# Patient Record
Sex: FEMALE | Race: BLACK OR AFRICAN AMERICAN | Employment: OTHER | URBAN - METROPOLITAN AREA
[De-identification: names, ages, dates, MRNs, and addresses within clinical notes are randomized per-mention and may not be internally consistent; named-entity substitution may affect disease eponyms.]

---

## 2017-08-22 PROBLEM — R56.9 SEIZURE (HCC): Status: ACTIVE | Noted: 2017-08-22

## 2018-08-21 ENCOUNTER — APPOINTMENT (OUTPATIENT)
Dept: GENERAL RADIOLOGY | Age: 83
End: 2018-08-21
Payer: MEDICARE

## 2018-08-21 ENCOUNTER — HOSPITAL ENCOUNTER (OUTPATIENT)
Age: 83
Setting detail: OBSERVATION
Discharge: HOME | End: 2018-08-24
Attending: EMERGENCY MEDICINE | Admitting: INTERNAL MEDICINE
Payer: MEDICARE

## 2018-08-21 ENCOUNTER — APPOINTMENT (OUTPATIENT)
Dept: CT IMAGING | Age: 83
End: 2018-08-21
Payer: MEDICARE

## 2018-08-21 DIAGNOSIS — G45.9 TRANSIENT CEREBRAL ISCHEMIA, UNSPECIFIED TYPE: ICD-10-CM

## 2018-08-21 DIAGNOSIS — I10 ACCELERATED HYPERTENSION: ICD-10-CM

## 2018-08-21 DIAGNOSIS — R55 SYNCOPE AND COLLAPSE: Primary | ICD-10-CM

## 2018-08-21 PROBLEM — I48.0 PAROXYSMAL ATRIAL FIBRILLATION (HCC): Chronic | Status: ACTIVE | Noted: 2018-08-21

## 2018-08-21 LAB
ANION GAP SERPL CALCULATED.3IONS-SCNC: 10 MMOL/L (ref 7–16)
BASOPHILS ABSOLUTE: 0.05 E9/L (ref 0–0.2)
BASOPHILS RELATIVE PERCENT: 0.9 % (ref 0–2)
BUN BLDV-MCNC: 11 MG/DL (ref 8–23)
CALCIUM SERPL-MCNC: 9.4 MG/DL (ref 8.6–10.2)
CHLORIDE BLD-SCNC: 106 MMOL/L (ref 98–107)
CO2: 24 MMOL/L (ref 22–29)
CREAT SERPL-MCNC: 0.9 MG/DL (ref 0.5–1)
EKG ATRIAL RATE: 49 BPM
EKG P AXIS: 57 DEGREES
EKG P-R INTERVAL: 158 MS
EKG Q-T INTERVAL: 444 MS
EKG QRS DURATION: 100 MS
EKG QTC CALCULATION (BAZETT): 401 MS
EKG R AXIS: -47 DEGREES
EKG T AXIS: 30 DEGREES
EKG VENTRICULAR RATE: 49 BPM
EOSINOPHILS ABSOLUTE: 0.11 E9/L (ref 0.05–0.5)
EOSINOPHILS RELATIVE PERCENT: 1.9 % (ref 0–6)
GFR AFRICAN AMERICAN: >60
GFR NON-AFRICAN AMERICAN: >60 ML/MIN/1.73
GLUCOSE BLD-MCNC: 100 MG/DL (ref 74–109)
HCT VFR BLD CALC: 43 % (ref 34–48)
HEMOGLOBIN: 13.6 G/DL (ref 11.5–15.5)
IMMATURE GRANULOCYTES #: 0.01 E9/L
IMMATURE GRANULOCYTES %: 0.2 % (ref 0–5)
INR BLD: 1.3
LYMPHOCYTES ABSOLUTE: 2.05 E9/L (ref 1.5–4)
LYMPHOCYTES RELATIVE PERCENT: 35.1 % (ref 20–42)
MCH RBC QN AUTO: 29.1 PG (ref 26–35)
MCHC RBC AUTO-ENTMCNC: 31.6 % (ref 32–34.5)
MCV RBC AUTO: 91.9 FL (ref 80–99.9)
MONOCYTES ABSOLUTE: 0.57 E9/L (ref 0.1–0.95)
MONOCYTES RELATIVE PERCENT: 9.8 % (ref 2–12)
NEUTROPHILS ABSOLUTE: 3.05 E9/L (ref 1.8–7.3)
NEUTROPHILS RELATIVE PERCENT: 52.1 % (ref 43–80)
PDW BLD-RTO: 14.6 FL (ref 11.5–15)
PLATELET # BLD: 179 E9/L (ref 130–450)
PMV BLD AUTO: 9.7 FL (ref 7–12)
POTASSIUM SERPL-SCNC: 4 MMOL/L (ref 3.5–5)
PROTHROMBIN TIME: 14.4 SEC (ref 9.3–12.4)
RBC # BLD: 4.68 E12/L (ref 3.5–5.5)
SODIUM BLD-SCNC: 140 MMOL/L (ref 132–146)
TROPONIN: <0.01 NG/ML (ref 0–0.03)
WBC # BLD: 5.8 E9/L (ref 4.5–11.5)

## 2018-08-21 PROCEDURE — 85025 COMPLETE CBC W/AUTO DIFF WBC: CPT

## 2018-08-21 PROCEDURE — G0378 HOSPITAL OBSERVATION PER HR: HCPCS

## 2018-08-21 PROCEDURE — 99285 EMERGENCY DEPT VISIT HI MDM: CPT

## 2018-08-21 PROCEDURE — 93005 ELECTROCARDIOGRAM TRACING: CPT | Performed by: NURSE PRACTITIONER

## 2018-08-21 PROCEDURE — 36415 COLL VENOUS BLD VENIPUNCTURE: CPT

## 2018-08-21 PROCEDURE — 70450 CT HEAD/BRAIN W/O DYE: CPT

## 2018-08-21 PROCEDURE — 6360000002 HC RX W HCPCS: Performed by: EMERGENCY MEDICINE

## 2018-08-21 PROCEDURE — 71046 X-RAY EXAM CHEST 2 VIEWS: CPT

## 2018-08-21 PROCEDURE — 6370000000 HC RX 637 (ALT 250 FOR IP): Performed by: EMERGENCY MEDICINE

## 2018-08-21 PROCEDURE — 80048 BASIC METABOLIC PNL TOTAL CA: CPT

## 2018-08-21 PROCEDURE — 84484 ASSAY OF TROPONIN QUANT: CPT

## 2018-08-21 PROCEDURE — 96374 THER/PROPH/DIAG INJ IV PUSH: CPT

## 2018-08-21 PROCEDURE — 85610 PROTHROMBIN TIME: CPT

## 2018-08-21 RX ORDER — HYDRALAZINE HYDROCHLORIDE 10 MG/1
10 TABLET, FILM COATED ORAL 2 TIMES DAILY
Status: ON HOLD | COMMUNITY
End: 2018-08-24 | Stop reason: HOSPADM

## 2018-08-21 RX ORDER — ASPIRIN 81 MG/1
81 TABLET, CHEWABLE ORAL DAILY
Status: DISCONTINUED | OUTPATIENT
Start: 2018-08-22 | End: 2018-08-24 | Stop reason: HOSPADM

## 2018-08-21 RX ORDER — SODIUM CHLORIDE 0.9 % (FLUSH) 0.9 %
10 SYRINGE (ML) INJECTION EVERY 12 HOURS SCHEDULED
Status: DISCONTINUED | OUTPATIENT
Start: 2018-08-22 | End: 2018-08-24 | Stop reason: HOSPADM

## 2018-08-21 RX ORDER — CLONIDINE HYDROCHLORIDE 0.1 MG/1
0.1 TABLET ORAL ONCE
Status: COMPLETED | OUTPATIENT
Start: 2018-08-21 | End: 2018-08-21

## 2018-08-21 RX ORDER — HYDRALAZINE HYDROCHLORIDE 25 MG/1
25 TABLET, FILM COATED ORAL EVERY 8 HOURS SCHEDULED
Status: DISCONTINUED | OUTPATIENT
Start: 2018-08-22 | End: 2018-08-24 | Stop reason: HOSPADM

## 2018-08-21 RX ORDER — ONDANSETRON 2 MG/ML
4 INJECTION INTRAMUSCULAR; INTRAVENOUS EVERY 6 HOURS PRN
Status: DISCONTINUED | OUTPATIENT
Start: 2018-08-21 | End: 2018-08-24 | Stop reason: HOSPADM

## 2018-08-21 RX ORDER — LABETALOL HYDROCHLORIDE 5 MG/ML
10 INJECTION, SOLUTION INTRAVENOUS EVERY 4 HOURS PRN
Status: DISCONTINUED | OUTPATIENT
Start: 2018-08-21 | End: 2018-08-24 | Stop reason: HOSPADM

## 2018-08-21 RX ORDER — SENNA PLUS 8.6 MG/1
1 TABLET ORAL DAILY
Status: DISCONTINUED | OUTPATIENT
Start: 2018-08-22 | End: 2018-08-24 | Stop reason: HOSPADM

## 2018-08-21 RX ORDER — SODIUM CHLORIDE 0.9 % (FLUSH) 0.9 %
10 SYRINGE (ML) INJECTION PRN
Status: DISCONTINUED | OUTPATIENT
Start: 2018-08-21 | End: 2018-08-24 | Stop reason: HOSPADM

## 2018-08-21 RX ORDER — HYDRALAZINE HYDROCHLORIDE 20 MG/ML
10 INJECTION INTRAMUSCULAR; INTRAVENOUS ONCE
Status: COMPLETED | OUTPATIENT
Start: 2018-08-21 | End: 2018-08-21

## 2018-08-21 RX ORDER — HYDRALAZINE HYDROCHLORIDE 25 MG/1
25 TABLET, FILM COATED ORAL ONCE
Status: COMPLETED | OUTPATIENT
Start: 2018-08-21 | End: 2018-08-21

## 2018-08-21 RX ORDER — WARFARIN SODIUM 5 MG/1
5 TABLET ORAL
Status: COMPLETED | OUTPATIENT
Start: 2018-08-22 | End: 2018-08-22

## 2018-08-21 RX ORDER — ATORVASTATIN CALCIUM 10 MG/1
10 TABLET, FILM COATED ORAL NIGHTLY
Status: DISCONTINUED | OUTPATIENT
Start: 2018-08-22 | End: 2018-08-22

## 2018-08-21 RX ORDER — HYDRALAZINE HYDROCHLORIDE 20 MG/ML
10 INJECTION INTRAMUSCULAR; INTRAVENOUS EVERY 6 HOURS PRN
Status: DISCONTINUED | OUTPATIENT
Start: 2018-08-21 | End: 2018-08-24 | Stop reason: HOSPADM

## 2018-08-21 RX ORDER — LISINOPRIL 20 MG/1
20 TABLET ORAL 2 TIMES DAILY
Status: DISCONTINUED | OUTPATIENT
Start: 2018-08-23 | End: 2018-08-24 | Stop reason: HOSPADM

## 2018-08-21 RX ADMIN — HYDRALAZINE HYDROCHLORIDE 10 MG: 20 INJECTION INTRAMUSCULAR; INTRAVENOUS at 19:47

## 2018-08-21 RX ADMIN — HYDRALAZINE HYDROCHLORIDE 25 MG: 25 TABLET, FILM COATED ORAL at 18:15

## 2018-08-21 RX ADMIN — CLONIDINE HYDROCHLORIDE 0.1 MG: 0.1 TABLET ORAL at 19:47

## 2018-08-21 ASSESSMENT — ENCOUNTER SYMPTOMS
NAUSEA: 0
VOMITING: 0
BACK PAIN: 0
COUGH: 0
SHORTNESS OF BREATH: 0
BLOOD IN STOOL: 0
ABDOMINAL PAIN: 0

## 2018-08-21 ASSESSMENT — PAIN SCALES - GENERAL: PAINLEVEL_OUTOF10: 0

## 2018-08-21 NOTE — ED PROVIDER NOTES
FIRST PROVIDER CONTACT ASSESSMENT NOTE      Department of Emergency Medicine   8/21/18  1:07 PM    Chief Complaint: Dizziness (ongoing for about a month)      History of Present Illness:   Geronimo De La Rosa is a 80 y.o. female who presents to the ED for elevated BP and lightheadedness. Patient states BP at home was 170s/98. Patient reports lightheadedness when she stands today. Both elevated BP and lightheadedness have been going on for roughly one month. Patient not always compliant with medication. Denies head injury or trauma. Medical History:  has a past medical history of Arthritis; Hypertension; Ischemic colitis ([address] and PAF (paroxysmal atrial fibrillation) ([address] Surgical History:  has a past surgical history that includes Ankle fracture surgery. Social History:  reports that she has never smoked. She does not have any smokeless tobacco history on file. She reports that she does not drink alcohol or use drugs. Family History: family history is not on file. *ALLERGIES*     Patient has no known allergies.      Physical Exam:      VS:  BP (!) 168/85   Pulse 70   Temp 98.1 °F (36.7 °C)   Resp 14   Ht 5' 6.5\" (1.689 m)   Wt 155 lb (70.3 kg)   SpO2 98%   BMI 24.64 kg/m²      Initial Plan of Care:  Initiate Treatment-Testing, Proceed toTreatment Area When Bed Available for ED Attending/MLP to Continue Care    -----------------END OF FIRST PROVIDER CONTACT ASSESSMENT NOTE--------------  Electronically signed by FRANCES Avila CNP   DD: 8/21/18             FRANCES Avila CNP  08/21/18 7400

## 2018-08-21 NOTE — ED PROVIDER NOTES
Danay Sawyer is an 80 y.o female who presents to the ED with a complaint of dizziness. Patient states over the past month she has been experiencing intermittent lightheadedness. She states it usually occurs in the morning and improves throughout the day. She states she has passed out in the past and was told she has vasovagal syncope. She denies feeling the sensation that the room is spinning. She denies HA, blurry vision, nausea, vomiting, chest pain, sob, diaphoresis, cough or congestion. She denies tinnitus or ear pain. Review of Systems   Constitutional: Negative for chills and fever. Respiratory: Negative for cough and shortness of breath. Cardiovascular: Negative for chest pain. Gastrointestinal: Negative for abdominal pain, blood in stool, nausea and vomiting. Genitourinary: Negative for dysuria and frequency. Musculoskeletal: Negative for back pain. Skin: Negative for rash. Neurological: Positive for light-headedness. Negative for weakness and headaches. All other systems reviewed and are negative. Physical Exam   Constitutional: She is oriented to person, place, and time. She appears well-developed and well-nourished. HENT:   Head: Normocephalic and atraumatic. Eyes: Conjunctivae are normal.   Neck: Normal range of motion. Neck supple. Cardiovascular: Regular rhythm and normal heart sounds. Bradycardia present. No murmur heard. Pulmonary/Chest: Effort normal and breath sounds normal. No respiratory distress. She has no wheezes. She has no rales. Abdominal: Soft. Bowel sounds are normal. There is no tenderness. There is no rebound and no guarding. Musculoskeletal: She exhibits no edema. Neurological: She is alert and oriented to person, place, and time. No cranial nerve deficit. Coordination normal.   Skin: Skin is warm and dry. Nursing note and vitals reviewed.       Procedures    MDM  Number of Diagnoses or Management Options  Transient cerebral Final Result   1. Stable, enlarged cardiomediastinal silhouette. .   2. Tortuous descending thoracic aorta. 3. Vascular calcifications thoracic aorta. EKG: This EKG is signed and interpreted by me. Rate: 49  Rhythm: Sinus  Interpretation: NSR with PACs  Comparison: stable as compared to patient's most recent EKG      ------------------------- NURSING NOTES AND VITALS REVIEWED ---------------------------  Date / Time Roomed:  8/21/2018  5:12 PM  ED Bed Assignment:  24/24    The nursing notes within the ED encounter and vital signs as below have been reviewed. Patient Vitals for the past 24 hrs:   BP Temp Pulse Resp SpO2 Height Weight   08/21/18 2059 (!) 153/79 - 74 18 98 % - -   08/21/18 1952 (!) 178/94 - 66 - - - -   08/21/18 1947 (!) 196/107 - - - - - -   08/21/18 1944 (!) 187/110 - 63 18 98 % - -   08/21/18 1913 (!) 185/91 - (!) 49 22 98 % - -   08/21/18 1815 (!) 187/97 - - - - - -   08/21/18 1737 - - 50 17 98 % - -   08/21/18 1309 (!) 168/85 98.1 °F (36.7 °C) 70 14 98 % 5' 6.5\" (1.689 m) 155 lb (70.3 kg)       Oxygen Saturation Interpretation: Normal    ------------------------------------------ PROGRESS NOTES ------------------------------------------  Re-evaluation(s):  Time: 1921  Patients symptoms show no change  Repeat physical examination is not changed    Counseling:  I have spoken with the patient and discussed todays results, in addition to providing specific details for the plan of care and counseling regarding the diagnosis and prognosis. Their questions are answered at this time and they are agreeable with the plan of admission.    --------------------------------- ADDITIONAL PROVIDER NOTES ---------------------------------  Consultations:  Time: 2130. Spoke with Dr. Dorene Gracia. Discussed case. They will admit the patient.   This patient's ED course included: a personal history and physicial examination and multiple bedside re-evaluations    This patient has remained hemodynamically stable during their ED course. Diagnosis:  1. Transient cerebral ischemia, unspecified type        Disposition:  Patient's disposition: Admit to telemetry  Patient's condition is stable.        Samra Palumbo DO  Resident  08/21/18 2353

## 2018-08-22 ENCOUNTER — APPOINTMENT (OUTPATIENT)
Dept: ULTRASOUND IMAGING | Age: 83
End: 2018-08-22
Payer: MEDICARE

## 2018-08-22 ENCOUNTER — APPOINTMENT (OUTPATIENT)
Dept: MRI IMAGING | Age: 83
End: 2018-08-22
Payer: MEDICARE

## 2018-08-22 LAB
ALBUMIN SERPL-MCNC: 3.6 G/DL (ref 3.5–5.2)
ALP BLD-CCNC: 68 U/L (ref 35–104)
ALT SERPL-CCNC: 26 U/L (ref 0–32)
ANION GAP SERPL CALCULATED.3IONS-SCNC: 11 MMOL/L (ref 7–16)
AST SERPL-CCNC: 29 U/L (ref 0–31)
BASOPHILS ABSOLUTE: 0.04 E9/L (ref 0–0.2)
BASOPHILS RELATIVE PERCENT: 0.7 % (ref 0–2)
BILIRUB SERPL-MCNC: 0.7 MG/DL (ref 0–1.2)
BUN BLDV-MCNC: 12 MG/DL (ref 8–23)
CALCIUM SERPL-MCNC: 9.1 MG/DL (ref 8.6–10.2)
CHLORIDE BLD-SCNC: 107 MMOL/L (ref 98–107)
CHOLESTEROL, TOTAL: 154 MG/DL (ref 0–199)
CO2: 23 MMOL/L (ref 22–29)
CREAT SERPL-MCNC: 0.9 MG/DL (ref 0.5–1)
EOSINOPHILS ABSOLUTE: 0.1 E9/L (ref 0.05–0.5)
EOSINOPHILS RELATIVE PERCENT: 1.7 % (ref 0–6)
GFR AFRICAN AMERICAN: >60
GFR NON-AFRICAN AMERICAN: >60 ML/MIN/1.73
GLUCOSE BLD-MCNC: 122 MG/DL (ref 74–109)
HCT VFR BLD CALC: 39.1 % (ref 34–48)
HDLC SERPL-MCNC: 65 MG/DL
HEMOGLOBIN: 13.3 G/DL (ref 11.5–15.5)
IMMATURE GRANULOCYTES #: 0.01 E9/L
IMMATURE GRANULOCYTES %: 0.2 % (ref 0–5)
LDL CHOLESTEROL CALCULATED: 75 MG/DL (ref 0–99)
LYMPHOCYTES ABSOLUTE: 1.69 E9/L (ref 1.5–4)
LYMPHOCYTES RELATIVE PERCENT: 29.3 % (ref 20–42)
MAGNESIUM: 2 MG/DL (ref 1.6–2.6)
MCH RBC QN AUTO: 30 PG (ref 26–35)
MCHC RBC AUTO-ENTMCNC: 34 % (ref 32–34.5)
MCV RBC AUTO: 88.3 FL (ref 80–99.9)
MONOCYTES ABSOLUTE: 0.58 E9/L (ref 0.1–0.95)
MONOCYTES RELATIVE PERCENT: 10.1 % (ref 2–12)
NEUTROPHILS ABSOLUTE: 3.34 E9/L (ref 1.8–7.3)
NEUTROPHILS RELATIVE PERCENT: 58 % (ref 43–80)
PDW BLD-RTO: 14.5 FL (ref 11.5–15)
PLATELET # BLD: 171 E9/L (ref 130–450)
PMV BLD AUTO: 9.7 FL (ref 7–12)
POTASSIUM SERPL-SCNC: 3.8 MMOL/L (ref 3.5–5)
RBC # BLD: 4.43 E12/L (ref 3.5–5.5)
SODIUM BLD-SCNC: 141 MMOL/L (ref 132–146)
TOTAL PROTEIN: 6.8 G/DL (ref 6.4–8.3)
TRIGL SERPL-MCNC: 70 MG/DL (ref 0–149)
TROPONIN: <0.01 NG/ML (ref 0–0.03)
TROPONIN: <0.01 NG/ML (ref 0–0.03)
VLDLC SERPL CALC-MCNC: 14 MG/DL
WBC # BLD: 5.8 E9/L (ref 4.5–11.5)

## 2018-08-22 PROCEDURE — G8989 SELF CARE D/C STATUS: HCPCS

## 2018-08-22 PROCEDURE — 83735 ASSAY OF MAGNESIUM: CPT

## 2018-08-22 PROCEDURE — 97165 OT EVAL LOW COMPLEX 30 MIN: CPT

## 2018-08-22 PROCEDURE — G0378 HOSPITAL OBSERVATION PER HR: HCPCS

## 2018-08-22 PROCEDURE — 70551 MRI BRAIN STEM W/O DYE: CPT

## 2018-08-22 PROCEDURE — G8988 SELF CARE GOAL STATUS: HCPCS

## 2018-08-22 PROCEDURE — 6370000000 HC RX 637 (ALT 250 FOR IP): Performed by: INTERNAL MEDICINE

## 2018-08-22 PROCEDURE — 2580000003 HC RX 258: Performed by: INTERNAL MEDICINE

## 2018-08-22 PROCEDURE — 36415 COLL VENOUS BLD VENIPUNCTURE: CPT

## 2018-08-22 PROCEDURE — 80053 COMPREHEN METABOLIC PANEL: CPT

## 2018-08-22 PROCEDURE — 80061 LIPID PANEL: CPT

## 2018-08-22 PROCEDURE — 93880 EXTRACRANIAL BILAT STUDY: CPT

## 2018-08-22 PROCEDURE — 85025 COMPLETE CBC W/AUTO DIFF WBC: CPT

## 2018-08-22 PROCEDURE — 84484 ASSAY OF TROPONIN QUANT: CPT

## 2018-08-22 PROCEDURE — G8987 SELF CARE CURRENT STATUS: HCPCS

## 2018-08-22 RX ORDER — ATORVASTATIN CALCIUM 40 MG/1
40 TABLET, FILM COATED ORAL NIGHTLY
Status: DISCONTINUED | OUTPATIENT
Start: 2018-08-22 | End: 2018-08-24 | Stop reason: HOSPADM

## 2018-08-22 RX ORDER — LORAZEPAM 0.5 MG/1
0.5 TABLET ORAL
Status: COMPLETED | OUTPATIENT
Start: 2018-08-22 | End: 2018-08-22

## 2018-08-22 RX ADMIN — WARFARIN SODIUM 5 MG: 5 TABLET ORAL at 17:52

## 2018-08-22 RX ADMIN — METOPROLOL TARTRATE 25 MG: 25 TABLET ORAL at 08:56

## 2018-08-22 RX ADMIN — Medication 10 ML: at 08:56

## 2018-08-22 RX ADMIN — LORAZEPAM 0.5 MG: 0.5 TABLET ORAL at 11:59

## 2018-08-22 RX ADMIN — MAGNESIUM HYDROXIDE 30 ML: 400 SUSPENSION ORAL at 07:07

## 2018-08-22 RX ADMIN — ASPIRIN 81 MG 81 MG: 81 TABLET ORAL at 08:56

## 2018-08-22 RX ADMIN — HYDRALAZINE HYDROCHLORIDE 25 MG: 25 TABLET ORAL at 13:59

## 2018-08-22 RX ADMIN — ATORVASTATIN CALCIUM 10 MG: 10 TABLET, FILM COATED ORAL at 00:50

## 2018-08-22 RX ADMIN — METOPROLOL TARTRATE 25 MG: 25 TABLET ORAL at 00:28

## 2018-08-22 RX ADMIN — SENNOSIDES 8.6 MG: 8.6 TABLET, FILM COATED ORAL at 08:56

## 2018-08-22 RX ADMIN — HYDRALAZINE HYDROCHLORIDE 25 MG: 25 TABLET ORAL at 00:29

## 2018-08-22 RX ADMIN — HYDRALAZINE HYDROCHLORIDE 25 MG: 25 TABLET ORAL at 07:04

## 2018-08-22 ASSESSMENT — PAIN SCALES - GENERAL: PAINLEVEL_OUTOF10: 0

## 2018-08-22 NOTE — CARE COORDINATION
Social Work/Discharge Planning:    SW met with pt, pt stated that she lives with her son, grand daughter and two grandchildren. PTA pt stated that she is independent but use a cane in the community. Pt stated that she does not have any additional DME. Pt denies any hx with ARU/SNF however has hx with [facility]. PCP is Dr. Ivette Rausch and primary [facility] is [facility]. Pt stated that plan at discharge is to return home. Pt stated that her son and grand daughter can assist if needed. Pt stated that she also has a son that lives nearby and three daughters that live in the area that  are  able to assist if needed. SW discussed [address] with pt. Pt agreeable to [address] and would like [facility] after choices. SW made referral to [facility] at [facility], Will need [address] orders prior to d/c. Awaiting PT/OT olaf, AMANDEEP will follow.     Electronically signed by ESTEFANIA Whitehead on 8/22/2018 at 8:42 AM

## 2018-08-22 NOTE — PROGRESS NOTES
Physical Therapy    Facility/Department: [facility]      NAME: Ethan López  : 1931  MRN: [id number]    Date of Service: 2018    PT order received. Chart reviewed. Noted pt independent for OT evaluation. Nursing and pt also confirm, pt moving well in her room. Will discharge PT order at this time. Thank you.       Ary Hartmann PT, DPT [id number]

## 2018-08-22 NOTE — PROGRESS NOTES
Occupational Therapy        OCCUPATIONAL THERAPY INITIAL EVALUATION      Date:2018  Patient Name: Melecio Mcgrath  MRN: [id number]  : 1931  Room: [address] [place] [address]     Evaluating OT: Duque Foot, OTR/L    AM-PAC Daily Activity Scale:     Recommended Adaptive Equipment: none at this time     Diagnosis: syncope/dizziness    Past Medical History:  Past Medical History:   Diagnosis Date    Arthritis     Hypertension     [facility]schem[facility] colitis ([facility]     PAF (paroxysmal atrial fibrillation) ([facility]       Past Surgical History:   Procedure Laterality Date    ANKLE FRACTURE SURGERY       Precautions: fall    Home living:  Pt lives with her son, granddaughter and 2 great grandchildren in a one level home with 2 steps to enter. There is a basement where the laundry is, but she reports that she does not go to the basement. Pt is I with all her care and she has a tub/shower that she uses with a grab bar. Pt uses a SPC for mobility when she goes outside the home.      Occupation:  retired      Pain Level: pt c/o no pain this session    Hearing: intact  Vision: intact      Line and Tubes: PIV    Cognition: oriented x 4  Memory: intact  Attention: intact  Problem Solving: intact  Safety Awareness: intact      UE Assessment:  Hand Dominance: Right [x]  Left []      ROM:  LUE: WFL  RUE: WFL       Strength:  L UE: 5/5 Proximal; 5/5 Distal  R UE: 5/5 Proximal; 5/5 Distal          Functional Assessment:   Initial Status  Comments   Feeding  IND    Grooming  IND      Upper Body Dressing IND     Lower Body Dressing IND Pt independent to manage pajama bottoms during toileting activity   Bathing IND    Toileting  IND Pt was IND to sit<>stand from toilet, IND for hygiene, and IND for LB clothing management   Bed Mobility  Supine to Sit: IND  Sit to Supine:IND For supine<>sit   Functional Transfers IND  With use of SPC   Functional Mobility IND To ambulate to bathroom with SPC     Static Sit balance: good  Dynamic Sit Balance: good  Static Stand balance: good Dynamic Stand Balance: good  Endurance/Activity tolerance: good  Sensation: intact                                  Comments: Upon arrival pt lying supine in bed in NAD and agreeable to OT eval.  At end of session pt seated at edge of bed with all devices within reach, all lines and tubes intact. Assessment of current deficits  No deficits noted at this time  Functional mobility []  ROM [] Strength []  Cognition []  ADLs []   IADLs [] Safety Awareness [] Endurance []  Fine Motor Coordination [] Balance [] Vision/perception [] Sensation []   Gross Motor Coordination []     Eval Complexity: Low  Profile and History- Low  Assessment of Occupational Performance and Identification of Deficits- low  Clinical Decision Making- low    Treatment frequency-no needs       Patient seen this date for OT eval secondary to admittance for syncope. Patient reports that prior to admittance she is IND with all her care and uses a cane when she goes outside for mobility. Patient does not drive and she has assistance from family members for [place] as needed. Pt is currently presenting at her baseline level of IND function and is not in need of any further OT tx. Will DC. Patient and/or family understands diagnosis, prognosis and plan of care: yes         OT Eval: only  Timed Treatment Minutes:     Tamika Pineda OTR/L #[id number]

## 2018-08-23 PROBLEM — E44.0 MODERATE PROTEIN-CALORIE MALNUTRITION (HCC): Chronic | Status: ACTIVE | Noted: 2018-08-23

## 2018-08-23 LAB
INR BLD: 1.8
LV EF: 55 %
LVEF MODALITY: NORMAL
PROTHROMBIN TIME: 21 SEC (ref 9.3–12.4)

## 2018-08-23 PROCEDURE — 93306 TTE W/DOPPLER COMPLETE: CPT

## 2018-08-23 PROCEDURE — 6370000000 HC RX 637 (ALT 250 FOR IP): Performed by: INTERNAL MEDICINE

## 2018-08-23 PROCEDURE — G0378 HOSPITAL OBSERVATION PER HR: HCPCS

## 2018-08-23 PROCEDURE — 85610 PROTHROMBIN TIME: CPT

## 2018-08-23 PROCEDURE — 99231 SBSQ HOSP IP/OBS SF/LOW 25: CPT | Performed by: PSYCHIATRY & NEUROLOGY

## 2018-08-23 PROCEDURE — 2580000003 HC RX 258: Performed by: INTERNAL MEDICINE

## 2018-08-23 PROCEDURE — 36415 COLL VENOUS BLD VENIPUNCTURE: CPT

## 2018-08-23 RX ADMIN — METOPROLOL TARTRATE 12.5 MG: 25 TABLET, FILM COATED ORAL at 08:06

## 2018-08-23 RX ADMIN — METOPROLOL TARTRATE 12.5 MG: 25 TABLET, FILM COATED ORAL at 20:14

## 2018-08-23 RX ADMIN — SENNOSIDES 8.6 MG: 8.6 TABLET, FILM COATED ORAL at 08:07

## 2018-08-23 RX ADMIN — HYDRALAZINE HYDROCHLORIDE 25 MG: 25 TABLET ORAL at 06:32

## 2018-08-23 RX ADMIN — DESMOPRESSIN ACETATE 40 MG: 0.2 TABLET ORAL at 20:14

## 2018-08-23 RX ADMIN — LISINOPRIL 20 MG: 20 TABLET ORAL at 08:06

## 2018-08-23 RX ADMIN — HYDRALAZINE HYDROCHLORIDE 25 MG: 25 TABLET ORAL at 13:54

## 2018-08-23 RX ADMIN — LISINOPRIL 20 MG: 20 TABLET ORAL at 20:14

## 2018-08-23 RX ADMIN — Medication 10 ML: at 08:06

## 2018-08-23 RX ADMIN — HYDRALAZINE HYDROCHLORIDE 25 MG: 25 TABLET ORAL at 22:25

## 2018-08-23 RX ADMIN — ASPIRIN 81 MG 81 MG: 81 TABLET ORAL at 08:06

## 2018-08-23 ASSESSMENT — PAIN SCALES - GENERAL
PAINLEVEL_OUTOF10: 0

## 2018-08-23 NOTE — PROGRESS NOTES
Patient is seen for Syncope    Subjective:     Ms. Cisneros Feeler she feels fine today and wants to go home. She denies any chest pain, shortness of breath, lightheadedness, or dizziness   Laying in bed, in no acute distress     ROS:  CONSTITUTIONAL:  negative for  fevers, chills  HEENT:  negative for earaches, nasal congestion and epistaxis  RESPIRATORY:  negative for  dry cough, cough with sputum,wheezing and hemoptysis  GASTROINTESTINAL:  negative for nausea, vomiting  MUSCULOSKELETAL:  negative for  myalgias, arthralgias  NEUROLOGICAL:  negative for visual disturbance, dysphagia    Medication side effects: none    Scheduled Meds:   atorvastatin  40 mg Oral Nightly    metoprolol tartrate  12.5 mg Oral BID    aspirin  81 mg Oral Daily    hydrALAZINE  25 mg Oral 3 times per day    lisinopril  20 mg Oral BID    senna  1 tablet Oral Daily    sodium chloride flush  10 mL Intravenous 2 times per day     Continuous Infusions:  PRN Meds:perflutren lipid microspheres, sodium chloride flush, magnesium hydroxide, ondansetron, hydrALAZINE, labetalol      Objective:      Physical Exam:   /77   Pulse 76   Temp 98.8 °F (37.1 °C) (Temporal)   Resp 16   Ht 5' 6.5\" (1.689 m)   Wt 155 lb (70.3 kg)   SpO2 98%   BMI 24.64 kg/m²   CONSTITUTIONAL:  awake, alert, cooperative, no apparent distress, and appears stated age  NECK:  Supple, symmetrical, trachea midline  LUNGS:  No increased work of breathing, good air exchange, clear to auscultation bilaterally, no crackles or wheezing  CARDIOVASCULAR:  Normal apical impulse, regular rate and rhythm, normal S1 and S2, no S3 or S4, 3/6 systolic murmur at the apex, 3/6 diastolic murmur at the right upper sternal border, no JVD, no carotid bruit, no pedal edema, good carotid upstroke bilaterally. ABDOMEN:  Soft, nontender, no masses, no hepatomegaly or splenomegaly, BS+  CHEST: nontender to palpation, expands symmetrically  MUSCULOSKELETAL:  No clubbing no cyanosis. there is no redness, warmth, or swelling of the joints  full range of motion noted  NEUROLOGIC:  Alert, awake  SKIN:  no bruising or bleeding, normal skin color, texture, turgor and no redness, warmth, or swelling    Cardiographics  I personally reviewed the telemetry monitor strips with the following interpretation: atrial fibrillation with controled ventricular rate     Echocardiogram: 12/3/2015,Summary   No previous echo for comparison. Technically adequate study.   Mild asymmetric hypertrophy.   Ejection fraction is visually estimated at 45-50%.   No regional wall motion abnormalities seen.  E/A flow reversal noted. Suggestive of diastolic dysfunction.   mild aortic regurgitation is noted.   Mild tricuspid regurgitation.       Imaging  US CAROTID ARTERY BILATERAL   Final Result   1. No evidence to suggest hemodynamically significant stenosis. MRI Brain WO Contrast   Final Result      1. Negative acute stroke, midline shift mass effect. 2. Moderate chronic small ischemic disease. 3. Multifocal lacunar infarcts involving the cerebellum          CT Head WO Contrast   Final Result   1. No CT evidence of acute intracranial abnormality, as questioned. If   there is further clinical concern, MRI of the brain would be   recommended for more detailed evaluation. .   2. Moderate cerebral volume loss/atrophy. 3. Moderate to moderately severe white matter changes consistent with   sequelae small vessel ischemic disease. 4. Vascular calcifications within the bilateral internal carotid   artery cavernous segments and the vertebral arteries. .   5. Moderate hyperostosis frontalis interna. XR CHEST STANDARD (2 VW)   Final Result   1. Stable, enlarged cardiomediastinal silhouette. .   2. Tortuous descending thoracic aorta. 3. Vascular calcifications thoracic aorta.           Lab Review   Lab Results   Component Value Date     08/22/2018     08/21/2018     02/05/2018    K 3.8 08/22/2018    K 4.0

## 2018-08-23 NOTE — PROGRESS NOTES
Pt woke up pleasantly confused, trying to find the bathroom. Pt was observed attempting to crawl into bed with her roommate. RN intervened & reoriented pt back to her own bed.

## 2018-08-23 NOTE — CONSULTS
Inpatient consult to Neurology  Consult performed by: Nakul Ivy ordered by: Lyndon MORAES        Neurology Consult Note    8/23/2018     REASON FOR CONSULTATION: stroke     HISTORY OF PRESENT ILLNESS:   Patient is an 80-year-old woman presented here with positional dizziness as lightheadedness occurring the past 3 days. Blood pressure has been fluctuating from very high to low. She denies any kind of a spinning sensation. No dysphagia or facial numbness changing voice, weakness or numbness in face and extremities. Past Medical History:   Diagnosis Date    Arthritis     Hypertension     Ischemic colitis ([address]     PA[facility]oxysmal [facility] fibrillation) ([facility])         Past Surgical History:   Procedure Laterality Date    ANKLE FRACTURE SURGERY         Prior to Admission medications    Medication Sig Start Date End Date Taking? Authorizing Provider   hydrALAZINE (APRESOLINE) 10 MG tablet Take 10 mg by mouth 2 times daily One in the A.M.  And at 3pm   Yes Historical Provider, MD   senna (SENOKOT) 8.6 MG TABS tablet Take 1 tablet by mouth daily 2/5/18  Yes FRANCES Pascual - CNP   atorvastatin (LIPITOR) 10 MG tablet Take 1 tablet by mouth nightly 8/25/17  Yes Shayy Taylor,    hydrALAZINE (APRESOLINE) 25 MG tablet Take 1 tablet by mouth every 8 hours  Patient taking differently: Take 25 mg by mouth every evening  8/25/17  Yes Heidi Butler, DO   metoprolol tartrate (LOPRESSOR) 25 MG tablet Take 1 tablet by mouth 2 times daily 8/25/17  Yes Heidi Butler DO   warfarin (JANTOVEN) 6 MG tablet Take 6 mg by mouth daily    Yes Historical Provider, MD   vitamin B-12 (CYANOCOBALAMIN) 500 MCG tablet Take 500 mcg by mouth daily   Yes Historical Provider, MD   lisinopril (PRINIVIL;ZESTRIL) 20 MG tablet Take 1 tablet by mouth 2 times daily  Patient taking differently: Take 40 mg by mouth daily  12/5/15  Yes Tanisha Pritchard, DO   vitamin D (CHOLECALCIFEROL) 1000 UNITS TABS tablet Take 1,000 Units by mouth daily   Yes Historical Provider, MD   aspirin 81 MG chewable tablet Take 81 mg by mouth daily. Yes Historical Provider, MD       Allergies:  Patient has no known allergies. History reviewed. No pertinent family history. Social History   Substance Use Topics    Smoking status: Never Smoker    Smokeless tobacco: Never Used    Alcohol use No          ROS:  GENERAL:  No weight change, change in appetite, fever or chills. HEENT:  No headache, blurred or double vision. CARDIOPULMONARY:  No chest pain, palpitations or shortness of breath. GASTROINTESTINAL:  No anorexia, nausea or vomiting, no diarrhea  GENITOURINARY:  No dysuria  ENDOCRINE:  No heat/cold intolerance, no excessive thirst.  HEMATOLOGY/ONCOLOGY:  No bruising or bleeding. MUSCULOSKELETAL: No swelling. PSYCHIATRIC:  No change in personality, affect or depression. EXAMINATION:  /77   Pulse 76   Temp 98.8 °F (37.1 °C) (Temporal)   Resp 16   Ht 5' 6.5\" (1.689 m)   Wt 148 lb 14.4 oz (67.5 kg)   SpO2 98%   BMI 23.67 kg/m²   GEN: NAD    AAOx3, follows commands, no aphasia/dysarthria. PERRL, EOMI, VFF, normal saccades and pursuit, no gaze preference/nystagmus. Intact facial sensation, no asymmetry. His heart of hearing bilaterally. Tongue midline. Symmetric palate elevation. Neck muscles and shoulder shrug 5/5. Sensation: intact all over (PP, LT, pain, vibration and proprioception), no neglect. Motor: 5/5 all four extremities. Normal bulk and tone, No drift/orbiting. DTRs: +2 biceps/triceps/BR/Knees, +1 ankles, plantars down B/L. Coordination: intact F-N and H-S B/L. No dysmetria. Intact KEKE.  Gait: Walks with a cane and provided the daughter is walking fine at her baseline      ASSESSMENT:  80year-old woman with positional lightheadedness without any spinning components. Neurology was consulted because of question of a cerebellar lower stroke.  These small lacunar strokes  In cerebellum are part of the chronic small vessel disease that can be seen periventricularly in bilateral hemispheres. No acute distress in DWI. She is already on aspirin 81 mg and she takes Coumadin because of the paroxysmal afib. PLAN:   No further neurology investigation or treatment. Aspirin and Coumadin can be continued per discretion of primary team and cardiology. Neurology will sign off. Please call us with questions/additional, persistent or new concerns.       Electronically signed by Sonia Escobedo MD on 8/23/2018 at 7:37 PM

## 2018-08-23 NOTE — PROGRESS NOTES
Nutrition Assessment    Type and Reason for Visit: Initial, Positive Nutrition Screen    Nutrition Recommendations: Continue current diet, Start Vanilla Ensure BID    Malnutrition Assessment:  · Malnutrition Status: Meets the criteria for moderate malnutrition  · Context: Acute illness or injury  · Findings of the 6 clinical characteristics of malnutrition (Minimum of 2 out of 6 clinical characteristics is required to make the diagnosis of moderate or severe Protein Calorie Malnutrition based on AND/ASPEN Guidelines):  1. Energy Intake-Greater than 75%,  (x 2 days)    2. Weight Loss-No significant weight loss, in 1 year  3. Fat Loss-Mild subcutaneous fat loss, Orbital, Triceps  4. Muscle Loss-Moderate muscle mass loss, Temples (temporalis muscle), Clavicles (pectoralis and deltoids)  5. Fluid Accumulation-No significant fluid accumulation  6.  Strength-Not measured    Nutrition Diagnosis:   · Problem:  Moderate malnutrition, in context of acute illness or injury  · Etiology: related to Insufficient energy/nutrient consumption     Signs and symptoms:  as evidenced by Moderate muscle loss (mild fat loss)    Nutrition Assessment:  · Nutrition-Focused Physical Findings: pt alert, some confusion, abd WDL, no noted edema, +I/O's  · Wound Type: None  · Current Nutrition Therapies:  · Oral Diet Orders: General   · Oral Diet intake: %  · Oral Nutrition Supplement (ONS) Orders: None  · Anthropometric Measures:  · Ht: 5' 6.5\" (168.9 cm)   · Current Body Wt: 148 lb (67.1 kg) (8/23 bed scale)  · Admission Body Wt: 148 lb (67.1 kg) (8/23 first measured)  · Usual Body Wt: 149 lb (67.6 kg) (08/2017 standing, actual EMR)  · % Weight Change: wt stable x 1 yr  · Ideal Body Wt: 135 lb (61.2 kg), % Ideal Body 110%  · BMI Classification: BMI 18.5 - 24.9 Normal Weight  · Comparative Standards (Estimated Nutrition Needs):  · Estimated Daily Total Kcal: 6611-2780 (MSJ 1152 x 1.2 SF)  · Estimated Daily Protein (g):

## 2018-08-24 VITALS
OXYGEN SATURATION: 97 % | HEART RATE: 63 BPM | HEIGHT: 67 IN | TEMPERATURE: 98 F | WEIGHT: 148.9 LBS | DIASTOLIC BLOOD PRESSURE: 83 MMHG | SYSTOLIC BLOOD PRESSURE: 141 MMHG | BODY MASS INDEX: 23.37 KG/M2 | RESPIRATION RATE: 18 BRPM

## 2018-08-24 PROCEDURE — 6370000000 HC RX 637 (ALT 250 FOR IP): Performed by: INTERNAL MEDICINE

## 2018-08-24 PROCEDURE — G0378 HOSPITAL OBSERVATION PER HR: HCPCS

## 2018-08-24 RX ORDER — WARFARIN SODIUM 2 MG/1
2 TABLET ORAL DAILY
Status: DISCONTINUED | OUTPATIENT
Start: 2018-08-24 | End: 2018-08-24 | Stop reason: HOSPADM

## 2018-08-24 RX ADMIN — LISINOPRIL 20 MG: 20 TABLET ORAL at 09:00

## 2018-08-24 RX ADMIN — HYDRALAZINE HYDROCHLORIDE 25 MG: 25 TABLET ORAL at 13:12

## 2018-08-24 RX ADMIN — SENNOSIDES 8.6 MG: 8.6 TABLET, FILM COATED ORAL at 09:05

## 2018-08-24 RX ADMIN — ASPIRIN 81 MG 81 MG: 81 TABLET ORAL at 09:00

## 2018-08-24 RX ADMIN — HYDRALAZINE HYDROCHLORIDE 25 MG: 25 TABLET ORAL at 06:29

## 2018-08-24 RX ADMIN — METOPROLOL TARTRATE 12.5 MG: 25 TABLET, FILM COATED ORAL at 09:01

## 2018-08-24 RX ADMIN — MAGNESIUM HYDROXIDE 30 ML: 400 SUSPENSION ORAL at 09:05

## 2018-08-24 ASSESSMENT — PAIN SCALES - GENERAL: PAINLEVEL_OUTOF10: 0

## 2018-08-24 NOTE — DISCHARGE SUMMARY
Physician Discharge Summary     Patient ID:  Leopoldo Del  [id number]  80 y.o.  1931    Admit date: 2018    Discharge date and time: 2018    Admission Diagnoses:   Patient Active Problem List   Diagnosis    Seizure [facility])    Essential hypertension    Paroxysmal atrial fibrillation (HCC)    Syncope and collapse    Moderate protein-calorie malnutrition ([facility] [address]       Discharge Diagnoses: near syncope - likely vasovagal, cerebellar strokes lacunar     Consults:neuro and cards     Procedures: imaging work up unremarkable except cerebellar strokes     Hospital Course:      The patient is a 80 y.o. female of Tuyet Cardoso MD with significant past medical history of htn / colitis ? PAF  on oac with coumadin   who presents with above complaints. She indicates she had a stroke 1 month ago. I do not see documentation of this on our records. Headache w/o visual changes. No focal neurological deficits are noted. Last MRI was 1 year ago. She was evalauted by cards and neurology at that tome and was thought to have vasovagal syncope. Ef was noted to be 50%. On [facility] admission, ct head is wihtou acute stroke. Will await mri . BP is noted to be markedly elevated in 190's range. Pt underwent echo and eval by cards and neuro. She is doing well. She was placed on asa and bb secondary to cerebellar stroke. bp better controlled.  [facility]     Recent Labs      18   1348  18   0323   WBC  5.8  5.8   HGB  13.6  13.3   HCT  43.0  39.1   PLT  179  171       Recent Labs      18   1348  18   0323   NA  140  141   K  4.0  3.8   CL  106  107   CO2  24  23   BUN  11  12   CREATININE  0.9  0.9   CALCIUM  9.4  9.1       Xr Chest Standard (2 Vw)    Result Date: 2018  Patient MRN: [id number] : 1931 Age:  80 years Gender: Female Order Date: 2018 2:15 PM Exam: XR CHEST (2 VW) Number of Views: 2 Indication:   Dizziness with elevated blood pressure, patient reports a stroke one month ischemic disease. 4. Vascular calcifications within the bilateral internal carotid artery cavernous segments and the vertebral arteries. . 5. Moderate hyperostosis frontalis interna. Mri Brain Wo Contrast    Result Date: 2018  Patient MRN:  [id number] : 1931 Age: 80 years Gender: Female EXAM: MRI BRAIN WO CONTRAST INDICATION:  tia/stroke  headache, near syncope, lightheadedness history of hypertension COMPARISON: MRI brain 2017, CT head 2018 FINDINGS: No restricted diffusion. Mild to moderate age-related involutional changes of brain parenchyma with prominence of ventricles and sulci. Moderate periventricular and subcortical white matter T2/FLAIR hyperintensity is nonspecific but suggestive of chronic small vessel ischemic disease. Mild patchy white matter disease identified by the pamella and midbrain. Remote lacunar infarct left lentiform nucleus. Small remote lacunar infarcts both cerebellar hemispheres. No shift of midline structure. Basal cisterns are patent. Minimal fluid both mastoids. 1. Negative acute stroke, midline shift mass effect. 2. Moderate chronic small ischemic disease. 3. Multifocal lacunar infarcts involving the cerebellum      Us Carotid Artery Bilateral    Result Date: 2018  Patient MRN:  [id number] : 1931 Age: 80 years Gender: Female Order Date:  2018 10:30 AM EXAM: US CAROTID ARTERY BILATERAL NUMBER OF IMAGES:  68 TECHNIQUE: Duplex carotid ultrasound with color-flow Doppler, and a velocity blood flow characteristic and spectral analysis of the carotid arteries were obtained and documented. INDICATION:  Patient is an 14-year-old woman with history of stroke  COMPARISON: 2009 FINDINGS: Bilateral CCA, ICA and ECA are patent. The right internal carotid artery has a peak systolic velocity of 39.7 cm/sec proximally with an  internal to common carotid artery ratio of 1.1.  Based on Gosink criteria, no evidence of hemodynamically significant stenosis is seen. The left internal carotid artery has a peak systolic velocity of 15.9 cm/sec proximally with an internal to common carotid artery ratio of 1.6. Based on Gosink criteria, no evidence of hemodynamically significant stenosis is seen. Bilateral antegrade vertebral artery flow is seen. Mild atherosclerotic plaque formation is seen on the right without evidence of hemodynamically significant stenosis. Mild atherosclerotic plaque formation is seen on the left without evidence of hemodynamically significant stenosis. An incidental finding, complex area of right thyroid lobe measuring 2.2 x 2 x 2 cm. Thyroid ultrasound for follow-up is recommended. 1. No evidence to suggest hemodynamically significant stenosis. Discharge Exam:    HEENT: NCAT,  PERRLA, No JVD  Heart:  RRR, no murmurs, gallops, or rubs. Lungs:  CTA bilaterally, no wheeze, rales or rhonchi  Abd: bowel sounds present, nontender, nondistended, no masses  Extrem:  No clubbing, cyanosis, or edema    Disposition: home    Patient Instructions:      Medication List      CHANGE how you take these medications    * hydrALAZINE 25 MG tablet  Commonly known as:  APRESOLINE  Take 1 tablet by mouth every 8 hours  What changed:  when to take this     * hydrALAZINE 10 MG tablet  Commonly known as:  APRESOLINE  What changed:  Another medication with the same name was changed. Make sure you understand how and when to take each. lisinopril 20 MG tablet  Commonly known as:  PRINIVIL;ZESTRIL  Take 1 tablet by mouth 2 times daily  What changed:  · how much to take  · when to take this        * This list has 2 medication(s) that are the same as other medications prescribed for you. Read the directions carefully, and ask your doctor or other care provider to review them with you.             CONTINUE taking these medications    aspirin 81 MG chewable tablet     atorvastatin 10 MG tablet  Commonly known as:  LIPITOR  Take 1 tablet by mouth nightly     JANTOVEN 6 MG tablet  Generic drug:  warfarin     metoprolol tartrate 25 MG tablet  Commonly known as:  LOPRESSOR  Take 1 tablet by mouth 2 times daily     senna 8.6 MG Tabs tablet  Commonly known as:  SENOKOT  Take 1 tablet by mouth daily     vitamin B-12 500 MCG tablet  Commonly known as:  CYANOCOBALAMIN     vitamin D 1000 units Tabs tablet  Commonly known as:  CHOLECALCIFEROL          Activity: activity as tolerated  Diet: low fat, low cholesterol diet    Pt has been advised to: Follow-up with Patrizia Malone MD in 1 week.   Follow-up with consultants as recommended by them    Note that over 30 minutes was spent in preparing discharge papers, discussing discharge with patient, medication review, etc.    Signed:  Donya Oquendo MD  8/24/2018  12:23 PM

## 2018-08-24 NOTE — PROGRESS NOTES
bilateral internal carotid   artery cavernous segments and the vertebral arteries. .   5. Moderate hyperostosis frontalis interna. XR CHEST STANDARD (2 VW)   Final Result   1. Stable, enlarged cardiomediastinal silhouette. .   2. Tortuous descending thoracic aorta. 3. Vascular calcifications thoracic aorta.           Lab Review   Lab Results   Component Value Date     08/22/2018     08/21/2018     02/05/2018    K 3.8 08/22/2018    K 4.0 08/21/2018    K 3.8 02/05/2018    CO2 23 08/22/2018    CO2 24 08/21/2018    CO2 20 02/05/2018    BUN 12 08/22/2018    BUN 11 08/21/2018    BUN 9 02/05/2018    CREATININE 0.9 08/22/2018    CREATININE 0.9 08/21/2018    CREATININE 1.0 02/05/2018    GLUCOSE 122 08/22/2018    GLUCOSE 100 08/21/2018    GLUCOSE 118 02/05/2018    GLUCOSE 102 11/12/2010    GLUCOSE 111 11/09/2010    GLUCOSE 100 10/20/2010    CALCIUM 9.1 08/22/2018    CALCIUM 9.4 08/21/2018    CALCIUM 9.2 02/05/2018     Lab Results   Component Value Date    CKTOTAL 266 12/02/2015    CKTOTAL 252 12/01/2015    CKTOTAL 160 11/12/2010    CKMB 2.6 12/02/2015    CKMB 2.7 12/01/2015    CKMB 2.2 11/12/2010    TROPONINI <0.01 08/22/2018    TROPONINI <0.01 08/21/2018    TROPONINI <0.01 08/21/2018     Lab Results   Component Value Date    WBC 5.8 08/22/2018    WBC 5.8 08/21/2018    WBC 6.0 02/05/2018    HGB 13.3 08/22/2018    HGB 13.6 08/21/2018    HGB 13.2 02/05/2018    HCT 39.1 08/22/2018    HCT 43.0 08/21/2018    HCT 40.1 02/05/2018    MCV 88.3 08/22/2018    MCV 91.9 08/21/2018    MCV 88.7 02/05/2018     08/22/2018     08/21/2018     02/05/2018     Lab Results   Component Value Date    CHOL 154 08/22/2018    CHOL 182 08/24/2017    TRIG 70 08/22/2018    TRIG 58 08/24/2017    HDL 65 08/22/2018    HDL 62 08/24/2017     I have personally reviewed the laboratory, cardiac diagnostic and radiographic testing as outlined above:    Assessment:       Patient Active Problem List    Diagnosis Date Noted

## 2019-04-15 ENCOUNTER — APPOINTMENT (OUTPATIENT)
Dept: CT IMAGING | Age: 84
End: 2019-04-15
Payer: MEDICARE

## 2019-04-15 ENCOUNTER — HOSPITAL ENCOUNTER (OUTPATIENT)
Age: 84
Setting detail: OBSERVATION
Discharge: HOME | End: 2019-04-16
Attending: EMERGENCY MEDICINE | Admitting: INTERNAL MEDICINE
Payer: MEDICARE

## 2019-04-15 ENCOUNTER — APPOINTMENT (OUTPATIENT)
Dept: GENERAL RADIOLOGY | Age: 84
End: 2019-04-15
Payer: MEDICARE

## 2019-04-15 DIAGNOSIS — R55 SYNCOPE AND COLLAPSE: Primary | ICD-10-CM

## 2019-04-15 LAB
ALBUMIN SERPL-MCNC: 3.5 G/DL (ref 3.5–5.2)
ALP BLD-CCNC: 69 U/L (ref 35–104)
ALT SERPL-CCNC: 26 U/L (ref 0–32)
ANION GAP SERPL CALCULATED.3IONS-SCNC: 8 MMOL/L (ref 7–16)
AST SERPL-CCNC: 34 U/L (ref 0–31)
BILIRUB SERPL-MCNC: 1 MG/DL (ref 0–1.2)
BUN BLDV-MCNC: 10 MG/DL (ref 8–23)
CALCIUM SERPL-MCNC: 9 MG/DL (ref 8.6–10.2)
CHLORIDE BLD-SCNC: 108 MMOL/L (ref 98–107)
CO2: 23 MMOL/L (ref 22–29)
CREAT SERPL-MCNC: 0.7 MG/DL (ref 0.5–1)
EKG ATRIAL RATE: 91 BPM
EKG P AXIS: 45 DEGREES
EKG P-R INTERVAL: 156 MS
EKG Q-T INTERVAL: 396 MS
EKG QRS DURATION: 96 MS
EKG QTC CALCULATION (BAZETT): 487 MS
EKG R AXIS: -52 DEGREES
EKG T AXIS: 69 DEGREES
EKG VENTRICULAR RATE: 91 BPM
GFR AFRICAN AMERICAN: >60
GFR NON-AFRICAN AMERICAN: >60 ML/MIN/1.73
GLUCOSE BLD-MCNC: 99 MG/DL (ref 74–99)
HCT VFR BLD CALC: 40.6 % (ref 34–48)
HEMOGLOBIN: 13.5 G/DL (ref 11.5–15.5)
INR BLD: 1.5
MCH RBC QN AUTO: 29.7 PG (ref 26–35)
MCHC RBC AUTO-ENTMCNC: 33.3 % (ref 32–34.5)
MCV RBC AUTO: 89.2 FL (ref 80–99.9)
PDW BLD-RTO: 14.4 FL (ref 11.5–15)
PLATELET # BLD: 156 E9/L (ref 130–450)
PMV BLD AUTO: 9.8 FL (ref 7–12)
POTASSIUM SERPL-SCNC: 3.8 MMOL/L (ref 3.5–5)
PROTHROMBIN TIME: 17 SEC (ref 9.3–12.4)
RBC # BLD: 4.55 E12/L (ref 3.5–5.5)
SODIUM BLD-SCNC: 139 MMOL/L (ref 132–146)
TOTAL PROTEIN: 7 G/DL (ref 6.4–8.3)
TROPONIN: <0.01 NG/ML (ref 0–0.03)
WBC # BLD: 6.2 E9/L (ref 4.5–11.5)

## 2019-04-15 PROCEDURE — 85027 COMPLETE CBC AUTOMATED: CPT

## 2019-04-15 PROCEDURE — 80053 COMPREHEN METABOLIC PANEL: CPT

## 2019-04-15 PROCEDURE — 97165 OT EVAL LOW COMPLEX 30 MIN: CPT

## 2019-04-15 PROCEDURE — G0378 HOSPITAL OBSERVATION PER HR: HCPCS

## 2019-04-15 PROCEDURE — 70450 CT HEAD/BRAIN W/O DYE: CPT

## 2019-04-15 PROCEDURE — 85610 PROTHROMBIN TIME: CPT

## 2019-04-15 PROCEDURE — 93005 ELECTROCARDIOGRAM TRACING: CPT | Performed by: EMERGENCY MEDICINE

## 2019-04-15 PROCEDURE — 99218 PR INITIAL OBSERVATION CARE/DAY 30 MINUTES: CPT | Performed by: PSYCHIATRY & NEUROLOGY

## 2019-04-15 PROCEDURE — 99285 EMERGENCY DEPT VISIT HI MDM: CPT

## 2019-04-15 PROCEDURE — 97161 PT EVAL LOW COMPLEX 20 MIN: CPT | Performed by: PHYSICAL THERAPIST

## 2019-04-15 PROCEDURE — 6370000000 HC RX 637 (ALT 250 FOR IP): Performed by: INTERNAL MEDICINE

## 2019-04-15 PROCEDURE — 2580000003 HC RX 258: Performed by: INTERNAL MEDICINE

## 2019-04-15 PROCEDURE — 36415 COLL VENOUS BLD VENIPUNCTURE: CPT

## 2019-04-15 PROCEDURE — 96360 HYDRATION IV INFUSION INIT: CPT

## 2019-04-15 PROCEDURE — 96361 HYDRATE IV INFUSION ADD-ON: CPT

## 2019-04-15 PROCEDURE — 84484 ASSAY OF TROPONIN QUANT: CPT

## 2019-04-15 PROCEDURE — 71045 X-RAY EXAM CHEST 1 VIEW: CPT

## 2019-04-15 RX ORDER — HYDRALAZINE HYDROCHLORIDE 25 MG/1
25 TABLET, FILM COATED ORAL EVERY EVENING
Status: DISCONTINUED | OUTPATIENT
Start: 2019-04-15 | End: 2019-04-15

## 2019-04-15 RX ORDER — ACETAMINOPHEN 325 MG/1
650 TABLET ORAL EVERY 4 HOURS PRN
Status: DISCONTINUED | OUTPATIENT
Start: 2019-04-15 | End: 2019-04-16 | Stop reason: HOSPADM

## 2019-04-15 RX ORDER — SODIUM CHLORIDE 0.9 % (FLUSH) 0.9 %
10 SYRINGE (ML) INJECTION PRN
Status: DISCONTINUED | OUTPATIENT
Start: 2019-04-15 | End: 2019-04-16 | Stop reason: HOSPADM

## 2019-04-15 RX ORDER — SODIUM CHLORIDE 9 MG/ML
INJECTION, SOLUTION INTRAVENOUS CONTINUOUS
Status: ACTIVE | OUTPATIENT
Start: 2019-04-15 | End: 2019-04-15

## 2019-04-15 RX ORDER — SENNA PLUS 8.6 MG/1
1 TABLET ORAL DAILY
Status: DISCONTINUED | OUTPATIENT
Start: 2019-04-15 | End: 2019-04-16 | Stop reason: HOSPADM

## 2019-04-15 RX ORDER — HYDRALAZINE HYDROCHLORIDE 25 MG/1
25 TABLET, FILM COATED ORAL EVERY 8 HOURS SCHEDULED
Status: DISCONTINUED | OUTPATIENT
Start: 2019-04-16 | End: 2019-04-16 | Stop reason: HOSPADM

## 2019-04-15 RX ORDER — HYDRALAZINE HYDROCHLORIDE 10 MG/1
10 TABLET, FILM COATED ORAL 2 TIMES DAILY
Status: ON HOLD | COMMUNITY
End: 2019-04-16 | Stop reason: HOSPADM

## 2019-04-15 RX ORDER — ONDANSETRON 2 MG/ML
4 INJECTION INTRAMUSCULAR; INTRAVENOUS EVERY 6 HOURS PRN
Status: DISCONTINUED | OUTPATIENT
Start: 2019-04-15 | End: 2019-04-16 | Stop reason: HOSPADM

## 2019-04-15 RX ORDER — LISINOPRIL 20 MG/1
40 TABLET ORAL DAILY
Status: DISCONTINUED | OUTPATIENT
Start: 2019-04-15 | End: 2019-04-16 | Stop reason: HOSPADM

## 2019-04-15 RX ORDER — SODIUM CHLORIDE 0.9 % (FLUSH) 0.9 %
10 SYRINGE (ML) INJECTION EVERY 12 HOURS SCHEDULED
Status: DISCONTINUED | OUTPATIENT
Start: 2019-04-15 | End: 2019-04-16 | Stop reason: HOSPADM

## 2019-04-15 RX ORDER — WARFARIN SODIUM 5 MG/1
5 TABLET ORAL
Status: COMPLETED | OUTPATIENT
Start: 2019-04-15 | End: 2019-04-15

## 2019-04-15 RX ORDER — ASPIRIN 81 MG/1
81 TABLET, CHEWABLE ORAL DAILY
Status: DISCONTINUED | OUTPATIENT
Start: 2019-04-15 | End: 2019-04-16 | Stop reason: HOSPADM

## 2019-04-15 RX ORDER — HYDRALAZINE HYDROCHLORIDE 25 MG/1
25 TABLET, FILM COATED ORAL EVERY EVENING
Status: ON HOLD | COMMUNITY
End: 2019-04-16 | Stop reason: HOSPADM

## 2019-04-15 RX ADMIN — HYDRALAZINE HYDROCHLORIDE 25 MG: 25 TABLET, FILM COATED ORAL at 18:37

## 2019-04-15 RX ADMIN — Medication 10 ML: at 12:27

## 2019-04-15 RX ADMIN — ASPIRIN 81 MG 81 MG: 81 TABLET ORAL at 12:26

## 2019-04-15 RX ADMIN — WARFARIN SODIUM 5 MG: 5 TABLET ORAL at 18:37

## 2019-04-15 RX ADMIN — METOPROLOL TARTRATE 25 MG: 25 TABLET ORAL at 21:11

## 2019-04-15 RX ADMIN — SODIUM CHLORIDE: 9 INJECTION, SOLUTION INTRAVENOUS at 12:26

## 2019-04-15 RX ADMIN — METOPROLOL TARTRATE 25 MG: 25 TABLET ORAL at 12:26

## 2019-04-15 NOTE — CONSULTS
Jaleesa Stanton is a 80 y.o. right handed female    Neurology was consulted for seizure    Past Medical History:     Past Medical History:   Diagnosis Date    Arthritis     Hypertension     Ischemic colitis ([facility] [address]     PAF (paroxysmal atrial fibrillation) ([facility] [address]        Past Surgical History:     Past Surgical History:   Procedure Laterality Date    ANKLE FRACTURE SURGERY         Allergies:     Patient has no known allergies. Medications:     Prior to Admission medications    Medication Sig Start Date End Date Taking? Authorizing Provider   aspirin 81 MG tablet Take 81 mg by mouth daily   Yes Historical Provider, MD   hydrALAZINE (APRESOLINE) 25 MG tablet Take 25 mg by mouth every evening   Yes Historical Provider, MD   hydrALAZINE (APRESOLINE) 10 MG tablet Take 10 mg by mouth 2 times daily   Yes Historical Provider, MD   senna (SENOKOT) 8.6 MG TABS tablet Take 1 tablet by mouth daily 2/5/18  Yes FRANCES Dwyer CNP   metoprolol tartrate (LOPRESSOR) 25 MG tablet Take 1 tablet by mouth 2 times daily 8/25/17  Yes Jeanette Chaparro DO   warfarin (JANTOVEN) 5 MG tablet Take 5 mg by mouth daily    Yes Historical Provider, MD   vitamin B-12 (CYANOCOBALAMIN) 500 MCG tablet Take 500 mcg by mouth daily   Yes Historical Provider, MD   vitamin D (CHOLECALCIFEROL) 1000 UNITS TABS tablet Take 1,000 Units by mouth daily   Yes Historical Provider, MD       Social History:     Denies ETOH, tobacco, or illicit drugs    Review of Systems:     No chest pain  Positive for palpitations  No SOB  Positive for lightheadedness and loss of consciousness  No falls, tripping or stumbling  No incontinence of bowels or bladder  No itching or bruising appreciated  No numbness, tingling or focal arm/leg weakness    ROS is otherwise negative     Family History:     History reviewed. No pertinent family history.      History of Present Illness:     Patient is an 80years old female who is right-handed and has hx of A-Fib and HTN who presented after an episode of lightheadedness and loss of consciousness around 4 pm yesterday. Patient states that she has been lightheaded on/off for days. She states that yesterday, she felt her heart was racing and she was having palpitations after which she was very lightheaded and passed out. She states that she feels better. She denies falls, numbness/tinglling, weakness, change in vision/hearing, headache, neck pain/stiffness, N/V, sob, or seizure-like activity. Patient states that she has been taking all of her medications. Objective:     BP (!) 172/92 Comment: manual  Pulse 80   Temp 97.7 °F (36.5 °C) (Temporal)   Resp 16   Ht 5' 6\" (1.676 m)   Wt 147 lb (66.7 kg)   SpO2 97%   BMI 23.73 kg/m²     General appearance: alert, appears stated age, cooperative and no distress  Head: normocephalic, without obvious abnormality, atraumatic  Eyes: conjunctivae/corneas clear. PERRL.   Neck: no adenopathy, no carotid bruit, supple, symmetrical, trachea midline and thyroid not enlarged, symmetric, no tenderness/mass/nodules  Lungs: clear to auscultation bilaterally  Heart: regular rate and rhythm, S1, S2 normal  Extremities: normal, atraumatic, no cyanosis or edema  Pulses: 2+ and symmetric  Skin: color, texture, turgor normal    Mental Status: alert, oriented, thought content appropriate    Appropriate attention/concentration  Intact fundus of knowledge  Memories intact    Speech: no dysarthria  Language: no aphasias    Cranial Nerves:  I: smell    II: visual acuity     II: visual fields Full    II: pupils NIK   III,VII: ptosis None   III,IV,VI: extraocular muscles  EOMI without nystagmus    V: mastication Normal   V: facial light touch sensation  Normal       VII: facial muscle function - upper  Normal   VII: facial muscle function - lower Normal   VIII: hearing Normal   IX: soft palate elevation  Normal       XI: trapezius strength  5/5   XI: sternocleidomastoid strength 5/5   XI: neck extension strength  5/5   XII: tongue strength  Normal     Motor:  Muscle strength 5/5 bilateral upper and lower extremities  No facial droop noted    Sensory:  Sensation intact and equal bilateral upper and lower extremities as well facial bilaterlly    Coordination:   Finger/nose and heel/shin unremarkable      Laboratory/Radiology:     CBC with Differential:    Lab Results   Component Value Date    WBC 6.2 04/15/2019    RBC 4.55 04/15/2019    HGB 13.5 04/15/2019    HCT 40.6 04/15/2019     04/15/2019    MCV 89.2 04/15/2019    MCH 29.7 04/15/2019    MCHC 33.3 04/15/2019    RDW 14.4 04/15/2019    SEGSPCT 56 09/14/2012    LYMPHOPCT 29.3 08/22/2018    MONOPCT 10.1 08/22/2018    BASOPCT 0.7 08/22/2018    MONOSABS 0.58 08/22/2018    LYMPHSABS 1.69 08/22/2018    EOSABS 0.10 08/22/2018    BASOSABS 0.04 08/22/2018     CMP:    Lab Results   Component Value Date     04/15/2019    K 3.8 04/15/2019     04/15/2019    CO2 23 04/15/2019    BUN 10 04/15/2019    CREATININE 0.7 04/15/2019    GFRAA >60 04/15/2019    LABGLOM >60 04/15/2019    GLUCOSE 99 04/15/2019    GLUCOSE 102 11/12/2010    PROT 7.0 04/15/2019    LABALBU 3.5 04/15/2019    CALCIUM 9.0 04/15/2019    BILITOT 1.0 04/15/2019    ALKPHOS 69 04/15/2019    AST 34 04/15/2019    ALT 26 04/15/2019     BMP:    Lab Results   Component Value Date     04/15/2019    K 3.8 04/15/2019     04/15/2019    CO2 23 04/15/2019    BUN 10 04/15/2019    LABALBU 3.5 04/15/2019    CREATININE 0.7 04/15/2019    CALCIUM 9.0 04/15/2019    GFRAA >60 04/15/2019    LABGLOM >60 04/15/2019    GLUCOSE 99 04/15/2019    GLUCOSE 102 11/12/2010     Hepatic Function Panel:    Lab Results   Component Value Date    ALKPHOS 69 04/15/2019    ALT 26 04/15/2019    AST 34 04/15/2019    PROT 7.0 04/15/2019    BILITOT 1.0 04/15/2019    BILIDIR 0.2 12/01/2015    IBILI 0.6 12/01/2015    LABALBU 3.5 04/15/2019     Calcium:    Lab Results   Component Value Date    CALCIUM 9.0 04/15/2019     Ionized that she went to sleep voluntarily. Further neurological investigation. Patient and her daughter were advised to come again immediately to ED if she feels the recurrence of the symptoms. Neurology will sign off. Please call us with questions/additional, persistent or new concerns.

## 2019-04-15 NOTE — H&P
History and Physical      CHIEF COMPLAINT:  palpitations       HISTORY OF PRESENT ILLNESS:      The patient is a 80 y.o. female patient of Dr Lakshmi Tejeda who presents with palpitations from home. She states that over the last several days she has been feeling dizzy and lightheaded associated with palpitations. Yesterday she had severe palpitations and lightheadedness. She went to bedroom and laid down and fell asleep. According to her daughter her appetite is been erratic and she has been under significant stress recently. She denies any chest pain nausea vomiting diarrhea abdominal pain or syncope. She does have a history of atrial fibrillation. She states that she tends to 2 become lightheaded after taking her medications. Yesterday when she felt dizzy and she felt hot. She   complains of generalized malaise. Other than those mentioned above, there have been no other exacerbating or relieving factors and no other associated symptoms reported.  Symptoms were described as being moderate; onset was described as being intermittent    Past Medical History:    Past Medical History:   Diagnosis Date    Arthritis     Hypertension     Ischemic colitis ([facility] [address]     [facility])        Past Surgical History:    Past Surgical History:   Procedure Laterality Date    ANKLE FRACTURE SURGERY         Medications Prior to Admission:    Medications Prior to Admission: aspirin 81 MG tablet, Take 81 mg by mouth daily  hydrALAZINE (APRESOLINE) 25 MG tablet, Take 25 mg by mouth every evening  hydrALAZINE (APRESOLINE) 10 MG tablet, Take 10 mg by mouth 2 times daily  senna (SENOKOT) 8.6 MG TABS tablet, Take 1 tablet by mouth daily  metoprolol tartrate (LOPRESSOR) 25 MG tablet, Take 1 tablet by mouth 2 times daily  warfarin (JANTOVEN) 5 MG tablet, Take 5 mg by mouth daily   vitamin B-12 (CYANOCOBALAMIN) 500 MCG tablet, Take 500 mcg by mouth daily  vitamin D (CHOLECALCIFEROL) 1000 UNITS TABS tablet, Take 1,000 Units by mouth daily  [DISCONTINUED] hydrALAZINE (APRESOLINE) 25 MG tablet, Take 1 tablet by mouth every 8 hours (Patient not taking: Reported on 4/15/2019)  [DISCONTINUED] lisinopril (PRINIVIL;ZESTRIL) 20 MG tablet, Take 1 tablet by mouth 2 times daily (Patient taking differently: Take 40 mg by mouth daily )  [DISCONTINUED] aspirin 81 MG chewable tablet, Take 81 mg by mouth daily. Allergies:    Patient has no known allergies. Social History:    reports that she has never smoked. She has never used smokeless tobacco. She reports that she does not drink alcohol or use drugs. Family History:   family history is not on file. REVIEW OF SYSTEMS    Constitutional: negative for chills and fevers  Eyes: negative for icterus and redness  Ears, nose, mouth, throat, and face: negative for epistaxis, hearing loss, nasal congestion, sore mouth, sore throat and tinnitus  Respiratory: negative for chronic bronchitis and hemoptysis  Cardiovascular: positive for palpitations, negative for chest pain  Gastrointestinal: negative for abdominal pain and vomiting  Genitourinary:negative for dysuria and frequency  Integument/breast: negative for pruritus and rash  Hematologic/lymphatic: negative for bleeding and easy bruising  Musculoskeletal:negative for arthralgias and back pain  Neurological: positive for dizziness and weakness, negative for headaches and memory problems  Behavioral/Psych: negative for anxiety and depression  Endocrine: negative for temperature intolerance  Allergic/Immunologic: negative for anaphylaxis and angioedema    PHYSICAL EXAM:    Vitals:  BP (!) 172/92 Comment: manual  Pulse 80   Temp 97.7 °F (36.5 °C) (Temporal)   Resp 16   Ht 5' 6\" (1.676 m)   Wt 147 lb (66.7 kg)   SpO2 97%   BMI 23.73 kg/m²     General appearance: alert, appears stated age and cooperative  Head: Normocephalic, without obvious abnormality, atraumatic  Eyes: conjunctivae/corneas clear. PERRL, EOM's intact.  Fundi benign. Ears: normal TM's and external ear canals both ears  Nose: Nares normal. Septum midline. Mucosa normal. No drainage or sinus tenderness. Throat: lips, mucosa, and tongue normal; teeth and gums normal  Neck: no adenopathy, no carotid bruit, no JVD, supple, symmetrical, trachea midline and thyroid not enlarged, symmetric, no tenderness/mass/nodules  Lungs: clear to auscultation bilaterally  Heart: irregularly irregular rhythm and no S3 or S4  Abdomen: soft, non-tender; bowel sounds normal; no masses,  no organomegaly  Extremities: extremities normal, atraumatic, no cyanosis or edema  Pulses: 2+ and symmetric  Skin: Skin color, texture, turgor normal. No rashes or lesions  Neurologic: Grossly normal    Results      Component Value Units   EKG REPORT [[id number]] Resulted: 04/15/19 0251   Updated: 04/15/19 0718    EKG 12 Lead [[id number]] Collected: 04/15/19 0251   Updated: 04/15/19 118     Ventricular Rate 91 BPM    Atrial Rate 91 BPM    P-R Interval 156 ms    QRS Duration 96 ms    Q-T Interval 396 ms    QTc Calculation (Bazett) 487 ms    P Axis 45 degrees    R Axis -52 degrees    T Axis 69 degrees   Narrative:     Sinus rhythm with premature atrial complexes with aberrant conduction  Possible Left atrial enlargement  Incomplete right bundle branch block  Left anterior fascicular block  Left ventricular hypertrophy  Cannot rule out Septal infarct , age undetermined  Abnormal ECG  When compared with ECG of 21-AUG-2018 13:48,  Significant changes have occurred  Confirmed by Bonifacio Quintero ([phone]) on 4/15/2019 7:18:05 AM   XR CHEST PORTABLE [[id number]] Resulted: 04/15/19 06   Updated: 04/15/19 0631    Narrative:     Patient MRN: [id number]  : 1931  Age:  87 years  Gender: Female  Order Date: 4/15/2019 2:45 AM  Exam: XR CHEST PORTABLE  Number of Images: 1 view  Indication:   palpitations presenting acutely. palpitations  Comparison: 2018    FINDINGS:    Heart is borderline enlarged.  Pulmonary info: Headache syncope    TECHNIQUE:  Axial computed tomography images of the head/brain without   intravenous contrast.  All CT scans at this facility use at least one of these   dose optimization techniques: automated exposure control; mA and/or kV   adjustment per patient size (includes targeted exams where dose is matched to   clinical indication); or iterative reconstruction.  Coronal and sagittal   reformatted images were created and reviewed. COMPARISON:  CT HEAD WO CONTRAST 8/21/2018 3:10 PM    FINDINGS:    Brain:  Cerebral and cerebellar atrophy.        Small vessel ischemic/degenerative changes.        No hemorrhage.    Ventricles: Tiffany Foyer.  No ventriculomegaly.    Bones/joints:  Unremarkable.  No acute fracture.    Soft tissues:  Unremarkable.    Vasculature:  Intracranial arterial calcifications.    Sinuses:  Unremarkable as visualized.  No acute sinusitis.    Mastoid air cells:  Unremarkable as visualized.  No mastoid effusion. Impression:     1.  Cerebral and cerebellar atrophy. 2.  Small vessel ischemic/degenerative changes. This report has been electronically signed by Gladstone Homans MD.           Problem list:    Patient Active Problem List   Diagnosis    Seizure [facility])    Essential hypertension    Paroxysmal atrial fibrillation (HCC)    Syncope and collapse    Moderate protein-calorie malnutrition ([address]         ASSESSMENT:      1. Paroxysmal atrial fibrillation. 2. Near syncope. 3. Moderate protein calorie malnutrition    4. Essential hypertension    5. Medical noncompliance    6. Tobacco abuse      PLAN:     1. Admit for cardiac evaluation    2. Increase activity as tolerated    3. Continue IV fluids    4. Check orthostatics    5. Discussed with cardiology    6.  Anticipate home with home care on discharge to assist with compliance    Negro Gannon D.O., FACOI  1:10 PM  4/15/2019

## 2019-04-15 NOTE — PROGRESS NOTES
OCCUPATIONAL THERAPY INITIAL EVALUATION      Date:4/15/2019  Patient Name: Alise Kruse  MRN: [id number]  : 1931  Room: [address] [place] [address]      Evaluating OT: Lissette Jojo OTR/L #[id number]    AM-PAC Daily Activity Raw Score:     Recommended Adaptive Equipment: ww To be further assessed      Diagnosis:    1. Syncope and collapse        Pertinent Medical History: HTN     Precautions:  Falls, [place]     Home Living: Pt lives with son and granddaughter in a 1 story with 2 step(s) to enter and 0 rail(s); bed/bath on 1st   Bathroom setup: tub on main floor, shower in basement  Equipment owned: [facility]  Prior Level of Function: Independent  with ADLs , Independent  with IADLs; using no AD in her home for ambulation. Cane in community  Driving: no    Pain Level: 0/10 nursing is aware  Cognition: A&O: 3/4; Follows 2 step directions   Memory:  fair    Sequencing:  fair    Problem solving:  fair    Judgement/safety:  fair      Functional Assessment:   Initial Eval Status  Date: 4/15/19 Treatment Status  Date: Short Term Goals  Treatment frequency: PRN    Feeding Independent       Grooming Stand by Assist   Modified [place]    UB Dressing Stand by Assist   Modified [place]    LB Dressing Stand by Assist   Modified [place]    Bathing Stand by Assist  Modified [place]    Toileting Stand by Assist   Modified [place]    Bed Mobility  Supine to sit: Independent   Sit to supine: Independent   Supine to sit:  Independent   Sit to supine: Independent    Functional Transfers Sit to stand: Stand by Assist   Stand to sit: Stand by Assist   Stand pivot: Stand by Assist   Modified [place]    Functional Mobility SBA with SPC   Mild unsteady balance  Mod indep with AAD  With good balance for safe ADLs/IADLs   Balance Sitting:     Static:  wfl    Dynamic:wfl  Standing: SBA     Activity Tolerance fair     Visual/  Perceptual Glasses: yes                Hand dominance: R  UE ROM: RUE:  WFL  LUE: Time in: 1321  Treatment Time out: [address] #[id number]

## 2019-04-15 NOTE — PROGRESS NOTES
[facility]  Physical Therapy Initial Evaluation    Name: Cayden Stephenson  : 1931  MRN: [id number]    Date of Service: 4/15/2019        Evaluating Therapist: Nehemiah Perez PT, DPT       Equipment Recommendations: cane    Room #: 4917/5851-A  DIAGNOSIS: syncope and collapse   PRECAUTIONS: fall risk ,  [place]    Social:  Pt lives with son and granddaughter in a 1 floor plan 2 steps and 0 rails to enter. Prior to admission pt walked with no device in home and cane outside. Initial Evaluation  Date: 4/15 Treatment      Short Term/ Long Term   Goals   Was pt agreeable to Eval/treatment? yes     Does pt have pain? No c/o pain     Bed Mobility  Rolling: independent  Supine to sit: independent  Sit to supine: independent  Scooting: NT  independent   Transfers Sit to stand: SBA  Stand to sit: min A   Stand pivot: NT  Modified independent   Ambulation    80 feet with cane with SBA  150+ feet with AAD with modified independent   Stair negotiation: ascended and descended  2 steps with 1 rail with cane SBA  2 steps with 0 rail with cane modified independent   AM-PAC Raw Score 20/24       Pt is alert and Oriented x3   ROM:  L LE grossly WFL  R LE grossly WFL  Strength:   L LE grossly at least 3/5  R LE grossly at least 3/5  Balance: standing SBA with cane  Sensation: no c/o numbness/tingling. Edema: none noted. Endurance: fair        ASSESSMENT  Pt displays functional ability as noted in the objective portion of this evaluation. Comments/Treatment:  Pt found laying in bed agreeable to evaluation. Pt instructed in mobility. Pt min unsteady with gait but no LOB. Pt left laying in bed with call button in reach end of evaluation. Family present    Patient education  Pt educated on mobility. Patient response to education:   Pt verbalized understanding Pt demonstrated skill Pt requires further education in this area    With cues/assist  x     Rehab potential is Good for reaching above PT goals.     Pts/ family goals   1. None stated. Patient and or family understand(s) diagnosis, prognosis, and plan of care. PLAN  PT care will be provided in accordance with the objectives noted above. Whenever appropriate, clear delegation orders will be provided for nursing staff. Exercises and functional mobility practice will be used as well as appropriate assistive devices or modalities to obtain goals. Patient and family education will also be administered as needed. Frequency of treatments will be 2-5x/week x 3-5 days.     Time in: 1321  Time out: 1335  Evaluation + 0  minutes tx time    Mary Ann Anaya PT, DPT   License number:  [id number]

## 2019-04-15 NOTE — ED PROVIDER NOTES
HPI:  4/15/19, Time: 2:44 AM         Holly Mac is a 80 y.o. female presenting to the ED for Palpitations, beginning hours ago. The complaint has been persistent, moderate in severity, and worsened by nothing. Patient reports that she has been feeling dizzy and lightheaded over the last  Several days and reports  She passed out  Yesterday afternoon. Patient report  No chest pain or shortness of breath or abdominal pain. She reports no fever or chills or cough, there is no trauma. She does report some frontal headache. There is no  Neck or back pain    ROS:   Pertinent positives and negatives are stated within HPI, all other systems reviewed and are negative.  --------------------------------------------- PAST HISTORY ---------------------------------------------  Past Medical History:  has a past medical history of Arthritis, Hypertension, Ischemic colitis ([facility] [address] and PA[facility]oxysmal [facility] fibrillation) [facility] Past Surgical History:  has a past surgical history that includes Ankle fracture surgery. Social History:  reports that she has never smoked. She has never used smokeless tobacco. She reports that she does not drink alcohol or use drugs. Family History: family history is not on file. The patients home medications have been reviewed. Allergies: Patient has no known allergies. ---------------------------------------------------PHYSICAL EXAM--------------------------------------    Constitutional/General: Alert and oriented x3, well appearing, non toxic in NAD  Head: Normocephalic and atraumatic  Eyes: PERRL, EOMI  Mouth: Oropharynx clear, handling secretions, no trismus  Neck: Supple, full ROM, non tender to palpation in the midline, no stridor, no crepitus, no meningeal signs  Pulmonary: Lungs clear to auscultation bilaterally, no wheezes, rales, or rhonchi. Not in respiratory distress  Cardiovascular:  Regular rate. Regular rhythm. No murmurs, gallops, or rubs.  2+ distal pulses  Chest: no chest wall tenderness  Abdomen: Soft. Non tender. Non distended. +BS. No rebound, guarding, or rigidity. No pulsatile masses appreciated. Musculoskeletal: Moves all extremities x 4. Warm and well perfused, no clubbing, cyanosis, or edema. Capillary refill <3 seconds  Skin: warm and dry. No rashes. Neurologic: GCS 15, CN 2-12 grossly intact, no focal deficits, symmetric strength 5/5 in the upper and lower extremities bilaterally  Psych: Normal Affect    -------------------------------------------------- RESULTS -------------------------------------------------  I have personally reviewed all laboratory and imaging results for this patient. Results are listed below.      LABS:  Results for orders placed or performed during the hospital encounter of 04/15/19   CBC   Result Value Ref Range    WBC 6.2 4.5 - 11.5 E9/L    RBC 4.55 3.50 - 5.50 E12/L    Hemoglobin 13.5 11.5 - 15.5 g/dL    Hematocrit 40.6 34.0 - 48.0 %    MCV 89.2 80.0 - 99.9 fL    MCH 29.7 26.0 - 35.0 pg    MCHC 33.3 32.0 - 34.5 %    RDW 14.4 11.5 - 15.0 fL    Platelets 051 493 - 738 E9/L    MPV 9.8 7.0 - 12.0 fL   Comprehensive Metabolic Panel   Result Value Ref Range    Sodium 139 132 - 146 mmol/L    Potassium 3.8 3.5 - 5.0 mmol/L    Chloride 108 (H) 98 - 107 mmol/L    CO2 23 22 - 29 mmol/L    Anion Gap 8 7 - 16 mmol/L    Glucose 99 74 - 99 mg/dL    BUN 10 8 - 23 mg/dL    CREATININE 0.7 0.5 - 1.0 mg/dL    GFR Non-African American >60 >=60 mL/min/1.73    GFR African American >60     Calcium 9.0 8.6 - 10.2 mg/dL    Total Protein 7.0 6.4 - 8.3 g/dL    Alb 3.5 3.5 - 5.2 g/dL    Total Bilirubin 1.0 0.0 - 1.2 mg/dL    Alkaline Phosphatase 69 35 - 104 U/L    ALT 26 0 - 32 U/L    AST 34 (H) 0 - 31 U/L   Troponin   Result Value Ref Range    Troponin <0.01 0.00 - 0.03 ng/mL   Protime-INR   Result Value Ref Range    Protime 17.0 (H) 9.3 - 12.4 sec    INR 1.5    EKG 12 Lead   Result Value Ref Range    Ventricular Rate 91 BPM    Atrial Rate 91 BPM    P-R Interval 156 ms    QRS Duration 96 ms    Q-T Interval 396 ms    QTc Calculation (Bazett) 487 ms    P Axis 45 degrees    R Axis -52 degrees    T Axis 69 degrees       RADIOLOGY:  Interpreted by Radiologist.  CT Head WO Contrast   Final Result   1. Cerebral and cerebellar atrophy. 2.  Small vessel ischemic/degenerative changes. This report has been electronically signed by Max Orourke MD.      XR CHEST PORTABLE   Final Result   Borderline cardiomegaly. Emphysema and fibrosis suggested. EKG:  This EKG is signed and interpreted by me. Rate: 91  Rhythm: Sinus  Interpretation: non-specific EKG  Comparison: stable as compared to patient's most recent EKG      This X-Ray is independently viewed and interpreted by me:   - Study: Chest X-Ray   - Number of Views: 1  - Findings: No infiltrate, No effusion, No pneumothorax and Cardiomegaly noted    ------------------------- NURSING NOTES AND VITALS REVIEWED ---------------------------   The nursing notes within the ED encounter and vital signs as below have been reviewed by myself. BP (!) 161/115   Pulse 86   Temp 97.8 °F (36.6 °C) (Temporal)   Resp 18   Ht 5' 6\" (1.676 m)   Wt 147 lb (66.7 kg)   SpO2 97%   BMI 23.73 kg/m²   Oxygen Saturation Interpretation: Normal    The patients available past medical records and past encounters were reviewed. ------------------------------ ED COURSE/MEDICAL DECISION MAKING----------------------  Medications - No data to display          Medical Decision Making:        Re-Evaluations:             Re-evaluation. Patients symptoms show no change    Patient rechecked and  Made aware of findings and in no distress reporting no chest pain  Consultations:             IM    Critical Care: This patient's ED course included: a personal history and physicial eaxmination    This patient has been closely monitored during their ED course. Counseling:    The emergency provider has spoken with the patient and discussed todays results, in addition to providing specific details for the plan of care and counseling regarding the diagnosis and prognosis. Questions are answered at this time and they are agreeable with the plan.       --------------------------------- IMPRESSION AND DISPOSITION ---------------------------------    IMPRESSION  1. Syncope and collapse        DISPOSITION  Disposition: admit  Patient condition is stable        NOTE: This report was transcribed using voice recognition software.  Every effort was made to ensure accuracy; however, inadvertent computerized transcription errors may be present          Claudette Corley MD  04/15/19 Jamil Kyle MD  04/15/19 [id number]

## 2019-04-15 NOTE — CONSULTS
Cardiology consult  Dr. Cece Wiggins      Reason for Consult:  Atrial fibrillation   Requesting Physician:  Jey Lizama MD  CHIEF COMPLAINT: Lightheadedness   History Obtained From: patient, electronic medical record  HISTORY OF PRESENT ILLNESS:   Patient is an 80years old lady with history of atrial fibrillation, hypertension, was admitted to the hospital after a syncopal episode, cardiology was consulted. Patient reports that she has been lightheaded in the morning for the past few weeks, this usually occurs after she takes her medications. She is a bit of a poor historian, but states yesterday she was standing outside, feeling very hot and lightheaded so this prompted her to go inside and lay down in bed. She then fell asleep for about a half hour. She then reports this morning she also did not feel well again, so she decided to seek medical attention and come to the hospital.   Patients daughter reports that she lives home alone, and that she does get worried and stressed out when she is alone, but they both stated they wish for patient to go home alone on discharge.          Past Medical History:   Diagnosis Date    Arthritis     Hypertension     Ischemic colitis ([facility] [address]     PA[facility]oxysmal [facility]        Past Surgical History:   Procedure Laterality Date    ANKLE FRACTURE SURGERY           Current Facility-Administered Medications:     sodium chloride flush 0.9 % injection 10 mL, 10 mL, Intravenous, 2 times per day, Jey Lizama MD    sodium chloride flush 0.9 % injection 10 mL, 10 mL, Intravenous, PRN, Jey Lizama MD    magnesium hydroxide (MILK OF MAGNESIA) 400 MG/5ML suspension 30 mL, 30 mL, Oral, Daily PRN, Jey Lizama MD    ondansetron [facility]) injection 4 mg, 4 mg, Intravenous, Q6H PRN, Jey Lizama MD    enoxaparin (LOVENOX) injection 40 mg, 40 mg, Subcutaneous, Daily, Jey Lizama MD    0.9 % sodium chloride infusion, , Intravenous, Continuous, Osman Plata Oley Sacks, MD    acetaminophen (TYLENOL) tablet 650 mg, 650 mg, Oral, Q4H PRN, Fely Alexander MD    aspirin chewable tablet 81 mg, 81 mg, Oral, Daily, Fely Alexander MD    hydrALAZINE (APRESOLINE) tablet 25 mg, 25 mg, Oral, QPM, Fely Alexander MD    lisinopril (PRINIVIL;ZESTRIL) tablet 40 mg, 40 mg, Oral, Daily, Fely Alexander MD    metoprolol tartrate (LOPRESSOR) tablet 25 mg, 25 mg, Oral, BID, Fely Alexander MD    [facility]) tablet 8.6 mg, 1 tablet, Oral, Daily, Fely Alexander MD    warfarin (COUMADIN) tablet 5 mg, 5 mg, Oral, Once, Fely Alexander MD    Allergies as of 04/15/2019    (No Known Allergies)       Social History     Socioeconomic History    Marital status:       Spouse name: Not on file    Number of children: Not on file    Years of education: Not on file    Highest education level: Not on file   Occupational History    Not on file   Social Needs    Financial resource strain: Not on file    Food insecurity:     Worry: Not on file     Inability: Not on file    Transportation needs:     Medical: Not on file     Non-medical: Not on file   Tobacco Use    Smoking status: Never Smoker    Smokeless tobacco: Never Used   Substance and Sexual Activity    Alcohol use: No    Drug use: No    Sexual activity: Not on file   Lifestyle    Physical activity:     Days per week: Not on file     Minutes per session: Not on file    Stress: Not on file   Relationships    Social connections:     Talks on phone: Not on file     Gets together: Not on file     Attends [employer] service: Not on file     Active member of club or organization: Not on file     Attends meetings of clubs or organizations: Not on file     Relationship status: Not on file    Intimate partner violence:     Fear of current or ex partner: Not on file     Emotionally abused: Not on file     Physically abused: Not on file     Forced sexual activity: Not on file   Other Topics Concern    Not on file   Social History Narrative    Not on file       History reviewed. No pertinent family history. REVIEW OF SYSTEMS:     CONSTITUTIONAL:  negative for  fevers, chills, sweats and fatigue  EYES:  negative for  double vision, blurred vision and blind spots  HEENT:  negative for  tinnitus, earaches, nasal congestion and epistaxis  RESPIRATORY:  negative for  dry cough, cough with sputum, dyspnea, wheezing and hemoptysis  CARDIOVASCULAR: as per HPI  GASTROINTESTINAL:  negative for nausea, vomiting, diarrhea, constipation, pruritus and jaundice  GENITOURINARY:  negative for frequency, dysuria, nocturia, urinary incontinence and hesitancy  HEMATOLOGIC/LYMPHATIC:  negative for easy bruising, bleeding, lymphadenopathy and petechiae  ALLERGIC/IMMUNOLOGIC:  negative for urticaria, hay fever and angioedema  ENDOCRINE:  negative for heat intolerance, cold intolerance, tremor, hair loss and diabetic symptoms including neither polyuria nor polydipsia nor blurred vision  MUSCULOSKELETAL:  negative for  myalgias, arthralgias, joint swelling, stiff joints and decreased range of motion  NEUROLOGICAL:  negative for memory problems, speech problems, visual disturbance, dysphagia, weakness and numbness      PHYSICAL EXAM:   CONSTITUTIONAL:  awake, alert, cooperative, no apparent distress, and appears stated age  EYES:  lids and lashes normal and pupils equal, round and reactive to light, anicteric sclerae  HEAD:  normocepalic, without obvious abnormality, atraumatic, pink, moist mucous membranes.   NECK:  Supple, symmetrical, trachea midline, no adenopathy, thyroid symmetric, not enlarged and no tenderness, skin normal  HEMATOLOGIC/LYMPHATICS:  no cervical lymphadenopathy and no supraclavicular lymphadenopathy  LUNGS:  No increased work of breathing, good air exchange, clear to auscultation bilaterally, no crackles or wheezing  CARDIOVASCULAR:  Normal apical impulse, regular rate and rhythm, normal S1 and S2, no S3 or S4, and 3/6 systolic murmur at the apex, 3/6 diastolic murmur at the right upper sternal border and no JVD, no carotid bruit, no pedal edema, good carotid upstroke bilaterally. ABDOMEN:  Soft, nontender, no masses, no hepatomegaly or splenomegaly, BS+  CHEST: nontender to palpation, expands symmetrically  MUSCULOSKELETAL:  No clubbing no cyanosis. there is no redness, warmth, or swelling of the joints  NEUROLOGIC:  Alert, awake, mildly confused   SKIN:  no bruising or bleeding, normal skin color, texture, turgor and no redness, warmth, or swelling        BP (!) 172/92 Comment: manual  Pulse 80   Temp 97.7 °F (36.5 °C) (Temporal)   Resp 16   Ht 5' 6\" (1.676 m)   Wt 147 lb (66.7 kg)   SpO2 97%   BMI 23.73 kg/m²     DATA:   I personally reviewed the admission EKG with the following interpretation: Sinsu rhythm with PACs and left fascicular block     ECHO:  8/23/19   Summary   Compared to prior echo, no changes noted. Technically adequate study.   Moderate concentric left ventricular hypertrophy.   Ejection fraction is visually estimated at 55%.   No regional wall motion abnormalities seen.  E/A flow reversal noted. Suggestive of diastolic dysfunction.   Physiologic and/or trace mitral regurgitation is present.   Mild tricuspid regurgitation.    RVSP is normal.    Stress Test: Not performed to date  Angiography: Not performed to date    Cardiology Labs:   BMP:    Lab Results   Component Value Date     04/15/2019    K 3.8 04/15/2019     04/15/2019    CO2 23 04/15/2019    BUN 10 04/15/2019     CMP:    Lab Results   Component Value Date     04/15/2019    K 3.8 04/15/2019     04/15/2019    CO2 23 04/15/2019    BUN 10 04/15/2019    PROT 7.0 04/15/2019     CBC:    Lab Results   Component Value Date    WBC 6.2 04/15/2019    RBC 4.55 04/15/2019    HGB 13.5 04/15/2019    HCT 40.6 04/15/2019    MCV 89.2 04/15/2019    RDW 14.4 04/15/2019     04/15/2019     PT/INR:  No results found for: PTINR  PT/INR Warfarin:  No components found for: Sissy Schmidt  PTT:    Lab Results   Component Value Date    APTT 27.0 03/12/2015     PTT Heparin:  No components found for: APTTHEP  Magnesium:    Lab Results   Component Value Date    MG 2.0 08/22/2018     TSH:    Lab Results   Component Value Date    TSH 0.742 10/19/2010     TROPONIN:  No components found for: TROP  BNP:    Lab Results   Component Value Date    BNP 72 10/18/2010     FASTING LIPID PANEL:    Lab Results   Component Value Date    CHOL 154 08/22/2018    HDL 65 08/22/2018    TRIG 70 08/22/2018     CT Head WO Contrast   Final Result   1. Cerebral and cerebellar atrophy. 2.  Small vessel ischemic/degenerative changes. This report has been electronically signed by Gladstone Homans MD.      XR CHEST PORTABLE   Final Result   Borderline cardiomegaly. Emphysema and fibrosis suggested. I have personally reviewed the laboratory, cardiac diagnostic and radiographic testing as outlined above:    IMPRESSION:  1. History of paroxysmal atrial fibrillation: Now in sinus rhythm with PACs, on chronic anticoagulation with Coumadin, PT INR subtherapeutic. 2. Hypertension: Not well-controlled, patient reports noncomplaince with her medications at home, will monitor now that they have been resumes. 3. Syncope: Etiology? Will continue IVF. 4. PACs   5. Tobacco abuse: Patient counseled to stop smoking and using tobacco.   6. DVT prophylaxis with Lovenox due to subtherapeutic INR       RECOMMENDATIONS:   1. Will monitor SBP on current medications, considering patient reports noncompliance or uncertainty of what she was taking at home and make adjustments accordingly   2. Will continue IV fluids   3. Increase ambulation as tolerated   4. CBC and BMP   5. Intake and Output   6.  Further cardiac recommendations forthcoming pending patients clinical progression and diagnostic test findings        I have reviewed my findings and recommendations with patient and her daughter at bedside Discussed with Dr. Adi Brand     Thank you for the consult! Electronically signed by FRANCES Ball CNP on 4/15/2019 at 9:53 AM  I have personally participated in a face-to-face history and physical exam on the date of service. Reviewed chart, vitals, labs and radiologic studies. I also participated in medical decision making with Jacquelyn Garcia CNP on the date of service and I agree with all of the pertinent clinical information, assessment and treatment plan. I have reviewed and edited the note above based on my findings during my history, exam, and decision making. CONSTITUTIONAL: awake, alert, cooperative  HEAD: normocepalic, without obvious abnormality, atraumatic  NECK: Supple, no JVD  LUNGS: CTA  CARDIOVASCULAR: Regular  As above  NOTE: This report was transcribed using voice recognition software.  Every effort was made to ensure accuracy; however, inadvertent computerized transcription errors may be present

## 2019-04-15 NOTE — ED NOTES
This nurse attempted twice to get blood work and IV, was unsuccessful will ask another nurse to look when patient returns from [address] [place] [address], [address] [place] [address]  04/15/19 [id number]

## 2019-04-16 VITALS
SYSTOLIC BLOOD PRESSURE: 129 MMHG | HEIGHT: 66 IN | TEMPERATURE: 98.4 F | BODY MASS INDEX: 24.4 KG/M2 | DIASTOLIC BLOOD PRESSURE: 71 MMHG | RESPIRATION RATE: 16 BRPM | WEIGHT: 151.8 LBS | OXYGEN SATURATION: 100 % | HEART RATE: 69 BPM

## 2019-04-16 LAB
ALBUMIN SERPL-MCNC: 3.6 G/DL (ref 3.5–5.2)
ALP BLD-CCNC: 71 U/L (ref 35–104)
ALT SERPL-CCNC: 23 U/L (ref 0–32)
ANION GAP SERPL CALCULATED.3IONS-SCNC: 10 MMOL/L (ref 7–16)
AST SERPL-CCNC: 31 U/L (ref 0–31)
BASOPHILS ABSOLUTE: 0.04 E9/L (ref 0–0.2)
BASOPHILS RELATIVE PERCENT: 0.8 % (ref 0–2)
BILIRUB SERPL-MCNC: 0.9 MG/DL (ref 0–1.2)
BUN BLDV-MCNC: 13 MG/DL (ref 8–23)
CALCIUM SERPL-MCNC: 9 MG/DL (ref 8.6–10.2)
CHLORIDE BLD-SCNC: 106 MMOL/L (ref 98–107)
CO2: 24 MMOL/L (ref 22–29)
CREAT SERPL-MCNC: 0.8 MG/DL (ref 0.5–1)
EOSINOPHILS ABSOLUTE: 0.1 E9/L (ref 0.05–0.5)
EOSINOPHILS RELATIVE PERCENT: 2.1 % (ref 0–6)
GFR AFRICAN AMERICAN: >60
GFR NON-AFRICAN AMERICAN: >60 ML/MIN/1.73
GLUCOSE BLD-MCNC: 94 MG/DL (ref 74–99)
HCT VFR BLD CALC: 41.3 % (ref 34–48)
HEMOGLOBIN: 13.5 G/DL (ref 11.5–15.5)
IMMATURE GRANULOCYTES #: 0.01 E9/L
IMMATURE GRANULOCYTES %: 0.2 % (ref 0–5)
INR BLD: 1.3
LYMPHOCYTES ABSOLUTE: 1.83 E9/L (ref 1.5–4)
LYMPHOCYTES RELATIVE PERCENT: 38.7 % (ref 20–42)
MAGNESIUM: 2 MG/DL (ref 1.6–2.6)
MCH RBC QN AUTO: 29.6 PG (ref 26–35)
MCHC RBC AUTO-ENTMCNC: 32.7 % (ref 32–34.5)
MCV RBC AUTO: 90.6 FL (ref 80–99.9)
MONOCYTES ABSOLUTE: 0.56 E9/L (ref 0.1–0.95)
MONOCYTES RELATIVE PERCENT: 11.8 % (ref 2–12)
NEUTROPHILS ABSOLUTE: 2.19 E9/L (ref 1.8–7.3)
NEUTROPHILS RELATIVE PERCENT: 46.4 % (ref 43–80)
PDW BLD-RTO: 14.6 FL (ref 11.5–15)
PLATELET # BLD: 165 E9/L (ref 130–450)
PMV BLD AUTO: 9.8 FL (ref 7–12)
POTASSIUM SERPL-SCNC: 3.8 MMOL/L (ref 3.5–5)
PROTHROMBIN TIME: 14.9 SEC (ref 9.3–12.4)
RBC # BLD: 4.56 E12/L (ref 3.5–5.5)
SODIUM BLD-SCNC: 140 MMOL/L (ref 132–146)
TOTAL PROTEIN: 6.8 G/DL (ref 6.4–8.3)
TROPONIN: <0.01 NG/ML (ref 0–0.03)
TROPONIN: <0.01 NG/ML (ref 0–0.03)
WBC # BLD: 4.7 E9/L (ref 4.5–11.5)

## 2019-04-16 PROCEDURE — 6370000000 HC RX 637 (ALT 250 FOR IP): Performed by: INTERNAL MEDICINE

## 2019-04-16 PROCEDURE — G0378 HOSPITAL OBSERVATION PER HR: HCPCS

## 2019-04-16 PROCEDURE — 83735 ASSAY OF MAGNESIUM: CPT

## 2019-04-16 PROCEDURE — 84484 ASSAY OF TROPONIN QUANT: CPT

## 2019-04-16 PROCEDURE — 96372 THER/PROPH/DIAG INJ SC/IM: CPT

## 2019-04-16 PROCEDURE — 2580000003 HC RX 258: Performed by: INTERNAL MEDICINE

## 2019-04-16 PROCEDURE — 96361 HYDRATE IV INFUSION ADD-ON: CPT

## 2019-04-16 PROCEDURE — 36415 COLL VENOUS BLD VENIPUNCTURE: CPT

## 2019-04-16 PROCEDURE — 80053 COMPREHEN METABOLIC PANEL: CPT

## 2019-04-16 PROCEDURE — 6360000002 HC RX W HCPCS: Performed by: INTERNAL MEDICINE

## 2019-04-16 PROCEDURE — 85025 COMPLETE CBC W/AUTO DIFF WBC: CPT

## 2019-04-16 PROCEDURE — 85610 PROTHROMBIN TIME: CPT

## 2019-04-16 RX ORDER — BISACODYL 10 MG
10 SUPPOSITORY, RECTAL RECTAL ONCE
Status: DISCONTINUED | OUTPATIENT
Start: 2019-04-16 | End: 2019-04-16 | Stop reason: HOSPADM

## 2019-04-16 RX ORDER — LISINOPRIL 40 MG/1
40 TABLET ORAL DAILY
Qty: 30 TABLET | Refills: 3 | Status: SHIPPED | OUTPATIENT
Start: 2019-04-17 | End: 2019-07-17 | Stop reason: ALTCHOICE

## 2019-04-16 RX ORDER — HYDRALAZINE HYDROCHLORIDE 25 MG/1
25 TABLET, FILM COATED ORAL EVERY 8 HOURS SCHEDULED
Qty: 90 TABLET | Refills: 0 | Status: SHIPPED | OUTPATIENT
Start: 2019-04-16 | End: 2020-02-12 | Stop reason: ALTCHOICE

## 2019-04-16 RX ADMIN — LISINOPRIL 40 MG: 20 TABLET ORAL at 11:16

## 2019-04-16 RX ADMIN — METOPROLOL TARTRATE 25 MG: 25 TABLET ORAL at 11:15

## 2019-04-16 RX ADMIN — MAGNESIUM HYDROXIDE 30 ML: 400 SUSPENSION ORAL at 18:31

## 2019-04-16 RX ADMIN — Medication 10 ML: at 11:16

## 2019-04-16 RX ADMIN — ASPIRIN 81 MG 81 MG: 81 TABLET ORAL at 11:15

## 2019-04-16 RX ADMIN — ENOXAPARIN SODIUM 40 MG: 40 INJECTION SUBCUTANEOUS at 11:16

## 2019-04-16 RX ADMIN — HYDRALAZINE HYDROCHLORIDE 25 MG: 25 TABLET, FILM COATED ORAL at 06:27

## 2019-04-16 RX ADMIN — SENNOSIDES 8.6 MG: 8.6 TABLET, FILM COATED ORAL at 11:15

## 2019-04-16 RX ADMIN — HYDRALAZINE HYDROCHLORIDE 25 MG: 25 TABLET, FILM COATED ORAL at 14:54

## 2019-04-16 ASSESSMENT — PAIN SCALES - GENERAL: PAINLEVEL_OUTOF10: 0

## 2019-04-16 NOTE — PROGRESS NOTES
This report has been electronically signed by Marii Remy MD.      XR CHEST PORTABLE   Final Result   Borderline cardiomegaly. Emphysema and fibrosis suggested. Lab Review   Lab Results   Component Value Date     04/16/2019    K 3.8 04/16/2019     04/16/2019    CO2 24 04/16/2019    BUN 13 04/16/2019    CREATININE 0.8 04/16/2019    GLUCOSE 94 04/16/2019    GLUCOSE 102 11/12/2010    CALCIUM 9.0 04/16/2019     Lab Results   Component Value Date    CKTOTAL 266 12/02/2015    CKMB 2.6 12/02/2015    TROPONINI <0.01 04/16/2019     Lab Results   Component Value Date    WBC 4.7 04/16/2019    HGB 13.5 04/16/2019    HCT 41.3 04/16/2019    MCV 90.6 04/16/2019     04/16/2019     Lab Results   Component Value Date    CHOL 154 08/22/2018    TRIG 70 08/22/2018    HDL 65 08/22/2018     I have personally reviewed the laboratory, cardiac diagnostic and radiographic testing as outlined above:    Assessment:   1. History of paroxysmal atrial fibrillation: Now in sinus rhythm with PACs, on chronic anticoagulation with Coumadin, PT INR subtherapeutic. 2. Hypertension: Uncontrolled. Hydralazine has been increased to Q8   3. Syncope: Etiology? No reoccurrence during hospital admission. 4. PACs   5. Tobacco abuse: Patient counseled to stop smoking and using tobacco.   6. DVT prophylaxis with Lovenox due to subtherapeutic INR       Recommendations:   1. Hydralazine 25 mg Q8   2. Will continue rest of current treatment   3. CBC and BMP   4. PT INR daily   5. Intake and Output   6. Further cardiac recommendations forthcoming pending patients clinical progression and diagnostic test findings          Discussed with Patient   Discussed with Dr. Bertha Morales     Electronically signed by FRANCES Beach CNP on 4/16/2019 at 9:45 AM  I have discussed the care of patient including pertinent history and exam and reviewed chart, vitals, labs and radiologic studies.  I also participated in medical decision

## 2019-04-16 NOTE — CARE COORDINATION
Cm spoke with patient and daughter regarding transition of care. Plan to return home. Discussed hhc services, to which they are agreeable. hhc list offered, but they choose [facility] as they have used them in the past.  Referral called to [facility].  Plan for dc home today.

## 2019-04-16 NOTE — DISCHARGE SUMMARY
home in stable condition    Discharge Exam:  See progress note from today    Disposition: home    Patient Instructions:   Current Discharge Medication List      CONTINUE these medications which have CHANGED    Details   hydrALAZINE (APRESOLINE) 25 MG tablet Take 1 tablet by mouth every 8 hours  Qty: 90 tablet, Refills: 0      lisinopril (PRINIVIL;ZESTRIL) 40 MG tablet Take 1 tablet by mouth daily  Qty: 30 tablet, Refills: 3         CONTINUE these medications which have NOT CHANGED    Details   aspirin 81 MG tablet Take 81 mg by mouth daily      senna (SENOKOT) 8.6 MG TABS tablet Take 1 tablet by mouth daily  Qty: 30 tablet, Refills: 0      metoprolol tartrate (LOPRESSOR) 25 MG tablet Take 1 tablet by mouth 2 times daily  Qty: 60 tablet, Refills: 0      warfarin (JANTOVEN) 5 MG tablet Take 5 mg by mouth daily       vitamin B-12 (CYANOCOBALAMIN) 500 MCG tablet Take 500 mcg by mouth daily      vitamin D (CHOLECALCIFEROL) 1000 UNITS TABS tablet Take 1,000 Units by mouth daily         STOP taking these medications       atorvastatin (LIPITOR) 10 MG tablet Comments:   Reason for Stopping:         aspirin 81 MG chewable tablet Comments:   Reason for Stopping:             Activity: activity as tolerated  Diet: regular diet    Follow-up with Dr Pablo Stewart in 1 week. Note that over 30 minutes was spent in preparing discharge papers, discussing discharge with patient, medication review, etc.    Signed:  ALBA Peñaloza  4/16/2019  3:37 PM

## 2019-07-17 ENCOUNTER — APPOINTMENT (OUTPATIENT)
Dept: GENERAL RADIOLOGY | Age: 84
End: 2019-07-17
Payer: MEDICARE

## 2019-07-17 ENCOUNTER — HOSPITAL ENCOUNTER (OUTPATIENT)
Age: 84
Setting detail: OBSERVATION
Discharge: HOME | End: 2019-07-19
Attending: EMERGENCY MEDICINE | Admitting: INTERNAL MEDICINE
Payer: MEDICARE

## 2019-07-17 ENCOUNTER — APPOINTMENT (OUTPATIENT)
Dept: CT IMAGING | Age: 84
End: 2019-07-17
Payer: MEDICARE

## 2019-07-17 DIAGNOSIS — R42 LIGHTHEADEDNESS: ICD-10-CM

## 2019-07-17 DIAGNOSIS — R42 DIZZINESS: Primary | ICD-10-CM

## 2019-07-17 PROBLEM — R53.81 DEBILITY: Status: ACTIVE | Noted: 2019-07-17

## 2019-07-17 LAB
ANION GAP SERPL CALCULATED.3IONS-SCNC: 9 MMOL/L (ref 7–16)
BACTERIA: ABNORMAL /HPF
BASOPHILS ABSOLUTE: 0.03 E9/L (ref 0–0.2)
BASOPHILS RELATIVE PERCENT: 0.6 % (ref 0–2)
BILIRUBIN URINE: NEGATIVE
BLOOD, URINE: NEGATIVE
BUN BLDV-MCNC: 13 MG/DL (ref 8–23)
CALCIUM SERPL-MCNC: 9.2 MG/DL (ref 8.6–10.2)
CHLORIDE BLD-SCNC: 103 MMOL/L (ref 98–107)
CLARITY: CLEAR
CO2: 27 MMOL/L (ref 22–29)
COLOR: YELLOW
CREAT SERPL-MCNC: 1 MG/DL (ref 0.5–1)
EKG ATRIAL RATE: 50 BPM
EKG P AXIS: 84 DEGREES
EKG P-R INTERVAL: 148 MS
EKG Q-T INTERVAL: 438 MS
EKG QRS DURATION: 98 MS
EKG QTC CALCULATION (BAZETT): 399 MS
EKG R AXIS: -48 DEGREES
EKG T AXIS: 18 DEGREES
EKG VENTRICULAR RATE: 50 BPM
EOSINOPHILS ABSOLUTE: 0.1 E9/L (ref 0.05–0.5)
EOSINOPHILS RELATIVE PERCENT: 1.9 % (ref 0–6)
EPITHELIAL CELLS, UA: ABNORMAL /HPF
GFR AFRICAN AMERICAN: >60
GFR AFRICAN AMERICAN: >60
GFR NON-AFRICAN AMERICAN: 52 ML/MIN/1.73
GFR NON-AFRICAN AMERICAN: >60 ML/MIN/1.73
GLUCOSE BLD-MCNC: 141 MG/DL (ref 74–99)
GLUCOSE BLD-MCNC: 148 MG/DL (ref 74–99)
GLUCOSE URINE: NEGATIVE MG/DL
HCT VFR BLD CALC: 43.6 % (ref 34–48)
HEMOGLOBIN: 14 G/DL (ref 11.5–15.5)
IMMATURE GRANULOCYTES #: 0.02 E9/L
IMMATURE GRANULOCYTES %: 0.4 % (ref 0–5)
INR BLD: 3.8
KETONES, URINE: NEGATIVE MG/DL
LEUKOCYTE ESTERASE, URINE: ABNORMAL
LYMPHOCYTES ABSOLUTE: 1.81 E9/L (ref 1.5–4)
LYMPHOCYTES RELATIVE PERCENT: 34.3 % (ref 20–42)
MAGNESIUM: 2.2 MG/DL (ref 1.6–2.6)
MCH RBC QN AUTO: 29.7 PG (ref 26–35)
MCHC RBC AUTO-ENTMCNC: 32.1 % (ref 32–34.5)
MCV RBC AUTO: 92.4 FL (ref 80–99.9)
MONOCYTES ABSOLUTE: 0.49 E9/L (ref 0.1–0.95)
MONOCYTES RELATIVE PERCENT: 9.3 % (ref 2–12)
NEUTROPHILS ABSOLUTE: 2.83 E9/L (ref 1.8–7.3)
NEUTROPHILS RELATIVE PERCENT: 53.5 % (ref 43–80)
NITRITE, URINE: NEGATIVE
PDW BLD-RTO: 14.2 FL (ref 11.5–15)
PERFORMED ON: ABNORMAL
PH UA: 7 (ref 5–9)
PLATELET # BLD: 169 E9/L (ref 130–450)
PMV BLD AUTO: 10.2 FL (ref 7–12)
POC CHLORIDE: 108 MMOL/L (ref 100–108)
POC CREATININE: 1 MG/DL (ref 0.5–1)
POC POTASSIUM: 4.8 MMOL/L (ref 3.5–5)
POC SODIUM: 142 MMOL/L (ref 132–146)
POTASSIUM SERPL-SCNC: 4.7 MMOL/L (ref 3.5–5)
PROTEIN UA: NEGATIVE MG/DL
PROTHROMBIN TIME: 43.1 SEC (ref 9.3–12.4)
RBC # BLD: 4.72 E12/L (ref 3.5–5.5)
RBC UA: ABNORMAL /HPF (ref 0–2)
SODIUM BLD-SCNC: 139 MMOL/L (ref 132–146)
SPECIFIC GRAVITY UA: <=1.005 (ref 1–1.03)
TROPONIN: <0.01 NG/ML (ref 0–0.03)
UROBILINOGEN, URINE: 0.2 E.U./DL
WBC # BLD: 5.3 E9/L (ref 4.5–11.5)
WBC UA: ABNORMAL /HPF (ref 0–5)

## 2019-07-17 PROCEDURE — 36415 COLL VENOUS BLD VENIPUNCTURE: CPT

## 2019-07-17 PROCEDURE — 2580000003 HC RX 258: Performed by: EMERGENCY MEDICINE

## 2019-07-17 PROCEDURE — G0378 HOSPITAL OBSERVATION PER HR: HCPCS

## 2019-07-17 PROCEDURE — 83735 ASSAY OF MAGNESIUM: CPT

## 2019-07-17 PROCEDURE — 93010 ELECTROCARDIOGRAM REPORT: CPT | Performed by: INTERNAL MEDICINE

## 2019-07-17 PROCEDURE — 84484 ASSAY OF TROPONIN QUANT: CPT

## 2019-07-17 PROCEDURE — 71045 X-RAY EXAM CHEST 1 VIEW: CPT

## 2019-07-17 PROCEDURE — 82435 ASSAY OF BLOOD CHLORIDE: CPT

## 2019-07-17 PROCEDURE — 2580000003 HC RX 258: Performed by: INTERNAL MEDICINE

## 2019-07-17 PROCEDURE — 80048 BASIC METABOLIC PNL TOTAL CA: CPT

## 2019-07-17 PROCEDURE — 99285 EMERGENCY DEPT VISIT HI MDM: CPT

## 2019-07-17 PROCEDURE — 70450 CT HEAD/BRAIN W/O DYE: CPT

## 2019-07-17 PROCEDURE — 87088 URINE BACTERIA CULTURE: CPT

## 2019-07-17 PROCEDURE — 84132 ASSAY OF SERUM POTASSIUM: CPT

## 2019-07-17 PROCEDURE — 82947 ASSAY GLUCOSE BLOOD QUANT: CPT

## 2019-07-17 PROCEDURE — 85025 COMPLETE CBC W/AUTO DIFF WBC: CPT

## 2019-07-17 PROCEDURE — 84295 ASSAY OF SERUM SODIUM: CPT

## 2019-07-17 PROCEDURE — 6370000000 HC RX 637 (ALT 250 FOR IP): Performed by: INTERNAL MEDICINE

## 2019-07-17 PROCEDURE — 85610 PROTHROMBIN TIME: CPT

## 2019-07-17 PROCEDURE — 81001 URINALYSIS AUTO W/SCOPE: CPT

## 2019-07-17 PROCEDURE — 93005 ELECTROCARDIOGRAM TRACING: CPT | Performed by: NURSE PRACTITIONER

## 2019-07-17 PROCEDURE — 82565 ASSAY OF CREATININE: CPT

## 2019-07-17 RX ORDER — ASPIRIN 81 MG/1
81 TABLET, CHEWABLE ORAL DAILY
Status: DISCONTINUED | OUTPATIENT
Start: 2019-07-17 | End: 2019-07-19 | Stop reason: HOSPADM

## 2019-07-17 RX ORDER — SODIUM CHLORIDE 9 MG/ML
INJECTION, SOLUTION INTRAVENOUS CONTINUOUS
Status: DISCONTINUED | OUTPATIENT
Start: 2019-07-17 | End: 2019-07-18

## 2019-07-17 RX ORDER — ACETAMINOPHEN 325 MG/1
650 TABLET ORAL EVERY 4 HOURS PRN
Status: DISCONTINUED | OUTPATIENT
Start: 2019-07-17 | End: 2019-07-19 | Stop reason: HOSPADM

## 2019-07-17 RX ORDER — POTASSIUM CHLORIDE 7.45 MG/ML
10 INJECTION INTRAVENOUS PRN
Status: DISCONTINUED | OUTPATIENT
Start: 2019-07-17 | End: 2019-07-19 | Stop reason: HOSPADM

## 2019-07-17 RX ORDER — VITAMIN E 268 MG
400 CAPSULE ORAL DAILY
COMMUNITY
End: 2020-02-12 | Stop reason: ALTCHOICE

## 2019-07-17 RX ORDER — POTASSIUM CHLORIDE 20 MEQ/1
40 TABLET, EXTENDED RELEASE ORAL PRN
Status: DISCONTINUED | OUTPATIENT
Start: 2019-07-17 | End: 2019-07-19 | Stop reason: HOSPADM

## 2019-07-17 RX ORDER — ONDANSETRON 2 MG/ML
4 INJECTION INTRAMUSCULAR; INTRAVENOUS EVERY 6 HOURS PRN
Status: DISCONTINUED | OUTPATIENT
Start: 2019-07-17 | End: 2019-07-19 | Stop reason: HOSPADM

## 2019-07-17 RX ORDER — HYDRALAZINE HYDROCHLORIDE 25 MG/1
25 TABLET, FILM COATED ORAL EVERY 8 HOURS SCHEDULED
Status: DISCONTINUED | OUTPATIENT
Start: 2019-07-17 | End: 2019-07-19 | Stop reason: HOSPADM

## 2019-07-17 RX ORDER — SODIUM CHLORIDE 0.9 % (FLUSH) 0.9 %
10 SYRINGE (ML) INJECTION EVERY 12 HOURS SCHEDULED
Status: DISCONTINUED | OUTPATIENT
Start: 2019-07-17 | End: 2019-07-19 | Stop reason: HOSPADM

## 2019-07-17 RX ORDER — ATORVASTATIN CALCIUM 20 MG/1
20 TABLET, FILM COATED ORAL DAILY
COMMUNITY
End: 2019-08-26

## 2019-07-17 RX ORDER — ATORVASTATIN CALCIUM 10 MG/1
20 TABLET, FILM COATED ORAL DAILY
Status: DISCONTINUED | OUTPATIENT
Start: 2019-07-17 | End: 2019-07-19 | Stop reason: HOSPADM

## 2019-07-17 RX ORDER — SODIUM CHLORIDE 0.9 % (FLUSH) 0.9 %
10 SYRINGE (ML) INJECTION PRN
Status: DISCONTINUED | OUTPATIENT
Start: 2019-07-17 | End: 2019-07-19 | Stop reason: HOSPADM

## 2019-07-17 RX ORDER — 0.9 % SODIUM CHLORIDE 0.9 %
500 INTRAVENOUS SOLUTION INTRAVENOUS ONCE
Status: COMPLETED | OUTPATIENT
Start: 2019-07-17 | End: 2019-07-17

## 2019-07-17 RX ADMIN — Medication 10 ML: at 18:25

## 2019-07-17 RX ADMIN — SODIUM CHLORIDE 500 ML: 9 INJECTION, SOLUTION INTRAVENOUS at 10:11

## 2019-07-17 RX ADMIN — SODIUM CHLORIDE: 9 INJECTION, SOLUTION INTRAVENOUS at 18:25

## 2019-07-17 RX ADMIN — HYDRALAZINE HYDROCHLORIDE 25 MG: 25 TABLET, FILM COATED ORAL at 21:52

## 2019-07-17 RX ADMIN — Medication 10 ML: at 21:53

## 2019-07-17 RX ADMIN — METOPROLOL TARTRATE 25 MG: 25 TABLET ORAL at 21:52

## 2019-07-17 ASSESSMENT — ENCOUNTER SYMPTOMS
COLOR CHANGE: 0
BLOOD IN STOOL: 0
ABDOMINAL PAIN: 0
BACK PAIN: 0
NAUSEA: 0
COUGH: 0
VOMITING: 0
CHEST TIGHTNESS: 0
DIARRHEA: 0
SHORTNESS OF BREATH: 0

## 2019-07-17 ASSESSMENT — PAIN DESCRIPTION - DESCRIPTORS: DESCRIPTORS: ACHING

## 2019-07-17 ASSESSMENT — PAIN DESCRIPTION - LOCATION: LOCATION: HEAD

## 2019-07-17 ASSESSMENT — PAIN SCALES - GENERAL: PAINLEVEL_OUTOF10: 3

## 2019-07-17 ASSESSMENT — PAIN DESCRIPTION - ONSET: ONSET: ON-GOING

## 2019-07-17 ASSESSMENT — PAIN DESCRIPTION - FREQUENCY: FREQUENCY: INTERMITTENT

## 2019-07-17 ASSESSMENT — PAIN DESCRIPTION - PAIN TYPE: TYPE: ACUTE PAIN

## 2019-07-17 ASSESSMENT — PAIN DESCRIPTION - PROGRESSION: CLINICAL_PROGRESSION: NOT CHANGED

## 2019-07-17 NOTE — ED NOTES
Pt reported dizziness while standing and also dizziness from lying to sitting no other complaints Dr zain Salcedo, RN  07/17/19 1736

## 2019-07-17 NOTE — ED PROVIDER NOTES
Neck: Normal range of motion. Neck supple. No JVD present. Cardiovascular: Regular rhythm, S1 normal, S2 normal and normal heart sounds. Bradycardia present. No murmur heard. Pulses:       Radial pulses are 2+ on the right side, and 2+ on the left side. Dorsalis pedis pulses are 2+ on the right side, and 2+ on the left side. Pulmonary/Chest: Effort normal and breath sounds normal. No accessory muscle usage. No respiratory distress. She has no wheezes. She has no rhonchi. She has no rales. Abdominal: Soft. Normal appearance and bowel sounds are normal. She exhibits no distension. There is no tenderness. Musculoskeletal: Normal range of motion. She exhibits no edema. Lymphadenopathy:     She has no cervical adenopathy. Neurological: She is alert and oriented to person, place, and time. She has normal strength. No cranial nerve deficit or sensory deficit. Coordination normal. GCS eye subscore is 4. GCS verbal subscore is 5. GCS motor subscore is 6. Reflex Scores:       Brachioradialis reflexes are 2+ on the right side and 2+ on the left side. Patellar reflexes are 2+ on the right side and 2+ on the left side. Skin: Skin is warm and dry. No rash noted. She is not diaphoretic. No pallor. Nursing note and vitals reviewed. Procedures    MDM    ED Course as of Jul 20 0703   Wed Jul 17, 2019   1106 Patient states she does not feel well, shivering. Reassessed, vitals stable, pulse ox 100%, appears anxious. Given assurance and warm blankets and is improving. To go for CT head. [RU]   1542 Attempted to ambulate patient, became symptomatic and unsteady. Decision made to admit at this time.     [RU]      ED Course User Index  [RU] Cora Arce, DO       --------------------------------------------- PAST HISTORY ---------------------------------------------  Past Medical History:  has a past medical history of Arthritis, Hypertension, Ischemic colitis ([address] and PAF (paroxysmal atrial fibrillation) ([facility] Past Surgical History:  has a past surgical history that includes Ankle fracture surgery. Social History:  reports that she has never smoked. She has never used smokeless tobacco. She reports that she does not drink alcohol or use drugs. Family History: family history is not on file. The patients home medications have been reviewed. Allergies: Patient has no known allergies.     -------------------------------------------------- RESULTS -------------------------------------------------    Lab  Results for orders placed or performed during the hospital encounter of 07/17/19   CBC Auto Differential   Result Value Ref Range    WBC 5.3 4.5 - 11.5 E9/L    RBC 4.72 3.50 - 5.50 E12/L    Hemoglobin 14.0 11.5 - 15.5 g/dL    Hematocrit 43.6 34.0 - 48.0 %    MCV 92.4 80.0 - 99.9 fL    MCH 29.7 26.0 - 35.0 pg    MCHC 32.1 32.0 - 34.5 %    RDW 14.2 11.5 - 15.0 fL    Platelets 808 438 - 435 E9/L    MPV 10.2 7.0 - 12.0 fL    Neutrophils % 53.5 43.0 - 80.0 %    Immature Granulocytes % 0.4 0.0 - 5.0 %    Lymphocytes % 34.3 20.0 - 42.0 %    Monocytes % 9.3 2.0 - 12.0 %    Eosinophils % 1.9 0.0 - 6.0 %    Basophils % 0.6 0.0 - 2.0 %    Neutrophils # 2.83 1.80 - 7.30 E9/L    Immature Granulocytes # 0.02 E9/L    Lymphocytes # 1.81 1.50 - 4.00 E9/L    Monocytes # 0.49 0.10 - 0.95 E9/L    Eosinophils # 0.10 0.05 - 0.50 E9/L    Basophils # 0.03 0.00 - 0.20 R1/Y   Basic Metabolic Panel   Result Value Ref Range    Sodium 139 132 - 146 mmol/L    Potassium 4.7 3.5 - 5.0 mmol/L    Chloride 103 98 - 107 mmol/L    CO2 27 22 - 29 mmol/L    Anion Gap 9 7 - 16 mmol/L    Glucose 141 (H) 74 - 99 mg/dL    BUN 13 8 - 23 mg/dL    CREATININE 1.0 0.5 - 1.0 mg/dL    GFR Non-African American >60 >=60 mL/min/1.73    GFR African American >60     Calcium 9.2 8.6 - 10.2 mg/dL   Magnesium   Result Value Ref Range    Magnesium 2.2 1.6 - 2.6 mg/dL   Troponin   Result Value Ref Range    Troponin <0.01 0.00 - 0.03 ng/mL history, physical, labs, imaging reviewed, presentation is most consistent with dizziness. Differential diagnoses include, but are not limited to, heart block, vasovagal, sepsis, peripheral vertigo. Patient's condition improved following treatment which included fluid bolus. However, patient remained symptomatic with standing, concern for safety on discharge. Patient subsequently felt stable for admission for further workup and evaluation and management.        --------------------------------- ADDITIONAL PROVIDER NOTES ---------------------------------  Counseling:  I have spoken with the patient and discussed todays results, in addition to providing specific details for the plan of care and counseling regarding the diagnosis and prognosis. Their questions are answered at this time and they are agreeable with the plan of admission. This patient has remained hemodynamically stable during their ED course. Diagnosis:  1. Dizziness    2. Lightheadedness        Disposition:  Patient's disposition: Admit to telemetry  Patient's condition is stable.          Jarred Sheffield DO  Resident  07/20/19 9735

## 2019-07-18 LAB
ANION GAP SERPL CALCULATED.3IONS-SCNC: 12 MMOL/L (ref 7–16)
BASOPHILS ABSOLUTE: 0.03 E9/L (ref 0–0.2)
BASOPHILS RELATIVE PERCENT: 0.6 % (ref 0–2)
BUN BLDV-MCNC: 7 MG/DL (ref 8–23)
CALCIUM SERPL-MCNC: 9.3 MG/DL (ref 8.6–10.2)
CHLORIDE BLD-SCNC: 108 MMOL/L (ref 98–107)
CO2: 22 MMOL/L (ref 22–29)
CREAT SERPL-MCNC: 0.7 MG/DL (ref 0.5–1)
EOSINOPHILS ABSOLUTE: 0.1 E9/L (ref 0.05–0.5)
EOSINOPHILS RELATIVE PERCENT: 1.9 % (ref 0–6)
GFR AFRICAN AMERICAN: >60
GFR NON-AFRICAN AMERICAN: >60 ML/MIN/1.73
GLUCOSE BLD-MCNC: 102 MG/DL (ref 74–99)
HCT VFR BLD CALC: 43.5 % (ref 34–48)
HEMOGLOBIN: 14.2 G/DL (ref 11.5–15.5)
IMMATURE GRANULOCYTES #: 0.01 E9/L
IMMATURE GRANULOCYTES %: 0.2 % (ref 0–5)
INR BLD: 5.2
LYMPHOCYTES ABSOLUTE: 1.28 E9/L (ref 1.5–4)
LYMPHOCYTES RELATIVE PERCENT: 24.3 % (ref 20–42)
MAGNESIUM: 2 MG/DL (ref 1.6–2.6)
MCH RBC QN AUTO: 29.9 PG (ref 26–35)
MCHC RBC AUTO-ENTMCNC: 32.6 % (ref 32–34.5)
MCV RBC AUTO: 91.6 FL (ref 80–99.9)
MONOCYTES ABSOLUTE: 0.55 E9/L (ref 0.1–0.95)
MONOCYTES RELATIVE PERCENT: 10.5 % (ref 2–12)
NEUTROPHILS ABSOLUTE: 3.29 E9/L (ref 1.8–7.3)
NEUTROPHILS RELATIVE PERCENT: 62.5 % (ref 43–80)
PDW BLD-RTO: 14.3 FL (ref 11.5–15)
PLATELET # BLD: 166 E9/L (ref 130–450)
PMV BLD AUTO: 10.4 FL (ref 7–12)
POTASSIUM REFLEX MAGNESIUM: 3.5 MMOL/L (ref 3.5–5)
PROTHROMBIN TIME: 57.8 SEC (ref 9.3–12.4)
RBC # BLD: 4.75 E12/L (ref 3.5–5.5)
SODIUM BLD-SCNC: 142 MMOL/L (ref 132–146)
URINE CULTURE, ROUTINE: NORMAL
WBC # BLD: 5.3 E9/L (ref 4.5–11.5)

## 2019-07-18 PROCEDURE — 6370000000 HC RX 637 (ALT 250 FOR IP): Performed by: INTERNAL MEDICINE

## 2019-07-18 PROCEDURE — G0378 HOSPITAL OBSERVATION PER HR: HCPCS

## 2019-07-18 PROCEDURE — 2580000003 HC RX 258: Performed by: INTERNAL MEDICINE

## 2019-07-18 PROCEDURE — 80048 BASIC METABOLIC PNL TOTAL CA: CPT

## 2019-07-18 PROCEDURE — 97161 PT EVAL LOW COMPLEX 20 MIN: CPT | Performed by: PHYSICAL THERAPIST

## 2019-07-18 PROCEDURE — 85610 PROTHROMBIN TIME: CPT

## 2019-07-18 PROCEDURE — 83735 ASSAY OF MAGNESIUM: CPT

## 2019-07-18 PROCEDURE — 97165 OT EVAL LOW COMPLEX 30 MIN: CPT

## 2019-07-18 PROCEDURE — 36415 COLL VENOUS BLD VENIPUNCTURE: CPT

## 2019-07-18 PROCEDURE — 97530 THERAPEUTIC ACTIVITIES: CPT

## 2019-07-18 PROCEDURE — 85025 COMPLETE CBC W/AUTO DIFF WBC: CPT

## 2019-07-18 RX ORDER — HYDRALAZINE HYDROCHLORIDE 20 MG/ML
10 INJECTION INTRAMUSCULAR; INTRAVENOUS EVERY 4 HOURS PRN
Status: DISCONTINUED | OUTPATIENT
Start: 2019-07-18 | End: 2019-07-19 | Stop reason: HOSPADM

## 2019-07-18 RX ADMIN — Medication 10 ML: at 08:57

## 2019-07-18 RX ADMIN — METOPROLOL TARTRATE 12.5 MG: 25 TABLET ORAL at 20:46

## 2019-07-18 RX ADMIN — HYDRALAZINE HYDROCHLORIDE 25 MG: 25 TABLET, FILM COATED ORAL at 22:05

## 2019-07-18 RX ADMIN — Medication 10 ML: at 20:48

## 2019-07-18 RX ADMIN — HYDRALAZINE HYDROCHLORIDE 25 MG: 25 TABLET, FILM COATED ORAL at 05:46

## 2019-07-18 RX ADMIN — ASPIRIN 81 MG 81 MG: 81 TABLET ORAL at 08:56

## 2019-07-18 RX ADMIN — METOPROLOL TARTRATE 12.5 MG: 25 TABLET ORAL at 08:57

## 2019-07-18 RX ADMIN — ATORVASTATIN CALCIUM 20 MG: 10 TABLET, FILM COATED ORAL at 08:56

## 2019-07-18 RX ADMIN — HYDRALAZINE HYDROCHLORIDE 25 MG: 25 TABLET, FILM COATED ORAL at 13:22

## 2019-07-18 ASSESSMENT — PAIN SCALES - GENERAL
PAINLEVEL_OUTOF10: 0
PAINLEVEL_OUTOF10: 0

## 2019-07-18 NOTE — H&P
Admission History and Physical patient seen in coverage for hospital service                                                                                 Joshua Espinoza MD, FACP                   Patient Name: Koko Rosario                   Age:  80 y.o. Gender:   female    CC: Dizziness lightheadedness and near syncope    HPI: This patient was admitted overnight by the hospital service. History is transcribed by the emergency room as follows:  Patient is an 80year old female with a history of hypertension, colitis, PAF, with recent admission for syncope presenting for an episode of light-headedness. States that she had difficulty sleeping throughout the night, this morning she woke, was cleaning the kitchen when she became lightheaded. States she felt as though she could potentially pass out, symptoms reportedly moderate in severity, worse with standing, improved with sitting down. She was scheduled to see her physician this morning at 10 AM, however, felt concerned enough for her symptoms and decided to come the emergency department instead. Does have a known history of paroxysmal atrial fibrillation, is on Coumadin for anticoagulation, has had multiple changes in her dosage due to frequent variations in her INR. She currently denies headache, vertigo, vision changes, numbness, tingling, weakness, falls, trauma, chest pain, shortness of breath, abdominal pain. No black or bloody stools.     This patient was interviewed this morning: He is awake and alert  She endorses the above history  She stated she has been having this off and on periodically  He also stated that she has passed out in the past  Acknowledges admission and evaluation for this as well    Past Medical History:   Diagnosis Date    Arthritis     Hx of blood clots     Hypertension     [facility]     PAF (paroxysmal atrial fibrillation) ([facility] suggestive of orthostasis  Acceleration of blood pressure based on fluid bolus    Problem list:  Patient Active Problem List   Diagnosis    [facility]    Essential hypertension    Paroxysmal atrial fibrillation (HCC)    Syncope and collapse    Moderate protein-calorie malnutrition ([facility]    Debility    Dizzy       PLAN:  Reviewed the emergency room record indicates that she has orthostatic dizziness with change in position  Currently her blood pressure is elevated and her pulse is slow  She also has a headache no focal neurologic signs  In the context of her elevated blood pressure unfortunately she received a bolus of 500 cc of normal saline followed by 75 cc/h  Orthostatic blood pressure and pulse checks  At this time we will discontinue the intravenous since there is no sign of dehydration  We will order hydralazine venously for acceleration of her blood pressure  Maintain the metoprolol but at a reduced dose  I have reviewed the electrocardiographic tracing:  · Sinus bradycardia with premature atrial complexes  · Incomplete right bundle branch block  · Left anterior fascicular block  · Voltage criteria for left ventricular hypertrophy      See  Orders  Waldo Khanna MD, Kurtis Smith, American Board of Internal Medicine  [place] [address], [address] of Geriatric Medicine  7:53 AM  7/18/2019

## 2019-07-19 VITALS
OXYGEN SATURATION: 98 % | WEIGHT: 141.2 LBS | HEIGHT: 66 IN | RESPIRATION RATE: 16 BRPM | DIASTOLIC BLOOD PRESSURE: 82 MMHG | TEMPERATURE: 98.3 F | BODY MASS INDEX: 22.69 KG/M2 | SYSTOLIC BLOOD PRESSURE: 142 MMHG | HEART RATE: 53 BPM

## 2019-07-19 LAB
INR BLD: 5
PROTHROMBIN TIME: 55.7 SEC (ref 9.3–12.4)

## 2019-07-19 PROCEDURE — 36415 COLL VENOUS BLD VENIPUNCTURE: CPT

## 2019-07-19 PROCEDURE — 85610 PROTHROMBIN TIME: CPT

## 2019-07-19 PROCEDURE — 2580000003 HC RX 258: Performed by: INTERNAL MEDICINE

## 2019-07-19 PROCEDURE — G0378 HOSPITAL OBSERVATION PER HR: HCPCS

## 2019-07-19 PROCEDURE — 6370000000 HC RX 637 (ALT 250 FOR IP): Performed by: INTERNAL MEDICINE

## 2019-07-19 RX ADMIN — HYDRALAZINE HYDROCHLORIDE 25 MG: 25 TABLET, FILM COATED ORAL at 05:44

## 2019-07-19 RX ADMIN — Medication 10 ML: at 08:28

## 2019-07-19 RX ADMIN — ASPIRIN 81 MG 81 MG: 81 TABLET ORAL at 08:27

## 2019-07-19 RX ADMIN — ATORVASTATIN CALCIUM 20 MG: 10 TABLET, FILM COATED ORAL at 08:27

## 2019-07-19 NOTE — CARE COORDINATION
Transition of care at discharge. Patient is alert and oriented. She is independent. She does not use assistive devices when she is at home. She uses a cane when she goes out somewhere. She lives with her son in a one floor home. She does have rails to enter the home as there are a couple of steps to get into the house. She does not have any rehab history or homecare. Her plan is to return home with her family and they will provide transportation home. Her pcp is Dr Lilian Larkin and her pharmacy is [address] [place] [address]. Will follow prn.

## 2019-07-19 NOTE — DISCHARGE SUMMARY
Discharge Summary    Leigh Ann Randall  :  1931  MRN:  [id number]    Admit date:  2019  Discharge date:  2019 10:03 AM    Admitting Physician:  Shu Del Toro MD    Discharge Diagnoses:    Patient Active Problem List    Diagnosis Date Noted    Debility 2019    Dizzy 2019    Moderate protein-calorie malnutrition ([address] 2018    Essential hypertension 2018    Paroxysmal atrial fibrillation ([address] 2018    Syncope and collapse 2018    Seizure ([address] 2017       Past Medical Hx :   Past Medical History:   Diagnosis Date    Arthritis     Hx of blood clots     Hypertension     [facility]     PAF (paroxysmal atrial fibrillation) ([address]        Past Surgical Hx :   Past Surgical History:   Procedure Laterality Date    ANKLE FRACTURE SURGERY      COLONOSCOPY      ENDOSCOPY, COLON, DIAGNOSTIC      FRACTURE SURGERY         Admission Condition:  fair    Discharged Condition:  good    Labs:  CBC:   Lab Results   Component Value Date    WBC 5.3 2019    RBC 4.75 2019    HGB 14.2 2019    HCT 43.5 2019    MCV 91.6 2019    MCH 29.9 2019    MCHC 32.6 2019    RDW 14.3 2019     2019    MPV 10.4 2019     CMP:    Lab Results   Component Value Date     2019    K 3.5 2019     2019    CO2 22 2019    BUN 7 2019    CREATININE 0.7 2019    GFRAA >60 2019    LABGLOM >60 2019    GLUCOSE 102 2019    GLUCOSE 102 2010    PROT 6.8 2019    LABALBU 3.6 2019    CALCIUM 9.3 2019    BILITOT 0.9 2019    ALKPHOS 71 2019    AST 31 2019    ALT 23 2019        Radiology Results: Ct Head Wo Contrast    Result Date: 2019  Patient MRN: [id number] : 1931 Age:  80 years Gender: Female Order Date: 2019 10:15 AM Exam: CT HEAD WO CONTRAST Number of Images: 105 views Indication:  Acute dizziness

## 2019-08-26 ENCOUNTER — HOSPITAL ENCOUNTER (OUTPATIENT)
Dept: WOUND CARE | Age: 84
Discharge: HOME | End: 2019-08-26
Payer: MEDICARE

## 2019-08-26 VITALS
WEIGHT: 143 LBS | HEIGHT: 66 IN | BODY MASS INDEX: 22.98 KG/M2 | DIASTOLIC BLOOD PRESSURE: 88 MMHG | RESPIRATION RATE: 18 BRPM | HEART RATE: 60 BPM | TEMPERATURE: 97.8 F | SYSTOLIC BLOOD PRESSURE: 134 MMHG

## 2019-08-26 PROCEDURE — 11042 DBRDMT SUBQ TIS 1ST 20SQCM/<: CPT

## 2019-08-26 PROCEDURE — 99203 OFFICE O/P NEW LOW 30 MIN: CPT

## 2019-08-26 RX ORDER — LIDOCAINE HYDROCHLORIDE 20 MG/ML
JELLY TOPICAL ONCE
Status: DISCONTINUED | OUTPATIENT
Start: 2019-08-26 | End: 2019-08-27 | Stop reason: HOSPADM

## 2019-08-26 NOTE — PROGRESS NOTES
[facility] Followup Visit Note    Referring Physician : Aislinn Cerda MD   [facility] [employer] RECORD NUMBER:  [id number]  AGE: 80 y.o. GENDER: female  : 1931  EPISODE DATE:  2019    Subjective:     Chief Complaint   Patient presents with    Wound Check     rt ankle       HISTORY of PRESENT ILLNESS HPI   Jesus Baptiste is a 80 y.o. female who presents today in regards to follow up evaluation and treatment of wound/ulcer. That patient's past medical, family and social hx were reviewed and changes were made if present. History of Wound Context:  Follow up and debridement     Wound/Ulcer Pain Timing/Severity: intermittent  Quality of pain: dull, aching  Severity:  2 / 10   Modifying Factors: Pain worsens with walking and Pain is relieved/improved with rest  Associated Signs/Symptoms: edema    Ulcer Identification:  Ulcer Type: venous  Contributing Factors: edema    Diabetic/Pressure/Non Pressure Ulcers only:  Ulcer: Non-Pressure ulcer, fat layer exposed    Wound: N/A        PAST MEDICAL HISTORY      Diagnosis Date    Arthritis     Hx of blood clots     Hypertension     [facility]     PAF (paroxysmal atrial fibrillation) ([facility]      Past Surgical History:   Procedure Laterality Date    ANKLE FRACTURE SURGERY      COLONOSCOPY      ENDOSCOPY, COLON, DIAGNOSTIC      FRACTURE SURGERY       History reviewed. No pertinent family history.   Social History     Tobacco Use    Smoking status: Never Smoker    Smokeless tobacco: Never Used   Substance Use Topics    Alcohol use: No    Drug use: No     No Known Allergies  Current Outpatient Medications on File Prior to Encounter   Medication Sig Dispense Refill    metoprolol tartrate (LOPRESSOR) 25 MG tablet Take 0.5 tablets by mouth 2 times daily 60 tablet 3    vitamin E 400 UNIT capsule Take 400 Units by mouth daily      hydrALAZINE (APRESOLINE) 25 MG tablet Take 1 tablet by mouth every 8 hours 90 tablet 0    aspirin 81 MG tablet Take 81 mg by mouth daily      warfarin (JANTOVEN) 5 MG tablet Take 5 mg by mouth daily       vitamin B-12 (CYANOCOBALAMIN) 500 MCG tablet Take 500 mcg by mouth daily      vitamin D (CHOLECALCIFEROL) 1000 UNITS TABS tablet Take 1,000 Units by mouth daily       No current facility-administered medications on file prior to encounter. REVIEW OF SYSTEMS See HPI    Objective:    /88   Pulse 60   Temp 97.8 °F (36.6 °C) (Oral)   Resp 18   Ht 5' 6\" (1.676 m)   Wt 143 lb (64.9 kg)   BMI 23.08 kg/m²   Wt Readings from Last 3 Encounters:   08/26/19 143 lb (64.9 kg)   07/19/19 141 lb 3.2 oz (64 kg)   04/16/19 151 lb 12.8 oz (68.9 kg)     PHYSICAL EXAM  CONSTITUTIONAL:   Awake, alert, cooperative   EYES:  lids and lashes normal   ENT: external ears and nose without lesions   NECK:  supple, symmetrical, trachea midline   SKIN:  Open wound Present    Assessment:     Problem List Items Addressed This Visit     None        Procedure Note  Indications:  Based on my examination of this patient's wound(s)/ulcer(s) today, debridement is required to promote healing and evaluate the wound base. Performed by: Waqas Lynn DPM    Consent obtained:  Yes    Time out taken:  Yes    Pain Control: Anesthetic  Anesthetic: 2% Lidocaine Gel Topical     Debridement:Excisional Debridement    Using curette the wound(s)/ulcer(s) was/were sharply debrided down through and including the removal of subcutaneous tissue.         Devitalized Tissue Debrided:  fibrin, biofilm and slough to stimulate bleeding to promote healing, post debridement good bleeding base and wound edges noted    Pre Debridement Measurements:  Are located in the [place]  Documentation Flow Sheet    Wound/Ulcer #: 1    Post Debridement Measurements:  Wound/Ulcer Descriptions are Pre Debridement except measurements:    Wound 08/26/19 Ankle Right;Lateral #1 acquired 8/12/19 (Active)   Wound Image   8/26/2019  3:37 PM   Wound Length (cm) 0.4 cm Increase in Pain  * Temperature over 101  * Increase in drainage from your wound  * Drainage with a foul odor  * Bleeding  * Increase in swelling  * Need for compression bandage changes due to slippage, breakthrough drainage. If you need medical attention outside of the business hours of the [address] [place] [address] Road please contact your PCP or go to the nearest emergency room.         Electronically signed by Elmer Acosta DPM on 8/26/2019 at 3:54 PM

## 2019-09-09 ENCOUNTER — HOSPITAL ENCOUNTER (OUTPATIENT)
Dept: WOUND CARE | Age: 84
Discharge: HOME | End: 2019-09-09
Payer: MEDICARE

## 2019-09-16 ENCOUNTER — APPOINTMENT (OUTPATIENT)
Dept: GENERAL RADIOLOGY | Age: 84
End: 2019-09-16
Payer: MEDICARE

## 2019-09-16 ENCOUNTER — HOSPITAL ENCOUNTER (OUTPATIENT)
Age: 84
Setting detail: OBSERVATION
Discharge: HOME | End: 2019-09-17
Attending: EMERGENCY MEDICINE | Admitting: INTERNAL MEDICINE
Payer: MEDICARE

## 2019-09-16 DIAGNOSIS — R00.2 PALPITATIONS: Primary | ICD-10-CM

## 2019-09-16 DIAGNOSIS — R79.1 SUBTHERAPEUTIC INTERNATIONAL NORMALIZED RATIO (INR): ICD-10-CM

## 2019-09-16 DIAGNOSIS — R55 NEAR SYNCOPE: ICD-10-CM

## 2019-09-16 PROBLEM — R53.81 DEBILITY: Status: RESOLVED | Noted: 2019-07-17 | Resolved: 2019-09-16

## 2019-09-16 PROBLEM — R53.1 WEAKNESS: Status: ACTIVE | Noted: 2019-09-16

## 2019-09-16 PROBLEM — R42 DIZZY: Status: RESOLVED | Noted: 2019-07-17 | Resolved: 2019-09-16

## 2019-09-16 LAB
ALBUMIN SERPL-MCNC: 3.5 G/DL (ref 3.5–5.2)
ALP BLD-CCNC: 68 U/L (ref 35–104)
ALT SERPL-CCNC: 12 U/L (ref 0–32)
ANION GAP SERPL CALCULATED.3IONS-SCNC: 11 MMOL/L (ref 7–16)
AST SERPL-CCNC: 17 U/L (ref 0–31)
BILIRUB SERPL-MCNC: 0.7 MG/DL (ref 0–1.2)
BUN BLDV-MCNC: 10 MG/DL (ref 8–23)
CALCIUM SERPL-MCNC: 9.3 MG/DL (ref 8.6–10.2)
CHLORIDE BLD-SCNC: 108 MMOL/L (ref 98–107)
CO2: 25 MMOL/L (ref 22–29)
CREAT SERPL-MCNC: 0.8 MG/DL (ref 0.5–1)
EKG ATRIAL RATE: 75 BPM
EKG P-R INTERVAL: 96 MS
EKG Q-T INTERVAL: 424 MS
EKG QRS DURATION: 96 MS
EKG QTC CALCULATION (BAZETT): 464 MS
EKG R AXIS: -49 DEGREES
EKG T AXIS: 11 DEGREES
EKG VENTRICULAR RATE: 72 BPM
GFR AFRICAN AMERICAN: >60
GFR NON-AFRICAN AMERICAN: >60 ML/MIN/1.73
GLUCOSE BLD-MCNC: 113 MG/DL (ref 74–99)
HCT VFR BLD CALC: 39.5 % (ref 34–48)
HEMOGLOBIN: 12.9 G/DL (ref 11.5–15.5)
INR BLD: 1.7
MCH RBC QN AUTO: 29.5 PG (ref 26–35)
MCHC RBC AUTO-ENTMCNC: 32.7 % (ref 32–34.5)
MCV RBC AUTO: 90.2 FL (ref 80–99.9)
METER GLUCOSE: 121 MG/DL (ref 74–99)
PDW BLD-RTO: 14.6 FL (ref 11.5–15)
PLATELET # BLD: 186 E9/L (ref 130–450)
PMV BLD AUTO: 10 FL (ref 7–12)
POTASSIUM SERPL-SCNC: 3.4 MMOL/L (ref 3.5–5)
PROTHROMBIN TIME: 19.3 SEC (ref 9.3–12.4)
RBC # BLD: 4.38 E12/L (ref 3.5–5.5)
SODIUM BLD-SCNC: 144 MMOL/L (ref 132–146)
TOTAL PROTEIN: 7.1 G/DL (ref 6.4–8.3)
TROPONIN: <0.01 NG/ML (ref 0–0.03)
WBC # BLD: 5.4 E9/L (ref 4.5–11.5)

## 2019-09-16 PROCEDURE — 71045 X-RAY EXAM CHEST 1 VIEW: CPT

## 2019-09-16 PROCEDURE — 96375 TX/PRO/DX INJ NEW DRUG ADDON: CPT

## 2019-09-16 PROCEDURE — 6360000002 HC RX W HCPCS

## 2019-09-16 PROCEDURE — 93005 ELECTROCARDIOGRAM TRACING: CPT | Performed by: EMERGENCY MEDICINE

## 2019-09-16 PROCEDURE — 82962 GLUCOSE BLOOD TEST: CPT

## 2019-09-16 PROCEDURE — 99285 EMERGENCY DEPT VISIT HI MDM: CPT

## 2019-09-16 PROCEDURE — 93010 ELECTROCARDIOGRAM REPORT: CPT | Performed by: INTERNAL MEDICINE

## 2019-09-16 PROCEDURE — 6360000002 HC RX W HCPCS: Performed by: EMERGENCY MEDICINE

## 2019-09-16 PROCEDURE — 36415 COLL VENOUS BLD VENIPUNCTURE: CPT

## 2019-09-16 PROCEDURE — 84484 ASSAY OF TROPONIN QUANT: CPT

## 2019-09-16 PROCEDURE — 85610 PROTHROMBIN TIME: CPT

## 2019-09-16 PROCEDURE — 96374 THER/PROPH/DIAG INJ IV PUSH: CPT

## 2019-09-16 PROCEDURE — 80053 COMPREHEN METABOLIC PANEL: CPT

## 2019-09-16 PROCEDURE — 6370000000 HC RX 637 (ALT 250 FOR IP): Performed by: INTERNAL MEDICINE

## 2019-09-16 PROCEDURE — G0378 HOSPITAL OBSERVATION PER HR: HCPCS

## 2019-09-16 PROCEDURE — 85027 COMPLETE CBC AUTOMATED: CPT

## 2019-09-16 PROCEDURE — 2580000003 HC RX 258: Performed by: PHYSICIAN ASSISTANT

## 2019-09-16 PROCEDURE — 97165 OT EVAL LOW COMPLEX 30 MIN: CPT

## 2019-09-16 PROCEDURE — 97535 SELF CARE MNGMENT TRAINING: CPT

## 2019-09-16 PROCEDURE — 6370000000 HC RX 637 (ALT 250 FOR IP): Performed by: PHYSICIAN ASSISTANT

## 2019-09-16 RX ORDER — ONDANSETRON 2 MG/ML
INJECTION INTRAMUSCULAR; INTRAVENOUS
Status: COMPLETED
Start: 2019-09-16 | End: 2019-09-16

## 2019-09-16 RX ORDER — HYDRALAZINE HYDROCHLORIDE 25 MG/1
25 TABLET, FILM COATED ORAL EVERY 8 HOURS SCHEDULED
Status: DISCONTINUED | OUTPATIENT
Start: 2019-09-16 | End: 2019-09-17 | Stop reason: HOSPADM

## 2019-09-16 RX ORDER — WARFARIN SODIUM 5 MG/1
5 TABLET ORAL DAILY
Status: DISCONTINUED | OUTPATIENT
Start: 2019-09-16 | End: 2019-09-16

## 2019-09-16 RX ORDER — SODIUM CHLORIDE 0.9 % (FLUSH) 0.9 %
10 SYRINGE (ML) INJECTION PRN
Status: DISCONTINUED | OUTPATIENT
Start: 2019-09-16 | End: 2019-09-17 | Stop reason: HOSPADM

## 2019-09-16 RX ORDER — ONDANSETRON 2 MG/ML
4 INJECTION INTRAMUSCULAR; INTRAVENOUS EVERY 6 HOURS PRN
Status: DISCONTINUED | OUTPATIENT
Start: 2019-09-16 | End: 2019-09-17 | Stop reason: HOSPADM

## 2019-09-16 RX ORDER — ACETAMINOPHEN 325 MG/1
650 TABLET ORAL EVERY 4 HOURS PRN
Status: DISCONTINUED | OUTPATIENT
Start: 2019-09-16 | End: 2019-09-17 | Stop reason: HOSPADM

## 2019-09-16 RX ORDER — ASPIRIN 81 MG/1
81 TABLET, CHEWABLE ORAL DAILY
Status: DISCONTINUED | OUTPATIENT
Start: 2019-09-16 | End: 2019-09-17 | Stop reason: HOSPADM

## 2019-09-16 RX ORDER — HYDRALAZINE HYDROCHLORIDE 20 MG/ML
10 INJECTION INTRAMUSCULAR; INTRAVENOUS ONCE
Status: COMPLETED | OUTPATIENT
Start: 2019-09-16 | End: 2019-09-16

## 2019-09-16 RX ORDER — SODIUM CHLORIDE 0.9 % (FLUSH) 0.9 %
10 SYRINGE (ML) INJECTION EVERY 12 HOURS SCHEDULED
Status: DISCONTINUED | OUTPATIENT
Start: 2019-09-16 | End: 2019-09-17 | Stop reason: HOSPADM

## 2019-09-16 RX ORDER — WARFARIN SODIUM 7.5 MG/1
7.5 TABLET ORAL
Status: COMPLETED | OUTPATIENT
Start: 2019-09-16 | End: 2019-09-16

## 2019-09-16 RX ORDER — POTASSIUM CHLORIDE 7.45 MG/ML
10 INJECTION INTRAVENOUS PRN
Status: DISCONTINUED | OUTPATIENT
Start: 2019-09-16 | End: 2019-09-17

## 2019-09-16 RX ORDER — POTASSIUM CHLORIDE 20 MEQ/1
40 TABLET, EXTENDED RELEASE ORAL ONCE
Status: COMPLETED | OUTPATIENT
Start: 2019-09-16 | End: 2019-09-16

## 2019-09-16 RX ORDER — POTASSIUM CHLORIDE 20 MEQ/1
40 TABLET, EXTENDED RELEASE ORAL PRN
Status: DISCONTINUED | OUTPATIENT
Start: 2019-09-16 | End: 2019-09-17

## 2019-09-16 RX ADMIN — Medication 10 ML: at 21:18

## 2019-09-16 RX ADMIN — ONDANSETRON HYDROCHLORIDE 4 MG: 2 SOLUTION INTRAMUSCULAR; INTRAVENOUS at 07:03

## 2019-09-16 RX ADMIN — METOPROLOL TARTRATE 12.5 MG: 25 TABLET ORAL at 21:18

## 2019-09-16 RX ADMIN — POTASSIUM CHLORIDE 40 MEQ: 20 TABLET, EXTENDED RELEASE ORAL at 10:49

## 2019-09-16 RX ADMIN — HYDRALAZINE HYDROCHLORIDE 25 MG: 25 TABLET, FILM COATED ORAL at 21:18

## 2019-09-16 RX ADMIN — HYDRALAZINE HYDROCHLORIDE 10 MG: 20 INJECTION, SOLUTION INTRAMUSCULAR; INTRAVENOUS at 07:03

## 2019-09-16 RX ADMIN — WARFARIN SODIUM 7.5 MG: 7.5 TABLET ORAL at 19:45

## 2019-09-16 ASSESSMENT — PAIN SCALES - GENERAL: PAINLEVEL_OUTOF10: 0

## 2019-09-16 NOTE — H&P
[address] [facility]  Attending History and Physical      CHIEF COMPLAINT:  weakness      HISTORY OF PRESENT ILLNESS:      The patient is a 80 y.o. female patient of dr Brandi Barajas who presents with complains of weakness. Patient states she has been having pain in her mouth from her dentures. She has not been eating or drinking much. She states she felt her heart racing in her chest.  She denied chest pain, shortness of breath, abdominal pain, nausea, vomiting, fevers, chills and diaphoresis. She is back to her usual state of health currently. Past Medical History:    Past Medical History:   Diagnosis Date    Arthritis     Hx of blood clots     Hypertension     Ischemic colitis ([address]     [facility]        Past Surgical History:    Past Surgical History:   Procedure Laterality Date    ANKLE FRACTURE SURGERY      COLONOSCOPY      ENDOSCOPY, COLON, DIAGNOSTIC      FRACTURE SURGERY         Medications Prior to Admission:    Not in a hospital admission. Allergies:    Patient has no known allergies. Social History:    reports that she has never smoked. She has never used smokeless tobacco. She reports that she does not drink alcohol or use drugs. Family History:   family history is not on file. REVIEW OF SYSTEMS:  As above in the HPI, otherwise negative    PHYSICAL EXAM:    Vitals:  BP (!) 168/97   Pulse 76   Temp 98.3 °F (36.8 °C) (Temporal)   Resp 19   Ht 5' 6\" (1.676 m)   Wt 143 lb (64.9 kg)   SpO2 100%   BMI 23.08 kg/m²     General:  Awake, alert, oriented X 3. Frail appearing. HEENT:  Normocephalic, atraumatic. Pupils equal, round, reactive to light. No scleral icterus. No conjunctival injection. Normal lips, teeth, and gums. No nasal discharge. Neck:  Supple  Heart:  RRR, no murmurs, gallops, rubs  Lungs:  CTA bilaterally, bilat symmetrical expansion, no wheeze, rales, or rhonchi  Abdomen:   Bowel sounds present, soft, Component Value Date    TROPONINI <0.01 09/16/2019     Last 3 Troponin:    Lab Results   Component Value Date    TROPONINI <0.01 09/16/2019    TROPONINI <0.01 07/17/2019    TROPONINI <0.01 04/16/2019     U/A:    Lab Results   Component Value Date    COLORU Yellow 07/17/2019    PROTEINU Negative 07/17/2019    PHUR 7.0 07/17/2019    WBCUA 0-1 07/17/2019    WBCUA 0-1 11/12/2010    RBCUA 0-1 07/17/2019    RBCUA 0-1 08/24/2012    BACTERIA RARE 07/17/2019    CLARITYU Clear 07/17/2019    SPECGRAV <=1.005 07/17/2019    LEUKOCYTESUR TRACE 07/17/2019    UROBILINOGEN 0.2 07/17/2019    BILIRUBINUR Negative 07/17/2019    BILIRUBINUR NEGATIVE 11/12/2010    BLOODU Negative 07/17/2019    GLUCOSEU Negative 07/17/2019    GLUCOSEU NEGATIVE 11/12/2010     HgBA1c:  No results found for: LABA1C  FLP:    Lab Results   Component Value Date    TRIG 70 08/22/2018    HDL 65 08/22/2018    LDLCALC 75 08/22/2018    LABVLDL 14 08/22/2018     TSH:    Lab Results   Component Value Date    TSH 0.742 10/19/2010       ASSESSMENT:      Patient Active Problem List   Diagnosis    [facility]    Essential hypertension    Paroxysmal atrial fibrillation (HCC)    Moderate protein-calorie malnutrition (HCC)    Weakness         PLAN:    Continue anti-epileptic therapy. Blood pressure ok, continue current medications  Rate controlled. Supplement diet. Seems physical deconditioning. No metabolic or infectious etiology to account for acute weakness.     Pt/Ot evaluations for discharge planning    Livier Marino MD  8:42 AM  9/16/2019

## 2019-09-16 NOTE — ED PROVIDER NOTES
clear, handling secretions, no trismus  Neck: Supple, full ROM, non tender to palpation in the midline, no stridor, no crepitus, no meningeal signs  Pulmonary: Lungs clear to auscultation bilaterally, no wheezes, rales, or rhonchi. Not in respiratory distress  Cardiovascular:  Regular rate. Regular rhythm. No murmurs, gallops, or rubs. 2+ distal pulses  Chest: no chest wall tenderness  Abdomen: Soft. Non tender. Non distended. +BS. No rebound, guarding, or rigidity. No pulsatile masses appreciated. Musculoskeletal: Moves all extremities x 4. Warm and well perfused, no clubbing, cyanosis, or edema. Capillary refill <3 seconds  Skin: warm and dry. No rashes. Noted superficial ulceration right lateral ankle no cellulitis or abscess. Pulses are intact distally  Neurologic: GCS 15, CN 2-12 grossly intact, no focal deficits, symmetric strength 5/5 in the upper and lower extremities bilaterally  Psych: Normal Affect    -------------------------------------------------- RESULTS -------------------------------------------------  I have personally reviewed all laboratory and imaging results for this patient. Results are listed below.      LABS:  Results for orders placed or performed during the hospital encounter of 09/16/19   CBC   Result Value Ref Range    WBC 5.4 4.5 - 11.5 E9/L    RBC 4.38 3.50 - 5.50 E12/L    Hemoglobin 12.9 11.5 - 15.5 g/dL    Hematocrit 39.5 34.0 - 48.0 %    MCV 90.2 80.0 - 99.9 fL    MCH 29.5 26.0 - 35.0 pg    MCHC 32.7 32.0 - 34.5 %    RDW 14.6 11.5 - 15.0 fL    Platelets 000 234 - 999 E9/L    MPV 10.0 7.0 - 12.0 fL   Comprehensive Metabolic Panel   Result Value Ref Range    Sodium 144 132 - 146 mmol/L    Potassium 3.4 (L) 3.5 - 5.0 mmol/L    Chloride 108 (H) 98 - 107 mmol/L    CO2 25 22 - 29 mmol/L    Anion Gap 11 7 - 16 mmol/L    Glucose 113 (H) 74 - 99 mg/dL    BUN 10 8 - 23 mg/dL    CREATININE 0.8 0.5 - 1.0 mg/dL    GFR Non-African American >60 >=60 mL/min/1.73    GFR African American >60

## 2019-09-16 NOTE — PROGRESS NOTES
Occupational Therapy  OCCUPATIONAL THERAPY INITIAL EVALUATION      Date:2019  Patient Name: Eduarda Guadarrama  MRN: [id number]  : 1931  Room: [address] [place], [place] [address]    Evaluating OT:  LUIGI David, OTR/L #[id number]      AM-PAC Daily Activity Raw Score:    Recommended Adaptive Equipment:  TBD     Reason for Admission:  Pt was admitted w/ Dizziness, lightheadedness, near syncope    Diagnosis:  Palpitations, Near Syncope      Procedures this admission:  None     Pertinent Medical History:  HTN, A Fib     Precautions:  Falls  General Diet    Home Living: Pt lives with her Son in a 1-story house with 2 ADELE and 0 HR/s. Bed/bath on the main floor. Laundry in basement - Flight w/ HR   Bathroom setup:  Tub on main floor - sits on floor of tub to bathe, standard commode   Equipment owned:  [facility] for outings    Available Family Assist:  Family can assist PRN    Prior Level of Function:  IND with ADLs, IADLs, Transfers and Mobility using no AD for household mobility, SPC for outings.     Driving:  No  Occupation:  None reported    Pain Level:  denies;  Nsg Notified   Additional Complaints:  No C/O Dizziness w/ Ax, Change in position    Vitals/Lab Values:  WFL, Room Air    Cognition: A & O x 3 - generally oriented, cues for exact date   Able to Follow Multi-Step Commands w/ min VCs   Memory:  good (-)   Sequencing:  good (-)   Problem solving:  good (-)   Judgement/safety:  good  (-)  Additional Comments:  Pt was pleasant, cooperative       Functional Assessment:   Initial Eval Status  Date: 19 Treatment Status  Date: Short Term Goals  Treatment frequency: PRN 1-2 x/week   Feeding IND         Grooming Close SUP    Able to wash hands standing at the sink, no LOB/dizziness  Mod I  Standing At The Sink   UB Dressing SUP/Set up    Able to don garment while seated EOB after set up, unsafe to retrieve item INDly  Mod I   LB Dressing CGA/Close SUP    SUP to don/doff socks seated EOB w/ cross-legged tech  CGA for standing balance to pull pants over hips  Mod I   Bathing NT      Mod I   Toileting NT    Pt declined  Mod I   Bed Mobility  Rolling:  IND  Repositioning:  IND   Supine to Sit:  IND    Sit to Supine:  NT           Functional Transfers Sit to stand:  Close SUP  Stand to sit:  Close SUP      Sit<>stand from EOB 2x, chair 1x  Mod I   Functional Mobility CGA w/ SPC    Short distance in room ~ 20' 2x + distance to parish 25' 2x w/o seated rest break, no LOB, slightly unsteady  Mod I   Balance Sitting:  Remote SUP for safety     Static:  SUP    Dynamic:  SUP w/ Ax at EOB    Standing:  CGA w/ SPC for safety     Static:  Close SUP w/ SPC    Dynamic:  CGA w/ SPC for safety     Activity Tolerance Tolerated Sitting:  EOB ~ 15 mins w/ ax  Tolerated Standing:  ~ 5 mins     Visual/  Perceptual WFL  Glasses:  Yes      Hearing WFL - [place]  Hearing Aids  No       Hand dominance: Right    UE ROM: RUE:  WFL      LUE:  WFL    Strength: RUE: grossly 4+/5     LUE: grossly 4+/5     Strength:  WFL [place] UEs    Fine Motor Coordination:  WFL Tim[place] UEs    Sensation:  Denies numbness or tingling Tim[place] UEs  Tone:  WFL Timi UEs  Edema:  None Noted                            Treatment:       -- Education:  Provided Pt/Family ed re: Benefits/Purpose of OT services;  OT Plan of Care;  Transfer Safety; Benefits of use of DME/AD/Adaptive equip/techs to increase safety/IND with Functional Ax; Techs to increase Safety/Safety Awareness w/ Functional Ax; Benefits of Cont'd Participation in OT services       Pt and/or Family verbalized/demonstrated a good understanding of education provided. Will Review PRN. Provided Skilled SUP/Assist w/ Pt safety, Proper Positioning, ADLs, Transfers and Functional Mobility as noted above, as well as set up and clean up for session. Skilled monitoring of Vitals and pts response to treatment.       Consulted RN, Family     [] Malnutrition indicators have been identified and nursing has been notified to ensure a dietitian consult

## 2019-09-17 VITALS
BODY MASS INDEX: 23.29 KG/M2 | TEMPERATURE: 97.6 F | HEIGHT: 66 IN | OXYGEN SATURATION: 96 % | SYSTOLIC BLOOD PRESSURE: 145 MMHG | WEIGHT: 144.9 LBS | DIASTOLIC BLOOD PRESSURE: 82 MMHG | RESPIRATION RATE: 18 BRPM | HEART RATE: 70 BPM

## 2019-09-17 LAB
ANION GAP SERPL CALCULATED.3IONS-SCNC: 8 MMOL/L (ref 7–16)
BASOPHILS ABSOLUTE: 0.03 E9/L (ref 0–0.2)
BASOPHILS RELATIVE PERCENT: 0.6 % (ref 0–2)
BUN BLDV-MCNC: 11 MG/DL (ref 8–23)
CALCIUM SERPL-MCNC: 9.1 MG/DL (ref 8.6–10.2)
CHLORIDE BLD-SCNC: 107 MMOL/L (ref 98–107)
CO2: 27 MMOL/L (ref 22–29)
CREAT SERPL-MCNC: 0.9 MG/DL (ref 0.5–1)
EOSINOPHILS ABSOLUTE: 0.13 E9/L (ref 0.05–0.5)
EOSINOPHILS RELATIVE PERCENT: 2.8 % (ref 0–6)
GFR AFRICAN AMERICAN: >60
GFR NON-AFRICAN AMERICAN: >60 ML/MIN/1.73
GLUCOSE BLD-MCNC: 100 MG/DL (ref 74–99)
HCT VFR BLD CALC: 41.4 % (ref 34–48)
HEMOGLOBIN: 13.3 G/DL (ref 11.5–15.5)
IMMATURE GRANULOCYTES #: 0.01 E9/L
IMMATURE GRANULOCYTES %: 0.2 % (ref 0–5)
INR BLD: 2.3
LV EF: 48 %
LVEF MODALITY: NORMAL
LYMPHOCYTES ABSOLUTE: 1.51 E9/L (ref 1.5–4)
LYMPHOCYTES RELATIVE PERCENT: 32 % (ref 20–42)
MCH RBC QN AUTO: 29.6 PG (ref 26–35)
MCHC RBC AUTO-ENTMCNC: 32.1 % (ref 32–34.5)
MCV RBC AUTO: 92.2 FL (ref 80–99.9)
MONOCYTES ABSOLUTE: 0.48 E9/L (ref 0.1–0.95)
MONOCYTES RELATIVE PERCENT: 10.2 % (ref 2–12)
NEUTROPHILS ABSOLUTE: 2.56 E9/L (ref 1.8–7.3)
NEUTROPHILS RELATIVE PERCENT: 54.2 % (ref 43–80)
PDW BLD-RTO: 15.2 FL (ref 11.5–15)
PLATELET # BLD: 200 E9/L (ref 130–450)
PMV BLD AUTO: 10.3 FL (ref 7–12)
POTASSIUM REFLEX MAGNESIUM: 3.9 MMOL/L (ref 3.5–5)
PROTHROMBIN TIME: 26.3 SEC (ref 9.3–12.4)
RBC # BLD: 4.49 E12/L (ref 3.5–5.5)
SODIUM BLD-SCNC: 142 MMOL/L (ref 132–146)
WBC # BLD: 4.7 E9/L (ref 4.5–11.5)

## 2019-09-17 PROCEDURE — 6370000000 HC RX 637 (ALT 250 FOR IP): Performed by: INTERNAL MEDICINE

## 2019-09-17 PROCEDURE — 6370000000 HC RX 637 (ALT 250 FOR IP)

## 2019-09-17 PROCEDURE — G0378 HOSPITAL OBSERVATION PER HR: HCPCS

## 2019-09-17 PROCEDURE — 85025 COMPLETE CBC W/AUTO DIFF WBC: CPT

## 2019-09-17 PROCEDURE — 97161 PT EVAL LOW COMPLEX 20 MIN: CPT | Performed by: PHYSICAL THERAPIST

## 2019-09-17 PROCEDURE — 93306 TTE W/DOPPLER COMPLETE: CPT

## 2019-09-17 PROCEDURE — 80048 BASIC METABOLIC PNL TOTAL CA: CPT

## 2019-09-17 PROCEDURE — 85610 PROTHROMBIN TIME: CPT

## 2019-09-17 PROCEDURE — 36415 COLL VENOUS BLD VENIPUNCTURE: CPT

## 2019-09-17 PROCEDURE — 6370000000 HC RX 637 (ALT 250 FOR IP): Performed by: PHYSICIAN ASSISTANT

## 2019-09-17 PROCEDURE — 97530 THERAPEUTIC ACTIVITIES: CPT | Performed by: PHYSICAL THERAPIST

## 2019-09-17 PROCEDURE — 2580000003 HC RX 258: Performed by: PHYSICIAN ASSISTANT

## 2019-09-17 RX ORDER — LISINOPRIL 5 MG/1
5 TABLET ORAL DAILY
Qty: 30 TABLET | Refills: 0 | Status: ON HOLD | OUTPATIENT
Start: 2019-09-18 | End: 2019-11-12 | Stop reason: HOSPADM

## 2019-09-17 RX ORDER — WARFARIN SODIUM 4 MG/1
4 TABLET ORAL
Status: DISCONTINUED | OUTPATIENT
Start: 2019-09-17 | End: 2019-09-17

## 2019-09-17 RX ORDER — SPIRONOLACTONE 25 MG/1
12.5 TABLET ORAL DAILY
Qty: 30 TABLET | Refills: 0 | Status: ON HOLD | OUTPATIENT
Start: 2019-09-18 | End: 2019-10-17 | Stop reason: HOSPADM

## 2019-09-17 RX ORDER — CARVEDILOL 6.25 MG/1
6.25 TABLET ORAL 2 TIMES DAILY WITH MEALS
Qty: 30 TABLET | Refills: 0 | Status: SHIPPED | OUTPATIENT
Start: 2019-09-18 | End: 2019-10-06 | Stop reason: SDUPTHER

## 2019-09-17 RX ORDER — CARVEDILOL 6.25 MG/1
6.25 TABLET ORAL 2 TIMES DAILY WITH MEALS
Status: DISCONTINUED | OUTPATIENT
Start: 2019-09-17 | End: 2019-09-17 | Stop reason: HOSPADM

## 2019-09-17 RX ORDER — LISINOPRIL 5 MG/1
5 TABLET ORAL DAILY
Status: DISCONTINUED | OUTPATIENT
Start: 2019-09-17 | End: 2019-09-17 | Stop reason: HOSPADM

## 2019-09-17 RX ORDER — SPIRONOLACTONE 25 MG/1
12.5 TABLET ORAL DAILY
Status: DISCONTINUED | OUTPATIENT
Start: 2019-09-17 | End: 2019-09-17 | Stop reason: HOSPADM

## 2019-09-17 RX ORDER — LISINOPRIL 10 MG/1
TABLET ORAL
Status: COMPLETED
Start: 2019-09-17 | End: 2019-09-17

## 2019-09-17 RX ADMIN — LISINOPRIL 5 MG: 10 TABLET ORAL at 10:38

## 2019-09-17 RX ADMIN — CARVEDILOL 6.25 MG: 6.25 TABLET, FILM COATED ORAL at 17:59

## 2019-09-17 RX ADMIN — HYDRALAZINE HYDROCHLORIDE 25 MG: 25 TABLET, FILM COATED ORAL at 18:00

## 2019-09-17 RX ADMIN — LISINOPRIL 5 MG: 5 TABLET ORAL at 10:38

## 2019-09-17 RX ADMIN — SPIRONOLACTONE 12.5 MG: 25 TABLET ORAL at 18:02

## 2019-09-17 RX ADMIN — HYDRALAZINE HYDROCHLORIDE 25 MG: 25 TABLET, FILM COATED ORAL at 06:40

## 2019-09-17 RX ADMIN — ASPIRIN 81 MG 81 MG: 81 TABLET ORAL at 10:32

## 2019-09-17 RX ADMIN — Medication 10 ML: at 10:32

## 2019-09-17 RX ADMIN — METOPROLOL TARTRATE 12.5 MG: 25 TABLET ORAL at 10:32

## 2019-09-17 ASSESSMENT — PAIN SCALES - GENERAL: PAINLEVEL_OUTOF10: 0

## 2019-09-17 NOTE — PROGRESS NOTES
Call placed to Dr. Aleshia Shetty advise him of medication changes made by Dr. Shanthi Hartley and that cardiology signs off for patient discharge.

## 2019-09-17 NOTE — PROGRESS NOTES
Orthostatic Bps done at the bedside with PT. Laying 140/76 and 70, 171/81 and 80 sitting and 141/80 and 96 standing.

## 2019-09-17 NOTE — CONSULTS
CARDIOLOGY CONSULTATION    Patient Name:  Stefanie Viera    :  1931    Reason for Consultation:   Atrial fibrillation    History of Present Illness:   Stefanie Viera presents to [facility] for weakness and palpitations. The patient reports that she was at home yesterday with family. After walking down the hallway she sat to rest and reported to her daughter that she \"felt warm\" the daughter later reported to the patient that she seemed to be sitting with her mouth opening as if she was trying to talk but couldn't get the words out. She denies syncope or falls. Symptoms resolved after a short amount of time. She is a fair historian. She denies chest heaviness, SOB, but does feel her heart pounding at times. Past Medical History:   has a past medical history of Arthritis, Hx of blood clots, Hypertension, Ischemic colitis ([facility] and PAF (paroxysmal atrial fibrillation) ([facility] Surgical History:   has a past surgical history that includes Ankle fracture surgery; Colonoscopy; Endoscopy, colon, diagnostic; and fracture surgery. Social History:   reports that she has never smoked. She has never used smokeless tobacco. She reports that she does not drink alcohol or use drugs. Family History:  Remarkable for Mother  secondary CVA  Father  secondary Heart attack    Medications:  Prior to Admission medications    Medication Sig Start Date End Date Taking?  Authorizing Provider   metoprolol tartrate (LOPRESSOR) 25 MG tablet Take 0.5 tablets by mouth 2 times daily 19   Mary Ellen Elizondo MD   vitamin E 400 UNIT capsule Take 400 Units by mouth daily    Historical Provider, MD   hydrALAZINE (APRESOLINE) 25 MG tablet Take 1 tablet by mouth every 8 hours 19   Jim Samuels DO   aspirin 81 MG tablet Take 81 mg by mouth daily    Historical Provider, MD   warfarin (JANTOVEN) 5 MG tablet Take 5 mg by mouth daily     Historical Provider, MD   vitamin B-12 (CYANOCOBALAMIN) 500 MCG tablet Take 500 mcg by mouth daily    Historical Provider, MD   vitamin D (CHOLECALCIFEROL) 1000 UNITS TABS tablet Take 1,000 Units by mouth daily    Historical Provider, MD       Allergies:  Patient has no known allergies. Review of Systems:   · Constitutional: (+) near LOC; there has been no unanticipated weight loss. There's been no significant change in energy level, sleep pattern or activity level. No fever chills or rigors. · Eyes: No visual changes or diplopia. No scleral icterus. · ENT: No Headaches, hearing loss or vertigo. No mouth sores or sore throat. No change in taste or smell. · Cardiovascular: (+) palpitations; No chest discomfort, dyspnea on exertion, loss of consciousness, no phlebitis, no claudication. · Respiratory: No cough or wheezing, no sputum production. No hemoptysis, pleuritic pain. · Gastrointestinal: No abdominal pain, appetite loss, blood in stools. No change in bowel habits. No hematemesis  · Genitourinary: No dysuria, trouble voiding or hematuria. No nocturia or increased frequency. · Musculoskeletal:  No gait disturbance, weakness or joint complaints. · Integumentary: No rash or pruritis. · Neurological: No headache, diplopia, change in muscle strength, numbness or tingling. No change in gait, balance, coordination, mood, affect, memory, mentation, behavior. · Psychiatric: No anxiety or depression. · Endocrine: No temperature intolerance. No excessive thirst, fluid intake, or urination. No tremor. · Hematologic/Lymphatic: No abnormal bruising or bleeding, blood clots or swollen lymph nodes. · Allergic/Immunologic: No nasal congestion or hives. Physical Examination:    Vital Signs: BP (!) 162/93   Pulse 77   Temp 97.6 °F (36.4 °C) (Tympanic)   Resp 18   Ht 5' 6\" (1.676 m)   Wt 144 lb 14.4 oz (65.7 kg)   SpO2 97%   BMI 23.39 kg/m²   General appearance: Well preserved, mesomorphic body habitus, alert, no distress.   Skin: Skin color, texture, turgor INR 1.7 2.3     PRO-BNP:   Lab Results   Component Value Date    PROBNP 283 2017      Cardiac Injury Profile:   Recent Labs     19  0320   TROPONINI <0.01      Lipid Profile:   Lab Results   Component Value Date    TRIG 70 2018    HDL 65 2018    LDLCALC 75 2018    CHOL 154 2018      Hemoglobin A1C: No components found for: HGBA1C   ECG:  See report    Radiology:  Xr Chest Portable    Result Date: 2019  Patient MRN: [id number] : 1931 Age:  80 years Gender: Female Order Date: 2019 2:15 AM Exam: XR CHEST PORTABLE Number of Images: 1 view Indication:   palpitations palpitations Comparison: 2019 Findings: The heart is borderline The lung fields demonstrate no significant pulmonary vascular congestion and edema. The aorta is tortuous ectatic and calcified. There are infiltrates throughout the lung fields which are likely chronic     Cardiomegaly Findings compatible with atherosclerotic disease of the aorta. No acute infiltrate        Assessment:    Principal Problem:    Weakness  Active Problems:    Seizure (HCC)    Essential hypertension    Paroxysmal atrial fibrillation (HCC)    Moderate protein-calorie malnutrition (HCC)  Resolved Problems:    Near syncope    Near syncope      Plan:  Would consider placing her on apixaban 5mg BID in place of her coumadin. There is no evidence on Afib on EKG however it is very irregular. Recommend switching to carvedilol 6.25mg BID instead of metoprolol. Will add spironolactone 12.5 mg as well to assist with hypokalemia. Recommend electrolyte checks weekly for 2 weeks to ensure potassium is well regulated. Continue hydralazine. Echo with bubble study is pending. She is to follow-up with PCP Dr. Paige Harper and I will follow-up with her in my office.     I have spent more than 45 minutes face to face with Andrew Rodriguze reviewing notes and laboratory data with greater than 50% of this time instructing and counseling the patient regarding my findings and recommendations and I have answered all questions as posed to me by Ms. Summer Aguilera. Thank you, Roselyn Moise MD for allowing me to consult in the care of this patient. Claudy Archer DO, FACP, FACC, [facility]    NOTE:  This report was transcribed using voice recognition software. Every effort was made to ensure accuracy; however, inadvertent computerized transcription errors may be present.

## 2019-09-17 NOTE — PROGRESS NOTES
MG 2.0 07/18/2019     Phosphorus:  No results found for: PHOS  PT/INR:    Lab Results   Component Value Date    PROTIME 26.3 09/17/2019    INR 2.3 09/17/2019     PTT:    Lab Results   Component Value Date    APTT 27.0 03/12/2015   [APTT}     Assessment:    Patient Active Problem List   Diagnosis    Seizure ([facility]    Essential hypertension    Paroxysmal atrial fibrillation (HCC)    Moderate protein-calorie malnutrition (HCC)    Weakness       Plan:    No acute issues. Blood pressure ok, continue current medications  Rate controlled. Supplement diet. Physically deconditioned. Ok to discharge.     Gerald Mitchell    12:33 PM  9/17/2019

## 2019-09-19 PROBLEM — I48.91 ATRIAL FIBRILLATION WITH RVR (HCC): Status: ACTIVE | Noted: 2019-09-19

## 2019-10-06 ENCOUNTER — HOSPITAL ENCOUNTER (EMERGENCY)
Age: 84
Discharge: HOME | End: 2019-10-06
Payer: MEDICARE

## 2019-10-06 VITALS
RESPIRATION RATE: 16 BRPM | SYSTOLIC BLOOD PRESSURE: 182 MMHG | DIASTOLIC BLOOD PRESSURE: 88 MMHG | OXYGEN SATURATION: 98 % | HEART RATE: 69 BPM | TEMPERATURE: 98.2 F

## 2019-10-06 DIAGNOSIS — Z76.0 ENCOUNTER FOR MEDICATION REFILL: Primary | ICD-10-CM

## 2019-10-06 PROCEDURE — 99281 EMR DPT VST MAYX REQ PHY/QHP: CPT

## 2019-10-06 PROCEDURE — 6370000000 HC RX 637 (ALT 250 FOR IP): Performed by: PHYSICIAN ASSISTANT

## 2019-10-06 RX ORDER — CARVEDILOL 6.25 MG/1
6.25 TABLET ORAL ONCE
Status: COMPLETED | OUTPATIENT
Start: 2019-10-06 | End: 2019-10-06

## 2019-10-06 RX ORDER — CARVEDILOL 6.25 MG/1
6.25 TABLET ORAL 2 TIMES DAILY WITH MEALS
Qty: 30 TABLET | Refills: 0 | Status: ON HOLD | OUTPATIENT
Start: 2019-10-06 | End: 2019-11-21 | Stop reason: HOSPADM

## 2019-10-06 RX ADMIN — CARVEDILOL 6.25 MG: 6.25 TABLET, FILM COATED ORAL at 20:03

## 2019-10-06 RX ADMIN — APIXABAN 5 MG: 5 TABLET, FILM COATED ORAL at 20:03

## 2019-10-16 ENCOUNTER — HOSPITAL ENCOUNTER (OUTPATIENT)
Age: 84
Setting detail: OBSERVATION
Discharge: HOME | End: 2019-10-17
Attending: EMERGENCY MEDICINE | Admitting: INTERNAL MEDICINE
Payer: MEDICARE

## 2019-10-16 ENCOUNTER — APPOINTMENT (OUTPATIENT)
Dept: CT IMAGING | Age: 84
End: 2019-10-16
Payer: MEDICARE

## 2019-10-16 ENCOUNTER — APPOINTMENT (OUTPATIENT)
Dept: GENERAL RADIOLOGY | Age: 84
End: 2019-10-16
Payer: MEDICARE

## 2019-10-16 DIAGNOSIS — D69.6 THROMBOCYTOPENIA (HCC): ICD-10-CM

## 2019-10-16 DIAGNOSIS — R00.1 BRADYCARDIA: ICD-10-CM

## 2019-10-16 DIAGNOSIS — D64.9 ANEMIA, UNSPECIFIED TYPE: Primary | ICD-10-CM

## 2019-10-16 LAB
ANION GAP SERPL CALCULATED.3IONS-SCNC: 7 MMOL/L (ref 7–16)
BACTERIA: ABNORMAL /HPF
BASOPHILS ABSOLUTE: 0.02 E9/L (ref 0–0.2)
BASOPHILS RELATIVE PERCENT: 0.8 % (ref 0–2)
BILIRUBIN URINE: NEGATIVE
BLOOD, URINE: NEGATIVE
BUN BLDV-MCNC: 14 MG/DL (ref 8–23)
BURR CELLS: ABNORMAL
CALCIUM SERPL-MCNC: 9.3 MG/DL (ref 8.6–10.2)
CHLORIDE BLD-SCNC: 106 MMOL/L (ref 98–107)
CLARITY: CLEAR
CO2: 25 MMOL/L (ref 22–29)
COLOR: YELLOW
CREAT SERPL-MCNC: 1 MG/DL (ref 0.5–1)
EKG ATRIAL RATE: 53 BPM
EKG P AXIS: 53 DEGREES
EKG P-R INTERVAL: 168 MS
EKG Q-T INTERVAL: 496 MS
EKG QRS DURATION: 100 MS
EKG QTC CALCULATION (BAZETT): 465 MS
EKG R AXIS: -49 DEGREES
EKG T AXIS: 21 DEGREES
EKG VENTRICULAR RATE: 53 BPM
EOSINOPHILS ABSOLUTE: 0.06 E9/L (ref 0.05–0.5)
EOSINOPHILS RELATIVE PERCENT: 2.4 % (ref 0–6)
GFR AFRICAN AMERICAN: >60
GFR NON-AFRICAN AMERICAN: >60 ML/MIN/1.73
GLUCOSE BLD-MCNC: 115 MG/DL (ref 74–99)
GLUCOSE URINE: NEGATIVE MG/DL
HCT VFR BLD CALC: 22.9 % (ref 34–48)
HEMOGLOBIN: 7.1 G/DL (ref 11.5–15.5)
IMMATURE GRANULOCYTES #: 0.01 E9/L
IMMATURE GRANULOCYTES %: 0.4 % (ref 0–5)
KETONES, URINE: NEGATIVE MG/DL
LEUKOCYTE ESTERASE, URINE: ABNORMAL
LYMPHOCYTES ABSOLUTE: 0.83 E9/L (ref 1.5–4)
LYMPHOCYTES RELATIVE PERCENT: 32.8 % (ref 20–42)
MCH RBC QN AUTO: 30.3 PG (ref 26–35)
MCHC RBC AUTO-ENTMCNC: 31 % (ref 32–34.5)
MCV RBC AUTO: 97.9 FL (ref 80–99.9)
MONOCYTES ABSOLUTE: 0.27 E9/L (ref 0.1–0.95)
MONOCYTES RELATIVE PERCENT: 10.7 % (ref 2–12)
NEUTROPHILS ABSOLUTE: 1.34 E9/L (ref 1.8–7.3)
NEUTROPHILS RELATIVE PERCENT: 52.9 % (ref 43–80)
NITRITE, URINE: NEGATIVE
OVALOCYTES: ABNORMAL
PDW BLD-RTO: 14.6 FL (ref 11.5–15)
PH UA: 7.5 (ref 5–9)
PLATELET # BLD: 76 E9/L (ref 130–450)
PLATELET CONFIRMATION: NORMAL
PMV BLD AUTO: 9.9 FL (ref 7–12)
POIKILOCYTES: ABNORMAL
POTASSIUM REFLEX MAGNESIUM: 4.9 MMOL/L (ref 3.5–5)
PRO-BNP: 141 PG/ML (ref 0–450)
PROTEIN UA: NEGATIVE MG/DL
RBC # BLD: 2.34 E12/L (ref 3.5–5.5)
RBC UA: ABNORMAL /HPF (ref 0–2)
SODIUM BLD-SCNC: 138 MMOL/L (ref 132–146)
SPECIFIC GRAVITY UA: 1.01 (ref 1–1.03)
TROPONIN: <0.01 NG/ML (ref 0–0.03)
TROPONIN: <0.01 NG/ML (ref 0–0.03)
UROBILINOGEN, URINE: 0.2 E.U./DL
WBC # BLD: 2.5 E9/L (ref 4.5–11.5)
WBC UA: ABNORMAL /HPF (ref 0–5)

## 2019-10-16 PROCEDURE — 70450 CT HEAD/BRAIN W/O DYE: CPT

## 2019-10-16 PROCEDURE — G0378 HOSPITAL OBSERVATION PER HR: HCPCS

## 2019-10-16 PROCEDURE — 71045 X-RAY EXAM CHEST 1 VIEW: CPT

## 2019-10-16 PROCEDURE — 36415 COLL VENOUS BLD VENIPUNCTURE: CPT

## 2019-10-16 PROCEDURE — 83880 ASSAY OF NATRIURETIC PEPTIDE: CPT

## 2019-10-16 PROCEDURE — 81001 URINALYSIS AUTO W/SCOPE: CPT

## 2019-10-16 PROCEDURE — 2580000003 HC RX 258: Performed by: EMERGENCY MEDICINE

## 2019-10-16 PROCEDURE — 85025 COMPLETE CBC W/AUTO DIFF WBC: CPT

## 2019-10-16 PROCEDURE — 99285 EMERGENCY DEPT VISIT HI MDM: CPT

## 2019-10-16 PROCEDURE — 93005 ELECTROCARDIOGRAM TRACING: CPT | Performed by: EMERGENCY MEDICINE

## 2019-10-16 PROCEDURE — 93010 ELECTROCARDIOGRAM REPORT: CPT | Performed by: INTERNAL MEDICINE

## 2019-10-16 PROCEDURE — 80048 BASIC METABOLIC PNL TOTAL CA: CPT

## 2019-10-16 PROCEDURE — 84484 ASSAY OF TROPONIN QUANT: CPT

## 2019-10-16 RX ORDER — SODIUM CHLORIDE 9 MG/ML
INJECTION, SOLUTION INTRAVENOUS CONTINUOUS
Status: DISCONTINUED | OUTPATIENT
Start: 2019-10-16 | End: 2019-10-17

## 2019-10-16 RX ORDER — ASPIRIN 81 MG/1
81 TABLET ORAL DAILY
Status: DISCONTINUED | OUTPATIENT
Start: 2019-10-17 | End: 2019-10-17 | Stop reason: HOSPADM

## 2019-10-16 RX ORDER — POTASSIUM CHLORIDE 20 MEQ/1
40 TABLET, EXTENDED RELEASE ORAL PRN
Status: DISCONTINUED | OUTPATIENT
Start: 2019-10-16 | End: 2019-10-17 | Stop reason: HOSPADM

## 2019-10-16 RX ORDER — SODIUM CHLORIDE 0.9 % (FLUSH) 0.9 %
10 SYRINGE (ML) INJECTION PRN
Status: DISCONTINUED | OUTPATIENT
Start: 2019-10-16 | End: 2019-10-17 | Stop reason: HOSPADM

## 2019-10-16 RX ORDER — 0.9 % SODIUM CHLORIDE 0.9 %
500 INTRAVENOUS SOLUTION INTRAVENOUS ONCE
Status: COMPLETED | OUTPATIENT
Start: 2019-10-16 | End: 2019-10-16

## 2019-10-16 RX ORDER — POTASSIUM CHLORIDE 7.45 MG/ML
10 INJECTION INTRAVENOUS PRN
Status: DISCONTINUED | OUTPATIENT
Start: 2019-10-16 | End: 2019-10-17 | Stop reason: HOSPADM

## 2019-10-16 RX ORDER — HYDRALAZINE HYDROCHLORIDE 25 MG/1
25 TABLET, FILM COATED ORAL EVERY 8 HOURS SCHEDULED
Status: DISCONTINUED | OUTPATIENT
Start: 2019-10-16 | End: 2019-10-17 | Stop reason: HOSPADM

## 2019-10-16 RX ORDER — SODIUM CHLORIDE 0.9 % (FLUSH) 0.9 %
10 SYRINGE (ML) INJECTION EVERY 12 HOURS SCHEDULED
Status: DISCONTINUED | OUTPATIENT
Start: 2019-10-16 | End: 2019-10-17 | Stop reason: HOSPADM

## 2019-10-16 RX ORDER — ACETAMINOPHEN 325 MG/1
650 TABLET ORAL EVERY 4 HOURS PRN
Status: DISCONTINUED | OUTPATIENT
Start: 2019-10-16 | End: 2019-10-17 | Stop reason: HOSPADM

## 2019-10-16 RX ORDER — LISINOPRIL 5 MG/1
5 TABLET ORAL DAILY
Status: DISCONTINUED | OUTPATIENT
Start: 2019-10-17 | End: 2019-10-17 | Stop reason: HOSPADM

## 2019-10-16 RX ORDER — SPIRONOLACTONE 25 MG/1
12.5 TABLET ORAL DAILY
Status: DISCONTINUED | OUTPATIENT
Start: 2019-10-17 | End: 2019-10-17

## 2019-10-16 RX ORDER — ONDANSETRON 2 MG/ML
4 INJECTION INTRAMUSCULAR; INTRAVENOUS EVERY 6 HOURS PRN
Status: DISCONTINUED | OUTPATIENT
Start: 2019-10-16 | End: 2019-10-17 | Stop reason: HOSPADM

## 2019-10-16 RX ADMIN — SODIUM CHLORIDE 500 ML: 9 INJECTION, SOLUTION INTRAVENOUS at 17:30

## 2019-10-16 ASSESSMENT — ENCOUNTER SYMPTOMS
SHORTNESS OF BREATH: 0
DIARRHEA: 0
CHEST TIGHTNESS: 0
VOMITING: 0
BACK PAIN: 0
COUGH: 0
SINUS PAIN: 0
SORE THROAT: 0
ABDOMINAL PAIN: 0
NAUSEA: 0

## 2019-10-16 ASSESSMENT — PAIN SCALES - GENERAL: PAINLEVEL_OUTOF10: 0

## 2019-10-17 VITALS
TEMPERATURE: 98.2 F | HEIGHT: 66 IN | BODY MASS INDEX: 22.84 KG/M2 | WEIGHT: 142.1 LBS | SYSTOLIC BLOOD PRESSURE: 100 MMHG | RESPIRATION RATE: 18 BRPM | HEART RATE: 54 BPM | OXYGEN SATURATION: 97 % | DIASTOLIC BLOOD PRESSURE: 69 MMHG

## 2019-10-17 PROBLEM — I48.91 ATRIAL FIBRILLATION WITH RVR (HCC): Status: RESOLVED | Noted: 2019-09-19 | Resolved: 2019-10-17

## 2019-10-17 PROBLEM — R53.1 WEAKNESS: Status: RESOLVED | Noted: 2019-09-16 | Resolved: 2019-10-17

## 2019-10-17 PROBLEM — D64.9 ANEMIA: Status: RESOLVED | Noted: 2019-10-16 | Resolved: 2019-10-17

## 2019-10-17 LAB
HCT VFR BLD CALC: 39.1 % (ref 34–48)
HEMOGLOBIN: 12.7 G/DL (ref 11.5–15.5)
REASON FOR REJECTION: NORMAL
REJECTED TEST: NORMAL
TROPONIN: <0.01 NG/ML (ref 0–0.03)

## 2019-10-17 PROCEDURE — 84484 ASSAY OF TROPONIN QUANT: CPT

## 2019-10-17 PROCEDURE — 85014 HEMATOCRIT: CPT

## 2019-10-17 PROCEDURE — 6370000000 HC RX 637 (ALT 250 FOR IP): Performed by: PHYSICIAN ASSISTANT

## 2019-10-17 PROCEDURE — 36415 COLL VENOUS BLD VENIPUNCTURE: CPT

## 2019-10-17 PROCEDURE — G0378 HOSPITAL OBSERVATION PER HR: HCPCS

## 2019-10-17 PROCEDURE — 97165 OT EVAL LOW COMPLEX 30 MIN: CPT

## 2019-10-17 PROCEDURE — 85018 HEMOGLOBIN: CPT

## 2019-10-17 PROCEDURE — 2580000003 HC RX 258: Performed by: PHYSICIAN ASSISTANT

## 2019-10-17 RX ADMIN — SPIRONOLACTONE 12.5 MG: 25 TABLET ORAL at 10:24

## 2019-10-17 RX ADMIN — HYDRALAZINE HYDROCHLORIDE 25 MG: 25 TABLET, FILM COATED ORAL at 03:17

## 2019-10-17 RX ADMIN — SODIUM CHLORIDE: 9 INJECTION, SOLUTION INTRAVENOUS at 03:17

## 2019-10-17 RX ADMIN — LISINOPRIL 5 MG: 5 TABLET ORAL at 10:24

## 2019-10-17 RX ADMIN — Medication 10 ML: at 10:27

## 2019-10-17 RX ADMIN — APIXABAN 5 MG: 5 TABLET, FILM COATED ORAL at 16:43

## 2019-10-17 RX ADMIN — APIXABAN 5 MG: 5 TABLET, FILM COATED ORAL at 03:17

## 2019-10-17 RX ADMIN — Medication 10 ML: at 03:17

## 2019-10-17 RX ADMIN — ASPIRIN 81 MG: 81 TABLET ORAL at 10:24

## 2019-10-17 ASSESSMENT — PAIN SCALES - GENERAL: PAINLEVEL_OUTOF10: 0

## 2019-11-06 ENCOUNTER — APPOINTMENT (OUTPATIENT)
Dept: GENERAL RADIOLOGY | Age: 84
DRG: 287 | End: 2019-11-06
Payer: MEDICARE

## 2019-11-06 ENCOUNTER — APPOINTMENT (OUTPATIENT)
Dept: CT IMAGING | Age: 84
DRG: 287 | End: 2019-11-06
Payer: MEDICARE

## 2019-11-06 ENCOUNTER — HOSPITAL ENCOUNTER (INPATIENT)
Age: 84
LOS: 3 days | Discharge: HOME | DRG: 287 | End: 2019-11-12
Attending: EMERGENCY MEDICINE | Admitting: INTERNAL MEDICINE
Payer: MEDICARE

## 2019-11-06 DIAGNOSIS — R00.2 PALPITATIONS: Primary | ICD-10-CM

## 2019-11-06 DIAGNOSIS — I10 ESSENTIAL HYPERTENSION: ICD-10-CM

## 2019-11-06 DIAGNOSIS — R07.9 CHEST PAIN, UNSPECIFIED TYPE: ICD-10-CM

## 2019-11-06 LAB
ALBUMIN SERPL-MCNC: 3.8 G/DL (ref 3.5–5.2)
ALP BLD-CCNC: 77 U/L (ref 35–104)
ALT SERPL-CCNC: 14 U/L (ref 0–32)
ANION GAP SERPL CALCULATED.3IONS-SCNC: 11 MMOL/L (ref 7–16)
AST SERPL-CCNC: 20 U/L (ref 0–31)
BACTERIA: ABNORMAL /HPF
BILIRUB SERPL-MCNC: 0.6 MG/DL (ref 0–1.2)
BILIRUBIN URINE: NEGATIVE
BLOOD, URINE: NEGATIVE
BUN BLDV-MCNC: 11 MG/DL (ref 8–23)
CALCIUM SERPL-MCNC: 9.3 MG/DL (ref 8.6–10.2)
CHLORIDE BLD-SCNC: 106 MMOL/L (ref 98–107)
CLARITY: CLEAR
CO2: 24 MMOL/L (ref 22–29)
COLOR: YELLOW
CREAT SERPL-MCNC: 0.9 MG/DL (ref 0.5–1)
EPITHELIAL CELLS, UA: ABNORMAL /HPF
GFR AFRICAN AMERICAN: >60
GFR NON-AFRICAN AMERICAN: >60 ML/MIN/1.73
GLUCOSE BLD-MCNC: 109 MG/DL (ref 74–99)
GLUCOSE URINE: NEGATIVE MG/DL
HCT VFR BLD CALC: 42.5 % (ref 34–48)
HEMOGLOBIN: 13.6 G/DL (ref 11.5–15.5)
KETONES, URINE: NEGATIVE MG/DL
LEUKOCYTE ESTERASE, URINE: ABNORMAL
MAGNESIUM: 2 MG/DL (ref 1.6–2.6)
MCH RBC QN AUTO: 29.3 PG (ref 26–35)
MCHC RBC AUTO-ENTMCNC: 32 % (ref 32–34.5)
MCV RBC AUTO: 91.6 FL (ref 80–99.9)
NITRITE, URINE: NEGATIVE
PDW BLD-RTO: 14.4 FL (ref 11.5–15)
PH UA: 8 (ref 5–9)
PLATELET # BLD: 193 E9/L (ref 130–450)
PMV BLD AUTO: 9.7 FL (ref 7–12)
POTASSIUM SERPL-SCNC: 3.6 MMOL/L (ref 3.5–5)
PRO-BNP: 248 PG/ML (ref 0–450)
PROTEIN UA: NEGATIVE MG/DL
RBC # BLD: 4.64 E12/L (ref 3.5–5.5)
RBC UA: ABNORMAL /HPF (ref 0–2)
SODIUM BLD-SCNC: 141 MMOL/L (ref 132–146)
SPECIFIC GRAVITY UA: 1.01 (ref 1–1.03)
TOTAL PROTEIN: 7.2 G/DL (ref 6.4–8.3)
TROPONIN: <0.01 NG/ML (ref 0–0.03)
TROPONIN: <0.01 NG/ML (ref 0–0.03)
UROBILINOGEN, URINE: 0.2 E.U./DL
WBC # BLD: 4.8 E9/L (ref 4.5–11.5)
WBC UA: ABNORMAL /HPF (ref 0–5)

## 2019-11-06 PROCEDURE — 36415 COLL VENOUS BLD VENIPUNCTURE: CPT

## 2019-11-06 PROCEDURE — 99285 EMERGENCY DEPT VISIT HI MDM: CPT

## 2019-11-06 PROCEDURE — 83880 ASSAY OF NATRIURETIC PEPTIDE: CPT

## 2019-11-06 PROCEDURE — 6370000000 HC RX 637 (ALT 250 FOR IP): Performed by: INTERNAL MEDICINE

## 2019-11-06 PROCEDURE — 71045 X-RAY EXAM CHEST 1 VIEW: CPT

## 2019-11-06 PROCEDURE — 93005 ELECTROCARDIOGRAM TRACING: CPT | Performed by: NURSE PRACTITIONER

## 2019-11-06 PROCEDURE — G0378 HOSPITAL OBSERVATION PER HR: HCPCS

## 2019-11-06 PROCEDURE — 70450 CT HEAD/BRAIN W/O DYE: CPT

## 2019-11-06 PROCEDURE — 84484 ASSAY OF TROPONIN QUANT: CPT

## 2019-11-06 PROCEDURE — 83735 ASSAY OF MAGNESIUM: CPT

## 2019-11-06 PROCEDURE — 6360000002 HC RX W HCPCS: Performed by: NURSE PRACTITIONER

## 2019-11-06 PROCEDURE — 81001 URINALYSIS AUTO W/SCOPE: CPT

## 2019-11-06 PROCEDURE — 2580000003 HC RX 258: Performed by: INTERNAL MEDICINE

## 2019-11-06 PROCEDURE — 80053 COMPREHEN METABOLIC PANEL: CPT

## 2019-11-06 PROCEDURE — 6370000000 HC RX 637 (ALT 250 FOR IP): Performed by: NURSE PRACTITIONER

## 2019-11-06 PROCEDURE — 96374 THER/PROPH/DIAG INJ IV PUSH: CPT

## 2019-11-06 PROCEDURE — 85027 COMPLETE CBC AUTOMATED: CPT

## 2019-11-06 RX ORDER — ONDANSETRON 2 MG/ML
4 INJECTION INTRAMUSCULAR; INTRAVENOUS EVERY 6 HOURS PRN
Status: DISCONTINUED | OUTPATIENT
Start: 2019-11-06 | End: 2019-11-12 | Stop reason: HOSPADM

## 2019-11-06 RX ORDER — SODIUM CHLORIDE 0.9 % (FLUSH) 0.9 %
10 SYRINGE (ML) INJECTION EVERY 12 HOURS SCHEDULED
Status: DISCONTINUED | OUTPATIENT
Start: 2019-11-06 | End: 2019-11-12 | Stop reason: HOSPADM

## 2019-11-06 RX ORDER — HYDRALAZINE HYDROCHLORIDE 25 MG/1
25 TABLET, FILM COATED ORAL EVERY 8 HOURS SCHEDULED
Status: DISCONTINUED | OUTPATIENT
Start: 2019-11-06 | End: 2019-11-12 | Stop reason: HOSPADM

## 2019-11-06 RX ORDER — VITAMIN E 268 MG
400 CAPSULE ORAL DAILY
Status: DISCONTINUED | OUTPATIENT
Start: 2019-11-06 | End: 2019-11-12 | Stop reason: HOSPADM

## 2019-11-06 RX ORDER — VITAMIN B COMPLEX
1000 TABLET ORAL DAILY
Status: DISCONTINUED | OUTPATIENT
Start: 2019-11-06 | End: 2019-11-12 | Stop reason: HOSPADM

## 2019-11-06 RX ORDER — LISINOPRIL 5 MG/1
5 TABLET ORAL DAILY
Status: DISCONTINUED | OUTPATIENT
Start: 2019-11-06 | End: 2019-11-11

## 2019-11-06 RX ORDER — LANOLIN ALCOHOL/MO/W.PET/CERES
500 CREAM (GRAM) TOPICAL DAILY
Status: DISCONTINUED | OUTPATIENT
Start: 2019-11-06 | End: 2019-11-12 | Stop reason: HOSPADM

## 2019-11-06 RX ORDER — ACETAMINOPHEN 325 MG/1
650 TABLET ORAL EVERY 4 HOURS PRN
Status: DISCONTINUED | OUTPATIENT
Start: 2019-11-06 | End: 2019-11-12 | Stop reason: HOSPADM

## 2019-11-06 RX ORDER — ACETAMINOPHEN 325 MG/1
650 TABLET ORAL ONCE
Status: COMPLETED | OUTPATIENT
Start: 2019-11-06 | End: 2019-11-06

## 2019-11-06 RX ORDER — SODIUM CHLORIDE 0.9 % (FLUSH) 0.9 %
10 SYRINGE (ML) INJECTION PRN
Status: DISCONTINUED | OUTPATIENT
Start: 2019-11-06 | End: 2019-11-12 | Stop reason: HOSPADM

## 2019-11-06 RX ORDER — HYDRALAZINE HYDROCHLORIDE 20 MG/ML
5 INJECTION INTRAMUSCULAR; INTRAVENOUS ONCE
Status: COMPLETED | OUTPATIENT
Start: 2019-11-06 | End: 2019-11-06

## 2019-11-06 RX ORDER — ATORVASTATIN CALCIUM 40 MG/1
40 TABLET, FILM COATED ORAL NIGHTLY
Status: DISCONTINUED | OUTPATIENT
Start: 2019-11-06 | End: 2019-11-12 | Stop reason: HOSPADM

## 2019-11-06 RX ORDER — CARVEDILOL 6.25 MG/1
6.25 TABLET ORAL 2 TIMES DAILY WITH MEALS
Status: DISCONTINUED | OUTPATIENT
Start: 2019-11-06 | End: 2019-11-12 | Stop reason: HOSPADM

## 2019-11-06 RX ORDER — ASPIRIN 81 MG/1
81 TABLET ORAL DAILY
Status: DISCONTINUED | OUTPATIENT
Start: 2019-11-06 | End: 2019-11-12 | Stop reason: HOSPADM

## 2019-11-06 RX ADMIN — ASPIRIN 81 MG: 81 TABLET, COATED ORAL at 21:55

## 2019-11-06 RX ADMIN — Medication 10 ML: at 20:55

## 2019-11-06 RX ADMIN — HYDRALAZINE HYDROCHLORIDE 5 MG: 20 INJECTION INTRAMUSCULAR; INTRAVENOUS at 14:37

## 2019-11-06 RX ADMIN — APIXABAN 5 MG: 5 TABLET, FILM COATED ORAL at 20:55

## 2019-11-06 RX ADMIN — ACETAMINOPHEN 650 MG: 325 TABLET, FILM COATED ORAL at 14:25

## 2019-11-06 RX ADMIN — VITAMIN D, TAB 1000IU (100/BT) 1000 UNITS: 25 TAB at 21:55

## 2019-11-06 RX ADMIN — HYDRALAZINE HYDROCHLORIDE 25 MG: 25 TABLET, FILM COATED ORAL at 20:55

## 2019-11-06 RX ADMIN — ATORVASTATIN CALCIUM 40 MG: 40 TABLET, FILM COATED ORAL at 20:55

## 2019-11-06 RX ADMIN — CARVEDILOL 6.25 MG: 6.25 TABLET, FILM COATED ORAL at 21:55

## 2019-11-06 RX ADMIN — Medication 400 UNITS: at 21:55

## 2019-11-06 RX ADMIN — Medication 500 MCG: at 21:56

## 2019-11-06 RX ADMIN — LISINOPRIL 5 MG: 5 TABLET ORAL at 21:55

## 2019-11-06 ASSESSMENT — PAIN SCALES - GENERAL
PAINLEVEL_OUTOF10: 8
PAINLEVEL_OUTOF10: 0

## 2019-11-07 PROBLEM — Z79.01 CHRONIC ANTICOAGULATION: Status: ACTIVE | Noted: 2019-11-07

## 2019-11-07 PROBLEM — R07.9 CHEST PAIN: Status: ACTIVE | Noted: 2019-11-07

## 2019-11-07 LAB
ANION GAP SERPL CALCULATED.3IONS-SCNC: 14 MMOL/L (ref 7–16)
BUN BLDV-MCNC: 10 MG/DL (ref 8–23)
CALCIUM SERPL-MCNC: 9.4 MG/DL (ref 8.6–10.2)
CHLORIDE BLD-SCNC: 105 MMOL/L (ref 98–107)
CHOLESTEROL, TOTAL: 186 MG/DL (ref 0–199)
CO2: 20 MMOL/L (ref 22–29)
CREAT SERPL-MCNC: 0.8 MG/DL (ref 0.5–1)
EKG ATRIAL RATE: 70 BPM
EKG P AXIS: 69 DEGREES
EKG Q-T INTERVAL: 394 MS
EKG QRS DURATION: 96 MS
EKG QTC CALCULATION (BAZETT): 439 MS
EKG R AXIS: -55 DEGREES
EKG T AXIS: 75 DEGREES
EKG VENTRICULAR RATE: 75 BPM
GFR AFRICAN AMERICAN: >60
GFR NON-AFRICAN AMERICAN: >60 ML/MIN/1.73
GLUCOSE BLD-MCNC: 125 MG/DL (ref 74–99)
HBA1C MFR BLD: 5.5 % (ref 4–5.6)
HCT VFR BLD CALC: 41.1 % (ref 34–48)
HDLC SERPL-MCNC: 70 MG/DL
HEMOGLOBIN: 13.4 G/DL (ref 11.5–15.5)
LDL CHOLESTEROL CALCULATED: 107 MG/DL (ref 0–99)
MCH RBC QN AUTO: 30 PG (ref 26–35)
MCHC RBC AUTO-ENTMCNC: 32.6 % (ref 32–34.5)
MCV RBC AUTO: 91.9 FL (ref 80–99.9)
PDW BLD-RTO: 14.4 FL (ref 11.5–15)
PLATELET # BLD: 182 E9/L (ref 130–450)
PMV BLD AUTO: 9.7 FL (ref 7–12)
POTASSIUM REFLEX MAGNESIUM: 3.8 MMOL/L (ref 3.5–5)
RBC # BLD: 4.47 E12/L (ref 3.5–5.5)
SODIUM BLD-SCNC: 139 MMOL/L (ref 132–146)
TRIGL SERPL-MCNC: 44 MG/DL (ref 0–149)
TROPONIN: <0.01 NG/ML (ref 0–0.03)
VLDLC SERPL CALC-MCNC: 9 MG/DL
WBC # BLD: 4.8 E9/L (ref 4.5–11.5)

## 2019-11-07 PROCEDURE — 83036 HEMOGLOBIN GLYCOSYLATED A1C: CPT

## 2019-11-07 PROCEDURE — 85027 COMPLETE CBC AUTOMATED: CPT

## 2019-11-07 PROCEDURE — G0378 HOSPITAL OBSERVATION PER HR: HCPCS

## 2019-11-07 PROCEDURE — 84484 ASSAY OF TROPONIN QUANT: CPT

## 2019-11-07 PROCEDURE — 80048 BASIC METABOLIC PNL TOTAL CA: CPT

## 2019-11-07 PROCEDURE — 36415 COLL VENOUS BLD VENIPUNCTURE: CPT

## 2019-11-07 PROCEDURE — 2580000003 HC RX 258: Performed by: INTERNAL MEDICINE

## 2019-11-07 PROCEDURE — 6370000000 HC RX 637 (ALT 250 FOR IP): Performed by: INTERNAL MEDICINE

## 2019-11-07 PROCEDURE — 93010 ELECTROCARDIOGRAM REPORT: CPT | Performed by: INTERNAL MEDICINE

## 2019-11-07 PROCEDURE — 80061 LIPID PANEL: CPT

## 2019-11-07 RX ADMIN — Medication 500 MCG: at 08:30

## 2019-11-07 RX ADMIN — HYDRALAZINE HYDROCHLORIDE 25 MG: 25 TABLET, FILM COATED ORAL at 21:46

## 2019-11-07 RX ADMIN — ASPIRIN 81 MG: 81 TABLET, COATED ORAL at 08:30

## 2019-11-07 RX ADMIN — Medication 10 ML: at 08:37

## 2019-11-07 RX ADMIN — ACETAMINOPHEN 650 MG: 325 TABLET, FILM COATED ORAL at 00:46

## 2019-11-07 RX ADMIN — HYDRALAZINE HYDROCHLORIDE 25 MG: 25 TABLET, FILM COATED ORAL at 06:27

## 2019-11-07 RX ADMIN — APIXABAN 5 MG: 5 TABLET, FILM COATED ORAL at 06:27

## 2019-11-07 RX ADMIN — Medication 400 UNITS: at 08:30

## 2019-11-07 RX ADMIN — VITAMIN D, TAB 1000IU (100/BT) 1000 UNITS: 25 TAB at 08:30

## 2019-11-07 RX ADMIN — ATORVASTATIN CALCIUM 40 MG: 40 TABLET, FILM COATED ORAL at 21:46

## 2019-11-07 RX ADMIN — LISINOPRIL 5 MG: 5 TABLET ORAL at 08:30

## 2019-11-07 RX ADMIN — CARVEDILOL 6.25 MG: 6.25 TABLET, FILM COATED ORAL at 16:41

## 2019-11-07 RX ADMIN — APIXABAN 5 MG: 5 TABLET, FILM COATED ORAL at 21:46

## 2019-11-07 RX ADMIN — Medication 10 ML: at 21:47

## 2019-11-07 RX ADMIN — CARVEDILOL 6.25 MG: 6.25 TABLET, FILM COATED ORAL at 08:30

## 2019-11-07 RX ADMIN — HYDRALAZINE HYDROCHLORIDE 25 MG: 25 TABLET, FILM COATED ORAL at 13:44

## 2019-11-07 ASSESSMENT — PAIN SCALES - GENERAL
PAINLEVEL_OUTOF10: 0
PAINLEVEL_OUTOF10: 4
PAINLEVEL_OUTOF10: 0

## 2019-11-08 ENCOUNTER — APPOINTMENT (OUTPATIENT)
Dept: NUCLEAR MEDICINE | Age: 84
DRG: 287 | End: 2019-11-08
Payer: MEDICARE

## 2019-11-08 LAB
LV EF: 47 %
LVEF MODALITY: NORMAL

## 2019-11-08 PROCEDURE — 6370000000 HC RX 637 (ALT 250 FOR IP): Performed by: INTERNAL MEDICINE

## 2019-11-08 PROCEDURE — 3430000000 HC RX DIAGNOSTIC RADIOPHARMACEUTICAL: Performed by: RADIOLOGY

## 2019-11-08 PROCEDURE — G0378 HOSPITAL OBSERVATION PER HR: HCPCS

## 2019-11-08 PROCEDURE — 93017 CV STRESS TEST TRACING ONLY: CPT

## 2019-11-08 PROCEDURE — 6360000002 HC RX W HCPCS: Performed by: INTERNAL MEDICINE

## 2019-11-08 PROCEDURE — A9500 TC99M SESTAMIBI: HCPCS | Performed by: RADIOLOGY

## 2019-11-08 PROCEDURE — 2580000003 HC RX 258: Performed by: INTERNAL MEDICINE

## 2019-11-08 PROCEDURE — 78452 HT MUSCLE IMAGE SPECT MULT: CPT

## 2019-11-08 RX ADMIN — CARVEDILOL 6.25 MG: 6.25 TABLET, FILM COATED ORAL at 19:22

## 2019-11-08 RX ADMIN — Medication 8 MILLICURIE: at 12:40

## 2019-11-08 RX ADMIN — Medication 30 MILLICURIE: at 14:15

## 2019-11-08 RX ADMIN — Medication 500 MCG: at 10:49

## 2019-11-08 RX ADMIN — APIXABAN 5 MG: 5 TABLET, FILM COATED ORAL at 19:23

## 2019-11-08 RX ADMIN — LISINOPRIL 5 MG: 5 TABLET ORAL at 10:47

## 2019-11-08 RX ADMIN — ATORVASTATIN CALCIUM 40 MG: 40 TABLET, FILM COATED ORAL at 20:21

## 2019-11-08 RX ADMIN — HYDRALAZINE HYDROCHLORIDE 25 MG: 25 TABLET, FILM COATED ORAL at 06:40

## 2019-11-08 RX ADMIN — ACETAMINOPHEN 650 MG: 325 TABLET, FILM COATED ORAL at 20:28

## 2019-11-08 RX ADMIN — Medication 10 ML: at 20:21

## 2019-11-08 RX ADMIN — Medication 10 ML: at 10:48

## 2019-11-08 RX ADMIN — HYDRALAZINE HYDROCHLORIDE 25 MG: 25 TABLET, FILM COATED ORAL at 15:51

## 2019-11-08 RX ADMIN — ASPIRIN 81 MG: 81 TABLET, COATED ORAL at 10:47

## 2019-11-08 RX ADMIN — APIXABAN 5 MG: 5 TABLET, FILM COATED ORAL at 06:40

## 2019-11-08 RX ADMIN — REGADENOSON 0.4 MG: 0.08 INJECTION, SOLUTION INTRAVENOUS at 14:07

## 2019-11-08 RX ADMIN — Medication 400 UNITS: at 10:47

## 2019-11-08 RX ADMIN — VITAMIN D, TAB 1000IU (100/BT) 1000 UNITS: 25 TAB at 10:48

## 2019-11-08 ASSESSMENT — PAIN SCALES - GENERAL
PAINLEVEL_OUTOF10: 0
PAINLEVEL_OUTOF10: 5

## 2019-11-09 PROBLEM — Z79.01 CHRONIC ANTICOAGULATION: Status: RESOLVED | Noted: 2019-11-07 | Resolved: 2019-11-09

## 2019-11-09 PROBLEM — R00.2 PALPITATIONS: Status: RESOLVED | Noted: 2019-11-06 | Resolved: 2019-11-09

## 2019-11-09 PROBLEM — R00.1 BRADYCARDIA: Status: RESOLVED | Noted: 2019-10-16 | Resolved: 2019-11-09

## 2019-11-09 PROBLEM — R56.9 SEIZURE (HCC): Status: RESOLVED | Noted: 2017-08-22 | Resolved: 2019-11-09

## 2019-11-09 LAB
ANION GAP SERPL CALCULATED.3IONS-SCNC: 11 MMOL/L (ref 7–16)
BUN BLDV-MCNC: 13 MG/DL (ref 8–23)
CALCIUM SERPL-MCNC: 8.9 MG/DL (ref 8.6–10.2)
CHLORIDE BLD-SCNC: 105 MMOL/L (ref 98–107)
CO2: 23 MMOL/L (ref 22–29)
CREAT SERPL-MCNC: 0.9 MG/DL (ref 0.5–1)
GFR AFRICAN AMERICAN: >60
GFR NON-AFRICAN AMERICAN: >60 ML/MIN/1.73
GLUCOSE BLD-MCNC: 96 MG/DL (ref 74–99)
HCT VFR BLD CALC: 41.3 % (ref 34–48)
HEMOGLOBIN: 13.3 G/DL (ref 11.5–15.5)
MCH RBC QN AUTO: 29.8 PG (ref 26–35)
MCHC RBC AUTO-ENTMCNC: 32.2 % (ref 32–34.5)
MCV RBC AUTO: 92.4 FL (ref 80–99.9)
PDW BLD-RTO: 14.8 FL (ref 11.5–15)
PLATELET # BLD: 187 E9/L (ref 130–450)
PMV BLD AUTO: 9.8 FL (ref 7–12)
POTASSIUM SERPL-SCNC: 4.2 MMOL/L (ref 3.5–5)
RBC # BLD: 4.47 E12/L (ref 3.5–5.5)
SODIUM BLD-SCNC: 139 MMOL/L (ref 132–146)
WBC # BLD: 4.3 E9/L (ref 4.5–11.5)

## 2019-11-09 PROCEDURE — 6370000000 HC RX 637 (ALT 250 FOR IP): Performed by: INTERNAL MEDICINE

## 2019-11-09 PROCEDURE — 36415 COLL VENOUS BLD VENIPUNCTURE: CPT

## 2019-11-09 PROCEDURE — 2580000003 HC RX 258: Performed by: INTERNAL MEDICINE

## 2019-11-09 PROCEDURE — 80048 BASIC METABOLIC PNL TOTAL CA: CPT

## 2019-11-09 PROCEDURE — 97161 PT EVAL LOW COMPLEX 20 MIN: CPT

## 2019-11-09 PROCEDURE — 85027 COMPLETE CBC AUTOMATED: CPT

## 2019-11-09 PROCEDURE — 2060000000 HC ICU INTERMEDIATE R&B

## 2019-11-09 RX ADMIN — HYDRALAZINE HYDROCHLORIDE 25 MG: 25 TABLET, FILM COATED ORAL at 22:26

## 2019-11-09 RX ADMIN — Medication 10 ML: at 09:12

## 2019-11-09 RX ADMIN — Medication 400 UNITS: at 09:12

## 2019-11-09 RX ADMIN — ASPIRIN 81 MG: 81 TABLET, COATED ORAL at 09:14

## 2019-11-09 RX ADMIN — CARVEDILOL 6.25 MG: 6.25 TABLET, FILM COATED ORAL at 17:46

## 2019-11-09 RX ADMIN — HYDRALAZINE HYDROCHLORIDE 25 MG: 25 TABLET, FILM COATED ORAL at 14:36

## 2019-11-09 RX ADMIN — HYDRALAZINE HYDROCHLORIDE 25 MG: 25 TABLET, FILM COATED ORAL at 06:53

## 2019-11-09 RX ADMIN — MAGNESIUM HYDROXIDE 30 ML: 400 SUSPENSION ORAL at 17:48

## 2019-11-09 RX ADMIN — ATORVASTATIN CALCIUM 40 MG: 40 TABLET, FILM COATED ORAL at 20:09

## 2019-11-09 RX ADMIN — Medication 10 ML: at 20:09

## 2019-11-09 RX ADMIN — CARVEDILOL 6.25 MG: 6.25 TABLET, FILM COATED ORAL at 09:12

## 2019-11-09 RX ADMIN — Medication 500 MCG: at 09:12

## 2019-11-09 RX ADMIN — LISINOPRIL 5 MG: 5 TABLET ORAL at 09:12

## 2019-11-09 RX ADMIN — APIXABAN 5 MG: 5 TABLET, FILM COATED ORAL at 06:54

## 2019-11-09 RX ADMIN — VITAMIN D, TAB 1000IU (100/BT) 1000 UNITS: 25 TAB at 09:12

## 2019-11-09 ASSESSMENT — PAIN SCALES - GENERAL
PAINLEVEL_OUTOF10: 0

## 2019-11-10 PROCEDURE — 2580000003 HC RX 258: Performed by: INTERNAL MEDICINE

## 2019-11-10 PROCEDURE — 6360000002 HC RX W HCPCS: Performed by: NURSE PRACTITIONER

## 2019-11-10 PROCEDURE — 2060000000 HC ICU INTERMEDIATE R&B

## 2019-11-10 PROCEDURE — 6370000000 HC RX 637 (ALT 250 FOR IP): Performed by: INTERNAL MEDICINE

## 2019-11-10 PROCEDURE — 96372 THER/PROPH/DIAG INJ SC/IM: CPT

## 2019-11-10 RX ORDER — SODIUM CHLORIDE 0.9 % (FLUSH) 0.9 %
10 SYRINGE (ML) INJECTION EVERY 12 HOURS SCHEDULED
Status: DISCONTINUED | OUTPATIENT
Start: 2019-11-10 | End: 2019-11-12 | Stop reason: HOSPADM

## 2019-11-10 RX ORDER — SODIUM CHLORIDE 0.9 % (FLUSH) 0.9 %
10 SYRINGE (ML) INJECTION PRN
Status: DISCONTINUED | OUTPATIENT
Start: 2019-11-10 | End: 2019-11-12 | Stop reason: HOSPADM

## 2019-11-10 RX ADMIN — LISINOPRIL 5 MG: 5 TABLET ORAL at 09:37

## 2019-11-10 RX ADMIN — Medication 10 ML: at 09:39

## 2019-11-10 RX ADMIN — Medication 10 ML: at 21:36

## 2019-11-10 RX ADMIN — VITAMIN D, TAB 1000IU (100/BT) 1000 UNITS: 25 TAB at 09:37

## 2019-11-10 RX ADMIN — HYDRALAZINE HYDROCHLORIDE 25 MG: 25 TABLET, FILM COATED ORAL at 06:37

## 2019-11-10 RX ADMIN — CARVEDILOL 6.25 MG: 6.25 TABLET, FILM COATED ORAL at 09:37

## 2019-11-10 RX ADMIN — Medication 400 UNITS: at 09:37

## 2019-11-10 RX ADMIN — HYDRALAZINE HYDROCHLORIDE 25 MG: 25 TABLET, FILM COATED ORAL at 13:13

## 2019-11-10 RX ADMIN — CARVEDILOL 6.25 MG: 6.25 TABLET, FILM COATED ORAL at 16:53

## 2019-11-10 RX ADMIN — ENOXAPARIN SODIUM 60 MG: 60 INJECTION SUBCUTANEOUS at 09:38

## 2019-11-10 RX ADMIN — Medication 500 MCG: at 09:37

## 2019-11-10 RX ADMIN — ATORVASTATIN CALCIUM 40 MG: 40 TABLET, FILM COATED ORAL at 21:36

## 2019-11-10 RX ADMIN — Medication 10 ML: at 21:38

## 2019-11-10 RX ADMIN — HYDRALAZINE HYDROCHLORIDE 25 MG: 25 TABLET, FILM COATED ORAL at 21:37

## 2019-11-10 RX ADMIN — ENOXAPARIN SODIUM 60 MG: 60 INJECTION SUBCUTANEOUS at 21:37

## 2019-11-10 RX ADMIN — ASPIRIN 81 MG: 81 TABLET, COATED ORAL at 09:37

## 2019-11-10 ASSESSMENT — PAIN SCALES - GENERAL
PAINLEVEL_OUTOF10: 0

## 2019-11-11 LAB
ABO/RH: NORMAL
ANION GAP SERPL CALCULATED.3IONS-SCNC: 12 MMOL/L (ref 7–16)
ANTIBODY SCREEN: NORMAL
BUN BLDV-MCNC: 12 MG/DL (ref 8–23)
CALCIUM SERPL-MCNC: 8.9 MG/DL (ref 8.6–10.2)
CHLORIDE BLD-SCNC: 107 MMOL/L (ref 98–107)
CO2: 22 MMOL/L (ref 22–29)
CREAT SERPL-MCNC: 0.9 MG/DL (ref 0.5–1)
GFR AFRICAN AMERICAN: >60
GFR NON-AFRICAN AMERICAN: >60 ML/MIN/1.73
GLUCOSE BLD-MCNC: 105 MG/DL (ref 74–99)
HCT VFR BLD CALC: 40.4 % (ref 34–48)
HEMOGLOBIN: 13 G/DL (ref 11.5–15.5)
MCH RBC QN AUTO: 29.8 PG (ref 26–35)
MCHC RBC AUTO-ENTMCNC: 32.2 % (ref 32–34.5)
MCV RBC AUTO: 92.7 FL (ref 80–99.9)
PDW BLD-RTO: 14.6 FL (ref 11.5–15)
PLATELET # BLD: 185 E9/L (ref 130–450)
PMV BLD AUTO: 9.7 FL (ref 7–12)
POTASSIUM SERPL-SCNC: 3.8 MMOL/L (ref 3.5–5)
RBC # BLD: 4.36 E12/L (ref 3.5–5.5)
SODIUM BLD-SCNC: 141 MMOL/L (ref 132–146)
WBC # BLD: 4.2 E9/L (ref 4.5–11.5)

## 2019-11-11 PROCEDURE — 85027 COMPLETE CBC AUTOMATED: CPT

## 2019-11-11 PROCEDURE — 86900 BLOOD TYPING SEROLOGIC ABO: CPT

## 2019-11-11 PROCEDURE — 97535 SELF CARE MNGMENT TRAINING: CPT

## 2019-11-11 PROCEDURE — 93458 L HRT ARTERY/VENTRICLE ANGIO: CPT | Performed by: INTERNAL MEDICINE

## 2019-11-11 PROCEDURE — 6370000000 HC RX 637 (ALT 250 FOR IP): Performed by: INTERNAL MEDICINE

## 2019-11-11 PROCEDURE — B2111ZZ FLUOROSCOPY OF MULTIPLE CORONARY ARTERIES USING LOW OSMOLAR CONTRAST: ICD-10-PCS | Performed by: INTERNAL MEDICINE

## 2019-11-11 PROCEDURE — 97530 THERAPEUTIC ACTIVITIES: CPT

## 2019-11-11 PROCEDURE — 2709999900 HC NON-CHARGEABLE SUPPLY

## 2019-11-11 PROCEDURE — 4A023N7 MEASUREMENT OF CARDIAC SAMPLING AND PRESSURE, LEFT HEART, PERCUTANEOUS APPROACH: ICD-10-PCS | Performed by: INTERNAL MEDICINE

## 2019-11-11 PROCEDURE — 86901 BLOOD TYPING SEROLOGIC RH(D): CPT

## 2019-11-11 PROCEDURE — 2140000000 HC CCU INTERMEDIATE R&B

## 2019-11-11 PROCEDURE — 80048 BASIC METABOLIC PNL TOTAL CA: CPT

## 2019-11-11 PROCEDURE — 36415 COLL VENOUS BLD VENIPUNCTURE: CPT

## 2019-11-11 PROCEDURE — 2580000003 HC RX 258: Performed by: INTERNAL MEDICINE

## 2019-11-11 PROCEDURE — C1769 GUIDE WIRE: HCPCS

## 2019-11-11 PROCEDURE — 86850 RBC ANTIBODY SCREEN: CPT

## 2019-11-11 PROCEDURE — 2500000003 HC RX 250 WO HCPCS

## 2019-11-11 PROCEDURE — 6360000002 HC RX W HCPCS

## 2019-11-11 PROCEDURE — 97165 OT EVAL LOW COMPLEX 30 MIN: CPT

## 2019-11-11 RX ORDER — LISINOPRIL 10 MG/1
10 TABLET ORAL DAILY
Status: DISCONTINUED | OUTPATIENT
Start: 2019-11-12 | End: 2019-11-12 | Stop reason: HOSPADM

## 2019-11-11 RX ADMIN — Medication 10 ML: at 21:21

## 2019-11-11 RX ADMIN — HYDRALAZINE HYDROCHLORIDE 25 MG: 25 TABLET, FILM COATED ORAL at 06:11

## 2019-11-11 RX ADMIN — ASPIRIN 81 MG: 81 TABLET, COATED ORAL at 10:00

## 2019-11-11 RX ADMIN — HYDRALAZINE HYDROCHLORIDE 25 MG: 25 TABLET, FILM COATED ORAL at 21:20

## 2019-11-11 RX ADMIN — ATORVASTATIN CALCIUM 40 MG: 40 TABLET, FILM COATED ORAL at 21:19

## 2019-11-11 RX ADMIN — Medication 10 ML: at 10:00

## 2019-11-11 RX ADMIN — CARVEDILOL 6.25 MG: 6.25 TABLET, FILM COATED ORAL at 22:29

## 2019-11-11 ASSESSMENT — PAIN SCALES - GENERAL
PAINLEVEL_OUTOF10: 0
PAINLEVEL_OUTOF10: 0

## 2019-11-12 VITALS
TEMPERATURE: 99 F | WEIGHT: 137.5 LBS | OXYGEN SATURATION: 97 % | RESPIRATION RATE: 16 BRPM | BODY MASS INDEX: 22.1 KG/M2 | SYSTOLIC BLOOD PRESSURE: 141 MMHG | DIASTOLIC BLOOD PRESSURE: 77 MMHG | HEIGHT: 66 IN | HEART RATE: 61 BPM

## 2019-11-12 PROCEDURE — 6370000000 HC RX 637 (ALT 250 FOR IP): Performed by: INTERNAL MEDICINE

## 2019-11-12 PROCEDURE — 2580000003 HC RX 258: Performed by: INTERNAL MEDICINE

## 2019-11-12 RX ORDER — LISINOPRIL 10 MG/1
10 TABLET ORAL DAILY
Qty: 30 TABLET | Refills: 3 | Status: SHIPPED | OUTPATIENT
Start: 2019-11-13 | End: 2020-09-26

## 2019-11-12 RX ADMIN — CARVEDILOL 6.25 MG: 6.25 TABLET, FILM COATED ORAL at 10:09

## 2019-11-12 RX ADMIN — Medication 10 ML: at 10:12

## 2019-11-12 RX ADMIN — Medication 500 MCG: at 10:10

## 2019-11-12 RX ADMIN — HYDRALAZINE HYDROCHLORIDE 25 MG: 25 TABLET, FILM COATED ORAL at 14:00

## 2019-11-12 RX ADMIN — VITAMIN D, TAB 1000IU (100/BT) 1000 UNITS: 25 TAB at 10:11

## 2019-11-12 RX ADMIN — Medication 10 ML: at 10:11

## 2019-11-12 RX ADMIN — Medication 400 UNITS: at 10:11

## 2019-11-12 RX ADMIN — HYDRALAZINE HYDROCHLORIDE 25 MG: 25 TABLET, FILM COATED ORAL at 06:13

## 2019-11-12 RX ADMIN — LISINOPRIL 10 MG: 10 TABLET ORAL at 10:10

## 2019-11-12 RX ADMIN — ASPIRIN 81 MG: 81 TABLET, COATED ORAL at 10:09

## 2019-11-12 ASSESSMENT — PAIN SCALES - GENERAL
PAINLEVEL_OUTOF10: 0

## 2019-11-18 ENCOUNTER — APPOINTMENT (OUTPATIENT)
Dept: CT IMAGING | Age: 84
End: 2019-11-18
Payer: MEDICARE

## 2019-11-18 ENCOUNTER — APPOINTMENT (OUTPATIENT)
Dept: GENERAL RADIOLOGY | Age: 84
End: 2019-11-18
Payer: MEDICARE

## 2019-11-18 ENCOUNTER — HOSPITAL ENCOUNTER (OUTPATIENT)
Age: 84
Setting detail: OBSERVATION
Discharge: HOME | End: 2019-11-21
Attending: EMERGENCY MEDICINE | Admitting: INTERNAL MEDICINE
Payer: MEDICARE

## 2019-11-18 DIAGNOSIS — R55 SYNCOPE AND COLLAPSE: Primary | ICD-10-CM

## 2019-11-18 PROBLEM — R07.9 CHEST PAIN: Status: RESOLVED | Noted: 2019-11-07 | Resolved: 2019-11-18

## 2019-11-18 PROBLEM — E44.0 MODERATE PROTEIN-CALORIE MALNUTRITION (HCC): Chronic | Status: RESOLVED | Noted: 2018-08-23 | Resolved: 2019-11-18

## 2019-11-18 LAB
ALBUMIN SERPL-MCNC: 3.1 G/DL (ref 3.5–5.2)
ALP BLD-CCNC: 59 U/L (ref 35–104)
ALT SERPL-CCNC: 22 U/L (ref 0–32)
ANION GAP SERPL CALCULATED.3IONS-SCNC: 11 MMOL/L (ref 7–16)
AST SERPL-CCNC: 22 U/L (ref 0–31)
BASOPHILS ABSOLUTE: 0.01 E9/L (ref 0–0.2)
BASOPHILS RELATIVE PERCENT: 0.2 % (ref 0–2)
BILIRUB SERPL-MCNC: 0.5 MG/DL (ref 0–1.2)
BUN BLDV-MCNC: 16 MG/DL (ref 8–23)
CALCIUM SERPL-MCNC: 8.6 MG/DL (ref 8.6–10.2)
CHLORIDE BLD-SCNC: 111 MMOL/L (ref 98–107)
CHP ED QC CHECK: YES
CO2: 22 MMOL/L (ref 22–29)
CREAT SERPL-MCNC: 1 MG/DL (ref 0.5–1)
EKG ATRIAL RATE: 61 BPM
EKG P AXIS: 22 DEGREES
EKG P-R INTERVAL: 162 MS
EKG Q-T INTERVAL: 440 MS
EKG QRS DURATION: 110 MS
EKG QTC CALCULATION (BAZETT): 442 MS
EKG R AXIS: -48 DEGREES
EKG T AXIS: -12 DEGREES
EKG VENTRICULAR RATE: 61 BPM
EOSINOPHILS ABSOLUTE: 0.04 E9/L (ref 0.05–0.5)
EOSINOPHILS RELATIVE PERCENT: 0.9 % (ref 0–6)
GFR AFRICAN AMERICAN: >60
GFR NON-AFRICAN AMERICAN: >60 ML/MIN/1.73
GLUCOSE BLD-MCNC: 121 MG/DL
GLUCOSE BLD-MCNC: 149 MG/DL (ref 74–99)
HCT VFR BLD CALC: 39.5 % (ref 34–48)
HEMOGLOBIN: 12.3 G/DL (ref 11.5–15.5)
IMMATURE GRANULOCYTES #: 0.01 E9/L
IMMATURE GRANULOCYTES %: 0.2 % (ref 0–5)
LYMPHOCYTES ABSOLUTE: 0.98 E9/L (ref 1.5–4)
LYMPHOCYTES RELATIVE PERCENT: 21.4 % (ref 20–42)
MCH RBC QN AUTO: 29 PG (ref 26–35)
MCHC RBC AUTO-ENTMCNC: 31.1 % (ref 32–34.5)
MCV RBC AUTO: 93.2 FL (ref 80–99.9)
MONOCYTES ABSOLUTE: 0.45 E9/L (ref 0.1–0.95)
MONOCYTES RELATIVE PERCENT: 9.8 % (ref 2–12)
NEUTROPHILS ABSOLUTE: 3.1 E9/L (ref 1.8–7.3)
NEUTROPHILS RELATIVE PERCENT: 67.5 % (ref 43–80)
PDW BLD-RTO: 14.3 FL (ref 11.5–15)
PLATELET # BLD: 166 E9/L (ref 130–450)
PMV BLD AUTO: 9.7 FL (ref 7–12)
POTASSIUM REFLEX MAGNESIUM: 3.7 MMOL/L (ref 3.5–5)
PRO-BNP: 366 PG/ML (ref 0–450)
RBC # BLD: 4.24 E12/L (ref 3.5–5.5)
SODIUM BLD-SCNC: 144 MMOL/L (ref 132–146)
TOTAL PROTEIN: 6.3 G/DL (ref 6.4–8.3)
TROPONIN: <0.01 NG/ML (ref 0–0.03)
TROPONIN: <0.01 NG/ML (ref 0–0.03)
WBC # BLD: 4.6 E9/L (ref 4.5–11.5)

## 2019-11-18 PROCEDURE — 85025 COMPLETE CBC W/AUTO DIFF WBC: CPT

## 2019-11-18 PROCEDURE — 84484 ASSAY OF TROPONIN QUANT: CPT

## 2019-11-18 PROCEDURE — G0378 HOSPITAL OBSERVATION PER HR: HCPCS

## 2019-11-18 PROCEDURE — 36415 COLL VENOUS BLD VENIPUNCTURE: CPT

## 2019-11-18 PROCEDURE — 2580000003 HC RX 258: Performed by: INTERNAL MEDICINE

## 2019-11-18 PROCEDURE — 70450 CT HEAD/BRAIN W/O DYE: CPT

## 2019-11-18 PROCEDURE — 83880 ASSAY OF NATRIURETIC PEPTIDE: CPT

## 2019-11-18 PROCEDURE — 93010 ELECTROCARDIOGRAM REPORT: CPT | Performed by: INTERNAL MEDICINE

## 2019-11-18 PROCEDURE — 99285 EMERGENCY DEPT VISIT HI MDM: CPT

## 2019-11-18 PROCEDURE — 6370000000 HC RX 637 (ALT 250 FOR IP): Performed by: INTERNAL MEDICINE

## 2019-11-18 PROCEDURE — 80053 COMPREHEN METABOLIC PANEL: CPT

## 2019-11-18 PROCEDURE — 71045 X-RAY EXAM CHEST 1 VIEW: CPT

## 2019-11-18 PROCEDURE — 93005 ELECTROCARDIOGRAM TRACING: CPT | Performed by: STUDENT IN AN ORGANIZED HEALTH CARE EDUCATION/TRAINING PROGRAM

## 2019-11-18 RX ORDER — SODIUM CHLORIDE 9 MG/ML
INJECTION, SOLUTION INTRAVENOUS CONTINUOUS
Status: ACTIVE | OUTPATIENT
Start: 2019-11-18 | End: 2019-11-19

## 2019-11-18 RX ORDER — ACETAMINOPHEN 325 MG/1
650 TABLET ORAL EVERY 4 HOURS PRN
Status: DISCONTINUED | OUTPATIENT
Start: 2019-11-18 | End: 2019-11-21 | Stop reason: HOSPADM

## 2019-11-18 RX ORDER — LISINOPRIL 10 MG/1
10 TABLET ORAL DAILY
Status: DISCONTINUED | OUTPATIENT
Start: 2019-11-18 | End: 2019-11-21 | Stop reason: HOSPADM

## 2019-11-18 RX ORDER — SODIUM CHLORIDE 0.9 % (FLUSH) 0.9 %
10 SYRINGE (ML) INJECTION PRN
Status: DISCONTINUED | OUTPATIENT
Start: 2019-11-18 | End: 2019-11-21 | Stop reason: HOSPADM

## 2019-11-18 RX ORDER — ASPIRIN 81 MG/1
81 TABLET, CHEWABLE ORAL DAILY
Status: DISCONTINUED | OUTPATIENT
Start: 2019-11-18 | End: 2019-11-21 | Stop reason: HOSPADM

## 2019-11-18 RX ORDER — HYDRALAZINE HYDROCHLORIDE 25 MG/1
25 TABLET, FILM COATED ORAL EVERY 8 HOURS SCHEDULED
Status: DISCONTINUED | OUTPATIENT
Start: 2019-11-18 | End: 2019-11-21 | Stop reason: HOSPADM

## 2019-11-18 RX ORDER — ONDANSETRON 2 MG/ML
4 INJECTION INTRAMUSCULAR; INTRAVENOUS EVERY 6 HOURS PRN
Status: DISCONTINUED | OUTPATIENT
Start: 2019-11-18 | End: 2019-11-21 | Stop reason: HOSPADM

## 2019-11-18 RX ORDER — CARVEDILOL 6.25 MG/1
6.25 TABLET ORAL 2 TIMES DAILY WITH MEALS
Status: DISCONTINUED | OUTPATIENT
Start: 2019-11-18 | End: 2019-11-19

## 2019-11-18 RX ORDER — SODIUM CHLORIDE 0.9 % (FLUSH) 0.9 %
10 SYRINGE (ML) INJECTION EVERY 12 HOURS SCHEDULED
Status: DISCONTINUED | OUTPATIENT
Start: 2019-11-18 | End: 2019-11-21 | Stop reason: HOSPADM

## 2019-11-18 RX ADMIN — Medication 10 ML: at 21:39

## 2019-11-18 RX ADMIN — SODIUM CHLORIDE 125 ML: 9 INJECTION, SOLUTION INTRAVENOUS at 19:55

## 2019-11-18 RX ADMIN — ASPIRIN 81 MG 81 MG: 81 TABLET ORAL at 19:54

## 2019-11-18 RX ADMIN — HYDRALAZINE HYDROCHLORIDE 25 MG: 25 TABLET, FILM COATED ORAL at 21:23

## 2019-11-18 RX ADMIN — CARVEDILOL 6.25 MG: 6.25 TABLET, FILM COATED ORAL at 21:28

## 2019-11-18 RX ADMIN — APIXABAN 5 MG: 5 TABLET, FILM COATED ORAL at 21:23

## 2019-11-18 ASSESSMENT — ENCOUNTER SYMPTOMS
ABDOMINAL PAIN: 0
SHORTNESS OF BREATH: 0
VOMITING: 0
VISUAL CHANGE: 0
ABDOMINAL DISTENTION: 0
NAUSEA: 0
CHEST TIGHTNESS: 0
DIARRHEA: 0
PHOTOPHOBIA: 0
WHEEZING: 0
DIFFICULTY BREATHING: 0
COUGH: 0
STRIDOR: 0

## 2019-11-18 ASSESSMENT — PAIN SCALES - GENERAL: PAINLEVEL_OUTOF10: 0

## 2019-11-19 LAB — TROPONIN: <0.01 NG/ML (ref 0–0.03)

## 2019-11-19 PROCEDURE — 97530 THERAPEUTIC ACTIVITIES: CPT

## 2019-11-19 PROCEDURE — 6370000000 HC RX 637 (ALT 250 FOR IP): Performed by: INTERNAL MEDICINE

## 2019-11-19 PROCEDURE — G0378 HOSPITAL OBSERVATION PER HR: HCPCS

## 2019-11-19 PROCEDURE — 97162 PT EVAL MOD COMPLEX 30 MIN: CPT

## 2019-11-19 PROCEDURE — 2580000003 HC RX 258: Performed by: INTERNAL MEDICINE

## 2019-11-19 RX ORDER — CARVEDILOL 3.12 MG/1
3.12 TABLET ORAL 2 TIMES DAILY WITH MEALS
Status: DISCONTINUED | OUTPATIENT
Start: 2019-11-19 | End: 2019-11-21 | Stop reason: HOSPADM

## 2019-11-19 RX ADMIN — Medication 10 ML: at 09:32

## 2019-11-19 RX ADMIN — CARVEDILOL 6.25 MG: 6.25 TABLET, FILM COATED ORAL at 09:32

## 2019-11-19 RX ADMIN — HYDRALAZINE HYDROCHLORIDE 25 MG: 25 TABLET, FILM COATED ORAL at 14:17

## 2019-11-19 RX ADMIN — Medication 10 ML: at 21:17

## 2019-11-19 RX ADMIN — APIXABAN 5 MG: 5 TABLET, FILM COATED ORAL at 21:18

## 2019-11-19 RX ADMIN — HYDRALAZINE HYDROCHLORIDE 25 MG: 25 TABLET, FILM COATED ORAL at 21:17

## 2019-11-19 RX ADMIN — LISINOPRIL 10 MG: 10 TABLET ORAL at 09:32

## 2019-11-19 RX ADMIN — APIXABAN 5 MG: 5 TABLET, FILM COATED ORAL at 09:32

## 2019-11-19 RX ADMIN — ASPIRIN 81 MG 81 MG: 81 TABLET ORAL at 09:32

## 2019-11-19 RX ADMIN — HYDRALAZINE HYDROCHLORIDE 25 MG: 25 TABLET, FILM COATED ORAL at 05:12

## 2019-11-19 ASSESSMENT — PAIN SCALES - GENERAL: PAINLEVEL_OUTOF10: 0

## 2019-11-20 PROCEDURE — 6370000000 HC RX 637 (ALT 250 FOR IP): Performed by: INTERNAL MEDICINE

## 2019-11-20 PROCEDURE — 2580000003 HC RX 258: Performed by: INTERNAL MEDICINE

## 2019-11-20 PROCEDURE — 97165 OT EVAL LOW COMPLEX 30 MIN: CPT

## 2019-11-20 PROCEDURE — G0378 HOSPITAL OBSERVATION PER HR: HCPCS

## 2019-11-20 RX ADMIN — HYDRALAZINE HYDROCHLORIDE 25 MG: 25 TABLET, FILM COATED ORAL at 21:32

## 2019-11-20 RX ADMIN — APIXABAN 5 MG: 5 TABLET, FILM COATED ORAL at 20:41

## 2019-11-20 RX ADMIN — Medication 10 ML: at 08:11

## 2019-11-20 RX ADMIN — CARVEDILOL 3.12 MG: 3.12 TABLET, FILM COATED ORAL at 17:48

## 2019-11-20 RX ADMIN — ACETAMINOPHEN 325 MG: 325 TABLET, FILM COATED ORAL at 05:50

## 2019-11-20 RX ADMIN — ASPIRIN 81 MG 81 MG: 81 TABLET ORAL at 08:05

## 2019-11-20 RX ADMIN — Medication 10 ML: at 20:41

## 2019-11-20 RX ADMIN — HYDRALAZINE HYDROCHLORIDE 25 MG: 25 TABLET, FILM COATED ORAL at 05:06

## 2019-11-20 RX ADMIN — APIXABAN 5 MG: 5 TABLET, FILM COATED ORAL at 09:00

## 2019-11-20 RX ADMIN — LISINOPRIL 10 MG: 10 TABLET ORAL at 08:05

## 2019-11-20 RX ADMIN — CARVEDILOL 3.12 MG: 3.12 TABLET, FILM COATED ORAL at 08:05

## 2019-11-20 ASSESSMENT — PAIN DESCRIPTION - PAIN TYPE: TYPE: ACUTE PAIN

## 2019-11-20 ASSESSMENT — PAIN SCALES - GENERAL
PAINLEVEL_OUTOF10: 0
PAINLEVEL_OUTOF10: 5
PAINLEVEL_OUTOF10: 0

## 2019-11-20 ASSESSMENT — PAIN DESCRIPTION - DESCRIPTORS: DESCRIPTORS: ACHING;DISCOMFORT

## 2019-11-20 ASSESSMENT — PAIN - FUNCTIONAL ASSESSMENT: PAIN_FUNCTIONAL_ASSESSMENT: ACTIVITIES ARE NOT PREVENTED

## 2019-11-20 ASSESSMENT — PAIN DESCRIPTION - PROGRESSION: CLINICAL_PROGRESSION: NOT CHANGED

## 2019-11-20 ASSESSMENT — PAIN DESCRIPTION - LOCATION: LOCATION: BACK

## 2019-11-20 ASSESSMENT — PAIN DESCRIPTION - FREQUENCY: FREQUENCY: INTERMITTENT

## 2019-11-20 ASSESSMENT — PAIN DESCRIPTION - ONSET: ONSET: AWAKENED FROM SLEEP

## 2019-11-21 VITALS
WEIGHT: 130 LBS | BODY MASS INDEX: 23.04 KG/M2 | TEMPERATURE: 97.4 F | SYSTOLIC BLOOD PRESSURE: 166 MMHG | RESPIRATION RATE: 19 BRPM | HEART RATE: 77 BPM | OXYGEN SATURATION: 99 % | HEIGHT: 63 IN | DIASTOLIC BLOOD PRESSURE: 62 MMHG

## 2019-11-21 LAB
EKG ATRIAL RATE: 59 BPM
EKG P AXIS: 51 DEGREES
EKG P-R INTERVAL: 168 MS
EKG Q-T INTERVAL: 460 MS
EKG QRS DURATION: 96 MS
EKG QTC CALCULATION (BAZETT): 455 MS
EKG R AXIS: -51 DEGREES
EKG T AXIS: -3 DEGREES
EKG VENTRICULAR RATE: 59 BPM

## 2019-11-21 PROCEDURE — 6370000000 HC RX 637 (ALT 250 FOR IP): Performed by: INTERNAL MEDICINE

## 2019-11-21 PROCEDURE — G0378 HOSPITAL OBSERVATION PER HR: HCPCS

## 2019-11-21 PROCEDURE — 2580000003 HC RX 258: Performed by: INTERNAL MEDICINE

## 2019-11-21 RX ORDER — CARVEDILOL 3.12 MG/1
3.12 TABLET ORAL 2 TIMES DAILY WITH MEALS
Qty: 60 TABLET | Refills: 3 | Status: ON HOLD
Start: 2019-11-21 | End: 2020-02-13 | Stop reason: HOSPADM

## 2019-11-21 RX ADMIN — CARVEDILOL 3.12 MG: 3.12 TABLET, FILM COATED ORAL at 09:17

## 2019-11-21 RX ADMIN — HYDRALAZINE HYDROCHLORIDE 25 MG: 25 TABLET, FILM COATED ORAL at 05:31

## 2019-11-21 RX ADMIN — APIXABAN 5 MG: 5 TABLET, FILM COATED ORAL at 09:17

## 2019-11-21 RX ADMIN — ASPIRIN 81 MG 81 MG: 81 TABLET ORAL at 09:17

## 2019-11-21 RX ADMIN — LISINOPRIL 10 MG: 10 TABLET ORAL at 09:17

## 2019-11-21 RX ADMIN — Medication 10 ML: at 09:18

## 2020-02-12 ENCOUNTER — HOSPITAL ENCOUNTER (OUTPATIENT)
Age: 85
Setting detail: OBSERVATION
Discharge: HOME | End: 2020-02-14
Attending: EMERGENCY MEDICINE | Admitting: INTERNAL MEDICINE
Payer: MEDICARE

## 2020-02-12 ENCOUNTER — APPOINTMENT (OUTPATIENT)
Dept: GENERAL RADIOLOGY | Age: 85
End: 2020-02-12
Payer: MEDICARE

## 2020-02-12 ENCOUNTER — APPOINTMENT (OUTPATIENT)
Dept: CT IMAGING | Age: 85
End: 2020-02-12
Payer: MEDICARE

## 2020-02-12 LAB
ALBUMIN SERPL-MCNC: 3.5 G/DL (ref 3.5–5.2)
ALP BLD-CCNC: 58 U/L (ref 35–104)
ALT SERPL-CCNC: 12 U/L (ref 0–32)
ANION GAP SERPL CALCULATED.3IONS-SCNC: 6 MMOL/L (ref 7–16)
AST SERPL-CCNC: 24 U/L (ref 0–31)
BASOPHILS ABSOLUTE: 0.04 E9/L (ref 0–0.2)
BASOPHILS RELATIVE PERCENT: 0.9 % (ref 0–2)
BILIRUB SERPL-MCNC: 0.8 MG/DL (ref 0–1.2)
BILIRUBIN URINE: NEGATIVE
BLOOD, URINE: NEGATIVE
BUN BLDV-MCNC: 9 MG/DL (ref 8–23)
CALCIUM SERPL-MCNC: 8.8 MG/DL (ref 8.6–10.2)
CHLORIDE BLD-SCNC: 107 MMOL/L (ref 98–107)
CLARITY: CLEAR
CO2: 27 MMOL/L (ref 22–29)
COLOR: YELLOW
CREAT SERPL-MCNC: 0.9 MG/DL (ref 0.5–1)
EKG ATRIAL RATE: 60 BPM
EKG P AXIS: 42 DEGREES
EKG P-R INTERVAL: 162 MS
EKG Q-T INTERVAL: 450 MS
EKG QRS DURATION: 108 MS
EKG QTC CALCULATION (BAZETT): 450 MS
EKG R AXIS: -38 DEGREES
EKG T AXIS: 22 DEGREES
EKG VENTRICULAR RATE: 60 BPM
EOSINOPHILS ABSOLUTE: 0.12 E9/L (ref 0.05–0.5)
EOSINOPHILS RELATIVE PERCENT: 2.8 % (ref 0–6)
GFR AFRICAN AMERICAN: >60
GFR NON-AFRICAN AMERICAN: >60 ML/MIN/1.73
GLUCOSE BLD-MCNC: 98 MG/DL (ref 74–99)
GLUCOSE URINE: NEGATIVE MG/DL
HCT VFR BLD CALC: 37.9 % (ref 34–48)
HEMOGLOBIN: 11.9 G/DL (ref 11.5–15.5)
IMMATURE GRANULOCYTES #: 0.01 E9/L
IMMATURE GRANULOCYTES %: 0.2 % (ref 0–5)
INR BLD: 1.3
KETONES, URINE: NEGATIVE MG/DL
LEUKOCYTE ESTERASE, URINE: NEGATIVE
LYMPHOCYTES ABSOLUTE: 1.51 E9/L (ref 1.5–4)
LYMPHOCYTES RELATIVE PERCENT: 34.6 % (ref 20–42)
MCH RBC QN AUTO: 29.2 PG (ref 26–35)
MCHC RBC AUTO-ENTMCNC: 31.4 % (ref 32–34.5)
MCV RBC AUTO: 93.1 FL (ref 80–99.9)
MONOCYTES ABSOLUTE: 0.47 E9/L (ref 0.1–0.95)
MONOCYTES RELATIVE PERCENT: 10.8 % (ref 2–12)
NEUTROPHILS ABSOLUTE: 2.21 E9/L (ref 1.8–7.3)
NEUTROPHILS RELATIVE PERCENT: 50.7 % (ref 43–80)
NITRITE, URINE: NEGATIVE
PDW BLD-RTO: 14.6 FL (ref 11.5–15)
PH UA: 8 (ref 5–9)
PLATELET # BLD: 184 E9/L (ref 130–450)
PMV BLD AUTO: 10.5 FL (ref 7–12)
POTASSIUM REFLEX MAGNESIUM: 4.1 MMOL/L (ref 3.5–5)
PRO-BNP: 265 PG/ML (ref 0–450)
PROTEIN UA: NEGATIVE MG/DL
PROTHROMBIN TIME: 14.5 SEC (ref 9.3–12.4)
RBC # BLD: 4.07 E12/L (ref 3.5–5.5)
SODIUM BLD-SCNC: 140 MMOL/L (ref 132–146)
SPECIFIC GRAVITY UA: 1.01 (ref 1–1.03)
TOTAL PROTEIN: 6.5 G/DL (ref 6.4–8.3)
TROPONIN: <0.01 NG/ML (ref 0–0.03)
UROBILINOGEN, URINE: 0.2 E.U./DL
WBC # BLD: 4.4 E9/L (ref 4.5–11.5)

## 2020-02-12 PROCEDURE — 81003 URINALYSIS AUTO W/O SCOPE: CPT

## 2020-02-12 PROCEDURE — 6370000000 HC RX 637 (ALT 250 FOR IP): Performed by: EMERGENCY MEDICINE

## 2020-02-12 PROCEDURE — 71045 X-RAY EXAM CHEST 1 VIEW: CPT

## 2020-02-12 PROCEDURE — 99285 EMERGENCY DEPT VISIT HI MDM: CPT

## 2020-02-12 PROCEDURE — 96375 TX/PRO/DX INJ NEW DRUG ADDON: CPT

## 2020-02-12 PROCEDURE — 83880 ASSAY OF NATRIURETIC PEPTIDE: CPT

## 2020-02-12 PROCEDURE — 2580000003 HC RX 258: Performed by: EMERGENCY MEDICINE

## 2020-02-12 PROCEDURE — 96374 THER/PROPH/DIAG INJ IV PUSH: CPT

## 2020-02-12 PROCEDURE — G0378 HOSPITAL OBSERVATION PER HR: HCPCS

## 2020-02-12 PROCEDURE — 85610 PROTHROMBIN TIME: CPT

## 2020-02-12 PROCEDURE — 36415 COLL VENOUS BLD VENIPUNCTURE: CPT

## 2020-02-12 PROCEDURE — 84484 ASSAY OF TROPONIN QUANT: CPT

## 2020-02-12 PROCEDURE — 80053 COMPREHEN METABOLIC PANEL: CPT

## 2020-02-12 PROCEDURE — 85025 COMPLETE CBC W/AUTO DIFF WBC: CPT

## 2020-02-12 PROCEDURE — 6360000002 HC RX W HCPCS: Performed by: EMERGENCY MEDICINE

## 2020-02-12 PROCEDURE — 6370000000 HC RX 637 (ALT 250 FOR IP): Performed by: INTERNAL MEDICINE

## 2020-02-12 PROCEDURE — 93005 ELECTROCARDIOGRAM TRACING: CPT | Performed by: EMERGENCY MEDICINE

## 2020-02-12 PROCEDURE — 70450 CT HEAD/BRAIN W/O DYE: CPT

## 2020-02-12 RX ORDER — HYDRALAZINE HYDROCHLORIDE 25 MG/1
12.5 TABLET, FILM COATED ORAL 3 TIMES DAILY
Status: DISCONTINUED | OUTPATIENT
Start: 2020-02-12 | End: 2020-02-14

## 2020-02-12 RX ORDER — HYDRALAZINE HYDROCHLORIDE 25 MG/1
12.5 TABLET, FILM COATED ORAL 3 TIMES DAILY
Status: ON HOLD | COMMUNITY
End: 2020-02-14 | Stop reason: HOSPADM

## 2020-02-12 RX ORDER — LANOLIN ALCOHOL/MO/W.PET/CERES
500 CREAM (GRAM) TOPICAL DAILY
Status: DISCONTINUED | OUTPATIENT
Start: 2020-02-12 | End: 2020-02-14 | Stop reason: HOSPADM

## 2020-02-12 RX ORDER — FUROSEMIDE 10 MG/ML
20 INJECTION INTRAMUSCULAR; INTRAVENOUS ONCE
Status: COMPLETED | OUTPATIENT
Start: 2020-02-12 | End: 2020-02-12

## 2020-02-12 RX ORDER — ATORVASTATIN CALCIUM 20 MG/1
20 TABLET, FILM COATED ORAL NIGHTLY
COMMUNITY
End: 2020-02-12 | Stop reason: ALTCHOICE

## 2020-02-12 RX ORDER — ASPIRIN 81 MG/1
81 TABLET, CHEWABLE ORAL DAILY
Status: DISCONTINUED | OUTPATIENT
Start: 2020-02-13 | End: 2020-02-14 | Stop reason: HOSPADM

## 2020-02-12 RX ORDER — HYDRALAZINE HYDROCHLORIDE 20 MG/ML
20 INJECTION INTRAMUSCULAR; INTRAVENOUS ONCE
Status: COMPLETED | OUTPATIENT
Start: 2020-02-12 | End: 2020-02-12

## 2020-02-12 RX ORDER — LISINOPRIL 10 MG/1
10 TABLET ORAL DAILY
Status: DISCONTINUED | OUTPATIENT
Start: 2020-02-13 | End: 2020-02-14

## 2020-02-12 RX ORDER — 0.9 % SODIUM CHLORIDE 0.9 %
500 INTRAVENOUS SOLUTION INTRAVENOUS ONCE
Status: COMPLETED | OUTPATIENT
Start: 2020-02-12 | End: 2020-02-12

## 2020-02-12 RX ADMIN — NITROGLYCERIN 0.5 INCH: 20 OINTMENT TOPICAL at 12:00

## 2020-02-12 RX ADMIN — HYDRALAZINE HYDROCHLORIDE 20 MG: 20 INJECTION INTRAMUSCULAR; INTRAVENOUS at 13:18

## 2020-02-12 RX ADMIN — FUROSEMIDE 20 MG: 10 INJECTION, SOLUTION INTRAMUSCULAR; INTRAVENOUS at 12:00

## 2020-02-12 RX ADMIN — SODIUM CHLORIDE 500 ML: 9 INJECTION, SOLUTION INTRAVENOUS at 10:12

## 2020-02-12 RX ADMIN — APIXABAN 5 MG: 5 TABLET, FILM COATED ORAL at 21:10

## 2020-02-12 RX ADMIN — HYDRALAZINE HYDROCHLORIDE 12.5 MG: 25 TABLET, FILM COATED ORAL at 21:10

## 2020-02-12 ASSESSMENT — PAIN SCALES - GENERAL
PAINLEVEL_OUTOF10: 0
PAINLEVEL_OUTOF10: 0

## 2020-02-12 NOTE — ED PROVIDER NOTES
Department of Emergency Medicine   ED  Provider Note  Admit Date/RoomTime: 2/12/2020  9:40 AM  ED Room: 23/23      History of Present Illness:  2/12/20, Time: 10:01 AM         Lupe Padilla is a 80 y.o. female presenting to the ED for dizziness, beginning three days ago. The complaint has been persistent, moderate in severity, and worsened by nothing. Pt comes to the ED reporting that for the past three days she has felt like she is going to pass out. Pt has heart issues which has her in and out of the hospital often, and her home nurse reports that for the past few days she has been sleeping a lot and having memory issues. Pt reports that she also has had multiple syncopal episodes recently. Pt denies shortness of breath, fever, chills, cough, abdominal pain, nausea, emesis, diarrhea, constipation, urinary symptoms or further complaints at this time. Review of Systems:   Pertinent positives and negatives are stated within HPI, all other systems reviewed and are negative.    --------------------------------------------- PAST HISTORY ---------------------------------------------  Past Medical History:  has a past medical history of Arthritis, Hx of blood clots, Hypertension, Ischemic colitis ([facility] [facility] and PAF (paroxysmal atrial fibrillation) ([facility] Past Surgical History:  has a past surgical history that includes Ankle fracture surgery; Colonoscopy; Endoscopy, colon, diagnostic; fracture surgery; and Cardiac catheterization (11/11/2019). Social History:  reports that she has never smoked. She has never used smokeless tobacco. She reports that she does not drink alcohol or use drugs. Family History: family history is not on file. The patients home medications have been reviewed. Allergies: Patient has no known allergies.     ---------------------------------------------------PHYSICAL EXAM--------------------------------------    Constitutional/General: Alert and oriented x3, well appearing, non mmol/L    Potassium reflex Magnesium 4.1 3.5 - 5.0 mmol/L    Chloride 107 98 - 107 mmol/L    CO2 27 22 - 29 mmol/L    Anion Gap 6 (L) 7 - 16 mmol/L    Glucose 98 74 - 99 mg/dL    BUN 9 8 - 23 mg/dL    CREATININE 0.9 0.5 - 1.0 mg/dL    GFR Non-African American >60 >=60 mL/min/1.73    GFR African American >60     Calcium 8.8 8.6 - 10.2 mg/dL    Total Protein 6.5 6.4 - 8.3 g/dL    Alb 3.5 3.5 - 5.2 g/dL    Total Bilirubin 0.8 0.0 - 1.2 mg/dL    Alkaline Phosphatase 58 35 - 104 U/L    ALT 12 0 - 32 U/L    AST 24 0 - 31 U/L   Troponin   Result Value Ref Range    Troponin <0.01 0.00 - 0.03 ng/mL   Protime-INR   Result Value Ref Range    Protime 14.5 (H) 9.3 - 12.4 sec    INR 1.3    Urinalysis, reflex to microscopic   Result Value Ref Range    Color, UA Yellow Straw/Yellow    Clarity, UA Clear Clear    Glucose, Ur Negative Negative mg/dL    Bilirubin Urine Negative Negative    Ketones, Urine Negative Negative mg/dL    Specific Gravity, UA 1.015 1.005 - 1.030    Blood, Urine Negative Negative    pH, UA 8.0 5.0 - 9.0    Protein, UA Negative Negative mg/dL    Urobilinogen, Urine 0.2 <2.0 E.U./dL    Nitrite, Urine Negative Negative    Leukocyte Esterase, Urine Negative Negative   Brain Natriuretic Peptide   Result Value Ref Range    Pro- 0 - 450 pg/mL   EKG 12 Lead   Result Value Ref Range    Ventricular Rate 60 BPM    Atrial Rate 60 BPM    P-R Interval 162 ms    QRS Duration 108 ms    Q-T Interval 450 ms    QTc Calculation (Bazett) 450 ms    P Axis 42 degrees    R Axis -38 degrees    T Axis 22 degrees       RADIOLOGY:  Interpreted by Radiologist.  CT Head WO Contrast   Final Result   Atrophy and chronic microvascular ischemic disease. XR CHEST PORTABLE   Final Result   Cardiomegaly. Borderline pulmonary vascular congestion. EKG:  This EKG is signed and interpreted by the EP.     Time: 9:48am  Rate: 60bpm  Rhythm: Sinus  Interpretation: Marked sinus arrhythmia, left axis deviation, occasional PACs, non specific EKG  Comparison: stable as compared to patient's most recent EKG and no previous EKG available    ------------------------- NURSING NOTES AND VITALS REVIEWED ---------------------------   The nursing notes within the ED encounter and vital signs as below have been reviewed by myself. BP (!) 154/94   Pulse 64   Temp 97.6 °F (36.4 °C)   Resp 14   Wt 143 lb (64.9 kg)   SpO2 100%   BMI 25.33 kg/m²   Oxygen Saturation Interpretation: Normal    The patients available past medical records and past encounters were reviewed. ------------------------------ ED COURSE/MEDICAL DECISION MAKING----------------------  Medications   0.9 % sodium chloride bolus (0 mLs Intravenous Stopped 2/12/20 1130)   nitroglycerin (NITRO-BID) 2 % ointment 0.5 inch (0.5 inches Topical Given 2/12/20 1200)   furosemide (LASIX) injection 20 mg (20 mg Intravenous Given 2/12/20 1200)   hydrALAZINE (APRESOLINE) injection 20 mg (20 mg Intravenous Given 2/12/20 1318)       Medical Decision Making:   Syncope with chf, discussed with pt, elects for obs tx, discussed with dr Lilia Leblanc, covering for pcp, will admit     This patient's ED course included: a personal history and physicial examination and re-evaluation prior to disposition    This patient has remained hemodynamically stable during their ED course. Re-Evaluations:           Re-evaluation. Patients symptoms are improving    Consultations:                 Counseling: The emergency provider has spoken with the patient and discussed todays results, in addition to providing specific details for the plan of care and counseling regarding the diagnosis and prognosis. Questions are answered at this time and they are agreeable with the plan.     --------------------------------- IMPRESSION AND DISPOSITION ---------------------------------    IMPRESSION  1. Syncope and collapse    2.  Acute congestive heart failure, unspecified [facility] DISPOSITION  Disposition: Admit to telemetry  Patient condition is fair    2/12/20, 10:01 AM.    This note is prepared by Prince Bernal, acting as Scribe for Estela Amin MD.    Estela Amin MD:  The scribe's documentation has been prepared under my direction and personally reviewed by me in its entirety. I confirm that the note above accurately reflects all work, treatment, procedures, and medical decision making performed by me.          Estela Amin MD  02/12/20 [id number]

## 2020-02-13 PROBLEM — E44.0 MODERATE PROTEIN-CALORIE MALNUTRITION (HCC): Chronic | Status: ACTIVE | Noted: 2020-02-13

## 2020-02-13 PROCEDURE — G0378 HOSPITAL OBSERVATION PER HR: HCPCS

## 2020-02-13 PROCEDURE — 6370000000 HC RX 637 (ALT 250 FOR IP): Performed by: INTERNAL MEDICINE

## 2020-02-13 RX ORDER — AMLODIPINE BESYLATE 2.5 MG/1
2.5 TABLET ORAL DAILY
Qty: 30 TABLET | Refills: 3 | Status: SHIPPED
Start: 2020-02-14 | End: 2020-02-14 | Stop reason: HOSPADM

## 2020-02-13 RX ORDER — AMLODIPINE BESYLATE 2.5 MG/1
2.5 TABLET ORAL DAILY
Status: DISCONTINUED | OUTPATIENT
Start: 2020-02-13 | End: 2020-02-14

## 2020-02-13 RX ADMIN — APIXABAN 5 MG: 5 TABLET, FILM COATED ORAL at 09:07

## 2020-02-13 RX ADMIN — Medication 500 MCG: at 09:07

## 2020-02-13 RX ADMIN — HYDRALAZINE HYDROCHLORIDE 12.5 MG: 25 TABLET, FILM COATED ORAL at 09:06

## 2020-02-13 RX ADMIN — HYDRALAZINE HYDROCHLORIDE 12.5 MG: 25 TABLET, FILM COATED ORAL at 20:22

## 2020-02-13 RX ADMIN — APIXABAN 5 MG: 5 TABLET, FILM COATED ORAL at 20:22

## 2020-02-13 RX ADMIN — HYDRALAZINE HYDROCHLORIDE 12.5 MG: 25 TABLET, FILM COATED ORAL at 16:12

## 2020-02-13 RX ADMIN — LISINOPRIL 10 MG: 10 TABLET ORAL at 09:06

## 2020-02-13 RX ADMIN — AMLODIPINE BESYLATE 2.5 MG: 2.5 TABLET ORAL at 16:13

## 2020-02-13 RX ADMIN — ASPIRIN 81 MG 81 MG: 81 TABLET ORAL at 09:06

## 2020-02-13 ASSESSMENT — PAIN SCALES - GENERAL
PAINLEVEL_OUTOF10: 0
PAINLEVEL_OUTOF10: 0

## 2020-02-13 NOTE — PLAN OF CARE
Problem: Falls - Risk of:  Goal: Will remain free from falls  Description  Will remain free from falls  2/13/2020 1739 by Maryam Gibson RN  Outcome: Met This Shift     Problem: Falls - Risk of:  Goal: Absence of physical injury  Description  Absence of physical injury  2/13/2020 1739 by Maryam Gibson RN  Outcome: Met This Shift     Problem: Risk for Impaired Skin Integrity  Goal: Tissue integrity - skin and mucous membranes  Description  Structural intactness and normal physiological function of skin and  mucous membranes.   2/13/2020 1739 by Maryam Gibson RN  Outcome: Met This Shift

## 2020-02-13 NOTE — H&P
file   [employer] needs:     Medical: Not on file     Non-medical: Not on file   Tobacco Use    Smoking status: Never Smoker    Smokeless tobacco: Never Used   Substance and Sexual Activity    Alcohol use: No    Drug use: No    Sexual activity: Not on file   Lifestyle    Physical activity:     Days per week: Not on file     Minutes per session: Not on file    Stress: Not on file   Relationships    Social connections:     Talks on phone: Not on file     Gets together: Not on file     Attends Buddhist service: Not on file     Active member of club or organization: Not on file     Attends meetings of clubs or organizations: Not on file     Relationship status: Not on file    Intimate partner violence:     Fear of current or ex partner: Not on file     Emotionally abused: Not on file     Physically abused: Not on file     Forced sexual activity: Not on file   Other Topics Concern    Not on file   Social History Narrative    Not on file         Family History:   History reviewed. No pertinent family history. REVIEW OF SYSTEMS:    General ROS: negative  Hematological and Lymphatic ROS: negative  Endocrine ROS: negative  Respiratory ROS: no cough,  wheezing  or shortness of breath,   Cardiovascular ROS: Dizziness bradycardia   gastrointestinal ROS: no abdominal pain, change in bowel habits, or black or bloody stools  Genito-Urinary ROS: no dysuria, trouble voiding, or hematuria  Neurological ROS: no TIA or stroke symptoms  negative    Vitals:  BP (!) 167/84   Pulse 66   Temp 97.2 °F (36.2 °C) (Temporal)   Resp 16   Ht 5' 5\" (1.651 m)   Wt 138 lb (62.6 kg) Comment: bedscale per RD  SpO2 98%   BMI 22.96 kg/m²     PHYSICAL EXAM:  General:  Awake, alert, oriented X 3. Well developed, well nourished, well groomed. No apparent distress. HEENT:  Normocephalic, atraumatic. Pupils equal, round, reactive to light. No scleral icterus. No conjunctival injection.    Neck:  Supple, no carotid bruits  Heart:  RRR,   Lungs:  CTA bilaterally, bilat symmetrical expansion, no wheeze, rales, or rhonchi  Abdomen: Bowel sounds present, soft, nontender, no masses, no organomegaly, no peritoneal signs  Extremities:  No clubbing, cyanosis, or edema  Skin:  Warm and dry, no open lesions or rash  Neuro:  Cranial nerves 2-12 intact, no focal deficits      DATA:     No results found for this or any previous visit (from the past 24 hour(s)). CT Head WO Contrast   Final Result   Atrophy and chronic microvascular ischemic disease. XR CHEST PORTABLE   Final Result   Cardiomegaly. Borderline pulmonary vascular congestion. ASSESSMENT :      Active Problems:    Syncope and collapse    Moderate protein-calorie malnutrition (HCC)  Resolved Problems:    * No resolved hospital problems. *  Bradycardia from beta blockade  Hypertension by history  Paroxysmal A. fib on Eliquis therapy    Plan :    Doing better off beta-blocker  Cardiology on the case  Discharge home once okay with cardiology she has had echocardiogram and heart catheterization recently        Electronically signed by Yordan Pineda MD on 2/13/2020 at 2:43 PM    NOTE: This report was transcribed using voice recognition software.  Every effort was made to ensure accuracy; however, inadvertent transcription errors may be present

## 2020-02-13 NOTE — PLAN OF CARE
Problem: Falls - Risk of:  Goal: Will remain free from falls  Description  Will remain free from falls  2/13/2020 0345 by Anabella Villar RN  Outcome: Met This Shift     Problem: Falls - Risk of:  Goal: Absence of physical injury  Description  Absence of physical injury  2/13/2020 0345 by Anabella Villar RN  Outcome: Met This Shift     Problem: Risk for Impaired Skin Integrity  Goal: Tissue integrity - skin and mucous membranes  Description  Structural intactness and normal physiological function of skin and  mucous membranes.   2/13/2020 0345 by Anabella Villar RN  Outcome: Met This Shift

## 2020-02-13 NOTE — PROGRESS NOTES
Nutrition Assessment    Type and Reason for Visit: Initial, Positive Nutrition Screen    Nutrition Recommendations: Recommend and start Ensure Enlive supplement BID (per discussion with pt) to help meet nutritional needs. Nutrition Assessment: Son at bedside ; Pt stating fair appetite x 2 days since admission ; Pt meets criteria for moderate malnutrition AEB muscle and fat wasting ; will start nutritional supplementation     Malnutrition Assessment:  · Malnutrition Status: Meets the criteria for moderate malnutrition  · Context: Chronic illness  · Findings of the 6 clinical characteristics of malnutrition (Minimum of 2 out of 6 clinical characteristics is required to make the diagnosis of moderate or severe Protein Calorie Malnutrition based on AND/ASPEN Guidelines):  1. Energy Intake-Less than or equal to 75% of estimated energy requirement, (x 2 days )    2. Weight Loss-No significant weight loss,    3. Fat Loss-Mild subcutaneous fat loss, Orbital, Triceps  4. Muscle Loss-Moderate muscle mass loss, Temples (temporalis muscle), Clavicles (pectoralis and deltoids), Interosseous  5. Fluid Accumulation-No significant fluid accumulation,    6.  Strength-Not measured    Nutrition Risk Level: Moderate    Nutrient Needs:  · Estimated Daily Total Kcal: 6978-7741 (REE 1058 x 1.2 SF)  · Estimated Daily Protein (g): 75-90 (1.2-1.4g/kg CBW)  · Estimated Daily Total Fluid (ml/day): 7336-6789    Nutrition Diagnosis:   · Problem:  Moderate malnutrition, In context of chronic illness  · Etiology: related to Cardiac dysfunction     Signs and symptoms:  as evidenced by Diet history of poor intake, Moderate muscle loss, Mild loss of subcutaneous fat    Objective Information:  · Nutrition-Focused Physical Findings: I&Os WNL, no edema, [place], active BS, confused at times, U/L dentures, dry skin, boggy heels, abrasion, fair appetite, muscle and fat wasting      · Wound Type: None     · Current Nutrition Therapies:  · Oral Diet Orders: Cardiac   · Oral Diet intake: %(per 1 meal recorded in flowsheets ; pt states fair appetite)  · Oral Nutrition Supplement (ONS) Orders: None     · Anthropometric Measures:  · Ht: 5' 5\" (165.1 cm)   · Current Body Wt: 138 lb (62.6 kg)(2/13/20, bedscale per RD)  · Admission Body Wt: 143 lb (64.9 kg)(2/12/20, stated)  · Usual Body Wt: 140 lb (63.5 kg)(per pt)  ·   EMR shows past standing scale weights of 137# on 11/6/19 and 141# on 7/17/19 ; pt unsure of recent weight loss or gain   · Ideal Body Wt: 125 lb (56.7 kg), % Ideal Body 110%  · BMI Classification: BMI 18.5 - 24.9 Normal Weight    Nutrition Interventions:   Continue current diet, Start ONS  Continued Inpatient Monitoring, Coordination of Care    Nutrition Evaluation:   · Evaluation: Goals set   · Goals: Patient will consume >75% of most meals and supplements served     · Monitoring: Meal Intake, Supplement Intake, Diet Tolerance, Skin Integrity, I&O, Monitor Hemodynamic Status, Monitor Bowel Function, Mental Status/Confusion, Weight, Pertinent Labs, Chewing/Swallowing      Electronically signed by Dory Cushing, RD, LD on 2/13/20 at 2:23 PM    Contact Number: [phone]

## 2020-02-14 VITALS
WEIGHT: 138 LBS | HEIGHT: 65 IN | BODY MASS INDEX: 22.99 KG/M2 | OXYGEN SATURATION: 97 % | HEART RATE: 84 BPM | RESPIRATION RATE: 16 BRPM | DIASTOLIC BLOOD PRESSURE: 85 MMHG | TEMPERATURE: 98 F | SYSTOLIC BLOOD PRESSURE: 139 MMHG

## 2020-02-14 PROCEDURE — G0378 HOSPITAL OBSERVATION PER HR: HCPCS

## 2020-02-14 PROCEDURE — 97165 OT EVAL LOW COMPLEX 30 MIN: CPT

## 2020-02-14 PROCEDURE — 97530 THERAPEUTIC ACTIVITIES: CPT

## 2020-02-14 PROCEDURE — 6370000000 HC RX 637 (ALT 250 FOR IP): Performed by: INTERNAL MEDICINE

## 2020-02-14 RX ORDER — LISINOPRIL 20 MG/1
20 TABLET ORAL DAILY
Status: DISCONTINUED | OUTPATIENT
Start: 2020-02-15 | End: 2020-02-14

## 2020-02-14 RX ORDER — ACETAMINOPHEN 325 MG/1
650 TABLET ORAL EVERY 4 HOURS PRN
Status: DISCONTINUED | OUTPATIENT
Start: 2020-02-14 | End: 2020-02-14 | Stop reason: HOSPADM

## 2020-02-14 RX ORDER — NIFEDIPINE 30 MG/1
30 TABLET, FILM COATED, EXTENDED RELEASE ORAL DAILY
Qty: 30 TABLET | Refills: 3 | Status: SHIPPED | OUTPATIENT
Start: 2020-02-14 | End: 2020-09-26

## 2020-02-14 RX ORDER — LISINOPRIL 10 MG/1
10 TABLET ORAL DAILY
Status: DISCONTINUED | OUTPATIENT
Start: 2020-02-15 | End: 2020-02-14 | Stop reason: HOSPADM

## 2020-02-14 RX ORDER — CHLORTHALIDONE 25 MG/1
25 TABLET ORAL
Status: DISCONTINUED | OUTPATIENT
Start: 2020-02-14 | End: 2020-02-14 | Stop reason: HOSPADM

## 2020-02-14 RX ORDER — CHLORTHALIDONE 25 MG/1
25 TABLET ORAL
Qty: 30 TABLET | Refills: 3 | Status: SHIPPED | OUTPATIENT
Start: 2020-02-14 | End: 2020-09-26

## 2020-02-14 RX ORDER — NIFEDIPINE 30 MG/1
30 TABLET, EXTENDED RELEASE ORAL DAILY
Status: DISCONTINUED | OUTPATIENT
Start: 2020-02-14 | End: 2020-02-14 | Stop reason: HOSPADM

## 2020-02-14 RX ORDER — DOCUSATE SODIUM 100 MG/1
100 CAPSULE, LIQUID FILLED ORAL DAILY
Status: DISCONTINUED | OUTPATIENT
Start: 2020-02-14 | End: 2020-02-14 | Stop reason: HOSPADM

## 2020-02-14 RX ADMIN — LISINOPRIL 10 MG: 10 TABLET ORAL at 08:12

## 2020-02-14 RX ADMIN — AMLODIPINE BESYLATE 2.5 MG: 2.5 TABLET ORAL at 08:12

## 2020-02-14 RX ADMIN — HYDRALAZINE HYDROCHLORIDE 12.5 MG: 25 TABLET, FILM COATED ORAL at 08:12

## 2020-02-14 RX ADMIN — HYDRALAZINE HYDROCHLORIDE 12.5 MG: 25 TABLET, FILM COATED ORAL at 14:07

## 2020-02-14 RX ADMIN — ACETAMINOPHEN 650 MG: 325 TABLET ORAL at 14:07

## 2020-02-14 RX ADMIN — Medication 500 MCG: at 08:12

## 2020-02-14 RX ADMIN — APIXABAN 5 MG: 5 TABLET, FILM COATED ORAL at 08:12

## 2020-02-14 RX ADMIN — DOCUSATE SODIUM 100 MG: 100 CAPSULE, LIQUID FILLED ORAL at 14:07

## 2020-02-14 RX ADMIN — ASPIRIN 81 MG 81 MG: 81 TABLET ORAL at 08:12

## 2020-02-14 ASSESSMENT — PAIN SCALES - GENERAL
PAINLEVEL_OUTOF10: 6
PAINLEVEL_OUTOF10: 0
PAINLEVEL_OUTOF10: 6

## 2020-02-14 ASSESSMENT — PAIN DESCRIPTION - ORIENTATION: ORIENTATION: LEFT;OUTER

## 2020-02-14 ASSESSMENT — PAIN DESCRIPTION - DESCRIPTORS: DESCRIPTORS: CONSTANT

## 2020-02-14 ASSESSMENT — PAIN DESCRIPTION - LOCATION: LOCATION: HEAD

## 2020-02-14 ASSESSMENT — PAIN DESCRIPTION - PAIN TYPE: TYPE: ACUTE PAIN

## 2020-02-14 NOTE — PROGRESS NOTES
sclera non-icteric, conjunctiva pink. Carotids are brisk in upstroke with normal contour. No carotid bruits. Normal jugular venous pulsation at 45°. No palpable cervical nor supraclavicular nodes. Thyroid not palpable. Trachea midline. Chest: Even excursion  Lungs: CTA B, no expiratory wheezes or rhonchi, no decreased tactile fremitus without inspiratory rales. Heart: Regular  rhythm; S1 > S2, no gallop or murmur. No clicks, rub, palpable thrills   or heaves. PMI nondisplaced, 5th intercostal space MCL. Abdomen: Soft, nontender, nondistended,  mildly protuberant, no masses or organomegaly. Bowel sounds active. Extremities: Without clubbing, cyanosis or edema. Pulses present 3+ upper extermities bilaterally; present 1+ DP and present 1+ PT bilaterally.      Data:   Scheduled Meds: Reviewed  Continuous Infusions:     Intake/Output Summary (Last 24 hours) at 2020 2130  Last data filed at 2020 1442  Gross per 24 hour   Intake 60 ml   Output --- ml   Net 60 ml     CBC:   Recent Labs     20  1011   WBC 4.4*   HGB 11.9   HCT 37.9        BMP:  Recent Labs     20  1011      K 4.1      CO2 27   BUN 9   CREATININE 0.9   LABGLOM >60     ABGs: No results found for: PH, PO2, PCO2  INR:   Recent Labs     20  1011   INR 1.3     PRO-BNP:   Lab Results   Component Value Date    PROBNP 265 2020    PROBNP 366 2019      TSH:   Lab Results   Component Value Date    TSH 0.742 10/19/2010      Cardiac Injury Profile:   Recent Labs     20  1011   TROPONINI <0.01      Lipid Profile:   Lab Results   Component Value Date    TRIG 44 2019    HDL 70 2019    LDLCALC 107 2019    CHOL 186 2019      Hemoglobin A1C: No components found for: HGBA1C     RAD:   Ct Head Wo Contrast    Result Date: 2020  Patient MRN: [id number] : 1931 Age:  80 years Gender: Female Order Date: 2020 10:15 AM Exam: CT HEAD WO CONTRAST Number of Images: 111 views transcribed using voice recognition software.   Every effort was made to ensure accuracy; however, inadvertent computerized transcription errors may be present

## 2020-02-14 NOTE — PROGRESS NOTES
PROGRESS NOTE       PATIENT PROBLEM LIST:  Active Problems:    Systolic hypertension    Paroxysmal atrial fibrillation (HCC)    Syncope and collapse    Moderate protein-calorie malnutrition (HCC)  Resolved Problems:    * No resolved hospital problems. *      SUBJECTIVE:  Lyndon Javier states she feels somewhat better and denies any lightheadedness nor shortness of breath presently. Her overall baseline heart rate has increased to the 60s-70s. REVIEW OF SYSTEMS:  General ROS: positive for - fatigue  Psychological ROS: negative for - anxiety , depression  Ophthalmic ROS: negative for - decreased vision or visual distortion. ENT ROS: negative  Allergy and Immunology ROS: negative  Hematological and Lymphatic ROS: negative  Endocrine: no heat or cold intolerance and no polyphagia, polydipsia, or polyuria  Respiratory ROS: negative for - hemoptysis, pleuritic pain, shortness of breath and wheezing  Cardiovascular ROS: positive for - irregular heartbeat and near loss of consciousness. Gastrointestinal ROS: no abdominal pain, change in bowel habits, or black or bloody stools  Genito-Urinary ROS: no nocturia, dysuria, trouble voiding, frequency or hematuria  Musculoskeletal ROS: negative for- myalgias, arthralgias, or claudication  Neurological ROS: no TIA or stroke symptoms otherwise no significant change in symptoms or problems since yesterday as documented in previous progress notes.     SCHEDULED MEDICATIONS:   docusate sodium  100 mg Oral Daily    NIFEdipine  30 mg Oral Daily    chlorthalidone  25 mg Oral Once per day on Mon Thu    [START ON 2/15/2020] lisinopril  10 mg Oral Daily    vitamin B-12  500 mcg Oral Daily    aspirin  81 mg Oral Daily    apixaban  5 mg Oral BID       VITAL SIGNS:                                                                                                                          /85   Pulse 84   Temp 98 °F (36.7 °C) (Temporal)   Resp 16   Ht 5' 5\" (1.651 m)   Wt 138 lb (62.6 kg) Comment: bedscale per RD  SpO2 97%   BMI 22.96 kg/m²   Patient Vitals for the past 96 hrs (Last 3 readings):   Weight   02/13/20 1400 138 lb (62.6 kg)   02/13/20 0530 138 lb 2 oz (62.7 kg)   02/12/20 1815 148 lb 2.4 oz (67.2 kg)     OBJECTIVE:    HEENT: PERRL, EOM  Intact; sclera non-icteric, conjunctiva pink. Carotids are brisk in upstroke with normal contour. No carotid bruits. Normal jugular venous pulsation at 45°. No palpable cervical nor supraclavicular nodes. Thyroid not palpable. Trachea midline. Chest: Even excursion  Lungs: CTA B, no expiratory wheezes or rhonchi, no decreased tactile fremitus without inspiratory rales. Heart: Regular, irregular rhythm; S1 > S2, no gallop or murmur. No clicks, rub, palpable thrills   or heaves. PMI nondisplaced, 5th intercostal space MCL. Abdomen: Soft, nontender, nondistended,  scaphoid, no masses or organomegaly. Bowel sounds active. Extremities: Without clubbing, cyanosis or edema. Pulses present 3+ upper extermities bilaterally; present 1+ DP and present 1+ PT bilaterally.      Data:   Scheduled Meds: Reviewed  Continuous Infusions:     Intake/Output Summary (Last 24 hours) at 2/14/2020 1519  Last data filed at 2/14/2020 1442  Gross per 24 hour   Intake 600 ml   Output 500 ml   Net 100 ml     CBC:   Recent Labs     02/12/20  1011   WBC 4.4*   HGB 11.9   HCT 37.9        BMP:  Recent Labs     02/12/20  1011      K 4.1      CO2 27   BUN 9   CREATININE 0.9   LABGLOM >60     ABGs: No results found for: PH, PO2, PCO2  INR:   Recent Labs     02/12/20  1011   INR 1.3     PRO-BNP:   Lab Results   Component Value Date    PROBNP 265 02/12/2020    PROBNP 366 11/18/2019      TSH:   Lab Results   Component Value Date    TSH 0.742 10/19/2010      Cardiac Injury Profile:   Recent Labs     02/12/20  1011   TROPONINI <0.01      Lipid Profile:   Lab Results   Component Value Date    TRIG 44 11/07/2019    HDL 70 11/07/2019    LDLCALC 107 apixaban decreased risk of stroke based upon her Chads-Vasc score. Hopefully she will not experience orthostatic hypotension with her new drug regimen fortunately she has noncritical coronary atherosclerosis and preserved left ventricular systolic function and estimated left ventricular ejection fraction based upon her cardiac catheterization in November 2019 at approximately 55%. I will see her in follow-up as well. Should she develop recurrent atrial fibrillation, she will then be placed on a more definitive antiarrhythmic drug regimen and if necessary will place a permanent pacemaker to the fact that she demonstrate a propensity for profound bradycardia. Additionally she is to maintain her LDL cholesterol within updated 2018 ACC/AHA/AACE cholesterol guidelines. I have spent more than 26 minutes face to face with Lupe Randy and reviewing notes and laboratory data, with greater than 50% of this time instructing and counseling the patient face to face regarding my findings and recommendations and I have answered all questions as posed to me by Ms. Joe Tan. Stuart [facility], DO FACP,FACC,FSCAI      NOTE:  This report was transcribed using voice recognition software.   Every effort was made to ensure accuracy; however, inadvertent computerized transcription errors may be present

## 2020-02-14 NOTE — PROGRESS NOTES
OCCUPATIONAL THERAPY INITIAL EVALUATION      Date:2020  Patient Name: Tarry Dakins  MRN: [id number]  : 1931  Room: [address] [place] [address]      [address], OTR/L #[id number]    AM-PAC Daily Activity Raw Score:   Recommended Adaptive Equipment: TBD   Recommend pt utilize w/w for functional ambulation and standing tasks for safety, balance. Pt has home w/w    Diagnosis: Syncope and collapse [R55]     Pt presented to ED on 20 for dizziness. Multiple syncopal episodes recently  Referring physician: Claudine Tom MD     Pertinent Medical History: arthritis, HTN, ischemic colitis, PAF    Precautions:  Falls, dizzy/lightheaded w/ activity, [place]     Home Living: Pt lives with son in 1 floor home. 2 ADELE, 1 handrail   Bathroom setup: tub/shower with grab bars   Equipment owned: [employer], w/w    Prior Level of Function: independent/mod I with ADLs , shares IADLs; ambulated independently w/o AD inside home, SPC in community  Driving: no - children assist    Pain Level: Pt c/o no pain this session    Cognition: A&O: 4/4; Follows 1 step directions   Memory:  good    Sequencing:  fair    Problem solving:  fair    Judgement/safety:  fair      Functional Assessment:   Initial Eval Status  Date: 20 Treatment Status  Date: Short Term Goals/LTG  Treatment frequency: 1-4x/wk   Feeding Supervision   Modified [place]    Grooming Minimal Assist   standing  Modified [place]    UB Dressing Stand by Assist   Modified [place]    LB Dressing Minimal Assist   Supervision   Bathing Minimal Assist  Supervision   Toileting Minimal Assist   Modified [place]    Bed Mobility  NT  Pt seated in chair at room entry and departure. Student nursing present  Rolling: Independent   Supine to sit:  Independent   Sit to supine: Independent    Functional Transfers Min A  Mod I   Functional Mobility Min A w/ SPC  Min A w/ w/w  Mod I   Balance Sitting: SBA  Standing: Min A w/ SPC     Activity Tolerance Fair-  Limited by c/o dizziness/lightheadedness. Safety reinforced  Fair+   Visual/  Perceptual Glasses: bifocals                Hand dominance: R   Strength ROM Additional Info:    RUE  4/5  WFL   good  and wfl FMC/dexterity noted during ADL tasks       LUE 4/5  WFL   good  and wfl FMC/dexterity noted during ADL tasks       Hearing: [place]  Sensation:  No c/o numbness or tingling  Tone: WFL   Edema: none noted                   Treatment: Upon arrival, patient seated in chair. Pt agreeable to OT session this date - RN approved. Therapist facilitated functional transfers (multiple sit><stand transfers from chair w/ cues for safety/hand placement. Thorough education on importance of pausing/safety w/ all positional changes), standing tolerance tasks (addressing posture, balance and activity tolerance while incorporating light functional reaching) and functional ambulation task (Initially facilitated ambulation in room w/ SPC. Observed pt reaching for therapist, unsteady therefore incorporated w/w for remainder of mobility - balance improved. Education/cues on posture, w/w management and safety). Therapist facilitated self-care retraining: UB/LB self-care tasks (simulated gown, socks) and simulated toileting task while educating pt on modified techniques, posture, safety and energy conservation techniques. Skilled monitoring of HR, O2 sats and pts response to treatment. Pt c/o lightheadedness/dizziness w/ all activity - safety reinforced. BP taken prior to ambulation task: 112/88  BP following ambulation:144/78, HR=96 bpm  At end of session, patient seated in chair (nursing students present) with call light and phone within reach, all lines and tubes intact. Comments: Pt demonstrated decreased independence w/ self-care tasks and functional mobility secondary to c/o lightheadedness/dizziness, decreased safety and limited activity tolerance.   Pt would benefit from continued skilled OT to increase functional independence and quality of life. Eval Complexity: Low    Evaluation time includes thorough review of current medical information, gathering information on past medical history/social history and prior level of function, completion of standardized testing/informal observation of tasks, assessment of data, and development of POC/Goals. Assessment of current deficits   Functional mobility [x]  ADLs [x] Strength [x]  Cognition []  Functional transfers  [x] IADLs [] Safety Awareness [x]  Endurance [x]  Fine Motor Coordination [] Balance [x] Vision/perception [] Sensation []   Gross Motor Coordination [] ROM [] Delirium []                  Motor Control []    Plan of Care:   5-7 days  ADL retraining [x]   Equipment needs [x]   Neuromuscular re-education [x] Energy Conservation Techniques [x]  Functional Transfer training [x] Patient and/or Family Education [x]  Functional Mobility training [x]  Environmental Modifications [x]  Cognitive re-training []   Compensatory techniques for ADLs [x]  Splinting Needs []   Positioning to improve overall function [x]   Therapeutic Activity [x]  Therapeutic Exercise  [x]  Visual/Perceptual: []    Delirium prevention/treatment  []   Other:  []    Rehab Potential: Good for established goals    Patient / Family Goal:  Not stated     Patient and/or family were instructed on diagnosis, prognosis/goals and plan of care. Demonstrated fair understanding. [] Malnutrition indicators have been identified and nursing has been notified to ensure a dietitian consult is ordered.        Low Evaluation completed +    Treatment Time In:09:57            Treatment Time Out:10:09               Treatment Charges: Mins Units   Ther Ex  31297     Manual Therapy 46734     Thera Activities 37422 8 1   ADL/Home Mgt 90264 4 0   Neuro Re-ed 79300     Group Therapy      Orthotic manage/training  99065     Non-Billable Time     Total Timed Treatment 12 1         Jacku, OTR/L #[id number]

## 2020-02-14 NOTE — PROGRESS NOTES
Subjective:    Chief complaint:    Issues with constipation and headache    Objective:    /85   Pulse 84   Temp 98 °F (36.7 °C) (Temporal)   Resp 16   Ht 5' 5\" (1.651 m)   Wt 138 lb (62.6 kg) Comment: bedscale per RD  SpO2 97%   BMI 22.96 kg/m²   General : Awake ,alert,no distress. Heart:  RRR, no murmurs, gallops, or rubs. Lungs:  CTA bilaterally, no wheeze, rales or rhonchi  Abd: bowel sounds present, nontender, nondistended, no masses  Extrem:  No clubbing, cyanosis, or edema    CBC:   Lab Results   Component Value Date    WBC 4.4 02/12/2020    RBC 4.07 02/12/2020    HGB 11.9 02/12/2020    HCT 37.9 02/12/2020    MCV 93.1 02/12/2020    MCH 29.2 02/12/2020    MCHC 31.4 02/12/2020    RDW 14.6 02/12/2020     02/12/2020    MPV 10.5 02/12/2020     BMP:    Lab Results   Component Value Date     02/12/2020    K 4.1 02/12/2020     02/12/2020    CO2 27 02/12/2020    BUN 9 02/12/2020    LABALBU 3.5 02/12/2020    CREATININE 0.9 02/12/2020    CALCIUM 8.8 02/12/2020    GFRAA >60 02/12/2020    LABGLOM >60 02/12/2020    GLUCOSE 98 02/12/2020    GLUCOSE 102 11/12/2010     PT/INR:    Lab Results   Component Value Date    PROTIME 14.5 02/12/2020    INR 1.3 02/12/2020     Troponin:    Lab Results   Component Value Date    TROPONINI <0.01 02/12/2020       No results for input(s): LABURIN in the last 72 hours. No results for input(s): BC in the last 72 hours. No results for input(s): Raf Perry in the last 72 hours.       Current Facility-Administered Medications:     docusate sodium (COLACE) capsule 100 mg, 100 mg, Oral, Daily, Stephen Neil MD, 100 mg at 02/14/20 1407    acetaminophen (TYLENOL) tablet 650 mg, 650 mg, Oral, Q4H PRN, Stephen Neil MD, 650 mg at 02/14/20 1407    NIFEdipine (PROCARDIA XL) extended release tablet 30 mg, 30 mg, Oral, Daily, Navarro Putnam DO    chlorthalidone (HYGROTON) tablet 25 mg, 25 mg, Oral, Once per day on Mon Thu, Navarro Putnam DO  

## 2020-02-14 NOTE — CONSULTS
smell.  · Cardiovascular: (+) palpitations; No chest discomfort, dyspnea on exertion, loss of consciousness, no phlebitis, no claudication. · Respiratory: No cough or wheezing, no sputum production. No hemoptysis, pleuritic pain. · Gastrointestinal: No abdominal pain, appetite loss, blood in stools. No change in bowel habits. No hematemesis  · Genitourinary: No dysuria, trouble voiding or hematuria. No nocturia or increased frequency. · Musculoskeletal:  No gait disturbance, weakness or joint complaints. · Integumentary: No rash or pruritis. · Neurological: No headache, diplopia, change in muscle strength, numbness or tingling. No change in gait, balance, coordination, mood, affect, memory, mentation, behavior. · Psychiatric: No anxiety or depression. · Endocrine: No temperature intolerance. No excessive thirst, fluid intake, or urination. No tremor. · Hematologic/Lymphatic: No abnormal bruising or bleeding, blood clots or swollen lymph nodes. · Allergic/Immunologic: No nasal congestion or hives. Physical Examination:    Vital Signs: /85   Pulse 64   Temp 97.6 °F (36.4 °C) (Temporal)   Resp 14   Ht 5' 5\" (1.651 m)   Wt 138 lb (62.6 kg) Comment: bedscale per RD  SpO2 97%   BMI 22.96 kg/m²   General appearance: Well preserved, mesomorphic body habitus, alert, no distress. Skin: Skin color, texture, turgor normal. No rashes or lesions. No induration or tightening palpated. Head: Normocephalic. No masses, lesions, tenderness or abnormalities  Eyes: Conjunctivae/corneas clear. PERRL, EOMs intact. Sclera non icteric. Ears: External ears normal. Canals clear. TM's clear bilaterally. Hearing normal to finger rub. Nose/Sinuses: Nares normal. Septum midline. Mucosa normal. No drainage or sinus tenderness. Oropharynx: Lips, mucosa, and tongue normal. Oropharynx clear with no exudate seen. Neck: Neck supple and symmetric. No adenopathy. Thyroid symmetric, normal size, without nodules.  Trachea is

## 2020-02-14 NOTE — PROGRESS NOTES
Attempted to call Dr. Jori Hampton twice to ok discharge, phone rang busy both times was instructed to call back

## 2020-02-17 NOTE — DISCHARGE SUMMARY
Physician Discharge Summary     Patient ID:  Oralia Beltran  [id number]  2 y.o.  12/25/1931    Admit date: 2/12/2020    Discharge date and time: 2/14/2020  4:49 PM     Admission Diagnoses: Active Problems:    Systolic hypertension    Paroxysmal atrial fibrillation (HCC)    Syncope and collapse    Moderate protein-calorie malnutrition (HCC)  Resolved Problems:    * No resolved hospital problems. *      Discharge Diagnoses: Active Problems:    Systolic hypertension    Paroxysmal atrial fibrillation (HCC)    Syncope and collapse    Moderate protein-calorie malnutrition (HCC)  Resolved Problems:    * No resolved hospital problems. *      Condition at discharge : Stable     Consults: IP CONSULT TO CARDIOLOGY    Procedures: None    Hospital Course: Patient is a 70-year-old lady who presents to the emergency room with bradycardia, dizziness. The symptoms were felt to be from carvedilol. This was discontinued. She improved with this therapy. Seen by cardiology. CT Head WO Contrast   Final Result   Atrophy and chronic microvascular ischemic disease. XR CHEST PORTABLE   Final Result   Cardiomegaly. Borderline pulmonary vascular congestion.                 Results for orders placed or performed during the hospital encounter of 02/12/20 (from the past 336 hour(s))   EKG 12 Lead    Collection Time: 02/12/20  9:48 AM   Result Value Ref Range    Ventricular Rate 60 BPM    Atrial Rate 60 BPM    P-R Interval 162 ms    QRS Duration 108 ms    Q-T Interval 450 ms    QTc Calculation (Bazett) 450 ms    P Axis 42 degrees    R Axis -38 degrees    T Axis 22 degrees   CBC Auto Differential    Collection Time: 02/12/20 10:11 AM   Result Value Ref Range    WBC 4.4 (L) 4.5 - 11.5 E9/L    RBC 4.07 3.50 - 5.50 E12/L    Hemoglobin 11.9 11.5 - 15.5 g/dL    Hematocrit 37.9 34.0 - 48.0 %    MCV 93.1 80.0 - 99.9 fL    MCH 29.2 26.0 - 35.0 pg    MCHC 31.4 (L) 32.0 - 34.5 %    RDW 14.6 11.5 - 15.0 fL    Platelets 591 173 - 788 E9/L    MPV 10.5 7.0 - 12.0 fL    Neutrophils % 50.7 43.0 - 80.0 %    Immature Granulocytes % 0.2 0.0 - 5.0 %    Lymphocytes % 34.6 20.0 - 42.0 %    Monocytes % 10.8 2.0 - 12.0 %    Eosinophils % 2.8 0.0 - 6.0 %    Basophils % 0.9 0.0 - 2.0 %    Neutrophils Absolute 2.21 1.80 - 7.30 E9/L    Immature Granulocytes # 0.01 E9/L    Lymphocytes Absolute 1.51 1.50 - 4.00 E9/L    Monocytes Absolute 0.47 0.10 - 0.95 E9/L    Eosinophils Absolute 0.12 0.05 - 0.50 E9/L    Basophils Absolute 0.04 0.00 - 0.20 E9/L   Comprehensive Metabolic Panel w/ Reflex to MG    Collection Time: 02/12/20 10:11 AM   Result Value Ref Range    Sodium 140 132 - 146 mmol/L    Potassium reflex Magnesium 4.1 3.5 - 5.0 mmol/L    Chloride 107 98 - 107 mmol/L    CO2 27 22 - 29 mmol/L    Anion Gap 6 (L) 7 - 16 mmol/L    Glucose 98 74 - 99 mg/dL    BUN 9 8 - 23 mg/dL    CREATININE 0.9 0.5 - 1.0 mg/dL    GFR Non-African American >60 >=60 mL/min/1.73    GFR African American >60     Calcium 8.8 8.6 - 10.2 mg/dL    Total Protein 6.5 6.4 - 8.3 g/dL    Alb 3.5 3.5 - 5.2 g/dL    Total Bilirubin 0.8 0.0 - 1.2 mg/dL    Alkaline Phosphatase 58 35 - 104 U/L    ALT 12 0 - 32 U/L    AST 24 0 - 31 U/L   Troponin    Collection Time: 02/12/20 10:11 AM   Result Value Ref Range    Troponin <0.01 0.00 - 0.03 ng/mL   Protime-INR    Collection Time: 02/12/20 10:11 AM   Result Value Ref Range    Protime 14.5 (H) 9.3 - 12.4 sec    INR 1.3    Brain Natriuretic Peptide    Collection Time: 02/12/20 10:11 AM   Result Value Ref Range    Pro- 0 - 450 pg/mL   Urinalysis, reflex to microscopic    Collection Time: 02/12/20 11:45 AM   Result Value Ref Range    Color, UA Yellow Straw/Yellow    Clarity, UA Clear Clear    Glucose, Ur Negative Negative mg/dL    Bilirubin Urine Negative Negative    Ketones, Urine Negative Negative mg/dL    Specific Gravity, UA 1.015 1.005 - 1.030    Blood, Urine Negative Negative    pH, UA 8.0 5.0 - 9.0    Protein, UA Negative Negative mg/dL    Urobilinogen, Urine

## 2020-09-26 ENCOUNTER — HOSPITAL ENCOUNTER (EMERGENCY)
Age: 85
Discharge: HOME | End: 2020-09-26
Attending: FAMILY MEDICINE
Payer: MEDICARE

## 2020-09-26 ENCOUNTER — APPOINTMENT (OUTPATIENT)
Dept: GENERAL RADIOLOGY | Age: 85
End: 2020-09-26
Payer: MEDICARE

## 2020-09-26 VITALS
HEIGHT: 65 IN | DIASTOLIC BLOOD PRESSURE: 88 MMHG | RESPIRATION RATE: 16 BRPM | WEIGHT: 143 LBS | TEMPERATURE: 97.5 F | SYSTOLIC BLOOD PRESSURE: 172 MMHG | OXYGEN SATURATION: 99 % | HEART RATE: 76 BPM | BODY MASS INDEX: 23.82 KG/M2

## 2020-09-26 LAB
ANION GAP SERPL CALCULATED.3IONS-SCNC: 9 MMOL/L (ref 7–16)
BUN BLDV-MCNC: 10 MG/DL (ref 8–23)
CALCIUM SERPL-MCNC: 9.4 MG/DL (ref 8.6–10.2)
CHLORIDE BLD-SCNC: 104 MMOL/L (ref 98–107)
CO2: 28 MMOL/L (ref 22–29)
CREAT SERPL-MCNC: 1 MG/DL (ref 0.5–1)
EKG ATRIAL RATE: 70 BPM
EKG P AXIS: 50 DEGREES
EKG P-R INTERVAL: 172 MS
EKG Q-T INTERVAL: 448 MS
EKG QRS DURATION: 106 MS
EKG QTC CALCULATION (BAZETT): 483 MS
EKG R AXIS: -45 DEGREES
EKG T AXIS: -4 DEGREES
EKG VENTRICULAR RATE: 70 BPM
GFR AFRICAN AMERICAN: >60
GFR NON-AFRICAN AMERICAN: >60 ML/MIN/1.73
GLUCOSE BLD-MCNC: 112 MG/DL (ref 74–99)
HCT VFR BLD CALC: 41.7 % (ref 34–48)
HEMOGLOBIN: 14.1 G/DL (ref 11.5–15.5)
MAGNESIUM: 2 MG/DL (ref 1.6–2.6)
MCH RBC QN AUTO: 30.8 PG (ref 26–35)
MCHC RBC AUTO-ENTMCNC: 33.8 % (ref 32–34.5)
MCV RBC AUTO: 91 FL (ref 80–99.9)
PDW BLD-RTO: 13.9 FL (ref 11.5–15)
PLATELET # BLD: 212 E9/L (ref 130–450)
PMV BLD AUTO: 9.1 FL (ref 7–12)
POTASSIUM SERPL-SCNC: 3.4 MMOL/L (ref 3.5–5)
PRO-BNP: 369 PG/ML (ref 0–450)
RBC # BLD: 4.58 E12/L (ref 3.5–5.5)
SODIUM BLD-SCNC: 141 MMOL/L (ref 132–146)
TROPONIN: <0.01 NG/ML (ref 0–0.03)
WBC # BLD: 6.2 E9/L (ref 4.5–11.5)

## 2020-09-26 PROCEDURE — 6370000000 HC RX 637 (ALT 250 FOR IP): Performed by: FAMILY MEDICINE

## 2020-09-26 PROCEDURE — 73030 X-RAY EXAM OF SHOULDER: CPT

## 2020-09-26 PROCEDURE — 84439 ASSAY OF FREE THYROXINE: CPT

## 2020-09-26 PROCEDURE — 93005 ELECTROCARDIOGRAM TRACING: CPT | Performed by: FAMILY MEDICINE

## 2020-09-26 PROCEDURE — 84443 ASSAY THYROID STIM HORMONE: CPT

## 2020-09-26 PROCEDURE — 99282 EMERGENCY DEPT VISIT SF MDM: CPT

## 2020-09-26 PROCEDURE — 36415 COLL VENOUS BLD VENIPUNCTURE: CPT

## 2020-09-26 PROCEDURE — 84484 ASSAY OF TROPONIN QUANT: CPT

## 2020-09-26 PROCEDURE — 85027 COMPLETE CBC AUTOMATED: CPT

## 2020-09-26 PROCEDURE — 80048 BASIC METABOLIC PNL TOTAL CA: CPT

## 2020-09-26 PROCEDURE — 71046 X-RAY EXAM CHEST 2 VIEWS: CPT

## 2020-09-26 PROCEDURE — 93010 ELECTROCARDIOGRAM REPORT: CPT | Performed by: INTERNAL MEDICINE

## 2020-09-26 PROCEDURE — 99283 EMERGENCY DEPT VISIT LOW MDM: CPT

## 2020-09-26 PROCEDURE — 2580000003 HC RX 258: Performed by: FAMILY MEDICINE

## 2020-09-26 PROCEDURE — 83880 ASSAY OF NATRIURETIC PEPTIDE: CPT

## 2020-09-26 PROCEDURE — 83735 ASSAY OF MAGNESIUM: CPT

## 2020-09-26 RX ORDER — ACETAMINOPHEN 500 MG
1000 TABLET ORAL EVERY 8 HOURS PRN
Qty: 50 TABLET | Refills: 0 | Status: SHIPPED | OUTPATIENT
Start: 2020-09-26

## 2020-09-26 RX ORDER — CARVEDILOL 3.12 MG/1
3.12 TABLET ORAL 2 TIMES DAILY WITH MEALS
COMMUNITY
End: 2021-08-10

## 2020-09-26 RX ORDER — HYDRALAZINE HYDROCHLORIDE 25 MG/1
25 TABLET, FILM COATED ORAL 2 TIMES DAILY
COMMUNITY
End: 2021-08-10

## 2020-09-26 RX ORDER — 0.9 % SODIUM CHLORIDE 0.9 %
1000 INTRAVENOUS SOLUTION INTRAVENOUS ONCE
Status: COMPLETED | OUTPATIENT
Start: 2020-09-26 | End: 2020-09-26

## 2020-09-26 RX ADMIN — POTASSIUM BICARBONATE 40 MEQ: 782 TABLET, EFFERVESCENT ORAL at 12:22

## 2020-09-26 RX ADMIN — SODIUM CHLORIDE 1000 ML: 9 INJECTION, SOLUTION INTRAVENOUS at 10:53

## 2020-09-26 ASSESSMENT — PAIN DESCRIPTION - FREQUENCY: FREQUENCY: CONTINUOUS

## 2020-09-26 ASSESSMENT — PAIN DESCRIPTION - ONSET: ONSET: SUDDEN

## 2020-09-26 ASSESSMENT — PAIN SCALES - GENERAL: PAINLEVEL_OUTOF10: 7

## 2020-09-26 ASSESSMENT — PAIN DESCRIPTION - LOCATION: LOCATION: SHOULDER

## 2020-09-26 ASSESSMENT — PAIN DESCRIPTION - DESCRIPTORS: DESCRIPTORS: SORE

## 2020-09-26 ASSESSMENT — PAIN DESCRIPTION - PROGRESSION: CLINICAL_PROGRESSION: NOT CHANGED

## 2020-09-26 ASSESSMENT — PAIN DESCRIPTION - PAIN TYPE: TYPE: ACUTE PAIN

## 2020-09-26 ASSESSMENT — PAIN DESCRIPTION - ORIENTATION: ORIENTATION: LEFT

## 2020-09-26 NOTE — ED PROVIDER NOTES
Department of Emergency Medicine   ED  Provider Note  Admit Date/RoomTime: 9/26/2020 10:03 AM  ED Room: 07/07  Chief Complaint:   Shoulder Pain (pt passes out often, this is not new. Having left shoulder pain, not sure if she fell or not. )    History of Present Illness   Source of history provided by:  patient and relative(s). History/Exam Limitations: none. Ras Rodriguez is a 80 y.o. old female who has a past medical history of:   Past Medical History:   Diagnosis Date    Arthritis     Hx of blood clots     Hypertension     Ischemic colitis ([facility] [address]     PAF (paroxysmal atrial fibrillation) ([facility] [address]    presents to the emergency department by private vehicle and ambulatory, for Left shoulder pain which occured 3 day(s) prior to arrival. Cause of complaint: fall while at home. Was found on the floor. There has been a history of no prior problems with this area in the past.  She is right handed. The patients tetanus status is unknown. Since onset the symptoms have been moderate in degree. Her pain is aggraveated by movement, use and palpation and relieved by rest.     ROS    Pertinent positives and negatives are stated within HPI, all other systems reviewed and are negative. Past Surgical History:  has a past surgical history that includes Ankle fracture surgery; Colonoscopy; Endoscopy, colon, diagnostic; fracture surgery; and Cardiac catheterization (11/11/2019). Social History:  reports that she has never smoked. She has never used smokeless tobacco. She reports that she does not drink alcohol or use drugs. Family History: family history is not on file. Allergies: Nifedipine    Physical Exam           ED Triage Vitals [09/26/20 1010]   BP Temp Temp Source Pulse Resp SpO2 Height Weight   -- 97.5 °F (36.4 °C) Infrared 137 16 99 % -- --      Oxygen Saturation Interpretation: Normal    Constitutional:  Alert, development consistent with age. Neck:  Normal ROM. Supple. Non-tender.   Chest neg Lungs clear BS equal Heart IR IR rapid  Abd neg  Shoulder:  Left anterior, lateral.              Tenderness: mild              Swelling: None. Deformity: no.              ROM: diminished range with pain. Skin:  no erythema, rash or wounds noted. Neurovascular: Motor deficit: none. Sensory deficit: none. Pulse deficit: none. Capillary refill: normal.    Elbow:              Tenderness:  none. Swelling: None. Deformity: no.              ROM: full range of motion. Skin:  no erythema, rash or wounds noted. Lymphatics: No lymphangitis or edema noted  Neurological:  Oriented. Motor functions intact.     Lab / Imaging Results   (All laboratory and radiology results have been personally reviewed by myself)  Labs:  Results for orders placed or performed during the hospital encounter of 09/26/20   CBC   Result Value Ref Range    WBC 6.2 4.5 - 11.5 E9/L    RBC 4.58 3.50 - 5.50 E12/L    Hemoglobin 14.1 11.5 - 15.5 g/dL    Hematocrit 41.7 34.0 - 48.0 %    MCV 91.0 80.0 - 99.9 fL    MCH 30.8 26.0 - 35.0 pg    MCHC 33.8 32.0 - 34.5 %    RDW 13.9 11.5 - 15.0 fL    Platelets 341 440 - 891 E9/L    MPV 9.1 7.0 - 12.0 fL   Basic Metabolic Panel   Result Value Ref Range    Sodium 141 132 - 146 mmol/L    Potassium 3.4 (L) 3.5 - 5.0 mmol/L    Chloride 104 98 - 107 mmol/L    CO2 28 22 - 29 mmol/L    Anion Gap 9 7 - 16 mmol/L    Glucose 112 (H) 74 - 99 mg/dL    BUN 10 8 - 23 mg/dL    CREATININE 1.0 0.5 - 1.0 mg/dL    GFR Non-African American >60 >=60 mL/min/1.73    GFR African American >60     Calcium 9.4 8.6 - 10.2 mg/dL   Troponin   Result Value Ref Range    Troponin <0.01 0.00 - 0.03 ng/mL   Magnesium   Result Value Ref Range    Magnesium 2.0 1.6 - 2.6 mg/dL   EKG 12 Lead   Result Value Ref Range    Ventricular Rate 70 BPM    Atrial Rate 70 BPM    P-R Interval 172 ms    QRS Duration 106 ms    Q-T Interval 448 ms    QTc Calculation (Bazett) 483 ms    P Axis 50 degrees    R Axis -45 degrees    T Axis -4 degrees     Imaging: All Radiology results interpreted by Radiologist unless otherwise noted. XR SHOULDER LEFT (MIN 2 VIEWS)   Final Result   No acute process         XR CHEST (2 VW)   Final Result   No acute cardiopulmonary disease process is identified. EKG:  This EKG is signed and interpreted by me. Rate: 70  Rhythm: Atrial fibrillation PACs  Interpretation: Sinus rhythm with PACs incomplete right bundle branch block left anterior fascicular block  Comparison: stable as compared to patient's most recent EKG      ED Course / Medical Decision Making     Medications   0.9 % sodium chloride bolus (1,000 mLs Intravenous New Bag 9/26/20 1053)        Re-examination:  9/26/20       Time: 11:25   improving    Consult(s):   None    Procedure(s):   none    MDM:   Films were obtained based on moderate  suspicion for bony injury as per history/physical findings . Plan is subsequently for symptom control, limited use and  immobilization with appropriate outpatient follow-up. Counseling: The emergency provider has spoken with the patient and family member patient and daughter and discussed todays results, in addition to providing specific details for the plan of care and counseling regarding the diagnosis and prognosis. Questions are answered at this time and they are agreeable with the plan. Assessment      1. Sprain of left shoulder, unspecified shoulder sprain type, initial encounter    2. Chronic atrial fibrillation ([address]      Plan   Discharge to home  Patient condition is good    New Medications     New Prescriptions    No medications on file     Electronically signed by Vincent Godoy MD   DD: 9/26/20  **This report was transcribed using voice recognition software. Every effort was made to ensure accuracy; however, inadvertent computerized transcription errors may be present.   END OF ED PROVIDER NOTE           Estuardo Miranda MD  09/26/20 7998

## 2020-09-27 LAB
T4 FREE: 1.17 NG/DL (ref 0.93–1.7)
TSH SERPL DL<=0.05 MIU/L-ACNC: 2.54 UIU/ML (ref 0.27–4.2)

## 2021-03-11 ENCOUNTER — HOSPITAL ENCOUNTER (OUTPATIENT)
Dept: CT IMAGING | Age: 86
Discharge: HOME | End: 2021-03-13
Payer: MEDICARE

## 2021-03-11 DIAGNOSIS — R41.3 MEMORY PROBLEM: ICD-10-CM

## 2021-03-11 PROCEDURE — 70450 CT HEAD/BRAIN W/O DYE: CPT

## 2021-08-10 ENCOUNTER — APPOINTMENT (OUTPATIENT)
Dept: GENERAL RADIOLOGY | Age: 86
End: 2021-08-10
Payer: MEDICARE

## 2021-08-10 ENCOUNTER — APPOINTMENT (OUTPATIENT)
Dept: CT IMAGING | Age: 86
End: 2021-08-10
Payer: MEDICARE

## 2021-08-10 ENCOUNTER — HOSPITAL ENCOUNTER (EMERGENCY)
Age: 86
Discharge: LEFT BEFORE DISCHARGE | End: 2021-08-10
Attending: EMERGENCY MEDICINE
Payer: MEDICARE

## 2021-08-10 VITALS
OXYGEN SATURATION: 100 % | HEART RATE: 69 BPM | DIASTOLIC BLOOD PRESSURE: 98 MMHG | TEMPERATURE: 98.1 F | SYSTOLIC BLOOD PRESSURE: 170 MMHG | RESPIRATION RATE: 16 BRPM

## 2021-08-10 DIAGNOSIS — Z53.29 LEFT AGAINST MEDICAL ADVICE: ICD-10-CM

## 2021-08-10 DIAGNOSIS — N30.00 ACUTE CYSTITIS WITHOUT HEMATURIA: ICD-10-CM

## 2021-08-10 DIAGNOSIS — R55 SYNCOPE AND COLLAPSE: Primary | ICD-10-CM

## 2021-08-10 LAB
ALBUMIN SERPL-MCNC: 3.5 G/DL (ref 3.5–5.2)
ALP BLD-CCNC: 61 U/L (ref 35–104)
ALT SERPL-CCNC: 18 U/L (ref 0–32)
ANION GAP SERPL CALCULATED.3IONS-SCNC: 12 MMOL/L (ref 7–16)
AST SERPL-CCNC: 30 U/L (ref 0–31)
BACTERIA: ABNORMAL /HPF
BASOPHILS ABSOLUTE: 0.03 E9/L (ref 0–0.2)
BASOPHILS RELATIVE PERCENT: 0.6 % (ref 0–2)
BILIRUB SERPL-MCNC: 1 MG/DL (ref 0–1.2)
BILIRUBIN URINE: NEGATIVE
BLOOD, URINE: ABNORMAL
BUN BLDV-MCNC: 15 MG/DL (ref 6–23)
CALCIUM SERPL-MCNC: 9.5 MG/DL (ref 8.6–10.2)
CHLORIDE BLD-SCNC: 102 MMOL/L (ref 98–107)
CLARITY: CLEAR
CO2: 27 MMOL/L (ref 22–29)
COLOR: YELLOW
CREAT SERPL-MCNC: 1 MG/DL (ref 0.5–1)
EKG ATRIAL RATE: 83 BPM
EKG P AXIS: 33 DEGREES
EKG P-R INTERVAL: 144 MS
EKG Q-T INTERVAL: 390 MS
EKG QRS DURATION: 110 MS
EKG QTC CALCULATION (BAZETT): 458 MS
EKG R AXIS: -52 DEGREES
EKG T AXIS: 56 DEGREES
EKG VENTRICULAR RATE: 83 BPM
EOSINOPHILS ABSOLUTE: 0.07 E9/L (ref 0.05–0.5)
EOSINOPHILS RELATIVE PERCENT: 1.3 % (ref 0–6)
GFR AFRICAN AMERICAN: >60
GFR NON-AFRICAN AMERICAN: >60 ML/MIN/1.73
GLUCOSE BLD-MCNC: 103 MG/DL (ref 74–99)
GLUCOSE URINE: NEGATIVE MG/DL
HCT VFR BLD CALC: 42 % (ref 34–48)
HEMOGLOBIN: 14.2 G/DL (ref 11.5–15.5)
IMMATURE GRANULOCYTES #: 0.01 E9/L
IMMATURE GRANULOCYTES %: 0.2 % (ref 0–5)
KETONES, URINE: NEGATIVE MG/DL
LEUKOCYTE ESTERASE, URINE: ABNORMAL
LYMPHOCYTES ABSOLUTE: 1.96 E9/L (ref 1.5–4)
LYMPHOCYTES RELATIVE PERCENT: 36 % (ref 20–42)
MAGNESIUM: 2.2 MG/DL (ref 1.6–2.6)
MCH RBC QN AUTO: 30.2 PG (ref 26–35)
MCHC RBC AUTO-ENTMCNC: 33.8 % (ref 32–34.5)
MCV RBC AUTO: 89.4 FL (ref 80–99.9)
MONOCYTES ABSOLUTE: 0.67 E9/L (ref 0.1–0.95)
MONOCYTES RELATIVE PERCENT: 12.3 % (ref 2–12)
NEUTROPHILS ABSOLUTE: 2.7 E9/L (ref 1.8–7.3)
NEUTROPHILS RELATIVE PERCENT: 49.6 % (ref 43–80)
NITRITE, URINE: NEGATIVE
PDW BLD-RTO: 14.6 FL (ref 11.5–15)
PH UA: 7 (ref 5–9)
PLATELET # BLD: 183 E9/L (ref 130–450)
PMV BLD AUTO: 9.8 FL (ref 7–12)
POTASSIUM REFLEX MAGNESIUM: 3.5 MMOL/L (ref 3.5–5)
PRO-BNP: 177 PG/ML (ref 0–450)
PROTEIN UA: NEGATIVE MG/DL
RBC # BLD: 4.7 E12/L (ref 3.5–5.5)
RBC UA: ABNORMAL /HPF (ref 0–2)
SODIUM BLD-SCNC: 141 MMOL/L (ref 132–146)
SPECIFIC GRAVITY UA: 1.01 (ref 1–1.03)
TOTAL CK: 399 U/L (ref 20–180)
TOTAL PROTEIN: 7 G/DL (ref 6.4–8.3)
TROPONIN, HIGH SENSITIVITY: 24 NG/L (ref 0–9)
TROPONIN, HIGH SENSITIVITY: 25 NG/L (ref 0–9)
UROBILINOGEN, URINE: 0.2 E.U./DL
WBC # BLD: 5.4 E9/L (ref 4.5–11.5)
WBC UA: >20 /HPF (ref 0–5)

## 2021-08-10 PROCEDURE — 93010 ELECTROCARDIOGRAM REPORT: CPT | Performed by: INTERNAL MEDICINE

## 2021-08-10 PROCEDURE — 84484 ASSAY OF TROPONIN QUANT: CPT

## 2021-08-10 PROCEDURE — 93005 ELECTROCARDIOGRAM TRACING: CPT | Performed by: EMERGENCY MEDICINE

## 2021-08-10 PROCEDURE — 85025 COMPLETE CBC W/AUTO DIFF WBC: CPT

## 2021-08-10 PROCEDURE — 36415 COLL VENOUS BLD VENIPUNCTURE: CPT

## 2021-08-10 PROCEDURE — 71045 X-RAY EXAM CHEST 1 VIEW: CPT

## 2021-08-10 PROCEDURE — 81001 URINALYSIS AUTO W/SCOPE: CPT

## 2021-08-10 PROCEDURE — 99284 EMERGENCY DEPT VISIT MOD MDM: CPT

## 2021-08-10 PROCEDURE — 80053 COMPREHEN METABOLIC PANEL: CPT

## 2021-08-10 PROCEDURE — 70450 CT HEAD/BRAIN W/O DYE: CPT

## 2021-08-10 PROCEDURE — 87088 URINE BACTERIA CULTURE: CPT

## 2021-08-10 PROCEDURE — 82550 ASSAY OF CK (CPK): CPT

## 2021-08-10 PROCEDURE — 83880 ASSAY OF NATRIURETIC PEPTIDE: CPT

## 2021-08-10 PROCEDURE — 83735 ASSAY OF MAGNESIUM: CPT

## 2021-08-10 RX ORDER — CEFDINIR 300 MG/1
300 CAPSULE ORAL 2 TIMES DAILY
Qty: 14 CAPSULE | Refills: 0 | Status: SHIPPED | OUTPATIENT
Start: 2021-08-10 | End: 2021-08-17

## 2021-08-10 ASSESSMENT — ENCOUNTER SYMPTOMS
BACK PAIN: 0
COUGH: 0
ABDOMINAL PAIN: 0
SHORTNESS OF BREATH: 0

## 2021-08-10 NOTE — ED PROVIDER NOTES
This is an 59-year-old female with a past medical history of CHF and pulmonary embolism who presents to the ED for evaluation of syncope. Patient states that about 2 days ago she woke up on her bathroom floor. Patient states that she does not recall whether she was on the commode or walking to the commode is unsure how long she was on the ground for. Patient has no chest pain or shortness of breath that is new. Patient states she has had this happen in the past and was told that it was her heart. Patient has no leg pain leg swelling recent travel or recent surgeries. Patient states she will takes her blood thinners and unsure whether she struck her head or not. Patient states that her PCP has been changing her medications time to time. Patient has any back pain or neck pain. No reported mitigating or exacerbating factors. The history is provided by the patient. No  was used. Review of Systems   Constitutional: Negative for fever. HENT: Negative for congestion. Eyes: Negative for visual disturbance. Respiratory: Negative for cough and shortness of breath. Cardiovascular: Negative for chest pain. Gastrointestinal: Negative for abdominal pain. Endocrine: Negative for polyuria. Genitourinary: Positive for difficulty urinating. Musculoskeletal: Negative for back pain. Skin: Negative for rash. Allergic/Immunologic: Negative for immunocompromised state. Neurological: Positive for syncope. Negative for headaches. Hematological: Bruises/bleeds easily. Psychiatric/Behavioral: Negative for confusion. Physical Exam  Vitals and nursing note reviewed. Constitutional:       General: She is not in acute distress. Appearance: She is well-developed. HENT:      Head: Normocephalic and atraumatic. Eyes:      Extraocular Movements: Extraocular movements intact. Pupils: Pupils are equal, round, and reactive to light. Neck:      Vascular: No JVD. their PCP today and was given strict return precautions                       --------------------------------------------- PAST HISTORY ---------------------------------------------  Past Medical History:  has a past medical history of Arthritis, Hx of blood clots, Hypertension, Ischemic colitis ([facility] [address] and PAF (paroxysmal atrial fibrillation) ([facility] [address] Past Surgical History:  has a past surgical history that includes Ankle fracture surgery; Colonoscopy; Endoscopy, colon, diagnostic; fracture surgery; and Cardiac catheterization (11/11/2019). Social History:  reports that she has never smoked. She has never used smokeless tobacco. She reports that she does not drink alcohol and does not use drugs. Family History: family history is not on file. The patients home medications have been reviewed.     Allergies: Nifedipine    -------------------------------------------------- RESULTS -------------------------------------------------  Labs:  Results for orders placed or performed during the hospital encounter of 08/10/21   Comprehensive Metabolic Panel w/ Reflex to MG   Result Value Ref Range    Sodium 141 132 - 146 mmol/L    Potassium reflex Magnesium 3.5 3.5 - 5.0 mmol/L    Chloride 102 98 - 107 mmol/L    CO2 27 22 - 29 mmol/L    Anion Gap 12 7 - 16 mmol/L    Glucose 103 (H) 74 - 99 mg/dL    BUN 15 6 - 23 mg/dL    CREATININE 1.0 0.5 - 1.0 mg/dL    GFR Non-African American >60 >=60 mL/min/1.73    GFR African American >60     Calcium 9.5 8.6 - 10.2 mg/dL    Total Protein 7.0 6.4 - 8.3 g/dL    Albumin 3.5 3.5 - 5.2 g/dL    Total Bilirubin 1.0 0.0 - 1.2 mg/dL    Alkaline Phosphatase 61 35 - 104 U/L    ALT 18 0 - 32 U/L    AST 30 0 - 31 U/L   CBC Auto Differential   Result Value Ref Range    WBC 5.4 4.5 - 11.5 E9/L    RBC 4.70 3.50 - 5.50 E12/L    Hemoglobin 14.2 11.5 - 15.5 g/dL    Hematocrit 42.0 34.0 - 48.0 %    MCV 89.4 80.0 - 99.9 fL    MCH 30.2 26.0 - 35.0 pg    MCHC 33.8 32.0 - 34.5 %    RDW 14.6 11.5 - 15.0 fL    Platelets 910 163 - 422 E9/L    MPV 9.8 7.0 - 12.0 fL    Neutrophils % 49.6 43.0 - 80.0 %    Immature Granulocytes % 0.2 0.0 - 5.0 %    Lymphocytes % 36.0 20.0 - 42.0 %    Monocytes % 12.3 (H) 2.0 - 12.0 %    Eosinophils % 1.3 0.0 - 6.0 %    Basophils % 0.6 0.0 - 2.0 %    Neutrophils Absolute 2.70 1.80 - 7.30 E9/L    Immature Granulocytes # 0.01 E9/L    Lymphocytes Absolute 1.96 1.50 - 4.00 E9/L    Monocytes Absolute 0.67 0.10 - 0.95 E9/L    Eosinophils Absolute 0.07 0.05 - 0.50 E9/L    Basophils Absolute 0.03 0.00 - 0.20 E9/L   Troponin   Result Value Ref Range    Troponin, High Sensitivity 25 (H) 0 - 9 ng/L   Brain Natriuretic Peptide   Result Value Ref Range    Pro- 0 - 450 pg/mL   CK   Result Value Ref Range    Total  (H) 20 - 180 U/L   Magnesium   Result Value Ref Range    Magnesium 2.2 1.6 - 2.6 mg/dL   Troponin   Result Value Ref Range    Troponin, High Sensitivity 24 (H) 0 - 9 ng/L   Urinalysis with Microscopic   Result Value Ref Range    Color, UA Yellow Straw/Yellow    Clarity, UA Clear Clear    Glucose, Ur Negative Negative mg/dL    Bilirubin Urine Negative Negative    Ketones, Urine Negative Negative mg/dL    Specific Gravity, UA 1.015 1.005 - 1.030    Blood, Urine TRACE-INTACT Negative    pH, UA 7.0 5.0 - 9.0    Protein, UA Negative Negative mg/dL    Urobilinogen, Urine 0.2 <2.0 E.U./dL    Nitrite, Urine Negative Negative    Leukocyte Esterase, Urine LARGE (A) Negative    WBC, UA >20 (A) 0 - 5 /HPF    RBC, UA 2-5 0 - 2 /HPF    Bacteria, UA MODERATE (A) None Seen /HPF   EKG 12 Lead   Result Value Ref Range    Ventricular Rate 83 BPM    Atrial Rate 83 BPM    P-R Interval 144 ms    QRS Duration 110 ms    Q-T Interval 390 ms    QTc Calculation (Bazett) 458 ms    P Axis 33 degrees    R Axis -52 degrees    T Axis 56 degrees     EKG: This EKG is signed and interpreted by me.     Rate: 83  Rhythm: Sinus  Interpretation: Sinus rhythm with marked sinus arrhythmia incomplete right bundle branch left anterior fascicular block LVH as well as nonspecific T wave changes  Comparison: changes compared to previous EKG  Radiology:  CT HEAD WO CONTRAST   Final Result   1. There is no acute intracranial abnormality. Specifically, there is no   intracranial hemorrhage. 2. Atrophy and periventricular leukomalacia,   3. 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 XR CHEST PORTABLE   Final Result   Slight cardiomegaly      No radiographic evidence of definite acute pulmonary disease in the   visualized chest             ------------------------- NURSING NOTES AND VITALS REVIEWED ---------------------------  Date / Time Roomed:  8/10/2021  9:02 AM  ED Bed Assignment:  02/02    The nursing notes within the ED encounter and vital signs as below have been reviewed. BP (!) 170/98   Pulse 69   Temp 98.1 °F (36.7 °C) (Temporal)   Resp 16   SpO2 100%   Oxygen Saturation Interpretation: Normal      Unfortunately, Kelly White at 12:46 PM decided to leave the Emergency Department Against Medical Advice. Kelly White is clinically sober, free of any distracting injury, appears to be of sound mind with intact judgement and insight, and in my opinion has the capacity to make decisions. she presented to the Emergency Department to be evaluated for fall, syncope and understands that I am concerned about the possibility of heart failure, cva, cardiogenic shock UTI. I have explained the nature of the evaluation so for and she understands the results and that the evaluation so far has been limited and cannot exclude LV/RV function, PE, ACS and that by not undergoing further evaluation and management specific risks include: shock, grave disability, hypoxia and even death   We have discussed alternative treatments and the patient was offered/prescribed Omnicef and advised to follow up with her PCP and Dr. Fernando Clark.    Still, Kelly White is unwilling to stay for the recommended evaluation and management and wishes to leave against medical advice. I am unable to convince the patient to stay, I have asked them to return as soon as possible to complete their evaluation. I have answered all their questions     New Prescriptions    CEFDINIR (OMNICEF) 300 MG CAPSULE    Take 1 capsule by mouth 2 times daily for 7 days       Diagnosis:  1. Syncope and collapse    2. Acute cystitis without hematuria    3. Left against medical advice        Disposition:  Patient's disposition: Discharge AMA  Patient's condition is stable.      Heather Ramirez DO  08/10/21 1300

## 2021-08-12 LAB — URINE CULTURE, ROUTINE: NORMAL

## 2021-08-29 ENCOUNTER — APPOINTMENT (OUTPATIENT)
Dept: GENERAL RADIOLOGY | Age: 86
End: 2021-08-29
Payer: MEDICARE

## 2021-08-29 ENCOUNTER — HOSPITAL ENCOUNTER (OUTPATIENT)
Age: 86
Setting detail: OBSERVATION
Discharge: HOME | End: 2021-08-31
Attending: EMERGENCY MEDICINE | Admitting: INTERNAL MEDICINE
Payer: MEDICARE

## 2021-08-29 DIAGNOSIS — R07.9 CHEST PAIN, UNSPECIFIED TYPE: Primary | ICD-10-CM

## 2021-08-29 LAB
ALBUMIN SERPL-MCNC: 3.3 G/DL (ref 3.5–5.2)
ALP BLD-CCNC: 74 U/L (ref 35–104)
ALT SERPL-CCNC: 21 U/L (ref 0–32)
ANION GAP SERPL CALCULATED.3IONS-SCNC: 9 MMOL/L (ref 7–16)
AST SERPL-CCNC: 26 U/L (ref 0–31)
BASOPHILS ABSOLUTE: 0.05 E9/L (ref 0–0.2)
BASOPHILS RELATIVE PERCENT: 0.9 % (ref 0–2)
BILIRUB SERPL-MCNC: 0.7 MG/DL (ref 0–1.2)
BUN BLDV-MCNC: 15 MG/DL (ref 6–23)
CALCIUM SERPL-MCNC: 9.2 MG/DL (ref 8.6–10.2)
CHLORIDE BLD-SCNC: 105 MMOL/L (ref 98–107)
CO2: 28 MMOL/L (ref 22–29)
CREAT SERPL-MCNC: 0.9 MG/DL (ref 0.5–1)
EKG ATRIAL RATE: 62 BPM
EKG P AXIS: 37 DEGREES
EKG P-R INTERVAL: 152 MS
EKG Q-T INTERVAL: 444 MS
EKG QRS DURATION: 100 MS
EKG QTC CALCULATION (BAZETT): 450 MS
EKG R AXIS: -44 DEGREES
EKG T AXIS: 10 DEGREES
EKG VENTRICULAR RATE: 62 BPM
EOSINOPHILS ABSOLUTE: 0.24 E9/L (ref 0.05–0.5)
EOSINOPHILS RELATIVE PERCENT: 4.2 % (ref 0–6)
GFR AFRICAN AMERICAN: >60
GFR NON-AFRICAN AMERICAN: >60 ML/MIN/1.73
GLUCOSE BLD-MCNC: 99 MG/DL (ref 74–99)
HCT VFR BLD CALC: 39.1 % (ref 34–48)
HEMOGLOBIN: 13.2 G/DL (ref 11.5–15.5)
IMMATURE GRANULOCYTES #: 0.02 E9/L
IMMATURE GRANULOCYTES %: 0.3 % (ref 0–5)
INR BLD: 1.1
LYMPHOCYTES ABSOLUTE: 1.66 E9/L (ref 1.5–4)
LYMPHOCYTES RELATIVE PERCENT: 28.9 % (ref 20–42)
MCH RBC QN AUTO: 30.3 PG (ref 26–35)
MCHC RBC AUTO-ENTMCNC: 33.8 % (ref 32–34.5)
MCV RBC AUTO: 89.7 FL (ref 80–99.9)
MONOCYTES ABSOLUTE: 0.61 E9/L (ref 0.1–0.95)
MONOCYTES RELATIVE PERCENT: 10.6 % (ref 2–12)
NEUTROPHILS ABSOLUTE: 3.17 E9/L (ref 1.8–7.3)
NEUTROPHILS RELATIVE PERCENT: 55.1 % (ref 43–80)
PDW BLD-RTO: 14.2 FL (ref 11.5–15)
PLATELET # BLD: 192 E9/L (ref 130–450)
PMV BLD AUTO: 9.3 FL (ref 7–12)
POTASSIUM REFLEX MAGNESIUM: 3.6 MMOL/L (ref 3.5–5)
PRO-BNP: 242 PG/ML (ref 0–450)
PROTHROMBIN TIME: 12.7 SEC (ref 9.3–12.4)
RBC # BLD: 4.36 E12/L (ref 3.5–5.5)
SARS-COV-2, NAAT: NOT DETECTED
SODIUM BLD-SCNC: 142 MMOL/L (ref 132–146)
TOTAL PROTEIN: 6.7 G/DL (ref 6.4–8.3)
TROPONIN, HIGH SENSITIVITY: 17 NG/L (ref 0–9)
TROPONIN, HIGH SENSITIVITY: 17 NG/L (ref 0–9)
WBC # BLD: 5.8 E9/L (ref 4.5–11.5)

## 2021-08-29 PROCEDURE — 84484 ASSAY OF TROPONIN QUANT: CPT

## 2021-08-29 PROCEDURE — 71045 X-RAY EXAM CHEST 1 VIEW: CPT

## 2021-08-29 PROCEDURE — 93005 ELECTROCARDIOGRAM TRACING: CPT | Performed by: EMERGENCY MEDICINE

## 2021-08-29 PROCEDURE — 99284 EMERGENCY DEPT VISIT MOD MDM: CPT

## 2021-08-29 PROCEDURE — 83880 ASSAY OF NATRIURETIC PEPTIDE: CPT

## 2021-08-29 PROCEDURE — 85025 COMPLETE CBC W/AUTO DIFF WBC: CPT

## 2021-08-29 PROCEDURE — G0378 HOSPITAL OBSERVATION PER HR: HCPCS

## 2021-08-29 PROCEDURE — 36415 COLL VENOUS BLD VENIPUNCTURE: CPT

## 2021-08-29 PROCEDURE — 87635 SARS-COV-2 COVID-19 AMP PRB: CPT

## 2021-08-29 PROCEDURE — 93010 ELECTROCARDIOGRAM REPORT: CPT | Performed by: INTERNAL MEDICINE

## 2021-08-29 PROCEDURE — 80053 COMPREHEN METABOLIC PANEL: CPT

## 2021-08-29 PROCEDURE — 6370000000 HC RX 637 (ALT 250 FOR IP): Performed by: EMERGENCY MEDICINE

## 2021-08-29 PROCEDURE — 85610 PROTHROMBIN TIME: CPT

## 2021-08-29 RX ORDER — ASPIRIN 81 MG/1
81 TABLET ORAL DAILY
Status: ON HOLD | COMMUNITY
End: 2022-07-14 | Stop reason: SDUPTHER

## 2021-08-29 RX ORDER — ACETAMINOPHEN 500 MG
500 TABLET ORAL EVERY 6 HOURS PRN
Status: DISCONTINUED | OUTPATIENT
Start: 2021-08-29 | End: 2021-08-31 | Stop reason: HOSPADM

## 2021-08-29 RX ORDER — ASPIRIN 325 MG
325 TABLET ORAL ONCE
Status: COMPLETED | OUTPATIENT
Start: 2021-08-29 | End: 2021-08-29

## 2021-08-29 RX ORDER — LISINOPRIL 5 MG/1
5 TABLET ORAL DAILY
Status: DISCONTINUED | OUTPATIENT
Start: 2021-08-30 | End: 2021-08-31 | Stop reason: HOSPADM

## 2021-08-29 RX ADMIN — ASPIRIN 325 MG ORAL TABLET 325 MG: 325 PILL ORAL at 21:15

## 2021-08-29 ASSESSMENT — PAIN DESCRIPTION - DESCRIPTORS: DESCRIPTORS: ACHING;SHARP

## 2021-08-29 ASSESSMENT — PAIN SCALES - GENERAL
PAINLEVEL_OUTOF10: 0
PAINLEVEL_OUTOF10: 0

## 2021-08-29 ASSESSMENT — PAIN DESCRIPTION - LOCATION: LOCATION: CHEST

## 2021-08-29 ASSESSMENT — PAIN DESCRIPTION - PAIN TYPE: TYPE: ACUTE PAIN

## 2021-08-29 NOTE — Clinical Note
Patient Class: Observation [104]   REQUIRED: Diagnosis: Chest pain [051719]   Estimated Length of Stay: Estimated stay of less than 2 midnights   Admitting Provider: Al Strauss [[id number]]

## 2021-08-29 NOTE — ED PROVIDER NOTES
Fatou Francisco is a 80 y.o. female presenting to the ED for left sided chest pain, beginning today ago. The complaint has been intermittent, moderate in severity, and worsened by nothing. Is a 58-year-old female with history of paroxysmal A. fib history of blood clots, history of cardiac catheterization in 2019 that showed mild diffuse disease done by Dr. Lourdes Eng. History of hypertension history ischemic colitis. Patient states she has had intermittent left-sided chest pain. She states currently she is asymptomatic and has 0 out of 10 pain currently. She states that at worst can be pretty sharp and 8 out of 10. She states she is recently had a intermittent fever as well. She denies any cough or shortness of breath denies any abdominal pain vomiting or diarrhea she denies any back pain. She states she not currently have a family doctor. She states she is cut back on her lisinopril. She states she is taking her Eliquis. Patient states she has been intermittently lightheaded as well. Patient previously was a patient of Dr. Jerrica Larose MD.  Patient states she does not exert herself and therefore cannot comment on if she is having any exertional symptoms. Review of Systems:   Pertinent positives and negatives are stated within HPI, all other systems reviewed and are negative.          --------------------------------------------- PAST HISTORY ---------------------------------------------  Past Medical History:  has a past medical history of Arthritis, Atrial fibrillation ([facility] [address] Hx of blood clots, Hypertension, Ischemic colitis ([facility] [address] and PAF (paroxysmal [facility] fibrillation) [facility] Past Surgical History:  has a past surgical history that includes Ankle fracture surgery; Colonoscopy; Endoscopy, colon, diagnostic; fracture surgery; and Cardiac catheterization (11/11/2019). Social History:  reports that she has never smoked.  She has never used smokeless tobacco. She reports that she does not drink alcohol and does not use drugs. Family History: family history is not on file. The patients home medications have been reviewed.     Allergies: Nifedipine    -------------------------------------------------- RESULTS -------------------------------------------------  All laboratory and radiology results have been personally reviewed by myself   LABS:  Results for orders placed or performed during the hospital encounter of 08/29/21   COVID-19, Rapid    Specimen: Nasopharyngeal Swab   Result Value Ref Range    SARS-CoV-2, NAAT Not Detected Not Detected   CBC Auto Differential   Result Value Ref Range    WBC 5.8 4.5 - 11.5 E9/L    RBC 4.36 3.50 - 5.50 E12/L    Hemoglobin 13.2 11.5 - 15.5 g/dL    Hematocrit 39.1 34.0 - 48.0 %    MCV 89.7 80.0 - 99.9 fL    MCH 30.3 26.0 - 35.0 pg    MCHC 33.8 32.0 - 34.5 %    RDW 14.2 11.5 - 15.0 fL    Platelets 446 073 - 497 E9/L    MPV 9.3 7.0 - 12.0 fL    Neutrophils % 55.1 43.0 - 80.0 %    Immature Granulocytes % 0.3 0.0 - 5.0 %    Lymphocytes % 28.9 20.0 - 42.0 %    Monocytes % 10.6 2.0 - 12.0 %    Eosinophils % 4.2 0.0 - 6.0 %    Basophils % 0.9 0.0 - 2.0 %    Neutrophils Absolute 3.17 1.80 - 7.30 E9/L    Immature Granulocytes # 0.02 E9/L    Lymphocytes Absolute 1.66 1.50 - 4.00 E9/L    Monocytes Absolute 0.61 0.10 - 0.95 E9/L    Eosinophils Absolute 0.24 0.05 - 0.50 E9/L    Basophils Absolute 0.05 0.00 - 0.20 E9/L   Comprehensive Metabolic Panel w/ Reflex to MG   Result Value Ref Range    Sodium 142 132 - 146 mmol/L    Potassium reflex Magnesium 3.6 3.5 - 5.0 mmol/L    Chloride 105 98 - 107 mmol/L    CO2 28 22 - 29 mmol/L    Anion Gap 9 7 - 16 mmol/L    Glucose 99 74 - 99 mg/dL    BUN 15 6 - 23 mg/dL    CREATININE 0.9 0.5 - 1.0 mg/dL    GFR Non-African American >60 >=60 mL/min/1.73    GFR African American >60     Calcium 9.2 8.6 - 10.2 mg/dL    Total Protein 6.7 6.4 - 8.3 g/dL    Albumin 3.3 (L) 3.5 - 5.2 g/dL    Total Bilirubin 0.7 0.0 - 1.2 mg/dL Alkaline Phosphatase 74 35 - 104 U/L    ALT 21 0 - 32 U/L    AST 26 0 - 31 U/L   Troponin   Result Value Ref Range    Troponin, High Sensitivity 17 (H) 0 - 9 ng/L   Brain Natriuretic Peptide   Result Value Ref Range    Pro- 0 - 450 pg/mL   Protime-INR   Result Value Ref Range    Protime 12.7 (H) 9.3 - 12.4 sec    INR 1.1    EKG 12 Lead   Result Value Ref Range    Ventricular Rate 62 BPM    Atrial Rate 62 BPM    P-R Interval 152 ms    QRS Duration 100 ms    Q-T Interval 444 ms    QTc Calculation (Bazett) 450 ms    P Axis 37 degrees    R Axis -44 degrees    T Axis 10 degrees       RADIOLOGY:  Interpreted by Radiologist.  XR CHEST PORTABLE   Final Result   No acute process. ------------------------- NURSING NOTES AND VITALS REVIEWED ---------------------------   The nursing notes within the ED encounter and vital signs as below have been reviewed. BP (!) 159/85   Pulse 60   Temp 98.8 °F (37.1 °C) (Temporal)   Resp 18   Ht 5' 6\" (1.676 m)   Wt 140 lb (63.5 kg)   SpO2 100%   BMI 22.60 kg/m²   Oxygen Saturation Interpretation: Normal      ---------------------------------------------------PHYSICAL EXAM--------------------------------------    Physical Exam  Vitals reviewed. Constitutional:       General: She is not in acute distress. Appearance: Normal appearance. She is not toxic-appearing. HENT:      Head: Normocephalic and atraumatic. Right Ear: External ear normal.      Left Ear: External ear normal.      Nose: Nose normal. No congestion. Mouth/Throat:      Mouth: Mucous membranes are moist.      Pharynx: Oropharynx is clear. No posterior oropharyngeal erythema. Eyes:      Extraocular Movements: Extraocular movements intact. Pupils: Pupils are equal, round, and reactive to light. Cardiovascular:      Rate and Rhythm: Normal rate and regular rhythm. Pulses: Normal pulses. Heart sounds: No murmur heard.      Pulmonary:      Effort: Pulmonary effort is normal.      Breath sounds: No wheezing or rhonchi. Chest:      Chest wall: No tenderness. Abdominal:      General: Bowel sounds are normal.      Tenderness: There is no abdominal tenderness. There is no right CVA tenderness, left CVA tenderness or guarding. Musculoskeletal:         General: No swelling, tenderness or deformity. Cervical back: Normal range of motion and neck supple. No muscular tenderness. Right lower leg: No edema. Left lower leg: No edema. Skin:     General: Skin is warm and dry. Capillary Refill: Capillary refill takes less than 2 seconds. Coloration: Skin is not pale. Findings: No erythema. Neurological:      General: No focal deficit present. Mental Status: She is alert and oriented to person, place, and time. Sensory: No sensory deficit. Motor: No weakness. Coordination: Coordination normal.   Psychiatric:         Mood and Affect: Mood normal.         Behavior: Behavior normal.               ------------------------------ ED COURSE/MEDICAL DECISION MAKING----------------------  Medications   aspirin tablet 325 mg (325 mg Oral Given 21)     EKG: This EKG is signed and interpreted by me.     [id number]  Rate: 62  Rhythm: Sinus arrythmia  Interpretation: IRBBB, LAFB, no acute ischemic, LVH qtc 450ms  Comparison: stable as compared to patient's most recent EKG 10-AUG-2021 09:20:58      ED COURSE:  ED Course as of Aug 29 2116   Sun Aug 29, 2021   2106 D/w dr Lisa Blanco will admit    [JOSY]      ED Course User Index  [JOSY] Garcia Mota DO       HEART Score For Major Cardiac Events  (Max Score 10 Points)  HISTORY       []   Slightly Suspicious  0       [x]   Moderately Suspicious  +1       []   Highly Suspicious  +2    EK point: No ST depression but LBBB, LVH repolarization changes (ex:digoxin);               2 points: ST depression/elevation not due to LBBB, LVH or digoxin         []   Normal  0       [x]   Nonspecific Repolarization Disturbance  +1       []   Significant ST Depression  +2    AGE       []   <45  0       []   45-64  +1       [x]    >65  +2    RISK FACTORS:  1. HTN    2. Hypercholesterolemia    3. DM     4. Cigarette smoking (current or cessation < 3 mos)    5. Positive family history  (parent or sibling with CVD before age 72). 6. Obesity (BMI >30kg/m2)         []   No Risk factors Known  0       []   1-2 Risk Factors  +1       [x]   >3 Risk Factors or History of Atherosclerotic Disease  +2      INITIAL TROPONIN       [x]   < Normal Limit   0       []   1-3 x Normal Limit   +1       []   >3 x Normal Limit   +2     -----------------------------------------------------------------------------------------------------------------  SCORE TOTAL:  6 POINTS     Low Score          (0-3 Points), risk of MACE of 0.9-1.7% (discuss d/c home with f/u)  Moderate Score (4-6 Points), risk of MACE of 12-16.6% (discuss admission for        further testing)  High Score         (7-10 Points), risk of MACE of 50-65% (Admit ALL as they are        candidates for early invasive measures)      Medical Decision Making:   She has had intermittent left-sided chest pain. She is 80years old. She had a previous cardiac cath that showed diffuse mild disease in 2019. Her heart score is high risk. EKG today shows no acute ischemia. However cannot rule out anginal equivalent. She may have worsening disease. Likely will need admitted and stress test.  Discussed with hospitalist who will admit. Counseling: The emergency provider has spoken with the patient and discussed todays results, in addition to providing specific details for the plan of care and counseling regarding the diagnosis and prognosis. Questions are answered at this time and they are agreeable with the plan.      --------------------------------- IMPRESSION AND DISPOSITION ---------------------------------    IMPRESSION  1.  Chest pain, unspecified type DISPOSITION  Disposition: Admit to telemetry  Patient condition is fair      NOTE: This report was transcribed using voice recognition software.  Every effort was made to ensure accuracy; however, inadvertent computerized transcription errors may be present       Milad Schaeffer DO  08/29/21 [id number]

## 2021-08-30 ENCOUNTER — APPOINTMENT (OUTPATIENT)
Dept: NON INVASIVE DIAGNOSTICS | Age: 86
End: 2021-08-30
Payer: MEDICARE

## 2021-08-30 ENCOUNTER — APPOINTMENT (OUTPATIENT)
Dept: NUCLEAR MEDICINE | Age: 86
End: 2021-08-30
Payer: MEDICARE

## 2021-08-30 LAB
BACTERIA: ABNORMAL /HPF
BILIRUBIN URINE: NEGATIVE
BLOOD, URINE: ABNORMAL
CLARITY: ABNORMAL
COLOR: YELLOW
EPITHELIAL CELLS, UA: ABNORMAL /HPF
GLUCOSE URINE: NEGATIVE MG/DL
KETONES, URINE: NEGATIVE MG/DL
LEUKOCYTE ESTERASE, URINE: ABNORMAL
LV EF: 38 %
LVEF MODALITY: NORMAL
NITRITE, URINE: NEGATIVE
PH UA: 8.5 (ref 5–9)
PROTEIN UA: NEGATIVE MG/DL
RBC UA: ABNORMAL /HPF (ref 0–2)
SPECIFIC GRAVITY UA: 1.01 (ref 1–1.03)
TROPONIN, HIGH SENSITIVITY: 17 NG/L (ref 0–9)
TROPONIN, HIGH SENSITIVITY: 18 NG/L (ref 0–9)
UROBILINOGEN, URINE: 0.2 E.U./DL
WBC UA: ABNORMAL /HPF (ref 0–5)

## 2021-08-30 PROCEDURE — 6360000002 HC RX W HCPCS: Performed by: INTERNAL MEDICINE

## 2021-08-30 PROCEDURE — G0378 HOSPITAL OBSERVATION PER HR: HCPCS

## 2021-08-30 PROCEDURE — 93017 CV STRESS TEST TRACING ONLY: CPT

## 2021-08-30 PROCEDURE — 36415 COLL VENOUS BLD VENIPUNCTURE: CPT

## 2021-08-30 PROCEDURE — 6370000000 HC RX 637 (ALT 250 FOR IP): Performed by: INTERNAL MEDICINE

## 2021-08-30 PROCEDURE — 84484 ASSAY OF TROPONIN QUANT: CPT

## 2021-08-30 PROCEDURE — 81001 URINALYSIS AUTO W/SCOPE: CPT

## 2021-08-30 PROCEDURE — 78452 HT MUSCLE IMAGE SPECT MULT: CPT

## 2021-08-30 PROCEDURE — A9500 TC99M SESTAMIBI: HCPCS | Performed by: RADIOLOGY

## 2021-08-30 PROCEDURE — 3430000000 HC RX DIAGNOSTIC RADIOPHARMACEUTICAL: Performed by: RADIOLOGY

## 2021-08-30 RX ORDER — AMLODIPINE BESYLATE 5 MG/1
5 TABLET ORAL ONCE
Status: COMPLETED | OUTPATIENT
Start: 2021-08-30 | End: 2021-08-30

## 2021-08-30 RX ADMIN — APIXABAN 5 MG: 5 TABLET, FILM COATED ORAL at 00:45

## 2021-08-30 RX ADMIN — AMLODIPINE BESYLATE 5 MG: 5 TABLET ORAL at 00:45

## 2021-08-30 RX ADMIN — REGADENOSON 0.4 MG: 0.08 INJECTION, SOLUTION INTRAVENOUS at 14:20

## 2021-08-30 RX ADMIN — LISINOPRIL 5 MG: 5 TABLET ORAL at 08:18

## 2021-08-30 RX ADMIN — Medication 12 MILLICURIE: at 12:50

## 2021-08-30 RX ADMIN — Medication 32 MILLICURIE: at 14:25

## 2021-08-30 RX ADMIN — APIXABAN 5 MG: 5 TABLET, FILM COATED ORAL at 08:18

## 2021-08-30 RX ADMIN — APIXABAN 5 MG: 5 TABLET, FILM COATED ORAL at 22:02

## 2021-08-30 ASSESSMENT — PAIN SCALES - GENERAL: PAINLEVEL_OUTOF10: 0

## 2021-08-30 NOTE — PROGRESS NOTES
Paged Dr Martín Perry regarding patient being in [facility] due to discharge order if patient's stress test results are negative.       1920- cancel discharge per dr Martín Perry due to patient being in [facility] R[facility]

## 2021-08-30 NOTE — H&P
[facility] Internal Medicine  History and Physical      CHIEF COMPLAINT: Chest pain    Reason for Admission: Chest pain    History Obtained From: Patient    PCP :  Terrie Hall MD    [address] / [place] [address]      HISTORY OF PRESENT ILLNESS:      The patient is a 80 y.o. female presented to the emergency room with left lower chest wall pain. Currently she is chest pain-free. She does have a history of mild coronary artery disease on a cardiac catheterization in 2019. At that time no intervention was needed. She also has a history of paroxysmal atrial fibrillation. Patient was then admitted for further evaluation and treatment. Past Medical History:        Diagnosis Date    Arthritis     [facility]     Hx of blood clots     Hypertension     Ischemic colitis ([facility] [address] [facility]     [facility])      Past Surgical History:        Procedure Laterality Date    ANKLE FRACTURE SURGERY      CARDIAC CATHETERIZATION  11/11/2019    Dr. Rigoberto Bernal, COLON, DIAGNOSTIC      FRACTURE SURGERY           Medications Prior to Admission:    Medications Prior to Admission: aspirin 81 MG EC tablet, Take 81 mg by mouth daily  LISINOPRIL PO, Take by mouth daily Pt doesn't remember the dose  acetaminophen (TYLENOL) 500 MG tablet, Take 2 tablets by mouth every 8 hours as needed for Pain  apixaban (ELIQUIS) 5 MG TABS tablet, Take 5 mg by mouth 2 times daily    Allergies:  Nifedipine    Social History:   Social History     Socioeconomic History    Marital status:       Spouse name: Not on file    Number of children: Not on file    Years of education: Not on file    Highest education level: Not on file   Occupational History    Not on file   Tobacco Use    Smoking status: Never Smoker    Smokeless tobacco: Never Used   Vaping Use    Vaping Use: Never used   Substance and Sexual Activity    Alcohol use: No    Drug use: No    Sexual activity: Not on file   Other Topics Concern    Not on file   Social History Narrative    Not on file     Social Determinants of Health     Financial Resource Strain:     Difficulty of Paying Living Expenses:    Food Insecurity:     Worried About Running Out of Food in the Last Year:     [address] in the Last Year:    Transportation Needs:     Lack of Transportation (Medical):  Lack of Transportation (Non-Medical):    Physical Activity:     Days of Exercise per Week:     Minutes of Exercise per Session:    Stress:     Feeling of Stress :    Social Connections:     Frequency of Communication with Friends and Family:     Frequency of Social Gatherings with Friends and Family:     Attends Jainism Services:     Active Member of Clubs or Organizations:     Attends Club or Organization Meetings:     Marital Status:    Intimate Partner Violence:     Fear of Current or Ex-Partner:     Emotionally Abused:     Physically Abused:     Sexually Abused:          Family History:   History reviewed. No pertinent family history. REVIEW OF SYSTEMS:    General ROS: negative  Hematological and Lymphatic ROS: negative  Endocrine ROS: negative  Respiratory ROS: no cough,  wheezing  or shortness of breath,   Cardiovascular ROS: Positive for chest pain  Gastrointestinal ROS: no abdominal pain, change in bowel habits, or black or bloody stools  Genito-Urinary ROS: no dysuria, trouble voiding, or hematuria  Neurological ROS: no TIA or stroke symptoms  negative    Vitals:  BP (!) 166/81   Pulse 54   Temp 97.6 °F (36.4 °C) (Temporal)   Resp 18   Ht 5' 5\" (1.651 m)   Wt 138 lb 6.4 oz (62.8 kg)   SpO2 100%   BMI 23.03 kg/m²     PHYSICAL EXAM:  General:  Awake, alert, oriented X 3. Well developed, well nourished, well groomed. No apparent distress. HEENT:  Normocephalic, atraumatic. Pupils equal, round, reactive to light. No scleral icterus. No conjunctival injection.    Neck:  Supple, no carotid bruits  Heart:  RRR,   Lungs:  CTA bilaterally, bilat symmetrical expansion, no wheeze, rales, or rhonchi  Abdomen:   Bowel sounds present, soft, nontender, no masses, no organomegaly, no peritoneal signs  Extremities:  No clubbing, cyanosis, or edema  Skin:  Warm and dry, no open lesions or rash  Neuro:  Cranial nerves 2-12 intact, no focal deficits      DATA:     Recent Results (from the past 24 hour(s))   EKG 12 Lead    Collection Time: 08/29/21  7:55 PM   Result Value Ref Range    Ventricular Rate 62 BPM    Atrial Rate 62 BPM    P-R Interval 152 ms    QRS Duration 100 ms    Q-T Interval 444 ms    QTc Calculation (Bazett) 450 ms    P Axis 37 degrees    R Axis -44 degrees    T Axis 10 degrees   CBC Auto Differential    Collection Time: 08/29/21  8:04 PM   Result Value Ref Range    WBC 5.8 4.5 - 11.5 E9/L    RBC 4.36 3.50 - 5.50 E12/L    Hemoglobin 13.2 11.5 - 15.5 g/dL    Hematocrit 39.1 34.0 - 48.0 %    MCV 89.7 80.0 - 99.9 fL    MCH 30.3 26.0 - 35.0 pg    MCHC 33.8 32.0 - 34.5 %    RDW 14.2 11.5 - 15.0 fL    Platelets 799 400 - 130 E9/L    MPV 9.3 7.0 - 12.0 fL    Neutrophils % 55.1 43.0 - 80.0 %    Immature Granulocytes % 0.3 0.0 - 5.0 %    Lymphocytes % 28.9 20.0 - 42.0 %    Monocytes % 10.6 2.0 - 12.0 %    Eosinophils % 4.2 0.0 - 6.0 %    Basophils % 0.9 0.0 - 2.0 %    Neutrophils Absolute 3.17 1.80 - 7.30 E9/L    Immature Granulocytes # 0.02 E9/L    Lymphocytes Absolute 1.66 1.50 - 4.00 E9/L    Monocytes Absolute 0.61 0.10 - 0.95 E9/L    Eosinophils Absolute 0.24 0.05 - 0.50 E9/L    Basophils Absolute 0.05 0.00 - 0.20 E9/L   Comprehensive Metabolic Panel w/ Reflex to MG    Collection Time: 08/29/21  8:04 PM   Result Value Ref Range    Sodium 142 132 - 146 mmol/L    Potassium reflex Magnesium 3.6 3.5 - 5.0 mmol/L    Chloride 105 98 - 107 mmol/L    CO2 28 22 - 29 mmol/L    Anion Gap 9 7 - 16 mmol/L    Glucose 99 74 - 99 mg/dL    BUN 15 6 - 23 mg/dL    CREATININE 0.9 0.5 - 1.0 mg/dL    GFR Non-African American >60 >=60 mL/min/1.73    GFR African American >60     Calcium 9.2 8.6 - 10.2 mg/dL    Total Protein 6.7 6.4 - 8.3 g/dL    Albumin 3.3 (L) 3.5 - 5.2 g/dL    Total Bilirubin 0.7 0.0 - 1.2 mg/dL    Alkaline Phosphatase 74 35 - 104 U/L    ALT 21 0 - 32 U/L    AST 26 0 - 31 U/L   Troponin    Collection Time: 08/29/21  8:04 PM   Result Value Ref Range    Troponin, High Sensitivity 17 (H) 0 - 9 ng/L   Brain Natriuretic Peptide    Collection Time: 08/29/21  8:04 PM   Result Value Ref Range    Pro- 0 - 450 pg/mL   Protime-INR    Collection Time: 08/29/21  8:04 PM   Result Value Ref Range    Protime 12.7 (H) 9.3 - 12.4 sec    INR 1.1    COVID-19, Rapid    Collection Time: 08/29/21  8:13 PM    Specimen: Nasopharyngeal Swab   Result Value Ref Range    SARS-CoV-2, NAAT Not Detected Not Detected   Troponin    Collection Time: 08/29/21  9:24 PM   Result Value Ref Range    Troponin, High Sensitivity 17 (H) 0 - 9 ng/L   Troponin    Collection Time: 08/30/21 12:20 AM   Result Value Ref Range    Troponin, High Sensitivity 18 (H) 0 - 9 ng/L   Troponin    Collection Time: 08/30/21  4:29 AM   Result Value Ref Range    Troponin, High Sensitivity 17 (H) 0 - 9 ng/L   Urinalysis    Collection Time: 08/30/21 10:00 AM   Result Value Ref Range    Color, UA Yellow Straw/Yellow    Clarity, UA SL CLOUDY Clear    Glucose, Ur Negative Negative mg/dL    Bilirubin Urine Negative Negative    Ketones, Urine Negative Negative mg/dL    Specific Gravity, UA 1.015 1.005 - 1.030    Blood, Urine SMALL (A) Negative    pH, UA 8.5 5.0 - 9.0    Protein, UA Negative Negative mg/dL    Urobilinogen, Urine 0.2 <2.0 E.U./dL    Nitrite, Urine Negative Negative    Leukocyte Esterase, Urine LARGE (A) Negative   Microscopic Urinalysis    Collection Time: 08/30/21 10:00 AM   Result Value Ref Range    WBC, UA 10-20 (A) 0 - 5 /HPF    RBC, UA 1-3 0 - 2 /HPF    Epithelial Cells, UA MODERATE /HPF    Bacteria, UA MANY (A) None Seen /HPF       XR CHEST PORTABLE   Final Result   No acute process. NM Cardiac Stress Test Nuclear Imaging    (Results Pending)           ASSESSMENT :      Active Problems:    Chest pain  Resolved Problems:    * No resolved hospital problems. *    Underlying history of paroxysmal atrial fibrillation  Hypertension by history  Prior history of ischemic colitis  Chronic anticoagulation because of atrial fibrillation  Mild coronary artery disease on heart cath in the past    Plan :    No definitive upward trend in troponin  Cardiology following  Plan is for stress test today      Electronically signed by Keira Miller MD on 8/30/2021 at 11:04 AM    NOTE: This report was transcribed using voice recognition software.  Every effort was made to ensure accuracy; however, inadvertent transcription errors may be present

## 2021-08-30 NOTE — CARE COORDINATION
Attempted to meet with pt. Off the floor at testing. Second attempt made to see pt. Remain off the floor.

## 2021-08-31 VITALS
SYSTOLIC BLOOD PRESSURE: 152 MMHG | TEMPERATURE: 98.1 F | WEIGHT: 138.4 LBS | RESPIRATION RATE: 16 BRPM | OXYGEN SATURATION: 100 % | DIASTOLIC BLOOD PRESSURE: 87 MMHG | HEART RATE: 66 BPM | HEIGHT: 65 IN | BODY MASS INDEX: 23.06 KG/M2

## 2021-08-31 LAB
LV EF: 38 %
LVEF MODALITY: NORMAL

## 2021-08-31 PROCEDURE — 93306 TTE W/DOPPLER COMPLETE: CPT

## 2021-08-31 PROCEDURE — 6370000000 HC RX 637 (ALT 250 FOR IP): Performed by: INTERNAL MEDICINE

## 2021-08-31 PROCEDURE — G0378 HOSPITAL OBSERVATION PER HR: HCPCS

## 2021-08-31 RX ORDER — AMIODARONE HYDROCHLORIDE 200 MG/1
200 TABLET ORAL DAILY
Qty: 30 TABLET | Refills: 1 | Status: SHIPPED | OUTPATIENT
Start: 2021-09-01 | End: 2021-08-31

## 2021-08-31 RX ORDER — AMIODARONE HYDROCHLORIDE 200 MG/1
200 TABLET ORAL DAILY
Qty: 30 TABLET | Refills: 1 | Status: ON HOLD | OUTPATIENT
Start: 2021-09-01 | End: 2021-11-10 | Stop reason: HOSPADM

## 2021-08-31 RX ORDER — AMIODARONE HYDROCHLORIDE 200 MG/1
200 TABLET ORAL DAILY
Status: DISCONTINUED | OUTPATIENT
Start: 2021-08-31 | End: 2021-08-31 | Stop reason: HOSPADM

## 2021-08-31 RX ADMIN — AMIODARONE HYDROCHLORIDE 200 MG: 200 TABLET ORAL at 08:17

## 2021-08-31 RX ADMIN — APIXABAN 5 MG: 5 TABLET, FILM COATED ORAL at 08:17

## 2021-08-31 RX ADMIN — LISINOPRIL 5 MG: 5 TABLET ORAL at 08:17

## 2021-08-31 ASSESSMENT — PAIN SCALES - GENERAL
PAINLEVEL_OUTOF10: 0

## 2021-08-31 NOTE — PLAN OF CARE
Problem: Falls - Risk of:  Goal: Will remain free from falls  Description: Will remain free from falls  Outcome: Met This Shift     Problem: Pain:  Goal: Pain level will decrease  Description: Pain level will decrease  Outcome: Met This Shift     Problem: Pain:  Goal: Control of acute pain  Description: Control of acute pain  Outcome: Met This Shift     Problem: Pain:  Goal: Control of chronic pain  Description: Control of chronic pain  Outcome: Met This Shift

## 2021-08-31 NOTE — CARE COORDINATION
Message sent echo department requesting echo be completed today. Met with pt in the room. Role of  and transition of care discussed. Pt lives in a 2 story home with her son. Pt is independent for ADL's, uses a cane, also has a walker that she does not use, no other DME, no hx of HHC/GAIL. Pt plans to return home and is requesting HHC. Pt prefers [facility]. [id number] [facility] has accepted pt will need NEW HHC ORDERS. PCP Dr Lul Whalen. [facility].

## 2021-08-31 NOTE — CONSULTS
CARDIOLOGY CONSULTATION    Patient Name:  Misael Palomares    :  1931    Reason for Consultation:   Atrial fibrillation chest pain    History of Present Illness:   Misael Palomares returns to [address], following history of sudden near loss of consciousness and apparent bradycardia as well as the sudden onset of a severe sharp pain in her right upper chest..  Is a longstanding history of hypertension as well as recurrent persistent atrial fibrillation has been on polypharmacy leading carvedilol. She recently underwent cardiac catheterization 2019 which demonstrated noncritical coronary artery disease. Likewise her two-dimensional echocardiogram obtained in 2019 adjusted minimal global left ventricular hypokinesis and an apparent abnormal bubble study. She is now readmitted for further observation and adjustment of her medical regimen. Past Medical History:   has a past medical history of Arthritis, Atrial fibrillation ([address] Hx of blood clots, Hypertension, Ischemic colitis ([address] and PAF (paroxysmal atrial fibrillation) ([facility] [address] Surgical History:   has a past surgical history that includes Ankle fracture surgery; Colonoscopy; Endoscopy, colon, diagnostic; fracture surgery; and Cardiac catheterization (2019). Social History:   reports that she has never smoked. She has never used smokeless tobacco. She reports that she does not drink alcohol and does not use drugs. Family History:  Remarkable for - Mother  secondary CVA. Father  secondary to myocardial infarction. Medications:  See medication reconciliation list    Allergies:  Nifedipine     Review of Systems:   · Constitutional: (+) near LOC; there has been no unanticipated weight loss. There's been no significant change in energy level, sleep pattern or activity level. No fever chills or rigors. · Eyes: No visual changes or diplopia. No scleral icterus.   · ENT: No Headaches, hearing loss or vertigo. No mouth sores or sore throat. No change in taste or smell. · Cardiovascular: (+) palpitations; No chest discomfort, dyspnea on exertion, loss of consciousness, no phlebitis, no claudication. · Respiratory: No cough or wheezing, no sputum production. No hemoptysis, pleuritic pain. · Gastrointestinal: No abdominal pain, appetite loss, blood in stools. No change in bowel habits. No hematemesis  · Genitourinary: No dysuria, trouble voiding or hematuria. No nocturia or increased frequency. · Musculoskeletal:  No gait disturbance, weakness or joint complaints. · Integumentary: No rash or pruritis. · Neurological: No headache, diplopia, change in muscle strength, numbness or tingling. No change in gait, balance, coordination, mood, affect, memory, mentation, behavior. · Psychiatric: No anxiety or depression. · Endocrine: No temperature intolerance. No excessive thirst, fluid intake, or urination. No tremor. · Hematologic/Lymphatic: No abnormal bruising or bleeding, blood clots or swollen lymph nodes. · Allergic/Immunologic: No nasal congestion or hives. Physical Examination:    Vital Signs: /85   Pulse 64   Temp 97.6 °F (36.4 °C) (Temporal)   Resp 14   Ht 5' 5\" (1.651 m)   Wt 138 lb (62.6 kg) Comment: bedscale per RD  SpO2 97%   BMI 22.96 kg/m²   General appearance: Well preserved, mesomorphic body habitus, alert, no distress. Skin: Skin color, texture, turgor normal. No rashes or lesions. No induration or tightening palpated. Head: Normocephalic. No masses, lesions, tenderness or abnormalities  Eyes: Conjunctivae/corneas clear. PERRL, EOMs intact. Sclera non icteric. Ears: External ears normal. Canals clear. TM's clear bilaterally. Hearing normal to finger rub. Nose/Sinuses: Nares normal. Septum midline. Mucosa normal. No drainage or sinus tenderness. Oropharynx: Lips, mucosa, and tongue normal. Oropharynx clear with no exudate seen.   Neck: Neck supple and symmetric. No adenopathy. Thyroid symmetric, normal size, without nodules. Trachea is midline. Carotids brisk in upstroke without bruits, no abnormal JVP noted at 45°. Chest: Even excursion  Lungs: Lungs clear to auscultation bilaterally. No retractions or use of accessory muscles. No tactile vocal fremitus. No rhonchi, crackles or rales. Heart:  S1 > S2. Regular irregular rhythm. frequent ectopy S4 gallop or grade 2/6 early systolic murmur LUSB: No rub, palpable thrill or heave noted. PMI 5th intercostal space midclavicular line. Abdomen: Abdomen soft, scaphoid, non-tender. BS normal. No masses, organomegaly. No hernia noted. Extremities: Extremities normal. No deformities, edema, or skin discoloration. No cyanosis or clubbing noted to the nails. Peripheral pulses present 2+ upper extremities and present 2+  lower extremities. Musculoskeletal: Spine ROM normal. Muscular strength intact. Neuro: Cranial nerves intact. Motor: Strength 5/5 in all extremities. Reflexes 2+ in all extremities. No focal weakness. Sensory: grossly normal to touch. Coordination intact. Pertinent Labs:  CBC:   Recent Labs     08/29/21 2004   WBC 5.8   HGB 13.2        BMP:  Recent Labs     08/29/21 2004      K 3.6      CO2 28   BUN 15   CREATININE 0.9   GLUCOSE 99   LABGLOM >60     ABGs: No results found for: PH, PO2, PCO2  INR:   Recent Labs     08/29/21 2004   INR 1.1     PRO-BNP:   Lab Results   Component Value Date    PROBNP 242 08/29/2021    PROBNP 177 08/10/2021      Cardiac Injury Profile:   No results for input(s): CKTOTAL, CKMB, CKMBINDEX, TROPONINI in the last 72 hours.    Lipid Profile:   Lab Results   Component Value Date    TRIG 44 11/07/2019    HDL 70 11/07/2019    LDLCALC 107 11/07/2019    CHOL 186 11/07/2019      Hemoglobin A1C: No components found for: HGBA1C   ECG:  See report    Radiology:  Ct Head Wo Contrast    Result Date: 2/12/2020  Atrophy and chronic microvascular ischemic disease. Xr Chest Portable    Result Date: 2/12/2020  Cardiomegaly. Borderline pulmonary vascular congestion. Assessment:    Active Problems:    Chest pain  Resolved Problems:    * No resolved hospital problems. *      Plan:  It appears that Mrs. Rebeca Garcia has the equivalent of tachy-bradycardia syndrome and that while she is in sinus rhythm with intermittent 4 beat supraventricular salvos her baseline rate is significantly bradycardic enhanced by beta-blocker. Thus, will hold beta-blocker presently and if heart rate does not improve and/or she develops atrial fibrillation a rapid response, would then consider a permanent pacemaker appropriate antiarrhythmic medications in addition to continued chronic anticoagulation. Will need to readjust her hypertensive medications and check her for orthostatic changes. Monitor renal function and electrolytes. utilize appropriate antiarrhythmic agent to at least decrease heart rate while in atrial fibrillation. I have spent more than 45 minutes face to face with Severino Joy reviewing notes and laboratory data with greater than 50% of this time instructing and counseling the patient  regarding my findings and recommendations and I have answered all questions as posed to me by Ms. Rebeca Garcia. Thank you, Kely Matta MD and Mireya An MD for allowing me to consult in the care of this patient. Claudette Shin DO DO, FACP, [facility] [place], [facility]    NOTE:  This report was transcribed using voice recognition software. Every effort was made to ensure accuracy; however, inadvertent computerized transcription errors may be present.

## 2021-09-03 NOTE — HOME CARE
CALLED OUT TO PATIENT WHO STATES SHE HASN'T SEEN DR. Stacie Dwyer IN A LONG TIME AND TO CALL HER DAUGHTER THOMPSON CALLED OUT TO HER DAUGHTER AND SHE STATES THERE WAS A PROBLEM WITH THIS DOCTOR AND HE WOULDN'T TAKE HER BACK. PATIENT HAS NO PCP  EXPLAINED WITH NO PCP PATIENT UNABLE TO HAVE HHC. EXPLAINED THIS REFERRAL WOULD BE CX AND IF SHE ESTABLISHES WITH A PCP THEY CAN SEND HHC REFERRAL.      Fariha Barber LPN   [facility] [address]

## 2021-09-08 ENCOUNTER — APPOINTMENT (OUTPATIENT)
Dept: CT IMAGING | Age: 86
End: 2021-09-08
Payer: MEDICARE

## 2021-09-08 ENCOUNTER — APPOINTMENT (OUTPATIENT)
Dept: GENERAL RADIOLOGY | Age: 86
End: 2021-09-08
Payer: MEDICARE

## 2021-09-08 ENCOUNTER — HOSPITAL ENCOUNTER (EMERGENCY)
Age: 86
Discharge: HOME | End: 2021-09-08
Attending: EMERGENCY MEDICINE
Payer: MEDICARE

## 2021-09-08 VITALS
HEIGHT: 65 IN | OXYGEN SATURATION: 99 % | SYSTOLIC BLOOD PRESSURE: 162 MMHG | HEART RATE: 82 BPM | BODY MASS INDEX: 20.49 KG/M2 | RESPIRATION RATE: 18 BRPM | WEIGHT: 123 LBS | TEMPERATURE: 99.1 F | DIASTOLIC BLOOD PRESSURE: 100 MMHG

## 2021-09-08 DIAGNOSIS — R42 DIZZINESS: ICD-10-CM

## 2021-09-08 DIAGNOSIS — R55 NEAR SYNCOPE: Primary | ICD-10-CM

## 2021-09-08 DIAGNOSIS — K59.00 CONSTIPATION, UNSPECIFIED CONSTIPATION TYPE: ICD-10-CM

## 2021-09-08 LAB
ALBUMIN SERPL-MCNC: 3.3 G/DL (ref 3.5–5.2)
ALP BLD-CCNC: 63 U/L (ref 35–104)
ALT SERPL-CCNC: 15 U/L (ref 0–32)
AMPHETAMINE SCREEN, URINE: NOT DETECTED
ANION GAP SERPL CALCULATED.3IONS-SCNC: 8 MMOL/L (ref 7–16)
AST SERPL-CCNC: 27 U/L (ref 0–31)
BACTERIA: ABNORMAL /HPF
BARBITURATE SCREEN URINE: NOT DETECTED
BASOPHILS ABSOLUTE: 0.01 E9/L (ref 0–0.2)
BASOPHILS RELATIVE PERCENT: 0.2 % (ref 0–2)
BENZODIAZEPINE SCREEN, URINE: NOT DETECTED
BILIRUB SERPL-MCNC: 0.4 MG/DL (ref 0–1.2)
BILIRUBIN URINE: NEGATIVE
BLOOD, URINE: ABNORMAL
BUN BLDV-MCNC: 9 MG/DL (ref 6–23)
CALCIUM SERPL-MCNC: 8.5 MG/DL (ref 8.6–10.2)
CANNABINOID SCREEN URINE: NOT DETECTED
CHLORIDE BLD-SCNC: 101 MMOL/L (ref 98–107)
CK MB: <1 NG/ML (ref 0–4.3)
CLARITY: ABNORMAL
CO2: 27 MMOL/L (ref 22–29)
COCAINE METABOLITE SCREEN URINE: NOT DETECTED
COLOR: YELLOW
CREAT SERPL-MCNC: 1 MG/DL (ref 0.5–1)
EOSINOPHILS ABSOLUTE: 0.01 E9/L (ref 0.05–0.5)
EOSINOPHILS RELATIVE PERCENT: 0.2 % (ref 0–6)
EPITHELIAL CELLS, UA: ABNORMAL /HPF
FENTANYL SCREEN, URINE: NOT DETECTED
GFR AFRICAN AMERICAN: >60
GFR NON-AFRICAN AMERICAN: >60 ML/MIN/1.73
GLUCOSE BLD-MCNC: 111 MG/DL (ref 74–99)
GLUCOSE URINE: NEGATIVE MG/DL
HCT VFR BLD CALC: 44.9 % (ref 34–48)
HEMOGLOBIN: 14.6 G/DL (ref 11.5–15.5)
IMMATURE GRANULOCYTES #: 0.01 E9/L
IMMATURE GRANULOCYTES %: 0.2 % (ref 0–5)
KETONES, URINE: 15 MG/DL
LEUKOCYTE ESTERASE, URINE: ABNORMAL
LYMPHOCYTES ABSOLUTE: 1.1 E9/L (ref 1.5–4)
LYMPHOCYTES RELATIVE PERCENT: 25 % (ref 20–42)
Lab: NORMAL
MCH RBC QN AUTO: 29.2 PG (ref 26–35)
MCHC RBC AUTO-ENTMCNC: 32.5 % (ref 32–34.5)
MCV RBC AUTO: 89.8 FL (ref 80–99.9)
METHADONE SCREEN, URINE: NOT DETECTED
MONOCYTES ABSOLUTE: 0.49 E9/L (ref 0.1–0.95)
MONOCYTES RELATIVE PERCENT: 11.1 % (ref 2–12)
NEUTROPHILS ABSOLUTE: 2.78 E9/L (ref 1.8–7.3)
NEUTROPHILS RELATIVE PERCENT: 63.3 % (ref 43–80)
NITRITE, URINE: NEGATIVE
OPIATE SCREEN URINE: NOT DETECTED
OXYCODONE URINE: NOT DETECTED
PDW BLD-RTO: 14.4 FL (ref 11.5–15)
PH UA: 7.5 (ref 5–9)
PHENCYCLIDINE SCREEN URINE: NOT DETECTED
PLATELET # BLD: 163 E9/L (ref 130–450)
PMV BLD AUTO: 9.8 FL (ref 7–12)
POTASSIUM SERPL-SCNC: 4.3 MMOL/L (ref 3.5–5)
PROTEIN UA: ABNORMAL MG/DL
RBC # BLD: 5 E12/L (ref 3.5–5.5)
RBC UA: ABNORMAL /HPF (ref 0–2)
SODIUM BLD-SCNC: 136 MMOL/L (ref 132–146)
SPECIFIC GRAVITY UA: 1.02 (ref 1–1.03)
TOTAL CK: 57 U/L (ref 20–180)
TOTAL PROTEIN: 7.1 G/DL (ref 6.4–8.3)
TROPONIN, HIGH SENSITIVITY: 16 NG/L (ref 0–9)
TROPONIN, HIGH SENSITIVITY: 21 NG/L (ref 0–9)
UROBILINOGEN, URINE: 0.2 E.U./DL
WBC # BLD: 4.4 E9/L (ref 4.5–11.5)
WBC UA: ABNORMAL /HPF (ref 0–5)

## 2021-09-08 PROCEDURE — 72125 CT NECK SPINE W/O DYE: CPT

## 2021-09-08 PROCEDURE — 82553 CREATINE MB FRACTION: CPT

## 2021-09-08 PROCEDURE — 74177 CT ABD & PELVIS W/CONTRAST: CPT

## 2021-09-08 PROCEDURE — 93005 ELECTROCARDIOGRAM TRACING: CPT | Performed by: PHYSICIAN ASSISTANT

## 2021-09-08 PROCEDURE — 85025 COMPLETE CBC W/AUTO DIFF WBC: CPT

## 2021-09-08 PROCEDURE — 2580000003 HC RX 258: Performed by: RADIOLOGY

## 2021-09-08 PROCEDURE — 6360000004 HC RX CONTRAST MEDICATION: Performed by: RADIOLOGY

## 2021-09-08 PROCEDURE — 82550 ASSAY OF CK (CPK): CPT

## 2021-09-08 PROCEDURE — 99284 EMERGENCY DEPT VISIT MOD MDM: CPT

## 2021-09-08 PROCEDURE — 71046 X-RAY EXAM CHEST 2 VIEWS: CPT

## 2021-09-08 PROCEDURE — 70450 CT HEAD/BRAIN W/O DYE: CPT

## 2021-09-08 PROCEDURE — 80053 COMPREHEN METABOLIC PANEL: CPT

## 2021-09-08 PROCEDURE — 6370000000 HC RX 637 (ALT 250 FOR IP): Performed by: STUDENT IN AN ORGANIZED HEALTH CARE EDUCATION/TRAINING PROGRAM

## 2021-09-08 PROCEDURE — 80307 DRUG TEST PRSMV CHEM ANLYZR: CPT

## 2021-09-08 PROCEDURE — 81001 URINALYSIS AUTO W/SCOPE: CPT

## 2021-09-08 PROCEDURE — 84484 ASSAY OF TROPONIN QUANT: CPT

## 2021-09-08 RX ORDER — ACETAMINOPHEN 325 MG/1
650 TABLET ORAL ONCE
Status: COMPLETED | OUTPATIENT
Start: 2021-09-08 | End: 2021-09-08

## 2021-09-08 RX ORDER — SODIUM CHLORIDE 0.9 % (FLUSH) 0.9 %
10 SYRINGE (ML) INJECTION ONCE
Status: COMPLETED | OUTPATIENT
Start: 2021-09-08 | End: 2021-09-08

## 2021-09-08 RX ORDER — POLYETHYLENE GLYCOL 3350 17 G/17G
17 POWDER, FOR SOLUTION ORAL DAILY
Qty: 507 G | Refills: 0 | Status: SHIPPED | OUTPATIENT
Start: 2021-09-08 | End: 2021-10-08

## 2021-09-08 RX ADMIN — ACETAMINOPHEN 650 MG: 325 TABLET ORAL at 20:41

## 2021-09-08 RX ADMIN — IOPAMIDOL 90 ML: 755 INJECTION, SOLUTION INTRAVENOUS at 19:18

## 2021-09-08 RX ADMIN — SODIUM CHLORIDE, PRESERVATIVE FREE 10 ML: 5 INJECTION INTRAVENOUS at 20:42

## 2021-09-08 ASSESSMENT — PAIN SCALES - GENERAL: PAINLEVEL_OUTOF10: 5

## 2021-09-08 NOTE — ED PROVIDER NOTES
HPI:  9/8/21, Time: 2:27 PM EDT      *history obtained from son patient and chart review due to [address] Quintainlla is a 80 y.o. female presenting to the ED for altered mental status and syncope that began a few weeks ago. The complaint has been intermittent, moderate in severity, and worsened by changing position. Patient's son notes that her mother has been \"low on energy\" and sleeping a lot for a few weeks. Per patient she has been having on and off chest pains associated with shortness of breath. The episodes occur mostly at night and are similar to the pain she experienced on her previous admission. The pain does not radiate anywhere and is not worsened with exertion. Patient's syncopal symptoms are worsened with changes from sitting to standing specifically when she is getting on or off the toilet. Using the bathroom, specifically both urination and defecation also precipitate the episodes. Patient's oral intake per patient and son has also been very poor with patient losing \"a few pounds\" of weight. Patient denies fevers, night sweats, nausea vomiting, abdominal pain, extremity pain, numbness tingling, focal deficits, changes in vision or hearing, hematuria, bloody stools, or diarrhea. Patient is unsure if she fell or if she hit her head or body on anything. Patient also cannot recall how long she was unconscious for or how many events have occurred. ROS:   Pertinent positives and negatives are stated within HPI, all other systems reviewed and are negative.  --------------------------------------------- PAST HISTORY ---------------------------------------------  Past Medical History:  has a past medical history of Arthritis, Atrial fibrillation ([facility] [address] Hx of blood clots, Hypertension, Ischemic colitis ([facility] [address] and [facility] Past Surgical History:  has a past surgical history that includes Ankle fracture surgery;  Colonoscopy; Endoscopy, colon, diagnostic; fracture surgery; and Cardiac catheterization (11/11/2019). Social History:  reports that she has never smoked. She has never used smokeless tobacco. She reports that she does not drink alcohol and does not use drugs. Family History: family history is not on file. The patients home medications have been reviewed. Allergies: Nifedipine    ---------------------------------------------------PHYSICAL EXAM--------------------------------------  Constitutional/General: Alert and lethargic,  well appearing, non toxic in NAD  Head: Normocephalic and atraumatic  Eyes: PERRL, EOMI  Mouth: Oropharynx clear, handling secretions, no trismus  Neck: Supple, full ROM, non tender to palpation in the midline, no stridor, no crepitus, no meningeal signs  Pulmonary: Lungs clear to auscultation bilaterally, no wheezes, rales, or rhonchi. Not in respiratory distress  Cardiovascular:  Regular rate. Regular rhythm. No murmurs, gallops, or rubs. 2+ distal pulses  Chest: no chest wall tenderness  Abdomen: Soft. Non tender. Non distended. +BS. No rebound, guarding, or rigidity. No pulsatile masses appreciated. Musculoskeletal: Moves all extremities x 4. Warm and well perfused, no clubbing, cyanosis, or edema. Capillary refill <3 seconds  Skin: warm and dry. No rashes. Neurologic: GCS 15, CN 2-12 grossly intact, no focal deficits, symmetric strength 5/5 in the upper and lower extremities bilaterally  Psych: Normal Affect    -------------------------------------------------- RESULTS -------------------------------------------------  I have personally reviewed all laboratory and imaging results for this patient. Results are listed below.      LABS:  Results for orders placed or performed during the hospital encounter of 09/08/21   CBC Auto Differential   Result Value Ref Range    WBC 4.4 (L) 4.5 - 11.5 E9/L    RBC 5.00 3.50 - 5.50 E12/L    Hemoglobin 14.6 11.5 - 15.5 g/dL    Hematocrit 44.9 34.0 - 48.0 %    MCV 89.8 80.0 - 99.9 fL    MCH 29.2 26.0 - 35.0 pg    MCHC 32.5 32.0 - 34.5 %    RDW 14.4 11.5 - 15.0 fL    Platelets 722 887 - 012 E9/L    MPV 9.8 7.0 - 12.0 fL    Neutrophils % 63.3 43.0 - 80.0 %    Immature Granulocytes % 0.2 0.0 - 5.0 %    Lymphocytes % 25.0 20.0 - 42.0 %    Monocytes % 11.1 2.0 - 12.0 %    Eosinophils % 0.2 0.0 - 6.0 %    Basophils % 0.2 0.0 - 2.0 %    Neutrophils Absolute 2.78 1.80 - 7.30 E9/L    Immature Granulocytes # 0.01 E9/L    Lymphocytes Absolute 1.10 (L) 1.50 - 4.00 E9/L    Monocytes Absolute 0.49 0.10 - 0.95 E9/L    Eosinophils Absolute 0.01 (L) 0.05 - 0.50 E9/L    Basophils Absolute 0.01 0.00 - 0.20 E9/L   Comprehensive Metabolic Panel   Result Value Ref Range    Sodium 136 132 - 146 mmol/L    Potassium 4.3 3.5 - 5.0 mmol/L    Chloride 101 98 - 107 mmol/L    CO2 27 22 - 29 mmol/L    Anion Gap 8 7 - 16 mmol/L    Glucose 111 (H) 74 - 99 mg/dL    BUN 9 6 - 23 mg/dL    CREATININE 1.0 0.5 - 1.0 mg/dL    GFR Non-African American >60 >=60 mL/min/1.73    GFR African American >60     Calcium 8.5 (L) 8.6 - 10.2 mg/dL    Total Protein 7.1 6.4 - 8.3 g/dL    Albumin 3.3 (L) 3.5 - 5.2 g/dL    Total Bilirubin 0.4 0.0 - 1.2 mg/dL    Alkaline Phosphatase 63 35 - 104 U/L    ALT 15 0 - 32 U/L    AST 27 0 - 31 U/L   Troponin   Result Value Ref Range    Troponin, High Sensitivity 21 (H) 0 - 9 ng/L   Urinalysis   Result Value Ref Range    Color, UA Yellow Straw/Yellow    Clarity, UA SL CLOUDY Clear    Glucose, Ur Negative Negative mg/dL    Bilirubin Urine Negative Negative    Ketones, Urine 15 (A) Negative mg/dL    Specific Gravity, UA 1.020 1.005 - 1.030    Blood, Urine TRACE (A) Negative    pH, UA 7.5 5.0 - 9.0    Protein, UA TRACE Negative mg/dL    Urobilinogen, Urine 0.2 <2.0 E.U./dL    Nitrite, Urine Negative Negative    Leukocyte Esterase, Urine TRACE (A) Negative   URINE DRUG SCREEN   Result Value Ref Range    Amphetamine Screen, Urine NOT DETECTED Negative <1000 ng/mL    Barbiturate Screen, Ur NOT DETECTED Negative < 200 ng/mL    Benzodiazepine Screen, Urine NOT DETECTED Negative < 200 ng/mL    Cannabinoid Scrn, Ur NOT DETECTED Negative < 50ng/mL    Cocaine Metabolite Screen, Urine NOT DETECTED Negative < 300 ng/mL    Opiate Scrn, Ur NOT DETECTED Negative < 300ng/mL    PCP Screen, Urine NOT DETECTED Negative < 25 ng/mL    Methadone Screen, Urine NOT DETECTED Negative <300 ng/mL    Oxycodone Urine NOT DETECTED Negative <100 ng/mL    FENTANYL SCREEN, URINE NOT DETECTED Negative <1 ng/mL    Drug Screen Comment: see below    Microscopic Urinalysis   Result Value Ref Range    WBC, UA 2-5 0 - 5 /HPF    RBC, UA 0-1 0 - 2 /HPF    Epithelial Cells, UA FEW /HPF    Bacteria, UA FEW (A) None Seen /HPF   Troponin   Result Value Ref Range    Troponin, High Sensitivity 16 (H) 0 - 9 ng/L   CK-MB   Result Value Ref Range    CK-MB <1.0 0.0 - 4.3 ng/mL   CK   Result Value Ref Range    Total CK 57 20 - 180 U/L   EKG 12 Lead   Result Value Ref Range    Ventricular Rate 69 BPM    Atrial Rate 69 BPM    QRS Duration 110 ms    Q-T Interval 456 ms    QTc Calculation (Bazett) 488 ms    R Axis -55 degrees       RADIOLOGY:  Interpreted by Radiologist.  CT ABDOMEN PELVIS W IV CONTRAST Additional Contrast? None   Final Result   Colonic fecal retention involving the descending and left side of the   transverse colon. 1.2 cm cyst left lobe of the liver. CT HEAD WO CONTRAST   Final Result   No acute intracranial abnormality. 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 CT CERVICAL SPINE WO CONTRAST   Final Result   There is marked multilevel degenerative changes C4-5 through C6-7. No CT evidence for acute fracture involving the cervical spine. There is a nonspecific sclerotic lesion perhaps bone island at the C6 level.    If there is history of malignancy consider bone scan to evaluate this lesion   as well as identify any additional.         XR CHEST (2 VW)   Final Result   No radiographic evidence of acute cardiopulmonary disease process               EKG Interpretation  Interpreted by emergency department physician    Rhythm: atrial fibrillation - controlled  Rate: normal  Axis: left  Conduction: left anterior fasciclar block  ST Segments: no acute change  T Waves: non specific changes    Clinical Impression: no acute changes  Comparison to prior EKG: stable as compared to patient's most recent EKG and None      ------------------------- NURSING NOTES AND VITALS REVIEWED ---------------------------   The nursing notes within the ED encounter and vital signs as below have been reviewed by myself. BP (!) 162/100   Pulse 82   Temp 99.1 °F (37.3 °C)   Resp 18   Ht 5' 5\" (1.651 m)   Wt 123 lb (55.8 kg)   SpO2 99%   BMI 20.47 kg/m²   Oxygen Saturation Interpretation: Normal    The patients available past medical records and past encounters were reviewed. ------------------------------ ED COURSE/MEDICAL DECISION MAKING----------------------  Medications   iopamidol (ISOVUE-370) 76 % injection 90 mL (90 mLs IntraVENous Given 9/8/21 1918)   sodium chloride flush 0.9 % injection 10 mL (10 mLs IntraVENous Given 9/8/21 2042)   acetaminophen (TYLENOL) tablet 650 mg (650 mg Oral Given 9/8/21 2041)             Medical Decision Making:    Due to patient's inability to remember specifics regarding fall occurrence, CT head wo contrast was ordered to assess for bleeding in context of dual antiplatelet therapy. CBC, CMP, troponin, CK, CK-MB, UA and UDS were ordered to assess for an etiology of lethargy/AMS. Patient' Troponin returned elevated at 21, however this is within her baseline. Lytes, BUN, and glucose returned normal, CBC showed no evidence of leukocytosis or anemia. UA and CT scan returned negative. With workup for AMS negative and patient's condition stable, patient was discharged to home. Re-Evaluations:             Re-evaluation.   Patients symptoms show no change  Re-evaluation patient's symptoms have not changed      Consultations:             none    Critical Care: none        This patient's ED course included: a personal history and physicial examination and multiple bedside re-evaluations    This patient has remained hemodynamically stable and remained unchanged during their ED course. Counseling: The emergency provider has spoken with the patient and discussed todays results, in addition to providing specific details for the plan of care and counseling regarding the diagnosis and prognosis. Questions are answered at this time and they are agreeable with the plan.       --------------------------------- IMPRESSION AND DISPOSITION ---------------------------------    IMPRESSION  1. Near syncope    2. Dizziness    3. Constipation, unspecified constipation type        DISPOSITION  Disposition: Admit to telemetry  Patient condition is fair        NOTE: This report was transcribed using voice recognition software.  Every effort was made to ensure accuracy; however, inadvertent computerized transcription errors may be present       Rogelio Bullard MD  Resident  09/12/21 [id number]

## 2021-09-08 NOTE — ED NOTES
FIRST PROVIDER CONTACT ASSESSMENT NOTE                                                                                                Department of Emergency Medicine                                                      First Provider Note  21  12:15 PM EDT  NAME: Sandy Griffin  : 1931  MRN: [id number]    Chief Complaint: Loss of Consciousness (states shes been passing out when she gets up, denies hitting head. has been feeling weak)      History of Present Illness:   Sandy Griffin is a 80 y.o. female who presents to the ED for syncope that started a few weeks ago. Denies head injury. Focused Physical Exam:  VS:    ED Triage Vitals [21 1211]   BP Temp Temp src Pulse Resp SpO2 Height Weight   -- -- -- 71 -- 91 % -- --        General: Alert and in no apparent distress. Medical History:  has a past medical history of Arthritis, Atrial fibrillation ([facility] [address] Hx of blood clots, Hypertension, Ischemic colitis ([facility] [address] and PAF (paroxysmal atrial fibrillation) ([facility] [address] Surgical History:  has a past surgical history that includes Ankle fracture surgery; Colonoscopy; Endoscopy, colon, diagnostic; fracture surgery; and Cardiac catheterization (2019). Social History:  reports that she has never smoked. She has never used smokeless tobacco. She reports that she does not drink alcohol and does not use drugs. Family History: family history is not on file.     Allergies: Nifedipine     Initial Plan of Care:  Initiate Treatment-Testing, Proceed toTreatment Area When Bed Available for ED Attending/MLP to Continue Care    -------------------------------------------------END OF FIRST PROVIDER CONTACT ASSESSMENT NOTE--------------------------------------------------------  Electronically signed by PUJA Allen   DD: 21       PUJA Allen  21 [id number]

## 2021-09-08 NOTE — ED NOTES
Bed: 12  Expected date:   Expected time:   Means of arrival:   Comments:  mukul Dean Speaker, RN  09/08/21 [id number]

## 2021-09-09 LAB
EKG ATRIAL RATE: 69 BPM
EKG Q-T INTERVAL: 456 MS
EKG QRS DURATION: 110 MS
EKG QTC CALCULATION (BAZETT): 488 MS
EKG R AXIS: -55 DEGREES
EKG VENTRICULAR RATE: 69 BPM

## 2021-09-09 PROCEDURE — 93010 ELECTROCARDIOGRAM REPORT: CPT | Performed by: INTERNAL MEDICINE

## 2021-09-10 ENCOUNTER — HOSPITAL ENCOUNTER (OUTPATIENT)
Age: 86
Setting detail: OBSERVATION
Discharge: NURSING, REHAB OR LONG-TERM CARE | End: 2021-09-15
Attending: EMERGENCY MEDICINE | Admitting: INTERNAL MEDICINE
Payer: MEDICARE

## 2021-09-10 ENCOUNTER — APPOINTMENT (OUTPATIENT)
Dept: GENERAL RADIOLOGY | Age: 86
End: 2021-09-10
Payer: MEDICARE

## 2021-09-10 ENCOUNTER — APPOINTMENT (OUTPATIENT)
Dept: CT IMAGING | Age: 86
End: 2021-09-10
Payer: MEDICARE

## 2021-09-10 DIAGNOSIS — R53.1 GENERALIZED WEAKNESS: ICD-10-CM

## 2021-09-10 DIAGNOSIS — R55 SYNCOPE AND COLLAPSE: Primary | ICD-10-CM

## 2021-09-10 LAB
ALBUMIN SERPL-MCNC: 3.8 G/DL (ref 3.5–5.2)
ALP BLD-CCNC: 67 U/L (ref 35–104)
ALT SERPL-CCNC: 15 U/L (ref 0–32)
ANION GAP SERPL CALCULATED.3IONS-SCNC: 11 MMOL/L (ref 7–16)
AST SERPL-CCNC: 33 U/L (ref 0–31)
BACTERIA: ABNORMAL /HPF
BASOPHILS ABSOLUTE: 0.01 E9/L (ref 0–0.2)
BASOPHILS RELATIVE PERCENT: 0.3 % (ref 0–2)
BILIRUB SERPL-MCNC: 0.6 MG/DL (ref 0–1.2)
BILIRUBIN URINE: NEGATIVE
BLOOD, URINE: NEGATIVE
BUN BLDV-MCNC: 9 MG/DL (ref 6–23)
CALCIUM SERPL-MCNC: 9.2 MG/DL (ref 8.6–10.2)
CHLORIDE BLD-SCNC: 98 MMOL/L (ref 98–107)
CLARITY: CLEAR
CO2: 27 MMOL/L (ref 22–29)
COLOR: YELLOW
CREAT SERPL-MCNC: 1 MG/DL (ref 0.5–1)
EKG ATRIAL RATE: 136 BPM
EKG Q-T INTERVAL: 400 MS
EKG QRS DURATION: 96 MS
EKG QTC CALCULATION (BAZETT): 508 MS
EKG R AXIS: -52 DEGREES
EKG T AXIS: 31 DEGREES
EKG VENTRICULAR RATE: 97 BPM
EOSINOPHILS ABSOLUTE: 0.01 E9/L (ref 0.05–0.5)
EOSINOPHILS RELATIVE PERCENT: 0.3 % (ref 0–6)
EPITHELIAL CELLS, UA: ABNORMAL /HPF
GFR AFRICAN AMERICAN: >60
GFR NON-AFRICAN AMERICAN: >60 ML/MIN/1.73
GLUCOSE BLD-MCNC: 119 MG/DL (ref 74–99)
GLUCOSE URINE: NEGATIVE MG/DL
HCT VFR BLD CALC: 47.3 % (ref 34–48)
HEMOGLOBIN: 15.9 G/DL (ref 11.5–15.5)
IMMATURE GRANULOCYTES #: 0.01 E9/L
IMMATURE GRANULOCYTES %: 0.3 % (ref 0–5)
KETONES, URINE: NEGATIVE MG/DL
LEUKOCYTE ESTERASE, URINE: ABNORMAL
LYMPHOCYTES ABSOLUTE: 0.78 E9/L (ref 1.5–4)
LYMPHOCYTES RELATIVE PERCENT: 22.2 % (ref 20–42)
MAGNESIUM: 2.3 MG/DL (ref 1.6–2.6)
MCH RBC QN AUTO: 29.4 PG (ref 26–35)
MCHC RBC AUTO-ENTMCNC: 33.6 % (ref 32–34.5)
MCV RBC AUTO: 87.6 FL (ref 80–99.9)
MONOCYTES ABSOLUTE: 0.34 E9/L (ref 0.1–0.95)
MONOCYTES RELATIVE PERCENT: 9.7 % (ref 2–12)
NEUTROPHILS ABSOLUTE: 2.36 E9/L (ref 1.8–7.3)
NEUTROPHILS RELATIVE PERCENT: 67.2 % (ref 43–80)
NITRITE, URINE: NEGATIVE
PDW BLD-RTO: 14.1 FL (ref 11.5–15)
PH UA: 8.5 (ref 5–9)
PLATELET # BLD: 171 E9/L (ref 130–450)
PMV BLD AUTO: 9.8 FL (ref 7–12)
POTASSIUM SERPL-SCNC: 3.7 MMOL/L (ref 3.5–5)
PROTEIN UA: 100 MG/DL
RBC # BLD: 5.4 E12/L (ref 3.5–5.5)
RBC UA: ABNORMAL /HPF (ref 0–2)
REASON FOR REJECTION: NORMAL
REJECTED TEST: NORMAL
SODIUM BLD-SCNC: 136 MMOL/L (ref 132–146)
SPECIFIC GRAVITY UA: 1.02 (ref 1–1.03)
TOTAL PROTEIN: 7.9 G/DL (ref 6.4–8.3)
TROPONIN, HIGH SENSITIVITY: 25 NG/L (ref 0–9)
TROPONIN, HIGH SENSITIVITY: 31 NG/L (ref 0–9)
UROBILINOGEN, URINE: 1 E.U./DL
WBC # BLD: 3.5 E9/L (ref 4.5–11.5)
WBC UA: ABNORMAL /HPF (ref 0–5)

## 2021-09-10 PROCEDURE — 70450 CT HEAD/BRAIN W/O DYE: CPT

## 2021-09-10 PROCEDURE — 80053 COMPREHEN METABOLIC PANEL: CPT

## 2021-09-10 PROCEDURE — 83735 ASSAY OF MAGNESIUM: CPT

## 2021-09-10 PROCEDURE — 93005 ELECTROCARDIOGRAM TRACING: CPT | Performed by: STUDENT IN AN ORGANIZED HEALTH CARE EDUCATION/TRAINING PROGRAM

## 2021-09-10 PROCEDURE — 71045 X-RAY EXAM CHEST 1 VIEW: CPT

## 2021-09-10 PROCEDURE — G0378 HOSPITAL OBSERVATION PER HR: HCPCS

## 2021-09-10 PROCEDURE — 84484 ASSAY OF TROPONIN QUANT: CPT

## 2021-09-10 PROCEDURE — 93010 ELECTROCARDIOGRAM REPORT: CPT | Performed by: INTERNAL MEDICINE

## 2021-09-10 PROCEDURE — 85025 COMPLETE CBC W/AUTO DIFF WBC: CPT

## 2021-09-10 PROCEDURE — 6370000000 HC RX 637 (ALT 250 FOR IP): Performed by: INTERNAL MEDICINE

## 2021-09-10 PROCEDURE — 81001 URINALYSIS AUTO W/SCOPE: CPT

## 2021-09-10 PROCEDURE — 99285 EMERGENCY DEPT VISIT HI MDM: CPT

## 2021-09-10 PROCEDURE — 36415 COLL VENOUS BLD VENIPUNCTURE: CPT

## 2021-09-10 RX ORDER — ASPIRIN 81 MG/1
81 TABLET ORAL DAILY
Status: DISCONTINUED | OUTPATIENT
Start: 2021-09-10 | End: 2021-09-15 | Stop reason: HOSPADM

## 2021-09-10 RX ORDER — POLYETHYLENE GLYCOL 3350 17 G/17G
17 POWDER, FOR SOLUTION ORAL DAILY
Status: DISCONTINUED | OUTPATIENT
Start: 2021-09-10 | End: 2021-09-15 | Stop reason: HOSPADM

## 2021-09-10 RX ORDER — ACETAMINOPHEN 325 MG/1
650 TABLET ORAL EVERY 8 HOURS PRN
Status: DISCONTINUED | OUTPATIENT
Start: 2021-09-10 | End: 2021-09-15 | Stop reason: HOSPADM

## 2021-09-10 RX ORDER — SODIUM CHLORIDE 9 MG/ML
INJECTION, SOLUTION INTRAVENOUS CONTINUOUS
Status: DISCONTINUED | OUTPATIENT
Start: 2021-09-10 | End: 2021-09-15 | Stop reason: HOSPADM

## 2021-09-10 RX ORDER — LISINOPRIL 5 MG/1
5 TABLET ORAL DAILY
Status: DISCONTINUED | OUTPATIENT
Start: 2021-09-10 | End: 2021-09-15 | Stop reason: HOSPADM

## 2021-09-10 RX ORDER — AMIODARONE HYDROCHLORIDE 200 MG/1
200 TABLET ORAL DAILY
Status: DISCONTINUED | OUTPATIENT
Start: 2021-09-10 | End: 2021-09-15 | Stop reason: HOSPADM

## 2021-09-10 RX ADMIN — AMIODARONE HYDROCHLORIDE 200 MG: 200 TABLET ORAL at 14:24

## 2021-09-10 RX ADMIN — APIXABAN 5 MG: 5 TABLET, FILM COATED ORAL at 14:24

## 2021-09-10 RX ADMIN — ASPIRIN 81 MG: 81 TABLET, COATED ORAL at 14:24

## 2021-09-10 RX ADMIN — APIXABAN 5 MG: 5 TABLET, FILM COATED ORAL at 20:20

## 2021-09-10 ASSESSMENT — ENCOUNTER SYMPTOMS
SHORTNESS OF BREATH: 0
VOMITING: 0
NAUSEA: 0
ABDOMINAL PAIN: 0
COUGH: 0

## 2021-09-10 NOTE — ED PROVIDER NOTES
HPI   80-year-old female patient with past medical history of atrial fibrillation presented to emergency department for chief complaint of syncope and weakness. Patient was recently seen here few days prior and discharged for similar complaints. Over the last few days while at home patient has had 3-4 syncopal episodes. ,  Not brought on by anything not worsened with standing. She is complaining of some left-sided chest pain which is now resolved. No associated shortness of breath. Denies hitting her head. Denies loss of consciousness. Denies any numbness or tingling, states she does have weakness and both her legs. Review of Systems   Constitutional: Negative for activity change. HENT: Negative for congestion. Respiratory: Negative for cough and shortness of breath. Cardiovascular: Positive for chest pain. Negative for leg swelling. Gastrointestinal: Negative for abdominal pain, nausea and vomiting. Genitourinary: Negative for dysuria and frequency. All other systems reviewed and are negative. Physical Exam  Vitals and nursing note reviewed. HENT:      Head: Normocephalic and atraumatic. Nose: Nose normal.      Mouth/Throat:      Pharynx: Oropharynx is clear. Eyes:      General: No scleral icterus. Conjunctiva/sclera: Conjunctivae normal.   Cardiovascular:      Rate and Rhythm: Normal rate and regular rhythm. Pulses: Normal pulses. Heart sounds: Normal heart sounds. Pulmonary:      Effort: Pulmonary effort is normal.      Breath sounds: Normal breath sounds. Abdominal:      General: Bowel sounds are normal. There is no distension. Palpations: Abdomen is soft. Tenderness: There is abdominal tenderness (mild generlized tenderness). Musculoskeletal:         General: Normal range of motion. Cervical back: Normal range of motion. No tenderness. Right lower leg: No edema. Left lower leg: No edema. Skin:     General: Skin is warm and dry. Capillary Refill: Capillary refill takes less than 2 seconds. Neurological:      General: No focal deficit present. Mental Status: She is alert. Comments: No focal deficits, moving all 4 extremities patient is oriented x3. Sensation intact. Psychiatric:         Mood and Affect: Mood normal.          Procedures     MDM   63-year-old female patient presents to emergency department for frequent falls. Patient seen here recently discharged home. Patient with persisting falls. Did have an episode of chest pain today as well troponin is slightly elevated at 31. No acute findings on her EKG. Vital signs stable. Head and neck CT ordered on patient both negative. No acute concerns for stroke at this time. At this time patient to be admitted for further work-up of syncope falls and weakness. EKG: This EKG is signed and interpreted by me. Rate: 97  Rhythm: Atrial fibrillation  Interpretation: no acute changes  Comparison: stable as compared to patient's most recent EKG    ED Course as of Sep 10 1116   Fri Sep 10, 2021   0827 Spoke with Dr. Adiel Prescott, agrees patient needs admission for further work-up. [FG]      ED Course User Index  [FG] Allyssa Jacobo DO        ED Course as of Sep 10 1116   Fri Sep 10, 2021   0827 Spoke with Dr. Adiel Prescott, agrees patient needs admission for further work-up. [FG]      ED Course User Index  [FG] Allyssa Jacobo DO       --------------------------------------------- PAST HISTORY ---------------------------------------------  Past Medical History:  has a past medical history of Arthritis, Atrial fibrillation ([facility] [address] Hx of blood clots, Hypertension, Ischemic colitis ([facility] [address] and PAF (paroxysmal atrial fibrillation) ([facility] [address] Past Surgical History:  has a past surgical history that includes Ankle fracture surgery; Colonoscopy; Endoscopy, colon, diagnostic; fracture surgery; and Cardiac catheterization (11/11/2019).     Social History:  reports that she has never Troponin   Result Value Ref Range    Troponin, High Sensitivity 31 (H) 0 - 9 ng/L   Urinalysis with Microscopic   Result Value Ref Range    Color, UA Yellow Straw/Yellow    Clarity, UA Clear Clear    Glucose, Ur Negative Negative mg/dL    Bilirubin Urine Negative Negative    Ketones, Urine Negative Negative mg/dL    Specific Gravity, UA 1.020 1.005 - 1.030    Blood, Urine Negative Negative    pH, UA 8.5 5.0 - 9.0    Protein,  (A) Negative mg/dL    Urobilinogen, Urine 1.0 <2.0 E.U./dL    Nitrite, Urine Negative Negative    Leukocyte Esterase, Urine TRACE (A) Negative    WBC, UA 1-3 0 - 5 /HPF    RBC, UA 0-1 0 - 2 /HPF    Epithelial Cells, UA RARE /HPF    Bacteria, UA RARE (A) None Seen /HPF   EKG 12 Lead   Result Value Ref Range    Ventricular Rate 97 BPM    Atrial Rate 136 BPM    QRS Duration 96 ms    Q-T Interval 400 ms    QTc Calculation (Bazett) 508 ms    R Axis -52 degrees    T Axis 31 degrees       RADIOLOGY:  CT HEAD WO CONTRAST   Final Result   Stable atrophy and chronic ischemic disease. XR CHEST PORTABLE   Final Result   No acute process               ------------------------- NURSING NOTES AND VITALS REVIEWED ---------------------------  Date / Time Roomed:  9/10/2021  6:36 AM  ED Bed Assignment:  8210/8210-B    The nursing notes within the ED encounter and vital signs as below have been reviewed.      Patient Vitals for the past 24 hrs:   BP Temp Temp src Pulse Resp SpO2 Height Weight   09/10/21 1057 -- -- -- 87 -- -- -- --   09/10/21 1019 -- 98 °F (36.7 °C) Temporal -- 18 94 % -- --   09/10/21 0815 (!) 129/95 98 °F (36.7 °C) -- 93 16 97 % -- --   09/10/21 0634 118/86 98.2 °F (36.8 °C) -- 63 15 98 % 5' 5\" (1.651 m) 123 lb (55.8 kg)       Oxygen Saturation Interpretation: Normal    ------------------------------------------ PROGRESS NOTES ------------------------------------------  Re-evaluation(s):  Time: 9am  Patients symptoms show no change  Repeat physical examination is not changed    Counseling:  I have spoken with the patient and discussed todays results, in addition to providing specific details for the plan of care and counseling regarding the diagnosis and prognosis. Their questions are answered at this time and they are agreeable with the plan of admission.    --------------------------------- ADDITIONAL PROVIDER NOTES ---------------------------------  Consultations:  Time: 10am. Spoke with Dr. Aj Bennett. Discussed case. They will admit the patient. This patient's ED course included: a personal history and physicial examination and re-evaluation prior to disposition    This patient has remained hemodynamically stable during their ED course. Diagnosis:  1. Syncope and collapse    2. Generalized weakness        Disposition:  Patient's disposition: Admit to telemetry  Patient's condition is stable.          Bill Goddard DO  Resident  09/10/21 1111

## 2021-09-10 NOTE — PROGRESS NOTES
Upon obtaining orthostatic vital signs patient was able to stand for about 8 seconds before patient was assisted to sit back on bed with loss of consciousness for about 4 seconds . HR elevated to 150 during standing and quickly went back down to near 100 upon sitting. patient resting in bed comfortably at this time. Victoria Lincoln notified.

## 2021-09-10 NOTE — PROGRESS NOTES
Physical Therapy    PT orders received and chart reviewed to attempt eval. Pt case discussed with RN who requests pt to be held at this time due to unstable BP and syncopal episode during attempt to obtain orthostatic BP measurements. PT will follow and attempt again at later time/date as able. Thank you.     Eileen Jacob, PT, DPT  [id number]

## 2021-09-10 NOTE — CARE COORDINATION
9/10/21 Transition of Care: Patient is a return due to syncope at home and a period of loss of consciousness. She is on Eliquis at home. Her son states she is weaker and sleeping a lot more. She is c/o chest pains at home. He states her appetite has been poor and has been losing weight. She is currently observation. She is alert but hard of hearing. She asked if I could call her daughter, Jesus Brown, who is POA. Per Jesus Brown patient resides in a home with her son. The patient eats mostly fast foods. She has a walker and uses a cane. She was recently sent home with [facility] but did not have a pcp to sign the orders. Patient was seeing Dr Kamilla Rios, Dr Olga Clarke, and [facility] on [address] but the daughter does not know if she has a doctor currently. She does use [facility] in [facility]. It was explained to Jesus Brown that a cardiology consult is pending. PT.OT evals are pending. It was mentioned to [facility] that GAIL may need to be considered. She was unsure if that is something they are interested in. She states they will make arrangements to be at the home with the patient more often. She was also asked to follow up and see who her mothers pcp is. Will follow.  Stan Monteiro RN CM

## 2021-09-10 NOTE — LETTER
[place] Department Medicaid  CERTIFICATION OF NECESSITY  FOR NON-EMERGENCY TRANSPORTATION   BY GROUND AMBULANCE      Individual Information   1. Name: Elena Monroe 2. [place] Medicaid Billing Number:    3. Address: [facility] Information   4. Provider Name: Hakeem Gaines    5. [place] Medicaid Provider Number:  National Provider Identifier (NPI):      Certification  7. Criteria:  During transport, this individual requires:  [x] Medical treatment or continuous     supervision by an EMT. [] The administration or regulation of oxygen by another person. [] Supervised protective restraint. 8. Period Beginning Date: 09/13/2021   9. Length  [] Not more than 3  day(s)  [] One Year     Additional Information Relevant to Certification   10. Comments or Explanations, If Necessary or Appropriate   SYNCOPE/COLLAPSE WITH LOC/FALLS      Certifying Practitioner Information   11. Name of Practitioner: DR Dana Trinidad   12. [place] Medicaid Provider Number, If Applicable:  [address] Provider Identifier (NPI):      Signature Information   14. Date of Signature: 09/13/2021 15. Name of Person Signing: Electronically signed by Kenisha Montgomery RN on 9/13/2021 at 3:04 PM     16. Signature and Professional Designation: DR PHYLLIS URRUTIA/Electronically signed by Kenisha Montgomery RN  DISCHARGE PLANNER on 9/13/2021 at 3:04 PM       [facility] [address]  Rev. 7/2015                                      [address] [place] [address] Admission Date/Time: 9/10/21 1705 [facility] Account: [de-identified]    MRN: [id number]    Patient:  Elena Monroe   Contact Serial #: [id number]            ENCOUNTER          Patient Class: Observation Private Enc?   No Unit RM BD: SEYZ 8S CDU 8210/8210-B   Hospital Service: Intermediate   ADM DX: Syncope and collapse [C4*   ADM Provider: Sahara Veras MD   Procedure:     ATT Provider: Sahara Veras MD   REF Provider:        PATIENT                 Name: Elena Monroe : 1931 (89 yrs)   Address: [address] [place] [address] Sex: Female   City: [address] [place] [address]         Marital Status:    Employer: RETIRED         Temple: [employer]   Primary Care Provider: Akhil Richey MD         Primary Phone: [phone]   EMERGENCY CONTACT   Contact Name Legal Guardian? Relationship to Patient Home Phone Work Phone   1. Inés Wong  2. Maksim Coleman      Child  Child [phone]              GUARANTOR            Guarantor: Doretha Kent     : 1931   Address: [address] [place] [address] Sex: Female     [place] [id number]     Relation to Patient: Self       Home Phone: [phone]   Guarantor ID: [id number]       Work Phone:     Guarantor Employer: NONE         Status: NOT EMPLO*      COVERAGE  PRIMARY INSURANCE   Payor: [facility] MEDICARE Plan: ANTHEM MEDIBLUE ESSENTIA*   Payor Address: [facility] [id number]       Group Number: [id number] Type: INDEMNITY   Subscriber Name: Eduin Barragan Subscriber : 1931   Subscriber ID: [id number] [place]. Rel. to Sub: Self   SECONDARY INSURANCE   Payor:   Plan:     Payor Address:  ,           Group Number:   Insurance Type:     Subscriber Name:   Subscriber :     Subscriber ID:   Pat.  Rel. to Sub:

## 2021-09-11 PROCEDURE — 97530 THERAPEUTIC ACTIVITIES: CPT

## 2021-09-11 PROCEDURE — G0378 HOSPITAL OBSERVATION PER HR: HCPCS

## 2021-09-11 PROCEDURE — 2580000003 HC RX 258: Performed by: INTERNAL MEDICINE

## 2021-09-11 PROCEDURE — 97165 OT EVAL LOW COMPLEX 30 MIN: CPT

## 2021-09-11 PROCEDURE — 6370000000 HC RX 637 (ALT 250 FOR IP): Performed by: INTERNAL MEDICINE

## 2021-09-11 RX ORDER — SODIUM CHLORIDE 0.9 % (FLUSH) 0.9 %
5-40 SYRINGE (ML) INJECTION PRN
Status: DISCONTINUED | OUTPATIENT
Start: 2021-09-11 | End: 2021-09-15 | Stop reason: HOSPADM

## 2021-09-11 RX ORDER — SODIUM CHLORIDE 0.9 % (FLUSH) 0.9 %
5-40 SYRINGE (ML) INJECTION EVERY 12 HOURS SCHEDULED
Status: DISCONTINUED | OUTPATIENT
Start: 2021-09-11 | End: 2021-09-15 | Stop reason: HOSPADM

## 2021-09-11 RX ADMIN — APIXABAN 5 MG: 5 TABLET, FILM COATED ORAL at 19:56

## 2021-09-11 RX ADMIN — APIXABAN 5 MG: 5 TABLET, FILM COATED ORAL at 09:54

## 2021-09-11 RX ADMIN — SODIUM CHLORIDE: 9 INJECTION, SOLUTION INTRAVENOUS at 14:22

## 2021-09-11 RX ADMIN — LISINOPRIL 5 MG: 5 TABLET ORAL at 09:54

## 2021-09-11 RX ADMIN — AMIODARONE HYDROCHLORIDE 200 MG: 200 TABLET ORAL at 09:54

## 2021-09-11 RX ADMIN — ASPIRIN 81 MG: 81 TABLET, COATED ORAL at 09:54

## 2021-09-11 ASSESSMENT — PAIN SCALES - GENERAL
PAINLEVEL_OUTOF10: 0
PAINLEVEL_OUTOF10: 0

## 2021-09-11 NOTE — CONSULTS
CARDIOLOGY CONSULTATION    Patient Name:  Coral Cardoso    :  1931    Reason for Consultation:   Recurrent syncope    History of Present Illness:   Coral Cardoso returns to [facility], following history of sudden  loss of consciousness on at least 3 occasions during the past few weeks prior to admission. Her daughter states that this occurred while she was getting up and not while sitting or laying in bed. She has a longstanding history ofsystolic hypertension as well as recurrent persistent atrial fibrillation andhas been on polypharmacy . She previously underwent cardiac catheterization 2019 which demonstrated noncritical coronary artery disease. Likewise her two-dimensional echocardiogram obtained in 2019 demonstrated minimal global left ventricular hypokinesis and an apparent abnormal bubble study. She is now readmitted for further observation and adjustment of her medical regimen. she is not particularly clear or exact explaining her symptoms but her family members note that whenever she is under psychological stress due to family issues her medical problems exacerbate. Past Medical History:   has a past medical history of Arthritis, Atrial fibrillation ([facility] [address] Hx of blood clots, Hypertension, Ischemic colitis ([facility] [address] and PAF (paroxysmal atrial fibrillation) ([facility] [address] Surgical History:   has a past surgical history that includes Ankle fracture surgery; Colonoscopy; Endoscopy, colon, diagnostic; fracture surgery; and Cardiac catheterization (2019). Social History:   reports that she has never smoked. She has never used smokeless tobacco. She reports that she does not drink alcohol and does not use drugs. Family History:  Remarkable for - Mother  secondary CVA. Father  secondary to myocardial infarction.     Medications:  See medication reconciliation list    Allergies:  Nifedipine     Review of Systems: · Constitutional: (+) near LOC; there has been no unanticipated weight loss. There's been no significant change in energy level, sleep pattern or activity level. No fever chills or rigors. · Eyes: No visual changes or diplopia. No scleral icterus. · ENT: No Headaches, hearing loss or vertigo. No mouth sores or sore throat. No change in taste or smell. · Cardiovascular: (+) palpitations; No chest discomfort, dyspnea on exertion, loss of consciousness, no phlebitis, no claudication. · Respiratory: No cough or wheezing, no sputum production. No hemoptysis, pleuritic pain. · Gastrointestinal: No abdominal pain, appetite loss, blood in stools. No change in bowel habits. No hematemesis  · Genitourinary: No dysuria, trouble voiding or hematuria. No nocturia or increased frequency. · Musculoskeletal:  No gait disturbance, weakness or joint complaints. · Integumentary: No rash or pruritis. · Neurological: No headache, diplopia, change in muscle strength, numbness or tingling. No change in gait, balance, coordination, mood, affect, memory, mentation, behavior. · Psychiatric: No anxiety or depression. · Endocrine: No temperature intolerance. No excessive thirst, fluid intake, or urination. No tremor. · Hematologic/Lymphatic: No abnormal bruising or bleeding, blood clots or swollen lymph nodes. · Allergic/Immunologic: No nasal congestion or hives. Physical Examination:    Vital Signs: /85   Pulse 64   Temp 97.6 °F (36.4 °C) (Temporal)   Resp 14   Ht 5' 5\" (1.651 m)   Wt 138 lb (62.6 kg) Comment: bedscale per RD  SpO2 97%   BMI 22.96 kg/m²   General appearance: Well preserved, mesomorphic body habitus, alert, no distress. Skin: Skin color, texture, turgor normal. No rashes or lesions. No induration or tightening palpated. Head: Normocephalic. No masses, lesions, tenderness or abnormalities  Eyes: Conjunctivae/corneas clear. PERRL, EOMs intact. Sclera non icteric.   Ears: External ears normal. Canals clear. TM's clear bilaterally. Hearing normal to finger rub. Nose/Sinuses: Nares normal. Septum midline. Mucosa normal. No drainage or sinus tenderness. Oropharynx: Lips, mucosa, and tongue normal. Oropharynx clear with no exudate seen. Neck: Neck supple and symmetric. No adenopathy. Thyroid symmetric, normal size, without nodules. Trachea is midline. Carotids brisk in upstroke without bruits, no abnormal JVP noted at 45°. Chest: Even excursion  Lungs: Lungs clear to auscultation bilaterally. No retractions or use of accessory muscles. No tactile vocal fremitus. No rhonchi, crackles or rales. Heart:  S1 > S2. Regular irregular rhythm. frequent ectopy S4 gallop or grade 2/6 early systolic murmur LUSB: No rub, palpable thrill or heave noted. PMI 5th intercostal space midclavicular line. Abdomen: Abdomen soft, scaphoid, non-tender. BS normal. No masses, organomegaly. No hernia noted. Extremities: Extremities normal. No deformities, edema, or skin discoloration. No cyanosis or clubbing noted to the nails. Peripheral pulses present 2+ upper extremities and present 2+  lower extremities. Musculoskeletal: Spine ROM normal. Muscular strength intact. Neuro: Cranial nerves intact. Motor: Strength 5/5 in all extremities. Reflexes 2+ in all extremities. No focal weakness. Sensory: grossly normal to touch. Coordination intact. Pertinent Labs:  CBC:   Recent Labs     09/08/21  1458 09/10/21  0706   WBC 4.4* 3.5*   HGB 14.6 15.9*    171     BMP:  Recent Labs     09/08/21  1458 09/08/21  1458 09/10/21  0706     --  136   K 4.3  --  3.7     --  98   CO2 27  --  27   BUN 9  --  9   CREATININE 1.0  --  1.0   GLUCOSE 111*  --  119*   LABGLOM >60   < > >60    < > = values in this interval not displayed. ABGs: No results found for: PH, PO2, PCO2  INR:   No results for input(s): INR in the last 72 hours.   PRO-BNP:   Lab Results   Component Value Date    PROBNP 242 08/29/2021    PROBNP 177 08/10/2021      Cardiac Injury Profile:   Recent Labs     09/08/21  1950   CKTOTAL 62   CKMB <1.0      Lipid Profile:   Lab Results   Component Value Date    TRIG 44 11/07/2019    HDL 70 11/07/2019    LDLCALC 107 11/07/2019    CHOL 186 11/07/2019      Hemoglobin A1C: No components found for: HGBA1C   ECG:  See report    Radiology:  EXAMINATION:   ONE XRAY VIEW OF THE CHEST       9/10/2021 7:33 am       COMPARISON:   09/08/2021       HISTORY:   ORDERING SYSTEM PROVIDED HISTORY: chest pain   TECHNOLOGIST PROVIDED HISTORY:   Reason for exam:->chest pain   What reading provider will be dictating this exam?->CRC       FINDINGS:   Heart size is normal.  There is tortuosity of the aorta.  There are no acute   infiltrates or effusions.  There are few scattered scars.           Impression   No acute process         Assessment:    Active Problems:    Syncope and collapse  Resolved Problems:    * No resolved hospital problems. *      Plan:  It appears that Mrs. Bennie Pardo presents with a difficult clinical situation in which she has difficulty in describing what she is feeling and why she is feeling and other related matters. Nonetheless she has apparently lost consciousness according to family witnesses and we will recheck orthostatic blood pressures if she can cooperate and if abnormal would add low-dose midodrine. We will also continue to monitor for any significant malignant supraventricular or ventricular arrhythmia. I have spent more than 50 minutes face to face with Mario Alberto Adame reviewing notes and laboratory data with greater than 50% of this time instructing and counseling the patientand her daughter who is at her bedside  regarding my findings and recommendations and I have answered all questions as posed to me by Ms. Coleman's family member. Thank you, Héctor Barton MD and Rudy Lezama MD for allowing me to consult in the care of this patient.     Brien Woodson DO DO, FACP, [facility], [facility]    NOTE: This report was transcribed using voice recognition software. Every effort was made to ensure accuracy; however, inadvertent computerized transcription errors may be present.

## 2021-09-11 NOTE — PROGRESS NOTES
functional transfers/mobility and ADLs  * Cognitive retraining/development of therapeutic activities to improve problem solving, judgement, memory, and attention for increased safety/participation in ADL/IADL tasks  * Therapeutic exercise to improve motor endurance, ROM, and functional strength for ADLs/functional transfers  * Therapeutic activities to facilitate/challenge dynamic balance, stand tolerance for increased safety and independence with ADLs  * Therapeutic activities to facilitate gross/fine motor skills for increased independence with ADLs      Recommended Adaptive Equipment: TBD     Home Living: Pt lives with son in 1 floor home. 0 ADELE    Bathroom setup: tub/shower, 1 bar     Equipment owned: [facility]    Prior Level of Function: assistance with ADLs (by 2 daughters), assistance with IADLs; ambulated w/ SPC   Driving: no - family assists  Daughter provided above PLOF/home setup    Pain Level: Pt c/o no pain this session    Cognition: A&O: 1/4 (to self. Pt able to recall place w/ increased time and when choices provided); Follows 1 step directions inconsistently - [place], confusion noted.  Increased cues provided to sequence, initiate and attend to tasks   Memory:  poor   Sequencing:  fair -   Problem solving:  poor   Judgement/safety:  poor     Functional Assessment:  AM-PAC Daily Activity Raw Score: 10/24   Initial Eval Status  Date: 9/11/21 Treatment Status  Date: STGs = LTGs  Time frame: 10-14 days   Feeding Minimal Assist   Stand by Assist   Grooming Moderate Assist   Stand by Assist    UB Dressing Moderate Assist   Stand by Assist    LB Dressing Dependent   Moderate Assist    Bathing Dependent  Moderate Assist    Toileting Dependent   Including hygiene (rolling L/R)  Incontinent of urine  Moderate Assist    Bed Mobility  Supine to sit: Moderate Assist   Sit to supine: Maximal Assist   Supine to sit: Minimal Assist   Sit to supine: Minimal Assist    Functional Transfers Maximal Assist   Minimal Assist Functional Mobility NT  Deferred d/t safety concerns, difficulty following commands  Minimal Assist    Balance Sitting:     Static: Min A    Dynamic:Min A  Standing: Max A w/ w/w     Activity Tolerance Poor+  Fair+   Visual/  Perceptual Glasses: no                  Hand Dominance R   AROM (PROM) Strength Additional Info:    RUE  WFL Distal: 3+/5  Proximal: 3-/5 Fair  and fair FMC/dexterity noted during ADL tasks       LUE WFL Proximal: 3-/5  Distal: 3+/5 Fair  and fair FMC/dexterity noted during ADL tasks       Hearing: severely [place]  Sensation:  No c/o numbness or tingling  Tone: WFL   Edema: none noted    Comments: Upon arrival patient lying in bed. RN clearance. Pt agreeable to OT session this date. Therapist educated pt on role of OT. At end of session, patient lying in bed (bed alarm on) with call light and phone within reach, all lines and tubes intact. Overall patient demonstrated decreased independence and safety during completion of ADL/functional transfer/mobility tasks. Pt would benefit from continued skilled OT to increase safety and independence with completion of ADL/IADL tasks for functional independence and quality of life. Treatment: OT treatment provided this date includes: Facilitation of bed mobility (education/cues for body mechanics, sequencing), unsupported sitting balance (addressing posture, weight shifting, safety and dynamic reaching to prep for ADL's), functional transfers (w/ education/max cues for safety/hand placement, sequencing, attention) and standing tolerance tasks (addressing posture, balance and activity tolerance impacting ADLs and functional activity. Limited stand tolerance d/t c/o fatigue and noted weakness). Therapist facilitated self-care retraining: UB self-care tasks (gown), toileting task (including hygiene - incontinent of urine) and seated grooming tasks while educating pt on modified techniques, posture, safety and energy conservation techniques.  At end of session, facilitated optimal bed repositioning w/ TAPS and pillow props w/ education on benefits for skin and joint integrity. Skilled monitoring of HR, O2 sats and pts response to treatment. No c/o dizziness/lightheadedness w/ prompts. BP post activity: 167/96  During activity, HR= bpm, O2=^92%    Rehab Potential: Good for established goals     Patient / Family Goal: not stated      Patient and/or family were instructed on functional diagnosis, prognosis/goals and OT plan of care. Demonstrated fair understanding. Eval Complexity: Low    Time In: 11:25  Time Out: 11:55  Total Treatment Time: 14 minutes    Min Units   OT Eval Low 97165  X  1   OT Eval Medium 19103      OT Eval High 61896      OT Re-Eval [id number]       Therapeutic Ex 53584       Therapeutic Activities 91060  10  1   ADL/Self Care 03296  4  0   Orthotic Management 44773       Manual [id number]     Neuro Re-Ed [id number]       Non-Billable Time          Evaluation Time additionally includes thorough review of current medical information, gathering information on past medical history/social history and prior level of function, interpretation of standardized testing/informal observation of tasks, assessment of data and development of plan of care and goals.         JULIA YoR/L #[id number]

## 2021-09-11 NOTE — FLOWSHEET NOTE
09/11/21 0630   Vital Signs   Orthostatic B/P and Pulse?  Yes   Blood Pressure Lying 148/92   Pulse Lying 67 PER MINUTE   Blood Pressure Sitting 157/100   Pulse Sitting 94 PER MINUTE   Blood Pressure Standing 97/64   Pulse Standing 102 PER MINUTE

## 2021-09-11 NOTE — H&P
[facility]  History and Physical      CHIEF COMPLAINT: Syncope    Reason for Admission: Sinus bradycardia    History Obtained From: Daughter    PCP :  Anne Marie Mejia MD    [address] / [place] [id number]      HISTORY OF PRESENT ILLNESS:      The patient is a 80 y.o. female presented to the emergency room because of possible syncope, increased weakness. In the emergency room patient was noted to be bradycardic. Patient was then admitted for further evaluation and treatment. Patient is poor historian. She denies any complaints at the time of examination. She does not have any chest pain or heart palpitations. Past Medical History:        Diagnosis Date    Arthritis     Atrial fibrillation ([facility]     Hx of blood clots     Hypertension     Ischemic colitis ([facility]     [facility]oxysmal [facility]) [facility]      Past Surgical History:        Procedure Laterality Date    ANKLE FRACTURE SURGERY      CARDIAC CATHETERIZATION  11/11/2019    Dr. Lynnette Cerda    COLONOSCOPY      ENDOSCOPY, COLON, DIAGNOSTIC      FRACTURE SURGERY           Medications Prior to Admission:    Medications Prior to Admission: polyethylene glycol (GLYCOLAX) 17 GM/SCOOP powder, Take 17 g by mouth daily  amiodarone (CORDARONE) 200 MG tablet, Take 1 tablet by mouth daily  aspirin 81 MG EC tablet, Take 81 mg by mouth daily  LISINOPRIL PO, Take by mouth daily Pt doesn't remember the dose  acetaminophen (TYLENOL) 500 MG tablet, Take 2 tablets by mouth every 8 hours as needed for Pain  apixaban (ELIQUIS) 5 MG TABS tablet, Take 5 mg by mouth 2 times daily    Allergies:  Nifedipine    Social History:   Social History     Socioeconomic History    Marital status:       Spouse name: Not on file    Number of children: Not on file    Years of education: Not on file    Highest education level: Not on file   Occupational History    Not on file   Tobacco Use    Smoking status: Never Smoker    Smokeless tobacco: Never Used   Vaping Use    Vaping Use: Never used   Substance and Sexual Activity    Alcohol use: No    Drug use: No    Sexual activity: Not on file   Other Topics Concern    Not on file   Social History Narrative    Not on file     Social Determinants of Health     Financial Resource Strain:     Difficulty of Paying Living Expenses:    Food Insecurity:     Worried About Running Out of Food in the Last Year:     [address] in the Last Year:    Transportation Needs:     Lack of Transportation (Medical):  Lack of Transportation (Non-Medical):    Physical Activity:     Days of Exercise per Week:     Minutes of Exercise per Session:    Stress:     Feeling of Stress :    Social Connections:     Frequency of Communication with Friends and Family:     Frequency of Social Gatherings with Friends and Family:     Attends Yazidi Services:     Active Member of Clubs or Organizations:     Attends Club or Organization Meetings:     Marital Status:    Intimate Partner Violence:     Fear of Current or Ex-Partner:     Emotionally Abused:     Physically Abused:     Sexually Abused:          Family History:   History reviewed. No pertinent family history. REVIEW OF SYSTEMS:    General ROS: negative  Hematological and Lymphatic ROS: negative  Endocrine ROS: negative  Respiratory ROS: no cough,  wheezing  or shortness of breath,   Cardiovascular ROS: no chest pain or dyspnea on exertion  Gastrointestinal ROS: no abdominal pain, change in bowel habits, or black or bloody stools  Genito-Urinary ROS: no dysuria, trouble voiding, or hematuria  Neurological ROS: no TIA or stroke symptoms  negative    Vitals:  BP (!) 158/88   Pulse 62   Temp 98.2 °F (36.8 °C) (Temporal)   Resp 18   Ht 5' 5\" (1.651 m)   Wt 123 lb (55.8 kg)   SpO2 97%   BMI 20.47 kg/m²     PHYSICAL EXAM:  General:  Awake, alert, oriented X 3. Well developed, well nourished, well groomed. No apparent distress.   HEENT: Normocephalic, atraumatic. Pupils equal, round, reactive to light. No scleral icterus. No conjunctival injection. Neck:  Supple, no carotid bruits  Heart: Irregularly irregular  Lungs:  CTA bilaterally, bilat symmetrical expansion, no wheeze, rales, or rhonchi  Abdomen: Bowel sounds present, soft, nontender, no masses, no organomegaly, no peritoneal signs  Extremities:  No clubbing, cyanosis, or edema  Skin:  Warm and dry, no open lesions or rash  Neuro:  Cranial nerves 2-12 intact, no focal deficits      DATA:     Recent Results (from the past 24 hour(s))   Troponin    Collection Time: 09/10/21  6:35 PM   Result Value Ref Range    Troponin, High Sensitivity 25 (H) 0 - 9 ng/L   SPECIMEN REJECTION    Collection Time: 09/10/21  6:36 PM   Result Value Ref Range    Rejected Test TRP5     Reason for Rejection see below        CT HEAD WO CONTRAST   Final Result   Stable atrophy and chronic ischemic disease. XR CHEST PORTABLE   Final Result   No acute process                 ASSESSMENT :      Active Problems:    Syncope and collapse  Resolved Problems:    * No resolved hospital problems. 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  Every effort was made to ensure accuracy; however, inadvertent transcription errors may be present

## 2021-09-12 PROCEDURE — 2580000003 HC RX 258: Performed by: INTERNAL MEDICINE

## 2021-09-12 PROCEDURE — G0378 HOSPITAL OBSERVATION PER HR: HCPCS

## 2021-09-12 PROCEDURE — 6370000000 HC RX 637 (ALT 250 FOR IP): Performed by: INTERNAL MEDICINE

## 2021-09-12 RX ADMIN — ASPIRIN 81 MG: 81 TABLET, COATED ORAL at 09:25

## 2021-09-12 RX ADMIN — APIXABAN 5 MG: 5 TABLET, FILM COATED ORAL at 20:24

## 2021-09-12 RX ADMIN — SODIUM CHLORIDE, PRESERVATIVE FREE 10 ML: 5 INJECTION INTRAVENOUS at 20:24

## 2021-09-12 RX ADMIN — SODIUM CHLORIDE: 9 INJECTION, SOLUTION INTRAVENOUS at 03:32

## 2021-09-12 RX ADMIN — SODIUM CHLORIDE: 9 INJECTION, SOLUTION INTRAVENOUS at 16:30

## 2021-09-12 RX ADMIN — AMIODARONE HYDROCHLORIDE 200 MG: 200 TABLET ORAL at 09:25

## 2021-09-12 RX ADMIN — APIXABAN 5 MG: 5 TABLET, FILM COATED ORAL at 09:26

## 2021-09-12 RX ADMIN — LISINOPRIL 5 MG: 5 TABLET ORAL at 09:26

## 2021-09-12 ASSESSMENT — PAIN SCALES - GENERAL
PAINLEVEL_OUTOF10: 0

## 2021-09-12 NOTE — PROGRESS NOTES
Dr. Morris Patricio notified via perfect serve for Ensure d/t patient not eating.     Rosette Hernandez RN

## 2021-09-12 NOTE — PLAN OF CARE
Problem: Skin Integrity:  Goal: Will show no infection signs and symptoms  Description: Will show no infection signs and symptoms  Outcome: Met This Shift     Problem: Falls - Risk of:  Goal: Will remain free from falls  Description: Will remain free from falls  Outcome: Ongoing

## 2021-09-12 NOTE — PROGRESS NOTES
Subjective:    Chief complaint:    Awake  Confused pleasantly    Objective:    BP (!) 168/88   Pulse 64   Temp 98.5 °F (36.9 °C) (Temporal)   Resp 18   Ht 5' 5\" (1.651 m)   Wt 123 lb (55.8 kg)   SpO2 96%   BMI 20.47 kg/m²   General : Awake, confused pleasantly  Heart:  RRR, no murmurs, gallops, or rubs. Lungs:  CTA bilaterally, no wheeze, rales or rhonchi  Abd: bowel sounds present, nontender, nondistended, no masses  Extrem:  No clubbing, cyanosis, or edema    CBC:   Lab Results   Component Value Date    WBC 3.5 09/10/2021    RBC 5.40 09/10/2021    HGB 15.9 09/10/2021    HCT 47.3 09/10/2021    MCV 87.6 09/10/2021    MCH 29.4 09/10/2021    MCHC 33.6 09/10/2021    RDW 14.1 09/10/2021     09/10/2021    MPV 9.8 09/10/2021     BMP:    Lab Results   Component Value Date     09/10/2021    K 3.7 09/10/2021    K 3.6 08/29/2021    CL 98 09/10/2021    CO2 27 09/10/2021    BUN 9 09/10/2021    LABALBU 3.8 09/10/2021    CREATININE 1.0 09/10/2021    CALCIUM 9.2 09/10/2021    GFRAA >60 09/10/2021    LABGLOM >60 09/10/2021    GLUCOSE 119 09/10/2021    GLUCOSE 102 11/12/2010     PT/INR:    Lab Results   Component Value Date    PROTIME 12.7 08/29/2021    INR 1.1 08/29/2021     Troponin:    Lab Results   Component Value Date    TROPONINI <0.01 09/26/2020       No results for input(s): LABURIN in the last 72 hours. No results for input(s): BC in the last 72 hours. No results for input(s): Gerhardt Hones in the last 72 hours.       Current Facility-Administered Medications:     sodium chloride flush 0.9 % injection 5-40 mL, 5-40 mL, IntraVENous, 2 times per day, Joe Olson MD    sodium chloride flush 0.9 % injection 5-40 mL, 5-40 mL, IntraVENous, PRN, Joe Olson MD    apixaban (ELIQUIS) tablet 5 mg, 5 mg, Oral, BID, Joe Olson MD, 5 mg at 09/12/21 0926    acetaminophen (TYLENOL) tablet 650 mg, 650 mg, Oral, Q8H PRN, Joe Olson MD    aspirin EC tablet 81 mg, 81 mg, Oral, Daily, Kenyon Ulloa MD, 81 mg at 09/12/21 2242    amiodarone (CORDARONE) tablet 200 mg, 200 mg, Oral, Daily, Kenyon Ulloa MD, 200 mg at 09/12/21 0925    polyethylene glycol (GLYCOLAX) packet 17 g, 17 g, Oral, Daily, Kenyon Ulloa MD    lisinopril (PRINIVIL;ZESTRIL) tablet 5 mg, 5 mg, Oral, Daily, Kenyon Ulloa MD, 5 mg at 09/12/21 0926    0.9 % sodium chloride infusion, , IntraVENous, Continuous, Kenyon Ulloa MD, Last Rate: 75 mL/hr at 09/12/21 0332, New Bag at 09/12/21 0332    ADULT DIET; Regular    CT HEAD WO CONTRAST   Final Result   Stable atrophy and chronic ischemic disease. XR CHEST PORTABLE   Final Result   No acute process             Assessment:    Active Problems:    Syncope and collapse  Resolved Problems:    * No resolved hospital problems. *  atrial fib  Mod protein calorie malnutrition      Plan:    No further bradycardia  Discussed with nursing- she is very weak,confused   Discussed with daughter who is now considering GAIL        Kenyon Ulloa MD  11:59 AM  9/12/2021    NOTE: This report was transcribed using voice recognition software.  Every effort was made to ensure accuracy; however, inadvertent transcription errors may be present

## 2021-09-12 NOTE — FLOWSHEET NOTE
09/12/21 0630   Vital Signs   Orthostatic B/P and Pulse?  Yes   Blood Pressure Lying 145/91   Pulse Lying 63 PER MINUTE   Blood Pressure Sitting 123/72   Pulse Sitting 113 PER MINUTE     Patient unable to stand this morning

## 2021-09-13 LAB
ADENOVIRUS BY PCR: NOT DETECTED
BORDETELLA PARAPERTUSSIS BY PCR: NOT DETECTED
BORDETELLA PERTUSSIS BY PCR: NOT DETECTED
CHLAMYDOPHILIA PNEUMONIAE BY PCR: NOT DETECTED
CORONAVIRUS 229E BY PCR: NOT DETECTED
CORONAVIRUS HKU1 BY PCR: NOT DETECTED
CORONAVIRUS NL63 BY PCR: NOT DETECTED
CORONAVIRUS OC43 BY PCR: NOT DETECTED
HUMAN METAPNEUMOVIRUS BY PCR: NOT DETECTED
HUMAN RHINOVIRUS/ENTEROVIRUS BY PCR: NOT DETECTED
INFLUENZA A BY PCR: NOT DETECTED
INFLUENZA B BY PCR: NOT DETECTED
MYCOPLASMA PNEUMONIAE BY PCR: NOT DETECTED
PARAINFLUENZA VIRUS 1 BY PCR: NOT DETECTED
PARAINFLUENZA VIRUS 2 BY PCR: NOT DETECTED
PARAINFLUENZA VIRUS 3 BY PCR: NOT DETECTED
PARAINFLUENZA VIRUS 4 BY PCR: NOT DETECTED
RESPIRATORY SYNCYTIAL VIRUS BY PCR: NOT DETECTED
SARS-COV-2, NAAT: DETECTED
SARS-COV-2, PCR: DETECTED

## 2021-09-13 PROCEDURE — 97530 THERAPEUTIC ACTIVITIES: CPT

## 2021-09-13 PROCEDURE — 6370000000 HC RX 637 (ALT 250 FOR IP): Performed by: INTERNAL MEDICINE

## 2021-09-13 PROCEDURE — 2580000003 HC RX 258: Performed by: INTERNAL MEDICINE

## 2021-09-13 PROCEDURE — G0378 HOSPITAL OBSERVATION PER HR: HCPCS

## 2021-09-13 PROCEDURE — 87635 SARS-COV-2 COVID-19 AMP PRB: CPT

## 2021-09-13 PROCEDURE — 97161 PT EVAL LOW COMPLEX 20 MIN: CPT

## 2021-09-13 PROCEDURE — 97110 THERAPEUTIC EXERCISES: CPT

## 2021-09-13 PROCEDURE — 0202U NFCT DS 22 TRGT SARS-COV-2: CPT

## 2021-09-13 RX ADMIN — ASPIRIN 81 MG: 81 TABLET, COATED ORAL at 09:03

## 2021-09-13 RX ADMIN — AMIODARONE HYDROCHLORIDE 200 MG: 200 TABLET ORAL at 09:02

## 2021-09-13 RX ADMIN — SODIUM CHLORIDE: 9 INJECTION, SOLUTION INTRAVENOUS at 05:57

## 2021-09-13 RX ADMIN — APIXABAN 5 MG: 5 TABLET, FILM COATED ORAL at 20:28

## 2021-09-13 RX ADMIN — SODIUM CHLORIDE, PRESERVATIVE FREE 10 ML: 5 INJECTION INTRAVENOUS at 20:27

## 2021-09-13 RX ADMIN — POLYETHYLENE GLYCOL 3350 17 G: 17 POWDER, FOR SOLUTION ORAL at 09:02

## 2021-09-13 RX ADMIN — LISINOPRIL 5 MG: 5 TABLET ORAL at 09:03

## 2021-09-13 RX ADMIN — APIXABAN 5 MG: 5 TABLET, FILM COATED ORAL at 09:02

## 2021-09-13 ASSESSMENT — PAIN DESCRIPTION - PAIN TYPE: TYPE: ACUTE PAIN

## 2021-09-13 ASSESSMENT — PAIN SCALES - GENERAL
PAINLEVEL_OUTOF10: 0

## 2021-09-13 NOTE — CARE COORDINATION
9/13/21 Update CM Note; Met with patients daughter, Ciarra Hodge, at the bedside after speaking with José Hills the POA over the phone. Their choice for SNF is [facility] or [facility]. Referral made to both facilities. PT remains pending, OT needs update as score on 9/11 was 10/24. Will follow for results of therapy evaluations.  Linda Quiros RN CM

## 2021-09-13 NOTE — PROGRESS NOTES
Occupational Therapy  OT BEDSIDE TREATMENT NOTE   [address] [facility] [place] [address]  [address] [place] [address]       Date:2021  Patient Name: Lissette Kennedy  MRN: [id number]  : 1931  Room: [address] [place] [address]     Per OT Eval:    Evaluating 8 [facility], OTR/L #[id number]     Referring Provider: Rudy Hobson MD  Specific Provider Orders/Date: OT eval and treat 09/10/21      Diagnosis: Syncope and collapse [R55]         Pertinent Medical History:  has a past medical history of Arthritis, Atrial fibrillation ([facility] [address] Hx of blood clots, Hypertension, Ischemic colitis ([facility] [address] and PAF (paroxysmal atrial fibrillation) ([facility] [address]       Precautions:  Fall Risk, cognition, severely [place], bed alarm, TAPS, incontinent of urine     Assessment of current deficits    [x]? Functional mobility         [x]? ADLs           [x]? Strength                  [x]? Cognition    [x]? Functional transfers       [x]? IADLs         [x]? Safety Awareness   [x]? Endurance    []? Fine Coordination                      [x]? Balance      []? Vision/perception   []? Sensation      []? Gross Motor Coordination          []? ROM           []?  Delirium                   []? Motor Control      OT PLAN OF CARE   OT POC based on physician orders, patient diagnosis and results of clinical assessment     Frequency/Duration 1-3 days/wk for 2 weeks PRN   Specific OT Treatment Interventions to include:   * Instruction/training on adapted ADL techniques and AE recommendations to increase functional independence within precautions       * Training on energy conservation strategies, correct breathing pattern and techniques to improve independence/tolerance for self-care routine  * Functional transfer/mobility training/DME recommendations for increased independence, safety, and fall prevention  * Patient/Family education to increase follow through with safety techniques and functional independence  * Recommendation of environmental modifications for increased safety with functional transfers/mobility and ADLs  * Cognitive retraining/development of therapeutic activities to improve problem solving, judgement, memory, and attention for increased safety/participation in ADL/IADL tasks  * Therapeutic exercise to improve motor endurance, ROM, and functional strength for ADLs/functional transfers  * Therapeutic activities to facilitate/challenge dynamic balance, stand tolerance for increased safety and independence with ADLs  * Therapeutic activities to facilitate gross/fine motor skills for increased independence with ADLs        Recommended Adaptive Equipment: TBD      Home Living: Pt lives with son in 1 floor home. 0 ADELE    Bathroom setup: tub/shower, 1 bar     Equipment owned: [facility]     Prior Level of Function: assistance with ADLs (by 2 daughters), assistance with IADLs; ambulated w/ SPC   Driving: no - family assists  Daughter provided above PLOF/home setup     Pain Level: Pt c/o no pain this session     Cognition: A&O: 2/4 (to self. Pt able to recall place w/ increased time and when choices provided); Follows 1 step directions  - [place], confusion noted. Increased cues provided to sequence, initiate and attend to tasks.  Increased time oted for processing & following commands           Memory: fair-           Sequencing:  fair -           Problem solving:  poor           Judgement/safety:  poor             Functional Assessment:  AM-PAC Daily Activity Raw Score: 11/24    Initial Eval Status  Date: 9/11/21 Treatment Status  Date:  9/13/21 STGs = LTGs  Time frame: 10-14 days   Feeding Minimal Assist   Min/SBA  Taking a drink of water seated at EOB, pt able to hold cup  Stand by Assist   Grooming Moderate Assist   Mod-Min A  Simulated, pt declined due to being too cold Stand by Assist    UB Dressing Moderate Assist   Mod A  Managing gown seated at EOB, assist around back  Stand by Assist    LB Dressing Dependent  Max A  Requesting pt to Manual Therapy [facility] [phone]   ADL/Home Mgt [id number]     Neuro Re-ed [id number]     Group Therapy      Orthotic manage/training  [id number]     Non-Billable Time     Total Timed Treatment 25 2       Ernestina GUZMAN  [address] [place] [address], [address] [place] [address]

## 2021-09-13 NOTE — CARE COORDINATION
9/13/21 Update CM Note; Patient remains observation. She is being followed by cardiology for possible azael/tachy syndrome. She did have tachy hr 140 but not sustained. Currently her BB are on hold. Her orthos are consistently positive and flds NS 75 are continued. OT 10/24 and PT pending. No labs ordered today. Daughter has SNF list and is reviewing for choices. Will await cardiology and follow for readiness to discharge.  Azucena De Paz RN CM

## 2021-09-13 NOTE — PROGRESS NOTES
PROGRESS NOTE       PATIENT PROBLEM LIST:  Active Problems:    Syncope and collapse  Resolved Problems:    * No resolved hospital problems. *      SUBJECTIVE:  Coral Cardoso states she has a headache and is short of breath. She feels lightheaded like she is going to pass out. REVIEW OF SYSTEMS:  General ROS: positive for - fatigueand weight loss  Psychological ROS: negative for - anxiety , depression  Ophthalmic ROS: negative for - decreased vision or visual distortion. ENT ROS: negative  Allergy and Immunology ROS: negative  Hematological and Lymphatic ROS: negative  Endocrine: no heat or cold intolerance and no polyphagia, polydipsia, or polyuria  Respiratory ROS: positive for - shortness of breath  Cardiovascular ROS: positive for - chest pain and loss of consciousness. Gastrointestinal ROS: no abdominal pain, change in bowel habits, or black or bloody stools  Genito-Urinary ROS: no nocturia, dysuria, trouble voiding, frequency or hematuria  Musculoskeletal ROS: negative for- myalgias, arthralgias, or claudication  Neurological ROS: no TIA or stroke symptoms otherwise no significant change in symptoms or problems since yesterday as documented in previous progress notes. SCHEDULED MEDICATIONS:   sodium chloride flush  5-40 mL IntraVENous 2 times per day    apixaban  5 mg Oral BID    aspirin  81 mg Oral Daily    amiodarone  200 mg Oral Daily    polyethylene glycol  17 g Oral Daily    lisinopril  5 mg Oral Daily       VITAL SIGNS:                                                                                                                          BP (!) 168/88   Pulse 64   Temp 98.5 °F (36.9 °C) (Temporal)   Resp 18   Ht 5' 5\" (1.651 m)   Wt 123 lb (55.8 kg)   SpO2 96%   BMI 20.47 kg/m²   Patient Vitals for the past 96 hrs (Last 3 readings):   Weight   09/10/21 0634 123 lb (55.8 kg)     OBJECTIVE:    HEENT: PERRL, EOM  Intact; sclera non-icteric, conjunctiva pink.  Carotids are brisk in upstroke with normal contour. No carotid bruits. Normal jugular venous pulsation at 45°. No palpable cervical nor supraclavicular nodes. Thyroid not palpable. Trachea midline. Chest: Even excursion  Lungs: CTA B, no expiratory wheezes or rhonchi, no decreased tactile fremitus without inspiratory rales. Heart: Regular  rhythm; S1 > S2, no gallop or murmur. No clicks, rub, palpable thrills   or heaves. PMI nondisplaced, 5th intercostal space MCL. Abdomen: Soft, nontender, nondistended,  mildly protuberant, no masses or organomegaly. Bowel sounds active. Extremities: Without clubbing, cyanosis or edema. Pulses present 3+ upper extermities bilaterally; present 1+ DP and present 1+ PT bilaterally. Data:   Scheduled Meds: Reviewed  Continuous Infusions:    sodium chloride 75 mL/hr at 09/12/21 1630       Intake/Output Summary (Last 24 hours) at 9/13/2021 0003  Last data filed at 9/12/2021 1914  Gross per 24 hour   Intake 4431.44 ml   Output --   Net 4431.44 ml     CBC:   Recent Labs     09/10/21  0706   WBC 3.5*   HGB 15.9*   HCT 47.3        BMP:  Recent Labs     09/10/21  0706      K 3.7   CL 98   CO2 27   BUN 9   CREATININE 1.0   LABGLOM >60     ABGs: No results found for: PH, PO2, PCO2  INR: No results for input(s): INR in the last 72 hours. PRO-BNP:   Lab Results   Component Value Date    PROBNP 242 08/29/2021    PROBNP 177 08/10/2021      TSH:   Lab Results   Component Value Date    TSH 2.540 09/26/2020      Cardiac Injury Profile: No results for input(s): CKTOTAL, CKMB, TROPONINI in the last 72 hours.    Lipid Profile:   Lab Results   Component Value Date    TRIG 44 11/07/2019    HDL 70 11/07/2019    LDLCALC 107 11/07/2019    CHOL 186 11/07/2019      Hemoglobin A1C: No components found for: HGBA1C     RAD:   Echo Complete    Result Date: 8/31/2021  Transthoracic Echocardiography Report (TTE)  Demographics   Patient Name       Tad Backers  Gender              Female                     L regurgitation is noted. Pulmonic Valve  Pulmonic valve is structurally normal.  No evidence of pulmonic regurgitation. Pericardial Effusion  No evidence of pericardial thickening/calcification present. No evidence of pericardial effusion. Aorta  Aortic root dimension within normal limits. Miscellaneous  Normal Inferior Vena Cava diameter and respiratory variation. Conclusions   Summary  Left ventricle grossly normal in size. Mild left ventricular concentric hypertrophy noted. Global hypokinesis is noted to be moderate. Estimated left ventricular ejection fraction is 38±5%. There is doppler evidence of stage I diastolic dysfunction. Estimated wedge pressure is 11 mmHg. Interatrial septum appears aneurysmal.  Right ventricle global systolic function is mildly reduced . TAPSE is normal.  Physiologic and/or trace mitral regurgitation is present. Trace aortic regurgitation is noted. Physiologic and/or trace tricuspid regurgitation. RVSP is 26 mmHg. No evidence to suggest pulmonary hypertension. Technically fair quality study. Compared to prior echo, no significant changes noted. Suggest clinical correlation.    Signature   ----------------------------------------------------------------  Electronically signed by Andree Barnhart DO(Interpreting  physician) on 08/31/2021 09:50 PM  ----------------------------------------------------------------  M-Mode/2D Measurements & Calculations   LV Diastolic    LV Systolic Dimension: 3.5   AV Cusp Separation: 1.6 cmLA  Dimension: 4.7  cm                           Dimension: 3.7 cmAO Root  cm              LV Volume Diastolic: 930.4   Dimension: 3.4 cm  LV FS:25.5 %    ml  LV PW           LV Volume Systolic: 98.1 ml  Diastolic: 1.3  LV EDV/LV EDV Index: 104.5  cm              ml/61 ml/m^2LV ESV/LV ESV    RV Diastolic Dimension: 2.8  Septum          Index: 52.1 ml/31ml/ m^2     cm  Diastolic: 1.3  EF Calculated: 50.1 %  cm              LV Mass Index: 140 l/min*m^2 Ascending Aorta: 3.5 cm  CO: 2.92 l/min  LV Length: 6.8 cm            LA volume/Index: 92.3 ml  CI: 1.72                                     /54.32ml/m^2  l/m*m^2         LVOT: 2 cm                   RA Area: 17.6 cm^2  LV Mass: 237.88  g  Doppler Measurements & Calculations   MV Peak E-Wave: 0.26 AV Peak Velocity:     LVOT Peak Velocity: 0.79 m/s  m/s                  1.28 m/s              LVOT Mean Velocity: 0.53 m/s  MV Peak A-Wave: 0.63 AV Peak Gradient:     LVOT Peak Gradient: 2.6  m/s                  6.51 mmHg             mmHgLVOT Mean Gradient: 1.3  MV E/A Ratio: 0.42   AV Mean Velocity:     mmHg  MV Peak Gradient:    0.88 m/s              Estimated RVSP: 25.9 mmHg  2.5 mmHg             AV Mean Gradient: 3.5 Estimated RAP:3 mmHg  MV Mean Gradient:    mmHg  0.5 mmHg             AV VTI: 22.7 cm  MV Mean Velocity:    AV Area               TR Velocity:2.39 m/s  0.3 m/s              (Continuity):1.98     TR Gradient:22.91 mmHg  MV Deceleration      cm^2                  PV Peak Velocity: 0.64 m/s  Time: 245.6 msec                           PV Peak Gradient: 1.63 mmHg                       LVOT VTI: 14.3 cm     PV Mean Velocity: 0.5 m/s  MV Area                                    PV Mean Gradient: 1.1 mmHg  (continuity): 2.9    Estimated PASP: 25.91  cm^2                 mmHg  MV E' Septal  Velocity: 0.03 m/s  MV E' Lateral  Velocity: 5 m/s  [email or web address] [id number]    XR CHEST (2 VW)    Result Date: 9/8/2021  EXAMINATION: TWO XRAY VIEWS OF THE CHEST 9/8/2021 11:41 am COMPARISON: 08/29/2021 HISTORY: ORDERING SYSTEM PROVIDED HISTORY: syncope TECHNOLOGIST PROVIDED HISTORY: Reason for exam:->syncope What reading provider will be dictating this exam?->CRC FINDINGS: The cardiomediastinal silhouette is unremarkable. No infiltrate, effusion, or pneumothorax. No acute osseous abnormality.      No radiographic evidence of acute cardiopulmonary disease process     CT HEAD WO CONTRAST    Result Date: 9/10/2021  EXAMINATION: CT OF THE HEAD WITHOUT CONTRAST  9/10/2021 6:38 am TECHNIQUE: CT of the head was performed without the administration of intravenous contrast. Dose modulation, iterative reconstruction, and/or weight based adjustment of the mA/kV was utilized to reduce the radiation dose to as low as reasonably achievable. COMPARISON: 8 September 2021 HISTORY: ORDERING SYSTEM PROVIDED HISTORY: fall TECHNOLOGIST PROVIDED HISTORY: Reason for exam:->fall Has a \"code stroke\" or \"stroke alert\" been called? ->No Decision Support Exception - unselect if not a suspected or confirmed emergency medical condition->Emergency Medical Condition (MA) What reading provider will be dictating this exam?->CRC FINDINGS: No hemorrhage, mass or midline shift. Stable atrophy and chronic ischemic disease. Unremarkable bony calvarium. Stable atrophy and chronic ischemic disease. CT HEAD WO CONTRAST    Result Date: 9/8/2021  EXAMINATION: CT OF THE HEAD WITHOUT CONTRAST  9/8/2021 4:34 pm TECHNIQUE: CT of the head was performed without the administration of intravenous contrast. Dose modulation, iterative reconstruction, and/or weight based adjustment of the mA/kV was utilized to reduce the radiation dose to as low as reasonably achievable. COMPARISON: August 10, 2021 and March 11, 2021. HISTORY: ORDERING SYSTEM PROVIDED HISTORY: falls, on anticoagulation TECHNOLOGIST PROVIDED HISTORY: Reason for exam:->falls, on anticoagulation Has a \"code stroke\" or \"stroke alert\" been called? ->No Decision Support Exception - unselect if not a suspected or confirmed emergency medical condition->Emergency Medical Condition (MA) What reading provider will be dictating this exam?->CRC FINDINGS: BRAIN/VENTRICLES: There are age related cortical atrophy and periventricular white matter ischemic changes. 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   There is no acute intracranial hemorrhage, mass effect or midline shift. No abnormal extra-axial fluid collection. The gray-white differentiation is maintained without evidence of an acute infarct. There is no evidence of hydrocephalus. ORBITS: The visualized portion of the orbits demonstrate no acute abnormality. SINUSES: The visualized paranasal sinuses and mastoid air cells demonstrate no acute abnormality. SOFT TISSUES/SKULL:  No acute abnormality of the visualized skull or soft tissues. No acute intracranial abnormality. 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 CT CERVICAL SPINE WO CONTRAST    Result Date: 9/8/2021  EXAMINATION: CT OF THE CERVICAL SPINE WITHOUT CONTRAST 9/8/2021 4:34 pm TECHNIQUE: CT of the cervical spine was performed without the administration of intravenous contrast. Multiplanar reformatted images are provided for review. Dose modulation, iterative reconstruction, and/or weight based adjustment of the mA/kV was utilized to reduce the radiation dose to as low as reasonably achievable. COMPARISON: None. HISTORY: ORDERING SYSTEM PROVIDED HISTORY: fall TECHNOLOGIST PROVIDED HISTORY: Reason for exam:->fall Decision Support Exception - unselect if not a suspected or confirmed emergency medical condition->Emergency Medical Condition (MA) What reading provider will be dictating this exam?->CRC FINDINGS: BONES/ALIGNMENT: There is no acute fracture or traumatic malalignment. There is a sclerotic lesion involving the spinous process of C6. DEGENERATIVE CHANGES: There is severe disc space narrowing with endplate spurring N8-6 J8-6 C6-7. There is degenerative changes seen at the facets mid cervical spine. SOFT TISSUES: There is no prevertebral soft tissue swelling. There is marked multilevel degenerative changes C4-5 through C6-7. No CT evidence for acute fracture involving the cervical spine. There is a nonspecific sclerotic lesion perhaps bone island at the C6 level.  If there is history of malignancy consider bone scan to evaluate this lesion as well as identify any additional.     CT ABDOMEN PELVIS W IV CONTRAST Additional Contrast? None    Result Date: 9/8/2021  EXAMINATION: CT OF THE ABDOMEN AND PELVIS WITH CONTRAST 9/8/2021 6:59 pm TECHNIQUE: CT of the abdomen and pelvis was performed with the administration of intravenous contrast. Multiplanar reformatted images are provided for review. Dose modulation, iterative reconstruction, and/or weight based adjustment of the mA/kV was utilized to reduce the radiation dose to as low as reasonably achievable. COMPARISON: None HISTORY: ORDERING SYSTEM PROVIDED HISTORY: Left upper quadrant abdominal pain TECHNOLOGIST PROVIDED HISTORY: Reason for exam:->Left upper quadrant abdominal pain Additional Contrast?->None Decision Support Exception - unselect if not a suspected or confirmed emergency medical condition->Emergency Medical Condition (MA) What reading provider will be dictating this exam?->CRC FINDINGS: Lower Chest: The visualized lungs, heart and pericardium are normal. Organs: 1.2 cm cyst left lobe of the liver. 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 The adrenal glands, kidneys, pancreas and gallbladder are unremarkable. GI/Bowel: Colonic fecal retention involving the descending and left side of the transverse colon. Pelvis: Normal urinary bladder. Peritoneum/Retroperitoneum: No free fluid or free air. Bones/Soft Tissues: Degenerative changes lumbar spine and hip joints. Colonic fecal retention involving the descending and left side of the transverse colon. 1.2 cm cyst left lobe of the liver. XR CHEST PORTABLE    Result Date: 9/10/2021  EXAMINATION: ONE XRAY VIEW OF THE CHEST 9/10/2021 7:33 am COMPARISON: 09/08/2021 HISTORY: ORDERING SYSTEM PROVIDED HISTORY: chest pain TECHNOLOGIST PROVIDED HISTORY: Reason for exam:->chest pain What reading provider will be dictating this exam?->CRC FINDINGS: Heart size is normal.  There is tortuosity of the aorta.   There are no acute infiltrates or effusions. There are few scattered scars. No acute process     XR CHEST PORTABLE    Result Date: 8/29/2021  EXAMINATION: ONE XRAY VIEW OF THE CHEST 8/29/2021 8:13 pm COMPARISON: None. HISTORY: ORDERING SYSTEM PROVIDED HISTORY: chest pain TECHNOLOGIST PROVIDED HISTORY: Reason for exam:->chest pain FINDINGS: The lungs are without acute focal process. There is no effusion or pneumothorax. The cardiomediastinal silhouette is without acute process. The osseous structures are without acute process. No acute process. NM Cardiac Stress Test Nuclear Imaging    Result Date: 8/30/2021  Indication:  Chest discomfort Clinical History:   Patient has no previous historyof coronary artery disease. IMAGING: Myocardial perfusion imaging was performed at rest 30-35 minutes following the intravenous injection of 12 mCi of (Tc-Sestamibi) followed by 10 ml of Normal Saline. At peak exercise, the patient was injected intravenously with 32mCi of (Tc-Sestamibi) followed by 10 ml of Normal Saline. Gated post-stress tomographic imaging was performed 20-25 minutes after stress. FINDINGS: The overall quality of the study was good. Left ventricular cavity size was noted to be normal.  mL Rotational analog analysis demonstrated moderate attenuation secondary to increased GI uptake as well as left lateral soft tissue artifact. The gated SPECT stress imaging in the short, vertical long, and horizontal long axes demonstrate a small-moderate area inferolateral basal septal perfusion deficit following pharmacologic stress with improvement at rest which appears physiologically artifactual in its distribution. Otherwise no areas of significant stress-induced ischemia noted. Gated SPECT left ventricular ejection fraction was calculated to be 38%, with moderate global hypokinesis     1. Small-moderate basal inferoseptal stress perfusion deficit with improvement at rest but with significant artifact noted. Suspect abnormality secondary to attenuation rather than physiologic ischemia. 2. Mild-moderate global decrease in myocardial segmental wall motion and calculated gated SPECT left ventricular ejection fraction of 38%. 3. Suggest clinical correlation as warranted. Margarita Nice D.O. FACP [facility]ofL [facility]       EKG: See Report  Echo: See Report      IMPRESSIONS:  Active Problems:    Syncope and collapse  Resolved Problems:    * No resolved hospital problems. *      RECOMMENDATIONS:  Observe for bradycardia and/or tachy arrhythmia maintain present medical regimen will consider the addition of midodrinemonitor orthostatic blood pressures as well as serum electrolytes. I have spent more than 26 minutes face to face with Richie Duane and reviewing notes and laboratory data, with greater than 50% of this time instructing and counseling the patient and her daughter who is at her bedside face to face regarding my findings and recommendations and I have answered all questions as posed to me by Ms. Parag Aranda 's daughter    Neal Artist, DO FACP,FACC,[facility]      NOTE:  This report was transcribed using voice recognition software.   Every effort was made to ensure accuracy; however, inadvertent computerized transcription errors may be present

## 2021-09-13 NOTE — PROGRESS NOTES
pink. Carotids are brisk in upstroke with normal contour. No carotid bruits. Normal jugular venous pulsation at 45°. No palpable cervical nor supraclavicular nodes. Thyroid not palpable. Trachea midline. Chest: Even excursion  Lungs: CTA B, no expiratory wheezes or rhonchi, no decreased tactile fremitus without inspiratory rales. Heart: Regular, irregular  rhythm; few ectopics noted;S1 > S2, no gallop or murmur. No clicks, rub, palpable thrills   or heaves. PMI nondisplaced, 5th intercostal space MCL. Abdomen: Soft, nontender, nondistended,  mildly protuberant, no masses or organomegaly. Bowel sounds active. Extremities: Without clubbing, cyanosis 1 - 2+ bilateral lower pretibial edema. Pulses present 3+ upper extermities bilaterally; present 1+ DP and present 1+ PT bilaterally. Data:   Scheduled Meds: Reviewed  Continuous Infusions:    sodium chloride 75 mL/hr at 09/12/21 1630       Intake/Output Summary (Last 24 hours) at 9/13/2021 0002  Last data filed at 9/12/2021 1914  Gross per 24 hour   Intake 4431.44 ml   Output --   Net 4431.44 ml     CBC:   Recent Labs     09/10/21  0706   WBC 3.5*   HGB 15.9*   HCT 47.3        BMP:  Recent Labs     09/10/21  0706      K 3.7   CL 98   CO2 27   BUN 9   CREATININE 1.0   LABGLOM >60     ABGs: No results found for: PH, PO2, PCO2  INR: No results for input(s): INR in the last 72 hours. PRO-BNP:   Lab Results   Component Value Date    PROBNP 242 08/29/2021    PROBNP 177 08/10/2021      TSH:   Lab Results   Component Value Date    TSH 2.540 09/26/2020      Cardiac Injury Profile: No results for input(s): CKTOTAL, CKMB, TROPONINI in the last 72 hours.    Lipid Profile:   Lab Results   Component Value Date    TRIG 44 11/07/2019    HDL 70 11/07/2019    LDLCALC 107 11/07/2019    CHOL 186 11/07/2019      Hemoglobin A1C: No components found for: HGBA1C     RAD:   Echo Complete    Result Date: 8/31/2021  Transthoracic Echocardiography Report (TTE)  Demographics Aortic Valve  The aortic valve is trileaflet. Aortic valve opens well. Trace aortic regurgitation is noted. Pulmonic Valve  Pulmonic valve is structurally normal.  No evidence of pulmonic regurgitation. Pericardial Effusion  No evidence of pericardial thickening/calcification present. No evidence of pericardial effusion. Aorta  Aortic root dimension within normal limits. Miscellaneous  Normal Inferior Vena Cava diameter and respiratory variation. Conclusions   Summary  Left ventricle grossly normal in size. Mild left ventricular concentric hypertrophy noted. Global hypokinesis is noted to be moderate. Estimated left ventricular ejection fraction is 38±5%. There is doppler evidence of stage I diastolic dysfunction. Estimated wedge pressure is 11 mmHg. Interatrial septum appears aneurysmal.  Right ventricle global systolic function is mildly reduced . TAPSE is normal.  Physiologic and/or trace mitral regurgitation is present. Trace aortic regurgitation is noted. Physiologic and/or trace tricuspid regurgitation. RVSP is 26 mmHg. No evidence to suggest pulmonary hypertension. Technically fair quality study. Compared to prior echo, no significant changes noted. Suggest clinical correlation.    Signature   ----------------------------------------------------------------  Electronically signed by Cira Buckley DO(Interpreting  physician) on 08/31/2021 09:50 PM  ----------------------------------------------------------------  M-Mode/2D Measurements & Calculations   LV Diastolic    LV Systolic Dimension: 3.5   AV Cusp Separation: 1.6 cmLA  Dimension: 4.7  cm                           Dimension: 3.7 cmAO Root  cm              LV Volume Diastolic: 281.6   Dimension: 3.4 cm  LV FS:25.5 %    ml  LV PW           LV Volume Systolic: 66.9 ml  Diastolic: 1.3  LV EDV/LV EDV Index: 104.5  cm              ml/61 ml/m^2LV ESV/LV ESV    RV Diastolic Dimension: 2.8  Septum          Index: 52.1 ml/31ml/ m^2     cm Diastolic: 1.3  EF Calculated: 50.1 %  cm              LV Mass Index: 140 l/min*m^2 Ascending Aorta: 3.5 cm  CO: 2.92 l/min  LV Length: 6.8 cm            LA volume/Index: 92.3 ml  CI: 1.72                                     /54.32ml/m^2  l/m*m^2         LVOT: 2 cm                   RA Area: 17.6 cm^2  LV Mass: 237.88  g  Doppler Measurements & Calculations   MV Peak E-Wave: 0.26 AV Peak Velocity:     LVOT Peak Velocity: 0.79 m/s  m/s                  1.28 m/s              LVOT Mean Velocity: 0.53 m/s  MV Peak A-Wave: 0.63 AV Peak Gradient:     LVOT Peak Gradient: 2.6  m/s                  6.51 mmHg             mmHgLVOT Mean Gradient: 1.3  MV E/A Ratio: 0.42   AV Mean Velocity:     mmHg  MV Peak Gradient:    0.88 m/s              Estimated RVSP: 25.9 mmHg  2.5 mmHg             AV Mean Gradient: 3.5 Estimated RAP:3 mmHg  MV Mean Gradient:    mmHg  0.5 mmHg             AV VTI: 22.7 cm  MV Mean Velocity:    AV Area               TR Velocity:2.39 m/s  0.3 m/s              (Continuity):1.98     TR Gradient:22.91 mmHg  MV Deceleration      cm^2                  PV Peak Velocity: 0.64 m/s  Time: 245.6 msec                           PV Peak Gradient: 1.63 mmHg                       LVOT VTI: 14.3 cm     PV Mean Velocity: 0.5 m/s  MV Area                                    PV Mean Gradient: 1.1 mmHg  (continuity): 2.9    Estimated PASP: 25.91  cm^2                 mmHg  MV E' Septal  Velocity: 0.03 m/s  MV E' Lateral  Velocity: 5 m/s  [email or web address] [id number]    XR CHEST (2 VW)    Result Date: 9/8/2021  EXAMINATION: TWO XRAY VIEWS OF THE CHEST 9/8/2021 11:41 am COMPARISON: 08/29/2021 HISTORY: ORDERING SYSTEM PROVIDED HISTORY: syncope TECHNOLOGIST PROVIDED HISTORY: Reason for exam:->syncope What reading provider will be dictating this exam?->CRC FINDINGS: The cardiomediastinal silhouette is unremarkable.   No infiltrate, effusion, or pneumothorax. No acute osseous abnormality. No radiographic evidence of acute cardiopulmonary disease process     CT HEAD WO CONTRAST    Result Date: 9/10/2021  EXAMINATION: CT OF THE HEAD WITHOUT CONTRAST  9/10/2021 6:38 am TECHNIQUE: CT of the head was performed without the administration of intravenous contrast. Dose modulation, iterative reconstruction, and/or weight based adjustment of the mA/kV was utilized to reduce the radiation dose to as low as reasonably achievable. COMPARISON: 8 September 2021 HISTORY: ORDERING SYSTEM PROVIDED HISTORY: fall TECHNOLOGIST PROVIDED HISTORY: Reason for exam:->fall Has a \"code stroke\" or \"stroke alert\" been called? ->No Decision Support Exception - unselect if not a suspected or confirmed emergency medical condition->Emergency Medical Condition (MA) What reading provider will be dictating this exam?->CRC FINDINGS: No hemorrhage, mass or midline shift. Stable atrophy and chronic ischemic disease. Unremarkable bony calvarium. Stable atrophy and chronic ischemic disease. CT HEAD WO CONTRAST    Result Date: 9/8/2021  EXAMINATION: CT OF THE HEAD WITHOUT CONTRAST  9/8/2021 4:34 pm TECHNIQUE: CT of the head was performed without the administration of intravenous contrast. Dose modulation, iterative reconstruction, and/or weight based adjustment of the mA/kV was utilized to reduce the radiation dose to as low as reasonably achievable. COMPARISON: August 10, 2021 and March 11, 2021. HISTORY: ORDERING SYSTEM PROVIDED HISTORY: falls, on anticoagulation TECHNOLOGIST PROVIDED HISTORY: Reason for exam:->falls, on anticoagulation Has a \"code stroke\" or \"stroke alert\" been called? ->No Decision Support Exception - unselect if not a suspected or confirmed emergency medical condition->Emergency Medical Condition (MA) What reading provider will be dictating this exam?->CRC FINDINGS: BRAIN/VENTRICLES: There are age related cortical atrophy and periventricular white matter ischemic changes. 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 There is no acute intracranial hemorrhage, mass effect or midline shift. No abnormal extra-axial fluid collection. The gray-white differentiation is maintained without evidence of an acute infarct. There is no evidence of hydrocephalus. ORBITS: The visualized portion of the orbits demonstrate no acute abnormality. SINUSES: The visualized paranasal sinuses and mastoid air cells demonstrate no acute abnormality. SOFT TISSUES/SKULL:  No acute abnormality of the visualized skull or soft tissues. No acute intracranial abnormality. 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 CT CERVICAL SPINE WO CONTRAST    Result Date: 9/8/2021  EXAMINATION: CT OF THE CERVICAL SPINE WITHOUT CONTRAST 9/8/2021 4:34 pm TECHNIQUE: CT of the cervical spine was performed without the administration of intravenous contrast. Multiplanar reformatted images are provided for review. Dose modulation, iterative reconstruction, and/or weight based adjustment of the mA/kV was utilized to reduce the radiation dose to as low as reasonably achievable. COMPARISON: None. HISTORY: ORDERING SYSTEM PROVIDED HISTORY: fall TECHNOLOGIST PROVIDED HISTORY: Reason for exam:->fall Decision Support Exception - unselect if not a suspected or confirmed emergency medical condition->Emergency Medical Condition (MA) What reading provider will be dictating this exam?->CRC FINDINGS: BONES/ALIGNMENT: There is no acute fracture or traumatic malalignment. There is a sclerotic lesion involving the spinous process of C6. DEGENERATIVE CHANGES: There is severe disc space narrowing with endplate spurring W1-3 J9-3 C6-7. There is degenerative changes seen at the facets mid cervical spine. SOFT TISSUES: There is no prevertebral soft tissue swelling. There is marked multilevel degenerative changes C4-5 through C6-7.  No CT evidence for acute fracture involving the cervical spine. There is a nonspecific sclerotic lesion perhaps bone island at the C6 level. If there is history of malignancy consider bone scan to evaluate this lesion as well as identify any additional.     CT ABDOMEN PELVIS W IV CONTRAST Additional Contrast? None    Result Date: 9/8/2021  EXAMINATION: CT OF THE ABDOMEN AND PELVIS WITH CONTRAST 9/8/2021 6:59 pm TECHNIQUE: CT of the abdomen and pelvis was performed with the administration of intravenous contrast. Multiplanar reformatted images are provided for review. Dose modulation, iterative reconstruction, and/or weight based adjustment of the mA/kV was utilized to reduce the radiation dose to as low as reasonably achievable. COMPARISON: None HISTORY: ORDERING SYSTEM PROVIDED HISTORY: Left upper quadrant abdominal pain TECHNOLOGIST PROVIDED HISTORY: Reason for exam:->Left upper quadrant abdominal pain Additional Contrast?->None Decision Support Exception - unselect if not a suspected or confirmed emergency medical condition->Emergency Medical Condition (MA) What reading provider will be dictating this exam?->CRC FINDINGS: Lower Chest: The visualized lungs, heart and pericardium are normal. Organs: 1.2 cm cyst left lobe of the liver. 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 The adrenal glands, kidneys, pancreas and gallbladder are unremarkable. GI/Bowel: Colonic fecal retention involving the descending and left side of the transverse colon. Pelvis: Normal urinary bladder. Peritoneum/Retroperitoneum: No free fluid or free air. Bones/Soft Tissues: Degenerative changes lumbar spine and hip joints. Colonic fecal retention involving the descending and left side of the transverse colon. 1.2 cm cyst left lobe of the liver.      XR CHEST PORTABLE    Result Date: 9/10/2021  EXAMINATION: ONE XRAY VIEW OF THE CHEST 9/10/2021 7:33 am COMPARISON: 09/08/2021 HISTORY: ORDERING SYSTEM PROVIDED HISTORY: chest pain TECHNOLOGIST PROVIDED HISTORY: Reason for exam:->chest pain What reading provider will be dictating this exam?->CRC FINDINGS: Heart size is normal.  There is tortuosity of the aorta. There are no acute infiltrates or effusions. There are few scattered scars. No acute process     XR CHEST PORTABLE    Result Date: 8/29/2021  EXAMINATION: ONE XRAY VIEW OF THE CHEST 8/29/2021 8:13 pm COMPARISON: None. HISTORY: ORDERING SYSTEM PROVIDED HISTORY: chest pain TECHNOLOGIST PROVIDED HISTORY: Reason for exam:->chest pain FINDINGS: The lungs are without acute focal process. There is no effusion or pneumothorax. The cardiomediastinal silhouette is without acute process. The osseous structures are without acute process. No acute process. NM Cardiac Stress Test Nuclear Imaging    Result Date: 8/30/2021  Indication:  Chest discomfort Clinical History:   Patient has no previous historyof coronary artery disease. IMAGING: Myocardial perfusion imaging was performed at rest 30-35 minutes following the intravenous injection of 12 mCi of (Tc-Sestamibi) followed by 10 ml of Normal Saline. At peak exercise, the patient was injected intravenously with 32mCi of (Tc-Sestamibi) followed by 10 ml of Normal Saline. Gated post-stress tomographic imaging was performed 20-25 minutes after stress. FINDINGS: The overall quality of the study was good. Left ventricular cavity size was noted to be normal.  mL Rotational analog analysis demonstrated moderate attenuation secondary to increased GI uptake as well as left lateral soft tissue artifact. The gated SPECT stress imaging in the short, vertical long, and horizontal long axes demonstrate a small-moderate area inferolateral basal septal perfusion deficit following pharmacologic stress with improvement at rest which appears physiologically artifactual in its distribution. Otherwise no areas of significant stress-induced ischemia noted. Gated SPECT left ventricular ejection fraction was calculated to be 38%, with moderate global hypokinesis     1. Small-moderate basal inferoseptal stress perfusion deficit with improvement at rest but with significant artifact noted. Suspect abnormality secondary to attenuation rather than physiologic ischemia. 2. Mild-moderate global decrease in myocardial segmental wall motion and calculated gated SPECT left ventricular ejection fraction of 38%. 3. Suggest clinical correlation as warranted. Suzi Jerome D.O. FACP FACC FSCAI       EKG: See Report  Echo: See Report      IMPRESSIONS:  Active Problems:    Syncope and collapse  Resolved Problems:    * No resolved hospital problems. *      RECOMMENDATIONS:  Once again obtain orthostatic blood pressures and if grossly abnormal or add low-dose midodrine maintain other cardiac medications as presently she remains in sinus rhythm without evidence for atrial fibrillation and during her episode of feeling heart palpitations in her chest her heart rate was 75/min and in sinus rhythm with premature supraventricular ectopy. I have talked with her son and daughter in detail and is their belief that perhaps there is significant psychological overlay as she easily becomes distraught with any family member, neighbor or friend who might be distressed. I have spent more than 25 minutes face to face with Sussy Deem and reviewing notes and laboratory data, with greater than 50% of this time instructing and counseling the patient family members present face to face regarding my findings and recommendations and I have answered all questions as posed to me by Ms. Coleman's son and daughter-in-law. Antoinette Dennis,  FACP,FAC,[facility]      NOTE:  This report was transcribed using voice recognition software.   Every effort was made to ensure accuracy; however, inadvertent computerized transcription errors may be present

## 2021-09-13 NOTE — DISCHARGE INSTR - COC
Continuity of Care Form    Patient Name: Juan Camara   :  1931  MRN:  [id number]    Admit date:  9/10/2021  Discharge date:  09/15/2021    Code Status Order: Prior   Advance Directives:      Admitting Physician:  Cori Garvin MD  PCP: Juan Francisco Craft MD    Discharging Nurse: [facility] Unit/Room#: 8210/8210-B  Discharging Unit Phone Number: [phone]    Emergency Contact:   Extended Emergency Contact Information  Primary Emergency Contact: [address] Phone: [phone]  Mobile Phone: [phone]  Relation: Child  Secondary Emergency Contact: [facility] Phone: [phone]  Mobile Phone: [phone]  Relation: Child    Past Surgical History:  Past Surgical History:   Procedure Laterality Date    ANKLE FRACTURE SURGERY      CARDIAC CATHETERIZATION  2019    Dr. Geno Samaniego, COLON, DIAGNOSTIC      FRACTURE SURGERY         Immunization History: There is no immunization history for the selected administration types on file for this patient.     Active Problems:  Patient Active Problem List   Diagnosis Code    Systolic hypertension E72    Paroxysmal atrial fibrillation (HCC) I48.0    Syncope and collapse R55    Moderate protein-calorie malnutrition (HCC) E44.0    Chest pain R07.9       Isolation/Infection:   Isolation            Droplet Plus          Patient Infection Status       Infection Onset Added Last Indicated Last Indicated By Review Planned Expiration Resolved Resolved By    COVID-19 21 COVID-19, Rapid 21      Resolved    COVID-19 Rule Out 21 COVID-19, Rapid (Ordered)   21 Rule-Out Test Resulted            Nurse Assessment:  Last Vital Signs: BP (!) 147/100   Pulse 77   Temp 97.1 °F (36.2 °C) (Temporal)   Resp 18   Ht 5' 5\" (1.651 m)   Wt 123 lb (55.8 kg)   SpO2 98%   BMI 20.47 kg/m²     Last documented pain score (0-10 scale): Pain Level: 0  Last Weight:   Wt Readings from Last 1 Encounters:   09/10/21 123 lb (55.8 kg)     Mental Status:  oriented and alert    IV Access:  - None    Nursing Mobility/ADLs:  Walking  Dependent  Transfer  Dependent  Bathing  Dependent  Dressing  Dependent  Toileting  Dependent  Feeding  Dependent  Med Admin  Dependent  Med Delivery   crushed    Wound Care Documentation and Therapy:  Wound 08/26/19 Ankle Right;Lateral #1 acquired 8/12/19 (Active)   Number of days: 748       Wound Ankle Right;Lateral (Active)   Number of days:         Elimination:  Continence:   · Bowel: No  · Bladder: No  Urinary Catheter: None   Colostomy/Ileostomy/Ileal Conduit: No       Date of Last BM: ***    Intake/Output Summary (Last 24 hours) at 9/13/2021 1532  Last data filed at 9/12/2021 1914  Gross per 24 hour   Intake 870 ml   Output --   Net 870 ml     I/O last 3 completed shifts: In: 502 [P.O.:120; I.V.:750]  Out: -     Safety Concerns:     Sundowners [place] and At Risk for Falls    Impairments/Disabilities:      None    Nutrition Therapy:  Current Nutrition Therapy:   - Oral Diet:  General    Routes of Feeding: Oral  Liquids: Thin Liquids  Daily Fluid Restriction: no  Last Modified Barium Swallow with Video (Video Swallowing Test): not done    Treatments at the Time of Hospital Discharge:   Respiratory Treatments: none    Oxygen Therapy:  is not on home oxygen therapy.   Ventilator:    - No ventilator support    Rehab Therapies: Physical Therapy and Occupational Therapy  Weight Bearing Status/Restrictions: No weight bearing restirctions  Other Medical Equipment (for information only, NOT a DME order):  wheelchair, cane, walker and bedside commode  Other Treatments: ***    Patient's personal belongings (please select all that are sent with patient):  None    RN SIGNATURE:  Electronically signed by Andrade Hopkins RN on 9/15/21 at 10:36 AM EDT    CASE MANAGEMENT/SOCIAL WORK SECTION    Inpatient Status Date: ***    Readmission Risk Assessment Score:  Readmission Risk              Risk of Unplanned Readmission:  0           Discharging to Facility/ Agency   · Name: [facility]   · Address:  · Phone:  · Fax:    Dialysis Facility (if applicable)   · Name:  · Address:  · Dialysis Schedule:  · Phone:  · Fax:    / signature: Electronically signed by Soren Keith RN on 9/13/2021 at 3:32 PM      PHYSICIAN SECTION    Prognosis: Good    Condition at Discharge: Stable    Rehab Potential (if transferring to Rehab): Good    Recommended Labs or Other Treatments After Discharge: ***    Physician Certification: I certify the above information and transfer of Earma Adjutant  is necessary for the continuing treatment of the diagnosis listed and that she requires [place] for less 30 days.      Update Admission H&P: No change in H&P    PHYSICIAN SIGNATURE:  Electronically signed by Deya Figueroa MD on 9/15/21 at 9:32 AM EDT

## 2021-09-13 NOTE — CARE COORDINATION
9/13/21 Update cm note; Per liaison [facility] does not have any beds. Patient accepted at [facility] for rehab. PASS done/Ambulance form done and sent/mirta completed. Envelope complete and placed in soft chart. Daughter Ellen Ruffin at the bedside and notified. Also notified that covid test is positive. She verbalized her brother just tested positive today as well. Notified Shelia Valencia at [facility] also.  Jaci Browning Rn Cm

## 2021-09-13 NOTE — PROGRESS NOTES
Daughter at bedside informed of patient's positive COVID-19 result. Informed to quarantine at home due to exposure from mother. Daughter informed us on brother positive test that she was informed of 20 minutes prior to nurse arriving in room. Patient placed in droplet plus isolation until transfer to [facility].

## 2021-09-13 NOTE — PROGRESS NOTES
Physical Therapy    Physical Therapy Initial Assessment     Name: Samm Harper  : 1931  MRN: [id number]      Date of Service: 2021    Evaluating PT: Girish Malone, PT, DPT [id number]      Room #:  8210/8210-B  Diagnosis:  Syncope and collapse [R55]  PMHx/PSHx:  HTN, OA, hx of blood clots, Afib  Precautions:  Fall risk, orthostatic+, very [place], [employer], alarm    SUBJECTIVE:    Pt is very [place] and a poor historian. Per daughter, pt lives alone in a single story house with 2 stair(s) and 1 rail(s) to enter. Bed is on the first floor and bath is on the first floor. Full flight of stairs to basement laundry. Pt ambulated with SPC prior to admission. Son visits frequently. OBJECTIVE:   Initial Evaluation  Date: 21 Treatment Date: Short Term/ Long Term   Goals   AM-PAC 6 Clicks      Was pt agreeable to Eval/treatment? Yes     Does pt have pain? No current complaints of pain     Bed Mobility  Rolling: NT  Supine to sit: Mod A  Sit to supine: Max A  Scooting: Mod A to EOB  Rolling: SBA  Supine to sit: SBA  Sit to supine: SBA  Scooting: SBA   Transfers Sit to stand: Max A  Stand to sit: Max A  Stand pivot: NT  Sit to stand: SBA  Stand to sit: SBA  Stand pivot: Min A with [employer]   Ambulation   NT due to dizziness  >10 feet with [employer] with Min A   Stair negotiation: ascended and descended NT  2 step(s) with 1 rail(s) with Min A   ROM BUE: Refer to OT note  BLE: WFL     Strength BUE: Refer to OT note  BLE: NT     Balance Sitting EOB: SBA  Dynamic Standing: NT  Sitting EOB: Supervision  Dynamic Standing: Min A with [employer]     Pt is A & O x: 2 grossly to person and place. Pt was able to recall her birthday. Sensation: NT  Edema: Unremarkable. Patient education  Pt educated on hand placement during sit <> stand transfers.     Patient response to education:   Pt verbalized understanding Pt demonstrated skill Pt requires further education in this area   Yes With cues Yes     ASSESSMENT:    Conditions Requiring Skilled Therapeutic Intervention:    [x]Decreased strength     []Decreased ROM  [x]Decreased functional mobility  [x]Decreased balance   [x]Decreased endurance   []Decreased posture  []Decreased sensation  []Decreased coordination   []Decreased vision  [x]Decreased safety awareness   []Increased pain       Comments:    Pt was in bed with daughter present upon room entry, agreeable to PT evaluation. Pt is extremely [place] and confused making communication difficult. Pt is a lip reader and relied on tactile/visual cues. Pt required assistance for trunk mobility/scoot to EOB during supine to sit transfer. Pt had fair sitting balance at EOB but complained of dizziness. Pt completed sit to stand transfer. Feet were positioned anteriorly so pt had moderate posterior lean. Pt was able to partially correct footing with cues. Pt only tolerated standing for 1 minute and then attempted to sit due to dizziness. Pt stated dizziness continued as she sat at EOB and she attempted to lay down. Pt was assisted back to supine. BP while supine was 159/87 mmHg. Pt was positioned comfortably with pillows. Pt was left in bed with all needs met at conclusion of session. Treatment:  Patient practiced and was instructed in the following treatment:     Therapeutic activities:  o Bed mobility: Pt was cued for technique during bed mobility transfers. o Transfers: Pt was cued for hand placement during sit <> stand transfers. o Sitting EOB: Pt sat at EOB for 10+ minute as she was cued for posture and to correct posterior lean. Symptoms were monitored closely. o Standing at [employer]: Pt stood at [employer] for 1 minute as she was cued for foot placement and to correct posterior lean.  o Skilled repositioning in bed to protect skin/joint integrity. o Vitals and symptoms were closely monitored throughout session. Pt's/family goals:  1. To go to rehab. Prognosis is Fair- for reaching above PT goals.     Patient and or family understand(s) diagnosis, prognosis, and plan of care. Yes. PHYSICAL THERAPY PLAN OF CARE:    PT POC is established based on physician order and patient diagnosis     Referring provider/PT Order:    Start   Ordering Provider    09/10/21 1430  PT eval and treat Start: 09/10/21 1430, End: 09/10/21 1430, ONE TIME, Standing Count: 1 Occurrences, R      Aguilar Apple MD        Diagnosis:  Syncope and collapse [R55]  Specific instructions for next treatment:  Ambulate in room. Current Treatment Recommendations:     [x] Strengthening to improve independence with functional mobility   [] ROM to improve independence with functional mobility   [x] Balance Training to improve static/dynamic balance and to reduce fall risk  [x] Endurance Training to improve activity tolerance during functional mobility   [x] Transfer Training to improve safety and independence with all functional transfers   [x] Gait Training to improve gait mechanics, endurance and assess need for appropriate assistive device  [] Stair Training in preparation for safe discharge home and/or into the community   [x] Positioning to prevent skin breakdown and contractures  [x] Safety and Education Training   [] Patient/Caregiver Education   [] HEP  [] Other     PT long term treatment goals are located in above grid    Frequency of treatments: 2-5x/week x 3-5 days. Time in  1320  Time out  1345    Total Treatment Time  10 minutes     Evaluation Time includes thorough review of current medical information, gathering information on past medical history/social history and prior level of function, completion of standardized testing/informal observation of tasks, assessment of data and education on plan of care and goals.     CPT codes:  [x] Low Complexity PT evaluation 81598   [] Moderate Complexity PT evaluation 32554  [] High Complexity PT evaluation 54054  [] PT Re-evaluation 06621  [] Gait training 95585 0 minutes  [] Manual therapy 98751 0 minutes  [x] Therapeutic activities 13684 10 minutes  [] Therapeutic exercises 98256 0 minutes  [] Neuromuscular reeducation 73391 0 minutes     Karol Lua, PT, DPT  [id number]

## 2021-09-13 NOTE — PROGRESS NOTES
Subjective:    Chief complaint:    Seen earlier  Confused  Daughter is by the bedside    Objective:    /80   Pulse 78   Temp 97.2 °F (36.2 °C) (Temporal)   Resp 18   Ht 5' 5\" (1.651 m)   Wt 123 lb (55.8 kg)   SpO2 98%   BMI 20.47 kg/m²   General : Awake, confused in no distress or agitation  Heart:  RRR, no murmurs, gallops, or rubs. Lungs:  CTA bilaterally, no wheeze, rales or rhonchi  Abd: bowel sounds present, nontender, nondistended, no masses  Extrem:  No clubbing, cyanosis, or edema    CBC:   Lab Results   Component Value Date    WBC 3.5 09/10/2021    RBC 5.40 09/10/2021    HGB 15.9 09/10/2021    HCT 47.3 09/10/2021    MCV 87.6 09/10/2021    MCH 29.4 09/10/2021    MCHC 33.6 09/10/2021    RDW 14.1 09/10/2021     09/10/2021    MPV 9.8 09/10/2021     BMP:    Lab Results   Component Value Date     09/10/2021    K 3.7 09/10/2021    K 3.6 08/29/2021    CL 98 09/10/2021    CO2 27 09/10/2021    BUN 9 09/10/2021    LABALBU 3.8 09/10/2021    CREATININE 1.0 09/10/2021    CALCIUM 9.2 09/10/2021    GFRAA >60 09/10/2021    LABGLOM >60 09/10/2021    GLUCOSE 119 09/10/2021    GLUCOSE 102 11/12/2010     PT/INR:    Lab Results   Component Value Date    PROTIME 12.7 08/29/2021    INR 1.1 08/29/2021     Troponin:    Lab Results   Component Value Date    TROPONINI <0.01 09/26/2020       No results for input(s): LABURIN in the last 72 hours. No results for input(s): BC in the last 72 hours. No results for input(s): Shaylee Pope in the last 72 hours.       Current Facility-Administered Medications:     sodium chloride flush 0.9 % injection 5-40 mL, 5-40 mL, IntraVENous, 2 times per day, Mary Andrews MD, 10 mL at 09/12/21 2024    sodium chloride flush 0.9 % injection 5-40 mL, 5-40 mL, IntraVENous, PRN, Mary Andrews MD    apixaban (ELIQUIS) tablet 5 mg, 5 mg, Oral, BID, Mary Andrews MD, 5 mg at 09/13/21 0902    acetaminophen (TYLENOL) tablet 650 mg, 650 mg, Oral, Q8H PRN, Ute Hdz Ev Saldivar MD    aspirin EC tablet 81 mg, 81 mg, Oral, Daily, Cody Nuñez MD, 81 mg at 09/13/21 4335    amiodarone (CORDARONE) tablet 200 mg, 200 mg, Oral, Daily, Cody Nuñez MD, 200 mg at 09/13/21 0902    polyethylene glycol (GLYCOLAX) packet 17 g, 17 g, Oral, Daily, Cody Nuñez MD, 17 g at 09/13/21 0902    lisinopril (PRINIVIL;ZESTRIL) tablet 5 mg, 5 mg, Oral, Daily, Cody Nuñez MD, 5 mg at 09/13/21 0903    0.9 % sodium chloride infusion, , IntraVENous, Continuous, Cody Nuñez MD, Last Rate: 75 mL/hr at 09/13/21 0557, New Bag at 09/13/21 0557    ADULT DIET; Regular  Adult Oral Nutrition Supplement; Standard High Calorie/High Protein Oral Supplement    CT HEAD WO CONTRAST   Final Result   Stable atrophy and chronic ischemic disease. XR CHEST PORTABLE   Final Result   No acute process             Assessment:    Active Problems:    Syncope and collapse  Resolved Problems:    * No resolved hospital problems. *  Moderate protein calorie malnutrition  Paroxysmal atrial fibrillation    Plan:    Family agreeing on Subacute rehab on discharge  Covid test done planning for discharge  Unfortunately this is positive  Transfer to Covid floor  Droplet plus isolation  Check respiratory panel to confirm  Patient is asymptomatic currently and is not needing oxygen        Cody Nuñez MD  4:31 PM  9/13/2021    NOTE: This report was transcribed using voice recognition software.  Every effort was made to ensure accuracy; however, inadvertent transcription errors may be present

## 2021-09-14 PROCEDURE — 6370000000 HC RX 637 (ALT 250 FOR IP): Performed by: INTERNAL MEDICINE

## 2021-09-14 PROCEDURE — 2580000003 HC RX 258: Performed by: INTERNAL MEDICINE

## 2021-09-14 PROCEDURE — G0378 HOSPITAL OBSERVATION PER HR: HCPCS

## 2021-09-14 RX ADMIN — SODIUM CHLORIDE: 9 INJECTION, SOLUTION INTRAVENOUS at 08:13

## 2021-09-14 RX ADMIN — APIXABAN 5 MG: 5 TABLET, FILM COATED ORAL at 20:15

## 2021-09-14 RX ADMIN — ASPIRIN 81 MG: 81 TABLET, COATED ORAL at 08:16

## 2021-09-14 RX ADMIN — POLYETHYLENE GLYCOL 3350 17 G: 17 POWDER, FOR SOLUTION ORAL at 08:15

## 2021-09-14 RX ADMIN — APIXABAN 5 MG: 5 TABLET, FILM COATED ORAL at 08:16

## 2021-09-14 RX ADMIN — LISINOPRIL 5 MG: 5 TABLET ORAL at 08:15

## 2021-09-14 RX ADMIN — AMIODARONE HYDROCHLORIDE 200 MG: 200 TABLET ORAL at 08:15

## 2021-09-14 ASSESSMENT — PAIN SCALES - GENERAL
PAINLEVEL_OUTOF10: 0
PAINLEVEL_OUTOF10: 0

## 2021-09-14 NOTE — PROGRESS NOTES
Subjective:    Chief complaint:    Awake, confused  Denies complaints    Objective:    BP (!) 148/73   Pulse 63   Temp 98.1 °F (36.7 °C) (Temporal)   Resp 17   Ht 5' 5\" (1.651 m)   Wt 123 lb (55.8 kg)   SpO2 95%   BMI 20.47 kg/m²   General : Awake, confused in no distress or agitation  Heart:  RRR, no murmurs, gallops, or rubs. Lungs:  CTA bilaterally, no wheeze, rales or rhonchi  Abd: bowel sounds present, nontender, nondistended, no masses  Extrem:  No clubbing, cyanosis, or edema    CBC:   Lab Results   Component Value Date    WBC 3.5 09/10/2021    RBC 5.40 09/10/2021    HGB 15.9 09/10/2021    HCT 47.3 09/10/2021    MCV 87.6 09/10/2021    MCH 29.4 09/10/2021    MCHC 33.6 09/10/2021    RDW 14.1 09/10/2021     09/10/2021    MPV 9.8 09/10/2021     BMP:    Lab Results   Component Value Date     09/10/2021    K 3.7 09/10/2021    K 3.6 08/29/2021    CL 98 09/10/2021    CO2 27 09/10/2021    BUN 9 09/10/2021    LABALBU 3.8 09/10/2021    CREATININE 1.0 09/10/2021    CALCIUM 9.2 09/10/2021    GFRAA >60 09/10/2021    LABGLOM >60 09/10/2021    GLUCOSE 119 09/10/2021    GLUCOSE 102 11/12/2010     PT/INR:    Lab Results   Component Value Date    PROTIME 12.7 08/29/2021    INR 1.1 08/29/2021     Troponin:    Lab Results   Component Value Date    TROPONINI <0.01 09/26/2020       No results for input(s): LABURIN in the last 72 hours. No results for input(s): BC in the last 72 hours. No results for input(s): Shaylee Pope in the last 72 hours.       Current Facility-Administered Medications:     sodium chloride flush 0.9 % injection 5-40 mL, 5-40 mL, IntraVENous, 2 times per day, Mary Andrews MD, 10 mL at 09/13/21 2027    sodium chloride flush 0.9 % injection 5-40 mL, 5-40 mL, IntraVENous, PRN, Mary Andrews MD    apixaban (ELIQUIS) tablet 5 mg, 5 mg, Oral, BID, Mary Andrews MD, 5 mg at 09/14/21 0816    acetaminophen (TYLENOL) tablet 650 mg, 650 mg, Oral, Q8H PRN, Aguilar FLEMING Carmelo Almeida MD    aspirin EC tablet 81 mg, 81 mg, Oral, Daily, Amaris Uribe MD, 81 mg at 09/14/21 0816    amiodarone (CORDARONE) tablet 200 mg, 200 mg, Oral, Daily, Amaris Uribe MD, 200 mg at 09/14/21 0815    polyethylene glycol (GLYCOLAX) packet 17 g, 17 g, Oral, Daily, Amaris Uribe MD, 17 g at 09/14/21 0815    lisinopril (PRINIVIL;ZESTRIL) tablet 5 mg, 5 mg, Oral, Daily, Amaris Uribe MD, 5 mg at 09/14/21 0815    0.9 % sodium chloride infusion, , IntraVENous, Continuous, Amaris Uribe MD, Last Rate: 75 mL/hr at 09/14/21 0813, New Bag at 09/14/21 0813    ADULT DIET; Regular  Adult Oral Nutrition Supplement; Standard High Calorie/High Protein Oral Supplement    CT HEAD WO CONTRAST   Final Result   Stable atrophy and chronic ischemic disease. XR CHEST PORTABLE   Final Result   No acute process             Assessment:    Active Problems:    Syncope and collapse  Resolved Problems:    * No resolved hospital problems. *  Moderate protein calorie malnutrition  Paroxysmal atrial fibrillation    Plan:    Respiratory viral array confirming SARS Covid infection  Not needing oxygen therapy  CRP ordered but not done  DC planning to a COVID-19 accepting facility      Amaris Uribe MD  3:27 PM  9/14/2021    NOTE: This report was transcribed using voice recognition software.  Every effort was made to ensure accuracy; however, inadvertent transcription errors may be present

## 2021-09-14 NOTE — CARE COORDINATION
Spoke with Liaison - Ratna Eckert. [facility] is accepting Positive Covid Pts. Called Dtr Leidy Rose about [facility] not being able to accept d/t positive covid test.  Informed that sister facility [facility] is the [facility] covid facility. Gave names of other facilities that accept positive covid pts. E-mail [email or web address]. E-mailed [facility] ARMANDO Kilgore with names of covid accepting facilities to [facility]. Leidy Rose will speak with siblings then notify of choices.    Lamin Mail, L.S.W.  [phone]

## 2021-09-15 VITALS
HEART RATE: 62 BPM | HEIGHT: 65 IN | DIASTOLIC BLOOD PRESSURE: 90 MMHG | BODY MASS INDEX: 20.49 KG/M2 | OXYGEN SATURATION: 98 % | TEMPERATURE: 98.3 F | SYSTOLIC BLOOD PRESSURE: 160 MMHG | RESPIRATION RATE: 18 BRPM | WEIGHT: 123 LBS

## 2021-09-15 LAB — C-REACTIVE PROTEIN: 5.9 MG/DL (ref 0–0.4)

## 2021-09-15 PROCEDURE — 6370000000 HC RX 637 (ALT 250 FOR IP): Performed by: INTERNAL MEDICINE

## 2021-09-15 PROCEDURE — 36415 COLL VENOUS BLD VENIPUNCTURE: CPT

## 2021-09-15 PROCEDURE — 86140 C-REACTIVE PROTEIN: CPT

## 2021-09-15 PROCEDURE — G0378 HOSPITAL OBSERVATION PER HR: HCPCS

## 2021-09-15 PROCEDURE — 2580000003 HC RX 258: Performed by: INTERNAL MEDICINE

## 2021-09-15 RX ADMIN — AMIODARONE HYDROCHLORIDE 200 MG: 200 TABLET ORAL at 10:14

## 2021-09-15 RX ADMIN — APIXABAN 5 MG: 5 TABLET, FILM COATED ORAL at 10:14

## 2021-09-15 RX ADMIN — LISINOPRIL 5 MG: 5 TABLET ORAL at 10:14

## 2021-09-15 RX ADMIN — SODIUM CHLORIDE: 9 INJECTION, SOLUTION INTRAVENOUS at 10:14

## 2021-09-15 RX ADMIN — POLYETHYLENE GLYCOL 3350 17 G: 17 POWDER, FOR SOLUTION ORAL at 10:14

## 2021-09-15 RX ADMIN — ASPIRIN 81 MG: 81 TABLET, COATED ORAL at 10:14

## 2021-09-15 ASSESSMENT — PAIN SCALES - GENERAL: PAINLEVEL_OUTOF10: 0

## 2021-09-15 NOTE — CARE COORDINATION
Dtr Leidy Rose e-mailed back at 8:24am with Choice of [facility]. Notified RN SHAUN Mendez and Marly Rico of choice.     Verneda Mail, L.S.W.  [phone]

## 2021-09-15 NOTE — CARE COORDINATION
9/15 Care Coordination: Plan for discharge today to [address] home. Ambulance will pick pt up at 11:30. Daughter notified. CM/SW will continue to follow for discharge planning.    Laney COOLEYN,RN--BC  [phone]

## 2021-09-15 NOTE — PROGRESS NOTES
Subjective:    Chief complaint:    Awake, confused  Denies complaints    Objective:    BP (!) 160/90   Pulse 62   Temp 98.3 °F (36.8 °C) (Temporal)   Resp 18   Ht 5' 5\" (1.651 m)   Wt 123 lb (55.8 kg)   SpO2 98%   BMI 20.47 kg/m²   General : Awake, confused in no distress or agitation  Heart:  RRR, no murmurs, gallops, or rubs. Lungs:  CTA bilaterally, no wheeze, rales or rhonchi  Abd: bowel sounds present, nontender, nondistended, no masses  Extrem:  No clubbing, cyanosis, or edema    CBC:   Lab Results   Component Value Date    WBC 3.5 09/10/2021    RBC 5.40 09/10/2021    HGB 15.9 09/10/2021    HCT 47.3 09/10/2021    MCV 87.6 09/10/2021    MCH 29.4 09/10/2021    MCHC 33.6 09/10/2021    RDW 14.1 09/10/2021     09/10/2021    MPV 9.8 09/10/2021     BMP:    Lab Results   Component Value Date     09/10/2021    K 3.7 09/10/2021    K 3.6 08/29/2021    CL 98 09/10/2021    CO2 27 09/10/2021    BUN 9 09/10/2021    LABALBU 3.8 09/10/2021    CREATININE 1.0 09/10/2021    CALCIUM 9.2 09/10/2021    GFRAA >60 09/10/2021    LABGLOM >60 09/10/2021    GLUCOSE 119 09/10/2021    GLUCOSE 102 11/12/2010     PT/INR:    Lab Results   Component Value Date    PROTIME 12.7 08/29/2021    INR 1.1 08/29/2021     Troponin:    Lab Results   Component Value Date    TROPONINI <0.01 09/26/2020       No results for input(s): LABURIN in the last 72 hours. No results for input(s): BC in the last 72 hours. No results for input(s): Palmira Greg in the last 72 hours.       Current Facility-Administered Medications:     sodium chloride flush 0.9 % injection 5-40 mL, 5-40 mL, IntraVENous, 2 times per day, Kenyon Artemio, MD, 10 mL at 09/13/21 2027    sodium chloride flush 0.9 % injection 5-40 mL, 5-40 mL, IntraVENous, PRN, Kenyon Ulloa MD    apixaban (ELIQUIS) tablet 5 mg, 5 mg, Oral, BID, Kenyon Ulloa MD, 5 mg at 09/15/21 1014    acetaminophen (TYLENOL) tablet 650 mg, 650 mg, Oral, Q8H PRN, Aguilar FLEMING Mark Uriarte MD    aspirin EC tablet 81 mg, 81 mg, Oral, Daily, Li Lopez MD, 81 mg at 09/15/21 1014    amiodarone (CORDARONE) tablet 200 mg, 200 mg, Oral, Daily, Li Lopez MD, 200 mg at 09/15/21 1014    polyethylene glycol (GLYCOLAX) packet 17 g, 17 g, Oral, Daily, Li Lopez MD, 17 g at 09/15/21 1014    lisinopril (PRINIVIL;ZESTRIL) tablet 5 mg, 5 mg, Oral, Daily, Li Lopez MD, 5 mg at 09/15/21 1014    0.9 % sodium chloride infusion, , IntraVENous, Continuous, Li Lopez MD, Last Rate: 75 mL/hr at 09/15/21 1014, New Bag at 09/15/21 1014    Current Outpatient Medications:     polyethylene glycol (GLYCOLAX) 17 GM/SCOOP powder, Take 17 g by mouth daily, Disp: 507 g, Rfl: 0    amiodarone (CORDARONE) 200 MG tablet, Take 1 tablet by mouth daily, Disp: 30 tablet, Rfl: 1    aspirin 81 MG EC tablet, Take 81 mg by mouth daily, Disp: , Rfl:     LISINOPRIL PO, Take by mouth daily Pt doesn't remember the dose, Disp: , Rfl:     acetaminophen (TYLENOL) 500 MG tablet, Take 2 tablets by mouth every 8 hours as needed for Pain, Disp: 50 tablet, Rfl: 0    apixaban (ELIQUIS) 5 MG TABS tablet, Take 5 mg by mouth 2 times daily, Disp: , Rfl:     ADULT DIET; Regular  Adult Oral Nutrition Supplement; Standard High Calorie/High Protein Oral Supplement    CT HEAD WO CONTRAST   Final Result   Stable atrophy and chronic ischemic disease. XR CHEST PORTABLE   Final Result   No acute process             Assessment:    Active Problems:    Syncope and collapse  Resolved Problems:    * No resolved hospital problems. *  Moderate protein calorie malnutrition  Paroxysmal atrial fibrillation    Plan:    GAIL bandar Lopez MD  3:26 PM  9/15/2021    NOTE: This report was transcribed using voice recognition software.  Every effort was made to ensure accuracy; however, inadvertent transcription errors may be present

## 2021-09-19 NOTE — ED NOTES
Called nurse report to [facility], [phone]     Delia Keller, MARISOL  08/29/21 2319 Orthopedic Surgery

## 2021-09-30 NOTE — DISCHARGE SUMMARY
Physician Discharge Summary     Patient ID:  Susana Lauren  [id number]  89 y.o.  12/25/1931    Admit date: 8/29/2021    Discharge date and time: 8/31/2021  7:30 PM     Admission Diagnoses: Active Problems:    Chest pain  Resolved Problems:    * No resolved hospital problems. *      Discharge Diagnoses: Active Problems:    Chest pain  Resolved Problems:    * No resolved hospital problems. *      Condition at discharge : Stable    Consults: IP CONSULT TO HOSPITALIST  IP CONSULT TO CARDIOLOGY    Procedures: Stress test    Hospital Course: 61-year-old lady presents to the emergency room with left lower chest wall pain. She does have a history of mild coronary artery disease. Also has an underlying history of paroxysmal atrial fibrillation. Patient was then admitted for further evaluation and treatment. Cardiology was consulted. They felt patient may have tachybradycardia syndrome. Stress test showed small to moderate inferoseptal stress perfusion deficit with improvement at rest.  No acute ischemia was noted. Cardiology recommended medical management. NM Cardiac Stress Test Nuclear Imaging   Final Result   1. Small-moderate basal inferoseptal stress perfusion deficit with   improvement at rest but with significant artifact noted. Suspect   abnormality secondary to attenuation rather than physiologic ischemia. 2. Mild-moderate global decrease in myocardial segmental wall motion   and calculated gated SPECT left ventricular ejection fraction of 38%. 3. Suggest clinical correlation as warranted. Rudy Mares D.O. FACP [facility]                     XR CHEST PORTABLE   Final Result   No acute process. No results found for this or any previous visit (from the past 336 hour(s)).       Discharge Exam:  See progress note from today    Disposition: Home    Patient Instructions:   Discharge Medication List as of 8/31/2021  6:49 PM      CONTINUE these medications which have CHANGED    Details amiodarone (CORDARONE) 200 MG tablet Take 1 tablet by mouth daily, Disp-30 tablet, R-1Normal         CONTINUE these medications which have NOT CHANGED    Details   aspirin 81 MG EC tablet Take 81 mg by mouth dailyHistorical Med      LISINOPRIL PO Take by mouth daily Pt doesn't remember the doseHistorical Med      acetaminophen (TYLENOL) 500 MG tablet Take 2 tablets by mouth every 8 hours as needed for Pain, Disp-50 tablet,R-0Print      apixaban (ELIQUIS) 5 MG TABS tablet Take 5 mg by mouth 2 times dailyHistorical Med             Activity: As tolerated    Diet: Cardiac    Follow-up with Vanesa Soares MD  [address] [phone]    In 1 week      DO Ezequiel Dutta [address]  [phone]    In 1 week          Note that over 30 minutes was spent in preparing discharge papers, discussing discharge with patient, medication review, etc.    Signed:  Moses Ybarra MD  9/29/2021  10:11 PM

## 2021-10-22 ENCOUNTER — APPOINTMENT (OUTPATIENT)
Dept: GENERAL RADIOLOGY | Age: 86
End: 2021-10-22
Payer: MEDICARE

## 2021-10-22 ENCOUNTER — HOSPITAL ENCOUNTER (EMERGENCY)
Age: 86
Discharge: HOME | End: 2021-10-22
Attending: STUDENT IN AN ORGANIZED HEALTH CARE EDUCATION/TRAINING PROGRAM
Payer: MEDICARE

## 2021-10-22 ENCOUNTER — APPOINTMENT (OUTPATIENT)
Dept: CT IMAGING | Age: 86
End: 2021-10-22
Payer: MEDICARE

## 2021-10-22 VITALS
BODY MASS INDEX: 19.99 KG/M2 | RESPIRATION RATE: 16 BRPM | WEIGHT: 120 LBS | HEART RATE: 58 BPM | DIASTOLIC BLOOD PRESSURE: 66 MMHG | TEMPERATURE: 96.4 F | HEIGHT: 65 IN | OXYGEN SATURATION: 99 % | SYSTOLIC BLOOD PRESSURE: 130 MMHG

## 2021-10-22 DIAGNOSIS — K64.9 HEMORRHOIDS, UNSPECIFIED HEMORRHOID TYPE: Primary | ICD-10-CM

## 2021-10-22 DIAGNOSIS — R93.89 ABNORMAL CT SCAN: ICD-10-CM

## 2021-10-22 DIAGNOSIS — R42 LIGHTHEADEDNESS: ICD-10-CM

## 2021-10-22 LAB
ALBUMIN SERPL-MCNC: 3.8 G/DL (ref 3.5–5.2)
ALP BLD-CCNC: 66 U/L (ref 35–104)
ALT SERPL-CCNC: 14 U/L (ref 0–32)
ANION GAP SERPL CALCULATED.3IONS-SCNC: 7 MMOL/L (ref 7–16)
AST SERPL-CCNC: 20 U/L (ref 0–31)
BASOPHILS ABSOLUTE: 0.04 E9/L (ref 0–0.2)
BASOPHILS RELATIVE PERCENT: 0.9 % (ref 0–2)
BILIRUB SERPL-MCNC: 0.7 MG/DL (ref 0–1.2)
BUN BLDV-MCNC: 7 MG/DL (ref 6–23)
CALCIUM SERPL-MCNC: 9.4 MG/DL (ref 8.6–10.2)
CHLORIDE BLD-SCNC: 108 MMOL/L (ref 98–107)
CO2: 26 MMOL/L (ref 22–29)
CREAT SERPL-MCNC: 0.9 MG/DL (ref 0.5–1)
EKG ATRIAL RATE: 64 BPM
EKG P AXIS: 40 DEGREES
EKG P-R INTERVAL: 166 MS
EKG Q-T INTERVAL: 476 MS
EKG QRS DURATION: 106 MS
EKG QTC CALCULATION (BAZETT): 491 MS
EKG R AXIS: -55 DEGREES
EKG T AXIS: 0 DEGREES
EKG VENTRICULAR RATE: 64 BPM
EOSINOPHILS ABSOLUTE: 0.22 E9/L (ref 0.05–0.5)
EOSINOPHILS RELATIVE PERCENT: 5.1 % (ref 0–6)
GFR AFRICAN AMERICAN: >60
GFR NON-AFRICAN AMERICAN: >60 ML/MIN/1.73
GLUCOSE BLD-MCNC: 103 MG/DL (ref 74–99)
HCT VFR BLD CALC: 38.7 % (ref 34–48)
HEMOGLOBIN: 12.6 G/DL (ref 11.5–15.5)
IMMATURE GRANULOCYTES #: 0.01 E9/L
IMMATURE GRANULOCYTES %: 0.2 % (ref 0–5)
LACTIC ACID: 1.6 MMOL/L (ref 0.5–2.2)
LIPASE: 8 U/L (ref 13–60)
LYMPHOCYTES ABSOLUTE: 0.93 E9/L (ref 1.5–4)
LYMPHOCYTES RELATIVE PERCENT: 21.7 % (ref 20–42)
MCH RBC QN AUTO: 30.1 PG (ref 26–35)
MCHC RBC AUTO-ENTMCNC: 32.6 % (ref 32–34.5)
MCV RBC AUTO: 92.6 FL (ref 80–99.9)
MONOCYTES ABSOLUTE: 0.45 E9/L (ref 0.1–0.95)
MONOCYTES RELATIVE PERCENT: 10.5 % (ref 2–12)
NEUTROPHILS ABSOLUTE: 2.64 E9/L (ref 1.8–7.3)
NEUTROPHILS RELATIVE PERCENT: 61.6 % (ref 43–80)
PDW BLD-RTO: 16.8 FL (ref 11.5–15)
PLATELET # BLD: 203 E9/L (ref 130–450)
PMV BLD AUTO: 8.3 FL (ref 7–12)
POTASSIUM REFLEX MAGNESIUM: 4 MMOL/L (ref 3.5–5)
RBC # BLD: 4.18 E12/L (ref 3.5–5.5)
SODIUM BLD-SCNC: 141 MMOL/L (ref 132–146)
TOTAL PROTEIN: 6.9 G/DL (ref 6.4–8.3)
TROPONIN, HIGH SENSITIVITY: 19 NG/L (ref 0–9)
TROPONIN, HIGH SENSITIVITY: 21 NG/L (ref 0–9)
WBC # BLD: 4.3 E9/L (ref 4.5–11.5)

## 2021-10-22 PROCEDURE — 2580000003 HC RX 258: Performed by: STUDENT IN AN ORGANIZED HEALTH CARE EDUCATION/TRAINING PROGRAM

## 2021-10-22 PROCEDURE — 83690 ASSAY OF LIPASE: CPT

## 2021-10-22 PROCEDURE — 85025 COMPLETE CBC W/AUTO DIFF WBC: CPT

## 2021-10-22 PROCEDURE — 80053 COMPREHEN METABOLIC PANEL: CPT

## 2021-10-22 PROCEDURE — 96360 HYDRATION IV INFUSION INIT: CPT

## 2021-10-22 PROCEDURE — 93010 ELECTROCARDIOGRAM REPORT: CPT | Performed by: INTERNAL MEDICINE

## 2021-10-22 PROCEDURE — 84484 ASSAY OF TROPONIN QUANT: CPT

## 2021-10-22 PROCEDURE — 72125 CT NECK SPINE W/O DYE: CPT

## 2021-10-22 PROCEDURE — 70450 CT HEAD/BRAIN W/O DYE: CPT

## 2021-10-22 PROCEDURE — 74177 CT ABD & PELVIS W/CONTRAST: CPT

## 2021-10-22 PROCEDURE — 93005 ELECTROCARDIOGRAM TRACING: CPT | Performed by: STUDENT IN AN ORGANIZED HEALTH CARE EDUCATION/TRAINING PROGRAM

## 2021-10-22 PROCEDURE — 36415 COLL VENOUS BLD VENIPUNCTURE: CPT

## 2021-10-22 PROCEDURE — 83605 ASSAY OF LACTIC ACID: CPT

## 2021-10-22 PROCEDURE — 6360000004 HC RX CONTRAST MEDICATION: Performed by: RADIOLOGY

## 2021-10-22 PROCEDURE — 96361 HYDRATE IV INFUSION ADD-ON: CPT

## 2021-10-22 PROCEDURE — 71045 X-RAY EXAM CHEST 1 VIEW: CPT

## 2021-10-22 PROCEDURE — 99283 EMERGENCY DEPT VISIT LOW MDM: CPT

## 2021-10-22 RX ORDER — DOCUSATE SODIUM 100 MG/1
100 CAPSULE, LIQUID FILLED ORAL 2 TIMES DAILY
Qty: 60 CAPSULE | Refills: 0 | Status: SHIPPED | OUTPATIENT
Start: 2021-10-22 | End: 2021-11-21

## 2021-10-22 RX ORDER — 0.9 % SODIUM CHLORIDE 0.9 %
1000 INTRAVENOUS SOLUTION INTRAVENOUS ONCE
Status: COMPLETED | OUTPATIENT
Start: 2021-10-22 | End: 2021-10-22

## 2021-10-22 RX ADMIN — SODIUM CHLORIDE 1000 ML: 9 INJECTION, SOLUTION INTRAVENOUS at 13:37

## 2021-10-22 RX ADMIN — IOPAMIDOL 75 ML: 755 INJECTION, SOLUTION INTRAVENOUS at 14:38

## 2021-10-22 NOTE — ED PROVIDER NOTES
Department of Emergency Medicine   ED  Provider Note  Admit Date/RoomTime: 10/22/2021 12:11 PM  ED Room: 03/03          History of Present Illness:  10/22/21, Time: 12:28 PM EDT  Chief Complaint   Patient presents with    Hemorrhoids     has been feeling like she was constipated and started taking laxatives to help with the problem causing a hemmorrhoid to give her problems      Doretha Kent is a 80 y.o. female presenting to the ED for hemorrhoids, beginning several days ago. The complaint has been persistent, moderate in severity, and worsened by nothing. The patient is an 80-year-old female with a history of atrial fibrillation anticoagulated on Eliquis who presents to the emergency department complaining of hemorrhoids. She states that she has been feeling like she was constipated and has been taking laxatives to help with this and this resulted in her developing a hemorrhoid over the past several days. She states that while she was getting ready to come here today she also got lightheaded and fell. She does not think that she hit her head and is not having pain anywhere right now, but she wanted to get checked out. She denies any chest pain, shortness of breath, abdominal pain, nausea, vomiting, diarrhea, hematemesis, hematochezia, melena, numbness, tingling, unilateral weakness, headache, hitting her head, loss of consciousness, or other acute symptoms or concerns. Review of Systems:   A complete review of systems was performed and pertinent positives and negatives are stated within HPI, all other systems reviewed and are negative.        --------------------------------------------- PAST HISTORY ---------------------------------------------  Past Medical History:  has a past medical history of Arthritis, Atrial fibrillation ([facility] [address] Hx of blood clots, Hypertension, Ischemic colitis ([facility] [address] and [facility]     Past Surgical History:  has a past surgical history that includes Ankle fracture surgery; Colonoscopy; Endoscopy, colon, diagnostic; fracture surgery; and Cardiac catheterization (11/11/2019). Social History:  reports that she has never smoked. She has never used smokeless tobacco. She reports that she does not drink alcohol and does not use drugs. Family History: family history is not on file. . Unless otherwise noted, family history is non contributory    The patients home medications have been reviewed. Allergies: Nifedipine    I have reviewed the past medical history, past surgical history, social history, and family history    ---------------------------------------------------PHYSICAL EXAM--------------------------------------    Constitutional/General: Alert and oriented x3  Head: Normocephalic and atraumatic  Eyes:  EOMI, sclera non icteric  ENT: Oropharynx clear, handling secretions, no trismus, no asymmetry of the posterior oropharynx or uvular edema  Neck: Supple, full ROM, no stridor, no meningeal signs  Respiratory: Lungs clear to auscultation bilaterally, no wheezes, rales, or rhonchi. Not in respiratory distress  Cardiovascular:  Regular rate. Regular rhythm. No murmurs, no gallops, no rubs. 2+ distal pulses. Equal extremity pulses. Gastrointestinal:  Abdomen Soft, Non tender, Non distended. No rebound, guarding, or rigidity. No pulsatile masses. : Internal and external hemorrhoids with no evidence of thrombosis present. There is no active bleeding at this time. There is light brown stool that is guaiac negative. Musculoskeletal: Moves all extremities x 4. Warm and well perfused, no clubbing, no cyanosis, no edema. Capillary refill <3 seconds  Skin: skin warm and dry. No rashes.    Neurologic: GCS 15, no focal deficits, symmetric strength 5/5 in the upper and lower extremities bilaterally  Psychiatric: Normal Affect      -------------------------------------------------- RESULTS -------------------------------------------------  I have personally reviewed all laboratory and imaging results for this patient. Results are listed below.      LABS: (Lab results interpreted by me)  Results for orders placed or performed during the hospital encounter of 10/22/21   CBC auto differential   Result Value Ref Range    WBC 4.3 (L) 4.5 - 11.5 E9/L    RBC 4.18 3.50 - 5.50 E12/L    Hemoglobin 12.6 11.5 - 15.5 g/dL    Hematocrit 38.7 34.0 - 48.0 %    MCV 92.6 80.0 - 99.9 fL    MCH 30.1 26.0 - 35.0 pg    MCHC 32.6 32.0 - 34.5 %    RDW 16.8 (H) 11.5 - 15.0 fL    Platelets 164 959 - 139 E9/L    MPV 8.3 7.0 - 12.0 fL    Neutrophils % 61.6 43.0 - 80.0 %    Immature Granulocytes % 0.2 0.0 - 5.0 %    Lymphocytes % 21.7 20.0 - 42.0 %    Monocytes % 10.5 2.0 - 12.0 %    Eosinophils % 5.1 0.0 - 6.0 %    Basophils % 0.9 0.0 - 2.0 %    Neutrophils Absolute 2.64 1.80 - 7.30 E9/L    Immature Granulocytes # 0.01 E9/L    Lymphocytes Absolute 0.93 (L) 1.50 - 4.00 E9/L    Monocytes Absolute 0.45 0.10 - 0.95 E9/L    Eosinophils Absolute 0.22 0.05 - 0.50 E9/L    Basophils Absolute 0.04 0.00 - 0.20 E9/L   Comprehensive Metabolic Panel w/ Reflex to MG   Result Value Ref Range    Sodium 141 132 - 146 mmol/L    Potassium reflex Magnesium 4.0 3.5 - 5.0 mmol/L    Chloride 108 (H) 98 - 107 mmol/L    CO2 26 22 - 29 mmol/L    Anion Gap 7 7 - 16 mmol/L    Glucose 103 (H) 74 - 99 mg/dL    BUN 7 6 - 23 mg/dL    CREATININE 0.9 0.5 - 1.0 mg/dL    GFR Non-African American >60 >=60 mL/min/1.73    GFR African American >60     Calcium 9.4 8.6 - 10.2 mg/dL    Total Protein 6.9 6.4 - 8.3 g/dL    Albumin 3.8 3.5 - 5.2 g/dL    Total Bilirubin 0.7 0.0 - 1.2 mg/dL    Alkaline Phosphatase 66 35 - 104 U/L    ALT 14 0 - 32 U/L    AST 20 0 - 31 U/L   Troponin   Result Value Ref Range    Troponin, High Sensitivity 21 (H) 0 - 9 ng/L   LACTIC ACID, PLASMA   Result Value Ref Range    Lactic Acid 1.6 0.5 - 2.2 mmol/L   Lipase   Result Value Ref Range    Lipase 8 (L) 13 - 60 U/L   Troponin   Result Value Ref Range Troponin, High Sensitivity 19 (H) 0 - 9 ng/L   EKG 12 Lead   Result Value Ref Range    Ventricular Rate 64 BPM    Atrial Rate 64 BPM    P-R Interval 166 ms    QRS Duration 106 ms    Q-T Interval 476 ms    QTc Calculation (Bazett) 491 ms    P Axis 40 degrees    R Axis -55 degrees    T Axis 0 degrees   ,       RADIOLOGY:  Interpreted by Radiologist unless otherwise specified  CT HEAD WO CONTRAST   Final Result   1. There is no acute intracranial abnormality. Specifically, there is no   intracranial hemorrhage. 2. Atrophy and periventricular leukomalacia,         CT CERVICAL SPINE WO CONTRAST   Final Result   1. No sign of acute traumatic injury. 2. Mid and lower cervical degenerative disc disease and spondylosis. CT ABDOMEN PELVIS W IV CONTRAST Additional Contrast? None   Final Result   Fluid-filled and mildly dilated small and large bowel loops may be related to   enterocolitis/diarrheal illness. There is no bowel obstruction or acute   inflammation. New focal airspace opacity with air bronchograms in the right lower lobe,   which may be related to focal atelectasis or less likely infiltrate/neoplasm. Follow-up CT could be obtained in 3 months to ensure stability. There is low-attenuation in the central uterus, concerning for endometrial   cavity fluid or other endometrial pathology. Recommend pelvic ultrasound   evaluation. Prominent bilateral pelvic vessels, which can be seen with pelvic congestion   syndrome. XR CHEST PORTABLE   Final Result   Moderate cardiomegaly      No definite acute pulmonary disease with stable interstitial scar in both mid   lungs               EKG Interpretation  Interpreted by emergency department physician, Dr. Manuel Montoya    EKG: This EKG is signed and interpreted by me.     Rate: 64  Rhythm: Sinus  Interpretation: Sinus rhythm, left axis deviation, left anterior fasicular block, nonspecific st changes throughout, LVH, qtc is 491  Comparison: changes compared to previous EKG       ------------------------- NURSING NOTES AND VITALS REVIEWED ---------------------------   The nursing notes within the ED encounter and vital signs as below have been reviewed by myself  /66   Pulse 58   Temp 96.4 °F (35.8 °C) (Temporal)   Resp 16   Ht 5' 5\" (1.651 m)   Wt 120 lb (54.4 kg)   SpO2 99%   BMI 19.97 kg/m²     Oxygen Saturation Interpretation: Normal    The patients available past medical records and past encounters were reviewed. ------------------------------ ED COURSE/MEDICAL DECISION MAKING----------------------  Medications   0.9 % sodium chloride bolus (0 mLs IntraVENous Stopped 10/22/21 1540)   iopamidol (ISOVUE-370) 76 % injection 75 mL (75 mLs IntraVENous Given 10/22/21 1438)           The cardiac monitor revealed NSR with a heart rate in the 70s as interpreted by me. The cardiac monitor was ordered secondary to the patient's lightheadedness and to monitor the patient for dysrhythmia. CPT [id number]       I, Dr. Kallie Dawson, am the primary provider of record    Medical Decision Making:   The patient is an 63-year-old female who presents to the emergency department complaining of hemorrhoids. She is hemodynamically stable, nontoxic in appearance, no acute distress. EKG does not show evidence of acute ischemia and delta troponin is negative. Hemoglobin is stable at 12.6 rectal exam was negative for any acute bleeding she does have internal and external hemorrhoids which are nonthrombosed at this time. Imaging was negative for any acute abnormalities. Recommend her to follow-up closely with her family doctor and did provide her with surgery follow-up as well regarding her hemorrhoids. She remained asymptomatic in the emergency department. She states that she felt lightheaded because she was moving too quickly at home and that is why she fell. She never experienced an actual loss of consciousness.   She is to return here for worsening symptoms or other acute symptoms or concerns. She verbalized understanding and agreement to treatment plan discharge instructions    Oxygen Saturation Interpretation: 99 % on room air. Re-Evaluations:       Patient was resting comfortably on reassessment and in no acute distress. This patient's ED course included: a personal history and physicial examination, re-evaluation prior to disposition, multiple bedside re-evaluations, IV medications, cardiac monitoring, continuous pulse oximetry and complex medical decision making and emergency management    This patient has remained hemodynamically stable during their ED course. Counseling: The emergency provider has spoken with the patient and discussed todays results, in addition to providing specific details for the plan of care and counseling regarding the diagnosis and prognosis. Questions are answered at this time and they are agreeable with the plan.       --------------------------------- IMPRESSION AND DISPOSITION ---------------------------------    IMPRESSION  1. Hemorrhoids, unspecified hemorrhoid type    2. Abnormal CT scan    3. Lightheadedness        DISPOSITION  Disposition: Discharge to home  Patient condition is stable        NOTE: This report was transcribed using voice recognition software.  Every effort was made to ensure accuracy; however, inadvertent computerized transcription errors may be present       Alida Loo DO  10/27/21 1358

## 2021-10-24 NOTE — DISCHARGE SUMMARY
Physician Discharge Summary     Patient ID:  Bertie Dandy  [id number]  14 y.o.  12/25/1931    Admit date: 9/10/2021    Discharge date and time: 9/15/2021  1:30 PM     Admission Diagnoses: Active Problems:    Syncope and collapse  Resolved Problems:    * No resolved hospital problems. *      Discharge Diagnoses: Active Problems:    Syncope and collapse  Resolved Problems:    * No resolved hospital problems. *      Condition at discharge : Stable    Consults: IP CONSULT TO CARDIOLOGY    Procedures: None    Hospital Course: Patient is a 51-year-old lady presents to the emergency room with syncope, increased weakness. She was noted to be bradycardic. Admitted for further evaluation and treatment. She is a very poor historian. Cardiology was consulted. Patient was taken off beta-blockade. Patient was very weak. Subacute rehab was recommended on discharge. Patient was positive for Covid kena  test done for discharge planning. Patient was asymptomatic. She was then discharged to subacute rehab. CT HEAD WO CONTRAST   Final Result   Stable atrophy and chronic ischemic disease.          XR CHEST PORTABLE   Final Result   No acute process             Results for orders placed or performed during the hospital encounter of 10/22/21 (from the past 336 hour(s))   CBC auto differential    Collection Time: 10/22/21 12:57 PM   Result Value Ref Range    WBC 4.3 (L) 4.5 - 11.5 E9/L    RBC 4.18 3.50 - 5.50 E12/L    Hemoglobin 12.6 11.5 - 15.5 g/dL    Hematocrit 38.7 34.0 - 48.0 %    MCV 92.6 80.0 - 99.9 fL    MCH 30.1 26.0 - 35.0 pg    MCHC 32.6 32.0 - 34.5 %    RDW 16.8 (H) 11.5 - 15.0 fL    Platelets 566 318 - 046 E9/L    MPV 8.3 7.0 - 12.0 fL    Neutrophils % 61.6 43.0 - 80.0 %    Immature Granulocytes % 0.2 0.0 - 5.0 %    Lymphocytes % 21.7 20.0 - 42.0 %    Monocytes % 10.5 2.0 - 12.0 %    Eosinophils % 5.1 0.0 - 6.0 %    Basophils % 0.9 0.0 - 2.0 %    Neutrophils Absolute 2.64 1.80 - 7.30 E9/L    Immature Granulocytes R-0Print      amiodarone (CORDARONE) 200 MG tablet Take 1 tablet by mouth daily, Disp-30 tablet, R-1Normal      aspirin 81 MG EC tablet Take 81 mg by mouth dailyHistorical Med      LISINOPRIL PO Take by mouth daily Pt doesn't remember the doseHistorical Med      acetaminophen (TYLENOL) 500 MG tablet Take 2 tablets by mouth every 8 hours as needed for Pain, Disp-50 tablet,R-0Print      apixaban (ELIQUIS) 5 MG TABS tablet Take 5 mg by mouth 2 times dailyHistorical Med             Activity: As tolerated    Diet: Cardiac    Follow-up with No follow-up provider specified.       Note that over 30 minutes was spent in preparing discharge papers, discussing discharge with patient, medication review, etc.    Signed:  Grisel Escobar MD  10/24/2021  7:17 PM

## 2021-11-06 ENCOUNTER — APPOINTMENT (OUTPATIENT)
Dept: CT IMAGING | Age: 86
DRG: 312 | End: 2021-11-06
Payer: MEDICARE

## 2021-11-06 ENCOUNTER — APPOINTMENT (OUTPATIENT)
Dept: GENERAL RADIOLOGY | Age: 86
DRG: 312 | End: 2021-11-06
Payer: MEDICARE

## 2021-11-06 ENCOUNTER — HOSPITAL ENCOUNTER (INPATIENT)
Age: 86
LOS: 1 days | Discharge: HOME | DRG: 312 | End: 2021-11-10
Attending: EMERGENCY MEDICINE | Admitting: INTERNAL MEDICINE
Payer: MEDICARE

## 2021-11-06 DIAGNOSIS — R55 SYNCOPE AND COLLAPSE: Primary | ICD-10-CM

## 2021-11-06 DIAGNOSIS — R77.8 ELEVATED TROPONIN: ICD-10-CM

## 2021-11-06 DIAGNOSIS — I44.4 LEFT ANTERIOR FASCICULAR BLOCK: ICD-10-CM

## 2021-11-06 DIAGNOSIS — R00.1 BRADYCARDIA: ICD-10-CM

## 2021-11-06 LAB
ALBUMIN SERPL-MCNC: 3.1 G/DL (ref 3.5–5.2)
ALP BLD-CCNC: 55 U/L (ref 35–104)
ALT SERPL-CCNC: 9 U/L (ref 0–32)
ANION GAP SERPL CALCULATED.3IONS-SCNC: 7 MMOL/L (ref 7–16)
AST SERPL-CCNC: 13 U/L (ref 0–31)
BASOPHILS ABSOLUTE: 0.04 E9/L (ref 0–0.2)
BASOPHILS RELATIVE PERCENT: 0.6 % (ref 0–2)
BILIRUB SERPL-MCNC: 0.7 MG/DL (ref 0–1.2)
BUN BLDV-MCNC: 8 MG/DL (ref 6–23)
CALCIUM SERPL-MCNC: 8.7 MG/DL (ref 8.6–10.2)
CHLORIDE BLD-SCNC: 109 MMOL/L (ref 98–107)
CO2: 26 MMOL/L (ref 22–29)
CREAT SERPL-MCNC: 1 MG/DL (ref 0.5–1)
EOSINOPHILS ABSOLUTE: 0.12 E9/L (ref 0.05–0.5)
EOSINOPHILS RELATIVE PERCENT: 1.8 % (ref 0–6)
GFR AFRICAN AMERICAN: >60
GFR NON-AFRICAN AMERICAN: >60 ML/MIN/1.73
GLUCOSE BLD-MCNC: 145 MG/DL (ref 74–99)
HCT VFR BLD CALC: 37.5 % (ref 34–48)
HEMOGLOBIN: 12 G/DL (ref 11.5–15.5)
IMMATURE GRANULOCYTES #: 0.01 E9/L
IMMATURE GRANULOCYTES %: 0.2 % (ref 0–5)
LIPASE: 9 U/L (ref 13–60)
LYMPHOCYTES ABSOLUTE: 1.08 E9/L (ref 1.5–4)
LYMPHOCYTES RELATIVE PERCENT: 16.6 % (ref 20–42)
MCH RBC QN AUTO: 30.3 PG (ref 26–35)
MCHC RBC AUTO-ENTMCNC: 32 % (ref 32–34.5)
MCV RBC AUTO: 94.7 FL (ref 80–99.9)
METER GLUCOSE: 136 MG/DL (ref 74–99)
MONOCYTES ABSOLUTE: 0.57 E9/L (ref 0.1–0.95)
MONOCYTES RELATIVE PERCENT: 8.8 % (ref 2–12)
NEUTROPHILS ABSOLUTE: 4.67 E9/L (ref 1.8–7.3)
NEUTROPHILS RELATIVE PERCENT: 72 % (ref 43–80)
PDW BLD-RTO: 15.9 FL (ref 11.5–15)
PLATELET # BLD: 186 E9/L (ref 130–450)
PMV BLD AUTO: 9 FL (ref 7–12)
POTASSIUM REFLEX MAGNESIUM: 5 MMOL/L (ref 3.5–5)
RBC # BLD: 3.96 E12/L (ref 3.5–5.5)
SODIUM BLD-SCNC: 142 MMOL/L (ref 132–146)
TOTAL PROTEIN: 5.9 G/DL (ref 6.4–8.3)
TROPONIN, HIGH SENSITIVITY: 20 NG/L (ref 0–9)
WBC # BLD: 6.5 E9/L (ref 4.5–11.5)

## 2021-11-06 PROCEDURE — 70450 CT HEAD/BRAIN W/O DYE: CPT

## 2021-11-06 PROCEDURE — 84484 ASSAY OF TROPONIN QUANT: CPT

## 2021-11-06 PROCEDURE — 71045 X-RAY EXAM CHEST 1 VIEW: CPT

## 2021-11-06 PROCEDURE — 72125 CT NECK SPINE W/O DYE: CPT

## 2021-11-06 PROCEDURE — 81001 URINALYSIS AUTO W/SCOPE: CPT

## 2021-11-06 PROCEDURE — 82962 GLUCOSE BLOOD TEST: CPT

## 2021-11-06 PROCEDURE — 85025 COMPLETE CBC W/AUTO DIFF WBC: CPT

## 2021-11-06 PROCEDURE — 99285 EMERGENCY DEPT VISIT HI MDM: CPT

## 2021-11-06 PROCEDURE — 80053 COMPREHEN METABOLIC PANEL: CPT

## 2021-11-06 PROCEDURE — 83690 ASSAY OF LIPASE: CPT

## 2021-11-06 PROCEDURE — 93005 ELECTROCARDIOGRAM TRACING: CPT | Performed by: STUDENT IN AN ORGANIZED HEALTH CARE EDUCATION/TRAINING PROGRAM

## 2021-11-06 ASSESSMENT — ENCOUNTER SYMPTOMS
CHEST TIGHTNESS: 0
ABDOMINAL PAIN: 0
NAUSEA: 0
ABDOMINAL DISTENTION: 0
DIARRHEA: 0
SORE THROAT: 0
BACK PAIN: 0
COUGH: 0
SHORTNESS OF BREATH: 0
VOMITING: 0

## 2021-11-07 PROBLEM — R82.90 ABNORMAL URINALYSIS: Status: ACTIVE | Noted: 2021-11-07

## 2021-11-07 PROBLEM — I50.42 CHRONIC COMBINED SYSTOLIC (CONGESTIVE) AND DIASTOLIC (CONGESTIVE) HEART FAILURE (HCC): Status: ACTIVE | Noted: 2021-11-07

## 2021-11-07 PROBLEM — I95.1 ORTHOSTATIC HYPOTENSION: Status: ACTIVE | Noted: 2021-11-07

## 2021-11-07 LAB
APTT: 220 SEC (ref 24.5–35.1)
APTT: 29 SEC (ref 24.5–35.1)
APTT: 39.1 SEC (ref 24.5–35.1)
APTT: 45.2 SEC (ref 24.5–35.1)
BACTERIA: ABNORMAL /HPF
BACTERIA: ABNORMAL /HPF
BILIRUBIN URINE: NEGATIVE
BILIRUBIN URINE: NEGATIVE
BLOOD, URINE: ABNORMAL
BLOOD, URINE: NEGATIVE
CLARITY: ABNORMAL
CLARITY: CLEAR
COLOR: YELLOW
COLOR: YELLOW
EKG ATRIAL RATE: 53 BPM
EKG ATRIAL RATE: 56 BPM
EKG P AXIS: 46 DEGREES
EKG P AXIS: 62 DEGREES
EKG P-R INTERVAL: 162 MS
EKG P-R INTERVAL: 182 MS
EKG Q-T INTERVAL: 490 MS
EKG Q-T INTERVAL: 560 MS
EKG QRS DURATION: 106 MS
EKG QRS DURATION: 108 MS
EKG QTC CALCULATION (BAZETT): 472 MS
EKG QTC CALCULATION (BAZETT): 525 MS
EKG R AXIS: -48 DEGREES
EKG R AXIS: -49 DEGREES
EKG T AXIS: -24 DEGREES
EKG T AXIS: -51 DEGREES
EKG VENTRICULAR RATE: 53 BPM
EKG VENTRICULAR RATE: 56 BPM
EPITHELIAL CELLS, UA: ABNORMAL /HPF
EPITHELIAL CELLS, UA: ABNORMAL /HPF
GLUCOSE URINE: NEGATIVE MG/DL
GLUCOSE URINE: NEGATIVE MG/DL
HCT VFR BLD CALC: 37.5 % (ref 34–48)
HEMOGLOBIN: 12.4 G/DL (ref 11.5–15.5)
KETONES, URINE: NEGATIVE MG/DL
KETONES, URINE: NEGATIVE MG/DL
LEUKOCYTE ESTERASE, URINE: ABNORMAL
LEUKOCYTE ESTERASE, URINE: ABNORMAL
MCH RBC QN AUTO: 30.7 PG (ref 26–35)
MCHC RBC AUTO-ENTMCNC: 33.1 % (ref 32–34.5)
MCV RBC AUTO: 92.8 FL (ref 80–99.9)
NITRITE, URINE: NEGATIVE
NITRITE, URINE: NEGATIVE
PDW BLD-RTO: 15.8 FL (ref 11.5–15)
PH UA: 7.5 (ref 5–9)
PH UA: 7.5 (ref 5–9)
PLATELET # BLD: 208 E9/L (ref 130–450)
PMV BLD AUTO: 9.7 FL (ref 7–12)
PROTEIN UA: NEGATIVE MG/DL
PROTEIN UA: NEGATIVE MG/DL
RBC # BLD: 4.04 E12/L (ref 3.5–5.5)
RBC UA: ABNORMAL /HPF (ref 0–2)
RBC UA: ABNORMAL /HPF (ref 0–2)
SPECIFIC GRAVITY UA: 1.01 (ref 1–1.03)
SPECIFIC GRAVITY UA: 1.01 (ref 1–1.03)
TROPONIN, HIGH SENSITIVITY: 15 NG/L (ref 0–9)
UROBILINOGEN, URINE: 0.2 E.U./DL
UROBILINOGEN, URINE: 0.2 E.U./DL
WBC # BLD: 5.1 E9/L (ref 4.5–11.5)
WBC UA: ABNORMAL /HPF (ref 0–5)
WBC UA: ABNORMAL /HPF (ref 0–5)

## 2021-11-07 PROCEDURE — 6370000000 HC RX 637 (ALT 250 FOR IP): Performed by: STUDENT IN AN ORGANIZED HEALTH CARE EDUCATION/TRAINING PROGRAM

## 2021-11-07 PROCEDURE — G0378 HOSPITAL OBSERVATION PER HR: HCPCS

## 2021-11-07 PROCEDURE — 6360000002 HC RX W HCPCS: Performed by: STUDENT IN AN ORGANIZED HEALTH CARE EDUCATION/TRAINING PROGRAM

## 2021-11-07 PROCEDURE — 85730 THROMBOPLASTIN TIME PARTIAL: CPT

## 2021-11-07 PROCEDURE — 99222 1ST HOSP IP/OBS MODERATE 55: CPT | Performed by: INTERNAL MEDICINE

## 2021-11-07 PROCEDURE — 93010 ELECTROCARDIOGRAM REPORT: CPT | Performed by: INTERNAL MEDICINE

## 2021-11-07 PROCEDURE — 85027 COMPLETE CBC AUTOMATED: CPT

## 2021-11-07 PROCEDURE — 87088 URINE BACTERIA CULTURE: CPT

## 2021-11-07 PROCEDURE — 84484 ASSAY OF TROPONIN QUANT: CPT

## 2021-11-07 PROCEDURE — 6370000000 HC RX 637 (ALT 250 FOR IP): Performed by: INTERNAL MEDICINE

## 2021-11-07 PROCEDURE — 2580000003 HC RX 258: Performed by: INTERNAL MEDICINE

## 2021-11-07 PROCEDURE — 36415 COLL VENOUS BLD VENIPUNCTURE: CPT

## 2021-11-07 PROCEDURE — 93005 ELECTROCARDIOGRAM TRACING: CPT | Performed by: STUDENT IN AN ORGANIZED HEALTH CARE EDUCATION/TRAINING PROGRAM

## 2021-11-07 PROCEDURE — 81001 URINALYSIS AUTO W/SCOPE: CPT

## 2021-11-07 PROCEDURE — 96374 THER/PROPH/DIAG INJ IV PUSH: CPT

## 2021-11-07 RX ORDER — SODIUM CHLORIDE 0.9 % (FLUSH) 0.9 %
5-40 SYRINGE (ML) INJECTION EVERY 12 HOURS SCHEDULED
Status: DISCONTINUED | OUTPATIENT
Start: 2021-11-07 | End: 2021-11-10 | Stop reason: HOSPADM

## 2021-11-07 RX ORDER — DOCUSATE SODIUM 100 MG/1
100 CAPSULE, LIQUID FILLED ORAL 2 TIMES DAILY
Status: DISCONTINUED | OUTPATIENT
Start: 2021-11-07 | End: 2021-11-10 | Stop reason: HOSPADM

## 2021-11-07 RX ORDER — LISINOPRIL 5 MG/1
5 TABLET ORAL DAILY
Status: DISCONTINUED | OUTPATIENT
Start: 2021-11-07 | End: 2021-11-08

## 2021-11-07 RX ORDER — ACETAMINOPHEN 650 MG/1
650 SUPPOSITORY RECTAL EVERY 6 HOURS PRN
Status: DISCONTINUED | OUTPATIENT
Start: 2021-11-07 | End: 2021-11-10 | Stop reason: HOSPADM

## 2021-11-07 RX ORDER — ONDANSETRON 2 MG/ML
4 INJECTION INTRAMUSCULAR; INTRAVENOUS EVERY 6 HOURS PRN
Status: DISCONTINUED | OUTPATIENT
Start: 2021-11-07 | End: 2021-11-07

## 2021-11-07 RX ORDER — HEPARIN SODIUM 10000 [USP'U]/100ML
5-30 INJECTION, SOLUTION INTRAVENOUS CONTINUOUS
Status: DISCONTINUED | OUTPATIENT
Start: 2021-11-07 | End: 2021-11-08

## 2021-11-07 RX ORDER — HEPARIN SODIUM 1000 [USP'U]/ML
30 INJECTION, SOLUTION INTRAVENOUS; SUBCUTANEOUS PRN
Status: DISCONTINUED | OUTPATIENT
Start: 2021-11-07 | End: 2021-11-10 | Stop reason: HOSPADM

## 2021-11-07 RX ORDER — SODIUM CHLORIDE 0.9 % (FLUSH) 0.9 %
5-40 SYRINGE (ML) INJECTION PRN
Status: DISCONTINUED | OUTPATIENT
Start: 2021-11-07 | End: 2021-11-10 | Stop reason: HOSPADM

## 2021-11-07 RX ORDER — SODIUM CHLORIDE 9 MG/ML
25 INJECTION, SOLUTION INTRAVENOUS PRN
Status: DISCONTINUED | OUTPATIENT
Start: 2021-11-07 | End: 2021-11-10 | Stop reason: HOSPADM

## 2021-11-07 RX ORDER — DICLOFENAC SODIUM 1 MG/ML
1 SOLUTION/ DROPS OPHTHALMIC 4 TIMES DAILY
Status: ON HOLD | COMMUNITY
End: 2022-07-14 | Stop reason: HOSPADM

## 2021-11-07 RX ORDER — ASPIRIN 81 MG/1
81 TABLET ORAL DAILY
Status: DISCONTINUED | OUTPATIENT
Start: 2021-11-07 | End: 2021-11-08

## 2021-11-07 RX ORDER — ONDANSETRON 4 MG/1
4 TABLET, ORALLY DISINTEGRATING ORAL EVERY 8 HOURS PRN
Status: DISCONTINUED | OUTPATIENT
Start: 2021-11-07 | End: 2021-11-07

## 2021-11-07 RX ORDER — POLYETHYLENE GLYCOL 3350 17 G/17G
17 POWDER, FOR SOLUTION ORAL DAILY PRN
Status: DISCONTINUED | OUTPATIENT
Start: 2021-11-07 | End: 2021-11-10 | Stop reason: HOSPADM

## 2021-11-07 RX ORDER — METOPROLOL SUCCINATE 25 MG/1
25 TABLET, EXTENDED RELEASE ORAL DAILY
Status: ON HOLD | COMMUNITY
End: 2021-11-10 | Stop reason: HOSPADM

## 2021-11-07 RX ORDER — AMIODARONE HYDROCHLORIDE 200 MG/1
200 TABLET ORAL DAILY
Status: DISCONTINUED | OUTPATIENT
Start: 2021-11-07 | End: 2021-11-08

## 2021-11-07 RX ORDER — ACETAMINOPHEN 325 MG/1
650 TABLET ORAL EVERY 6 HOURS PRN
Status: DISCONTINUED | OUTPATIENT
Start: 2021-11-07 | End: 2021-11-10 | Stop reason: HOSPADM

## 2021-11-07 RX ORDER — HEPARIN SODIUM 1000 [USP'U]/ML
60 INJECTION, SOLUTION INTRAVENOUS; SUBCUTANEOUS PRN
Status: DISCONTINUED | OUTPATIENT
Start: 2021-11-07 | End: 2021-11-10 | Stop reason: HOSPADM

## 2021-11-07 RX ADMIN — DOCUSATE SODIUM 100 MG: 100 CAPSULE ORAL at 08:52

## 2021-11-07 RX ADMIN — Medication 10 ML: at 08:52

## 2021-11-07 RX ADMIN — LISINOPRIL 5 MG: 5 TABLET ORAL at 11:33

## 2021-11-07 RX ADMIN — HEPARIN SODIUM 3490 UNITS: 1000 INJECTION INTRAVENOUS; SUBCUTANEOUS at 21:38

## 2021-11-07 RX ADMIN — DOCUSATE SODIUM 100 MG: 100 CAPSULE ORAL at 22:36

## 2021-11-07 RX ADMIN — HEPARIN SODIUM 16 UNITS/KG/HR: 10000 INJECTION, SOLUTION INTRAVENOUS at 21:33

## 2021-11-07 RX ADMIN — AMIODARONE HYDROCHLORIDE 200 MG: 200 TABLET ORAL at 08:52

## 2021-11-07 RX ADMIN — HEPARIN SODIUM 12 UNITS/KG/HR: 10000 INJECTION, SOLUTION INTRAVENOUS at 14:08

## 2021-11-07 RX ADMIN — ASPIRIN 81 MG: 81 TABLET, COATED ORAL at 08:51

## 2021-11-07 RX ADMIN — POLYETHYLENE GLYCOL 3350 17 G: 17 POWDER, FOR SOLUTION ORAL at 11:33

## 2021-11-07 NOTE — ED PROVIDER NOTES
HPI     Patient is a 80 y.o. female presents with a chief complaint of syncope  This has been occurring for a few hours. Patient states that it gets better with time. Patient states that it gets worse with nothing. Patient states that it is moderate in severity. Patient presents with complaint of syncope on the bathroom toilet. Patient is currently alert and oriented. Patient has no chest pain or shortness of breath. Patient states she otherwise feels fine. Patient denies any trauma. Patient is on Eliquis at home. Patient states that she \". \"  Patient states that she has no complaints this time. Patient denies any fevers, chills, nausea, vomiting, abdominal pain, changes in urinary or bowel habits. Review of Systems   Constitutional: Negative for activity change, appetite change, chills, fatigue and fever. HENT: Negative for congestion, drooling and sore throat. Respiratory: Negative for cough, chest tightness and shortness of breath. Cardiovascular: Negative for chest pain and palpitations. Gastrointestinal: Negative for abdominal distention, abdominal pain, diarrhea, nausea and vomiting. Genitourinary: Negative for decreased urine volume, difficulty urinating, enuresis, flank pain, frequency and hematuria. Musculoskeletal: Negative for arthralgias, back pain and neck stiffness. Skin: Negative for rash and wound. Neurological: Positive for syncope. Negative for dizziness, facial asymmetry, light-headedness and headaches. Psychiatric/Behavioral: Negative for agitation, confusion and decreased concentration. Physical Exam  Vitals and nursing note reviewed. Constitutional:       Appearance: She is well-developed. Comments: Cachectic appearing female. HENT:      Head: Normocephalic and atraumatic. Eyes:      Conjunctiva/sclera: Conjunctivae normal.   Cardiovascular:      Rate and Rhythm: Regular rhythm. Bradycardia present.       Heart sounds: Normal heart sounds. No murmur heard. Pulmonary:      Effort: Pulmonary effort is normal. No respiratory distress. Breath sounds: Normal breath sounds. No wheezing or rales. Abdominal:      General: Bowel sounds are normal.      Palpations: Abdomen is soft. Tenderness: There is no abdominal tenderness. There is no guarding or rebound. Musculoskeletal:      Cervical back: Normal range of motion and neck supple. Skin:     General: Skin is warm and dry. Neurological:      Mental Status: She is alert and oriented to person, place, and time. Cranial Nerves: No cranial nerve deficit. Coordination: Coordination normal.          Procedures   EKG read interpreted by me. Rate 53 bpm.  Left axis deviation. Sinus bradycardia. Incomplete right bundle branch block. Left anterior fascicular block. T wave inversions in the precordial leads. Prolonged QT. Trent Hawkins Prior EKG with QRS changes. Significant changes when compared to prior EKG. [facility]     ED Course as of 11/07/21 0131   Sun Nov 07, 2021   0122 Called and discussed case with internal medicine who agreed to admit patient. [JM]      ED Course User Index  [JM] Jalyn Saavedra MD      Patient is a 80 y.o. female presenting with syncope. Patient was previously admitted for something similar but at that time patient had Covid. Patient is bradycardic in the 50s. Patient's EKG is benign compared to prior. Patient had a troponin drawn. Patient's delta troponin is 5. Patient is denying any chest pain or shortness of breath. Patient had a CT head. Patient CT head is negative. Patient CT C-spine is notable for degenerative changes. Patient is afebrile. Patient had any urine analysis done with significant amount of epithelial cells. Patient will be admitted to internal medicine. Called and discussed case with internal medicine who agreed to admit patient.         Patient was seen and evaluated by myself and my attending Camilo Carrillo MD. Assessment and Plan discussed with attending provider, please see attestation for final plan of care. This note was done using dictation software and there may be some grammatical errors associated with this. Sherin Thompson MD       ED Course as of 11/07/21 0131   Sun Nov 07, 2021   0122 Called and discussed case with internal medicine who agreed to admit patient. [JM]      ED Course User Index  [JM] Sherin Thompson MD       --------------------------------------------- PAST HISTORY ---------------------------------------------  Past Medical History:  has a past medical history of Arthritis, Atrial fibrillation ([facility] [address] Hx of blood clots, Hypertension, Ischemic colitis ([facility] [address] and PAF (paroxysmal atrial fibrillation) ([facility] [address] Past Surgical History:  has a past surgical history that includes Ankle fracture surgery; Colonoscopy; Endoscopy, colon, diagnostic; fracture surgery; and Cardiac catheterization (11/11/2019). Social History:  reports that she has never smoked. She has never used smokeless tobacco. She reports that she does not drink alcohol and does not use drugs. Family History: family history is not on file. The patients home medications have been reviewed.     Allergies: Nifedipine    -------------------------------------------------- RESULTS -------------------------------------------------    LABS:  Results for orders placed or performed during the hospital encounter of 11/06/21   CBC Auto Differential   Result Value Ref Range    WBC 6.5 4.5 - 11.5 E9/L    RBC 3.96 3.50 - 5.50 E12/L    Hemoglobin 12.0 11.5 - 15.5 g/dL    Hematocrit 37.5 34.0 - 48.0 %    MCV 94.7 80.0 - 99.9 fL    MCH 30.3 26.0 - 35.0 pg    MCHC 32.0 32.0 - 34.5 %    RDW 15.9 (H) 11.5 - 15.0 fL    Platelets 246 923 - 188 E9/L    MPV 9.0 7.0 - 12.0 fL    Neutrophils % 72.0 43.0 - 80.0 %    Immature Granulocytes % 0.2 0.0 - 5.0 %    Lymphocytes % 16.6 (L) 20.0 - 42.0 %    Monocytes % 8.8 2.0 - 12.0 %    Eosinophils % 1.8 0.0 - 6.0 %    Basophils % 0.6 0.0 - 2.0 %    Neutrophils Absolute 4.67 1.80 - 7.30 E9/L    Immature Granulocytes # 0.01 E9/L    Lymphocytes Absolute 1.08 (L) 1.50 - 4.00 E9/L    Monocytes Absolute 0.57 0.10 - 0.95 E9/L    Eosinophils Absolute 0.12 0.05 - 0.50 E9/L    Basophils Absolute 0.04 0.00 - 0.20 E9/L   Comprehensive Metabolic Panel w/ Reflex to MG   Result Value Ref Range    Sodium 142 132 - 146 mmol/L    Potassium reflex Magnesium 5.0 3.5 - 5.0 mmol/L    Chloride 109 (H) 98 - 107 mmol/L    CO2 26 22 - 29 mmol/L    Anion Gap 7 7 - 16 mmol/L    Glucose 145 (H) 74 - 99 mg/dL    BUN 8 6 - 23 mg/dL    CREATININE 1.0 0.5 - 1.0 mg/dL    GFR Non-African American >60 >=60 mL/min/1.73    GFR African American >60     Calcium 8.7 8.6 - 10.2 mg/dL    Total Protein 5.9 (L) 6.4 - 8.3 g/dL    Albumin 3.1 (L) 3.5 - 5.2 g/dL    Total Bilirubin 0.7 0.0 - 1.2 mg/dL    Alkaline Phosphatase 55 35 - 104 U/L    ALT 9 0 - 32 U/L    AST 13 0 - 31 U/L   Lipase   Result Value Ref Range    Lipase 9 (L) 13 - 60 U/L   Troponin   Result Value Ref Range    Troponin, High Sensitivity 20 (H) 0 - 9 ng/L   Urinalysis, reflex to microscopic   Result Value Ref Range    Color, UA Yellow Straw/Yellow    Clarity, UA SLCLOUDY Clear    Glucose, Ur Negative Negative mg/dL    Bilirubin Urine Negative Negative    Ketones, Urine Negative Negative mg/dL    Specific Gravity, UA 1.015 1.005 - 1.030    Blood, Urine TRACE (A) Negative    pH, UA 7.5 5.0 - 9.0    Protein, UA Negative Negative mg/dL    Urobilinogen, Urine 0.2 <2.0 E.U./dL    Nitrite, Urine Negative Negative    Leukocyte Esterase, Urine LARGE (A) Negative   Microscopic Urinalysis   Result Value Ref Range    WBC, UA 5-10 (A) 0 - 5 /HPF    RBC, UA 0-1 0 - 2 /HPF    Epithelial Cells, UA MODERATE /HPF    Bacteria, UA FEW (A) None Seen /HPF   Troponin   Result Value Ref Range    Troponin, High Sensitivity 15 (H) 0 - 9 ng/L   POCT Glucose   Result Value Ref Range    Meter Glucose 136 (H) 74 - 99 mg/dL   EKG 12 Lead   Result Value Ref Range    Ventricular Rate 53 BPM    Atrial Rate 53 BPM    P-R Interval 182 ms    QRS Duration 108 ms    Q-T Interval 560 ms    QTc Calculation (Bazett) 525 ms    P Axis 62 degrees    R Axis -49 degrees    T Axis -51 degrees       RADIOLOGY:  XR CHEST 1 VIEW   Final Result   No acute process. CT Head WO Contrast   Final Result   No acute intracranial abnormality. CT Cervical Spine WO Contrast   Final Result   Advanced degenerative changes. MRI would be useful if symptoms persist.                 ------------------------- NURSING NOTES AND VITALS REVIEWED ---------------------------  Date / Time Roomed:  11/6/2021 10:29 PM  ED Bed Assignment:  09/09    The nursing notes within the ED encounter and vital signs as below have been reviewed. Patient Vitals for the past 24 hrs:   BP Temp Temp src Pulse Resp SpO2 Height Weight   11/06/21 2345 (!) 173/90 97.4 °F (36.3 °C) Oral 53 26 99 % -- --   11/06/21 2237 138/75 97.5 °F (36.4 °C) Oral 57 22 99 % 5' 7\" (1.702 m) 128 lb (58.1 kg)       Oxygen Saturation Interpretation: Normal    ------------------------------------------ PROGRESS NOTES ------------------------------------------  See ED course for reevaluation    Counseling:  I have spoken with the patient and discussed todays results, in addition to providing specific details for the plan of care and counseling regarding the diagnosis and prognosis. Their questions are answered at this time and they are agreeable with the plan of admission.    --------------------------------- ADDITIONAL PROVIDER NOTES ---------------------------------  Consultations:   Spoke with Dr. Smitha Jones. Discussed case. They will admit the patient.   This patient's ED course included: a personal history and physicial examination, re-evaluation prior to disposition, multiple bedside re-evaluations, IV medications, cardiac monitoring and continuous pulse oximetry    This patient has remained hemodynamically stable

## 2021-11-07 NOTE — H&P
[address] [place], [place] [address]ist Group   History and Physical      CHIEF COMPLAINT: Syncope    History of Present Illness:  80 y.o. female with a history of chronic combined systolic and diastolic heart failure but without recent exacerbation, atrial fibrillation, possible tachybradycardia syndrome, and essential hypertension presents with syncope. She has been evaluated for this multiple times in the past, and beta-blocker was noted to be discontinued multiple times. At this time she is awake, alert, but does not recall much of what brought her into the hospital.  Her daughter is present at bedside. Patient was at home, and may have had an episode of syncope while on the toilet. States that she \"\". She cannot further describe symptoms. At this time she denies any headache, chest pain, shortness of breath, dizziness, lightheadedness, nausea, vomiting, fever, or chills. No dysuria. In the ED, orthostatic vital signs were not taken. Vital signs did show mild bradycardia with heart rate in the 50s. CBC and CMP essentially unremarkable. High-sensitivity troponin 20-->15. Patient's daughter notes that after last hospitalization she was discharged to rehab, and metoprolol may have been restarted at subacute rehab. Informant(s) for H&P: Patient, patient's daughter, ED physician, chart review    REVIEW OF SYSTEMS:  no fevers, chills, cp, sob, n/v, ha, vision/hearing changes, wt changes, hot/cold flashes, other open skin lesions, diarrhea, constipation, dysuria/hematuria unless noted in HPI. Complete ROS performed with the patient and is otherwise negative.       PMH:  Past Medical History:   Diagnosis Date    Arthritis     Atrial fibrillation ([address]     Chronic combined systolic (congestive) and diastolic (congestive) heart failure ([address] 2021    Hx of blood clots     Hypertension     Ischemic colitis ([address]     PAF (paroxysmal atrial fibrillation) ([address]        Surgical History:  Past Surgical History:   Procedure Laterality Date    ANKLE FRACTURE SURGERY      CARDIAC CATHETERIZATION  11/11/2019    Dr. Cassie Lauren, COLON, DIAGNOSTIC      FRACTURE SURGERY         Medications Prior to Admission:    Prior to Admission medications    Medication Sig Start Date End Date Taking? Authorizing Provider   metoprolol succinate (TOPROL XL) 25 MG extended release tablet Take 25 mg by mouth daily   Yes Historical Provider, MD   diclofenac (VOLTAREN) 0.1 % ophthalmic solution 1 drop 4 times daily   Yes Historical Provider, MD   docusate sodium (COLACE) 100 MG capsule Take 1 capsule by mouth 2 times daily 10/22/21 11/21/21 Yes Moe Kaplan,    amiodarone (CORDARONE) 200 MG tablet Take 1 tablet by mouth daily 9/1/21  Yes Krzysztof Henriquez,    apixaban (ELIQUIS) 5 MG TABS tablet Take 2.5 mg by mouth 2 times daily    Yes Historical Provider, MD   Lidocaine, Anorectal, 5 % GEL Apply 1 each topically 2 times daily 10/22/21   Moe Kaplan DO   aspirin 81 MG EC tablet Take 81 mg by mouth daily    Historical Provider, MD   LISINOPRIL PO Take by mouth daily Pt doesn't remember the dose    Historical Provider, MD   acetaminophen (TYLENOL) 500 MG tablet Take 2 tablets by mouth every 8 hours as needed for Pain 9/26/20   Sarkis Tavarez MD       Allergies:    Nifedipine    Social History:    reports that she has never smoked. She has never used smokeless tobacco. She reports that she does not drink alcohol and does not use drugs. Family History:   Denies any significant family medical history    PHYSICAL EXAM:  Vitals:  BP (!) 159/79   Pulse 55   Temp 97.6 °F (36.4 °C) (Oral)   Resp 18   Ht 5' 7\" (1.702 m)   Wt 128 lb (58.1 kg)   SpO2 100%   BMI 20.05 kg/m²     General Appearance: Elderly female, alert and oriented to person, place.  Does not appear to be in any distress   Skin: warm and dry  Head: normocephalic and atraumatic  Eyes: pupils equal, round, and reactive to light, extraocular eye movements intact, conjunctivae normal  ENT: Oral mucosa moist.  Throat clear. Neck: neck supple and non tender without mass   Pulmonary/Chest: clear to auscultation bilaterally- no wheezes, rales or rhonchi, normal air movement, no respiratory distress  Cardiovascular: normal rate, normal S1 and S2 with grade 2/6 systolic murmur and no carotid bruits. Trace dorsalis pedis and posterior tibial pulses. Abdomen: soft, non-tender, non-distended, normal bowel sounds, no masses or organomegaly  Extremities: no cyanosis, no clubbing and no edema  Neurologic: no cranial nerve deficit and speech normal    LABS:  Recent Labs     11/06/21  2256      K 5.0   *   CO2 26   BUN 8   CREATININE 1.0   GLUCOSE 145*   CALCIUM 8.7       Recent Labs     11/06/21  2256   WBC 6.5   RBC 3.96   HGB 12.0   HCT 37.5   MCV 94.7   MCH 30.3   MCHC 32.0   RDW 15.9*      MPV 9.0       No results for input(s): POCGLU in the last 72 hours. Radiology: CT Head WO Contrast    Result Date: 11/6/2021  EXAMINATION: CT OF THE HEAD WITHOUT CONTRAST  11/6/2021 11:10 pm TECHNIQUE: CT of the head was performed without the administration of intravenous contrast. Dose modulation, iterative reconstruction, and/or weight based adjustment of the mA/kV was utilized to reduce the radiation dose to as low as reasonably achievable. COMPARISON: None. HISTORY: ORDERING SYSTEM PROVIDED HISTORY: ams, syncope TECHNOLOGIST PROVIDED HISTORY: Reason for exam:->ams, syncope Has a \"code stroke\" or \"stroke alert\" been called? ->No Decision Support Exception - unselect if not a suspected or confirmed emergency medical condition->Emergency Medical Condition (MA) FINDINGS: BRAIN/VENTRICLES: There is no acute intracranial hemorrhage, mass effect or midline shift. No abnormal extra-axial fluid collection. There is extensive periventricular low-density that suggest microangiopathy.   The gray-white differentiation is maintained without evidence of an acute infarct. There is no evidence of hydrocephalus. ORBITS: The visualized portion of the orbits demonstrate no acute abnormality. SINUSES: The visualized paranasal sinuses and mastoid air cells demonstrate no acute abnormality. SOFT TISSUES/SKULL:  No acute abnormality of the visualized skull or soft tissues. No acute intracranial abnormality. CT Cervical Spine WO Contrast    Result Date: 11/6/2021  EXAMINATION: CT OF THE CERVICAL SPINE WITHOUT CONTRAST 11/6/2021 11:10 pm TECHNIQUE: CT of the cervical spine was performed without the administration of intravenous contrast. Multiplanar reformatted images are provided for review. Dose modulation, iterative reconstruction, and/or weight based adjustment of the mA/kV was utilized to reduce the radiation dose to as low as reasonably achievable. COMPARISON: 10/22/2021 HISTORY: ORDERING SYSTEM PROVIDED HISTORY: syncope TECHNOLOGIST PROVIDED HISTORY: Reason for exam:->syncope Decision Support Exception - unselect if not a suspected or confirmed emergency medical condition->Emergency Medical Condition (MA) FINDINGS: Trace anterior subluxation of C4 on C5, C7 on T1 and T1 on T2, similar to previous. Trace retrolisthesis of C5 on C6 also unchanged. Advanced diffuse facet arthritis. Advanced degenerative disc disease changes greatest at C4 through C6. Prominent diffuse uncovertebral degenerative changes. The hypertrophic changes contribute to at least mild-moderate multilevel canal and foraminal narrowing. Moderate osteopenia. No distinct acute fracture identified. Scattered vascular calcifications. No fluid collection. There appears to be a small amount of intravenous gas. Advanced degenerative changes.   MRI would be useful if symptoms persist.     XR CHEST 1 VIEW    Result Date: 11/6/2021  EXAMINATION: ONE XRAY VIEW OF THE CHEST 11/6/2021 10:18 pm COMPARISON: October 22 HISTORY: [address] PROVIDED HISTORY: shortness of breath TECHNOLOGIST PROVIDED HISTORY: Reason for exam:->shortness of breath FINDINGS: The lungs are without acute focal process. There is no effusion or pneumothorax. The cardiomediastinal silhouette is without acute process. The osseous structures are without acute process. No acute process. EKG: Sinus bradycardia, left anterior fascicular block, incomplete right bundle branch block    ASSESSMENT/PLAN:      Principal Problem:    Syncope and collapse  Active Problems:    Hypertension, essential, benign    Paroxysmal atrial fibrillation (HCC)    Bradycardia    Chronic combined systolic (congestive) and diastolic (congestive) heart failure (HCC)    Abnormal urinalysis  Resolved Problems:    * No resolved hospital problems. *      1. Syncope  -Most likely related to orthostatic hypotension and beta-blocker use. Beta-blocker has been noted to be discontinued on multiple discharge summaries, however she has been taking it at home as this may have been started again at subacute rehab  -Discontinue metoprolol succinate  -Repeat orthostatic vital signs later in the afternoon    2. Orthostatic hypotension  -Discontinue metoprolol succinate   -Recheck orthostatic vital signs later this afternoon    3. 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 If this is the case, she can follow-up with cardiology for possible pacemaker    4. Chronic combined systolic and diastolic heart failure  -Discontinue beta-blocker as above  -No symptoms or signs of acute exacerbation at this time  -Resume home lisinopril at reduced dose once dose is confirmed    5. Paroxysmal atrial fibrillation  -Continue amiodarone  -Decrease apixaban to 2.5 mg p.o. twice daily based on renal function  -Discontinue metoprolol succinate as noted above    6.   Essential hypertension  -Can resume home lisinopril at reduced dose once home dose is confirmed    7. Abnormal urinalysis  -Possible UTI, however sample contaminated with epithelial cells. Repeat UA with clean-catch or straight cath    Discussed with RN at bedside     Code Status: Full code  DVT prophylaxis: Apixaban    NOTE: This report was transcribed using voice recognition software.  Every effort was made to ensure accuracy; however, inadvertent computerized transcription errors may be present.     Electronically signed by [employer] on 11/7/2021 at 2:24 AM

## 2021-11-07 NOTE — PROGRESS NOTES
[address] [place], [place] [address]ist   Progress Note    Admitting Date and Time: 11/6/2021 10:29 PM  Admit Dx: Syncope and collapse [R55]  Left anterior fascicular block [I44.4]  Bradycardia [R00.1]  Elevated troponin [R77.8]    Subjective:    Pt feels better today than she did yesterday. She states that she was not taking metoprolol at home prior to coming in. Per RN: She spoke with patient's son who stated he was not giving her any metoprolol or lisinopril at home prior to coming in. ROS: denies fever, chills, cp, sob, n/v, HA unless stated above.      amiodarone  200 mg Oral Daily    aspirin  81 mg Oral Daily    docusate sodium  100 mg Oral BID    sodium chloride flush  5-40 mL IntraVENous 2 times per day    lisinopril  5 mg Oral Daily     sodium chloride flush, 5-40 mL, PRN  sodium chloride, 25 mL, PRN  polyethylene glycol, 17 g, Daily PRN  acetaminophen, 650 mg, Q6H PRN   Or  acetaminophen, 650 mg, Q6H PRN  heparin (porcine), 60 Units/kg, PRN  heparin (porcine), 30 Units/kg, PRN         Objective:    BP (!) 194/93   Pulse 67   Temp 98.1 °F (36.7 °C) (Oral)   Resp 16   Ht 5' 7\" (1.702 m)   Wt 128 lb (58.1 kg)   SpO2 100%   BMI 20.05 kg/m²   General Appearance: alert and oriented to person, place and time and in no acute distress  Skin: warm and dry  Head: normocephalic and atraumatic  Eyes: pupils equal, round, and reactive to light, extraocular eye movements intact, conjunctivae normal  Neck: neck supple and non tender without mass   Pulmonary/Chest: clear to auscultation bilaterally- no wheezes, rales or rhonchi, normal air movement, no respiratory distress  Cardiovascular: normal rate, normal S1 and S2 and no carotid bruits  Abdomen: soft, non-tender, non-distended, normal bowel sounds, no masses or organomegaly  Extremities: no cyanosis, no clubbing and no edema  Neurologic: no cranial nerve deficit and speech normal      Recent Labs     11/06/21  2256      K 5.0   *   CO2 26   BUN 8   CREATININE 1.0   GLUCOSE 145*   CALCIUM 8.7       Recent Labs     11/06/21  2256   ALKPHOS 55   PROT 5.9*   LABALBU 3.1*   BILITOT 0.7   AST 13   ALT 9       Recent Labs     11/06/21 2256 11/07/21  1357   WBC 6.5 5.1   RBC 3.96 4.04   HGB 12.0 12.4   HCT 37.5 37.5   MCV 94.7 92.8   MCH 30.3 30.7   MCHC 32.0 33.1   RDW 15.9* 15.8*    208   MPV 9.0 9.7          Radiology:   XR CHEST 1 VIEW   Final Result   No acute process. CT Head WO Contrast   Final Result   No acute intracranial abnormality. CT Cervical Spine WO Contrast   Final Result   Advanced degenerative changes. MRI would be useful if symptoms persist.             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 *      Plan:  1. Syncope  -Most likely related to orthostatic hypotension and beta-blocker use. Confusion as to whether patient was taking metoprolol at home or not. Initially she said she was but now she said she wasn't. -Repeat orthostatic vital signs were almost positive this afternoon  -Given that the syncopal episode may not have been due to metoprolol use this time, will consult cardiology for their input    2. Orthostatic hypotension  -Repeat orthostatic VS technically negative but very close to positive  -Will monitor closely     3. 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   -May need pacemaker placed, will consult cardiology for their input     4.   Chronic combined systolic and diastolic heart failure  -Discontinue beta-blocker as above  -No symptoms or signs of acute exacerbation at this time  -Resume home lisinopril at 5mg QD, can titrate up or down as needed     5. Paroxysmal atrial fibrillation  -Continue amiodarone  -Will hold eliquis currently as unsure if cardiology will want to do pacemaker. Will put patient on heparin drip instead in the meantime.  -Discontinue metoprolol succinate as noted above     6.  Essential hypertension  -Resumed home lisinopril at 5mg QD, can titrate as needed  -BP this /93 prior to getting any antihypertensives  -Monitor BP closely    7. Abnormal urinalysis  -UA showed positive for LE and bacteria  -Will send urine for culture and start patient on ceftriaxone for treatment of UTI    NOTE: This report was transcribed using voice recognition software. Every effort was made to ensure accuracy; however, inadvertent computerized transcription errors may be present.      Electronically signed by Bernadine Marx MD on 11/7/2021 at 5:27 PM

## 2021-11-07 NOTE — ED NOTES
Bed: 09  Expected date:   Expected time:   Means of arrival:   Comments:  [address] [place] [address] Daniel Jacobo RN  11/06/21 [id number]

## 2021-11-07 NOTE — PROGRESS NOTES
Patient holding on to her chest stating she feels like she is going to pass out HR 50-70's with no ectopy on the monitor. Symptoms resolved on their own.  /93

## 2021-11-08 LAB
ANION GAP SERPL CALCULATED.3IONS-SCNC: 8 MMOL/L (ref 7–16)
APTT: 122 SEC (ref 24.5–35.1)
APTT: 55.3 SEC (ref 24.5–35.1)
APTT: 63.5 SEC (ref 24.5–35.1)
APTT: 96 SEC (ref 24.5–35.1)
BASOPHILS ABSOLUTE: 0.05 E9/L (ref 0–0.2)
BASOPHILS RELATIVE PERCENT: 1.1 % (ref 0–2)
BUN BLDV-MCNC: 5 MG/DL (ref 6–23)
CALCIUM SERPL-MCNC: 8.6 MG/DL (ref 8.6–10.2)
CHLORIDE BLD-SCNC: 108 MMOL/L (ref 98–107)
CO2: 23 MMOL/L (ref 22–29)
CREAT SERPL-MCNC: 0.7 MG/DL (ref 0.5–1)
EOSINOPHILS ABSOLUTE: 0.17 E9/L (ref 0.05–0.5)
EOSINOPHILS RELATIVE PERCENT: 3.8 % (ref 0–6)
GFR AFRICAN AMERICAN: >60
GFR NON-AFRICAN AMERICAN: >60 ML/MIN/1.73
GLUCOSE BLD-MCNC: 100 MG/DL (ref 74–99)
HCT VFR BLD CALC: 38 % (ref 34–48)
HEMOGLOBIN: 12.4 G/DL (ref 11.5–15.5)
IMMATURE GRANULOCYTES #: 0.01 E9/L
IMMATURE GRANULOCYTES %: 0.2 % (ref 0–5)
LYMPHOCYTES ABSOLUTE: 1.46 E9/L (ref 1.5–4)
LYMPHOCYTES RELATIVE PERCENT: 32.4 % (ref 20–42)
MCH RBC QN AUTO: 30.2 PG (ref 26–35)
MCHC RBC AUTO-ENTMCNC: 32.6 % (ref 32–34.5)
MCV RBC AUTO: 92.5 FL (ref 80–99.9)
MONOCYTES ABSOLUTE: 0.51 E9/L (ref 0.1–0.95)
MONOCYTES RELATIVE PERCENT: 11.3 % (ref 2–12)
NEUTROPHILS ABSOLUTE: 2.3 E9/L (ref 1.8–7.3)
NEUTROPHILS RELATIVE PERCENT: 51.2 % (ref 43–80)
PDW BLD-RTO: 15.9 FL (ref 11.5–15)
PLATELET # BLD: 194 E9/L (ref 130–450)
PMV BLD AUTO: 9.5 FL (ref 7–12)
POTASSIUM SERPL-SCNC: 3.9 MMOL/L (ref 3.5–5)
RBC # BLD: 4.11 E12/L (ref 3.5–5.5)
SODIUM BLD-SCNC: 139 MMOL/L (ref 132–146)
WBC # BLD: 4.5 E9/L (ref 4.5–11.5)

## 2021-11-08 PROCEDURE — 2580000003 HC RX 258: Performed by: INTERNAL MEDICINE

## 2021-11-08 PROCEDURE — 99225 PR SBSQ OBSERVATION CARE/DAY 25 MINUTES: CPT | Performed by: STUDENT IN AN ORGANIZED HEALTH CARE EDUCATION/TRAINING PROGRAM

## 2021-11-08 PROCEDURE — APPSS60 APP SPLIT SHARED TIME 46-60 MINUTES: Performed by: PHYSICIAN ASSISTANT

## 2021-11-08 PROCEDURE — 6370000000 HC RX 637 (ALT 250 FOR IP): Performed by: INTERNAL MEDICINE

## 2021-11-08 PROCEDURE — 85025 COMPLETE CBC W/AUTO DIFF WBC: CPT

## 2021-11-08 PROCEDURE — 97161 PT EVAL LOW COMPLEX 20 MIN: CPT | Performed by: PHYSICAL THERAPIST

## 2021-11-08 PROCEDURE — 99204 OFFICE O/P NEW MOD 45 MIN: CPT | Performed by: INTERNAL MEDICINE

## 2021-11-08 PROCEDURE — 36415 COLL VENOUS BLD VENIPUNCTURE: CPT

## 2021-11-08 PROCEDURE — 97165 OT EVAL LOW COMPLEX 30 MIN: CPT

## 2021-11-08 PROCEDURE — 6370000000 HC RX 637 (ALT 250 FOR IP): Performed by: STUDENT IN AN ORGANIZED HEALTH CARE EDUCATION/TRAINING PROGRAM

## 2021-11-08 PROCEDURE — 96376 TX/PRO/DX INJ SAME DRUG ADON: CPT

## 2021-11-08 PROCEDURE — 85730 THROMBOPLASTIN TIME PARTIAL: CPT

## 2021-11-08 PROCEDURE — 97530 THERAPEUTIC ACTIVITIES: CPT | Performed by: PHYSICAL THERAPIST

## 2021-11-08 PROCEDURE — 6360000002 HC RX W HCPCS: Performed by: STUDENT IN AN ORGANIZED HEALTH CARE EDUCATION/TRAINING PROGRAM

## 2021-11-08 PROCEDURE — G0378 HOSPITAL OBSERVATION PER HR: HCPCS

## 2021-11-08 PROCEDURE — 80048 BASIC METABOLIC PNL TOTAL CA: CPT

## 2021-11-08 RX ORDER — AMIODARONE HYDROCHLORIDE 200 MG/1
200 TABLET ORAL EVERY OTHER DAY
Status: DISCONTINUED | OUTPATIENT
Start: 2021-11-09 | End: 2021-11-10 | Stop reason: HOSPADM

## 2021-11-08 RX ORDER — AMLODIPINE BESYLATE 5 MG/1
5 TABLET ORAL DAILY
Status: DISCONTINUED | OUTPATIENT
Start: 2021-11-08 | End: 2021-11-09

## 2021-11-08 RX ORDER — LISINOPRIL 10 MG/1
10 TABLET ORAL DAILY
Status: DISCONTINUED | OUTPATIENT
Start: 2021-11-09 | End: 2021-11-08

## 2021-11-08 RX ORDER — LISINOPRIL 10 MG/1
10 TABLET ORAL 2 TIMES DAILY
Status: DISCONTINUED | OUTPATIENT
Start: 2021-11-08 | End: 2021-11-10 | Stop reason: HOSPADM

## 2021-11-08 RX ADMIN — LISINOPRIL 5 MG: 5 TABLET ORAL at 09:10

## 2021-11-08 RX ADMIN — AMLODIPINE BESYLATE 5 MG: 5 TABLET ORAL at 22:53

## 2021-11-08 RX ADMIN — HEPARIN SODIUM 1740 UNITS: 1000 INJECTION INTRAVENOUS; SUBCUTANEOUS at 14:10

## 2021-11-08 RX ADMIN — LISINOPRIL 10 MG: 10 TABLET ORAL at 22:53

## 2021-11-08 RX ADMIN — DOCUSATE SODIUM 100 MG: 100 CAPSULE ORAL at 09:10

## 2021-11-08 RX ADMIN — Medication 10 ML: at 21:26

## 2021-11-08 RX ADMIN — AMIODARONE HYDROCHLORIDE 200 MG: 200 TABLET ORAL at 09:11

## 2021-11-08 RX ADMIN — ASPIRIN 81 MG: 81 TABLET, COATED ORAL at 09:10

## 2021-11-08 RX ADMIN — DOCUSATE SODIUM 100 MG: 100 CAPSULE ORAL at 21:15

## 2021-11-08 RX ADMIN — Medication 10 ML: at 09:09

## 2021-11-08 NOTE — CARE COORDINATION
Ss note:11/8/2021 12:59 PM No covid testing. Met with pt & dtr Annika Freeman [phone] at bedside. Pt resides with her son Shane Haider, one story home in [place]. PTA pt uses a cane, has shower chair. Dtr relays a hx of [facility]. States plan is for pt to return home with family. Hx of HHC, receptive to [address] again, cannot recall agency name. List of [address] provided and sw requested 3 choices prior to discharge home with son. Will need HHC ORDERS. Use [facility] in [place].  ESTEFANIA Gomez

## 2021-11-08 NOTE — CONSULTS
Inpatient Cardiology Consultation      Reason for Consult: Syncope with bradycardia    Consulting Physician: Geetha Bah MD    Requesting Physician:  Julita Teixeira MD    Date of Consultation: 11/8/2021    HISTORY OF PRESENT ILLNESS OF Suzie Mukherjee located in  room [id number]: Suzie Mukherjee is a 80 y.o. female known to Dr. Thania Graves    Patient has h/o noncritical coronary artery disease (cardiac cath 11/11/2019), nonischemic cardiomyopathy, EF 38±5%. (Echo 08/31/2021), first-degree left ventricular diastolic dysfunction, mildly reduced right ventricular systolic function, paroxysmal atrial fibrillation, h/o syncope and collapse attributed, sinus bradycardia, HTN, [facility]    Patient presented to ED of [address] [place] [address] on 11/6/2021 complaining of fainting while on the bathroom toilet. Before it happened she did not felt lightheaded or felt palpitations. She is denying having chest pains. Please note: past medical records were reviewed per electronic medical record (EMR) - see detailed reports under Past Medical/ Surgical History. Past Medical History:    Past Medical History:   Diagnosis Date    Arthritis     Atrial fibrillation ([address]     Chronic combined systolic (congestive) and diastolic (congestive) heart failure ([address] 11/7/2021    Hx of blood clots     Hypertension     [facility]     PAF (paroxysmal atrial fibrillation) ([facility]        Past Surgical History:    Past Surgical History:   Procedure Laterality Date    ANKLE FRACTURE SURGERY      CARDIAC CATHETERIZATION  11/11/2019    Dr. Thania Graves    COLONOSCOPY      ENDOSCOPY, COLON, DIAGNOSTIC      FRACTURE SURGERY         Medications Prior to admit:  Prior to Admission medications    Medication Sig Start Date End Date Taking?  Authorizing Provider   metoprolol succinate (TOPROL XL) 25 MG extended release tablet Take 25 mg by mouth daily   Yes Historical Provider, MD   diclofenac (VOLTAREN) 0.1 % ophthalmic solution 1 drop 4 times daily   Yes Historical Provider, MD   docusate sodium (COLACE) 100 MG capsule Take 1 capsule by mouth 2 times daily 10/22/21 11/21/21 Yes Colten Mcneal, DO   amiodarone (CORDARONE) 200 MG tablet Take 1 tablet by mouth daily 9/1/21  Yes Sally Ríos,    apixaban (ELIQUIS) 5 MG TABS tablet Take 2.5 mg by mouth 2 times daily    Yes Historical Provider, MD   Lidocaine, Anorectal, 5 % GEL Apply 1 each topically 2 times daily 10/22/21   Colten Mcneal, DO   aspirin 81 MG EC tablet Take 81 mg by mouth daily    Historical Provider, MD   LISINOPRIL PO Take by mouth daily Pt doesn't remember the dose    Historical Provider, MD   acetaminophen (TYLENOL) 500 MG tablet Take 2 tablets by mouth every 8 hours as needed for Pain 9/26/20   Elisabeth Kauffman MD       Current Medications:    Current Facility-Administered Medications: amiodarone (CORDARONE) tablet 200 mg, 200 mg, Oral, Daily  aspirin EC tablet 81 mg, 81 mg, Oral, Daily  docusate sodium (COLACE) capsule 100 mg, 100 mg, Oral, BID  sodium chloride flush 0.9 % injection 5-40 mL, 5-40 mL, IntraVENous, 2 times per day  sodium chloride flush 0.9 % injection 5-40 mL, 5-40 mL, IntraVENous, PRN  0.9 % sodium chloride infusion, 25 mL, IntraVENous, PRN  polyethylene glycol (GLYCOLAX) packet 17 g, 17 g, Oral, Daily PRN  acetaminophen (TYLENOL) tablet 650 mg, 650 mg, Oral, Q6H PRN **OR** acetaminophen (TYLENOL) suppository 650 mg, 650 mg, Rectal, Q6H PRN  lisinopril (PRINIVIL;ZESTRIL) tablet 5 mg, 5 mg, Oral, Daily  heparin (porcine) injection 3,490 Units, 60 Units/kg, IntraVENous, PRN  heparin (porcine) injection 1,740 Units, 30 Units/kg, IntraVENous, PRN  heparin 25,000 units in dextrose 5% 250 mL (premix) infusion, 5-30 Units/kg/hr, IntraVENous, Continuous    Allergies:  Nifedipine    Social History:    Social History     Socioeconomic History    Marital status:       Spouse name: Not on file    Number of children: Not on file    Years of education: Not on file    Highest education level: Not on file   Occupational History    Not on file   Tobacco Use    Smoking status: Never Smoker    Smokeless tobacco: Never Used   Vaping Use    Vaping Use: Never used   Substance and Sexual Activity    Alcohol use: No    Drug use: No    Sexual activity: Not on file   Other Topics Concern    Not on file   Social History Narrative    Not on file     Social Determinants of Health     Financial Resource Strain:     Difficulty of Paying Living Expenses: Not on file   Food Insecurity:     Worried About Running Out of Food in the Last Year: Not on file    Elva of Food in the Last Year: Not on file   Transportation Needs:     Lack of Transportation (Medical): Not on file    Lack of Transportation (Non-Medical): Not on file   Physical Activity:     Days of Exercise per Week: Not on file    Minutes of Exercise per Session: Not on file   Stress:     Feeling of Stress : Not on file   Social Connections:     Frequency of Communication with Friends and Family: Not on file    Frequency of Social Gatherings with Friends and Family: Not on file    Attends Christian Services: Not on file    Active Member of [address] [place] [address] or Organizations: Not on file    Attends Club or Organization Meetings: Not on file    Marital Status: Not on file   Intimate Partner Violence:     Fear of Current or Ex-Partner: Not on file    Emotionally Abused: Not on file    Physically Abused: Not on file    Sexually Abused: Not on file   Housing Stability:     Unable to Pay for Housing in the Last Year: Not on file    Number of [place] in the Last Year: Not on file    Unstable Housing in the Last Year: Not on file       Family History:   History reviewed. No pertinent family history. REVIEW OF SYSTEMS:     Constitutional: Denies fatigue, fevers, chills, night sweats, dusty loss, dusty gain  HEENT: Denies headaches, nose bleeds, and blurred vision,oral pain, oral lesion.   Neurological: Denies history of stroke, weakness, dizziness and lightheadedness, numbness and tingling  Cardiovascular: History of syncopal episodes. Denies chest pain,SOB, palpitations, and feelings of heart racing. Respiratory: Denies cough, wheezing,  use of supplementary oxygen, CPAP/BiPAP  Gastrointestinal: Denies heartburn, nausea, vomiting, diarrhea and constipation, black/bloody, and tarry stools. Genitourinary:Denies pain on  urination,  blood in urine, trouble starting urination, need to strain on urination, urinary urgency, urinary incontinence. Hematologic: Denies history of easy bruising, prolonged bleeding,  of blood clots in legs  Lymphatic: Denies lumps and bumps to neck, axilla, breast, and groin  Endocrine: Denies excessive thirst. Denies intolerance to heat or cold  Musculoskeletal: Denies falls, pain to BLE with ambulation and edema to BLE. Psychiatric: Denies anxiety and depression. PHYSICAL EXAM:   BP (!) 188/95   Pulse 52   Temp 98.2 °F (36.8 °C) (Oral)   Resp 18   Ht 5' 7\" (1.702 m)   Wt 128 lb (58.1 kg)   SpO2 98%   BMI 37.11 kg/m²   Systolic (35YLD), EMM:966 , Min:159 , CDO:584    Diastolic (69NKE), FJR:32, Min:78, Max:95    CONST: Frail appearance. Awake, alert, cooperative, no apparent distress  HEENT:   Head- Normocephalic, atraumatic   Eyes- Conjunctivae pink, anicteric  Throat- Oral mucosa pink and moist  Neck-  No stridor, trachea midline, no jugular venous distention. No adenopathy   CHEST: Chest symmetrical and non-tender to palpation. No accessory muscle use or intercostal retractions  RESPIRATORY:Lung sounds - clear throughout fields;  CARDIOVASCULAR:     No carotid bruits  Heart Inspection- shows no noted pulsations  Heart Ausculation- Regular   rate and rhythm, no murmur. No s3, s4 or rub   PV: No lower extremity edema. No varicosities. Pedal pulses palpable, no clubbing or cyanosis   ABDOMEN: Soft, non-tender to light palpation. Bowel sounds present.    MS: Moves all fraction is 38±5%. There is doppler evidence of stage I diastolic dysfunction. Estimated wedge pressure is 11 mmHg. Interatrial septum appears aneurysmal.   Right ventricle global systolic function is mildly reduced . TAPSE is normal.   Physiologic and/or trace mitral regurgitation is present. Trace aortic regurgitation is noted. Physiologic and/or trace tricuspid regurgitation. RVSP is 26 mmHg. No evidence to suggest pulmonary hypertension. Technically fair quality study. Compared to prior echo, no significant changes noted. Suggest clinical correlation. Stress test 8/30/2021  Pression:  1. Small-moderate basal inferoseptal stress perfusion deficit with  improvement at rest but with significant artifact noted. Suspect  abnormality secondary to attenuation rather than physiologic ischemia. 2. Mild-moderate global decrease in myocardial segmental wall motion  and calculated gated SPECT left ventricular ejection fraction of 38%. 3. Suggest clinical correlation as warranted.        [facility]        CT Head WO Contrast    Result Date: 11/6/2021  EXAMINATION: CT OF THE HEAD WITHOUT CONTRAST  11/6/2021 11:10 pm TECHNIQUE: CT of the head was performed without the administration of intravenous contrast. Dose modulation, iterative reconstruction, and/or weight based adjustment of the mA/kV was utilized to reduce the radiation dose to as low as reasonably achievable. COMPARISON: None. HISTORY: ORDERING SYSTEM PROVIDED HISTORY: ams, syncope TECHNOLOGIST PROVIDED HISTORY: Reason for exam:->ams, syncope Has a \"code stroke\" or \"stroke alert\" been called? ->No Decision Support Exception - unselect if not a suspected or confirmed emergency medical condition->Emergency Medical Condition (MA) FINDINGS: BRAIN/VENTRICLES: There is no acute intracranial hemorrhage, mass effect or midline shift. No abnormal extra-axial fluid collection.   There is extensive periventricular low-density that suggest microangiopathy. The gray-white differentiation is maintained without evidence of an acute infarct. There is no evidence of hydrocephalus. ORBITS: The visualized portion of the orbits demonstrate no acute abnormality. SINUSES: The visualized paranasal sinuses and mastoid air cells demonstrate no acute abnormality. SOFT TISSUES/SKULL:  No acute abnormality of the visualized skull or soft tissues. No acute intracranial abnormality. CT Cervical Spine WO Contrast    Result Date: 11/6/2021  EXAMINATION: CT OF THE CERVICAL SPINE WITHOUT CONTRAST 11/6/2021 11:10 pm TECHNIQUE: CT of the cervical spine was performed without the administration of intravenous contrast. Multiplanar reformatted images are provided for review. Dose modulation, iterative reconstruction, and/or weight based adjustment of the mA/kV was utilized to reduce the radiation dose to as low as reasonably achievable. COMPARISON: 10/22/2021 HISTORY: ORDERING SYSTEM PROVIDED HISTORY: syncope TECHNOLOGIST PROVIDED HISTORY: Reason for exam:->syncope Decision Support Exception - unselect if not a suspected or confirmed emergency medical condition->Emergency Medical Condition (MA) FINDINGS: Trace anterior subluxation of C4 on C5, C7 on T1 and T1 on T2, similar to previous. Trace retrolisthesis of C5 on C6 also unchanged. Advanced diffuse facet arthritis. Advanced degenerative disc disease changes greatest at C4 through C6. Prominent diffuse uncovertebral degenerative changes. The hypertrophic changes contribute to at least mild-moderate multilevel canal and foraminal narrowing. Moderate osteopenia. No distinct acute fracture identified. Scattered vascular calcifications. No fluid collection. There appears to be a small amount of intravenous gas. Advanced degenerative changes.   MRI would be useful if symptoms persist.     XR CHEST 1 VIEW    Result Date: 11/6/2021  EXAMINATION: ONE XRAY VIEW OF THE CHEST 11/6/2021 10:18 pm COMPARISON: October 22 HISTORY: ORDERING SYSTEM PROVIDED HISTORY: shortness of breath TECHNOLOGIST PROVIDED HISTORY: Reason for exam:->shortness of breath FINDINGS: The lungs are without acute focal process. There is no effusion or pneumothorax. The cardiomediastinal silhouette is without acute process. The osseous structures are without acute process. No acute process. CT ABDOMEN PELVIS W IV CONTRAST Additional Contrast? None    Result Date: 10/22/2021  EXAMINATION: CT OF THE ABDOMEN AND PELVIS WITH CONTRAST 10/22/2021 1:14 pm TECHNIQUE: CT of the abdomen and pelvis was performed with the administration of intravenous contrast. Multiplanar reformatted images are provided for review. Dose modulation, iterative reconstruction, and/or weight based adjustment of the mA/kV was utilized to reduce the radiation dose to as low as reasonably achievable. COMPARISON: 09/08/2021 HISTORY: ORDERING SYSTEM PROVIDED HISTORY: abd pain TECHNOLOGIST PROVIDED HISTORY: Reason for exam:->abd pain Additional Contrast?->None Decision Support Exception - unselect if not a suspected or confirmed emergency medical condition->Emergency Medical Condition (MA) FINDINGS: Lower Chest: There is cardiomegaly. In the peripheral right lower lobe there is a new focal area of airspace opacity with air bronchograms, measuring up to 3.2 cm. Organs: The gallbladder is present. There is diffuse biliary ductal dilatation, which may be related to senescent change. There are stable hepatic cysts. The spleen is unremarkable. There is pancreatic atrophy. There is a stable ovoid fluid density structure posterior to the proximal superior mesenteric artery, also abutting the uncinate process of the pancreas, possibly an exophytic pancreatic cystic lesion or pseudocyst.No focal adrenal lesion. No hydroureteronephrosis. There are small left renal cysts. GI/Bowel:  There are air-fluid levels seen throughout the small and large bowel loops, which TROPONINI <0.01 10/16/2019     No results for input(s): PROBNP in the last 72 hours. Lab Results   Component Value Date    PROBNP 242 08/29/2021    PROBNP 177 08/10/2021    PROBNP 369 09/26/2020    PROBNP 265 02/12/2020    PROBNP 366 11/18/2019    PROBNP 248 11/06/2019    PROBNP 141 10/16/2019    PROBNP 283 08/22/2017     BMP:   Recent Labs     11/06/21  2256 11/08/21  0444    139   K 5.0 3.9   CO2 26 23   BUN 8 5*   CREATININE 1.0 0.7   LABGLOM >60 >60   CALCIUM 8.7 8.6     Lab Results   Component Value Date    CREATININE 0.7 11/08/2021    CREATININE 1.0 11/06/2021    CREATININE 0.9 10/22/2021    CREATININE 1.0 09/10/2021    CREATININE 1.0 09/08/2021    CREATININE 0.9 08/29/2021    CREATININE 1.0 08/10/2021    CREATININE 1.0 09/26/2020    CREATININE 0.9 02/12/2020    CREATININE 1.0 11/18/2019    CREATININE 0.9 11/11/2019    CREATININE 0.9 11/09/2019    CREATININE 0.8 11/07/2019    CREATININE 0.9 11/06/2019    CREATININE 1.0 10/16/2019    CREATININE 0.9 09/17/2019    CREATININE 0.8 09/16/2019    CREATININE 0.7 07/18/2019    CREATININE 1.0 07/17/2019    CREATININE 1.0 07/17/2019    CREATININE 0.8 04/16/2019     CBC:   Recent Labs     11/07/21  1357 11/08/21  0444   WBC 5.1 4.5   HGB 12.4 12.4   HCT 37.5 38.0    194     Mag: No results for input(s): MG in the last 72 hours. Phos: No results for input(s): PHOS in the last 72 hours. TSH: No results for input(s): TSH in the last 72 hours. HgA1c:   Lab Results   Component Value Date    LABA1C 5.5 11/07/2019     No results found for: EAG  BNP: No results for input(s): BNP in the last 72 hours. PT/INR: No results for input(s): PROTIME, INR in the last 72 hours.   APTT:  Recent Labs     11/07/21  2301 11/08/21  0444   APTT 220.0* 122.0*       FASTING LIPID PANEL:  Lab Results   Component Value Date    CHOL 186 11/07/2019    HDL 70 11/07/2019    LDLCALC 107 11/07/2019    TRIG 44 11/07/2019     LIVER PROFILE:  Recent Labs     11/06/21  2256   AST 13   ALT 9 LABALBU 3.1*       COVID-19 Labs:  Lab Results   Component Value Date    COVID19 DETECTED 09/13/2021    COVID19 DETECTED 09/13/2021     No results for input(s): COVID19 in the last 72 hours. ASSESSMENT:  Syncope of unclear etiology  Noncritical coronary artery disease (heart cath 11/11/2019). Stress test 8/30/2021  Troponin elevation, borderline, flat pattern  Cardiomyopathy, probably not ischemic  Diastolic dysfunction stage I  Reduced right ventricular systolic function, mildly  Sinus bradycardia  Paroxysmal atrial fibrillation, on Eliquis, on amiodarone  HTN, poorly controlled  Abnormal urinary analysis  H/o ischemic colitis  On chronic anticoagulation    PLAN:  Continue Telemetry  Electrolytes check and correction    I&O, weights  BP control   To follow with Dr. Pyle Her after discharge for further evaluation       Further cardiac recommendations will be forthcoming pending patient clinical course. Assessment and plan discussed with Dr. Dola Siemens, MD    Assessment and Plan to follow as per Dr. Dola Siemens, MD    Electronically signed by Jonathon Olguin, [id number] on 11/8/2021 at 10:24 [address] Cardiology Consult       Leona Burgess    I have personally participated in a face-to-face and personally obtained history and performed physical exam on the date of service. I reviewed chart, vitals, labs and radiologic studies. I also participated in medical decision making with PUJA Devries on the date of service All of the assessments and recommendations are from me and I agree with all of the pertinent clinical information, assessment and treatment plan. I have reviewed and edited the note above based on my findings during my history, exam, and decision making. Please see my additional contributions to the history, physical exam, assessment, and recommendations below. HISTORY OF PRESENT ILLNESS:     Reviewed, as above      Past medical history:  Reviewed, as above.     Past surgical history:  Reviewed, as above. Current medications:  Reviewed, as above    Allergies:  Reviewed, as above    Social history:  Reviewed, as above    Family medical history:  Reviewed, as above. REVIEW OF SYSTEMS:   Reviewed, as above. PHYSICAL EXAM:   CONSTITUTIONAL: no apparent distress, and appears stated age  EYES:  lids and lashes normal and pupils equal, round and reactive to light, anicteric sclerae  HEAD:  normocepalic, without obvious abnormality, atraumatic, pink, moist mucous membranes. NECK:  Supple, symmetrical, trachea midline, no adenopathy, thyroid symmetric, not enlarged and no tenderness, skin normal  HEMATOLOGIC/LYMPHATICS:  no cervical lymphadenopathy and no supraclavicular lymphadenopathy  LUNGS:  No increased work of breathing, good air exchange, clear to auscultation bilaterally, no crackles or wheezing  CARDIOVASCULAR:  Normal apical impulse, regular rate and rhythm, normal S1 and S2, no S3 or S4, and no murmur noted and no JVD, no carotid bruit, no pedal edema, good carotid upstroke bilaterally. ABDOMEN:  Soft, nontender, no masses, no hepatomegaly or splenomegaly, BS+  CHEST: nontender to palpation, expands symmetrically  MUSCULOSKELETAL:  No clubbing no cyanosis. there is no redness, warmth, or swelling of the joints  full range of motion noted  NEUROLOGIC:  Alert, awake  SKIN:  no bruising or bleeding, normal skin color, texture, turgor and no redness, warmth, or swelling    BP (!) 188/94   Pulse 50   Temp 98.3 °F (36.8 °C) (Oral)   Resp 18   Ht 5' 7\" (1.702 m)   Wt 128 lb (58.1 kg)   SpO2 99%   BMI 20.05 kg/m²       I/O last 3 completed shifts: In: 340 [P.O.:340]  Out: 100 [Urine:100]  No intake/output data recorded.       DATA:   I personally reviewed the admission EKG with the following interpretation: Sinus bradycardia, incomplete right bundle branch block, left anterior fascicular block, T wave changes in the inferior lateral leads    ECHO: 8/31/2021,   Summary   Left ventricle grossly normal in size. Mild left ventricular concentric hypertrophy noted. Global hypokinesis is noted to be moderate. Estimated left ventricular ejection fraction is 38±5%. There is doppler evidence of stage I diastolic dysfunction. Estimated wedge pressure is 11 mmHg. Interatrial septum appears aneurysmal.   Right ventricle global systolic function is mildly reduced . TAPSE is normal.   Physiologic and/or trace mitral regurgitation is present. Trace aortic regurgitation is noted. Physiologic and/or trace tricuspid regurgitation. RVSP is 26 mmHg. No evidence to suggest pulmonary hypertension. Technically fair quality study. Compared to prior echo, no significant changes noted. Suggest clinical correlation.     Stress Test: Reviewed  Angiography: Reviewed  Cardiology Labs:   BMP:    Lab Results   Component Value Date     11/08/2021    K 3.9 11/08/2021    K 5.0 11/06/2021     11/08/2021    CO2 23 11/08/2021    BUN 5 11/08/2021     CMP:    Lab Results   Component Value Date     11/08/2021    K 3.9 11/08/2021    K 5.0 11/06/2021     11/08/2021    CO2 23 11/08/2021    BUN 5 11/08/2021    PROT 5.9 11/06/2021     CBC:    Lab Results   Component Value Date    WBC 4.5 11/08/2021    RBC 4.11 11/08/2021    HGB 12.4 11/08/2021    HCT 38.0 11/08/2021    MCV 92.5 11/08/2021    RDW 15.9 11/08/2021     11/08/2021     PT/INR:  No results found for: PTINR  PT/INR Warfarin:  No components found for: PTPATWAR, PTINRWAR  PTT:    Lab Results   Component Value Date    APTT 96.0 11/08/2021     PTT Heparin:  No components found for: APTTHEP  Magnesium:    Lab Results   Component Value Date    MG 2.3 09/10/2021     TSH:    Lab Results   Component Value Date    TSH 2.540 09/26/2020     TROPONIN:  No components found for: TROP  BNP:    Lab Results   Component Value Date    BNP 72 10/18/2010     FASTING LIPID PANEL:    Lab Results   Component Value Date    CHOL 186 11/07/2019

## 2021-11-08 NOTE — PROGRESS NOTES
[address] [place], [place] [address]ist   Progress Note    Admitting Date and Time: 11/6/2021 10:29 PM  Admit Dx: Syncope and collapse [R55]  Left anterior fascicular block [I44.4]  Bradycardia [R00.1]  Elevated troponin [R77.8]    Subjective:    Pt feels pretty good today. She has no complaints. Per RN: Nothing to report    ROS: denies fever, chills, cp, sob, n/v, HA unless stated above.      amiodarone  200 mg Oral Daily    aspirin  81 mg Oral Daily    docusate sodium  100 mg Oral BID    sodium chloride flush  5-40 mL IntraVENous 2 times per day    lisinopril  5 mg Oral Daily     sodium chloride flush, 5-40 mL, PRN  sodium chloride, 25 mL, PRN  polyethylene glycol, 17 g, Daily PRN  acetaminophen, 650 mg, Q6H PRN   Or  acetaminophen, 650 mg, Q6H PRN  heparin (porcine), 60 Units/kg, PRN  heparin (porcine), 30 Units/kg, PRN         Objective:    BP (!) 188/95   Pulse 52   Temp 98.2 °F (36.8 °C) (Oral)   Resp 18   Ht 5' 7\" (1.702 m)   Wt 128 lb (58.1 kg)   SpO2 98%   BMI 20.05 kg/m²   General Appearance: alert and oriented to person, place and time and in no acute distress  Skin: warm and dry  Head: normocephalic and atraumatic  Eyes: pupils equal, round, and reactive to light, extraocular eye movements intact, conjunctivae normal  Neck: neck supple and non tender without mass   Pulmonary/Chest: clear to auscultation bilaterally- no wheezes, rales or rhonchi, normal air movement, no respiratory distress  Cardiovascular: normal rate, normal S1 and S2 and no carotid bruits  Abdomen: soft, non-tender, non-distended, normal bowel sounds, no masses or organomegaly  Extremities: no cyanosis, no clubbing and no edema  Neurologic: no cranial nerve deficit and speech normal      Recent Labs     11/06/21 2256 11/08/21  0444    139   K 5.0 3.9   * 108*   CO2 26 23   BUN 8 5*   CREATININE 1.0 0.7   GLUCOSE 145* 100*   CALCIUM 8.7 8.6       Recent Labs     11/06/21 2256   ALKPHOS 55   PROT 5.9* LABALBU 3.1*   BILITOT 0.7   AST 13   ALT 9       Recent Labs     11/06/21  2256 11/07/21  1357 11/08/21  0444   WBC 6.5 5.1 4.5   RBC 3.96 4.04 4.11   HGB 12.0 12.4 12.4   HCT 37.5 37.5 38.0   MCV 94.7 92.8 92.5   MCH 30.3 30.7 30.2   MCHC 32.0 33.1 32.6   RDW 15.9* 15.8* 15.9*    208 194   MPV 9.0 9.7 9.5          Radiology:   XR CHEST 1 VIEW   Final Result   No acute process. CT Head WO Contrast   Final Result   No acute intracranial abnormality. CT Cervical Spine WO Contrast   Final Result   Advanced degenerative changes. MRI would be useful if symptoms persist.             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 *      Plan:  1. Syncope  -Most likely related to orthostatic hypotension and beta-blocker use. Confusion as to whether patient was taking metoprolol at home or not. Initially she said she was but now she said she wasn't. -Repeat orthostatic vital signs were almost positive this afternoon  -Given that the syncopal episode may not have been due to metoprolol use this time, will consult cardiology for their input    2. Orthostatic hypotension  -Repeat orthostatic VS technically negative but very close to positive  -Will monitor closely     3. 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   -May need pacemaker placed, will consult cardiology for their input     4.   Chronic combined systolic and diastolic heart failure  -Discontinue beta-blocker as above  -No symptoms or signs of acute exacerbation at this time  -Resume home lisinopril at 5mg QD, can titrate up or down as needed     5. Paroxysmal atrial fibrillation  -Continue amiodarone  -Will hold eliquis currently as unsure if cardiology will want to do pacemaker. Will put patient on heparin drip instead in the meantime.  -Discontinue metoprolol succinate as noted above     6.  Essential hypertension  -Resumed home lisinopril at 5mg QD, can titrate as needed  -BP this /95 prior to getting any antihypertensives, will be rechecked again after getting morning meds  -Monitor BP closely    7. Abnormal urinalysis  -UA showed positive for LE and bacteria  -Urine culture sent, still pending  -Continue ceftriaxone for treatment of UTI    NOTE: This report was transcribed using voice recognition software. Every effort was made to ensure accuracy; however, inadvertent computerized transcription errors may be present.      Electronically signed by Davis Morrison MD on 11/8/2021 at 10:39 AM

## 2021-11-08 NOTE — PROGRESS NOTES
Physical Therapy    Physical Therapy Initial Evaluation/Plan of Care    Room #:  7706/4492-02  Patient Name: Yuly Angulo  YOB: 1931  MRN: [id number]    Date of Service: 11/8/2021     Tentative placement recommendation: Subacute vs Home Health Physical Therapy if patient meets goals  Equipment recommendation: [address]      Evaluating Physical Therapist: Kory Hendrix, PT #[id number]      Specific Provider Orders/Date/Referring Provider :  11/07/21 0145   PT eval and treat Start: 11/07/21 0145, End: 11/07/21 0145, ONE TIME, Standing Count: 1 Occurrences, R    [employer], DO      Admitting Diagnosis:   Syncope and collapse [R55]  Left anterior fascicular block [I44.4]  Bradycardia [R00.1]  Elevated troponin [R77.8]       Surgery: none  Visit Diagnoses       Codes    Left anterior fascicular block     I44.4    Elevated troponin     R77.8          Patient Active Problem List   Diagnosis    Systolic hypertension    Hypertension, essential, benign    Paroxysmal atrial fibrillation (HCC)    Bradycardia    Syncope and collapse    Moderate protein-calorie malnutrition ([address]    Chest pain    Chronic combined systolic (congestive) and diastolic (congestive) heart failure (HCC)    Abnormal urinalysis    Orthostatic hypotension        ASSESSMENT of Current Deficits Patient exhibits decreased strength, balance, endurance and confusion impairing functional mobility, transfers, gait , gait distance and tolerance to activity unsteady gait may benefit from wheeled walker for increased stability and safety. Patient requires continued skilled physical therapy to address concerns listed above for increased safety and function at discharge.          PHYSICAL THERAPY  PLAN OF CARE       Physical therapy plan of care is established based on physician order,  patient diagnosis and clinical assessment    Current Treatment Recommendations:    -Bed Mobility: Lower extremity exercises   -Sitting Balance: Facilitate active trunk muscle engagement  and Facilitate postural control in all planes   -Standing Balance: Perform strengthening exercises in standing to promote motor control with or without upper extremity support   -Transfers: Provide instruction on proper hand and foot position for adequate transfer of weight onto lower extremities and use of gait device if needed and Cues for hand placement, technique and safety. Provide stabilization to prevent fall   -Gait: Gait training, Standing activities to improve: base of support, weight shift, weight bearing  and Pregait training to emphasize: Device control, Upright and Safety   -Endurance: Utilize Supervised activities to increase level of endurance to allow for safe functional mobility including transfers and gait     PT long term treatment goals are located in below grid    Patient and or family understand(s) diagnosis, prognosis, and plan of care. Frequency of treatments: Patient will be seen  daily. Prior Level of Function: Patient ambulated with cane    Rehab Potential: good    for baseline    Past medical history:   Past Medical History:   Diagnosis Date    Arthritis     Atrial fibrillation ([address]     Chronic combined systolic (congestive) and diastolic (congestive) heart failure ([address] 11/7/2021    Hx of blood clots     Hypertension     Ischemic colitis ([address]     PAF (paroxysmal atrial fibrillation) ([address]      Past Surgical History:   Procedure Laterality Date    ANKLE FRACTURE SURGERY      CARDIAC CATHETERIZATION  11/11/2019    Dr. Daryle Staple, COLON, DIAGNOSTIC      FRACTURE SURGERY         SUBJECTIVE:    Precautions:  Up with assistance and Orthostatic blood pressure and pulse , falls, alarm and hard of hearing ,    Social history: Patient lives with son in a ranch home  with 2-3 steps  to enter with [employer] owned: Mari Rushing and walker unsure if has wheels,       [address] obility        AM-[facility] Mobility Inpatient   How much difficulty turning over in bed?: A Little  How much difficulty sitting down on / standing up from a chair with arms?: A Little  How much difficulty moving from lying on back to sitting on side of bed?: A Little  How much help from another person moving to and from a bed to a chair?: A Little  How much help from another person needed to walk in hospital room?: A Little  How much help from another person for climbing 3-5 steps with a railing?: A Lot  AM-PAC Inpatient Mobility Raw Score : 17  AM-PAC Inpatient T-Scale Score : 42.13  Mobility Inpatient CMS 0-100% Score: 50.57  Mobility Inpatient CMS G-Code Modifier : CK    Nursing cleared patient for PT evaluation. The admitting diagnosis and active problem list as listed above have been reviewed prior to the initiation of this evaluation. OBJECTIVE;   Initial Evaluation  Date: 11/8/2021 Treatment Date:     Short Term/ Long Term   Goals   Was pt agreeable to Eval/treatment? Yes  To be met in 3 days   Pain level   0/10        Bed Mobility    Rolling: Minimal assist of 1    Supine to sit: Minimal assist of 1    Sit to supine: Not assessed     Scooting: Minimal assist of 1    Rolling: Independent    Supine to sit:  Independent    Sit to supine: Independent    Scooting: Independent     Transfers Sit to stand: Minimal assist of 1 Cues for hand placement and safety   Sit to stand: Modified Independent     Ambulation    1 x 40 feet 1 x 20 feet using  cane with Minimal assist of 1   for balance and safety and cues for safety, proper hand placement and not reaching for furniture/table with left hand   100 feet using  wheeled walker with Modified Independent    Stair negotiation: ascended and descended   Not assessed     3 steps 1 rail with supervision    ROM Within functional limits        Strength BUE:  refer to OT eval  RLE:  3+/5  LLE:  3+/5  Increase strength in affected mm groups by 1/3 grade   Balance Sitting EOB:  good -  Dynamic Standing:  fair    Sitting EOB:  good    Dynamic Standing: good wheeled walker      Patient is Alert & Oriented x person, place and situation and follows one step directions    Sensation:  Patient  denies numbness/tingling   Edema:  no   Endurance: fair  +    Vitals: room air   Blood Pressure at rest    Blood Pressure during session     Heart Rate at rest 56 Heart Rate during session     SPO2 at rest 96%  SPO2 during session  95%     Patient education  Patient educated on role of Physical Therapy, risks of immobility, safety and plan of care and  importance of mobility while in hospital      Patient response to education:   Pt verbalized understanding Pt demonstrated skill Pt requires further education in this area   Yes Partial Yes      Treatment:  Patient practiced and was instructed/facilitated in the following treatment: Patient assisted to edge of bed,   Sat edge of bed 10 minutes with Supervision  to increase dynamic sitting balance and activity tolerance. slightly lightheaded. Ambulated in room, assisted to commode, supervision  for balance during hygiene and standing at sink to wash hands. Therapeutic Exercises:  ankle pumps, long arc quad and seated marching  x 15 reps. At end of session, patient in chair with alarm call light and phone within reach,  all lines and tubes intact, nursing notified. Patient would benefit from continued skilled Physical Therapy to improve functional independence and quality of life. Patient's/ family goals   home    Time in  1338  Time out  1415    Total Treatment Time  17 minutes    Evaluation time includes thorough review of current medical information, gathering information on past medical history/social history and prior level of function, completion of standardized testing/informal observation of tasks, assessment of data, and development of Plan of care and goals.      CPT codes:  Low Complexity PT evaluation (82465)  Therapeutic activities (96221)   10 minutes  1 unit(s)  Non billable time 7 minutes    Nazanin Saucedo, PT

## 2021-11-08 NOTE — PROGRESS NOTES
[address] [facility]  [facility]. [place]        Date:2021                                                  Patient Name: Vicenta Mathews    MRN: [id number]    : 1931    Room: [address] [place] [address]      Evaluating OT: Ofelia Adler OTR/L #[id number]     Referring Provider and Specific Provider Orders/Date:      21  OT eval and treat  Start:  21,   End:  21,   ONE TIME,   Standing Count:  1 Occurrences,   R         Malia Carrillo, DO      Placement Recommendation: [facility] if patient is able to meet goals       Diagnosis:   1. Syncope and collapse    2. Bradycardia    3. Left anterior fascicular block    4. Elevated troponin           Surgery: None     Pertinent Medical History:       Past Medical History:   Diagnosis Date    Arthritis     Atrial fibrillation (HCC)     Chronic combined systolic (congestive) and diastolic (congestive) heart failure ([address] 2021    Hx of blood clots     Hypertension     Ischemic colitis ([facility]     PAF (paroxysmal atrial fibrillation) ([facility]          Past Surgical History:   Procedure Laterality Date    ANKLE FRACTURE SURGERY      CARDIAC CATHETERIZATION  2019    Dr. Shawn Salinas, COLON, DIAGNOSTIC      FRACTURE SURGERY          Precautions:  Fall Risk, falls and alarm, hard of hearing, confusion.       Assessment of current deficits    [x] Functional mobility  [x]ADLs  [x] Strength               [x]Cognition    [x] Functional transfers   [x] IADLs         [x] Safety Awareness   [x]Endurance    [] Fine Coordination              [x] Balance      [] Vision/perception   []Sensation     []Gross Motor Coordination  [] ROM  [] Delirium                   [] Motor Control     OT PLAN OF CARE   OT POC based on physician orders, patient diagnosis and results of clinical assessment    Frequency/Duration 1-3 days/wk for 2 weeks PRN     Specific OT Treatment Interventions to include:   * Instruction/training on adapted ADL techniques and AE recommendations to increase functional independence within precautions       * Training on energy conservation strategies, correct breathing pattern and techniques to improve independence/tolerance for self-care routine  * Functional transfer/mobility training/DME recommendations for increased independence, safety, and fall prevention  * Patient/Family education to increase follow through with safety techniques and functional independence  * Recommendation of environmental modifications for increased safety with functional transfers/mobility and ADLs  * Cognitive retraining/development of therapeutic activities to improve problem solving, judgement, memory, and attention for increased safety/participation in ADL/IADL tasks  * Therapeutic exercise to improve motor endurance, ROM, and functional strength for ADLs/functional transfers  * Therapeutic activities to facilitate/challenge dynamic balance, stand tolerance for increased safety and independence with ADLs    Recommended Adaptive Equipment: TBD      Home Living: with son; single family home, 1 story, 2 steps to enter no rail. Bathroom set-up: tub/shower         Equipment owned: single point cane, wheeled walker, shower chair. Prior Level of Function: Family assists with ADLs as needed and completes all IADLs; ambulated with single point cane . Driving: No   Occupation: n/a  Enjoys: n/a      Pain Level: pt denied pain this date; Nursing notified.       Cognition: A&O: 3/4; Follows 1-2 step directions   Memory: impaired    Sequencing: fair minus    Problem solving: poor   Judgement/safety: poor    [facility]   AM-PAC Daily Activity Inpatient   How much help for putting on and taking off regular lower body clothing?: A Little  How much help for Bathing?: A Little  How much help for Toileting?: A Little  How much help for putting on and taking off regular upper body clothing?: A Little  How much help for taking care of personal grooming?: A Little  How much help for eating meals?: A Little  AM-PAC Inpatient Daily Activity Raw Score: 18  AM-PAC Inpatient ADL T-Scale Score : 38.66  ADL Inpatient CMS 0-100% Score: 46.65  ADL Inpatient CMS G-Code Modifier : CK     Functional Assessment:    Initial Eval Status  Date: 11/8/21 Treatment Status  Date: STGs = LTGs  Time frame: 10-14 days   Feeding Supervision   Pt seen feeding self this morning. Set-up assist needed. Independent    Grooming Stand by Assist   While standing at bathroom sink for hand hygiene. Pt completed face wash and grooming tasks while seated in chair at tabletop. Moderate Geary    UB Dressing Stand by Assist   Simulated overhead. Pt able to doff/don gown over shoulders with set-up assist.  Moderate Geary    LB Dressing Minimal Assist   Pt seen pulling up on socks while seated in chair. Assist needed for balance support while doffing/donning undergarments over hips. Moderate Geary    Bathing Minimal Assist   For UB/LB bathing seated in chair with verbal cueing for sequencing and problem solving. Moderate Geary    Toileting Stand by Assist   For perineal hygiene while seated on commode. Moderate Geary    Bed Mobility  Supine to sit: NT  Sit to supine: NT  Supine to sit: Independent   Sit to supine: Independent    Functional Transfers Minimal Assist from EOB to stand with SPC. Minimal Assist for sit <> stand transfer at commode and chair. Transfer training with verbal cues for hand placement throughout session to improve safety. Moderate Geary    Functional Mobility Minimal Assist with SPC to/from bathroom to improve balance, verbal cues for overall safety.    Moderate Geary    Balance Sitting:     Static: fair     Dynamic: fair   Standing: fair  minus with SPC   Sitting:     Static: good     Dynamic: good   Standing: good  with AD 9:20 AM   Time Out: 9:45 AM    Total Treatment Time: 0       Min Units   OT Eval Low 97165  X  1    OT Eval Medium 04968      OT Eval High 98580      OT Re-Eval [id number]            ADL/Self Care 94596     Therapeutic Activities 56497       Therapeutic Ex 83856       Orthotic Management 14324       Manual 84824     Neuro Re-Ed 80516       Non-Billable Time        Evaluation Time additionally includes thorough review of current medical information, gathering information on past medical history/social history and prior level of function, interpretation of standardized testing/informal observation of tasks, assessment of data and development of plan of care and goals.         Evaluating OT: Aline Moscoso OTR/L #[id number]

## 2021-11-09 ENCOUNTER — APPOINTMENT (OUTPATIENT)
Dept: CT IMAGING | Age: 86
DRG: 312 | End: 2021-11-09
Payer: MEDICARE

## 2021-11-09 LAB
HCT VFR BLD CALC: 37.7 % (ref 34–48)
HEMOGLOBIN: 12.6 G/DL (ref 11.5–15.5)
MCH RBC QN AUTO: 31.2 PG (ref 26–35)
MCHC RBC AUTO-ENTMCNC: 33.4 % (ref 32–34.5)
MCV RBC AUTO: 93.3 FL (ref 80–99.9)
PDW BLD-RTO: 16.1 FL (ref 11.5–15)
PLATELET # BLD: 207 E9/L (ref 130–450)
PMV BLD AUTO: 9.6 FL (ref 7–12)
RBC # BLD: 4.04 E12/L (ref 3.5–5.5)
URINE CULTURE, ROUTINE: NORMAL
WBC # BLD: 3.5 E9/L (ref 4.5–11.5)

## 2021-11-09 PROCEDURE — G0378 HOSPITAL OBSERVATION PER HR: HCPCS

## 2021-11-09 PROCEDURE — 6370000000 HC RX 637 (ALT 250 FOR IP): Performed by: INTERNAL MEDICINE

## 2021-11-09 PROCEDURE — 36415 COLL VENOUS BLD VENIPUNCTURE: CPT

## 2021-11-09 PROCEDURE — 99232 SBSQ HOSP IP/OBS MODERATE 35: CPT | Performed by: STUDENT IN AN ORGANIZED HEALTH CARE EDUCATION/TRAINING PROGRAM

## 2021-11-09 PROCEDURE — 70450 CT HEAD/BRAIN W/O DYE: CPT

## 2021-11-09 PROCEDURE — 85027 COMPLETE CBC AUTOMATED: CPT

## 2021-11-09 PROCEDURE — 97530 THERAPEUTIC ACTIVITIES: CPT

## 2021-11-09 PROCEDURE — APPSS60 APP SPLIT SHARED TIME 46-60 MINUTES: Performed by: PHYSICIAN ASSISTANT

## 2021-11-09 PROCEDURE — 2580000003 HC RX 258: Performed by: INTERNAL MEDICINE

## 2021-11-09 PROCEDURE — 99215 OFFICE O/P EST HI 40 MIN: CPT | Performed by: INTERNAL MEDICINE

## 2021-11-09 RX ORDER — AMLODIPINE BESYLATE 10 MG/1
10 TABLET ORAL DAILY
Status: DISCONTINUED | OUTPATIENT
Start: 2021-11-10 | End: 2021-11-10 | Stop reason: HOSPADM

## 2021-11-09 RX ORDER — HYDRALAZINE HYDROCHLORIDE 50 MG/1
50 TABLET, FILM COATED ORAL EVERY 8 HOURS SCHEDULED
Status: DISCONTINUED | OUTPATIENT
Start: 2021-11-09 | End: 2021-11-10 | Stop reason: HOSPADM

## 2021-11-09 RX ADMIN — LISINOPRIL 10 MG: 10 TABLET ORAL at 21:03

## 2021-11-09 RX ADMIN — Medication 10 ML: at 11:03

## 2021-11-09 RX ADMIN — AMLODIPINE BESYLATE 5 MG: 5 TABLET ORAL at 11:02

## 2021-11-09 RX ADMIN — APIXABAN 2.5 MG: 2.5 TABLET, FILM COATED ORAL at 11:02

## 2021-11-09 RX ADMIN — HYDRALAZINE HYDROCHLORIDE 50 MG: 50 TABLET, FILM COATED ORAL at 22:10

## 2021-11-09 RX ADMIN — APIXABAN 2.5 MG: 2.5 TABLET, FILM COATED ORAL at 21:06

## 2021-11-09 RX ADMIN — Medication 10 ML: at 21:03

## 2021-11-09 RX ADMIN — LISINOPRIL 10 MG: 10 TABLET ORAL at 11:02

## 2021-11-09 RX ADMIN — DOCUSATE SODIUM 100 MG: 100 CAPSULE ORAL at 21:03

## 2021-11-09 RX ADMIN — DOCUSATE SODIUM 100 MG: 100 CAPSULE ORAL at 11:02

## 2021-11-09 RX ADMIN — AMIODARONE HYDROCHLORIDE 200 MG: 200 TABLET ORAL at 11:03

## 2021-11-09 NOTE — CARE COORDINATION
Ss note:11/9/2021.2:15 PM no covid testing. Plan is home with son. Sw spoke with dtr [place] whom relays someone in the family will be with the pt until [facility] can start on Thursday. Referral was completed to Juan Harrison at [facility], orders are in.  Dr. Crystal Andrade anticipates discharge today, dtr aware and en route to hospital. ESTEFANIA Hayes

## 2021-11-09 NOTE — PROGRESS NOTES
Pt recently moved by aide staff to [facility]. Pt was in br without assistance. Pt was an unwitnessed fall. RRT called. CT head ordered. Pt states they did not lose consciousness.

## 2021-11-09 NOTE — PROGRESS NOTES
Physical Therapy    Physical Therapy Treatment Note/Plan of Care    Room #:  7889/4210-93  Patient Name: Lissette Kennedy  YOB: 1931  MRN: [id number]    Date of Service: 11/9/2021     Tentative placement recommendation: Subacute vs Home Health Physical Therapy if patient meets goals  Equipment recommendation: Destiny Harris      Evaluating Physical Therapist: Trevor Harrison, PT #[id number]      Specific Provider Orders/Date/Referring Provider :  11/07/21 0145   PT eval and treat Start: 11/07/21 0145, End: 11/07/21 0145, ONE TIME, Standing Count: 1 Occurrences, R    Shane Davidson,       Admitting Diagnosis:   Syncope and collapse [R55]  Left anterior fascicular block [I44.4]  Bradycardia [R00.1]  Elevated troponin [R77.8]       Surgery: none  Visit Diagnoses       Codes    Left anterior fascicular block     I44.4    Elevated troponin     R77.8          Patient Active Problem List   Diagnosis    Systolic hypertension    Hypertension, essential, benign    Paroxysmal atrial fibrillation (HCC)    Bradycardia    Syncope and collapse    Moderate protein-calorie malnutrition ([address]    Chest pain    Chronic combined systolic (congestive) and diastolic (congestive) heart failure (HCC)    Abnormal urinalysis    Orthostatic hypotension        ASSESSMENT of Current Deficits Patient exhibits decreased strength, balance, endurance and confusion Pt ambulated with wheeled walker d/t unsteady gait. Patient requires continued skilled physical therapy to address concerns listed above for increased safety and function at discharge.          PHYSICAL THERAPY  PLAN OF CARE       Physical therapy plan of care is established based on physician order,  patient diagnosis and clinical assessment    Current Treatment Recommendations:    -Bed Mobility: Lower extremity exercises   -Sitting Balance: Facilitate active trunk muscle engagement  and Facilitate postural control in all planes   -Standing Balance: Perform strengthening exercises in standing to promote motor control with or without upper extremity support   -Transfers: Provide instruction on proper hand and foot position for adequate transfer of weight onto lower extremities and use of gait device if needed and Cues for hand placement, technique and safety. Provide stabilization to prevent fall   -Gait: Gait training, Standing activities to improve: base of support, weight shift, weight bearing  and Pregait training to emphasize: Device control, Upright and Safety   -Endurance: Utilize Supervised activities to increase level of endurance to allow for safe functional mobility including transfers and gait     PT long term treatment goals are located in below grid    Patient and or family understand(s) diagnosis, prognosis, and plan of care. Frequency of treatments: Patient will be seen  daily. Prior Level of Function: Patient ambulated with cane    Rehab Potential: good    for baseline    Past medical history:   Past Medical History:   Diagnosis Date    Arthritis     Atrial fibrillation ([address]     Chronic combined systolic (congestive) and diastolic (congestive) heart failure ([address] 11/7/2021    Hx of blood clots     Hypertension     Ischemic colitis ([address]     PAF (paroxysmal atrial fibrillation) ([address]      Past Surgical History:   Procedure Laterality Date    ANKLE FRACTURE SURGERY      CARDIAC CATHETERIZATION  11/11/2019    Dr. Vandana Bui, COLON, DIAGNOSTIC      FRACTURE SURGERY         SUBJECTIVE:    Precautions:  Up with assistance and Orthostatic blood pressure and pulse , falls, alarm and hard of hearing ,    Social history: Patient lives with son in a ranch home  with 2-3 steps  to enter with [employer]     Equipment owned: Cane and walker unsure if has wheels,       [address] Mobility Inpatient   How much difficulty turning over in bed?: A Little  How much difficulty sitting down on / standing up from a chair with arms?: A Little  How much difficulty moving from lying on back to sitting on side of bed?: A Little  How much help from another person moving to and from a bed to a chair?: A Little  How much help from another person needed to walk in hospital room?: A Little  How much help from another person for climbing 3-5 steps with a railing?: A Lot  AM-PAC Inpatient Mobility Raw Score : 17  AM-PAC Inpatient T-Scale Score : 42.13  Mobility Inpatient CMS 0-100% Score: 50.57  Mobility Inpatient CMS G-Code Modifier : CK    Nursing cleared patient for PT treatment. .   OBJECTIVE;   Initial Evaluation  Date: 11/8/2021 Treatment Date:  11/9/2021     Short Term/ Long Term   Goals   Was pt agreeable to Eval/treatment? Yes Yes To be met in 3 days   Pain level   0/10    3/10 stomach pain from constipation     Bed Mobility    Rolling: Minimal assist of 1    Supine to sit: Minimal assist of 1    Sit to supine: Not assessed     Scooting: Minimal assist of 1   Rolling: Minimal assist of 1   Supine to sit: Minimal assist of 1   Sit to supine: Not assessed patient in chair   Scooting: Minimal assist of 1    Rolling: Independent    Supine to sit:  Independent    Sit to supine: Independent    Scooting: Independent     Transfers Sit to stand: Minimal assist of 1 Cues for hand placement and safety  Sit to stand: Minimal assist of 1 safety cues required       Sit to stand: Modified Independent     Ambulation    1 x 40 feet 1 x 20 feet using  cane with Minimal assist of 1   for balance and safety and cues for safety, proper hand placement and not reaching for furniture/table with left hand 2x20 feet using  wheeled walker with Minimal assist of 1   cues for walker approximation, safety and proper hand placement    100 feet using  wheeled walker with Modified Independent    Stair negotiation: ascended and descended   Not assessed     3 steps 1 rail with supervision    ROM Within functional limits Strength BUE:  refer to OT eval  RLE:  3+/5  LLE:  3+/5  Increase strength in affected mm groups by 1/3 grade   Balance Sitting EOB:  good -  Dynamic Standing:  fair   Sitting EOB: good -  Dynamic Standing: fair      Sitting EOB:  good    Dynamic Standing: good wheeled walker      Patient is Alert & Oriented x person, place and situation and follows one step directions    Sensation:  Patient  denies numbness/tingling   Edema:  no   Endurance: fair  +    Vitals: room air   Blood Pressure at rest    Blood Pressure during session     Heart Rate at rest 46 Heart Rate during session     SPO2 at rest 96%  SPO2 during session  96%     Patient education  Patient educated on role of Physical Therapy, risks of immobility, safety and plan of care and  importance of mobility while in hospital      Patient response to education:   Pt verbalized understanding Pt demonstrated skill Pt requires further education in this area   Yes Partial Yes      Treatment:  Patient practiced and was instructed/facilitated in the following treatment: Patient assisted to edge of bed,   Sat edge of bed 10 minutes with Supervision  to increase dynamic sitting balance and activity tolerance. slightly lightheaded. Ambulated in room. Pt assisted up in chair for breakfast. Performed exercises. Chair alarm. Therapeutic Exercises:  ankle pumps, long arc quad and seated marching  x 15 reps. At end of session, patient in chair with alarm call light and phone within reach,  all lines and tubes intact, nursing notified. Patient would benefit from continued skilled Physical Therapy to improve functional independence and quality of life.          Patient's/ family goals   home    Time in  0818  Time out  0832    Total Treatment Time  14 minutes      CPT codes:  Therapeutic activities (82283)   10 minutes  1 unit(s)  Therapeutic exercises (58950)   4 minutes  0 unit(s)    Ann Rios  #[id number]

## 2021-11-09 NOTE — PROGRESS NOTES
Inpatient Cardiology Progress note     PATIENT IS BEING FOLLOWED FOR:    Imani Hector located in  room 7995/6893-72 is a 80 y.o. female      SUBJECTIVE: Presenting no complaints at the moment of exam.  Denies chest pain, shortness of breath, potation. In the bathroom today bed toward the garbage can and stumbled and fell without losing consciousness and according to patient almost hitting her head. Protective response team was called and CT of the head was ordered. Her vitals when she returned to the room were /82 mmHg, pulse 58, respiration 18, SPO2 98% at room air   OBJECTIVE: No apparent distress       PHYSICAL EXAM:   BP (!) 175/87   Pulse 50   Temp 98.3 °F (36.8 °C) (Oral)   Resp 18   Ht 5' 7\" (1.702 m)   Wt 128 lb (58.1 kg)   SpO2 99%   BMI 20.05 kg/m²    B/P Range last 24 hours: Systolic (82CJZ), KIW:316 , Min:175 , SGT:947    Diastolic (21CGB), INE:14, Min:87, Max:94    CONST: Well developed, well nourished who appears stated age. Awake, alert and cooperative. No apparent distress  HEENT:   Head- Normocephalic, atraumatic   Eyes- Conjunctivae pink, anicteric  Throat- Oral mucosa pink and moist  Neck-  No stridor, trachea midline, no jugular venous distention. No carotid bruit  CHEST: Chest symmetrical and non-tender to palpation. No accessory muscle use or intercostal retractions  RESPIRATORY:  Lung sounds - clear throughout fields   CARDIOVASCULAR:     Heart Inspection- shows no noted pulsations  Heart Ausculation-regular with extrasystole, no murmur. No s3, s4 or rub   PV: No lower extremity edema. No varicosities. Pedal pulses palpable, no clubbing or cyanosis   ABDOMEN: Soft, non-tender to light palpation. Bowel sounds present. MS: Good muscle strength and tone. No atrophy or abnormal movements. : Deferred  SKIN: Warm and dry no statis dermatitis or ulcers   NEURO / PSYCH: Oriented to person, place and time. Speech clear and appropriate. Follows all commands.  Pleasant affect Intake/Output Summary (Last 24 hours) at 11/9/2021 1602  Last data filed at 11/9/2021 1053  Gross per 24 hour   Intake 360 ml   Output --   Net 360 ml       Weight:   Wt Readings from Last 3 Encounters:   11/06/21 128 lb (58.1 kg)   10/22/21 120 lb (54.4 kg)   09/10/21 123 lb (55.8 kg)     Current Inpatient Medications:   amiodarone  200 mg Oral Every Other Day    apixaban  2.5 mg Oral BID    amLODIPine  5 mg Oral Daily    lisinopril  10 mg Oral BID    docusate sodium  100 mg Oral BID    sodium chloride flush  5-40 mL IntraVENous 2 times per day       IV Infusions (if any):   sodium chloride         DIAGNOSTIC/ LABORATORY DATA:  Labs:   CBC:   Recent Labs     11/08/21  0444 11/09/21  0635   WBC 4.5 3.5*   HGB 12.4 12.6   HCT 38.0 37.7    207     BMP:   Recent Labs     11/06/21  2256 11/08/21 0444    139   K 5.0 3.9   CO2 26 23   BUN 8 5*   CREATININE 1.0 0.7   LABGLOM >60 >60   CALCIUM 8.7 8.6     Mag: No results for input(s): MG in the last 72 hours. Phos: No results for input(s): PHOS in the last 72 hours. TSH: No results for input(s): TSH in the last 72 hours. HgA1c:   Lab Results   Component Value Date    LABA1C 5.5 11/07/2019     No results found for: EAG    BNP: No results for input(s): BNP in the last 72 hours. PT/INR: No results for input(s): PROTIME, INR in the last 72 hours. APTT:  Recent Labs     11/08/21  1355 11/08/21 2010   APTT 55.3* 96.0*     CARDIAC ENZYMES:No results for input(s): CKTOTAL, CKMB, CKMBINDEX, TROPONINI in the last 72 hours.   FASTING LIPID PANEL:  Lab Results   Component Value Date    CHOL 186 11/07/2019    HDL 70 11/07/2019    LDLCALC 107 11/07/2019    TRIG 44 11/07/2019     LIVER PROFILE:  Recent Labs     11/06/21 2256   AST 13   ALT 9   LABALBU 3.1*       Recent Labs     11/06/21  2256 11/07/21  0016   TROPHS 20* 15*     Lab Results   Component Value Date    CREATININE 0.7 11/08/2021    CREATININE 1.0 11/06/2021    CREATININE 0.9 10/22/2021 CREATININE 1.0 09/10/2021    CREATININE 1.0 09/08/2021    CREATININE 0.9 08/29/2021    CREATININE 1.0 08/10/2021    CREATININE 1.0 09/26/2020    CREATININE 0.9 02/12/2020    CREATININE 1.0 11/18/2019    CREATININE 0.9 11/11/2019    CREATININE 0.9 11/09/2019    CREATININE 0.8 11/07/2019    CREATININE 0.9 11/06/2019    CREATININE 1.0 10/16/2019    CREATININE 0.9 09/17/2019    CREATININE 0.8 09/16/2019    CREATININE 0.7 07/18/2019    CREATININE 1.0 07/17/2019    CREATININE 1.0 07/17/2019    CREATININE 0.8 04/16/2019     Lab Results   Component Value Date    PROBNP 242 08/29/2021    PROBNP 177 08/10/2021    PROBNP 369 09/26/2020    PROBNP 265 02/12/2020    PROBNP 366 11/18/2019    PROBNP 248 11/06/2019    PROBNP 141 10/16/2019    PROBNP 283 08/22/2017     CXR:     Telemetry:  12 lead EKG: IMPRESSION:  1. Syncope: Etiology?,  Doubt cardiac in origin, suspect vasovagal versus secondary to orthostatic blood pressure changes, might benefit from outpatient event monitor for further evaluation  2. Bradycardia: Iatrogenic, beta-blocker was stopped already, will change amiodarone to every other day, might benefit from outpatient event monitor for further evaluation  3. Paroxysmal atrial fibrillation: Now in sinus bradycardia, on chronic anticoagulation with Eliquis CAD: Nonobstructive on cardiac cath done in November 2019, negative Lexiscan stress test in August 2021  4. Nonischemic cardiomyopathy, EF 38±5%:  5. Combined congestive heart failure: Chronic, compensated  6. Hypertension: Not well controlled, will continue to adjust medications   7.         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 Agree with holding metoprolol  2. will change amiodarone to every other day  3. Continue lisinopril 10 mg 1 by mouth daily  4. Continue Norvasc 5 mg 1 by mouth daily  5. Discontinued aspirin  6. Continue Eliquis  7.  Might benefit from outpatient breathing, good air exchange, clear to auscultation bilaterally, no crackles or wheezing  CARDIOVASCULAR:  Normal apical impulse, regular rate and rhythm, normal S1 and S2, no S3 or S4, and no murmur noted, no edema, no JVD, no carotid bruit. ABDOMEN:  Soft, nontender, no masses, no hepatomegaly, no splenomegaly, BS+  MUSCULOSKELETAL:  No clubbing no cyanosis. there is no redness, warmth, or swelling of the joints  full range of motion noted  NEUROLOGIC:  Alert, awake,oriented x3  SKIN:  no bruising or bleeding, normal skin color, texture, turgor and no redness, warmth, or swelling      Cardiographics  I personally reviewed the telemetry monitor strips with the following interpretation:  Echocardiogram: Reviewed, as above. Imaging  Reviewed, as above. Reviewed, as above. Lab Review   Lab Results   Component Value Date     11/08/2021    K 3.9 11/08/2021    K 5.0 11/06/2021     11/08/2021    CO2 23 11/08/2021    BUN 5 11/08/2021    CREATININE 0.7 11/08/2021    GLUCOSE 100 11/08/2021    GLUCOSE 102 11/12/2010    CALCIUM 8.6 11/08/2021     Lab Results   Component Value Date    WBC 3.5 11/09/2021    HGB 12.6 11/09/2021    HCT 37.7 11/09/2021    MCV 93.3 11/09/2021     11/09/2021     Reviewed, as above. I have personally reviewed the laboratory, cardiac diagnostic and radiographic testing as outlined above:    Assessment:     1. Syncope: Etiology?,  Doubt cardiac in origin, suspect vasovagal versus secondary to orthostatic blood pressure changes, might benefit from outpatient event monitor for further evaluation  2. Bradycardia: Iatrogenic, slowly improving after adjusting medications  3. Paroxysmal atrial fibrillation: Now in sinus bradycardia, on chronic anticoagulation with Eliquis  4. CAD: Nonobstructive on cardiac cath done in November 2019, negative Lexiscan stress test in August 2021  5. Nonischemic cardiomyopathy:  6.  Combined congestive heart failure: Chronic,

## 2021-11-09 NOTE — CARE COORDINATION
Ss note:11/9/2021.10:34 AM No covid testing. [facility] orders noted. Sw contacted pts dtr Laura Childress for [facility], [facility]. Referral made to [address] [facility]. Plan is home with son.  Michael Hawthorn, ESTEFANIA

## 2021-11-09 NOTE — SIGNIFICANT EVENT
Patient had an unwitnessed fall while daughter was visiting. Daughter states that patient was in bathroom and trying to throw tissue paper in the trash but she missed and when she went to bend over to pick it up she fell off the toilet. The daughter states that she went to go catch her and she thinks she caught her head before it hit the floor but she is not 100% sure. Patient denies losing consciousness. Patient was evaluated and fully neurologically intact, no cranial nerve deficits and no focal neurological signs. Of note, patient was resumed on eliquis today and was on a heparin drip yesterday. Head CT WO contrast ordered STAT by Dr. Renzo Blas who was at bedside first, will FU results. Will likely need to delay discharge today to monitor patient.

## 2021-11-09 NOTE — PROGRESS NOTES
[address] [place], [place] [address]ist   Progress Note    Admitting Date and Time: 11/6/2021 10:29 PM  Admit Dx: Syncope and collapse [R55]  Left anterior fascicular block [I44.4]  Bradycardia [R00.1]  Elevated troponin [R77.8]    Subjective:    Pt states she started feeling a little lightheaded again today. She has no other complaints. Per RN: Nothing to report    ROS: denies fever, chills, cp, sob, n/v, HA unless stated above.      amiodarone  200 mg Oral Every Other Day    apixaban  2.5 mg Oral BID    amLODIPine  5 mg Oral Daily    lisinopril  10 mg Oral BID    docusate sodium  100 mg Oral BID    sodium chloride flush  5-40 mL IntraVENous 2 times per day     sodium chloride flush, 5-40 mL, PRN  sodium chloride, 25 mL, PRN  polyethylene glycol, 17 g, Daily PRN  acetaminophen, 650 mg, Q6H PRN   Or  acetaminophen, 650 mg, Q6H PRN  heparin (porcine), 60 Units/kg, PRN  heparin (porcine), 30 Units/kg, PRN         Objective:    BP (!) 175/87   Pulse 50   Temp 98.3 °F (36.8 °C) (Oral)   Resp 18   Ht 5' 7\" (1.702 m)   Wt 128 lb (58.1 kg)   SpO2 99%   BMI 20.05 kg/m²   General Appearance: alert and oriented to person, place and time and in no acute distress  Skin: warm and dry  Head: normocephalic and atraumatic  Eyes: pupils equal, round, and reactive to light, extraocular eye movements intact, conjunctivae normal  Neck: neck supple and non tender without mass   Pulmonary/Chest: clear to auscultation bilaterally- no wheezes, rales or rhonchi, normal air movement, no respiratory distress  Cardiovascular: normal rate, normal S1 and S2 and no carotid bruits  Abdomen: soft, non-tender, non-distended, normal bowel sounds, no masses or organomegaly  Extremities: no cyanosis, no clubbing and no edema  Neurologic: no cranial nerve deficit and speech normal      Recent Labs     11/06/21  2256 11/08/21  0444    139   K 5.0 3.9   * 108*   CO2 26 23   BUN 8 5*   CREATININE 1.0 0.7   GLUCOSE 145* 100* CALCIUM 8.7 8.6       Recent Labs     11/06/21  2256   ALKPHOS 55   PROT 5.9*   LABALBU 3.1*   BILITOT 0.7   AST 13   ALT 9       Recent Labs     11/07/21  1357 11/08/21  0444 11/09/21  0635   WBC 5.1 4.5 3.5*   RBC 4.04 4.11 4.04   HGB 12.4 12.4 12.6   HCT 37.5 38.0 37.7   MCV 92.8 92.5 93.3   MCH 30.7 30.2 31.2   MCHC 33.1 32.6 33.4   RDW 15.8* 15.9* 16.1*    194 207   MPV 9.7 9.5 9.6          Radiology:   XR CHEST 1 VIEW   Final Result   No acute process. CT Head WO Contrast   Final Result   No acute intracranial abnormality. CT Cervical Spine WO Contrast   Final Result   Advanced degenerative changes. MRI would be useful if symptoms persist.             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 *      Plan:  1. Syncope  -Most likely related to orthostatic hypotension and beta-blocker use. Confusion as to whether patient was taking metoprolol at home or not. Initially she said she was but now she said she wasn't. -Repeat orthostatic vital signs were almost positive this afternoon  -Given that the syncopal episode may not have been due to metoprolol use this time, will consult cardiology for their input - stated can follow up outpatient with Dr. Sara Christy    2. Orthostatic hypotension  -Repeat orthostatic VS technically negative but very close to positive  -Will monitor closely     3. 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   -May need pacemaker placed, will consult cardiology for their input - stated can follow up outpatient     4.   Chronic combined systolic and diastolic heart

## 2021-11-10 ENCOUNTER — HOSPITAL ENCOUNTER (EMERGENCY)
Age: 86
Discharge: HOME | End: 2021-11-10
Attending: EMERGENCY MEDICINE
Payer: MEDICARE

## 2021-11-10 VITALS
DIASTOLIC BLOOD PRESSURE: 64 MMHG | HEART RATE: 82 BPM | BODY MASS INDEX: 20.09 KG/M2 | SYSTOLIC BLOOD PRESSURE: 102 MMHG | RESPIRATION RATE: 16 BRPM | OXYGEN SATURATION: 96 % | WEIGHT: 128 LBS | TEMPERATURE: 98.3 F | HEIGHT: 67 IN

## 2021-11-10 VITALS
DIASTOLIC BLOOD PRESSURE: 77 MMHG | RESPIRATION RATE: 18 BRPM | WEIGHT: 128 LBS | HEART RATE: 75 BPM | HEIGHT: 67 IN | BODY MASS INDEX: 20.09 KG/M2 | TEMPERATURE: 98.3 F | OXYGEN SATURATION: 97 % | SYSTOLIC BLOOD PRESSURE: 117 MMHG

## 2021-11-10 DIAGNOSIS — R53.83 FATIGUE, UNSPECIFIED TYPE: Primary | ICD-10-CM

## 2021-11-10 LAB
ANION GAP SERPL CALCULATED.3IONS-SCNC: 10 MMOL/L (ref 7–16)
BASOPHILS ABSOLUTE: 0.05 E9/L (ref 0–0.2)
BASOPHILS RELATIVE PERCENT: 0.9 % (ref 0–2)
BUN BLDV-MCNC: 10 MG/DL (ref 6–23)
CALCIUM SERPL-MCNC: 9.4 MG/DL (ref 8.6–10.2)
CHLORIDE BLD-SCNC: 107 MMOL/L (ref 98–107)
CO2: 24 MMOL/L (ref 22–29)
CREAT SERPL-MCNC: 0.9 MG/DL (ref 0.5–1)
EOSINOPHILS ABSOLUTE: 0.07 E9/L (ref 0.05–0.5)
EOSINOPHILS RELATIVE PERCENT: 1.2 % (ref 0–6)
GFR AFRICAN AMERICAN: >60
GFR NON-AFRICAN AMERICAN: >60 ML/MIN/1.73
GLUCOSE BLD-MCNC: 136 MG/DL (ref 74–99)
HCT VFR BLD CALC: 40 % (ref 34–48)
HEMOGLOBIN: 12.7 G/DL (ref 11.5–15.5)
IMMATURE GRANULOCYTES #: 0.02 E9/L
IMMATURE GRANULOCYTES %: 0.3 % (ref 0–5)
LYMPHOCYTES ABSOLUTE: 1.27 E9/L (ref 1.5–4)
LYMPHOCYTES RELATIVE PERCENT: 22 % (ref 20–42)
MCH RBC QN AUTO: 30.2 PG (ref 26–35)
MCHC RBC AUTO-ENTMCNC: 31.8 % (ref 32–34.5)
MCV RBC AUTO: 95.2 FL (ref 80–99.9)
MONOCYTES ABSOLUTE: 0.72 E9/L (ref 0.1–0.95)
MONOCYTES RELATIVE PERCENT: 12.5 % (ref 2–12)
NEUTROPHILS ABSOLUTE: 3.64 E9/L (ref 1.8–7.3)
NEUTROPHILS RELATIVE PERCENT: 63.1 % (ref 43–80)
PDW BLD-RTO: 16.3 FL (ref 11.5–15)
PLATELET # BLD: 203 E9/L (ref 130–450)
PMV BLD AUTO: 9.5 FL (ref 7–12)
POTASSIUM REFLEX MAGNESIUM: 3.8 MMOL/L (ref 3.5–5)
RBC # BLD: 4.2 E12/L (ref 3.5–5.5)
SODIUM BLD-SCNC: 141 MMOL/L (ref 132–146)
TROPONIN, HIGH SENSITIVITY: 17 NG/L (ref 0–9)
WBC # BLD: 5.8 E9/L (ref 4.5–11.5)

## 2021-11-10 PROCEDURE — 93005 ELECTROCARDIOGRAM TRACING: CPT | Performed by: EMERGENCY MEDICINE

## 2021-11-10 PROCEDURE — 84484 ASSAY OF TROPONIN QUANT: CPT

## 2021-11-10 PROCEDURE — 1200000000 HC SEMI PRIVATE

## 2021-11-10 PROCEDURE — 6370000000 HC RX 637 (ALT 250 FOR IP): Performed by: INTERNAL MEDICINE

## 2021-11-10 PROCEDURE — G0378 HOSPITAL OBSERVATION PER HR: HCPCS

## 2021-11-10 PROCEDURE — 80048 BASIC METABOLIC PNL TOTAL CA: CPT

## 2021-11-10 PROCEDURE — 85025 COMPLETE CBC W/AUTO DIFF WBC: CPT

## 2021-11-10 PROCEDURE — 97530 THERAPEUTIC ACTIVITIES: CPT

## 2021-11-10 PROCEDURE — 99239 HOSP IP/OBS DSCHRG MGMT >30: CPT | Performed by: STUDENT IN AN ORGANIZED HEALTH CARE EDUCATION/TRAINING PROGRAM

## 2021-11-10 PROCEDURE — 2580000003 HC RX 258: Performed by: INTERNAL MEDICINE

## 2021-11-10 PROCEDURE — 99284 EMERGENCY DEPT VISIT MOD MDM: CPT

## 2021-11-10 RX ORDER — LISINOPRIL 10 MG/1
10 TABLET ORAL 2 TIMES DAILY
Qty: 30 TABLET | Refills: 0 | Status: ON HOLD | OUTPATIENT
Start: 2021-11-10 | End: 2022-07-14 | Stop reason: HOSPADM

## 2021-11-10 RX ORDER — AMLODIPINE BESYLATE 10 MG/1
10 TABLET ORAL DAILY
Qty: 30 TABLET | Refills: 0 | Status: ON HOLD | OUTPATIENT
Start: 2021-11-11 | End: 2022-07-14 | Stop reason: HOSPADM

## 2021-11-10 RX ORDER — AMIODARONE HYDROCHLORIDE 200 MG/1
200 TABLET ORAL EVERY OTHER DAY
Qty: 15 TABLET | Refills: 0 | Status: ON HOLD | OUTPATIENT
Start: 2021-11-11 | End: 2022-07-14 | Stop reason: HOSPADM

## 2021-11-10 RX ORDER — POLYETHYLENE GLYCOL 3350 17 G/17G
17 POWDER, FOR SOLUTION ORAL DAILY PRN
Qty: 527 G | Refills: 0 | Status: SHIPPED | OUTPATIENT
Start: 2021-11-10 | End: 2021-12-10

## 2021-11-10 RX ORDER — HYDRALAZINE HYDROCHLORIDE 50 MG/1
50 TABLET, FILM COATED ORAL EVERY 8 HOURS SCHEDULED
Qty: 90 TABLET | Refills: 0 | Status: ON HOLD | OUTPATIENT
Start: 2021-11-10 | End: 2022-07-14 | Stop reason: SDUPTHER

## 2021-11-10 RX ADMIN — HYDRALAZINE HYDROCHLORIDE 50 MG: 50 TABLET, FILM COATED ORAL at 05:54

## 2021-11-10 RX ADMIN — DOCUSATE SODIUM 100 MG: 100 CAPSULE ORAL at 09:16

## 2021-11-10 RX ADMIN — ACETAMINOPHEN 650 MG: 325 TABLET ORAL at 09:17

## 2021-11-10 RX ADMIN — Medication 10 ML: at 09:17

## 2021-11-10 RX ADMIN — AMLODIPINE BESYLATE 10 MG: 10 TABLET ORAL at 09:16

## 2021-11-10 RX ADMIN — POLYETHYLENE GLYCOL 3350 17 G: 17 POWDER, FOR SOLUTION ORAL at 15:10

## 2021-11-10 RX ADMIN — HYDRALAZINE HYDROCHLORIDE 50 MG: 50 TABLET, FILM COATED ORAL at 15:09

## 2021-11-10 RX ADMIN — APIXABAN 2.5 MG: 2.5 TABLET, FILM COATED ORAL at 09:17

## 2021-11-10 RX ADMIN — LISINOPRIL 10 MG: 10 TABLET ORAL at 09:16

## 2021-11-10 ASSESSMENT — ENCOUNTER SYMPTOMS
BACK PAIN: 0
SHORTNESS OF BREATH: 0
COUGH: 0
VOMITING: 0
DIARRHEA: 0
ABDOMINAL PAIN: 1
COLOR CHANGE: 0
BLOOD IN STOOL: 0
CONSTIPATION: 1
RHINORRHEA: 0
NAUSEA: 0

## 2021-11-10 ASSESSMENT — PAIN SCALES - GENERAL
PAINLEVEL_OUTOF10: 3
PAINLEVEL_OUTOF10: 0

## 2021-11-10 NOTE — DISCHARGE SUMMARY
[facility] Physician Discharge Summary       Miguel Novoa MD  [address] [address]  [phone]          David Starkey DO  [address] [place] [address]  [phone]            Activity level: With assistance    Diet: ADULT DIET; Dysphagia - Soft and Bite Sized    Labs:Per PCP    Dispo:Home with Home health    Condition at discharge: Stable      Patient ID:  Doretha Kent  [id number]  78 y.o.  12/25/1931    Admit date: 11/6/2021    Discharge date and time:  11/10/2021  1:55 PM    Admission Diagnoses: Principal Problem:    Syncope and collapse  Active Problems:    Hypertension, essential, benign    Paroxysmal atrial fibrillation (HCC)    Bradycardia    Chronic anticoagulation--Eliquis    Chronic combined systolic (congestive) and diastolic (congestive) heart failure (HCC)    Abnormal urinalysis    Orthostatic hypotension    Coronary artery disease involving native coronary artery of native heart without angina pectoris  Resolved Problems:    * No resolved hospital problems. *      Discharge Diagnoses: Principal Problem:    Syncope and collapse  Active Problems:    Hypertension, essential, benign    Paroxysmal atrial fibrillation (HCC)    Bradycardia    Chronic anticoagulation--Eliquis    Chronic combined systolic (congestive) and diastolic (congestive) heart failure (HCC)    Abnormal urinalysis    Orthostatic hypotension    Coronary artery disease involving native coronary artery of native heart without angina pectoris  Resolved Problems:    * No resolved hospital problems. *      Consults:  IP CONSULT TO CARDIOLOGY  IP CONSULT TO SOCIAL WORK    Procedures: None    Hospital Course: Patient was admitted with Syncope and collapse [R55]  Left anterior fascicular block [I44.4]  Bradycardia [R00.1]  Elevated troponin [R77.8]. Patient was admitted for syncope and orthostatic hypotension.  There was confusion with her home medications and apparently she was taking metoprolol when she was no longer supposed to be due to bradycardia and orthostatic hypotension leading to syncope in the past (had been worked up previously multiple times by her cardiologist, Dr. Kezia Gould). The metoprolol was discontinued and the patient was started on IVF. Cardiology was consulted and adjusted her home meds further as seen below. Her orthostatic hypotension resolved and her lightheadedness improved. PT/OT was consulted and recommended inpatient rehab for the patient, however family and patient declined and wanted to go home with home therapy. She was sent home with daughter in stable condition and home health therapy was planned to see her the following day. Cardiology also stated she was ok for discharge from their POV. Of note, the day prior to discharge the patient had a fall. She did not lose consciousness but it was unclear whether she hit her head so a head CT was ordered and did not show any acute findings. The patient should follow up with her PCP and her usual cardiologist after discharge.     Discharge Exam:  Vitals:    11/09/21 2207 11/09/21 2216 11/10/21 0500 11/10/21 0815   BP: 126/76  120/69 (!) 105/58   Pulse:  70 67 76   Resp:   18 16   Temp:   98.6 °F (37 °C) 98.3 °F (36.8 °C)   TempSrc:   Oral Oral   SpO2:   99% 96%   Weight:       Height:           General Appearance: alert and oriented to person, place and time and in no acute distress  Skin: warm and dry  Head: normocephalic and atraumatic  Eyes: pupils equal, round, and reactive to light, extraocular eye movements intact, conjunctivae normal  Neck: neck supple and non tender without mass   Pulmonary/Chest: clear to auscultation bilaterally- no wheezes, rales or rhonchi, normal air movement, no respiratory distress  Cardiovascular: normal rate, normal S1 and S2 and no carotid bruits  Abdomen: soft, non-tender, non-distended, normal bowel sounds, no masses or organomegaly  Extremities: no cyanosis, no clubbing and no edema  Neurologic: no cranial nerve deficit and speech normal    I/O last 3 completed shifts: In: 360 [P.O.:360]  Out: 100 [Urine:100]  I/O this shift:  In: 120 [P.O.:120]  Out: -       LABS:  Recent Labs     11/08/21  0444      K 3.9   *   CO2 23   BUN 5*   CREATININE 0.7   GLUCOSE 100*   CALCIUM 8.6       Recent Labs     11/07/21  1357 11/08/21  0444 11/09/21  0635   WBC 5.1 4.5 3.5*   RBC 4.04 4.11 4.04   HGB 12.4 12.4 12.6   HCT 37.5 38.0 37.7   MCV 92.8 92.5 93.3   MCH 30.7 30.2 31.2   MCHC 33.1 32.6 33.4   RDW 15.8* 15.9* 16.1*    194 207   MPV 9.7 9.5 9.6       No results for input(s): POCGLU in the last 72 hours. Imaging:   CT HEAD WO CONTRAST   Final Result   No acute intracranial abnormality. No significant changes since November 6, 2021 study. XR CHEST 1 VIEW   Final Result   No acute process. CT Head WO Contrast   Final Result   No acute intracranial abnormality. CT Cervical Spine WO Contrast   Final Result   Advanced degenerative changes.   MRI would be useful if symptoms persist.             Patient Instructions:      Medication List      START taking these medications    amLODIPine 10 MG tablet  Commonly known as: NORVASC  Take 1 tablet by mouth daily  Start taking on: November 11, 2021     hydrALAZINE 50 MG tablet  Commonly known as: APRESOLINE  Take 1 tablet by mouth every 8 hours     polyethylene glycol 17 g packet  Commonly known as: GLYCOLAX  Take 17 g by mouth daily as needed for Constipation        CHANGE how you take these medications    amiodarone 200 MG tablet  Commonly known as: CORDARONE  Take 1 tablet by mouth every other day  Start taking on: November 11, 2021  What changed: when to take this     lisinopril 10 MG tablet  Commonly known as: PRINIVIL;ZESTRIL  Take 1 tablet by mouth 2 times daily  What changed:   · medication strength  · how much to take  · when to take this  · additional instructions

## 2021-11-10 NOTE — PROGRESS NOTES
Physical Therapy    Physical Therapy Treatment Note/Plan of Care    Room #:  6564/1348-16  Patient Name: Suzie Mukherjee  YOB: 1931  MRN: [id number]    Date of Service: 11/10/2021     Tentative placement recommendation: Subacute vs Home Health Physical Therapy if patient meets goals  Equipment recommendation: Madan Vaughan      Evaluating Physical Therapist: Tali Pereira, PT #[id number]      Specific Provider Orders/Date/Referring Provider :  11/07/21 0145   PT eval and treat Start: 11/07/21 0145, End: 11/07/21 0145, ONE TIME, Standing Count: 1 Occurrences, R    Izabella Prows, DO      Admitting Diagnosis:   Syncope and collapse [R55]  Left anterior fascicular block [I44.4]  Bradycardia [R00.1]  Elevated troponin [R77.8]       Surgery: none  Visit Diagnoses       Codes    Left anterior fascicular block     I44.4    Elevated troponin     R77.8          Patient Active Problem List   Diagnosis    Systolic hypertension    Hypertension, essential, benign    Paroxysmal atrial fibrillation (HCC)    Bradycardia    Chronic anticoagulation--Eliquis    Syncope and collapse    Moderate protein-calorie malnutrition ([address]    Chest pain    Chronic combined systolic (congestive) and diastolic (congestive) heart failure (HCC)    Abnormal urinalysis    Orthostatic hypotension    Coronary artery disease involving native coronary artery of native heart without angina pectoris        ASSESSMENT of Current Deficits Patient exhibits decreased strength, balance, endurance and confusion. Lightheadedness and dizziness limiting function this treatment session. Patient asks to immediately lay supine each sit to stand she performs. Patient requires continued skilled physical therapy to address concerns listed above for increased safety and function at discharge.          PHYSICAL THERAPY  PLAN OF CARE       Physical therapy plan of care is established based on physician order,  patient diagnosis and clinical assessment    Current Treatment Recommendations:    -Bed Mobility: Lower extremity exercises   -Sitting Balance: Facilitate active trunk muscle engagement  and Facilitate postural control in all planes   -Standing Balance: Perform strengthening exercises in standing to promote motor control with or without upper extremity support   -Transfers: Provide instruction on proper hand and foot position for adequate transfer of weight onto lower extremities and use of gait device if needed and Cues for hand placement, technique and safety. Provide stabilization to prevent fall   -Gait: Gait training, Standing activities to improve: base of support, weight shift, weight bearing  and Pregait training to emphasize: Device control, Upright and Safety   -Endurance: Utilize Supervised activities to increase level of endurance to allow for safe functional mobility including transfers and gait     PT long term treatment goals are located in below grid    Patient and or family understand(s) diagnosis, prognosis, and plan of care. Frequency of treatments: Patient will be seen  daily. Prior Level of Function: Patient ambulated with cane    Rehab Potential: good    for baseline    Past medical history:   Past Medical History:   Diagnosis Date    Arthritis     Atrial fibrillation ([address]     Chronic combined systolic (congestive) and diastolic (congestive) heart failure ([address] 11/7/2021    Coronary artery disease involving native coronary artery of native heart without angina pectoris     Hx of blood clots     Hypertension     Ischemic colitis ([address]     PAF (paroxysmal atrial fibrillation) ([address]      Past Surgical History:   Procedure Laterality Date    ANKLE FRACTURE SURGERY      CARDIAC CATHETERIZATION  11/11/2019    Dr. Vannesa Nice    COLONOSCOPY      ENDOSCOPY, COLON, DIAGNOSTIC      FRACTURE SURGERY         SUBJECTIVE:    Precautions:  Up with assistance and Orthostatic blood pressure and pulse , falls, alarm and hard of hearing ,    Social history: Patient lives with son in a ranch home  with 2-3 steps  to enter with Rail  Tub shower grab bars     Equipment owned: [place] and walker unsure if has wheels,       [address]   How much difficulty turning over in bed?: A Little  How much difficulty sitting down on / standing up from a chair with arms?: A Little  How much difficulty moving from lying on back to sitting on side of bed?: A Little  How much help from another person moving to and from a bed to a chair?: A Little  How much help from another person needed to walk in hospital room?: A Little  How much help from another person for climbing 3-5 steps with a railing?: A Lot  AM-PAC Inpatient Mobility Raw Score : 17  AM-PAC Inpatient T-Scale Score : 42.13  Mobility Inpatient CMS 0-100% Score: 50.57  Mobility Inpatient CMS G-Code Modifier : CK    Nursing cleared patient for PT treatment. .   OBJECTIVE;   Initial Evaluation  Date: 11/8/2021 Treatment Date:  11/10/2021     Short Term/ Long Term   Goals   Was pt agreeable to Eval/treatment? Yes Yes To be met in 3 days   Pain level   0/10    Not stated. Bed Mobility    Rolling: Minimal assist of 1    Supine to sit: Minimal assist of 1    Sit to supine: Not assessed     Scooting: Minimal assist of 1   Rolling: Supervision    Supine to sit: Minimal assist of 1   Sit to supine: Minimal assist of 1   Scooting: Supervision     Rolling: Independent    Supine to sit: Independent    Sit to supine: Independent    Scooting: Independent     Transfers Sit to stand: Minimal assist of 1 Cues for hand placement and safety  Sit to stand: Minimal assist of 1 safety cues required and hand placement.         Sit to stand: Modified Independent     Ambulation    1 x 40 feet 1 x 20 feet using  cane with Minimal assist of 1   for balance and safety and cues for safety, proper hand placement and not reaching for furniture/table with left hand 2x2  side steps session, patient in bed with alarm call light and phone within reach,  all lines and tubes intact, nursing notified. Patient would benefit from continued skilled Physical Therapy to improve functional independence and quality of life.          Patient's/ family goals   home    Time in 1007  Time out  1022    Total Treatment Time  15 minutes      CPT codes:  Therapeutic activities (90900)   10 minutes  1 unit(s)  Therapeutic exercises (35425)   5 minutes  0 unit(s)    Daphney Chaparro, PTA  #[id number]

## 2021-11-10 NOTE — PLAN OF CARE
Problem: Cardiac:  Goal: Ability to maintain vital signs within normal range will improve  Description: Ability to maintain vital signs within normal range will improve  Outcome: Ongoing     Problem: Cardiac:  Goal: Cardiovascular alteration will improve  Description: Cardiovascular alteration will improve  Outcome: Ongoing

## 2021-11-10 NOTE — CARE COORDINATION
Ss note:11/10/2021.3:18 PM Discharge order noted. Met with pt and dtr as pt is now inpt status, IM letter obtained. Dtr requesting [facility] and [employer]. Orders noted and referral made to Keny at [facility]. She will follow up with dtr Juan Manuel Mock for delivery of items to home tomorrow. Cynthia Madera [address] orders noted and completed with start of care on Thursday.  ESTEFANIA Saldana

## 2021-11-11 LAB
EKG ATRIAL RATE: 71 BPM
EKG P AXIS: 89 DEGREES
EKG P-R INTERVAL: 142 MS
EKG Q-T INTERVAL: 394 MS
EKG QRS DURATION: 102 MS
EKG QTC CALCULATION (BAZETT): 428 MS
EKG R AXIS: -50 DEGREES
EKG T AXIS: 30 DEGREES
EKG VENTRICULAR RATE: 71 BPM

## 2021-11-11 NOTE — ED PROVIDER NOTES
bruise/bleed easily. Physical Exam  Vitals and nursing note reviewed. Constitutional:       General: She is not in acute distress. Appearance: She is well-developed. She is not diaphoretic. HENT:      Head: Normocephalic and atraumatic. Mouth/Throat:      Mouth: Mucous membranes are dry. Eyes:      General: No scleral icterus. Conjunctiva/sclera: Conjunctivae normal.   Cardiovascular:      Rate and Rhythm: Normal rate and regular rhythm. Heart sounds: Normal heart sounds. No murmur heard. Pulmonary:      Effort: Pulmonary effort is normal. No respiratory distress ( Conversational dyspnea, accessory muscle use, or tachypnea. ). Breath sounds: Normal breath sounds. No wheezing or rales. Abdominal:      General: Bowel sounds are normal.      Palpations: Abdomen is soft. Tenderness: There is no abdominal tenderness. There is no guarding or rebound. Musculoskeletal:      Cervical back: Normal range of motion and neck supple. Comments: There is no pretibial edema nor calf tenderness bilaterally      Skin:     General: Skin is warm and dry. Coloration: Skin is not jaundiced or pale. Neurological:      Mental Status: She is alert and oriented to person, place, and time. Procedures     MDM   Patient presents to the ED for evaluation of fatigue. Patient was recently admitted for a syncopal episode. She was discharged home earlier today. Patient's daughter arrived and states that she appears much better and had just initially had her come in to be evaluated. Labs were assessed. CBC grossly unchanged showing no evidence of leukocytosis or anemia. BMP shows no electrolyte abnormalities or evidence of renal insufficiency. Troponin is 17 which is near patient's recent lab values during the admission. Patient is can be discharged home with family and will follow up with her PCP.      EKG Interpretation    Interpreted by emergency department physician    Rhythm: normal sinus   Rate: normal  Axis: left  Ectopy: premature ventricular contractions (infrequent)  Conduction: right bundle branch block (incomplete)  ST Segments: no acute change  T Waves: no acute change  Q Waves: none    Clinical Impression: no acute changes other than infrequent PVCs. Bernard Mcbride DO     ED Course as of 11/10/21 2208   Wed Nov 10, 2021   2038 Spoke with the patient's daughter who is now here. She states that the patient was just making statements such as Ray Ortiz is not can to make it. \"  She just wanted her to come in and get evaluated. She was able to ambulate at home at her baseline. The daughter talk to her brother the patient's son who says that she makes statements like this all the time. Discussed that we will evaluate her. If the work-up is unremarkable the patient's daughter is comfortable taking her home. [MS]   2206 Discussed results of labs with the patient's daughter. Discussed that I am comfortable with her going home. The patient's daughter is also comfortable with her going home will have her follow-up with the patient's PCP. They understand that if she has worsening symptoms or if she has new concerns that she can return to the ED for further evaluation. [MS]      ED Course User Index  [MS] Bernard Mcbride DO       --------------------------------------------- PAST HISTORY ---------------------------------------------  Past Medical History:  has a past medical history of Arthritis, Atrial fibrillation ([address] Chronic combined systolic (congestive) and diastolic (congestive) heart failure ([address] Coronary artery disease involving native coronary artery of native heart without angina pectoris, Hx of blood clots, Hypertension, Ischemic colitis ([facility] and PAF (paroxysmal atrial fibrillation) ([facility] Past Surgical History:  has a past surgical history that includes Ankle fracture surgery;  Colonoscopy; Endoscopy, colon, diagnostic; fracture surgery; and Cardiac catheterization (11/11/2019). Social History:  reports that she has never smoked. She has never used smokeless tobacco. She reports that she does not drink alcohol and does not use drugs. Family History: family history is not on file. The patients home medications have been reviewed.     Allergies: Nifedipine    -------------------------------------------------- RESULTS -------------------------------------------------  Labs:  Results for orders placed or performed during the hospital encounter of 11/10/21   CBC Auto Differential   Result Value Ref Range    WBC 5.8 4.5 - 11.5 E9/L    RBC 4.20 3.50 - 5.50 E12/L    Hemoglobin 12.7 11.5 - 15.5 g/dL    Hematocrit 40.0 34.0 - 48.0 %    MCV 95.2 80.0 - 99.9 fL    MCH 30.2 26.0 - 35.0 pg    MCHC 31.8 (L) 32.0 - 34.5 %    RDW 16.3 (H) 11.5 - 15.0 fL    Platelets 515 816 - 847 E9/L    MPV 9.5 7.0 - 12.0 fL    Neutrophils % 63.1 43.0 - 80.0 %    Immature Granulocytes % 0.3 0.0 - 5.0 %    Lymphocytes % 22.0 20.0 - 42.0 %    Monocytes % 12.5 (H) 2.0 - 12.0 %    Eosinophils % 1.2 0.0 - 6.0 %    Basophils % 0.9 0.0 - 2.0 %    Neutrophils Absolute 3.64 1.80 - 7.30 E9/L    Immature Granulocytes # 0.02 E9/L    Lymphocytes Absolute 1.27 (L) 1.50 - 4.00 E9/L    Monocytes Absolute 0.72 0.10 - 0.95 E9/L    Eosinophils Absolute 0.07 0.05 - 0.50 E9/L    Basophils Absolute 0.05 0.00 - 0.20 R4/T   Basic Metabolic Panel w/ Reflex to MG   Result Value Ref Range    Sodium 141 132 - 146 mmol/L    Potassium reflex Magnesium 3.8 3.5 - 5.0 mmol/L    Chloride 107 98 - 107 mmol/L    CO2 24 22 - 29 mmol/L    Anion Gap 10 7 - 16 mmol/L    Glucose 136 (H) 74 - 99 mg/dL    BUN 10 6 - 23 mg/dL    CREATININE 0.9 0.5 - 1.0 mg/dL    GFR Non-African American >60 >=60 mL/min/1.73    GFR African American >60     Calcium 9.4 8.6 - 10.2 mg/dL   Troponin   Result Value Ref Range    Troponin, High Sensitivity 17 (H) 0 - 9 ng/L   EKG 12 Lead   Result Value Ref Range    Ventricular Rate 71 BPM Atrial Rate 71 BPM    P-R Interval 142 ms    QRS Duration 102 ms    Q-T Interval 394 ms    QTc Calculation (Bazett) 428 ms    P Axis 89 degrees    R Axis -50 degrees    T Axis 30 degrees       Radiology:  No orders to display       ------------------------- NURSING NOTES AND VITALS REVIEWED ---------------------------  Date / Time Roomed:  11/10/2021  7:36 PM  ED Bed Assignment:  23/23    The nursing notes within the ED encounter and vital signs as below have been reviewed. /77   Pulse 75   Temp 98.3 °F (36.8 °C) (Oral)   Resp 18   Ht 5' 7\" (1.702 m)   Wt 128 lb (58.1 kg)   SpO2 97%   BMI 20.05 kg/m²   Oxygen Saturation Interpretation: Normal      ------------------------------------------ PROGRESS NOTES ------------------------------------------  I have spoken with the patient and discussed todays results, in addition to providing specific details for the plan of care and counseling regarding the diagnosis and prognosis. Their questions are answered at this time and they are agreeable with the plan. I discussed at length with them reasons for immediate return here for re evaluation. They will followup with primary care by calling their office tomorrow. --------------------------------- ADDITIONAL PROVIDER NOTES ---------------------------------  At this time the patient is without objective evidence of an acute process requiring hospitalization or inpatient management. They have remained hemodynamically stable throughout their entire ED visit and are stable for discharge with outpatient follow-up. The plan has been discussed in detail and they are aware of the specific conditions for emergent return, as well as the importance of follow-up. New Prescriptions    No medications on file       Diagnosis:  1. Fatigue, unspecified type        Disposition:  Patient's disposition: Discharge to home  Patient's condition is stable.            Herb Mariee DO  11/10/21 1130

## 2022-03-21 ENCOUNTER — APPOINTMENT (OUTPATIENT)
Dept: GENERAL RADIOLOGY | Age: 87
End: 2022-03-21
Payer: MEDICARE

## 2022-03-21 ENCOUNTER — HOSPITAL ENCOUNTER (EMERGENCY)
Age: 87
Discharge: LEFT BEFORE DISCHARGE | End: 2022-03-21
Attending: EMERGENCY MEDICINE
Payer: MEDICARE

## 2022-03-21 VITALS
WEIGHT: 124 LBS | SYSTOLIC BLOOD PRESSURE: 127 MMHG | HEART RATE: 74 BPM | RESPIRATION RATE: 18 BRPM | DIASTOLIC BLOOD PRESSURE: 85 MMHG | TEMPERATURE: 97.1 F | OXYGEN SATURATION: 100 % | BODY MASS INDEX: 19.42 KG/M2

## 2022-03-21 DIAGNOSIS — Z53.29 LEFT AGAINST MEDICAL ADVICE: Primary | ICD-10-CM

## 2022-03-21 DIAGNOSIS — R10.84 GENERALIZED ABDOMINAL PAIN: ICD-10-CM

## 2022-03-21 LAB
ALBUMIN SERPL-MCNC: 3.4 G/DL (ref 3.5–5.2)
ALP BLD-CCNC: 59 U/L (ref 35–104)
ALT SERPL-CCNC: 17 U/L (ref 0–32)
ANION GAP SERPL CALCULATED.3IONS-SCNC: 9 MMOL/L (ref 7–16)
AST SERPL-CCNC: 40 U/L (ref 0–31)
BACTERIA: ABNORMAL /HPF
BASOPHILS ABSOLUTE: 0.03 E9/L (ref 0–0.2)
BASOPHILS RELATIVE PERCENT: 0.5 % (ref 0–2)
BILIRUB SERPL-MCNC: 0.7 MG/DL (ref 0–1.2)
BILIRUBIN URINE: ABNORMAL
BLOOD, URINE: NEGATIVE
BUN BLDV-MCNC: 10 MG/DL (ref 6–23)
CALCIUM SERPL-MCNC: 9 MG/DL (ref 8.6–10.2)
CHLORIDE BLD-SCNC: 107 MMOL/L (ref 98–107)
CLARITY: CLEAR
CO2: 24 MMOL/L (ref 22–29)
COLOR: YELLOW
CREAT SERPL-MCNC: 1 MG/DL (ref 0.5–1)
EOSINOPHILS ABSOLUTE: 0.04 E9/L (ref 0.05–0.5)
EOSINOPHILS RELATIVE PERCENT: 0.7 % (ref 0–6)
EPITHELIAL CELLS, UA: ABNORMAL /HPF
GFR AFRICAN AMERICAN: >60
GFR NON-AFRICAN AMERICAN: >60 ML/MIN/1.73
GLUCOSE BLD-MCNC: 80 MG/DL (ref 74–99)
GLUCOSE URINE: NEGATIVE MG/DL
HCT VFR BLD CALC: 41.1 % (ref 34–48)
HEMOGLOBIN: 13.1 G/DL (ref 11.5–15.5)
IMMATURE GRANULOCYTES #: 0.01 E9/L
IMMATURE GRANULOCYTES %: 0.2 % (ref 0–5)
KETONES, URINE: NEGATIVE MG/DL
LACTIC ACID: 1.2 MMOL/L (ref 0.5–2.2)
LEUKOCYTE ESTERASE, URINE: ABNORMAL
LIPASE: 17 U/L (ref 13–60)
LYMPHOCYTES ABSOLUTE: 1.11 E9/L (ref 1.5–4)
LYMPHOCYTES RELATIVE PERCENT: 19.5 % (ref 20–42)
MCH RBC QN AUTO: 29.7 PG (ref 26–35)
MCHC RBC AUTO-ENTMCNC: 31.9 % (ref 32–34.5)
MCV RBC AUTO: 93.2 FL (ref 80–99.9)
MONOCYTES ABSOLUTE: 0.51 E9/L (ref 0.1–0.95)
MONOCYTES RELATIVE PERCENT: 8.9 % (ref 2–12)
NEUTROPHILS ABSOLUTE: 4 E9/L (ref 1.8–7.3)
NEUTROPHILS RELATIVE PERCENT: 70.2 % (ref 43–80)
NITRITE, URINE: NEGATIVE
PDW BLD-RTO: 14.3 FL (ref 11.5–15)
PH UA: 7.5 (ref 5–9)
PLATELET # BLD: 177 E9/L (ref 130–450)
PMV BLD AUTO: 10 FL (ref 7–12)
POTASSIUM REFLEX MAGNESIUM: 5.2 MMOL/L (ref 3.5–5)
PROTEIN UA: ABNORMAL MG/DL
RBC # BLD: 4.41 E12/L (ref 3.5–5.5)
RBC UA: ABNORMAL /HPF (ref 0–2)
REASON FOR REJECTION: NORMAL
REJECTED TEST: NORMAL
SODIUM BLD-SCNC: 140 MMOL/L (ref 132–146)
SPECIFIC GRAVITY UA: 1.01 (ref 1–1.03)
TOTAL PROTEIN: 6.8 G/DL (ref 6.4–8.3)
UROBILINOGEN, URINE: 0.2 E.U./DL
WBC # BLD: 5.7 E9/L (ref 4.5–11.5)
WBC UA: ABNORMAL /HPF (ref 0–5)

## 2022-03-21 PROCEDURE — 83605 ASSAY OF LACTIC ACID: CPT

## 2022-03-21 PROCEDURE — 71045 X-RAY EXAM CHEST 1 VIEW: CPT

## 2022-03-21 PROCEDURE — 99284 EMERGENCY DEPT VISIT MOD MDM: CPT

## 2022-03-21 PROCEDURE — 85025 COMPLETE CBC W/AUTO DIFF WBC: CPT

## 2022-03-21 PROCEDURE — 80053 COMPREHEN METABOLIC PANEL: CPT

## 2022-03-21 PROCEDURE — 81001 URINALYSIS AUTO W/SCOPE: CPT

## 2022-03-21 PROCEDURE — 83690 ASSAY OF LIPASE: CPT

## 2022-03-21 PROCEDURE — 99283 EMERGENCY DEPT VISIT LOW MDM: CPT

## 2022-03-21 ASSESSMENT — ENCOUNTER SYMPTOMS
PHOTOPHOBIA: 0
BLOOD IN STOOL: 0
SHORTNESS OF BREATH: 0
BACK PAIN: 0
COUGH: 0
NAUSEA: 0
ABDOMINAL DISTENTION: 0
RHINORRHEA: 0
CHEST TIGHTNESS: 0
EYE PAIN: 0
VOMITING: 0
SORE THROAT: 0
ABDOMINAL PAIN: 1
COLOR CHANGE: 0
DIARRHEA: 0
CONSTIPATION: 1

## 2022-03-21 NOTE — ED PROVIDER NOTES
Name: Ambreen Alex   MRN: [id number]     --------------------------------------------- History of Present Illness ---------------------------------------------  3/21/22, Time: 12:22 PM EDT   Chief Complaint   Patient presents with    Abdominal Pain     x week      HPI    Ambreen Alex is a 80 y.o. female, with hx of constipation, paroxysmal a-fib (on Eloquis), ischemic colitis, CHF who presents to the ED today for abdominal pain starting yesterday. Patient relates her last bowel movement was yesterday. She states she has been using magnesium citrate and stool softeners as needed for her constipation. She relates her abdominal pain is across the mid abdomen, is mild, intermittent, nothing makes it better or worse. Denies any nausea vomiting. Denies any obvious blood in the stool. Denies any urinary symptoms. The pt denies any associated fever, lightheadedness, dizziness, HA, n/v, chest pain, shortness of breath, abd pain, GI or  complaints. Allg: Nifedipine   PCP: Mariela Brown MD.    Meds: No current facility-administered medications for this encounter.     Current Outpatient Medications:     amiodarone (CORDARONE) 200 MG tablet, Take 1 tablet by mouth every other day, Disp: 15 tablet, Rfl: 0    lisinopril (PRINIVIL;ZESTRIL) 10 MG tablet, Take 1 tablet by mouth 2 times daily, Disp: 30 tablet, Rfl: 0    hydrALAZINE (APRESOLINE) 50 MG tablet, Take 1 tablet by mouth every 8 hours, Disp: 90 tablet, Rfl: 0    amLODIPine (NORVASC) 10 MG tablet, Take 1 tablet by mouth daily, Disp: 30 tablet, Rfl: 0    diclofenac (VOLTAREN) 0.1 % ophthalmic solution, 1 drop 4 times daily, Disp: , Rfl:     Lidocaine, Anorectal, 5 % GEL, Apply 1 each topically 2 times daily, Disp: 1 each, Rfl: 0    aspirin 81 MG EC tablet, Take 81 mg by mouth daily, Disp: , Rfl:     acetaminophen (TYLENOL) 500 MG tablet, Take 2 tablets by mouth every 8 hours as needed for Pain, Disp: 50 tablet, Rfl: 0    apixaban (ELIQUIS) 5 MG TABS tablet, Take 2.5 mg by mouth 2 times daily , Disp: , Rfl:      Review of Systems   Constitutional: Negative for chills, fatigue and fever. HENT: Negative for congestion, rhinorrhea and sore throat. Eyes: Negative for photophobia, pain and visual disturbance. Respiratory: Negative for cough, chest tightness and shortness of breath. Cardiovascular: Negative for chest pain, palpitations and leg swelling. Gastrointestinal: Positive for abdominal pain and constipation. Negative for abdominal distention, blood in stool, diarrhea, nausea and vomiting. Genitourinary: Negative for difficulty urinating, dysuria, hematuria, vaginal bleeding and vaginal discharge. Musculoskeletal: Negative for back pain, neck pain and neck stiffness. Skin: Negative for color change, rash and wound. Neurological: Negative for dizziness, syncope, light-headedness and headaches. Psychiatric/Behavioral: Negative for agitation, behavioral problems and confusion. Physical Exam  Constitutional:       General: She is not in acute distress. Appearance: Normal appearance. She is normal weight. She is not ill-appearing, toxic-appearing or diaphoretic. HENT:      Head: Normocephalic and atraumatic. Right Ear: External ear normal.      Left Ear: External ear normal.      Nose: Nose normal. No rhinorrhea. Mouth/Throat:      Pharynx: Oropharynx is clear. Eyes:      General: No scleral icterus. Right eye: No discharge. Left eye: No discharge. Extraocular Movements: Extraocular movements intact. Conjunctiva/sclera: Conjunctivae normal.      Pupils: Pupils are equal, round, and reactive to light. Cardiovascular:      Rate and Rhythm: Normal rate and regular rhythm. Pulses: Normal pulses. Pulmonary:      Effort: Pulmonary effort is normal. No respiratory distress. Breath sounds: Normal breath sounds. No stridor. Abdominal:      General: Abdomen is flat.  There is no distension. Palpations: Abdomen is soft. Tenderness: There is no abdominal tenderness. There is no guarding. Negative signs include Yeung's sign and McBurney's sign. Musculoskeletal:         General: No swelling or tenderness. Normal range of motion. Cervical back: Normal range of motion. Right lower leg: No edema. Left lower leg: No edema. Skin:     General: Skin is warm and dry. Capillary Refill: Capillary refill takes less than 2 seconds. Coloration: Skin is not jaundiced. Findings: No erythema or rash. Neurological:      General: No focal deficit present. Mental Status: She is alert and oriented to person, place, and time. Psychiatric:         Mood and Affect: Mood normal.         Behavior: Behavior normal.          Procedures     MDM  Number of Diagnoses or Management Options  Generalized abdominal pain  Left against medical advice  Diagnosis management comments: Mrs. Sergio Cerda is a 63-year-old female patient who presents today for intermittent abdominal pain latest episode being since yesterday. She has had some intermittent constipation and takes stool softener as needed. She is here with her son. On physical exam, patient is alert in no apparent distress. Vitals within normal limits. Lung sounds clear and equal bilaterally. H RRR. Abdomen is nondistended, soft, nontender. Skin is warm and dry. Concern this time for constipation, obstruction, mesenteric ischemia, MI, arrhythmia, diverticulitis, UTI. Labwork, EKG, CXR and UA were ordered for further evaluation. Lab work is reassuring. Lactic within normal limits. Electrolytes in balance. UA was negative for UTI. No leukocytosis or anemia present. Spoke with patient's son about results, recommended CT scan for further evaluation of her abdominal pain. Patient and son denied wanting CT scan done at this time.   Spoke with them about risks of not undergoing the imaging, and they were still adamant about not getting CT scan and wanting to leave AMA. Patient was recommended follow-up with her PCP and to return the ED for any new or worsening symptoms. Patient left AMA. ED Course as of 03/21/22 1546   Mon Mar 21, 2022   1412 CBC with Auto Differential(!):    WBC 5.7   RBC 4.41   Hemoglobin Quant 13.1   Hematocrit 41.1   MCV 93.2   MCH 29.7   MCHC 31.9(!)   RDW 14.3   Platelet Count 472   MPV 10.0   Neutrophils % 70.2   Immature Granulocytes % 0.2   Lymphocyte % 19.5(!)   Monocytes % 8.9   Eosinophils % 0.7   Basophils % 0.5   Neutrophils Absolute 4.00   Immature Granulocytes # 0.01   Lymphocytes Absolute 1.11(!)   Monocytes Absolute 0.51   Eosinophils Absolute 0.04(!)   Basophils Absolute 0.03  No leukocytosis or anemia [PW]   1412 Urinalysis(!):    Color, UA Yellow   Clarity, UA Clear   Glucose, UA Negative   Bilirubin, Urine SMALL(!)   Ketones, Urine Negative   Specific Gravity, UA 1.015   Blood, Urine Negative   pH, UA 7.5   Protein, UA TRACE   Urobilinogen, Urine 0.2   Nitrite, Urine Negative   Leukocyte Esterase, Urine SMALL(!)  UA negative. [PW]   6367 CT abd ordered for further eval.  [PW]   1451 Potassium(!): 5.2  Moderately hemolyzed [PW]      ED Course User Index  [PW] Edward Fairbanks DO      EKG Interpretation  Interpreted by emergency department physician.    3/21/22  Time: 8630    Rate: 62  Axis: left  HI: 128  QRS: 98  Qtc: 464  Rhythm: regularly irregular  Clinical Impression: Sinus rhythm w/ PACs in bigeminy pattern, Incomp RBBB, left ant fascicular block, nonspecific Twave abnormalities  Comparison to old EKG: Was sinus azael on 11/6/21 w/ similar t wave abnormalities and left ant fasc block      Unfortunately, Temitope Alatorre at 3:46 PM decided to leave the Emergency Department Against Medical Advice.    Temitope Alatorre is clinically sober, free of any distracting injury, appears to be of sound mind with intact judgement and insight, and in my opinion has the capacity to make decisions. she presented to the Emergency Department to be evaluated for Abdominal Pain (x week)   and understands that I am concerned about the possibility of intra-abdominal pathology. I have explained the nature of the evaluation so for and she understands the results and that the evaluation so far has been limited and cannot exclude mesenteric ischemia, infection, diverticulitis, or other intra-abdominal pathology and that by not undergoing further evaluation and management specific risks include: Permanent disability and death. We have discussed alternative treatments and the patient was still adamant about leaving and referred to Elijah Buitrago MD   Still, Jerri Bauman is unwilling to stay for the recommended evaluation and management and wishes to leave against medical advice. I am unable to convince the patient to stay, I have asked them to return as soon as possible to complete their evaluation.  I have answered all their questions     Clint MALLORY PGY-1           Radha Amaya DO  Resident  03/21/22 2952

## 2022-03-25 LAB
EKG ATRIAL RATE: 62 BPM
EKG P AXIS: 63 DEGREES
EKG P-R INTERVAL: 128 MS
EKG Q-T INTERVAL: 458 MS
EKG QRS DURATION: 98 MS
EKG QTC CALCULATION (BAZETT): 464 MS
EKG R AXIS: -52 DEGREES
EKG T AXIS: 33 DEGREES
EKG VENTRICULAR RATE: 62 BPM

## 2022-06-15 NOTE — PLAN OF CARE
Problem: Falls - Risk of:  Goal: Will remain free from falls  Description: Will remain free from falls  Outcome: Met This Shift Quality 130: Documentation Of Current Medications In The Medical Record: Current Medications Documented Additional Notes: Patient states he does not take any prescription medications Detail Level: Generalized Statement Selected

## 2022-07-09 ENCOUNTER — HOSPITAL ENCOUNTER (INPATIENT)
Age: 87
LOS: 9 days | Discharge: HOME HEALTH | DRG: 641 | End: 2022-07-18
Attending: EMERGENCY MEDICINE | Admitting: INTERNAL MEDICINE
Payer: MEDICARE

## 2022-07-09 ENCOUNTER — APPOINTMENT (OUTPATIENT)
Dept: GENERAL RADIOLOGY | Age: 87
DRG: 641 | End: 2022-07-09
Payer: MEDICARE

## 2022-07-09 ENCOUNTER — APPOINTMENT (OUTPATIENT)
Dept: CT IMAGING | Age: 87
DRG: 641 | End: 2022-07-09
Payer: MEDICARE

## 2022-07-09 DIAGNOSIS — R55 SYNCOPE AND COLLAPSE: Primary | ICD-10-CM

## 2022-07-09 DIAGNOSIS — I10 HYPERTENSION, ESSENTIAL, BENIGN: Chronic | ICD-10-CM

## 2022-07-09 DIAGNOSIS — I48.0 PAROXYSMAL ATRIAL FIBRILLATION (HCC): Chronic | ICD-10-CM

## 2022-07-09 LAB
ALBUMIN SERPL-MCNC: 3.5 G/DL (ref 3.5–5.2)
ALP BLD-CCNC: 75 U/L (ref 35–104)
ALT SERPL-CCNC: 13 U/L (ref 0–32)
ANION GAP SERPL CALCULATED.3IONS-SCNC: 9 MMOL/L (ref 7–16)
AST SERPL-CCNC: 22 U/L (ref 0–31)
BACTERIA: ABNORMAL /HPF
BASOPHILS ABSOLUTE: 0.03 E9/L (ref 0–0.2)
BASOPHILS RELATIVE PERCENT: 0.4 % (ref 0–2)
BILIRUB SERPL-MCNC: 0.5 MG/DL (ref 0–1.2)
BILIRUBIN URINE: NEGATIVE
BLOOD, URINE: NEGATIVE
BUN BLDV-MCNC: 9 MG/DL (ref 6–23)
CALCIUM SERPL-MCNC: 8.5 MG/DL (ref 8.6–10.2)
CHLORIDE BLD-SCNC: 109 MMOL/L (ref 98–107)
CLARITY: CLEAR
CO2: 24 MMOL/L (ref 22–29)
COLOR: YELLOW
CREAT SERPL-MCNC: 0.9 MG/DL (ref 0.5–1)
EOSINOPHILS ABSOLUTE: 0.05 E9/L (ref 0.05–0.5)
EOSINOPHILS RELATIVE PERCENT: 0.7 % (ref 0–6)
EPITHELIAL CELLS, UA: ABNORMAL /HPF
GFR AFRICAN AMERICAN: >60
GFR NON-AFRICAN AMERICAN: >60 ML/MIN/1.73
GLUCOSE BLD-MCNC: 90 MG/DL (ref 74–99)
GLUCOSE URINE: NEGATIVE MG/DL
HCT VFR BLD CALC: 39.3 % (ref 34–48)
HEMOGLOBIN: 12.6 G/DL (ref 11.5–15.5)
IMMATURE GRANULOCYTES #: 0.01 E9/L
IMMATURE GRANULOCYTES %: 0.1 % (ref 0–5)
KETONES, URINE: NEGATIVE MG/DL
LACTIC ACID: 1.5 MMOL/L (ref 0.5–2.2)
LACTIC ACID: 2.6 MMOL/L (ref 0.5–2.2)
LEUKOCYTE ESTERASE, URINE: ABNORMAL
LIPASE: 11 U/L (ref 13–60)
LYMPHOCYTES ABSOLUTE: 1.25 E9/L (ref 1.5–4)
LYMPHOCYTES RELATIVE PERCENT: 17.3 % (ref 20–42)
MAGNESIUM: 1.8 MG/DL (ref 1.6–2.6)
MCH RBC QN AUTO: 29.9 PG (ref 26–35)
MCHC RBC AUTO-ENTMCNC: 32.1 % (ref 32–34.5)
MCV RBC AUTO: 93.1 FL (ref 80–99.9)
MONOCYTES ABSOLUTE: 0.52 E9/L (ref 0.1–0.95)
MONOCYTES RELATIVE PERCENT: 7.2 % (ref 2–12)
NEUTROPHILS ABSOLUTE: 5.35 E9/L (ref 1.8–7.3)
NEUTROPHILS RELATIVE PERCENT: 74.3 % (ref 43–80)
NITRITE, URINE: NEGATIVE
PDW BLD-RTO: 14.6 FL (ref 11.5–15)
PH UA: 7.5 (ref 5–9)
PHOSPHORUS: 2.3 MG/DL (ref 2.5–4.5)
PLATELET # BLD: 148 E9/L (ref 130–450)
PMV BLD AUTO: 10.1 FL (ref 7–12)
POTASSIUM REFLEX MAGNESIUM: 3.6 MMOL/L (ref 3.5–5)
PROTEIN UA: NEGATIVE MG/DL
RBC # BLD: 4.22 E12/L (ref 3.5–5.5)
RBC UA: ABNORMAL /HPF (ref 0–2)
REASON FOR REJECTION: NORMAL
REJECTED TEST: NORMAL
SODIUM BLD-SCNC: 142 MMOL/L (ref 132–146)
SPECIFIC GRAVITY UA: 1.01 (ref 1–1.03)
TOTAL PROTEIN: 6.1 G/DL (ref 6.4–8.3)
TROPONIN, HIGH SENSITIVITY: 13 NG/L (ref 0–9)
TROPONIN, HIGH SENSITIVITY: 16 NG/L (ref 0–9)
UROBILINOGEN, URINE: 0.2 E.U./DL
WBC # BLD: 7.2 E9/L (ref 4.5–11.5)
WBC UA: ABNORMAL /HPF (ref 0–5)

## 2022-07-09 PROCEDURE — 72125 CT NECK SPINE W/O DYE: CPT

## 2022-07-09 PROCEDURE — 74177 CT ABD & PELVIS W/CONTRAST: CPT

## 2022-07-09 PROCEDURE — 83690 ASSAY OF LIPASE: CPT

## 2022-07-09 PROCEDURE — 6360000004 HC RX CONTRAST MEDICATION: Performed by: RADIOLOGY

## 2022-07-09 PROCEDURE — 2580000003 HC RX 258

## 2022-07-09 PROCEDURE — G0378 HOSPITAL OBSERVATION PER HR: HCPCS

## 2022-07-09 PROCEDURE — 71045 X-RAY EXAM CHEST 1 VIEW: CPT

## 2022-07-09 PROCEDURE — 36415 COLL VENOUS BLD VENIPUNCTURE: CPT

## 2022-07-09 PROCEDURE — 93005 ELECTROCARDIOGRAM TRACING: CPT

## 2022-07-09 PROCEDURE — 84100 ASSAY OF PHOSPHORUS: CPT

## 2022-07-09 PROCEDURE — 85025 COMPLETE CBC W/AUTO DIFF WBC: CPT

## 2022-07-09 PROCEDURE — 84484 ASSAY OF TROPONIN QUANT: CPT

## 2022-07-09 PROCEDURE — 80053 COMPREHEN METABOLIC PANEL: CPT

## 2022-07-09 PROCEDURE — 83605 ASSAY OF LACTIC ACID: CPT

## 2022-07-09 PROCEDURE — 70450 CT HEAD/BRAIN W/O DYE: CPT

## 2022-07-09 PROCEDURE — 96361 HYDRATE IV INFUSION ADD-ON: CPT

## 2022-07-09 PROCEDURE — 83735 ASSAY OF MAGNESIUM: CPT

## 2022-07-09 PROCEDURE — 81001 URINALYSIS AUTO W/SCOPE: CPT

## 2022-07-09 PROCEDURE — 2580000003 HC RX 258: Performed by: INTERNAL MEDICINE

## 2022-07-09 PROCEDURE — 99285 EMERGENCY DEPT VISIT HI MDM: CPT

## 2022-07-09 PROCEDURE — 1200000000 HC SEMI PRIVATE

## 2022-07-09 PROCEDURE — 6370000000 HC RX 637 (ALT 250 FOR IP): Performed by: INTERNAL MEDICINE

## 2022-07-09 PROCEDURE — 99220 PR INITIAL OBSERVATION CARE/DAY 70 MINUTES: CPT | Performed by: INTERNAL MEDICINE

## 2022-07-09 RX ORDER — 0.9 % SODIUM CHLORIDE 0.9 %
1000 INTRAVENOUS SOLUTION INTRAVENOUS ONCE
Status: COMPLETED | OUTPATIENT
Start: 2022-07-09 | End: 2022-07-09

## 2022-07-09 RX ORDER — POLYETHYLENE GLYCOL 3350 17 G/17G
17 POWDER, FOR SOLUTION ORAL DAILY PRN
Status: DISCONTINUED | OUTPATIENT
Start: 2022-07-09 | End: 2022-07-18 | Stop reason: HOSPADM

## 2022-07-09 RX ORDER — ASPIRIN 81 MG/1
81 TABLET ORAL DAILY
Status: DISCONTINUED | OUTPATIENT
Start: 2022-07-10 | End: 2022-07-15

## 2022-07-09 RX ORDER — OMEPRAZOLE 20 MG/1
40 CAPSULE, DELAYED RELEASE ORAL DAILY
Status: ON HOLD | COMMUNITY
End: 2022-07-14 | Stop reason: SDUPTHER

## 2022-07-09 RX ORDER — AMLODIPINE BESYLATE 10 MG/1
10 TABLET ORAL DAILY
Status: DISCONTINUED | OUTPATIENT
Start: 2022-07-10 | End: 2022-07-09

## 2022-07-09 RX ORDER — SODIUM CHLORIDE, SODIUM LACTATE, POTASSIUM CHLORIDE, CALCIUM CHLORIDE 600; 310; 30; 20 MG/100ML; MG/100ML; MG/100ML; MG/100ML
INJECTION, SOLUTION INTRAVENOUS CONTINUOUS
Status: DISCONTINUED | OUTPATIENT
Start: 2022-07-10 | End: 2022-07-10

## 2022-07-09 RX ORDER — DOCUSATE SODIUM 100 MG/1
100 CAPSULE, LIQUID FILLED ORAL DAILY
COMMUNITY

## 2022-07-09 RX ORDER — METRONIDAZOLE 500 MG/1
500 TABLET ORAL EVERY 8 HOURS SCHEDULED
Status: DISCONTINUED | OUTPATIENT
Start: 2022-07-10 | End: 2022-07-10

## 2022-07-09 RX ORDER — ACETAMINOPHEN 650 MG/1
650 SUPPOSITORY RECTAL EVERY 6 HOURS PRN
Status: DISCONTINUED | OUTPATIENT
Start: 2022-07-09 | End: 2022-07-18 | Stop reason: HOSPADM

## 2022-07-09 RX ORDER — CIPROFLOXACIN 500 MG/1
500 TABLET, FILM COATED ORAL EVERY 12 HOURS SCHEDULED
Status: DISCONTINUED | OUTPATIENT
Start: 2022-07-10 | End: 2022-07-10

## 2022-07-09 RX ORDER — SODIUM CHLORIDE 0.9 % (FLUSH) 0.9 %
10 SYRINGE (ML) INJECTION EVERY 12 HOURS SCHEDULED
Status: DISCONTINUED | OUTPATIENT
Start: 2022-07-10 | End: 2022-07-18 | Stop reason: HOSPADM

## 2022-07-09 RX ORDER — LISINOPRIL 10 MG/1
10 TABLET ORAL 2 TIMES DAILY
Status: DISCONTINUED | OUTPATIENT
Start: 2022-07-10 | End: 2022-07-09

## 2022-07-09 RX ORDER — SODIUM CHLORIDE 0.9 % (FLUSH) 0.9 %
10 SYRINGE (ML) INJECTION PRN
Status: DISCONTINUED | OUTPATIENT
Start: 2022-07-09 | End: 2022-07-18 | Stop reason: HOSPADM

## 2022-07-09 RX ORDER — LOSARTAN POTASSIUM 50 MG/1
50 TABLET ORAL 2 TIMES DAILY
Status: ON HOLD | COMMUNITY
End: 2022-07-14 | Stop reason: SDUPTHER

## 2022-07-09 RX ORDER — SODIUM CHLORIDE 9 MG/ML
INJECTION, SOLUTION INTRAVENOUS PRN
Status: DISCONTINUED | OUTPATIENT
Start: 2022-07-09 | End: 2022-07-18 | Stop reason: HOSPADM

## 2022-07-09 RX ORDER — LOSARTAN POTASSIUM 50 MG/1
50 TABLET ORAL 2 TIMES DAILY
Status: DISCONTINUED | OUTPATIENT
Start: 2022-07-10 | End: 2022-07-16

## 2022-07-09 RX ORDER — ACETAMINOPHEN 325 MG/1
650 TABLET ORAL EVERY 6 HOURS PRN
Status: DISCONTINUED | OUTPATIENT
Start: 2022-07-09 | End: 2022-07-18 | Stop reason: HOSPADM

## 2022-07-09 RX ADMIN — LOSARTAN POTASSIUM 50 MG: 50 TABLET, FILM COATED ORAL at 23:53

## 2022-07-09 RX ADMIN — IOPAMIDOL 50 ML: 755 INJECTION, SOLUTION INTRAVENOUS at 17:24

## 2022-07-09 RX ADMIN — APIXABAN 2.5 MG: 5 TABLET, FILM COATED ORAL at 23:53

## 2022-07-09 RX ADMIN — SODIUM CHLORIDE, POTASSIUM CHLORIDE, SODIUM LACTATE AND CALCIUM CHLORIDE: 600; 310; 30; 20 INJECTION, SOLUTION INTRAVENOUS at 23:57

## 2022-07-09 RX ADMIN — METRONIDAZOLE 500 MG: 500 TABLET ORAL at 23:53

## 2022-07-09 RX ADMIN — SODIUM CHLORIDE 1000 ML: 9 INJECTION, SOLUTION INTRAVENOUS at 18:30

## 2022-07-09 ASSESSMENT — ENCOUNTER SYMPTOMS
CONSTIPATION: 0
NAUSEA: 0
BACK PAIN: 0
ABDOMINAL PAIN: 1
CHEST TIGHTNESS: 0
VOMITING: 0
SHORTNESS OF BREATH: 0
WHEEZING: 0
DIARRHEA: 0
COUGH: 0
BLOOD IN STOOL: 0

## 2022-07-09 ASSESSMENT — LIFESTYLE VARIABLES: HOW OFTEN DO YOU HAVE A DRINK CONTAINING ALCOHOL: NEVER

## 2022-07-09 NOTE — ED PROVIDER NOTES
nosebleeds and tinnitus. Eyes: Negative for visual disturbance. Respiratory: Negative for cough, chest tightness, shortness of breath and wheezing. Cardiovascular: Negative for chest pain, palpitations and leg swelling. Gastrointestinal: Positive for abdominal pain. Negative for blood in stool, constipation, diarrhea, nausea and vomiting. Endocrine: Negative for polydipsia and polyphagia. Genitourinary: Negative for dysuria, flank pain, hematuria, vaginal bleeding, vaginal discharge and vaginal pain. Musculoskeletal: Negative for back pain, gait problem, joint swelling and myalgias. Skin: Negative for rash. Allergic/Immunologic: Negative for immunocompromised state. Neurological: Positive for syncope. Negative for dizziness, weakness, numbness and headaches. Hematological: Negative for adenopathy. Psychiatric/Behavioral: Negative for behavioral problems, confusion and hallucinations. Physical Exam  Constitutional:       General: She is not in acute distress. Appearance: Normal appearance. She is normal weight. She is not ill-appearing, toxic-appearing or diaphoretic. HENT:      Head: Normocephalic and atraumatic. Right Ear: External ear normal.      Left Ear: External ear normal.      Nose: Nose normal. No congestion or rhinorrhea. Mouth/Throat:      Mouth: Mucous membranes are moist.      Pharynx: Oropharynx is clear. No oropharyngeal exudate or posterior oropharyngeal erythema. Eyes:      Conjunctiva/sclera: Conjunctivae normal.      Pupils: Pupils are equal, round, and reactive to light. Cardiovascular:      Rate and Rhythm: Normal rate and regular rhythm. Pulses: Normal pulses. Heart sounds: Normal heart sounds. Pulmonary:      Effort: Pulmonary effort is normal.      Breath sounds: Normal breath sounds. Abdominal:      General: Abdomen is flat. Bowel sounds are normal. There is no distension. Palpations: Abdomen is soft. There is no mass. Tenderness: There is abdominal tenderness in the suprapubic area. There is no right CVA tenderness, left CVA tenderness or guarding. Hernia: No hernia is present. Musculoskeletal:      Cervical back: Normal range of motion. Skin:     General: Skin is warm and dry. Capillary Refill: Capillary refill takes less than 2 seconds. Neurological:      General: No focal deficit present. Mental Status: She is alert and oriented to person, place, and time. Mental status is at baseline. Psychiatric:         Mood and Affect: Mood normal.         Behavior: Behavior normal.         Thought Content: Thought content normal.          Procedures     MDM      80 y.o. female  presents with syncopal episode. Endorsing abdominal pain,  Endorses lightheaded. Is appears initially confused. While in the ED patient was hemodynamically stable, afebrile, nontoxic-appearing, in no respiratory distress. Orthostatics negative. Physical exam remarkable for abdominal tenderness in the suprapubic region, but her abdomen was soft, nondistended, positive bowel sounds. No signs of trauma. Labs remarkable for elevated lactic acid with a normal repeat lactic after fluids, troponin 16 at its baseline. EKG normal sinus with PVCs, LAD, incomplete right bundle branch block, LVH, T wave inversions on V4 and inferior leads that are worsening from previous EKG. CXR negative for any acute pathologies, for mild cardiomegaly. He had a negative negative for any intracranial abnormalities. The abdomen remarkable for inflammatory wall thickening of the cecum and ascending colon concerning for colitis is unremarkable.n Patient received fluids. Patient and daughter states the patient has not followed with cardiology for a long time. Given these unexplained syncopal episodes patient is admitted to the Medicine team for further management. Patient in agreement with plan of admission.       ED Course as of 07/09/22 2102   Sat Jul 09, 2022 2009   IMPRESSION:  Wall thickening and inflammatory stranding involving the cecum/ascending  colon as well as the splenic flexure, concerning for colitis. [TC]   2009 IMPRESSION:  No acute intracranial abnormality.     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 No acute abnormality of the cervical spine.     Stable moderate-severe cervical spondylosis. [TC]   2009 IMPRESSION:  No acute process.     Mild cardiomegaly [TC]   2010 EKG: This EKG is signed by emergency department physician. Rate: 61Qtc:517ms  Rhythm: Sinus and few PVCs  Interpretation: sinus with PVCs, LAD, Incomplete RBBB, LVH, T wave inversions on v4 andf inferior leads- V4 new and inferior leads worsening, prolonged Qt  Comparison: changes compared to previous EKG      [TC]   2049 hospitalist will admit patient. [TC]      ED Course User Index  [TC] Ruby Iverson MD       --------------------------------------------- PAST HISTORY ---------------------------------------------  Past Medical History:  has a past medical history of Arthritis, Atrial fibrillation ([address] Chronic combined systolic (congestive) and diastolic (congestive) heart failure ([address] Coronary artery disease involving native coronary artery of native heart without angina pectoris, Hx of blood clots, Hypertension, Ischemic colitis ([address] and PAF (paroxysmal atrial fibrillation) ([facility] [address] Past Surgical History:  has a past surgical history that includes Ankle fracture surgery; Colonoscopy; Endoscopy, colon, diagnostic; fracture surgery; and Cardiac catheterization (11/11/2019). Social History:  reports that she has never smoked. She has never used smokeless tobacco. She reports that she does not drink alcohol and does not use drugs. Family History: family history is not on file. The patients home medications have been reviewed.     Allergies: Nifedipine    -------------------------------------------------- RESULTS -------------------------------------------------    LABS:  Results for orders placed or performed during the hospital encounter of 07/09/22   Lactic Acid   Result Value Ref Range    Lactic Acid 2.6 (H) 0.5 - 2.2 mmol/L   CBC with Auto Differential   Result Value Ref Range    WBC 7.2 4.5 - 11.5 E9/L    RBC 4.22 3.50 - 5.50 E12/L    Hemoglobin 12.6 11.5 - 15.5 g/dL    Hematocrit 39.3 34.0 - 48.0 %    MCV 93.1 80.0 - 99.9 fL    MCH 29.9 26.0 - 35.0 pg    MCHC 32.1 32.0 - 34.5 %    RDW 14.6 11.5 - 15.0 fL    Platelets 413 218 - 072 E9/L    MPV 10.1 7.0 - 12.0 fL    Neutrophils % 74.3 43.0 - 80.0 %    Immature Granulocytes % 0.1 0.0 - 5.0 %    Lymphocytes % 17.3 (L) 20.0 - 42.0 %    Monocytes % 7.2 2.0 - 12.0 %    Eosinophils % 0.7 0.0 - 6.0 %    Basophils % 0.4 0.0 - 2.0 %    Neutrophils Absolute 5.35 1.80 - 7.30 E9/L    Immature Granulocytes # 0.01 E9/L    Lymphocytes Absolute 1.25 (L) 1.50 - 4.00 E9/L    Monocytes Absolute 0.52 0.10 - 0.95 E9/L    Eosinophils Absolute 0.05 0.05 - 0.50 E9/L    Basophils Absolute 0.03 0.00 - 0.20 E9/L   Comprehensive Metabolic Panel w/ Reflex to MG   Result Value Ref Range    Sodium 142 132 - 146 mmol/L    Potassium reflex Magnesium 3.6 3.5 - 5.0 mmol/L    Chloride 109 (H) 98 - 107 mmol/L    CO2 24 22 - 29 mmol/L    Anion Gap 9 7 - 16 mmol/L    Glucose 90 74 - 99 mg/dL    BUN 9 6 - 23 mg/dL    CREATININE 0.9 0.5 - 1.0 mg/dL    GFR Non-African American >60 >=60 mL/min/1.73    GFR African American >60     Calcium 8.5 (L) 8.6 - 10.2 mg/dL    Total Protein 6.1 (L) 6.4 - 8.3 g/dL    Albumin 3.5 3.5 - 5.2 g/dL    Total Bilirubin 0.5 0.0 - 1.2 mg/dL    Alkaline Phosphatase 75 35 - 104 U/L    ALT 13 0 - 32 U/L    AST 22 0 - 31 U/L   Troponin   Result Value Ref Range    Troponin, High Sensitivity 16 (H) 0 - 9 ng/L   Lipase   Result Value Ref Range    Lipase 11 (L) 13 - 60 U/L   Urinalysis with Microscopic   Result Value Ref Range    Color, UA Yellow Straw/Yellow    Clarity, UA Clear Clear    Glucose, Ur Negative Negative mg/dL    Bilirubin Urine Negative Negative    Ketones, Urine Negative Negative mg/dL    Specific Gravity, UA 1.015 1.005 - 1.030    Blood, Urine Negative Negative    pH, UA 7.5 5.0 - 9.0    Protein, UA Negative Negative mg/dL    Urobilinogen, Urine 0.2 <2.0 E.U./dL    Nitrite, Urine Negative Negative    Leukocyte Esterase, Urine SMALL (A) Negative    WBC, UA 0-1 0 - 5 /HPF    RBC, UA NONE 0 - 2 /HPF    Epithelial Cells, UA RARE /HPF    Bacteria, UA NONE SEEN None Seen /HPF   Lactic Acid   Result Value Ref Range    Lactic Acid 1.5 0.5 - 2.2 mmol/L   SPECIMEN REJECTION   Result Value Ref Range    Rejected Test TRP5     Reason for Rejection see below    EKG 12 Lead   Result Value Ref Range    Ventricular Rate 61 BPM    Atrial Rate 61 BPM    P-R Interval 170 ms    QRS Duration 100 ms    Q-T Interval 514 ms    QTc Calculation (Bazett) 517 ms    P Axis 82 degrees    R Axis -42 degrees    T Axis -12 degrees       RADIOLOGY:  CT Head WO Contrast   Final Result   No acute intracranial abnormality. 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 CT Cervical Spine WO Contrast   Final Result   No acute abnormality of the cervical spine. Stable moderate-severe cervical spondylosis. CT ABDOMEN PELVIS W IV CONTRAST Additional Contrast? None   Final Result   Wall thickening and inflammatory stranding involving the cecum/ascending   colon as well as the splenic flexure, concerning for colitis. Additional findings as above. XR CHEST PORTABLE   Final Result   No acute process. Mild cardiomegaly. ------------------------- NURSING NOTES AND VITALS REVIEWED ---------------------------  Date / Time Roomed:  7/9/2022  3:16 PM  ED Bed Assignment:  22/22    The nursing notes within the ED encounter and vital signs as below have been reviewed.      Patient Vitals for the past 24 hrs:   BP Temp Temp src Pulse Resp SpO2   07/09/22 1946 (!) 188/97 -- -- 51 15 100 %   07/09/22 1623 -- -- -- -- 18 95 %   07/09/22 1523 (!) 168/88 97.8 °F (36.6 °C) Oral 50 14 98 %       Oxygen Saturation Interpretation: Normal    ------------------------------------------ PROGRESS NOTES ------------------------------------------  Re-evaluation(s):  Time: 0400  Patients symptoms show no change  Repeat physical examination is not changed    Counseling:  I have spoken with the patient and discussed todays results, in addition to providing specific details for the plan of care and counseling regarding the diagnosis and prognosis. Their questions are answered at this time and they are agreeable with the plan of admission.    --------------------------------- ADDITIONAL PROVIDER NOTES ---------------------------------  Consultations:  . Spoke with Dr. Arturo Balderrama. Discussed case. They will admit the patient. This patient's ED course included: a personal history and physicial examination, re-evaluation prior to disposition, multiple bedside re-evaluations, IV medications, cardiac monitoring and continuous pulse oximetry    This patient has remained hemodynamically stable during their ED course. Diagnosis:  1. Syncope and collapse        Disposition:  Patient's disposition: Admit to telemetry  Patient's condition is stable.          Rufino Mcconnell MD  Resident  07/09/22 3753

## 2022-07-10 LAB
ANION GAP SERPL CALCULATED.3IONS-SCNC: 12 MMOL/L (ref 7–16)
BASOPHILS ABSOLUTE: 0.02 E9/L (ref 0–0.2)
BASOPHILS RELATIVE PERCENT: 0.4 % (ref 0–2)
BUN BLDV-MCNC: 7 MG/DL (ref 6–23)
C-REACTIVE PROTEIN: 0.3 MG/DL (ref 0–0.4)
CALCIUM SERPL-MCNC: 9.4 MG/DL (ref 8.6–10.2)
CHLORIDE BLD-SCNC: 109 MMOL/L (ref 98–107)
CO2: 20 MMOL/L (ref 22–29)
CREAT SERPL-MCNC: 0.7 MG/DL (ref 0.5–1)
EKG ATRIAL RATE: 61 BPM
EKG ATRIAL RATE: 64 BPM
EKG P AXIS: 82 DEGREES
EKG P AXIS: 92 DEGREES
EKG P-R INTERVAL: 156 MS
EKG P-R INTERVAL: 170 MS
EKG Q-T INTERVAL: 456 MS
EKG Q-T INTERVAL: 514 MS
EKG QRS DURATION: 100 MS
EKG QRS DURATION: 96 MS
EKG QTC CALCULATION (BAZETT): 470 MS
EKG QTC CALCULATION (BAZETT): 517 MS
EKG R AXIS: -42 DEGREES
EKG R AXIS: -54 DEGREES
EKG T AXIS: -12 DEGREES
EKG T AXIS: 29 DEGREES
EKG VENTRICULAR RATE: 61 BPM
EKG VENTRICULAR RATE: 64 BPM
EOSINOPHILS ABSOLUTE: 0.03 E9/L (ref 0.05–0.5)
EOSINOPHILS RELATIVE PERCENT: 0.5 % (ref 0–6)
GFR AFRICAN AMERICAN: >60
GFR NON-AFRICAN AMERICAN: >60 ML/MIN/1.73
GLUCOSE BLD-MCNC: 129 MG/DL (ref 74–99)
HCT VFR BLD CALC: 41.9 % (ref 34–48)
HEMOGLOBIN: 13.8 G/DL (ref 11.5–15.5)
IMMATURE GRANULOCYTES #: 0.02 E9/L
IMMATURE GRANULOCYTES %: 0.4 % (ref 0–5)
LYMPHOCYTES ABSOLUTE: 1.23 E9/L (ref 1.5–4)
LYMPHOCYTES RELATIVE PERCENT: 22.4 % (ref 20–42)
MAGNESIUM: 1.8 MG/DL (ref 1.6–2.6)
MCH RBC QN AUTO: 29.6 PG (ref 26–35)
MCHC RBC AUTO-ENTMCNC: 32.9 % (ref 32–34.5)
MCV RBC AUTO: 89.7 FL (ref 80–99.9)
METER GLUCOSE: 90 MG/DL (ref 74–99)
MONOCYTES ABSOLUTE: 0.52 E9/L (ref 0.1–0.95)
MONOCYTES RELATIVE PERCENT: 9.5 % (ref 2–12)
NEUTROPHILS ABSOLUTE: 3.68 E9/L (ref 1.8–7.3)
NEUTROPHILS RELATIVE PERCENT: 66.8 % (ref 43–80)
PDW BLD-RTO: 14.4 FL (ref 11.5–15)
PLATELET # BLD: 173 E9/L (ref 130–450)
PMV BLD AUTO: 10 FL (ref 7–12)
POTASSIUM REFLEX MAGNESIUM: 3.4 MMOL/L (ref 3.5–5)
RBC # BLD: 4.67 E12/L (ref 3.5–5.5)
REASON FOR REJECTION: NORMAL
REJECTED TEST: NORMAL
SEDIMENTATION RATE, ERYTHROCYTE: 4 MM/HR (ref 0–20)
SODIUM BLD-SCNC: 141 MMOL/L (ref 132–146)
TROPONIN, HIGH SENSITIVITY: 15 NG/L (ref 0–9)
TROPONIN, HIGH SENSITIVITY: 15 NG/L (ref 0–9)
WBC # BLD: 5.5 E9/L (ref 4.5–11.5)

## 2022-07-10 PROCEDURE — 80048 BASIC METABOLIC PNL TOTAL CA: CPT

## 2022-07-10 PROCEDURE — 85651 RBC SED RATE NONAUTOMATED: CPT

## 2022-07-10 PROCEDURE — 6360000002 HC RX W HCPCS: Performed by: INTERNAL MEDICINE

## 2022-07-10 PROCEDURE — 93005 ELECTROCARDIOGRAM TRACING: CPT | Performed by: INTERNAL MEDICINE

## 2022-07-10 PROCEDURE — 86140 C-REACTIVE PROTEIN: CPT

## 2022-07-10 PROCEDURE — 83735 ASSAY OF MAGNESIUM: CPT

## 2022-07-10 PROCEDURE — 99225 PR SBSQ OBSERVATION CARE/DAY 25 MINUTES: CPT | Performed by: INTERNAL MEDICINE

## 2022-07-10 PROCEDURE — G0378 HOSPITAL OBSERVATION PER HR: HCPCS

## 2022-07-10 PROCEDURE — 36415 COLL VENOUS BLD VENIPUNCTURE: CPT

## 2022-07-10 PROCEDURE — 6370000000 HC RX 637 (ALT 250 FOR IP): Performed by: INTERNAL MEDICINE

## 2022-07-10 PROCEDURE — 2580000003 HC RX 258: Performed by: INTERNAL MEDICINE

## 2022-07-10 PROCEDURE — 6370000000 HC RX 637 (ALT 250 FOR IP): Performed by: NURSE PRACTITIONER

## 2022-07-10 PROCEDURE — 1200000000 HC SEMI PRIVATE

## 2022-07-10 PROCEDURE — 85025 COMPLETE CBC W/AUTO DIFF WBC: CPT

## 2022-07-10 PROCEDURE — 82962 GLUCOSE BLOOD TEST: CPT

## 2022-07-10 PROCEDURE — 84484 ASSAY OF TROPONIN QUANT: CPT

## 2022-07-10 PROCEDURE — APPSS30 APP SPLIT SHARED TIME 16-30 MINUTES: Performed by: NURSE PRACTITIONER

## 2022-07-10 RX ORDER — POTASSIUM CHLORIDE 20 MEQ/1
40 TABLET, EXTENDED RELEASE ORAL DAILY
Status: COMPLETED | OUTPATIENT
Start: 2022-07-10 | End: 2022-07-12

## 2022-07-10 RX ORDER — HYDRALAZINE HYDROCHLORIDE 20 MG/ML
5 INJECTION INTRAMUSCULAR; INTRAVENOUS ONCE
Status: COMPLETED | OUTPATIENT
Start: 2022-07-10 | End: 2022-07-10

## 2022-07-10 RX ADMIN — POLYETHYLENE GLYCOL 3350 17 G: 17 POWDER, FOR SOLUTION ORAL at 01:49

## 2022-07-10 RX ADMIN — LOSARTAN POTASSIUM 50 MG: 50 TABLET, FILM COATED ORAL at 22:26

## 2022-07-10 RX ADMIN — METRONIDAZOLE 500 MG: 500 TABLET ORAL at 05:47

## 2022-07-10 RX ADMIN — LOSARTAN POTASSIUM 50 MG: 50 TABLET, FILM COATED ORAL at 08:36

## 2022-07-10 RX ADMIN — HYDRALAZINE HYDROCHLORIDE 5 MG: 20 INJECTION, SOLUTION INTRAMUSCULAR; INTRAVENOUS at 02:09

## 2022-07-10 RX ADMIN — APIXABAN 2.5 MG: 5 TABLET, FILM COATED ORAL at 08:37

## 2022-07-10 RX ADMIN — ASPIRIN 81 MG: 81 TABLET, COATED ORAL at 08:36

## 2022-07-10 RX ADMIN — Medication 10 ML: at 22:35

## 2022-07-10 RX ADMIN — POTASSIUM CHLORIDE 40 MEQ: 1500 TABLET, EXTENDED RELEASE ORAL at 08:36

## 2022-07-10 RX ADMIN — APIXABAN 2.5 MG: 5 TABLET, FILM COATED ORAL at 22:26

## 2022-07-10 RX ADMIN — CIPROFLOXACIN 500 MG: 500 TABLET, FILM COATED ORAL at 08:36

## 2022-07-10 RX ADMIN — Medication 10 ML: at 08:37

## 2022-07-10 NOTE — PROGRESS NOTES
Dr Luke Enriquez aware pt is having increased anxiety, elevated HR, and increased RR. Pt appears to be confused where she is at this time, this RN provided verbal redirection and reassurance.

## 2022-07-10 NOTE — H&P
[facility] History and Physical      CHIEF COMPLAINT:  syncope    History of Present Illness: 40-year-old female with a history of A. fib, combined heart failure, atherosclerotic heart disease, VTE. She was admitted in November for syncope attributed to bradycardia worsened by continued use of metoprolol despite it being discontinued. Today her son reports calling out to her when she was in the bathroom but she did not respond. When he opened the door he saw her in a standing position and then she fell. He believes she passed out. He mentioned to the ED team that she has been having similar episodes this week but it is unclear whether they were syncopal episodes or falls. When she was seen she was accompanied by her daughter who did not witness the episode and the patient is not able to provide any more information. Denies any current complaints. She does believe she is not eating or drinking well.   Orthostatic vitals in the ED were negative, lactic acid on presentation was 2.6 that improved with 1 L of IV fluids down to 1.3      REVIEW OF SYSTEMS:  A comprehensive 14 point review of systems was negative except for: what is in the HPI    PMH:  Past Medical History:   Diagnosis Date    Arthritis     Atrial fibrillation (HCC)     Chronic combined systolic (congestive) and diastolic (congestive) heart failure ([address] 11/7/2021    Coronary artery disease involving native coronary artery of native heart without angina pectoris     Hx of blood clots     Hypertension     Ischemic colitis ([address]     PAF ([facility]oxysmal atrial fibrillation) [address]        Surgical History:  Past Surgical History:   Procedure Laterality Date    ANKLE FRACTURE SURGERY      CARDIAC CATHETERIZATION  11/11/2019    Dr. Shadi Saldana    COLONOSCOPY      ENDOSCOPY, COLON, DIAGNOSTIC      FRACTURE SURGERY         Medications Prior to Admission:    Prior to Admission medications    Medication Sig Start Date End Date Taking? Authorizing Provider   amiodarone (CORDARONE) 200 MG tablet Take 1 tablet by mouth every other day 11/11/21 12/11/21  Melvin Prince MD   lisinopril (PRINIVIL;ZESTRIL) 10 MG tablet Take 1 tablet by mouth 2 times daily 11/10/21   Mevlin Prince MD   hydrALAZINE (APRESOLINE) 50 MG tablet Take 1 tablet by mouth every 8 hours 11/10/21   Melvin Prince MD   amLODIPine (NORVASC) 10 MG tablet Take 1 tablet by mouth daily 11/11/21   Melvin Prince MD   diclofenac (VOLTAREN) 0.1 % ophthalmic solution 1 drop 4 times daily    Historical Provider, MD   Lidocaine, Anorectal, 5 % GEL Apply 1 each topically 2 times daily 10/22/21   Leidy Ricketts DO   aspirin 81 MG EC tablet Take 81 mg by mouth daily    Historical Provider, MD   acetaminophen (TYLENOL) 500 MG tablet Take 2 tablets by mouth every 8 hours as needed for Pain 9/26/20   Carlota Menjivar MD   apixaban (ELIQUIS) 5 MG TABS tablet Take 2.5 mg by mouth 2 times daily     Historical Provider, MD       Allergies:    Nifedipine    Social History:    reports that she has never smoked. She has never used smokeless tobacco. She reports that she does not drink alcohol and does not use drugs.     Family History:   Reviewed and not contributory    PHYSICAL EXAM:  Vitals:  BP (!) 188/97   Pulse 51   Temp 97.8 °F (36.6 °C) (Oral)   Resp 15   SpO2 100%   General Appearance: alert and oriented to person, place and time and in no acute distress, frail  Skin: warm and dry, turgor not diminished  Head: normocephalic and atraumatic  Eyes: pupils equal, round, and reactive to light, extraocular eye movements intact, conjunctivae normal  Neck: neck supple and non tender without mass   Pulmonary/Chest: clear to auscultation bilaterally- no wheezes, rales or rhonchi, normal air movement, no respiratory distress  Cardiovascular: normal rate, normal S1 and S2 and 2 out of 6 systolic ejection murmur  Abdomen: soft, some right lower quadrant tenderness palpation, non-distended, normal bowel sounds, no masses or organomegaly  Extremities: no cyanosis, no clubbing and no edema  Neurologic: no cranial nerve deficit and speech normal    LABS:  Recent Labs     07/09/22  1603      K 3.6   *   CO2 24   BUN 9   CREATININE 0.9   GLUCOSE 90   CALCIUM 8.5*       Recent Labs     07/09/22  1603   WBC 7.2   RBC 4.22   HGB 12.6   HCT 39.3   MCV 93.1   MCH 29.9   MCHC 32.1   RDW 14.6      MPV 10.1       No results for input(s): POCGLU in the last 72 hours. Radiology:   CT Head WO Contrast   Final Result   No acute intracranial abnormality. 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 CT Cervical Spine WO Contrast   Final Result   No acute abnormality of the cervical spine. Stable moderate-severe cervical spondylosis. CT ABDOMEN PELVIS W IV CONTRAST Additional Contrast? None   Final Result   Wall thickening and inflammatory stranding involving the cecum/ascending   colon as well as the splenic flexure, concerning for colitis. Additional findings as above. XR CHEST PORTABLE   Final Result   No acute process. Mild cardiomegaly. EKG: Right bundle branch block. T wave inversions in V4    ASSESSMENT/PLAN:  Syncope  - Evaluation limited due to limited history surrounding the event. Vasovagal versus orthostatic.  - Likely dehydrated especially with increased lactic acid on presentation. Continue IV fluids from the night. Repeat orthostatic vitals in the morning  - PT/OT. She has declined placement in the past  -Trend troponin    Prolonged QTC  - Check electrolytes  -Unclear if she is still on amiodarone. Hold for now and recheck EKG in the morning    Ascending colitis  - CT findings show some inflammation she complains of pain in her right lower quadrant. Check inflammatory markers and trial antibiotics.   May need further evaluation if symptoms persist to rule out other etiologies    Paroxysmal A. Fib  Chronic anticoagulation  -Resume Eliquis. Will need to clarify with son when he is available whether she has frequent falls and whether the Eliquis can be held  -Hold amiodarone as above    Chronic combined heart failure  - Gentle IV fluid as above. Resume home regimen    Code Status: DNR arrest  DVT prophylaxis: On Eliquis      NOTE: This report was transcribed using voice recognition software. Every effort was made to ensure accuracy; however, inadvertent computerized transcription errors may be present.   Electronically signed by Олег Sanchez MD on 7/9/2022 at 9:09 PM

## 2022-07-10 NOTE — PROGRESS NOTES
[address] [place], [place] [address]ist   Progress Note    Admitting Date and Time: 7/9/2022  3:16 PM  Admit Dx: Syncope and collapse [R55]    Subjective:    Patient seen and examined. Alert and oriented. Patient states she has had a lot of her home medcations changed around recently and feels that is why she passed out. She reports occasional chest palpitations. None this am.       ROS:   Denies CP, SOB, subjective fever, chills, N/V/D, abdominal pain,  HA. All systems reviewed and negative unless otherwise documented.       potassium chloride  40 mEq Oral Daily    apixaban  2.5 mg Oral BID    aspirin  81 mg Oral Daily    sodium chloride flush  10 mL IntraVENous 2 times per day    losartan  50 mg Oral BID     sodium chloride flush, 10 mL, PRN  sodium chloride, , PRN  polyethylene glycol, 17 g, Daily PRN  acetaminophen, 650 mg, Q6H PRN   Or  acetaminophen, 650 mg, Q6H PRN         Objective:    BP (!) 154/95   Pulse 66   Temp 98.6 °F (37 °C) (Oral)   Resp 18   SpO2 100%   General Appearance: alert and oriented to person, place and time and in no acute distress  Skin: warm and dry  Head: normocephalic and atraumatic  Eyes: pupils equal, round, and reactive to light, extraocular eye movements intact, conjunctivae normal  Neck: neck supple and non tender without mass   Pulmonary/Chest: clear to auscultation bilaterally- no wheezes, rales or rhonchi, normal air movement, no respiratory distress  Cardiovascular: normal rate, normal S1 and S2 and no carotid bruits  Abdomen: soft, non-tender, non-distended, normal bowel sounds, no masses or organomegaly  Extremities: no cyanosis, no clubbing and no edema  Neurologic: no cranial nerve deficit and speech normal      Recent Labs     07/09/22  1603 07/10/22  0502    141   K 3.6 3.4*   * 109*   CO2 24 20*   BUN 9 7   CREATININE 0.9 0.7   GLUCOSE 90 129*   CALCIUM 8.5* 9.4       Recent Labs     07/09/22  1603   ALKPHOS 75   PROT 6.1*   LABALBU 3.5   BILITOT 0. 5   AST 22   ALT 13       Recent Labs     07/09/22  1603 07/10/22  0502   WBC 7.2 5.5   RBC 4.22 4.67   HGB 12.6 13.8   HCT 39.3 41.9   MCV 93.1 89.7   MCH 29.9 29.6   MCHC 32.1 32.9   RDW 14.6 14.4    173   MPV 10.1 10.0         Radiology:   CT Head WO Contrast   Final Result   No acute intracranial abnormality. 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 CT Cervical Spine WO Contrast   Final Result   No acute abnormality of the cervical spine. Stable moderate-severe cervical spondylosis. CT ABDOMEN PELVIS W IV CONTRAST Additional Contrast? None   Final Result   Wall thickening and inflammatory stranding involving the cecum/ascending   colon as well as the splenic flexure, concerning for colitis. Additional findings as above. XR CHEST PORTABLE   Final Result   No acute process. Mild cardiomegaly. CT Head WO Contrast    Result Date: 7/9/2022  EXAMINATION: CT OF THE HEAD WITHOUT CONTRAST  7/9/2022 3:21 pm TECHNIQUE: CT of the head was performed without the administration of intravenous contrast. Automated exposure control, iterative reconstruction, and/or weight based adjustment of the mA/kV was utilized to reduce the radiation dose to as low as reasonably achievable. Dose: Total Exam DLP: 959.84 mGy-cm. COMPARISON: 11/09/2021 HISTORY: ORDERING SYSTEM PROVIDED HISTORY: fall TECHNOLOGIST PROVIDED HISTORY: Reason for exam:->fall Has a \"code stroke\" or \"stroke alert\" been called? ->No Decision Support Exception - unselect if not a suspected or confirmed emergency medical condition->Emergency Medical Condition (MA) FINDINGS: BRAIN/VENTRICLES: There is no acute intracranial hemorrhage, mass effect or midline shift. No abnormal extra-axial fluid collection. The gray-white differentiation is maintained without evidence of an acute infarct.  There is prominence of the ventricles and sulci due to global parenchymal volume loss. There are nonspecific areas of hypoattenuation within the periventricular and subcortical white matter, which likely represent chronic microvascular ischemic change. ORBITS: The visualized portion of the orbits demonstrate no acute abnormality. SINUSES: The visualized paranasal sinuses and mastoid air cells demonstrate no acute abnormality. SOFT TISSUES/SKULL: No acute abnormality of the visualized skull or soft tissues. No acute intracranial abnormality. No significant change in diffuse volume loss and chronic small vessel ischemic white matter disease. CT Cervical Spine WO Contrast    Result Date: 7/9/2022  EXAMINATION: CT OF THE CERVICAL SPINE WITHOUT CONTRAST 7/9/2022 3:21 pm TECHNIQUE: CT of the cervical spine was performed without the administration of intravenous contrast. Multiplanar reformatted images are provided for review. Automated exposure control, iterative reconstruction, and/or weight based adjustment of the mA/kV was utilized to reduce the radiation dose to as low as reasonably achievable. Dose: Total Exam DLP: 525.81 mGy-cm. COMPARISON: 11/06/2021 HISTORY: ORDERING SYSTEM PROVIDED HISTORY: fall TECHNOLOGIST PROVIDED HISTORY: Reason for exam:->fall Decision Support Exception - unselect if not a suspected or confirmed emergency medical condition->Emergency Medical Condition (MA) FINDINGS: BONES/ALIGNMENT: There is no acute fracture or traumatic malalignment. DEGENERATIVE CHANGES: Moderate-severe multilevel degenerative endplate change and facet arthropathy is stable compared to the prior exam including disc height loss at C4-5, C5-6 and C6-7. No significant canal stenosis is seen. SOFT TISSUES: There is no prevertebral soft tissue swelling. No acute abnormality of the cervical spine. Stable moderate-severe cervical spondylosis.      CT ABDOMEN PELVIS W IV CONTRAST Additional Contrast? None    Result Date: 7/9/2022  EXAMINATION: CT OF THE ABDOMEN AND PELVIS WITH CONTRAST 7/9/2022 5:21 pm TECHNIQUE: CT of the abdomen and pelvis was performed with the administration of intravenous contrast. Multiplanar reformatted images are provided for review. Automated exposure control, iterative reconstruction, and/or weight based adjustment of the mA/kV was utilized to reduce the radiation dose to as low as reasonably achievable. COMPARISON: 10/22/2021 HISTORY: ORDERING SYSTEM PROVIDED HISTORY: abd pain TECHNOLOGIST PROVIDED HISTORY: Additional Contrast?->None Reason for exam:->abd pain Decision Support Exception - unselect if not a suspected or confirmed emergency medical condition->Emergency Medical Condition (MA) FINDINGS: Lower Chest: There is cardiomegaly. There is mild bilateral dependent atelectasis. Organs: The liver is not cirrhotic in configuration. Cystic lesion again noted within the medial hepatic segment. The gallbladder, spleen, pancreas, adrenals, and kidneys are unremarkable. GI/Bowel: There is wall thickening and inflammatory stranding of the colon, particularly the cecum/ascending colon as well as the splenic flexure. There is diverticulosis. No evidence of obstruction. Pelvis: The urinary bladder is partially filled. The uterus is unremarkable. Peritoneum/Retroperitoneum: No evidence of ascites or free air. No evidence of retroperitoneal lymphadenopathy. Aorta is normal in caliber without acute abnormality. Bones/Soft Tissues:  No acute abnormality of the visualized osseous structures. There is diffuse decreased bone density. Wall thickening and inflammatory stranding involving the cecum/ascending colon as well as the splenic flexure, concerning for colitis. Additional findings as above.      XR CHEST PORTABLE    Result Date: 7/9/2022  EXAMINATION: ONE XRAY VIEW OF THE CHEST 7/9/2022 4:01 pm COMPARISON: 03/21/2022 HISTORY: ORDERING SYSTEM PROVIDED HISTORY: fall TECHNOLOGIST PROVIDED HISTORY: Reason for exam:->fall FINDINGS: The lungs are without acute focal process. There is no effusion or pneumothorax. Cardiomegaly is noted. The osseous structures are without acute process. Osseous structures appear unremarkable. No acute process. Mild cardiomegaly. EKG:        Assessment:  Principal Problem:    Syncope and collapse  Resolved Problems:    * No resolved hospital problems. *      Plan:  1. Syncope  - Evaluation limited due to limited history surrounding the event. Vasovagal versus orthostatic.  - Likely dehydrated especially with increased lactic acid on presentation.    -Continue IV fluids from the night.  - PT/OT. She has declined placement in the past  -Trend troponin     2. Prolonged QTC  - mild hypokalemia  -Unclear if she is still on amiodarone. Hold for now  -repeat EKG this am as above, QTc 470.     3. Ascending colitis  - CT findings show some inflammation she complains of pain in her right lower quadrant.    -Check inflammatory markers- SED rate 4, CRP  pending  - hold abx for now as pt is afebrile, has no leukocytosis, no tachycardia. May need further evaluation if symptoms persist to rule out other etiologies     4. Paroxysmal A. Fib  Chronic anticoagulation  -continue Eliquis. Will need to clarify with son when he is available whether she has frequent falls and whether the Eliquis can be held  -Hold amiodarone as above     5. Chronic combined heart failure  - Gentle IV fluid as above.    -08/31/21 ECHO with 38% EF, stage I diastolic dysfunction    6.  Hypokalemia  -replace and follow BMP     Electronically signed by FRANCES Lemos - CNP on 7/10/2022 at 9:15 AM

## 2022-07-11 ENCOUNTER — APPOINTMENT (OUTPATIENT)
Dept: CT IMAGING | Age: 87
DRG: 641 | End: 2022-07-11
Payer: MEDICARE

## 2022-07-11 LAB
ANION GAP SERPL CALCULATED.3IONS-SCNC: 12 MMOL/L (ref 7–16)
BASOPHILS ABSOLUTE: 0.04 E9/L (ref 0–0.2)
BASOPHILS RELATIVE PERCENT: 0.9 % (ref 0–2)
BUN BLDV-MCNC: 11 MG/DL (ref 6–23)
CALCIUM SERPL-MCNC: 9.2 MG/DL (ref 8.6–10.2)
CHLORIDE BLD-SCNC: 106 MMOL/L (ref 98–107)
CO2: 23 MMOL/L (ref 22–29)
CREAT SERPL-MCNC: 0.8 MG/DL (ref 0.5–1)
EOSINOPHILS ABSOLUTE: 0.04 E9/L (ref 0.05–0.5)
EOSINOPHILS RELATIVE PERCENT: 0.9 % (ref 0–6)
GFR AFRICAN AMERICAN: >60
GFR NON-AFRICAN AMERICAN: >60 ML/MIN/1.73
GLUCOSE BLD-MCNC: 86 MG/DL (ref 74–99)
HCT VFR BLD CALC: 45.1 % (ref 34–48)
HEMOGLOBIN: 14.4 G/DL (ref 11.5–15.5)
IMMATURE GRANULOCYTES #: 0.01 E9/L
IMMATURE GRANULOCYTES %: 0.2 % (ref 0–5)
LYMPHOCYTES ABSOLUTE: 1.79 E9/L (ref 1.5–4)
LYMPHOCYTES RELATIVE PERCENT: 39.3 % (ref 20–42)
MCH RBC QN AUTO: 29.8 PG (ref 26–35)
MCHC RBC AUTO-ENTMCNC: 31.9 % (ref 32–34.5)
MCV RBC AUTO: 93.2 FL (ref 80–99.9)
METER GLUCOSE: 77 MG/DL (ref 74–99)
METER GLUCOSE: 83 MG/DL (ref 74–99)
METER GLUCOSE: 86 MG/DL (ref 74–99)
MONOCYTES ABSOLUTE: 0.54 E9/L (ref 0.1–0.95)
MONOCYTES RELATIVE PERCENT: 11.9 % (ref 2–12)
NEUTROPHILS ABSOLUTE: 2.13 E9/L (ref 1.8–7.3)
NEUTROPHILS RELATIVE PERCENT: 46.8 % (ref 43–80)
PDW BLD-RTO: 14.9 FL (ref 11.5–15)
PLATELET # BLD: 162 E9/L (ref 130–450)
PMV BLD AUTO: 9.9 FL (ref 7–12)
POTASSIUM REFLEX MAGNESIUM: 4 MMOL/L (ref 3.5–5)
RBC # BLD: 4.84 E12/L (ref 3.5–5.5)
SODIUM BLD-SCNC: 141 MMOL/L (ref 132–146)
WBC # BLD: 4.6 E9/L (ref 4.5–11.5)

## 2022-07-11 PROCEDURE — APPSS30 APP SPLIT SHARED TIME 16-30 MINUTES: Performed by: NURSE PRACTITIONER

## 2022-07-11 PROCEDURE — 82962 GLUCOSE BLOOD TEST: CPT

## 2022-07-11 PROCEDURE — G0378 HOSPITAL OBSERVATION PER HR: HCPCS

## 2022-07-11 PROCEDURE — 6360000004 HC RX CONTRAST MEDICATION: Performed by: RADIOLOGY

## 2022-07-11 PROCEDURE — 2580000003 HC RX 258: Performed by: INTERNAL MEDICINE

## 2022-07-11 PROCEDURE — 6360000002 HC RX W HCPCS: Performed by: INTERNAL MEDICINE

## 2022-07-11 PROCEDURE — 80048 BASIC METABOLIC PNL TOTAL CA: CPT

## 2022-07-11 PROCEDURE — 1200000000 HC SEMI PRIVATE

## 2022-07-11 PROCEDURE — 85025 COMPLETE CBC W/AUTO DIFF WBC: CPT

## 2022-07-11 PROCEDURE — 36415 COLL VENOUS BLD VENIPUNCTURE: CPT

## 2022-07-11 PROCEDURE — 6370000000 HC RX 637 (ALT 250 FOR IP): Performed by: NURSE PRACTITIONER

## 2022-07-11 PROCEDURE — 74177 CT ABD & PELVIS W/CONTRAST: CPT

## 2022-07-11 PROCEDURE — 6370000000 HC RX 637 (ALT 250 FOR IP): Performed by: INTERNAL MEDICINE

## 2022-07-11 PROCEDURE — 99226 PR SBSQ OBSERVATION CARE/DAY 35 MINUTES: CPT | Performed by: INTERNAL MEDICINE

## 2022-07-11 RX ORDER — SODIUM CHLORIDE 9 MG/ML
INJECTION, SOLUTION INTRAVENOUS CONTINUOUS
Status: DISCONTINUED | OUTPATIENT
Start: 2022-07-11 | End: 2022-07-12

## 2022-07-11 RX ADMIN — Medication 10 ML: at 21:40

## 2022-07-11 RX ADMIN — ASPIRIN 81 MG: 81 TABLET, COATED ORAL at 10:12

## 2022-07-11 RX ADMIN — POTASSIUM CHLORIDE 40 MEQ: 1500 TABLET, EXTENDED RELEASE ORAL at 10:13

## 2022-07-11 RX ADMIN — LOSARTAN POTASSIUM 50 MG: 50 TABLET, FILM COATED ORAL at 10:11

## 2022-07-11 RX ADMIN — POLYETHYLENE GLYCOL 3350 17 G: 17 POWDER, FOR SOLUTION ORAL at 10:12

## 2022-07-11 RX ADMIN — SODIUM CHLORIDE: 9 INJECTION, SOLUTION INTRAVENOUS at 15:57

## 2022-07-11 RX ADMIN — Medication 10 ML: at 10:12

## 2022-07-11 RX ADMIN — APIXABAN 2.5 MG: 5 TABLET, FILM COATED ORAL at 21:39

## 2022-07-11 RX ADMIN — APIXABAN 2.5 MG: 5 TABLET, FILM COATED ORAL at 10:12

## 2022-07-11 RX ADMIN — IOHEXOL 50 ML: 240 INJECTION, SOLUTION INTRATHECAL; INTRAVASCULAR; INTRAVENOUS; ORAL at 20:48

## 2022-07-11 RX ADMIN — LOSARTAN POTASSIUM 50 MG: 50 TABLET, FILM COATED ORAL at 21:38

## 2022-07-11 RX ADMIN — PIPERACILLIN SODIUM AND TAZOBACTAM SODIUM 4500 MG: 4; .5 INJECTION, POWDER, LYOPHILIZED, FOR SOLUTION INTRAVENOUS at 16:00

## 2022-07-11 RX ADMIN — IOPAMIDOL 50 ML: 755 INJECTION, SOLUTION INTRAVENOUS at 20:48

## 2022-07-11 NOTE — DISCHARGE INSTR - COC
Continuity of Care Form    Patient Name: Samm Harper   :  1931  MRN:  [id number]    Admit date:  2022  Discharge date:  ***    Code Status Order: DNR-CCA   Advance Directives:      Admitting Physician:  Ceci Collazo MD  PCP: Jie Deshpande MD    Discharging Nurse: [facility] Unit/Room#: 8727/4781-24  Discharging Unit Phone Number: ***    Emergency Contact:   Extended Emergency Contact Information  Primary Emergency Contact: [address] Phone: [phone]  Mobile Phone: [phone]  Relation: Child   needed? No  Secondary Emergency Contact: Kenneth Kidd Phone: [phone]  Mobile Phone: [phone]  Relation: Child   needed? No    Past Surgical History:  Past Surgical History:   Procedure Laterality Date    ANKLE FRACTURE SURGERY      CARDIAC CATHETERIZATION  2019    Dr. Sameer Gavin, COLON, DIAGNOSTIC      FRACTURE SURGERY         Immunization History: There is no immunization history for the selected administration types on file for this patient.     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    Abnormal urinalysis R82.90    Orthostatic hypotension I95.1    Coronary artery disease involving native coronary artery of native heart without angina pectoris I25.10    Urinary tract infection without hematuria N39.0       Isolation/Infection:   Isolation            No Isolation          Patient Infection Status       Infection Onset Added Last Indicated Last Indicated By Review Planned Expiration Resolved Resolved By None active    Resolved    COVID-19 21 Respiratory Panel, Molecular, with COVID-19 (Restricted: peds pts or suitable admitted adults)   21     COVID-19 (Rule Out) 21 COVID-19, Rapid (Ordered)   21 Rule-Out Test Resulted            Nurse Assessment:  Last Vital Signs: BP (!) 169/111   Pulse 71   Temp (!) 78.9 °F (26.1 °C) (Infrared)   Resp 20   SpO2 98%     Last documented pain score (0-10 scale):    Last Weight:   Wt Readings from Last 1 Encounters:   22 124 lb (56.2 kg)     Mental Status:  {IP PT MENTAL STATUS:}    IV Access:  { SHERRILL IV ACCESS:789853003}    Nursing Mobility/ADLs:  Walking   {CHP DME FZWL:317202846}  Transfer  {P DME JTTC:579551827}  Bathing  {P DME XLCM:523253661}  Dressing  {CHP DME TMIB:514773600}  Toileting  {CHP DME DDJJ:159114409}  Feeding  {P DME AJNC:002322968}  Med Admin  {P DME IPYW:668989922}  Med Delivery   { SHERRILL MED Delivery:996035380}    Wound Care Documentation and Therapy:  Wound 19 Ankle Right;Lateral #1 acquired 19 (Active)   Number of days: 6550       Wound Ankle Right;Lateral (Active)   Number of days:         Elimination:  Continence: Bowel: {YES / ZR:97764}  Bladder: {YES / FS:59132}  Urinary Catheter: {Urinary Catheter:421745078}   Colostomy/Ileostomy/Ileal Conduit: {YES / BC:88844}       Date of Last BM: ***    Intake/Output Summary (Last 24 hours) at 2022 0816  Last data filed at 7/10/2022 2235  Gross per 24 hour   Intake 797.18 ml   Output --   Net 797.18 ml     I/O last 3 completed shifts:   In: 922.2 [P.O.:365; I.V.:557.2]  Out: -     Safety Concerns:     [address] SHERRILL Safety Concerns:173028093}    Impairments/Disabilities:      [address] Impairments/Disabilities:712030730}    Nutrition Therapy:  Current Nutrition Therapy:   [address] SHERRILL Diet List:669116271}    Routes of Feeding: {CHP DME Other Feedings:576486939}  Liquids: {Slp liquid thickness:88191}  Daily Fluid Restriction: {CHP DME Yes amt example:530743638}  Last Modified Barium Swallow with Video (Video Swallowing Test): {Done Not Done SGES:156501821}    Treatments at the Time of Hospital Discharge:   Respiratory Treatments: ***  Oxygen Therapy:  {Therapy; copd oxygen:27985}  Ventilator:    { CC Vent HOBO:290754078}    Rehab Therapies: {THERAPEUTIC INTERVENTION:4170083769}  Weight Bearing Status/Restrictions: {[facility] Weight Bearin}  Other Medical Equipment (for information only, NOT a DME order):  {EQUIPMENT:335043580}  Other Treatments: ***    Patient's personal belongings (please select all that are sent with patient):  {P DME Belongings:913414104}    RN SIGNATURE:  {Esignature:903986216}    CASE MANAGEMENT/SOCIAL WORK SECTION    Inpatient Status Date: ***    Readmission Risk Assessment Score:  Readmission Risk              Risk of Unplanned Readmission:  0           Discharging to Facility/ Agency   Name:   Address:  Phone:  Fax:    Dialysis Facility (if applicable)   Name:  Address:  Dialysis Schedule:  Phone:  Fax:    / signature: {Esignature:396385648}    PHYSICIAN SECTION    Prognosis: {Prognosis:8568295711}    Condition at Discharge: [address] [place] [address] Patient Condition:214163498}    Rehab Potential (if transferring to Rehab): {Prognosis:3942409820}    Recommended Labs or Other Treatments After Discharge: ***    Physician Certification: I certify the above information and transfer of Evelina Memos  is necessary for the continuing treatment of the diagnosis listed and that she requires {Admit to Appropriate Level of Care:80402} for {GREATER/LESS:742940109} 30 days. Update Admission H&P: {CHP DME Changes in DVXWT:766586375}    PHYSICIAN SIGNATURE:  {Esignature:072870634}                         HEART FAILURE  / CONGESTIVE HEART FAILURE  DISCHARGE INSTRUCTIONS:  GUIDELINES TO FOLLOW AT HOME    No future appointments.     Self- Managed Care:     MEDICATIONS:  Take your medication as directed. If you are experiencing any side effects, inform your doctor, Do not stop taking any of your medications without letting your doctor know. Check with your doctor before taking any over-the-counter medications / herbal / or dietary supplements. They may interfere with your other medications. Do not take ibuprofen (Advil or Motrin) and naproxen (Aleve) without talking to your doctor first. They could make your heart failure worse. WEIGHT MONITORING:   Weigh yourself everyday (with the same scale) around the same time of the day and write it down. (you can chart them on a calendar or keep track of them on paper. Notify your doctor of a weight gain of 3 pounds or more in 1 day   OR a total of 5 pounds or more in 1 week    Take your weight record to your doctor visits  Also, the same goes if you loose more than 3# in one day, let your heart doctor know. DIET:   Cardiac heart healthy diet- Low saturated / low trans fat, no added salt, caffeine restricted, Low sodium diet-   No more than 2,000mg (2 grams) of salt / sodium per day (which equals to a little less than  a teaspoon of salt)  If your doctor wants you on a fluid restriction. ..it is usually recommended a fluid limit of 2,000cc -  Fluid restriction- 2,000 ml (milliliters) = 64 ounces = you can have 8 glasses of fluid per day (each glass 8 ounces)    Follow a low salt diet - avoid using salt at the table, avoid / limit use of canned soups, processed / packaged foods, salted snacks, olives and pickles. Do not use a salt substitute without checking with your doctor, they may contain a high amount of potassioum. (Mrs. Ada Uribe is safe to use).     Limit the use of alcohol       CALL YOUR DOCTOR THE FIRST DAY YOU NOTICE ANY OF THESE   SYMPTOMS:  You have a weight gain of 3 pounds or more in 1 day         OR 5 pounds or more in one week  More shortness of breath  More swelling of your stomach, legs, ankles or feet  Feeling more tired, No energy  Dry hacky cough  Dizziness  More chest pain / discomfort       (CALL 918 IF ANY OF THE FOLLOWING OCCURS  Chest pain (not relieved with nitroglycerine, if you have been prescribed this medication)  Severe shortness of breath  Faint / Pass out  Confusion / cannot think clearly  If symptoms get worse           SMOKING - TOBACCO USE:  * IF YOU SMOKE - STOP! Kick the habit. [address] Program is offered at [facility] and [address] [facility]. Call [phone] extension [phone] for more information. ACTIVITY:   (Ask your doctor when you will be able to return to work and before starting any exercise program.  Do not drive unless unless your doctor has given you permission to do so). Start light exercise. Even if you can only do a small amount, exercise will help you get stronger, have more energy, help manage your weight and decrease  stress. Walking is an easy way to get exercise. Start out slowly and  increase the amount you walk as tolerated  If you become short of breath, dizzy or have chest pain; stop, sit down, and rest.  If you feel \"wiped out\" the day after you exercise, walk at a slower pace or for a shorter distance. You can gradually increase the pace or amount of time. (Do not exercise right after a meal or in extreme temperatures, such as above 85 degrees, if the air is really humid, or wind chill is less than 20 degrees)                                             ADDITIONAL INFORMATION:  Avoid getting sick from colds and the flu. Stay away from friends or family that you know may have a contagious illness  Get plenty of rest   Get a flu shot each year. Get a pneumococcal vaccine shot. If you have had one before, ask your doctor whether you need another dose. My Goal for Self-management of Heart Failure Includes 5 steps :    1.  Notice a change in symptoms ( weight gain, short of breath, leg swelling, decreased activity level, bloating. ...)    2. Evaluate the change: (use the Heart Failure Zones )     3. Decide to take action: decide what your options are, such as: (call your doctor for an extra visit, take a prescribed medication, such as your water pill if your doctor has given you directions to do so, [address])    4. Come up with a strategy:  (now you call the doctor for advice / appointment. This is where you take action!!! Do not wait, catch the symptom early and treat it before it worsens. 5. Evaluate the response: The next day, check your Heart Failure Zones: are you in the GREEN ZONE (safe zone)? Worsening symptoms of YELLOW ZONE? Or have you moved to the RED ZONE and need to call 911 or go to the Emergency Room for evaluation? Call your doctor's office to update them on your symptoms of heart failure. Learning About Heart Failure Zones  What are heart failure zones? Heart failure zones give you an easy way to see changes in your heart failure symptoms. They also tell you when you need to get help. Check every day to see which zone you are in. Green zone. You are doing well. This is where you want to be. Your weight is stable. It's not going up or down. You breathe easily. You are sleeping well. You are able to lie flat without shortness of breath. You can do your usual activities. Yellow zone. Be careful. Your symptoms are changing. Call your doctor. You have new or increased shortness of breath. You are dizzy or lightheaded, or you feel like you may faint. You have sudden weight gain, such as more than 2 to 3 pounds in a day or 5 pounds in a week. (Your doctor may suggest a different range of weight gain.)  You have increased swelling in your legs, ankles, or feet. You are so tired or weak that you can't do your usual activities. You are not sleeping well. Shortness of breath wakes you up at night. You need extra pillows. Red zone.  911  This is an emergency. Call . You have symptoms of sudden heart failure. For example: You have severe trouble breathing. You cough up pink, foamy mucus. You have a new irregular or fast heartbeat. You have symptoms of a heart attack. These may include:  Chest pain or pressure, or a strange feeling in the chest.  Sweating. Shortness of breath. Nausea or vomiting. Pain, pressure, or a strange feeling in the back, neck, jaw, or upper belly or in one or both shoulders or arms. Lightheadedness or sudden weakness. A fast or irregular heartbeat. If you have symptoms of a heart attack 911 : After you call , the  may tell you to chew 1 adult-strength or 2 to 4 low-dose aspirin. Wait for an ambulance. Do not try to drive yourself. Follow-up care is a key part of your treatment and safety. Be sure to make and go to all appointments, and call your doctor if you are having problems. It's also a good idea to know your test results and keep a list of the medicines you take. Where can you learn more? Go to https://[employer]. org and sign in to your [employer] account. Enter T174 in the [facility] box to learn more about \"Learning About Heart Failure Zones. \"     If you do not have an account, please click on the \"Sign Up Now\" link. Current as of: August 31, 2020               Content Version: 12.9  © 1212-4109 Healthwise, [facility]. Care instructions adapted under license by [facility] ([facility]). If you have questions about a medical condition or this instruction, always ask your healthcare professional. [place] [address] any warranty or liability for your use of this information.

## 2022-07-11 NOTE — CARE COORDINATION
7/11/2022 [address] [place] [address] note: No covid testing. Met with patient for transition of care needs. Pt [place] and gave CM permission to call her dtr Josie Malik. Pt resides in a 1 story home,2 step entry with her son Haile Castellano. Pt's family assist her with ADLs. DME include: bsc, ww,cane, shower chair. PCP is Dr Mary Leigh and uses [facility]. [facility]. Hx: [address] [place] [address]. Plan is for pt to return home with her son at d/c. Await PT/OT olaf to assist with transition of care needs.  Ehsan DAMON

## 2022-07-11 NOTE — ACP (ADVANCE CARE PLANNING)
Advance Care Planning   Healthcare Decision Maker:    Primary Decision Maker: Bernadine Salvador - Child - [phone]    Secondary Decision Maker: Elida Tatum - Child - [phone]    Click here to complete Healthcare Decision Makers including selection of the Healthcare Decision Maker Relationship (ie \"Primary\").

## 2022-07-11 NOTE — PROGRESS NOTES
[address] [place], [place] [address]ist   Progress Note    Admitting Date and Time: 7/9/2022  3:16 PM  Admit Dx: Syncope and collapse [R55]    Subjective:    Patient seen and examined. Sleeping, but easily awakened. States she did not sleep well last night. Denies pain. ROS:   Denies CP, SOB, subjective fever, chills, N/V/D, abdominal pain,  HA. All systems reviewed and negative unless otherwise documented.       potassium chloride  40 mEq Oral Daily    apixaban  2.5 mg Oral BID    aspirin  81 mg Oral Daily    sodium chloride flush  10 mL IntraVENous 2 times per day    losartan  50 mg Oral BID     sodium chloride flush, 10 mL, PRN  sodium chloride, , PRN  polyethylene glycol, 17 g, Daily PRN  acetaminophen, 650 mg, Q6H PRN   Or  acetaminophen, 650 mg, Q6H PRN         Objective:    BP (!) 169/111   Pulse 71   Temp (!) 78.9 °F (26.1 °C) (Infrared)   Resp 20   SpO2 98%   General Appearance: alert and oriented to person, place and time and in no acute distress  Skin: warm and dry  Head: normocephalic and atraumatic  Eyes: pupils equal, round, and reactive to light, extraocular eye movements intact, conjunctivae normal  Neck: neck supple and non tender without mass   Pulmonary/Chest: clear to auscultation bilaterally- no wheezes, rales or rhonchi, normal air movement, no respiratory distress  Cardiovascular: normal rate, normal S1 and S2 and no carotid bruits  Abdomen: soft, non-tender, non-distended, normal bowel sounds, no masses or organomegaly  Extremities: no cyanosis, no clubbing and no edema  Neurologic: no cranial nerve deficit and speech normal      Recent Labs     07/09/22  1603 07/10/22  0502 07/11/22  0654    141 141   K 3.6 3.4* 4.0   * 109* 106   CO2 24 20* 23   BUN 9 7 11   CREATININE 0.9 0.7 0.8   GLUCOSE 90 129* 86   CALCIUM 8.5* 9.4 9.2       Recent Labs     07/09/22  1603   ALKPHOS 75   PROT 6.1*   LABALBU 3.5   BILITOT 0.5   AST 22   ALT 13       Recent Labs sulci due to global parenchymal volume loss. There are nonspecific areas of hypoattenuation within the periventricular and subcortical white matter, which likely represent chronic microvascular ischemic change. ORBITS: The visualized portion of the orbits demonstrate no acute abnormality. SINUSES: The visualized paranasal sinuses and mastoid air cells demonstrate no acute abnormality. SOFT TISSUES/SKULL: No acute abnormality of the visualized skull or soft tissues. No acute intracranial abnormality. No significant change in diffuse volume loss and chronic small vessel ischemic white matter disease. CT Cervical Spine WO Contrast    Result Date: 7/9/2022  EXAMINATION: CT OF THE CERVICAL SPINE WITHOUT CONTRAST 7/9/2022 3:21 pm TECHNIQUE: CT of the cervical spine was performed without the administration of intravenous contrast. Multiplanar reformatted images are provided for review. Automated exposure control, iterative reconstruction, and/or weight based adjustment of the mA/kV was utilized to reduce the radiation dose to as low as reasonably achievable. Dose: Total Exam DLP: 525.81 mGy-cm. COMPARISON: 11/06/2021 HISTORY: ORDERING SYSTEM PROVIDED HISTORY: fall TECHNOLOGIST PROVIDED HISTORY: Reason for exam:->fall Decision Support Exception - unselect if not a suspected or confirmed emergency medical condition->Emergency Medical Condition (MA) FINDINGS: BONES/ALIGNMENT: There is no acute fracture or traumatic malalignment. DEGENERATIVE CHANGES: Moderate-severe multilevel degenerative endplate change and facet arthropathy is stable compared to the prior exam including disc height loss at C4-5, C5-6 and C6-7. No significant canal stenosis is seen. SOFT TISSUES: There is no prevertebral soft tissue swelling. No acute abnormality of the cervical spine. Stable moderate-severe cervical spondylosis.      CT ABDOMEN PELVIS W IV CONTRAST Additional Contrast? None    Result Date: 7/9/2022  EXAMINATION: CT OF THE ABDOMEN AND PELVIS WITH CONTRAST 7/9/2022 5:21 pm TECHNIQUE: CT of the abdomen and pelvis was performed with the administration of intravenous contrast. Multiplanar reformatted images are provided for review. Automated exposure control, iterative reconstruction, and/or weight based adjustment of the mA/kV was utilized to reduce the radiation dose to as low as reasonably achievable. COMPARISON: 10/22/2021 HISTORY: ORDERING SYSTEM PROVIDED HISTORY: abd pain TECHNOLOGIST PROVIDED HISTORY: Additional Contrast?->None Reason for exam:->abd pain Decision Support Exception - unselect if not a suspected or confirmed emergency medical condition->Emergency Medical Condition (MA) FINDINGS: Lower Chest: There is cardiomegaly. There is mild bilateral dependent atelectasis. Organs: The liver is not cirrhotic in configuration. Cystic lesion again noted within the medial hepatic segment. The gallbladder, spleen, pancreas, adrenals, and kidneys are unremarkable. GI/Bowel: There is wall thickening and inflammatory stranding of the colon, particularly the cecum/ascending colon as well as the splenic flexure. There is diverticulosis. No evidence of obstruction. Pelvis: The urinary bladder is partially filled. The uterus is unremarkable. Peritoneum/Retroperitoneum: No evidence of ascites or free air. No evidence of retroperitoneal lymphadenopathy. Aorta is normal in caliber without acute abnormality. Bones/Soft Tissues:  No acute abnormality of the visualized osseous structures. There is diffuse decreased bone density. Wall thickening and inflammatory stranding involving the cecum/ascending colon as well as the splenic flexure, concerning for colitis. Additional findings as above.      XR CHEST PORTABLE    Result Date: 7/9/2022  EXAMINATION: ONE XRAY VIEW OF THE CHEST 7/9/2022 4:01 pm COMPARISON: 03/21/2022 HISTORY: ORDERING SYSTEM PROVIDED HISTORY: fall TECHNOLOGIST PROVIDED HISTORY: Reason for exam:->fall FINDINGS: The lungs are without acute focal process. There is no effusion or pneumothorax. Cardiomegaly is noted. The osseous structures are without acute process. Osseous structures appear unremarkable. No acute process. Mild cardiomegaly. EKG:        Assessment:  Principal Problem:    Syncope and collapse  Resolved Problems:    * No resolved hospital problems. *      Plan:  1. Syncope  -orthostatic hypotension x2 readings.  -Ortho BP's q shift  - CT head on 7/9 negative for acute abnormality  - PT/OT. She has declined placement in the past  -NS @75ml/hr x12hrs     2. Prolonged QTC  -Unclear if she is still on amiodarone. Hold for now  - QTc 517 on 7/9,  470 on 7/10. Repeat EKG in am     3. Ascending colitis  - CT abd 7/9: Wall thickening and inflammatory stranding involving the cecum/ascending colon as well as the splenic flexure, concerning for colitis. -CRP 0.3 and ESR 4 on 7/10  - hold abx for now as pt is afebrile, has no leukocytosis, no tachycardia. May need further evaluation if symptoms persist to rule out other etiologies.  -Complained of ongoing abd pain to attending physician. Started on Zosyn. Repeat CT abd     4. Paroxysmal A. Fib  Chronic anticoagulation  -continue Eliquis. Will need to clarify with son when he is available whether she has frequent falls and whether the Eliquis can be held  -Home amiodarone held     5. Chronic combined heart failure:  -08/31/21 ECHO with 38% EF, stage I diastolic dysfunction      6.  Hypokalemia  -Currently resolved, potassium 4.0 this morning.   -Receiving potassium chloride daily  -BMP in am     Electronically signed by FRANCES Fitzgerald - NP on 7/11/2022 at 9:26 AM

## 2022-07-11 NOTE — PROGRESS NOTES
Pt had an episode of dizziness and was in out of lethargy while sitting on bedside commode. Ortho BP/HR: Marthena Lev 974/43-78, Sitting 146/95-62. Pt too weak to obtain standing BP.

## 2022-07-12 LAB
ANION GAP SERPL CALCULATED.3IONS-SCNC: 11 MMOL/L (ref 7–16)
BASOPHILS ABSOLUTE: 0.05 E9/L (ref 0–0.2)
BASOPHILS RELATIVE PERCENT: 1.2 % (ref 0–2)
BUN BLDV-MCNC: 11 MG/DL (ref 6–23)
CALCIUM SERPL-MCNC: 9.2 MG/DL (ref 8.6–10.2)
CHLORIDE BLD-SCNC: 108 MMOL/L (ref 98–107)
CO2: 20 MMOL/L (ref 22–29)
CREAT SERPL-MCNC: 0.8 MG/DL (ref 0.5–1)
EOSINOPHILS ABSOLUTE: 0.06 E9/L (ref 0.05–0.5)
EOSINOPHILS RELATIVE PERCENT: 1.4 % (ref 0–6)
GFR AFRICAN AMERICAN: >60
GFR NON-AFRICAN AMERICAN: >60 ML/MIN/1.73
GLUCOSE BLD-MCNC: 85 MG/DL (ref 74–99)
HCT VFR BLD CALC: 48 % (ref 34–48)
HEMOGLOBIN: 15.2 G/DL (ref 11.5–15.5)
IMMATURE GRANULOCYTES #: 0.01 E9/L
IMMATURE GRANULOCYTES %: 0.2 % (ref 0–5)
LYMPHOCYTES ABSOLUTE: 1.54 E9/L (ref 1.5–4)
LYMPHOCYTES RELATIVE PERCENT: 37.2 % (ref 20–42)
MAGNESIUM: 1.7 MG/DL (ref 1.6–2.6)
MCH RBC QN AUTO: 29.9 PG (ref 26–35)
MCHC RBC AUTO-ENTMCNC: 31.7 % (ref 32–34.5)
MCV RBC AUTO: 94.3 FL (ref 80–99.9)
MONOCYTES ABSOLUTE: 0.41 E9/L (ref 0.1–0.95)
MONOCYTES RELATIVE PERCENT: 9.9 % (ref 2–12)
NEUTROPHILS ABSOLUTE: 2.07 E9/L (ref 1.8–7.3)
NEUTROPHILS RELATIVE PERCENT: 50.1 % (ref 43–80)
PDW BLD-RTO: 14.9 FL (ref 11.5–15)
PLATELET # BLD: 162 E9/L (ref 130–450)
PMV BLD AUTO: 9.8 FL (ref 7–12)
POTASSIUM REFLEX MAGNESIUM: 4.3 MMOL/L (ref 3.5–5)
RBC # BLD: 5.09 E12/L (ref 3.5–5.5)
SODIUM BLD-SCNC: 139 MMOL/L (ref 132–146)
WBC # BLD: 4.1 E9/L (ref 4.5–11.5)

## 2022-07-12 PROCEDURE — 80048 BASIC METABOLIC PNL TOTAL CA: CPT

## 2022-07-12 PROCEDURE — 97116 GAIT TRAINING THERAPY: CPT | Performed by: PHYSICAL THERAPIST

## 2022-07-12 PROCEDURE — 2580000003 HC RX 258: Performed by: INTERNAL MEDICINE

## 2022-07-12 PROCEDURE — G0378 HOSPITAL OBSERVATION PER HR: HCPCS

## 2022-07-12 PROCEDURE — 97161 PT EVAL LOW COMPLEX 20 MIN: CPT | Performed by: PHYSICAL THERAPIST

## 2022-07-12 PROCEDURE — APPSS30 APP SPLIT SHARED TIME 16-30 MINUTES: Performed by: NURSE PRACTITIONER

## 2022-07-12 PROCEDURE — 6370000000 HC RX 637 (ALT 250 FOR IP): Performed by: NURSE PRACTITIONER

## 2022-07-12 PROCEDURE — 6360000002 HC RX W HCPCS: Performed by: INTERNAL MEDICINE

## 2022-07-12 PROCEDURE — 1200000000 HC SEMI PRIVATE

## 2022-07-12 PROCEDURE — 99225 PR SBSQ OBSERVATION CARE/DAY 25 MINUTES: CPT | Performed by: INTERNAL MEDICINE

## 2022-07-12 PROCEDURE — 6370000000 HC RX 637 (ALT 250 FOR IP): Performed by: INTERNAL MEDICINE

## 2022-07-12 PROCEDURE — 83735 ASSAY OF MAGNESIUM: CPT

## 2022-07-12 PROCEDURE — 36415 COLL VENOUS BLD VENIPUNCTURE: CPT

## 2022-07-12 PROCEDURE — 85025 COMPLETE CBC W/AUTO DIFF WBC: CPT

## 2022-07-12 RX ORDER — PANTOPRAZOLE SODIUM 40 MG/1
40 TABLET, DELAYED RELEASE ORAL
Status: DISCONTINUED | OUTPATIENT
Start: 2022-07-13 | End: 2022-07-18 | Stop reason: HOSPADM

## 2022-07-12 RX ORDER — AMIODARONE HYDROCHLORIDE 200 MG/1
200 TABLET ORAL EVERY OTHER DAY
Status: DISCONTINUED | OUTPATIENT
Start: 2022-07-12 | End: 2022-07-12

## 2022-07-12 RX ADMIN — APIXABAN 2.5 MG: 5 TABLET, FILM COATED ORAL at 22:03

## 2022-07-12 RX ADMIN — ACETAMINOPHEN 325MG 650 MG: 325 TABLET ORAL at 05:59

## 2022-07-12 RX ADMIN — Medication 10 ML: at 08:22

## 2022-07-12 RX ADMIN — Medication 10 ML: at 22:04

## 2022-07-12 RX ADMIN — PIPERACILLIN AND TAZOBACTAM 3375 MG: 3; .375 INJECTION, POWDER, LYOPHILIZED, FOR SOLUTION INTRAVENOUS at 08:55

## 2022-07-12 RX ADMIN — SODIUM CHLORIDE: 9 INJECTION, SOLUTION INTRAVENOUS at 08:23

## 2022-07-12 RX ADMIN — APIXABAN 2.5 MG: 5 TABLET, FILM COATED ORAL at 08:20

## 2022-07-12 RX ADMIN — LOSARTAN POTASSIUM 50 MG: 50 TABLET, FILM COATED ORAL at 22:03

## 2022-07-12 RX ADMIN — POTASSIUM CHLORIDE 40 MEQ: 1500 TABLET, EXTENDED RELEASE ORAL at 08:19

## 2022-07-12 RX ADMIN — ASPIRIN 81 MG: 81 TABLET, COATED ORAL at 08:20

## 2022-07-12 RX ADMIN — PIPERACILLIN AND TAZOBACTAM 3375 MG: 3; .375 INJECTION, POWDER, LYOPHILIZED, FOR SOLUTION INTRAVENOUS at 00:23

## 2022-07-12 RX ADMIN — LOSARTAN POTASSIUM 50 MG: 50 TABLET, FILM COATED ORAL at 08:19

## 2022-07-12 NOTE — PROGRESS NOTES
[address] [place], [place] [address]ist   Progress Note    Admitting Date and Time: 7/9/2022  3:16 PM  Admit Dx: Syncope and collapse [R55]    Subjective:    Patient seen and examined. Sleeping, but easily awakened. States was able to get some sleep last night. Denies pain. Ate most of her meals today. Believes she had a BM earlier this morning. ROS:   Denies CP, SOB, subjective fever, chills, N/V/D, abdominal pain,  HA. All systems reviewed and negative unless otherwise documented.       [START ON 7/13/2022] pantoprazole  40 mg Oral QAM AC    apixaban  2.5 mg Oral BID    aspirin  81 mg Oral Daily    sodium chloride flush  10 mL IntraVENous 2 times per day    losartan  50 mg Oral BID     sodium chloride flush, 10 mL, PRN  sodium chloride, , PRN  polyethylene glycol, 17 g, Daily PRN  acetaminophen, 650 mg, Q6H PRN   Or  acetaminophen, 650 mg, Q6H PRN         Objective:    BP (!) 152/88   Pulse 65   Temp 98.1 °F (36.7 °C) (Infrared)   Resp 18   Ht 5' 7\" (1.702 m)   Wt 130 lb 11.2 oz (59.3 kg)   SpO2 99%   BMI 20.47 kg/m²   General Appearance: alert and oriented to person, place and time and in no acute distress  Skin: warm and dry  Head: normocephalic and atraumatic  Eyes: pupils equal, round, and reactive to light, extraocular eye movements intact, conjunctivae normal  Neck: neck supple and non tender without mass   Pulmonary/Chest: clear to auscultation bilaterally- no wheezes, rales or rhonchi, normal air movement, no respiratory distress  Cardiovascular: normal rate, normal S1 and S2 and no carotid bruits  Abdomen: soft, non-tender, non-distended, normal bowel sounds, no masses or organomegaly  Extremities: no cyanosis, no clubbing and no edema  Neurologic: no cranial nerve deficit and speech normal      Recent Labs     07/10/22  0502 07/11/22  0654 07/12/22  0659    141 139   K 3.4* 4.0 4.3   * 106 108*   CO2 20* 23 20*   BUN 7 11 11   CREATININE 0.7 0.8 0.8   GLUCOSE 129* 86 85 CALCIUM 9.4 9.2 9.2       No results for input(s): ALKPHOS, PROT, LABALBU, BILITOT, AST, ALT in the last 72 hours. Recent Labs     07/10/22  0502 07/11/22  0654 07/12/22  0659   WBC 5.5 4.6 4.1*   RBC 4.67 4.84 5.09   HGB 13.8 14.4 15.2   HCT 41.9 45.1 48.0   MCV 89.7 93.2 94.3   MCH 29.6 29.8 29.9   MCHC 32.9 31.9* 31.7*   RDW 14.4 14.9 14.9    162 162   MPV 10.0 9.9 9.8         Radiology:   CT ABDOMEN PELVIS W IV CONTRAST Additional Contrast? Oral   Final Result   Interval resolution of previously seen colitis in the ascending colon. RECOMMENDATIONS:   Unavailable         CT Head WO Contrast   Final Result   No acute intracranial abnormality. 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 CT Cervical Spine WO Contrast   Final Result   No acute abnormality of the cervical spine. Stable moderate-severe cervical spondylosis. CT ABDOMEN PELVIS W IV CONTRAST Additional Contrast? None   Final Result   Wall thickening and inflammatory stranding involving the cecum/ascending   colon as well as the splenic flexure, concerning for colitis. Additional findings as above. XR CHEST PORTABLE   Final Result   No acute process. Mild cardiomegaly. CT Head WO Contrast    Result Date: 7/9/2022  EXAMINATION: CT OF THE HEAD WITHOUT CONTRAST  7/9/2022 3:21 pm TECHNIQUE: CT of the head was performed without the administration of intravenous contrast. Automated exposure control, iterative reconstruction, and/or weight based adjustment of the mA/kV was utilized to reduce the radiation dose to as low as reasonably achievable. Dose: Total Exam DLP: 959.84 mGy-cm. COMPARISON: 11/09/2021 HISTORY: ORDERING SYSTEM PROVIDED HISTORY: fall TECHNOLOGIST PROVIDED HISTORY: Reason for exam:->fall Has a \"code stroke\" or \"stroke alert\" been called? ->No Decision Support Exception - unselect if not a suspected or confirmed emergency medical condition->Emergency Medical Condition (MA) FINDINGS: BRAIN/VENTRICLES: There is no acute intracranial hemorrhage, mass effect or midline shift. No abnormal extra-axial fluid collection. The gray-white differentiation is maintained without evidence of an acute infarct. There is prominence of the ventricles and sulci due to global parenchymal volume loss. There are nonspecific areas of hypoattenuation within the periventricular and subcortical white matter, which likely represent chronic microvascular ischemic change. ORBITS: The visualized portion of the orbits demonstrate no acute abnormality. SINUSES: The visualized paranasal sinuses and mastoid air cells demonstrate no acute abnormality. SOFT TISSUES/SKULL: No acute abnormality of the visualized skull or soft tissues. No acute intracranial abnormality. No significant change in diffuse volume loss and chronic small vessel ischemic white matter disease. CT Cervical Spine WO Contrast    Result Date: 7/9/2022  EXAMINATION: CT OF THE CERVICAL SPINE WITHOUT CONTRAST 7/9/2022 3:21 pm TECHNIQUE: CT of the cervical spine was performed without the administration of intravenous contrast. Multiplanar reformatted images are provided for review. Automated exposure control, iterative reconstruction, and/or weight based adjustment of the mA/kV was utilized to reduce the radiation dose to as low as reasonably achievable. Dose: Total Exam DLP: 525.81 mGy-cm. COMPARISON: 11/06/2021 HISTORY: ORDERING SYSTEM PROVIDED HISTORY: fall TECHNOLOGIST PROVIDED HISTORY: Reason for exam:->fall Decision Support Exception - unselect if not a suspected or confirmed emergency medical condition->Emergency Medical Condition (MA) FINDINGS: BONES/ALIGNMENT: There is no acute fracture or traumatic malalignment.  DEGENERATIVE CHANGES: Moderate-severe multilevel degenerative endplate change and facet arthropathy is stable compared to the prior exam including disc height loss at C4-5, C5-6 and C6-7. No significant canal stenosis is seen. SOFT TISSUES: There is no prevertebral soft tissue swelling. No acute abnormality of the cervical spine. Stable moderate-severe cervical spondylosis. CT ABDOMEN PELVIS W IV CONTRAST Additional Contrast? None    Result Date: 7/9/2022  EXAMINATION: CT OF THE ABDOMEN AND PELVIS WITH CONTRAST 7/9/2022 5:21 pm TECHNIQUE: CT of the abdomen and pelvis was performed with the administration of intravenous contrast. Multiplanar reformatted images are provided for review. Automated exposure control, iterative reconstruction, and/or weight based adjustment of the mA/kV was utilized to reduce the radiation dose to as low as reasonably achievable. COMPARISON: 10/22/2021 HISTORY: ORDERING SYSTEM PROVIDED HISTORY: abd pain TECHNOLOGIST PROVIDED HISTORY: Additional Contrast?->None Reason for exam:->abd pain Decision Support Exception - unselect if not a suspected or confirmed emergency medical condition->Emergency Medical Condition (MA) FINDINGS: Lower Chest: There is cardiomegaly. There is mild bilateral dependent atelectasis. Organs: The liver is not cirrhotic in configuration. Cystic lesion again noted within the medial hepatic segment. The gallbladder, spleen, pancreas, adrenals, and kidneys are unremarkable. GI/Bowel: There is wall thickening and inflammatory stranding of the colon, particularly the cecum/ascending colon as well as the splenic flexure. There is diverticulosis. No evidence of obstruction. Pelvis: The urinary bladder is partially filled. The uterus is unremarkable. Peritoneum/Retroperitoneum: No evidence of ascites or free air. No evidence of retroperitoneal lymphadenopathy. Aorta is normal in caliber without acute abnormality. Bones/Soft Tissues:  No acute abnormality of the visualized osseous structures. There is diffuse decreased bone density.      Wall thickening and inflammatory stranding involving the cecum/ascending colon as well as the splenic flexure, concerning for colitis. Additional findings as above. XR CHEST PORTABLE    Result Date: 7/9/2022  EXAMINATION: ONE XRAY VIEW OF THE CHEST 7/9/2022 4:01 pm COMPARISON: 03/21/2022 HISTORY: ORDERING SYSTEM PROVIDED HISTORY: fall TECHNOLOGIST PROVIDED HISTORY: Reason for exam:->fall FINDINGS: The lungs are without acute focal process. There is no effusion or pneumothorax. Cardiomegaly is noted. The osseous structures are without acute process. Osseous structures appear unremarkable. No acute process. Mild cardiomegaly. EKG:        Assessment:  Principal Problem:    Syncope and collapse  Resolved Problems:    * No resolved hospital problems. *      Plan:  1. Syncope  -Ortho BP's negative today  -Continue ortho BP's q shift  - CT head on 7/9 negative for acute abnormality  - PT/OT. She has declined placement in the past    2. Prolonged QTC  -Unclear if she is still on amiodarone. Hold for now  - QTc 517 on 7/9,  470 on 7/10, 457 on 7/12    3. Ascending colitis  - resolved. -Repeat CT abd on7/11 reported findings: Interval resolution of previously seen colitis in the ascending colon.  -Zosyn discontinued    4. Paroxysmal A. Fib  Chronic anticoagulation  -continue Eliquis. Will need to clarify with son when he is available whether she has frequent falls and whether the Eliquis can be held  -Home amiodarone held     5. Chronic combined heart failure:  -08/31/21 ECHO with 38% EF, stage I diastolic dysfunction    6.  Hypokalemia  -Currently resolved, potassium 4.3 this morning.   -Receiving potassium chloride daily  -BMP in am     Electronically signed by FRANCES Duenas - NP on 7/12/2022 at 5:24 PM

## 2022-07-12 NOTE — PROGRESS NOTES
Physical Therapy    Physical Therapy Initial Evaluation/Plan of Care    Room #:  1906/5157-98  Patient Name: Evelina Kwon  YOB: 1931  MRN: [id number]    Date of Service: 7/12/2022     Tentative placement recommendation: Subacute vs Home Health Physical Therapy if patient meets goals    Equipment recommendation: Patient has needed equipment       Evaluating Physical Therapist: Maritza Sharpe PT  #[id number]      Specific Provider Orders/Date/Referring Provider :  07/11/22 1445   PT eval and treat Start: 07/11/22 1445, End: 07/11/22 1445, ONE TIME, Standing Count: 1 Occurrences, Shane Kirkland MD      Admitting Diagnosis:   Syncope and collapse [R55]    Admitted with  Syncope from standing with fall at home; occurred again yesterday with nursing  Surgery: none      Patient Active Problem List   Diagnosis    Systolic hypertension    Hypertension, essential, benign    Paroxysmal atrial fibrillation (HCC)    Bradycardia    Chronic anticoagulation--Eliquis    Syncope and collapse    Moderate protein-calorie malnutrition ([address]    Chest pain    Chronic combined systolic (congestive) and diastolic (congestive) heart failure ([address]    Abnormal urinalysis    Orthostatic hypotension    Coronary artery disease involving native coronary artery of native heart without angina pectoris    Urinary tract infection without hematuria        ASSESSMENT of Current Deficits Patient exhibits decreased strength, balance and endurance impairing functional mobility, transfers, gait , gait distance and tolerance to activity are barriers to d/c and require skilled intervention during hospital stay to attain pre hospital level of function. Decreased strength, balance and endurance  increases patient's risk for fall.   Dizziness and weakness impact functional independence        PHYSICAL THERAPY  PLAN OF CARE       Physical therapy plan of care is established based on physician order,  patient diagnosis and clinical assessment    Current Treatment Recommendations:    -Standing Balance: Perform strengthening exercises in standing to promote motor control with or without upper extremity support   -Transfers: Provide instruction on proper hand and foot position for adequate transfer of weight onto lower extremities and use of gait device if needed and Cues for hand placement, technique and safety. Provide stabilization to prevent fall   -Gait: Gait training and Standing activities to improve: base of support, weight shift, weight bearing    -Endurance: Utilize Supervised activities to increase level of endurance to allow for safe functional mobility including transfers and gait     PT long term treatment goals are located in below grid    Patient and or family understand(s) diagnosis, prognosis, and plan of care. Frequency of treatments: Patient will be seen  daily. Prior Level of Function: Patient ambulated with wheeled walker    Rehab Potential: good    for baseline    Past medical history:   Past Medical History:   Diagnosis Date    Arthritis     Atrial fibrillation ([address]     Chronic combined systolic (congestive) and diastolic (congestive) heart failure ([address] 11/7/2021    Coronary artery disease involving native coronary artery of native heart without angina pectoris     Hx of blood clots     Hypertension     Ischemic colitis ([address]     PAF (paroxysmal atrial fibrillation) ([address]      Past Surgical History:   Procedure Laterality Date    ANKLE FRACTURE SURGERY      CARDIAC CATHETERIZATION  11/11/2019    Dr. Sarkis Huerta    COLONOSCOPY      ENDOSCOPY, COLON, DIAGNOSTIC      FRACTURE SURGERY         SUBJECTIVE:    Precautions:  Up with assistance and Orthostatic blood pressure and pulse , falls and alarm ,  hard of hearing   Social history: Patient lives with son in a ranch home  with 2 steps  to enter with [facility] owned: [address] [facility], Bedside commode and [address] [facility] chair,       -PAC Basic Mobility          -[facility] Mobility Inpatient   How much difficulty turning over in bed?: A Little  How much difficulty sitting down on / standing up from a chair with arms?: A Little  How much difficulty moving from lying on back to sitting on side of bed?: A Little  How much help from another person moving to and from a bed to a chair?: A Little  How much help from another person needed to walk in hospital room?: A Little  How much help from another person for climbing 3-5 steps with a railing?: A Little  AM-[facility] Inpatient Mobility Raw Score : 18  AM-PAC Inpatient T-Scale Score : 43.63  Mobility Inpatient CMS 0-100% Score: 46.58  Mobility Inpatient CMS G-Code Modifier : CK    Nursing cleared patient for PT evaluation. The admitting diagnosis and active problem list as listed above have been reviewed prior to the initiation of this evaluation. OBJECTIVE;   Initial Evaluation  Date: 7/12/2022 Treatment Date:     Short Term/ Long Term   Goals   Was pt agreeable to Eval/treatment? Yes  To be met in 3 days   Pain level   0/10        Bed Mobility    Rolling: Minimal assist of 1    Supine to sit: Minimal assist of 1    Sit to supine: Minimal assist of 1    Scooting: Minimal assist of 1    Rolling: Independent    Supine to sit:  Independent    Sit to supine: Independent    Scooting: Independent     Transfers Sit to stand: Minimal assist of 1 from bed x 2 reps and Bedside commode   Sit to stand: Independent     Ambulation    2x50 feet using  wheeled walker with Minimal assist of 1   2nd trial with cane   100 feet using  least restrictive device with Supervision     Stair negotiation: ascended and descended   Not assessed     2 steps with rial supervision    ROM Within functional limits        Strength BUE:  4-/5  RLE:  4-/5  LLE:  4-/5  Increase strength in affected mm groups by 1/3 grade   Balance Sitting EOB:  good    Dynamic Standing:  fair minus with device  Sitting EOB:  good    Dynamic Standing: good Patient is Alert & Oriented x person, place, time and situation and follows directions      Edema:  no   Endurance: poor    Orthostatic blood pressure documented in flow sheet. No significant change. Patient education  Patient educated on role of Physical Therapy, risks of immobility, safety and plan of care and  importance of mobility while in hospital      Patient response to education:   Pt verbalized understanding Pt demonstrated skill Pt requires further education in this area   Yes Partial Yes      Treatment:  Patient practiced and was instructed/facilitated in the following treatment: Patient   Sat edge of bed 5 minutes with Supervision  to increase dynamic sitting balance and activity tolerance. seated and standing challenges      Therapeutic Exercises:  ankle pumps  x 10 reps. At end of session, patient in chair with     call light and phone within reach,  all lines and tubes intact, nursing notified. Patient would benefit from continued skilled Physical Therapy to improve functional independence and quality of life. Patient's/ family goals   home    Time in  65  Time out  1115    Total Treatment Time  10 minutes    Evaluation time includes thorough review of current medical information, gathering information on past medical history/social history and prior level of function, completion of standardized testing/informal observation of tasks, assessment of data, and development of Plan of care and goals.      CPT codes:  Low Complexity PT evaluation (19264)  Gait Training (14785) 10 minutes 1 unit(s)    Jamar Suh, PT

## 2022-07-12 NOTE — CARE COORDINATION
7/12/2022 1434 CM note: No covid testing. Pt resides in a 1 story home,2 step entry with her son Denise Mata. Pt's family assist her with ADLs and provide 24/7 care. DME include: bsc, ww,cane, shower chair. PT recommendations reviewed with pt's dtr Deepali Leslie. Deepali Leslie declines SNF but agreeable to [place] [phone] and prefers [facility] placed. [facility] accepted patient and can start services Monday 7/18/22 -WILL NEED C orders for PT/OT/SN. Plan is for pt to return home with her son and [place] [address] at d/doni Moser RN      The Plan for Transition of Care is related to the following treatment goals: HHc    The Patient and/or patient representative dtr Servandonolberto Leslie was provided with a choice of provider and agrees   with the discharge plan. [x] Yes [] No    Freedom of choice list was provided with basic dialogue that supports the patient's individualized plan of care/goals, treatment preferences and shares the quality data associated with the providers.  [] Yes [x] No  Pt's dtr Deepali Leslie prefers [facility]

## 2022-07-13 LAB
ANION GAP SERPL CALCULATED.3IONS-SCNC: 6 MMOL/L (ref 7–16)
BASOPHILS ABSOLUTE: 0.04 E9/L (ref 0–0.2)
BASOPHILS RELATIVE PERCENT: 1.1 % (ref 0–2)
BUN BLDV-MCNC: 12 MG/DL (ref 6–23)
CALCIUM SERPL-MCNC: 9 MG/DL (ref 8.6–10.2)
CHLORIDE BLD-SCNC: 109 MMOL/L (ref 98–107)
CO2: 26 MMOL/L (ref 22–29)
CREAT SERPL-MCNC: 1 MG/DL (ref 0.5–1)
EKG ATRIAL RATE: 65 BPM
EKG P AXIS: 37 DEGREES
EKG P-R INTERVAL: 156 MS
EKG Q-T INTERVAL: 440 MS
EKG QRS DURATION: 100 MS
EKG QTC CALCULATION (BAZETT): 457 MS
EKG R AXIS: -51 DEGREES
EKG T AXIS: 31 DEGREES
EKG VENTRICULAR RATE: 65 BPM
EOSINOPHILS ABSOLUTE: 0.08 E9/L (ref 0.05–0.5)
EOSINOPHILS RELATIVE PERCENT: 2.1 % (ref 0–6)
GFR AFRICAN AMERICAN: >60
GFR NON-AFRICAN AMERICAN: >60 ML/MIN/1.73
GLUCOSE BLD-MCNC: 81 MG/DL (ref 74–99)
HCT VFR BLD CALC: 40.3 % (ref 34–48)
HEMOGLOBIN: 13.1 G/DL (ref 11.5–15.5)
IMMATURE GRANULOCYTES #: 0 E9/L
IMMATURE GRANULOCYTES %: 0 % (ref 0–5)
LYMPHOCYTES ABSOLUTE: 1.6 E9/L (ref 1.5–4)
LYMPHOCYTES RELATIVE PERCENT: 42.8 % (ref 20–42)
MCH RBC QN AUTO: 30.6 PG (ref 26–35)
MCHC RBC AUTO-ENTMCNC: 32.5 % (ref 32–34.5)
MCV RBC AUTO: 94.2 FL (ref 80–99.9)
MONOCYTES ABSOLUTE: 0.41 E9/L (ref 0.1–0.95)
MONOCYTES RELATIVE PERCENT: 11 % (ref 2–12)
NEUTROPHILS ABSOLUTE: 1.61 E9/L (ref 1.8–7.3)
NEUTROPHILS RELATIVE PERCENT: 43 % (ref 43–80)
PDW BLD-RTO: 15.2 FL (ref 11.5–15)
PLATELET # BLD: 155 E9/L (ref 130–450)
PMV BLD AUTO: 9.6 FL (ref 7–12)
POTASSIUM REFLEX MAGNESIUM: 4.5 MMOL/L (ref 3.5–5)
RBC # BLD: 4.28 E12/L (ref 3.5–5.5)
SODIUM BLD-SCNC: 141 MMOL/L (ref 132–146)
WBC # BLD: 3.7 E9/L (ref 4.5–11.5)

## 2022-07-13 PROCEDURE — 6370000000 HC RX 637 (ALT 250 FOR IP): Performed by: NURSE PRACTITIONER

## 2022-07-13 PROCEDURE — 85025 COMPLETE CBC W/AUTO DIFF WBC: CPT

## 2022-07-13 PROCEDURE — 97165 OT EVAL LOW COMPLEX 30 MIN: CPT

## 2022-07-13 PROCEDURE — G0378 HOSPITAL OBSERVATION PER HR: HCPCS

## 2022-07-13 PROCEDURE — 6370000000 HC RX 637 (ALT 250 FOR IP): Performed by: INTERNAL MEDICINE

## 2022-07-13 PROCEDURE — 99225 PR SBSQ OBSERVATION CARE/DAY 25 MINUTES: CPT | Performed by: INTERNAL MEDICINE

## 2022-07-13 PROCEDURE — 1200000000 HC SEMI PRIVATE

## 2022-07-13 PROCEDURE — 36415 COLL VENOUS BLD VENIPUNCTURE: CPT

## 2022-07-13 PROCEDURE — 2580000003 HC RX 258: Performed by: INTERNAL MEDICINE

## 2022-07-13 PROCEDURE — APPSS30 APP SPLIT SHARED TIME 16-30 MINUTES: Performed by: NURSE PRACTITIONER

## 2022-07-13 PROCEDURE — 97530 THERAPEUTIC ACTIVITIES: CPT

## 2022-07-13 PROCEDURE — 80048 BASIC METABOLIC PNL TOTAL CA: CPT

## 2022-07-13 RX ORDER — HYDRALAZINE HYDROCHLORIDE 50 MG/1
50 TABLET, FILM COATED ORAL EVERY 8 HOURS SCHEDULED
Status: DISCONTINUED | OUTPATIENT
Start: 2022-07-13 | End: 2022-07-16

## 2022-07-13 RX ADMIN — PANTOPRAZOLE SODIUM 40 MG: 40 TABLET, DELAYED RELEASE ORAL at 06:18

## 2022-07-13 RX ADMIN — Medication 10 ML: at 09:28

## 2022-07-13 RX ADMIN — HYDRALAZINE HYDROCHLORIDE 50 MG: 50 TABLET, FILM COATED ORAL at 14:09

## 2022-07-13 RX ADMIN — ASPIRIN 81 MG: 81 TABLET, COATED ORAL at 09:27

## 2022-07-13 RX ADMIN — Medication 10 ML: at 21:41

## 2022-07-13 RX ADMIN — POLYETHYLENE GLYCOL 3350 17 G: 17 POWDER, FOR SOLUTION ORAL at 17:51

## 2022-07-13 RX ADMIN — LOSARTAN POTASSIUM 50 MG: 50 TABLET, FILM COATED ORAL at 09:27

## 2022-07-13 RX ADMIN — APIXABAN 2.5 MG: 5 TABLET, FILM COATED ORAL at 21:41

## 2022-07-13 RX ADMIN — HYDRALAZINE HYDROCHLORIDE 50 MG: 50 TABLET, FILM COATED ORAL at 21:41

## 2022-07-13 RX ADMIN — LOSARTAN POTASSIUM 50 MG: 50 TABLET, FILM COATED ORAL at 21:41

## 2022-07-13 RX ADMIN — APIXABAN 2.5 MG: 5 TABLET, FILM COATED ORAL at 09:27

## 2022-07-13 NOTE — PROGRESS NOTES
Physical Therapy    Physical Therapy Treatment Note/Plan of Care    Room #:  0424/0424-01  Patient Name: Abimael Collins  YOB: 1931  MRN: [id number]    Date of Service: 7/13/2022     Tentative placement recommendation: Subacute vs Home Health Physical Therapy if patient meets goals    Equipment recommendation: Patient has needed equipment       Evaluating Physical Therapist: Tisha Andrade, PT  #[id number]      Specific Provider Orders/Date/Referring Provider :  07/11/22 Sha5   PT eval and treat Start: 07/11/22 1445, End: 07/11/22 1445, ONE TIME, Standing Count: 1 Occurrences, Haleigh Uribe MD      Admitting Diagnosis:   Syncope and collapse [R55]    Admitted with  Syncope from standing with fall at home; occurred again yesterday with nursing  Surgery: none      Patient Active Problem List   Diagnosis    Systolic hypertension    Hypertension, essential, benign    Paroxysmal atrial fibrillation (HCC)    Bradycardia    Chronic anticoagulation--Eliquis    Syncope and collapse    Moderate protein-calorie malnutrition ([address]    Chest pain    Chronic combined systolic (congestive) and diastolic (congestive) heart failure ([address]    Abnormal urinalysis    Orthostatic hypotension    Coronary artery disease involving native coronary artery of native heart without angina pectoris    Urinary tract infection without hematuria        ASSESSMENT of Current Deficits Patient exhibits decreased strength, balance and endurance impairing functional mobility, transfers, gait , gait distance and tolerance to activity are barriers to d/c and require skilled intervention during hospital stay to attain pre hospital level of function. Decreased strength, balance and endurance  increases patient's risk for fall. Patient tolerates activity this session, high BP, and confused at times.         PHYSICAL THERAPY  PLAN OF CARE       Physical therapy plan of care is established based on physician order,  patient AM-PAC Basic Mobility          [facility] Mobility Inpatient   How much difficulty turning over in bed?: A Little  How much difficulty sitting down on / standing up from a chair with arms?: A Lot  How much difficulty moving from lying on back to sitting on side of bed?: A Little  How much help from another person moving to and from a bed to a chair?: A Little  How much help from another person needed to walk in hospital room?: A Little  How much help from another person for climbing 3-5 steps with a railing?: A Lot  AM-PAC Inpatient Mobility Raw Score : 16  AM-PAC Inpatient T-Scale Score : 40.78  Mobility Inpatient CMS 0-100% Score: 54.16  Mobility Inpatient CMS G-Code Modifier : CK    Nursing cleared patient for PT treatment. Nursing obtaining orthosatics. (standing)   OBJECTIVE;   Initial Evaluation  Date: 7/12/2022 Treatment Date:   7/13/2022    Short Term/ Long Term   Goals   Was pt agreeable to Eval/treatment? Yes yes To be met in 3 days   Pain level   0/10    None reported    Bed Mobility    Rolling: Minimal assist of 1    Supine to sit: Minimal assist of 1    Sit to supine: Minimal assist of 1    Scooting: Minimal assist of 1   Rolling: Not assessed patient seated edge of bed   Supine to sit: Not assessed patient seated edge of bed   Sit to supine: Minimal assist of 1   Scooting: Supervision     Rolling: Independent    Supine to sit:  Independent    Sit to supine: Independent    Scooting: Independent     Transfers Sit to stand: Minimal assist of 1 from bed x 2 reps and Bedside commode  Sit to stand: Minimal assist of 1 cues for hand placement     Sit to stand: Independent     Ambulation    2x50 feet using  wheeled walker with Minimal assist of 1   2nd trial with cane 2x55 feet using  wheeled walker with Minimal assist of 1   cues for upright posture, walker approximation and pacing    100 feet using  least restrictive device with Supervision     Stair negotiation: ascended and descended   Not assessed     2 steps with rial supervision    ROM Within functional limits        Strength BUE:  4-/5  RLE:  4-/5  LLE:  4-/5  Increase strength in affected mm groups by 1/3 grade   Balance Sitting EOB:  good    Dynamic Standing:  fair minus with device Sitting EOB: good   Dynamic Standing: fair with wheeled walker   Sitting EOB:  good    Dynamic Standing: good       Patient is Alert & Oriented x person, place, time and situation and follows directions      Edema:  no   Endurance: fair     Orthostatic blood pressure documented in flow sheet. Patient education  Patient educated on role of Physical Therapy, risks of immobility, safety and plan of care,  importance of mobility while in hospital , safety  and seated exercises     Patient response to education:   Pt verbalized understanding Pt demonstrated skill Pt requires further education in this area   Yes Partial Yes      Treatment:  Patient practiced and was instructed/facilitated in the following treatment: Patient stood to walker for nursing to obtain BP. Patient returned to sitting and stood to amb in hallway and returned to room for seated exercises. Patient assisted back to supine position in bed. Therapeutic Exercises:  ankle pumps, long arc quad and seated marching  x 10 reps. At end of session, patient in bed with alarm and telesitter call light and phone within reach,  all lines and tubes intact, nursing notified. Patient would benefit from continued skilled Physical Therapy to improve functional independence and quality of life.          Patient's/ family goals   home    Time in  929  Time out  944    Total Treatment Time  15 minutes    CPT codes:  Therapeutic activities (53808)   15 minutes  1 unit(s)    Moriah Jacobo, [facility]    #[id number]

## 2022-07-13 NOTE — PROGRESS NOTES
[address] [place], [place] [address]   Progress Note    Admitting Date and Time: 7/9/2022  3:16 PM  Admit Dx: Syncope and collapse [R55]    Subjective:    Patient seen and examined. Awake, alert and sitting up in bed. States she ate well this morning. States her belly feels a little sore, unable to describe or pinpoint pain, but does say it feels better than yesterday. ROS:   Denies CP, SOB, subjective fever, chills, N/V/D, abdominal pain,  HA. All systems reviewed and negative unless otherwise documented.  hydrALAZINE  50 mg Oral 3 times per day    pantoprazole  40 mg Oral QAM AC    apixaban  2.5 mg Oral BID    aspirin  81 mg Oral Daily    sodium chloride flush  10 mL IntraVENous 2 times per day    losartan  50 mg Oral BID     sodium chloride flush, 10 mL, PRN  sodium chloride, , PRN  polyethylene glycol, 17 g, Daily PRN  acetaminophen, 650 mg, Q6H PRN   Or  acetaminophen, 650 mg, Q6H PRN         Objective:    BP (!) 169/95   Pulse 66   Temp 98.8 °F (37.1 °C) (Oral)   Resp 17   Ht 5' 7\" (1.702 m)   Wt 130 lb 11.2 oz (59.3 kg)   SpO2 100%   BMI 20.47 kg/m²   General Appearance: alert and oriented to person and place.  Pleasantly confused, and in no acute distress  Skin: warm and dry  Head: normocephalic and atraumatic  Eyes: pupils equal, round, and reactive to light, extraocular eye movements intact, conjunctivae normal  Neck: neck supple and non tender without mass   Pulmonary/Chest: clear to auscultation bilaterally- no wheezes, rales or rhonchi, normal air movement, no respiratory distress  Cardiovascular: normal rate, normal S1 and S2 and no carotid bruits  Abdomen: soft, non-tender, non-distended, normal bowel sounds, no masses or organomegaly  Extremities: no cyanosis, no clubbing and no edema  Neurologic: no cranial nerve deficit and speech normal      Recent Labs     07/11/22  0654 07/12/22  0659 07/13/22  0536    139 141   K 4.0 4.3 4.5    108* 109*   CO2 23 20* 26 BUN 11 11 12   CREATININE 0.8 0.8 1.0   GLUCOSE 86 85 81   CALCIUM 9.2 9.2 9.0       No results for input(s): ALKPHOS, PROT, LABALBU, BILITOT, AST, ALT in the last 72 hours. Recent Labs     07/11/22  0654 07/12/22  0659 07/13/22  0536   WBC 4.6 4.1* 3.7*   RBC 4.84 5.09 4.28   HGB 14.4 15.2 13.1   HCT 45.1 48.0 40.3   MCV 93.2 94.3 94.2   MCH 29.8 29.9 30.6   MCHC 31.9* 31.7* 32.5   RDW 14.9 14.9 15.2*    162 155   MPV 9.9 9.8 9.6         Radiology:   CT ABDOMEN PELVIS W IV CONTRAST Additional Contrast? Oral   Final Result   Interval resolution of previously seen colitis in the ascending colon. RECOMMENDATIONS:   Unavailable         CT Head WO Contrast   Final Result   No acute intracranial abnormality. 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 CT Cervical Spine WO Contrast   Final Result   No acute abnormality of the cervical spine. Stable moderate-severe cervical spondylosis. CT ABDOMEN PELVIS W IV CONTRAST Additional Contrast? None   Final Result   Wall thickening and inflammatory stranding involving the cecum/ascending   colon as well as the splenic flexure, concerning for colitis. Additional findings as above. XR CHEST PORTABLE   Final Result   No acute process. Mild cardiomegaly. CT Head WO Contrast    Result Date: 7/9/2022  EXAMINATION: CT OF THE HEAD WITHOUT CONTRAST  7/9/2022 3:21 pm TECHNIQUE: CT of the head was performed without the administration of intravenous contrast. Automated exposure control, iterative reconstruction, and/or weight based adjustment of the mA/kV was utilized to reduce the radiation dose to as low as reasonably achievable. Dose: Total Exam DLP: 959.84 mGy-cm. COMPARISON: 11/09/2021 HISTORY: ORDERING SYSTEM PROVIDED HISTORY: fall TECHNOLOGIST PROVIDED HISTORY: Reason for exam:->fall Has a \"code stroke\" or \"stroke alert\" been called? ->No Decision Support compared to the prior exam including disc height loss at C4-5, C5-6 and C6-7. No significant canal stenosis is seen. SOFT TISSUES: There is no prevertebral soft tissue swelling. No acute abnormality of the cervical spine. Stable moderate-severe cervical spondylosis. CT ABDOMEN PELVIS W IV CONTRAST Additional Contrast? None    Result Date: 7/9/2022  EXAMINATION: CT OF THE ABDOMEN AND PELVIS WITH CONTRAST 7/9/2022 5:21 pm TECHNIQUE: CT of the abdomen and pelvis was performed with the administration of intravenous contrast. Multiplanar reformatted images are provided for review. Automated exposure control, iterative reconstruction, and/or weight based adjustment of the mA/kV was utilized to reduce the radiation dose to as low as reasonably achievable. COMPARISON: 10/22/2021 HISTORY: ORDERING SYSTEM PROVIDED HISTORY: abd pain TECHNOLOGIST PROVIDED HISTORY: Additional Contrast?->None Reason for exam:->abd pain Decision Support Exception - unselect if not a suspected or confirmed emergency medical condition->Emergency Medical Condition (MA) FINDINGS: Lower Chest: There is cardiomegaly. There is mild bilateral dependent atelectasis. Organs: The liver is not cirrhotic in configuration. Cystic lesion again noted within the medial hepatic segment. The gallbladder, spleen, pancreas, adrenals, and kidneys are unremarkable. GI/Bowel: There is wall thickening and inflammatory stranding of the colon, particularly the cecum/ascending colon as well as the splenic flexure. There is diverticulosis. No evidence of obstruction. Pelvis: The urinary bladder is partially filled. The uterus is unremarkable. Peritoneum/Retroperitoneum: No evidence of ascites or free air. No evidence of retroperitoneal lymphadenopathy. Aorta is normal in caliber without acute abnormality. Bones/Soft Tissues:  No acute abnormality of the visualized osseous structures. There is diffuse decreased bone density.      Wall thickening and 7/13/2022 at 12:22 PM

## 2022-07-13 NOTE — PROGRESS NOTES
include:   * Instruction/training on adapted ADL techniques and AE recommendations to increase functional independence within precautions       * Training on energy conservation strategies, correct breathing pattern and techniques to improve independence/tolerance for self-care routine  * Functional transfer/mobility training/DME recommendations for increased independence, safety, and fall prevention  * Patient/Family education to increase follow through with safety techniques and functional independence  * Recommendation of environmental modifications for increased safety with functional transfers/mobility and ADLs  * Therapeutic exercise to improve motor endurance, ROM, and functional strength for ADLs/functional transfers  * Therapeutic activities to facilitate/challenge dynamic balance, stand tolerance for increased safety and independence with ADLs  * Positioning to improve skin integrity, interaction with environment and functional independence    Recommended Adaptive Equipment: none      Home Living: with son Dana Lea; single family home, 1 story, 2 steps to enter, walk in shower. Equipment owned: wheeled walker, cane, bedside commode, shower chair    Prior Level of Function: Needs assistance with ADLs and IADLs; ambulated with straight cane    Pain Level: no pain at time of evaluation; Nursing notified.       Cognition: A&O: 3/4; Follows 1 step directions   Memory: good    Sequencing: good    Problem solving: fair    Judgement/safety: fair     [facility]   AM-PAC Daily Activity Inpatient   How much help for putting on and taking off regular lower body clothing?: A Lot  How much help for Bathing?: A Lot  How much help for Toileting?: A Little  How much help for putting on and taking off regular upper body clothing?: A Little  How much help for taking care of personal grooming?: A Little  How much help for eating meals?: None  AM-[facility] Inpatient Daily Activity Raw Score: 17  AM-PAC Inpatient ADL T-Scale Score : 37.26  ADL Inpatient CMS 0-100% Score: 50.11  ADL Inpatient CMS G-Code Modifier : CK     Functional Assessment:    Initial Eval Status  Date: 7/13/22 Treatment Status  Date: STGs = LTGs  Time frame: 10-14 days   Feeding Independent   Independent    Grooming Minimal Assist   Independent    UB Dressing Minimal Assist   Independent    LB Dressing Moderate Assist   Independent    Bathing Moderate Assist  Modified [place]    Toileting Supervision for hygiene and to stand at sink level to wash and dry hands  Independent    Bed Mobility  Supine to sit: Supervision   Sit to supine: Supervision   Supine to sit: Independent   Sit to supine: Independent    Functional Transfers Minimal Assist from EOB to straight cane. Minimal Assist for transfer to and from low commode  Transfer training with verbal cues for hand placement throughout session to improve safety. Independent    Functional Mobility Minimal Assist with straight cane to improve balance, verbal cues for walker sequence and safety. Modified [place]    Balance Sitting:     Static: good     Dynamic: fair   Standing: fair  with cane     Activity Tolerance fair   good    Visual/  Perceptual Glasses: no                Hand Dominance: right     AROM (PROM) Strength Additional Info:    RUE  WFL 4/5 good  and wfl FMC/dexterity noted during ADL tasks     LUE WFL 4/5 good  and wfl FMC/dexterity noted during ADL tasks       Hearing: WFL   Sensation:  No c/o numbness or tingling  Tone: WFL   Edema: no    Comments: Upon arrival the patient was supine. At end of session, patient was supine with call light and phone within reach, all lines and tubes intact. Overall patient demonstrated decreased independence and safety during completion of ADL/functional transfer/mobility tasks. Pt would benefit from continued skilled OT to increase safety and independence with completion of ADL/IADL tasks for functional independence and quality of life.     Treatment: OT treatment provided this date includes:    Instruction/training on safety and adapted techniques for completion of ADLs    Instruction/training on safe functional mobility/transfer techniques    Instruction/training on energy conservation/work simplification for completion of ADLs    Proper Positioning/Alignment    Rehab Potential: Good for established goals. Patient / Family Goal: return home       Patient and/or family were instructed on functional diagnosis, prognosis/goals and OT plan of care. Demonstrated good understanding. Eval Complexity: Low    Time In: 10:50am   Time Out: 11:10am    Total Treatment Time: 0      Min Units   OT Eval Low 97165  X  1    OT Eval Medium 12272      OT Eval High 88654      OT Re-Eval [id number]            ADL/Self Care 22004     Therapeutic Activities 51861       Therapeutic Ex 39556       Orthotic Management 09184       Manual [id number]     Neuro Re-Ed 82407       Non-Billable Time        Evaluation Time additionally includes thorough review of current medical information, gathering information on past medical history/social history and prior level of function, interpretation of standardized testing/informal observation of tasks, assessment of data and development of plan of care and goals.         Evaluating OT: Daniel Angulo OTR/L; [id number]

## 2022-07-14 LAB
ANION GAP SERPL CALCULATED.3IONS-SCNC: 10 MMOL/L (ref 7–16)
BASOPHILS ABSOLUTE: 0.04 E9/L (ref 0–0.2)
BASOPHILS ABSOLUTE: 0.04 E9/L (ref 0–0.2)
BASOPHILS RELATIVE PERCENT: 0.7 % (ref 0–2)
BASOPHILS RELATIVE PERCENT: 1 % (ref 0–2)
BUN BLDV-MCNC: 10 MG/DL (ref 6–23)
CALCIUM SERPL-MCNC: 9.2 MG/DL (ref 8.6–10.2)
CHLORIDE BLD-SCNC: 108 MMOL/L (ref 98–107)
CO2: 20 MMOL/L (ref 22–29)
CREAT SERPL-MCNC: 0.7 MG/DL (ref 0.5–1)
EOSINOPHILS ABSOLUTE: 0.05 E9/L (ref 0.05–0.5)
EOSINOPHILS ABSOLUTE: 0.06 E9/L (ref 0.05–0.5)
EOSINOPHILS RELATIVE PERCENT: 0.9 % (ref 0–6)
EOSINOPHILS RELATIVE PERCENT: 1.5 % (ref 0–6)
GFR AFRICAN AMERICAN: >60
GFR NON-AFRICAN AMERICAN: >60 ML/MIN/1.73
GLUCOSE BLD-MCNC: 86 MG/DL (ref 74–99)
HCT VFR BLD CALC: 41.7 % (ref 34–48)
HCT VFR BLD CALC: 45.8 % (ref 34–48)
HEMOGLOBIN: 13.4 G/DL (ref 11.5–15.5)
HEMOGLOBIN: 14.1 G/DL (ref 11.5–15.5)
IMMATURE GRANULOCYTES #: 0.01 E9/L
IMMATURE GRANULOCYTES #: 0.01 E9/L
IMMATURE GRANULOCYTES %: 0.2 % (ref 0–5)
IMMATURE GRANULOCYTES %: 0.2 % (ref 0–5)
LYMPHOCYTES ABSOLUTE: 1.61 E9/L (ref 1.5–4)
LYMPHOCYTES ABSOLUTE: 2.04 E9/L (ref 1.5–4)
LYMPHOCYTES RELATIVE PERCENT: 35.9 % (ref 20–42)
LYMPHOCYTES RELATIVE PERCENT: 39.9 % (ref 20–42)
MCH RBC QN AUTO: 30.1 PG (ref 26–35)
MCH RBC QN AUTO: 30.2 PG (ref 26–35)
MCHC RBC AUTO-ENTMCNC: 30.8 % (ref 32–34.5)
MCHC RBC AUTO-ENTMCNC: 32.1 % (ref 32–34.5)
MCV RBC AUTO: 94.1 FL (ref 80–99.9)
MCV RBC AUTO: 97.9 FL (ref 80–99.9)
METER GLUCOSE: 119 MG/DL (ref 74–99)
MONOCYTES ABSOLUTE: 0.37 E9/L (ref 0.1–0.95)
MONOCYTES ABSOLUTE: 0.53 E9/L (ref 0.1–0.95)
MONOCYTES RELATIVE PERCENT: 9.2 % (ref 2–12)
MONOCYTES RELATIVE PERCENT: 9.3 % (ref 2–12)
NEUTROPHILS ABSOLUTE: 1.95 E9/L (ref 1.8–7.3)
NEUTROPHILS ABSOLUTE: 3.02 E9/L (ref 1.8–7.3)
NEUTROPHILS RELATIVE PERCENT: 48.2 % (ref 43–80)
NEUTROPHILS RELATIVE PERCENT: 53 % (ref 43–80)
PDW BLD-RTO: 15.2 FL (ref 11.5–15)
PDW BLD-RTO: 15.5 FL (ref 11.5–15)
PLATELET # BLD: 163 E9/L (ref 130–450)
PLATELET # BLD: 188 E9/L (ref 130–450)
PMV BLD AUTO: 10 FL (ref 7–12)
PMV BLD AUTO: 10.1 FL (ref 7–12)
POTASSIUM REFLEX MAGNESIUM: 3.9 MMOL/L (ref 3.5–5)
RBC # BLD: 4.43 E12/L (ref 3.5–5.5)
RBC # BLD: 4.68 E12/L (ref 3.5–5.5)
REASON FOR REJECTION: NORMAL
REJECTED TEST: NORMAL
SARS-COV-2, NAAT: NOT DETECTED
SODIUM BLD-SCNC: 138 MMOL/L (ref 132–146)
WBC # BLD: 4 E9/L (ref 4.5–11.5)
WBC # BLD: 5.7 E9/L (ref 4.5–11.5)

## 2022-07-14 PROCEDURE — 1200000000 HC SEMI PRIVATE

## 2022-07-14 PROCEDURE — 76937 US GUIDE VASCULAR ACCESS: CPT

## 2022-07-14 PROCEDURE — 85025 COMPLETE CBC W/AUTO DIFF WBC: CPT

## 2022-07-14 PROCEDURE — 99291 CRITICAL CARE FIRST HOUR: CPT | Performed by: INTERNAL MEDICINE

## 2022-07-14 PROCEDURE — 2580000003 HC RX 258: Performed by: INTERNAL MEDICINE

## 2022-07-14 PROCEDURE — 36415 COLL VENOUS BLD VENIPUNCTURE: CPT

## 2022-07-14 PROCEDURE — 6370000000 HC RX 637 (ALT 250 FOR IP): Performed by: INTERNAL MEDICINE

## 2022-07-14 PROCEDURE — 97530 THERAPEUTIC ACTIVITIES: CPT | Performed by: PHYSICAL THERAPIST

## 2022-07-14 PROCEDURE — 83036 HEMOGLOBIN GLYCOSYLATED A1C: CPT

## 2022-07-14 PROCEDURE — 6370000000 HC RX 637 (ALT 250 FOR IP): Performed by: NURSE PRACTITIONER

## 2022-07-14 PROCEDURE — 82962 GLUCOSE BLOOD TEST: CPT

## 2022-07-14 PROCEDURE — 87635 SARS-COV-2 COVID-19 AMP PRB: CPT

## 2022-07-14 PROCEDURE — APPSS45 APP SPLIT SHARED TIME 31-45 MINUTES: Performed by: NURSE PRACTITIONER

## 2022-07-14 PROCEDURE — 80048 BASIC METABOLIC PNL TOTAL CA: CPT

## 2022-07-14 RX ORDER — LOSARTAN POTASSIUM 50 MG/1
50 TABLET ORAL 2 TIMES DAILY
Qty: 30 TABLET | Refills: 0 | Status: SHIPPED | OUTPATIENT
Start: 2022-07-14 | End: 2022-07-18 | Stop reason: SDUPTHER

## 2022-07-14 RX ORDER — HYDRALAZINE HYDROCHLORIDE 50 MG/1
50 TABLET, FILM COATED ORAL EVERY 8 HOURS SCHEDULED
Qty: 90 TABLET | Refills: 0 | Status: SHIPPED | OUTPATIENT
Start: 2022-07-14 | End: 2022-07-18 | Stop reason: SDUPTHER

## 2022-07-14 RX ORDER — ASPIRIN 81 MG/1
81 TABLET ORAL DAILY
Qty: 30 TABLET | Refills: 0 | Status: SHIPPED | OUTPATIENT
Start: 2022-07-14

## 2022-07-14 RX ORDER — OMEPRAZOLE 20 MG/1
40 CAPSULE, DELAYED RELEASE ORAL DAILY
Qty: 30 CAPSULE | Refills: 0 | Status: SHIPPED | OUTPATIENT
Start: 2022-07-14

## 2022-07-14 RX ADMIN — HYDRALAZINE HYDROCHLORIDE 50 MG: 50 TABLET, FILM COATED ORAL at 13:41

## 2022-07-14 RX ADMIN — Medication 10 ML: at 09:39

## 2022-07-14 RX ADMIN — APIXABAN 2.5 MG: 5 TABLET, FILM COATED ORAL at 09:38

## 2022-07-14 RX ADMIN — PANTOPRAZOLE SODIUM 40 MG: 40 TABLET, DELAYED RELEASE ORAL at 06:32

## 2022-07-14 RX ADMIN — HYDRALAZINE HYDROCHLORIDE 50 MG: 50 TABLET, FILM COATED ORAL at 06:32

## 2022-07-14 RX ADMIN — LOSARTAN POTASSIUM 50 MG: 50 TABLET, FILM COATED ORAL at 09:38

## 2022-07-14 RX ADMIN — ASPIRIN 81 MG: 81 TABLET, COATED ORAL at 09:38

## 2022-07-14 RX ADMIN — ACETAMINOPHEN 325MG 650 MG: 325 TABLET ORAL at 09:38

## 2022-07-14 ASSESSMENT — PAIN DESCRIPTION - FREQUENCY: FREQUENCY: CONTINUOUS

## 2022-07-14 ASSESSMENT — PAIN SCALES - GENERAL
PAINLEVEL_OUTOF10: 0
PAINLEVEL_OUTOF10: 4

## 2022-07-14 ASSESSMENT — PAIN DESCRIPTION - ORIENTATION: ORIENTATION: INNER

## 2022-07-14 ASSESSMENT — PAIN DESCRIPTION - DESCRIPTORS: DESCRIPTORS: ACHING;DISCOMFORT

## 2022-07-14 ASSESSMENT — PAIN DESCRIPTION - PAIN TYPE: TYPE: ACUTE PAIN

## 2022-07-14 ASSESSMENT — PAIN DESCRIPTION - LOCATION: LOCATION: HEAD

## 2022-07-14 NOTE — DISCHARGE INSTRUCTIONS
Your information:  Name: Malik Grover  : 1931    Your instructions:    Discharged home with [facility]. Please make and keep any follow up appointments. If you have increased shortness of breath, increased cough or sputum production, have increased weakness, become dizzy, fall or pass out, have nausea or vomiting, fever or chills, please call Dr. Maciej Solorio or return to the emergency     What to do after you leave the hospital:    Recommended diet: regular diet    Recommended activity: activity as tolerated        The following personal items were collected during your admission and were returned to you:    Belongings  Dental Appliances: Uppers,Lowers  Vision - Corrective Lenses: None  Hearing Aid: None  Clothing: Sent home  Jewelry: Sent home  Body Piercings Removed: N/A  Electronic Devices: None  Weapons (Notify Protective Services/Security): None  Other Valuables: Sent home  Home Medications: Sent home  Valuables Given To: Family (Comment)  Provide Name(s) of Who Valuable(s) Were Given To: nia-daughter  Responsible person(s) in the waiting room: yes, children  Patient approves for provider to speak to responsible person post operatively: Yes    Information obtained by:  By signing below, I understand that if any problems occur once I leave the hospital I am to contact Dr. Maciej Solorio. I understand and acknowledge receipt of the instructions indicated above. HEART FAILURE  / CONGESTIVE HEART FAILURE  DISCHARGE INSTRUCTIONS:  GUIDELINES TO FOLLOW AT HOME    No future appointments. Self- Managed Care:     MEDICATIONS:  Take your medication as directed. If you are experiencing any side effects, inform your doctor, Do not stop taking any of your medications without letting your doctor know. Check with your doctor before taking any over-the-counter medications / herbal / or dietary supplements. They may interfere with your other medications.   Do not take ibuprofen (Advil or IF YOU SMOKE - STOP! Kick the habit. [address] Program is offered at [facility] and [address] [facility]. Call [phone] extension [phone] for more information. ACTIVITY:   (Ask your doctor when you will be able to return to work and before starting any exercise program.  Do not drive unless unless your doctor has given you permission to do so). Start light exercise. Even if you can only do a small amount, exercise will help you get stronger, have more energy, help manage your weight and decrease  stress. Walking is an easy way to get exercise. Start out slowly and  increase the amount you walk as tolerated  If you become short of breath, dizzy or have chest pain; stop, sit down, and rest.  If you feel \"wiped out\" the day after you exercise, walk at a slower pace or for a shorter distance. You can gradually increase the pace or amount of time. (Do not exercise right after a meal or in extreme temperatures, such as above 85 degrees, if the air is really humid, or wind chill is less than 20 degrees)                                             ADDITIONAL INFORMATION:  Avoid getting sick from colds and the flu. Stay away from friends or family that you know may have a contagious illness  Get plenty of rest   Get a flu shot each year. Get a pneumococcal vaccine shot. If you have had one before, ask your doctor whether you need another dose. My Goal for Self-management of Heart Failure Includes 5 steps :    1. Notice a change in symptoms ( weight gain, short of breath, leg swelling, decreased activity level, bloating. ...)    2. Evaluate the change: (use the Heart Failure Zones )     3. Decide to take action: decide what your options are, such as: (call your doctor for an extra visit, take a prescribed medication, such as your water pill if your doctor has given you directions to do so, [address])    4.  Come up with a strategy:  (now you call the doctor for advice / appointment. This is where you take action!!! Do not wait, catch the symptom early and treat it before it worsens. 5. Evaluate the response: The next day, check your Heart Failure Zones: are you in the GREEN ZONE (safe zone)? Worsening symptoms of YELLOW ZONE? Or have you moved to the RED ZONE and need to call 911 or go to the Emergency Room for evaluation? Call your doctor's office to update them on your symptoms of heart failure. Learning About Heart Failure Zones  What are heart failure zones? Heart failure zones give you an easy way to see changes in your heart failure symptoms. They also tell you when you need to get help. Check every day to see which zone you are in. Green zone. You are doing well. This is where you want to be. Your weight is stable. It's not going up or down. You breathe easily. You are sleeping well. You are able to lie flat without shortness of breath. You can do your usual activities. Yellow zone. Be careful. Your symptoms are changing. Call your doctor. You have new or increased shortness of breath. You are dizzy or lightheaded, or you feel like you may faint. You have sudden weight gain, such as more than 2 to 3 pounds in a day or 5 pounds in a week. (Your doctor may suggest a different range of weight gain.)  You have increased swelling in your legs, ankles, or feet. You are so tired or weak that you can't do your usual activities. You are not sleeping well. Shortness of breath wakes you up at night. You need extra pillows. Red zone. 911  This is an emergency. Call . You have symptoms of sudden heart failure. For example: You have severe trouble breathing. You cough up pink, foamy mucus. You have a new irregular or fast heartbeat. You have symptoms of a heart attack. These may include:  Chest pain or pressure, or a strange feeling in the chest.  Sweating. Shortness of breath.   Nausea or vomiting. Pain, pressure, or a strange feeling in the back, neck, jaw, or upper belly or in one or both shoulders or arms. Lightheadedness or sudden weakness. A fast or irregular heartbeat. If you have symptoms of a heart attack 911 : After you call , the  may tell you to chew 1 adult-strength or 2 to 4 low-dose aspirin. Wait for an ambulance. Do not try to drive yourself. Follow-up care is a key part of your treatment and safety. Be sure to make and go to all appointments, and call your doctor if you are having problems. It's also a good idea to know your test results and keep a list of the medicines you take. Where can you learn more? Go to https://[employer]. org and sign in to your [employer] account. Enter T174 in the [employer] box to learn more about \"Learning About Heart Failure Zones. \"     If you do not have an account, please click on the \"Sign Up Now\" link. Current as of: August 31, 2020               Content Version: 12.9  © 2006-2021 Healthwise, Inc[employer]. Care instructions adapted under license by [facility] ([facility]). If you have questions about a medical condition or this instruction, always ask your healthcare professional. [address]  any warranty or liability for your use of this information.       [address] will call to arrange visit for Monday 7/18/2022  ([phone])

## 2022-07-14 NOTE — PROGRESS NOTES
[address] [place], [place] [address]ist   Progress Note    Admitting Date and Time: 7/9/2022  3:16 PM  Admit Dx: Syncope and collapse [R55]    Subjective:    Pt seen and examined. Sleeping, but wakes easily to voice. Pleasantly confused. No complaints of belly pain today, states she had a BM last night and feels better. Denies any other pain. Ate breakfast. States she woke up several times throughout the night. ROS:   Denies CP, SOB, subjective fever, chills, N/V/D, abdominal pain,  HA. All systems reviewed and negative unless otherwise documented.  hydrALAZINE  50 mg Oral 3 times per day    pantoprazole  40 mg Oral QAM AC    apixaban  2.5 mg Oral BID    aspirin  81 mg Oral Daily    sodium chloride flush  10 mL IntraVENous 2 times per day    losartan  50 mg Oral BID     sodium chloride flush, 10 mL, PRN  sodium chloride, , PRN  polyethylene glycol, 17 g, Daily PRN  acetaminophen, 650 mg, Q6H PRN   Or  acetaminophen, 650 mg, Q6H PRN         Objective:    BP (!) 188/88   Pulse 58   Temp 98.4 °F (36.9 °C) (Oral)   Resp 18   Ht 5' 7\" (1.702 m)   Wt 130 lb 11.2 oz (59.3 kg)   SpO2 98%   BMI 20.47 kg/m²      PHYSICAL EXAM  General Appearance: alert and oriented to person and place. Pleasantly confused, and in no acute distress.  Very [place]  Skin: warm and dry  Head: normocephalic and atraumatic  Eyes: pupils equal, round, and reactive to light, extraocular eye movements intact, conjunctivae normal  Neck: neck supple and non tender without mass   Pulmonary/Chest: clear to auscultation bilaterally- no wheezes, rales or rhonchi, normal air movement, no respiratory distress  Cardiovascular: normal rate, normal S1 and S2 and no carotid bruits  Abdomen: soft, non-tender, non-distended, normal bowel sounds, no masses or organomegaly  Extremities: no cyanosis, no clubbing and no edema  Neurologic: no cranial nerve deficit and speech normal      Recent Labs     07/12/22  0659 07/13/22  0536 07/14/22  0526   NA 139 141 138   K 4.3 4.5 3.9   * 109* 108*   CO2 20* 26 20*   BUN 11 12 10   CREATININE 0.8 1.0 0.7   GLUCOSE 85 81 86   CALCIUM 9.2 9.0 9.2       No results for input(s): ALKPHOS, PROT, LABALBU, BILITOT, AST, ALT in the last 72 hours. Recent Labs     07/12/22  0659 07/13/22  0536 07/14/22  0526   WBC 4.1* 3.7* 4.0*   RBC 5.09 4.28 4.68   HGB 15.2 13.1 14.1   HCT 48.0 40.3 45.8   MCV 94.3 94.2 97.9   MCH 29.9 30.6 30.1   MCHC 31.7* 32.5 30.8*   RDW 14.9 15.2* 15.5*    155 163   MPV 9.8 9.6 10.1         Radiology:   CT ABDOMEN PELVIS W IV CONTRAST Additional Contrast? Oral   Final Result   Interval resolution of previously seen colitis in the ascending colon. RECOMMENDATIONS:   Unavailable         CT Head WO Contrast   Final Result   No acute intracranial abnormality. 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 CT Cervical Spine WO Contrast   Final Result   No acute abnormality of the cervical spine. Stable moderate-severe cervical spondylosis. CT ABDOMEN PELVIS W IV CONTRAST Additional Contrast? None   Final Result   Wall thickening and inflammatory stranding involving the cecum/ascending   colon as well as the splenic flexure, concerning for colitis. Additional findings as above. XR CHEST PORTABLE   Final Result   No acute process. Mild cardiomegaly. CT Head WO Contrast    Result Date: 7/9/2022  EXAMINATION: CT OF THE HEAD WITHOUT CONTRAST  7/9/2022 3:21 pm TECHNIQUE: CT of the head was performed without the administration of intravenous contrast. Automated exposure control, iterative reconstruction, and/or weight based adjustment of the mA/kV was utilized to reduce the radiation dose to as low as reasonably achievable. Dose: Total Exam DLP: 959.84 mGy-cm.  COMPARISON: 11/09/2021 HISTORY: ORDERING SYSTEM PROVIDED HISTORY: fall TECHNOLOGIST PROVIDED HISTORY: Reason for exam:->fall Has a \"code stroke\" or \"stroke alert\" been called? ->No Decision Support Exception - unselect if not a suspected or confirmed emergency medical condition->Emergency Medical Condition (MA) FINDINGS: BRAIN/VENTRICLES: There is no acute intracranial hemorrhage, mass effect or midline shift. No abnormal extra-axial fluid collection. The gray-white differentiation is maintained without evidence of an acute infarct. There is prominence of the ventricles and sulci due to global parenchymal volume loss. There are nonspecific areas of hypoattenuation within the periventricular and subcortical white matter, which likely represent chronic microvascular ischemic change. ORBITS: The visualized portion of the orbits demonstrate no acute abnormality. SINUSES: The visualized paranasal sinuses and mastoid air cells demonstrate no acute abnormality. SOFT TISSUES/SKULL: No acute abnormality of the visualized skull or soft tissues. No acute intracranial abnormality. No significant change in diffuse volume loss and chronic small vessel ischemic white matter disease. CT Cervical Spine WO Contrast    Result Date: 7/9/2022  EXAMINATION: CT OF THE CERVICAL SPINE WITHOUT CONTRAST 7/9/2022 3:21 pm TECHNIQUE: CT of the cervical spine was performed without the administration of intravenous contrast. Multiplanar reformatted images are provided for review. Automated exposure control, iterative reconstruction, and/or weight based adjustment of the mA/kV was utilized to reduce the radiation dose to as low as reasonably achievable. Dose: Total Exam DLP: 525.81 mGy-cm. COMPARISON: 11/06/2021 HISTORY: ORDERING SYSTEM PROVIDED HISTORY: fall TECHNOLOGIST PROVIDED HISTORY: Reason for exam:->fall Decision Support Exception - unselect if not a suspected or confirmed emergency medical condition->Emergency Medical Condition (MA) FINDINGS: BONES/ALIGNMENT: There is no acute fracture or traumatic malalignment.  DEGENERATIVE CHANGES: Moderate-severe multilevel degenerative endplate change and facet arthropathy is stable compared to the prior exam including disc height loss at C4-5, C5-6 and C6-7. No significant canal stenosis is seen. SOFT TISSUES: There is no prevertebral soft tissue swelling. No acute abnormality of the cervical spine. Stable moderate-severe cervical spondylosis. CT ABDOMEN PELVIS W IV CONTRAST Additional Contrast? None    Result Date: 7/9/2022  EXAMINATION: CT OF THE ABDOMEN AND PELVIS WITH CONTRAST 7/9/2022 5:21 pm TECHNIQUE: CT of the abdomen and pelvis was performed with the administration of intravenous contrast. Multiplanar reformatted images are provided for review. Automated exposure control, iterative reconstruction, and/or weight based adjustment of the mA/kV was utilized to reduce the radiation dose to as low as reasonably achievable. COMPARISON: 10/22/2021 HISTORY: ORDERING SYSTEM PROVIDED HISTORY: [facility] pain TECHNOLOGIST PROVIDED HISTORY: Additional Contrast?->None Reason for exam:->abd pain Decision Support Exception - unselect if not a suspected or confirmed emergency medical condition->Emergency Medical Condition (MA) FINDINGS: Lower Chest: There is cardiomegaly. There is mild bilateral dependent atelectasis. Organs: The liver is not cirrhotic in configuration. Cystic lesion again noted within the medial hepatic segment. The gallbladder, spleen, pancreas, adrenals, and kidneys are unremarkable. GI/Bowel: There is wall thickening and inflammatory stranding of the colon, particularly the cecum/ascending colon as well as the splenic flexure. There is diverticulosis. No evidence of obstruction. Pelvis: The urinary bladder is partially filled. The uterus is unremarkable. Peritoneum/Retroperitoneum: No evidence of ascites or free air. No evidence of retroperitoneal lymphadenopathy. Aorta is normal in caliber without acute abnormality. Bones/Soft Tissues:  No acute abnormality of the visualized osseous structures.   There is diffuse decreased bone density. Wall thickening and inflammatory stranding involving the cecum/ascending colon as well as the splenic flexure, concerning for colitis. Additional findings as above. XR CHEST PORTABLE    Result Date: 7/9/2022  EXAMINATION: ONE XRAY VIEW OF THE CHEST 7/9/2022 4:01 pm COMPARISON: 03/21/2022 HISTORY: ORDERING SYSTEM PROVIDED HISTORY: fall TECHNOLOGIST PROVIDED HISTORY: Reason for exam:->fall FINDINGS: The lungs are without acute focal process. There is no effusion or pneumothorax. Cardiomegaly is noted. The osseous structures are without acute process. Osseous structures appear unremarkable. No acute process. Mild cardiomegaly. EKG:        Assessment:  Principal Problem:    Syncope and collapse  Resolved Problems:    * No resolved hospital problems. *      Plan:  1. Syncope, collapse, weakness:  -Ortho BP's negative   -Continue ortho BP's q shift  - CT head on 7/9 negative for acute abnormality  - PT/OT. -spoke with , family wants [facility]. [facility] has accepted pt, but cannot see her until early next week. 2. Prolonged QTC:  -Amiodarone has been held since admission, discontinued 7/12.   - QTc 517 on 7/9,  470 on 7/10, 457 on 7/12    4. Paroxysmal A. Fib:  chronic anticoagulation   -CVR 58   -continue Eliquis. Will need to clarify with son when he is available  whether she has frequent falls and whether the Eliquis can be held     5. Chronic combined heart failure:  -08/31/21 ECHO with 38% EF, stage I diastolic dysfunction    6. Hypokalemia:  -Currently resolved, potassium 3.9 this morning.   - Daily potassium supplementation stopped  -BMP in am    7. HTN:  pt had not been taking her home lisinopril, hydralazine or amlodipine.  Was only taking losartan & possibly amiodarone.  -home hydralazine TIB was started on 7/13   -improvement in DBP, current /90   -continue home losartan and hydralazine   -continue to monitor BP        Electronically signed by Chente Alberto Deidra Moore NP on 7/14/2022 at 8:03 AM

## 2022-07-14 NOTE — DISCHARGE SUMMARY
[facility] Discharge Summary       MD Kevon Genaohailey [address]  [phone]    Schedule an appointment as soon as possible for a visit on 7/21/2022  Follow-up    [place] [id number]  [phone]          Activity level: As tolerated with assistance    Diet: ADULT DIET; Regular    Labs:Per PCP    Condition at discharge: Stable    Dispo:Discharge home        Patient ID:  Yesenia Mcnulty  [id number]  15 y.o.  12/25/1931    Admit date: 7/9/2022    Discharge date and time:  7/14/2022  3:01 PM    Admission Diagnoses: Principal Problem:    Syncope and collapse  Resolved Problems:    * No resolved hospital problems. *      Discharge Diagnoses: Principal Problem:    Syncope and collapse  Resolved Problems:    * No resolved hospital problems. *      Consults:  IP CONSULT TO SOCIAL WORK    Procedures: N/A    Hospital Course: Kit Tomas is a 81yo female with a history of A-fib, CHF, CAD and HTN who presented to the ER on 7/9 for syncope and collapse in her bathroom. She did not hit her head, son (who lives with her) stated he was able to hold her head as she fell. In the ER pt was intermittently confused, which both pt and daughter agreed is her baseline. ER work up was significant for abdominal tenderness on exam, and CT abd which was concerning for colitis. Abnormal lab work included elevated lactic acid, which resolved after IVF, and troponin of 16 -which is at/near her baseline. Orthostatic BP's were negative while in the ER, but subsequently positive the next day. UA was unremarkable. CT head and C-spine were negative for acute abnormality. Cxr showed mild cardiomegaly, but no acute process. Ms. Kelsie Merino does have a hx of a-fib, but initial and subsequent EKG's all showed variations of normal sinus rhythm with prolonged QT.    Prior to admission, Ms. Kelsie Merino had not been taking most of her BP medications. Her amiodarone was held for QT prolongation. Because losartan was the 1 medication she had been taking, it was continued without interruption. On 7/10, orthostatic blood pressures were positive and and she continued to receive the gentle hydration via IV that was initiated in the ER. By 7/12, she was no longer experiencing orthostatic hypotension and IVF was stopped. Ortho BP's continued to be monitored and remained negative. Based on CT abd results and pt's initial (and ultimately intermittent) complaint of abd pain, she was started on antibiotics for colitis while waiting for results of inflammatory markers. Those markers came back negative, pt was afebrile, without leukocytosis, or other signs of infection-so antibiotics were stopped. On 7/11, a repeat CT abd was ordered for ongoing complaints of abd pain. Previously seen colitis was not appreciated on repeat CT. On 7/13, Ms. Coleman's home hydralazine TID was reintroduced for HTN management. Today, her blood pressure is well controlled on losartan and hydralazine, and she will be discharged on those 2 HTN medications. Previously listed home HTN meds will be stopped. The remainder of her home medications will be continued unchanged. Ms. Dell Madden has been working with PT and OT while here. She and her family declined SNF, but agreed to [facility]. Ms. Dell Madden has several children and lives with her son. Between son other children, Ms. Dell Madden has 24/7 support and assistance. She will also be receiving [facility]. At this time Ms. Dell Madden is stable and ready for discharge. Discharge Exam:  Vitals:    07/14/22 0630 07/14/22 0800 07/14/22 1200 07/14/22 1341   BP: (!) 197/106 (!) 188/88 (!) 114/90 (!) 122/52   Pulse: 60 58     Resp:  18     Temp:  98.4 °F (36.9 °C)     TempSrc:  Oral     SpO2:  98%     Weight:       Height:           PHYSICAL EXAM  General Appearance: alert and oriented to person and place.  Pleasantly confused, and in no acute distress. Very [place]  Skin: warm and dry  Head: normocephalic and atraumatic  Eyes: pupils equal, round, and reactive to light, extraocular eye movements intact, conjunctivae normal  Neck: neck supple and non tender without mass   Pulmonary/Chest: clear to auscultation bilaterally- no wheezes, rales or rhonchi, normal air movement, no respiratory distress  Cardiovascular: normal rate, normal S1 and S2 and no carotid bruits  Abdomen: soft, non-tender, non-distended, normal bowel sounds, no masses or organomegaly  Extremities: no cyanosis, no clubbing and no edema  Neurologic: no cranial nerve deficit and speech normal       I/O last 3 completed shifts: In: 480 [P.O.:480]  Out: -   No intake/output data recorded. LABS:  Recent Labs     07/12/22  0659 07/13/22  0536 07/14/22  0526    141 138   K 4.3 4.5 3.9   * 109* 108*   CO2 20* 26 20*   BUN 11 12 10   CREATININE 0.8 1.0 0.7   GLUCOSE 85 81 86   CALCIUM 9.2 9.0 9.2       Recent Labs     07/12/22  0659 07/13/22  0536 07/14/22  0526   WBC 4.1* 3.7* 4.0*   RBC 5.09 4.28 4.68   HGB 15.2 13.1 14.1   HCT 48.0 40.3 45.8   MCV 94.3 94.2 97.9   MCH 29.9 30.6 30.1   MCHC 31.7* 32.5 30.8*   RDW 14.9 15.2* 15.5*    155 163   MPV 9.8 9.6 10.1       No results for input(s): POCGLU in the last 72 hours. Imaging:  CT Head WO Contrast    Result Date: 7/9/2022  EXAMINATION: CT OF THE HEAD WITHOUT CONTRAST  7/9/2022 3:21 pm TECHNIQUE: CT of the head was performed without the administration of intravenous contrast. Automated exposure control, iterative reconstruction, and/or weight based adjustment of the mA/kV was utilized to reduce the radiation dose to as low as reasonably achievable. Dose: Total Exam DLP: 959.84 mGy-cm. COMPARISON: 11/09/2021 HISTORY: ORDERING SYSTEM PROVIDED HISTORY: fall TECHNOLOGIST PROVIDED HISTORY: Reason for exam:->fall Has a \"code stroke\" or \"stroke alert\" been called? ->No Decision Support Exception - unselect if not a suspected or confirmed emergency medical condition->Emergency Medical Condition (MA) FINDINGS: BRAIN/VENTRICLES: There is no acute intracranial hemorrhage, mass effect or midline shift. No abnormal extra-axial fluid collection. The gray-white differentiation is maintained without evidence of an acute infarct. There is prominence of the ventricles and sulci due to global parenchymal volume loss. There are nonspecific areas of hypoattenuation within the periventricular and subcortical white matter, which likely represent chronic microvascular ischemic change. ORBITS: The visualized portion of the orbits demonstrate no acute abnormality. SINUSES: The visualized paranasal sinuses and mastoid air cells demonstrate no acute abnormality. SOFT TISSUES/SKULL: No acute abnormality of the visualized skull or soft tissues. No acute intracranial abnormality. No significant change in diffuse volume loss and chronic small vessel ischemic white matter disease. CT Cervical Spine WO Contrast    Result Date: 7/9/2022  EXAMINATION: CT OF THE CERVICAL SPINE WITHOUT CONTRAST 7/9/2022 3:21 pm TECHNIQUE: CT of the cervical spine was performed without the administration of intravenous contrast. Multiplanar reformatted images are provided for review. Automated exposure control, iterative reconstruction, and/or weight based adjustment of the mA/kV was utilized to reduce the radiation dose to as low as reasonably achievable. Dose: Total Exam DLP: 525.81 mGy-cm. COMPARISON: 11/06/2021 HISTORY: ORDERING SYSTEM PROVIDED HISTORY: fall TECHNOLOGIST PROVIDED HISTORY: Reason for exam:->fall Decision Support Exception - unselect if not a suspected or confirmed emergency medical condition->Emergency Medical Condition (MA) FINDINGS: BONES/ALIGNMENT: There is no acute fracture or traumatic malalignment.  DEGENERATIVE CHANGES: Moderate-severe multilevel degenerative endplate change and facet arthropathy is stable compared to the prior exam including disc height loss at C4-5, C5-6 and C6-7. No significant canal stenosis is seen. SOFT TISSUES: There is no prevertebral soft tissue swelling. No acute abnormality of the cervical spine. Stable moderate-severe cervical spondylosis. CT ABDOMEN PELVIS W IV CONTRAST Additional Contrast? None    Result Date: 7/9/2022  EXAMINATION: CT OF THE ABDOMEN AND PELVIS WITH CONTRAST 7/9/2022 5:21 pm TECHNIQUE: CT of the abdomen and pelvis was performed with the administration of intravenous contrast. Multiplanar reformatted images are provided for review. Automated exposure control, iterative reconstruction, and/or weight based adjustment of the mA/kV was utilized to reduce the radiation dose to as low as reasonably achievable. COMPARISON: 10/22/2021 HISTORY: ORDERING SYSTEM PROVIDED HISTORY: abd pain TECHNOLOGIST PROVIDED HISTORY: Additional Contrast?->None Reason for exam:->abd pain Decision Support Exception - unselect if not a suspected or confirmed emergency medical condition->Emergency Medical Condition (MA) FINDINGS: Lower Chest: There is cardiomegaly. There is mild bilateral dependent atelectasis. Organs: The liver is not cirrhotic in configuration. Cystic lesion again noted within the medial hepatic segment. The gallbladder, spleen, pancreas, adrenals, and kidneys are unremarkable. GI/Bowel: There is wall thickening and inflammatory stranding of the colon, particularly the cecum/ascending colon as well as the splenic flexure. There is diverticulosis. No evidence of obstruction. Pelvis: The urinary bladder is partially filled. The uterus is unremarkable. Peritoneum/Retroperitoneum: No evidence of ascites or free air. No evidence of retroperitoneal lymphadenopathy. Aorta is normal in caliber without acute abnormality. Bones/Soft Tissues:  No acute abnormality of the visualized osseous structures. There is diffuse decreased bone density.      Wall thickening and inflammatory stranding involving the cecum/ascending colon as well as the splenic flexure, concerning for colitis. Additional findings as above. XR CHEST PORTABLE    Result Date: 7/9/2022  EXAMINATION: ONE XRAY VIEW OF THE CHEST 7/9/2022 4:01 pm COMPARISON: 03/21/2022 HISTORY: ORDERING SYSTEM PROVIDED HISTORY: fall TECHNOLOGIST PROVIDED HISTORY: Reason for exam:->fall FINDINGS: The lungs are without acute focal process. There is no effusion or pneumothorax. Cardiomegaly is noted. The osseous structures are without acute process. Osseous structures appear unremarkable. No acute process. Mild cardiomegaly.          Patient Instructions:   Current Discharge Medication List      CONTINUE these medications which have NOT CHANGED    Details   losartan (COZAAR) 50 MG tablet Take 50 mg by mouth 2 times daily      omeprazole (PRILOSEC) 20 MG delayed release capsule Take 40 mg by mouth daily      docusate sodium (COLACE) 100 MG capsule Take 100 mg by mouth daily      hydrALAZINE (APRESOLINE) 50 MG tablet Take 1 tablet by mouth every 8 hours  Qty: 90 tablet, Refills: 0      aspirin 81 MG EC tablet Take 81 mg by mouth daily      acetaminophen (TYLENOL) 500 MG tablet Take 2 tablets by mouth every 8 hours as needed for Pain  Qty: 50 tablet, Refills: 0      apixaban (ELIQUIS) 5 MG TABS tablet Take 2.5 mg by mouth 2 times daily          STOP taking these medications       amiodarone (CORDARONE) 200 MG tablet Comments:   Reason for Stopping:         lisinopril (PRINIVIL;ZESTRIL) 10 MG tablet Comments:   Reason for Stopping:         amLODIPine (NORVASC) 10 MG tablet Comments:   Reason for Stopping:         diclofenac (VOLTAREN) 0.1 % ophthalmic solution Comments:   Reason for Stopping:         Lidocaine, Anorectal, 5 % GEL Comments:   Reason for Stopping:                 Note that more than 30 minutes was spent in preparing discharge papers, discussing discharge with patient, medication review, etc.    NOTE: This report was transcribed using voice recognition software. Every effort was made to ensure accuracy; however, inadvertent computerized transcription errors may be present.      Signed:  Electronically signed by FRANCES Tinoco NP on 7/14/2022 at 3:01 PM

## 2022-07-14 NOTE — PROGRESS NOTES
I called and talked to daughter Timbo Tocarol. I informed her that Shameka Hinson has been discharged. Timbo Trujillo informed me that she will have a ride home this evening.

## 2022-07-14 NOTE — CARE COORDINATION
7/14/2022 1433 CM note:  No covid testing. Pt resides in a 1 story home,2 step entry with her son [facility]. Pt's family assist her with ADLs and provide 24/7 care. DME include: bsc, ww,cane, shower chair. PT recommendations reviewed with pt's dtr Josie Malik. Josie Hekrystal declines SNF but agreeable to [facility] accepted patient ,received orders, notified of dc order and and can start services Monday 7/18/22. Plan is for pt to return home with her son and HHC at d/doni Lai RN

## 2022-07-14 NOTE — SIGNIFICANT EVENT
Patient was about to be discharged when rapid response was called. Per nurse, patient hand syncope while trying to use the bathroom. She was also getting dressed. There was no documented fall. This was witnessed by patient's nurse and daughter. She was brought to the bed. Her Bp initially was 99/66. Her heart rate was fluctuating between 67-110s. Her pulse ox was 96% on room air. She was responsive to verbal stimulation, answers a few words. Difficulty encountered while getting access to IV line. Initially success with IV placement that later failed. Later BP improved. Per son, he noted that this syncopal episode has been going on for the past 4-5 years and often resolves without any aggressive intervention. He noted that she has a hx of A. Fib and suspects that medication is contributing to her episode. She noted that patient follows with cardiology and no etiology of this syncope has been found. Etiology of the syncope was suspected to be due to vasovagal from straining while having bowel movement. Cardiac components can not be ruled out due to hx of a fib and arrthymia. She is still on eliquis     Hold all BP medication. Time spent in RRT is greater than 30 minutes.

## 2022-07-14 NOTE — PROGRESS NOTES
Pt was in bathroom with daughter when pt lost consciousness on the toilet. Pt was transferred to her bed and RRT was called.

## 2022-07-14 NOTE — PROGRESS NOTES
Physical Therapy    Physical Therapy Treatment Note/Plan of Care    Room #:  0424/0424-01  Patient Name: Vicenta Mathews  YOB: 1931  MRN: [id number]    Date of Service: 7/14/2022     Tentative placement recommendation: Subacute vs Home Health Physical Therapy if patient meets goals    Equipment recommendation: Patient has needed equipment       Evaluating Physical Therapist: Ly Hodge, PT  #[id number]      Specific Provider Orders/Date/Referring Provider :  07/11/22 1445   PT eval and treat Start: 07/11/22 1445, End: 07/11/22 1445, ONE TIME, Standing Count: 1 Occurrences, Omkar Olson MD      Admitting Diagnosis:   Syncope and collapse [R55]    Admitted with  Syncope from standing with fall at home; occurred again yesterday with nursing  Surgery: none      Patient Active Problem List   Diagnosis    Systolic hypertension    Hypertension, essential, benign    Paroxysmal atrial fibrillation (HCC)    Bradycardia    Chronic anticoagulation--Eliquis    Syncope and collapse    Moderate protein-calorie malnutrition ([address]    Chest pain    Chronic combined systolic (congestive) and diastolic (congestive) heart failure ([address]    Abnormal urinalysis    Orthostatic hypotension    Coronary artery disease involving native coronary artery of native heart without angina pectoris    Urinary tract infection without hematuria        ASSESSMENT of Current Deficits Patient exhibits decreased strength, balance and endurance impairing functional mobility, transfers, gait , gait distance and tolerance to activity are barriers to d/c and require skilled intervention during hospital stay to attain pre hospital level of function. Decreased strength, balance and endurance  increases patient's risk for fall. Patient with impaired safety awareness, requires constant assist with walker negotiation and safety.     PHYSICAL THERAPY  PLAN OF CARE       Physical therapy plan of care is established based on physician order,  patient diagnosis and clinical assessment    Current Treatment Recommendations:    -Standing Balance: Perform strengthening exercises in standing to promote motor control with or without upper extremity support   -Transfers: Provide instruction on proper hand and foot position for adequate transfer of weight onto lower extremities and use of gait device if needed and Cues for hand placement, technique and safety. Provide stabilization to prevent fall   -Gait: Gait training and Standing activities to improve: base of support, weight shift, weight bearing    -Endurance: Utilize Supervised activities to increase level of endurance to allow for safe functional mobility including transfers and gait     PT long term treatment goals are located in below grid    Patient and or family understand(s) diagnosis, prognosis, and plan of care. Frequency of treatments: Patient will be seen  daily. Prior Level of Function: Patient ambulated with wheeled walker    Rehab Potential: good    for baseline    Past medical history:   Past Medical History:   Diagnosis Date    Arthritis     Atrial fibrillation ([address]     Chronic combined systolic (congestive) and diastolic (congestive) heart failure ([address] 11/7/2021    Coronary artery disease involving native coronary artery of native heart without angina pectoris     Hx of blood clots     Hypertension     Ischemic colitis ([address]     PAF (paroxysmal atrial fibrillation) ([address]      Past Surgical History:   Procedure Laterality Date    ANKLE FRACTURE SURGERY      CARDIAC CATHETERIZATION  11/11/2019    Dr. Rodriguez Sessions    COLONOSCOPY      ENDOSCOPY, COLON, DIAGNOSTIC      FRACTURE SURGERY         SUBJECTIVE:    Precautions:  Up with assistance and Orthostatic blood pressure and pulse , falls and alarm ,  hard of hearing   Social history: Patient lives with son in a ranch home  with 2 steps  to enter with [place] [facility] owned: [address] [address], [facility], Bedside commode and Shower chair,       [facility] Basic Mobility          [facility] Mobility Inpatient   How much difficulty turning over in bed?: A Little  How much difficulty sitting down on / standing up from a chair with arms?: A Little  How much difficulty moving from lying on back to sitting on side of bed?: A Little  How much help from another person moving to and from a bed to a chair?: A Little  How much help from another person needed to walk in hospital room?: A Little  How much help from another person for climbing 3-5 steps with a railing?: A Lot  AM-PAC Inpatient Mobility Raw Score : 17  AM-PAC Inpatient T-Scale Score : 42.13  Mobility Inpatient CMS 0-100% Score: 50.57  Mobility Inpatient CMS G-Code Modifier : CK    Nursing cleared patient for PT treatment. OBJECTIVE;   Initial Evaluation  Date: 7/12/2022 Treatment Date:   7/14/2022    Short Term/ Long Term   Goals   Was pt agreeable to Eval/treatment? Yes yes To be met in 3 days   Pain level   0/10    None reported    Bed Mobility    Rolling: Minimal assist of 1    Supine to sit: Minimal assist of 1    Sit to supine: Minimal assist of 1    Scooting: Minimal assist of 1   Rolling: Not assessed    Supine to sit: Minimal assist of 1   Sit to supine: Minimal assist of 1   Scooting: Supervision     Rolling: Independent    Supine to sit:  Independent    Sit to supine: Independent    Scooting: Independent     Transfers Sit to stand: Minimal assist of 1 from bed x 2 reps and Bedside commode  Sit to stand: Minimal assist of 1 cues for hand placement     Sit to stand: Independent     Ambulation    2x50 feet using  wheeled walker with Minimal assist of 1   2nd trial with cane 1 x 20 feet, 2 x 40 feet using  wheeled walker with Minimal assist of 1   cues for upright posture, walker approximation and pacing requires assist for walker management and safety poor vision, patient trying to walk into door    100 feet using  least restrictive device with Supervision     Stair negotiation: ascended and descended   Not assessed     2 steps with rial supervision    ROM Within functional limits        Strength BUE:  4-/5  RLE:  4-/5  LLE:  4-/5  Increase strength in affected mm groups by 1/3 grade   Balance Sitting EOB:  good    Dynamic Standing:  fair minus with device Sitting EOB: good   Dynamic Standing: fair with wheeled walker   Sitting EOB:  good    Dynamic Standing: good       Patient is Alert & Oriented x person, place, time and situation and follows directions      Edema:  no   Endurance: fair        Vitals:  room air    Blood Pressure at rest   Blood Pressure during session     Heart Rate at rest   Heart Rate during session  94   SPO2 at rest  %  SPO2 during session  98%        Patient education  Patient educated on role of Physical Therapy, risks of immobility, safety and plan of care,  importance of mobility while in hospital , safety  and seated exercises     Patient response to education:   Pt verbalized understanding Pt demonstrated skill Pt requires further education in this area   Yes Partial Yes      Treatment:  Patient practiced and was instructed/facilitated in the following treatment: Patient assisted to edge of bed,   Sat edge of bed 5 minutes with Supervision  to increase dynamic sitting balance and activity tolerance. amb to/from bathroom, assisted on/off commode, stood at sink  with supervision for balance to wash hands. Ambulated in hallway, returned to bed. Therapeutic Exercises:  not performed  x  reps. At end of session, patient in bed with alarm and telesitter call light and phone within reach,  all lines and tubes intact, nursing notified. Patient would benefit from continued skilled Physical Therapy to improve functional independence and quality of life.          Patient's/ family goals   home    Time in  1010  Time out  1025    Total Treatment Time  15 minutes    CPT codes:  Therapeutic activities (28507)   15 minutes  1 unit(s)    Leon East Joon, PT    #[id number]

## 2022-07-15 LAB
ALBUMIN SERPL-MCNC: 3.2 G/DL (ref 3.5–5.2)
ALP BLD-CCNC: 67 U/L (ref 35–104)
ALT SERPL-CCNC: 9 U/L (ref 0–32)
ANION GAP SERPL CALCULATED.3IONS-SCNC: 7 MMOL/L (ref 7–16)
AST SERPL-CCNC: 16 U/L (ref 0–31)
BASOPHILS ABSOLUTE: 0.03 E9/L (ref 0–0.2)
BASOPHILS RELATIVE PERCENT: 0.7 % (ref 0–2)
BILIRUB SERPL-MCNC: 0.5 MG/DL (ref 0–1.2)
BUN BLDV-MCNC: 9 MG/DL (ref 6–23)
CALCIUM SERPL-MCNC: 8.7 MG/DL (ref 8.6–10.2)
CHLORIDE BLD-SCNC: 109 MMOL/L (ref 98–107)
CO2: 21 MMOL/L (ref 22–29)
CREAT SERPL-MCNC: 0.9 MG/DL (ref 0.5–1)
D DIMER: 419 NG/ML DDU
EKG ATRIAL RATE: 71 BPM
EKG P AXIS: 34 DEGREES
EKG P-R INTERVAL: 170 MS
EKG Q-T INTERVAL: 440 MS
EKG QRS DURATION: 100 MS
EKG QTC CALCULATION (BAZETT): 478 MS
EKG R AXIS: -52 DEGREES
EKG T AXIS: 17 DEGREES
EKG VENTRICULAR RATE: 71 BPM
EOSINOPHILS ABSOLUTE: 0.07 E9/L (ref 0.05–0.5)
EOSINOPHILS RELATIVE PERCENT: 1.7 % (ref 0–6)
GFR AFRICAN AMERICAN: >60
GFR NON-AFRICAN AMERICAN: >60 ML/MIN/1.73
GLUCOSE BLD-MCNC: 101 MG/DL (ref 74–99)
HBA1C MFR BLD: 5 % (ref 4–5.6)
HCT VFR BLD CALC: 36.8 % (ref 34–48)
HEMOGLOBIN: 12.2 G/DL (ref 11.5–15.5)
IMMATURE GRANULOCYTES #: 0.01 E9/L
IMMATURE GRANULOCYTES %: 0.2 % (ref 0–5)
LV EF: 63 %
LVEF MODALITY: NORMAL
LYMPHOCYTES ABSOLUTE: 1.29 E9/L (ref 1.5–4)
LYMPHOCYTES RELATIVE PERCENT: 30.6 % (ref 20–42)
MCH RBC QN AUTO: 30.4 PG (ref 26–35)
MCHC RBC AUTO-ENTMCNC: 33.2 % (ref 32–34.5)
MCV RBC AUTO: 91.8 FL (ref 80–99.9)
MONOCYTES ABSOLUTE: 0.53 E9/L (ref 0.1–0.95)
MONOCYTES RELATIVE PERCENT: 12.6 % (ref 2–12)
NEUTROPHILS ABSOLUTE: 2.29 E9/L (ref 1.8–7.3)
NEUTROPHILS RELATIVE PERCENT: 54.2 % (ref 43–80)
PDW BLD-RTO: 15.1 FL (ref 11.5–15)
PLATELET # BLD: 166 E9/L (ref 130–450)
PMV BLD AUTO: 9.6 FL (ref 7–12)
POTASSIUM SERPL-SCNC: 4 MMOL/L (ref 3.5–5)
PRO-BNP: 561 PG/ML (ref 0–450)
RBC # BLD: 4.01 E12/L (ref 3.5–5.5)
SODIUM BLD-SCNC: 137 MMOL/L (ref 132–146)
TOTAL PROTEIN: 5.9 G/DL (ref 6.4–8.3)
TROPONIN, HIGH SENSITIVITY: 19 NG/L (ref 0–9)
TSH SERPL DL<=0.05 MIU/L-ACNC: 1.84 UIU/ML (ref 0.27–4.2)
WBC # BLD: 4.2 E9/L (ref 4.5–11.5)

## 2022-07-15 PROCEDURE — 36415 COLL VENOUS BLD VENIPUNCTURE: CPT

## 2022-07-15 PROCEDURE — 6370000000 HC RX 637 (ALT 250 FOR IP): Performed by: PHYSICIAN ASSISTANT

## 2022-07-15 PROCEDURE — 6370000000 HC RX 637 (ALT 250 FOR IP): Performed by: INTERNAL MEDICINE

## 2022-07-15 PROCEDURE — 93306 TTE W/DOPPLER COMPLETE: CPT

## 2022-07-15 PROCEDURE — 85378 FIBRIN DEGRADE SEMIQUANT: CPT

## 2022-07-15 PROCEDURE — 97110 THERAPEUTIC EXERCISES: CPT

## 2022-07-15 PROCEDURE — 84484 ASSAY OF TROPONIN QUANT: CPT

## 2022-07-15 PROCEDURE — 83880 ASSAY OF NATRIURETIC PEPTIDE: CPT

## 2022-07-15 PROCEDURE — 99254 IP/OBS CNSLTJ NEW/EST MOD 60: CPT | Performed by: INTERNAL MEDICINE

## 2022-07-15 PROCEDURE — 93005 ELECTROCARDIOGRAM TRACING: CPT | Performed by: PHYSICIAN ASSISTANT

## 2022-07-15 PROCEDURE — 84443 ASSAY THYROID STIM HORMONE: CPT

## 2022-07-15 PROCEDURE — APPSS60 APP SPLIT SHARED TIME 46-60 MINUTES: Performed by: PHYSICIAN ASSISTANT

## 2022-07-15 PROCEDURE — 85025 COMPLETE CBC W/AUTO DIFF WBC: CPT

## 2022-07-15 PROCEDURE — 97530 THERAPEUTIC ACTIVITIES: CPT

## 2022-07-15 PROCEDURE — 99232 SBSQ HOSP IP/OBS MODERATE 35: CPT | Performed by: INTERNAL MEDICINE

## 2022-07-15 PROCEDURE — 1200000000 HC SEMI PRIVATE

## 2022-07-15 PROCEDURE — 2580000003 HC RX 258: Performed by: INTERNAL MEDICINE

## 2022-07-15 PROCEDURE — 80053 COMPREHEN METABOLIC PANEL: CPT

## 2022-07-15 RX ORDER — CARVEDILOL 3.12 MG/1
3.12 TABLET ORAL 2 TIMES DAILY WITH MEALS
Status: DISCONTINUED | OUTPATIENT
Start: 2022-07-15 | End: 2022-07-18 | Stop reason: HOSPADM

## 2022-07-15 RX ORDER — AMIODARONE HYDROCHLORIDE 200 MG/1
200 TABLET ORAL DAILY
Status: DISCONTINUED | OUTPATIENT
Start: 2022-07-15 | End: 2022-07-18 | Stop reason: HOSPADM

## 2022-07-15 RX ADMIN — PANTOPRAZOLE SODIUM 40 MG: 40 TABLET, DELAYED RELEASE ORAL at 05:12

## 2022-07-15 RX ADMIN — ACETAMINOPHEN 325MG 650 MG: 325 TABLET ORAL at 00:56

## 2022-07-15 RX ADMIN — APIXABAN 2.5 MG: 5 TABLET, FILM COATED ORAL at 00:56

## 2022-07-15 RX ADMIN — Medication 10 ML: at 00:57

## 2022-07-15 RX ADMIN — APIXABAN 2.5 MG: 5 TABLET, FILM COATED ORAL at 20:36

## 2022-07-15 RX ADMIN — ASPIRIN 81 MG: 81 TABLET, COATED ORAL at 09:00

## 2022-07-15 RX ADMIN — ACETAMINOPHEN 325MG 650 MG: 325 TABLET ORAL at 09:00

## 2022-07-15 RX ADMIN — ACETAMINOPHEN 325MG 650 MG: 325 TABLET ORAL at 20:35

## 2022-07-15 RX ADMIN — APIXABAN 2.5 MG: 5 TABLET, FILM COATED ORAL at 09:00

## 2022-07-15 RX ADMIN — AMIODARONE HYDROCHLORIDE 200 MG: 200 TABLET ORAL at 16:20

## 2022-07-15 RX ADMIN — CARVEDILOL 3.12 MG: 3.12 TABLET, FILM COATED ORAL at 16:20

## 2022-07-15 RX ADMIN — Medication 10 ML: at 20:36

## 2022-07-15 ASSESSMENT — PAIN DESCRIPTION - LOCATION
LOCATION: GENERALIZED
LOCATION: ABDOMEN
LOCATION: LEG

## 2022-07-15 ASSESSMENT — PAIN SCALES - GENERAL
PAINLEVEL_OUTOF10: 2
PAINLEVEL_OUTOF10: 0
PAINLEVEL_OUTOF10: 1
PAINLEVEL_OUTOF10: 1
PAINLEVEL_OUTOF10: 5
PAINLEVEL_OUTOF10: 4

## 2022-07-15 ASSESSMENT — PAIN DESCRIPTION - ORIENTATION
ORIENTATION: RIGHT
ORIENTATION: OTHER (COMMENT)

## 2022-07-15 ASSESSMENT — PAIN DESCRIPTION - DESCRIPTORS
DESCRIPTORS: SORE;PATIENT UNABLE TO DESCRIBE
DESCRIPTORS: SORE;PATIENT UNABLE TO DESCRIBE

## 2022-07-15 ASSESSMENT — PAIN DESCRIPTION - PAIN TYPE
TYPE: ACUTE PAIN
TYPE: ACUTE PAIN

## 2022-07-15 ASSESSMENT — PAIN SCALES - WONG BAKER: WONGBAKER_NUMERICALRESPONSE: 0

## 2022-07-15 NOTE — CONSULTS
Inpatient [id number] [facility] Cardiology Consultation      Reason for Consult: Syncope    Consulting Physician: Dr. Radha Townsend    Requesting Physician:  Dr. Brett Richey    Date of Consultation: 7/15/2022    HISTORY OF PRESENT ILLNESS:   Patient is a 80year old AAF known to Dr. Mira Alegria. She is being seen in consultation this hospital admission by Dr. Radha Townsend for evaluation and recommendations regarding syncope. PMH: HTN, HLD, GERD, medical non-compliance, history of sinus bradycardia, non-obstructive CAD by cardiac catheterization 11/2019, history of recurrent syncope of undetermined etiology, SVT and ventricular ectopy, PAF on chronic Eliquis therapy, chronic HFrEF, non-ischemic cardiomyopathy, possible dementia and DNR-CCA code status    Patient presented to [facility] on July 9, 2022 with complaints of questionable syncope with fall. Per the patient, she has been dealing with recurrent episodes of syncope for several years now without determined etiology. Of note, the patient, and her daughter, who was present at bedside are poor historians at times regarding the patient's past medical history and symptomatology. Patient reportedly presented to the hospital due to abdominal pain and recurrent syncope. She states she does not notice with syncopal episodes more so when she is using the restroom (admits to constipation and severe straining). However, sometimes the syncopal events occur randomly -- with changes in position, or during exertion/while at rest.  The patient admits that at times, she is able to [facility] it coming on\", but has a hard time describing this. She denies chest pain, shortness of breath, nausea, emesis, diaphoresis, palpitations. Denies PND, orthopnea or peripheral edema. There is documentation of the patient not taking her home cardiac medications upon hospital presentation. Reason for non-compliance is unclear.   Patient was to be discharged home yesterday, an RRT was noted due to patient having a possible syncopal episode while using the restroom. However, the patient reportedly did not fall to the ground, with no noted injury sustained. Please note: past medical records were reviewed per electronic medical record (EMR) - see detailed reports under Past Medical/ Surgical History. PAST MEDICAL HISTORY:    HTN  HLD, not on statin therapy  GERD  Medical non-compliance  History of sinus bradycardia  Non-obstructive CAD by cardiac catheterization 11/2019  History of recurrent syncope of undetermined etiology  SV and ventricular ectopy  PAF, on chronic Eliquis therapy  Prior amiodarone use  Chronic HFrEF. Non-ischemic cardiomyopathy  DNR-CCA code status  Possible dementia       CARDIAC TESTING    TTE (Dr. Chino Mustafa, 2018)   Summary   Compared to prior echo, no changes noted. Technically adequate study. Moderate concentric left ventricular hypertrophy. Ejection fraction is visually estimated at 55%. No regional wall motion abnormalities seen. E/A flow reversal noted. Suggestive of diastolic dysfunction. Physiologic and/or trace mitral regurgitation is present. Mild tricuspid regurgitation. RVSP is normal.    TTE (Dr. Suzzette Bumpers, 9/2019)  Summary   Left ventricle grossly normal in size. Mild left ventricular concentric hypertrophy noted. Estimated left ventricular ejection fraction is 45-50%. Global hypokinesis is noted to be borderline. Diastolic function could not be accurately assessed. The LAESV Index is >34 ml/m2. Mildly dilated right ventricle. Right ventricular segmental wall motion is normal.   Physiologic and/or trace mitral regurgitation is present. Physiologic and/or trace tricuspid regurgitation. RVSP is 21 mmHg. Physiologic and/or trace pulmonic regurgitation present. Abnormal bubble study. Technically fair quality study. Technically limited study. No previous echo for comparison.     [facility] Poot Stress Test 11/2019  FINDINGS:  Perfusion images demonstrate apparent reversible ischemia at the  inferolateral wall. Wall motion is mildly hypokinetic at the inferolateral wall  The end diastolic volume is 60 ml. The end systolic volume is 32 ml. The estimated ejection fraction is 47 %. Impression  1. Apparent reversible ischemia at the inferolateral wall with mild  hypokinesis. 2. Ejection fraction is 47 %. Cardiac catheterization (Dr. Catherine Manuel, 11/2019)  SUMMARY:  I. Injections. II. Selective coronary arteriogram:  a. Right coronary artery - preponderant. A large caliber vessel with  10% to 15% segmental narrowing noted at the level of the bales's  crook. The distal vessel was large, giving rise to a large  posterolateral marginal branch which was widely patent. b.  Left coronary:  1. Left main trunk - widely patent. 2.  Left anterior descending - mild ectasia noted proximally followed by  10% stenosis seen in the origin of the first large diagonal branch  immediately after the takeoff of the diagonal branch. In the  continuation of the anterior descending artery, there is 15% segmental  narrowing. In an intramyocardial segment in the mid segment of the LAD,  there is 15% segmental narrowing. 3.  Circumflex - mild to moderate ectasia noted throughout the proximal  mid third trunk. There are mild luminal wall irregularities noted. III. Left ventricular angiogram:  Left ventricular cavity size is  enlarged with increased trabeculation and a minimal decrease in global  systolic function. Estimated left ventricular ejection fraction 55%. IV. Ascending aortogram:  The ascending and descending thoracic aorta  are tortuous with mild ectasia noted of the proximal ascending aorta. No discrete evidence of dissection. The aortic valve appears trileaflet  in morphology. CONCLUSIONS:  1. 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 2.  Below normal left ventricular systolic function. 3.  Ectasia of thoracic aorta.      [facility] Stress Test (8/2021)  Impression  1. Small-moderate basal inferoseptal stress perfusion deficit with  improvement at rest but with significant artifact noted. Suspect  abnormality secondary to attenuation rather than physiologic ischemia. 2. Mild-moderate global decrease in myocardial segmental wall motion  and calculated gated SPECT left ventricular ejection fraction of 38%. 3. Suggest clinical correlation as warranted    TTE (Dr. Ky Grimm, 8/2021)  Summary   Left ventricle grossly normal in size. Mild left ventricular concentric hypertrophy noted. Global hypokinesis is noted to be moderate. Estimated left ventricular ejection fraction is 38±5%. There is doppler evidence of stage I diastolic dysfunction. Estimated wedge pressure is 11 mmHg. Interatrial septum appears aneurysmal.   Right ventricle global systolic function is mildly reduced . TAPSE is normal.   Physiologic and/or trace mitral regurgitation is present. Trace aortic regurgitation is noted. Physiologic and/or trace tricuspid regurgitation. RVSP is 26 mmHg. No evidence to suggest pulmonary hypertension. Technically fair quality study. Compared to prior echo, no significant changes noted. Suggest clinical correlation. PAST SURGICAL HISTORY:    Past Surgical History:   Procedure Laterality Date    ANKLE FRACTURE SURGERY      CARDIAC CATHETERIZATION  11/11/2019    Dr. Ky Grimm    COLONOSCOPY      ENDOSCOPY, COLON, DIAGNOSTIC      FRACTURE SURGERY         HOME MEDICATIONS:  Prior to Admission medications    Medication Sig Start Date End Date Taking?  Authorizing Provider   aspirin 81 MG EC tablet Take 1 tablet by mouth daily 7/14/22  Yes Brent Iverson APRN - NP   apixaban (ELIQUIS) 5 MG TABS tablet Take 0.5 tablets by mouth 2 times daily 7/14/22  Yes Brent Iverson APRN - NP   hydrALAZINE (APRESOLINE) 50 MG tablet Take 1 tablet by mouth every 8 hours 7/14/22  Yes Brent Iverson APRN - NP   losartan (COZAAR) 50 MG tablet Take 1 tablet by mouth 2 times daily 7/14/22  Yes FRANCES Fields NP   omeprazole (PRILOSEC) 20 MG delayed release capsule Take 2 capsules by mouth daily 7/14/22  Yes FRANCES Fields NP   docusate sodium (COLACE) 100 MG capsule Take 100 mg by mouth daily   Yes Historical Provider, MD   acetaminophen (TYLENOL) 500 MG tablet Take 2 tablets by mouth every 8 hours as needed for Pain 9/26/20   Vanda Hawley MD       CURRENT MEDICATIONS:      Current Facility-Administered Medications:     [Held by provider] hydrALAZINE (APRESOLINE) tablet 50 mg, 50 mg, Oral, 3 times per day, FRANCES Fields NP, 50 mg at 07/14/22 1341    pantoprazole (PROTONIX) tablet 40 mg, 40 mg, Oral, QAM AC, Samuel Modi MD, 40 mg at 07/15/22 0512    apixaban (ELIQUIS) tablet 2.5 mg, 2.5 mg, Oral, BID, Devon Marino MD, 2.5 mg at 07/15/22 0056    aspirin EC tablet 81 mg, 81 mg, Oral, Daily, Devon Marino MD, 81 mg at 07/14/22 9606    sodium chloride flush 0.9 % injection 10 mL, 10 mL, IntraVENous, 2 times per day, Ceci Collazo MD, 10 mL at 07/15/22 0057    sodium chloride flush 0.9 % injection 10 mL, 10 mL, IntraVENous, PRN, Devon Marino MD    0.9 % sodium chloride infusion, , IntraVENous, PRN, Devon Marino MD    polyethylene glycol (GLYCOLAX) packet 17 g, 17 g, Oral, Daily PRN, Ceci Collazo MD, 17 g at 07/13/22 1751    acetaminophen (TYLENOL) tablet 650 mg, 650 mg, Oral, Q6H PRN, 650 mg at 07/15/22 0056 **OR** acetaminophen (TYLENOL) suppository 650 mg, 650 mg, Rectal, Q6H PRN, Ceci Collazo MD    [Held by provider] losartan (COZAAR) tablet 50 mg, 50 mg, Oral, BID, Ceic Collazo MD, 50 mg at 07/14/22 2641      ALLERGIES:  Nifedipine    SOCIAL HISTORY:    Lives at home with her son. Uses cane and walker for ambulation.   Denies former/current tobacco, alcohol or illicit drug use    FAMILY HISTORY:   Non-contributory at this time due to patient's advanced age      REVIEW OF SYSTEMS:     Negative except as noted above in HPI    PHYSICAL EXAM:   BP (!) 148/74   Pulse 88   Temp 97.7 °F (36.5 °C) (Oral)   Resp 14   Ht 5' 7\" (1.702 m)   Wt 130 lb 11.2 oz (59.3 kg)   SpO2 100%   BMI 20.47 kg/m²   CONST:  Well developed, well nourished elderly AAF who appears stated age. Awake, alert, cooperative, no apparent distress. [place]  HEENT:   Head- Normocephalic, atraumatic. Eyes- Conjunctivae pink, anicteric. Neck-  No stridor, trachea midline, no apparent jugular venous distention. CHEST: Chest symmetrical and non-tender to palpation. No accessory muscle use or intercostal retractions. RESPIRATORY: Lung sounds - clear throughout fields. CARDIOVASCULAR:     No noted carotid bruit. Heart Ausculation- Irregular rhythm with fast rate, 2/6 systolic murmur RUSB  PV: No lower extremity edema. Pedal pulses palpable, no clubbing or cyanosis. ABDOMEN: Soft, non-tender to light palpation. Bowel sounds present. MS: Good muscle strength and tone. No atrophy or abnormal movements. SKIN: Warm and dry. NEURO / PSYCH: Oriented to person, place and time. Speech clear and appropriate. Follows all commands. Pleasant affect. DATA:    Telemetry: Appears AF with RVR HR in the 90s-140s, PVCs    Diagnostic:  All diagnostic testing and lab work thus far this admission reviewed in detail. CXR 7/9/2022  Impression  No acute process. Mild cardiomegaly. CT Head 7/9/2022  Impression  No acute intracranial abnormality. 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 CT C-spine 7/9/2022  Impression  No acute abnormality of the cervical spine. Stable moderate-severe cervical spondylosis. CT Abdomen/Pelvis 7/9/2022  Impression  Wall thickening and inflammatory stranding involving the cecum/ascending  colon as well as the splenic flexure, concerning for colitis. Additional findings as above.     CT Abdomen/Pelvis 7/12/2022  Impression  Interval resolution of previously seen colitis in the ascending colon.       Intake/Output Summary (Last 24 hours) at 7/15/2022 0729  Last data filed at 7/15/2022 0339  Gross per 24 hour   Intake 0 ml   Output 250 ml   Net -250 ml       Labs:   CBC:   Recent Labs     07/14/22  0526 07/14/22  1800   WBC 4.0* 5.7   HGB 14.1 13.4   HCT 45.8 41.7    188     BMP:   Recent Labs     07/13/22  0536 07/14/22  0526    138   K 4.5 3.9   CO2 26 20*   BUN 12 10   CREATININE 1.0 0.7   LABGLOM >60 >60   CALCIUM 9.0 9.2     Mag:   Recent Labs     07/12/22  1613   MG 1.7     HgA1c:   Lab Results   Component Value Date    LABA1C 5.5 11/07/2019     FASTING LIPID PANEL:  Lab Results   Component Value Date/Time    CHOL 186 11/07/2019 06:01 AM    HDL 70 11/07/2019 06:01 AM    LDLCALC 107 11/07/2019 06:01 AM    TRIG 44 11/07/2019 06:01 AM      Ref Range & Units 07/09/22 2110 07/09/22 1603   Troponin, High Sensitivity 0 - 9 ng/L 13 High   16 High  CM      Ref Range & Units 07/10/22 0502   Troponin, High Sensitivity 0 - 9 ng/L 15 High       Component Ref Range & Units 07/10/22 0828   Troponin, High Sensitivity 0 - 9 ng/L 15 High          Ref Range & Units 07/09/22 1955 07/09/22 1603    Lactic Acid 0.5 - 2.2 mmol/L 1.5  2.6 High       Component Ref Range & Units 07/10/22 0146   CRP 0.0 - 0.4 mg/dL 0.3          07/09/22 1711    Color, UA Straw/Yellow Yellow    Clarity, UA Clear Clear    Glucose, Ur Negative mg/dL Negative    Bilirubin Urine Negative Negative    Ketones, Urine Negative mg/dL Negative    Specific Gravity, UA 1.005 - 1.030 1.015    Blood, Urine Negative Negative    pH, UA 5.0 - 9.0 7.5    Protein, UA Negative mg/dL Negative    Urobilinogen, Urine <2.0 E.U./dL 0.2    Nitrite, Urine Negative Negative    Leukocyte Esterase, Urine Negative SMALL Abnormal     WBC, UA 0 - 5 /HPF 0-1    RBC, UA 0 - 2 /HPF NONE    Epithelial Cells, UA /HPF RARE    Bacteria, UA None Seen /HPF NONE SEEN        ASSESSMENT:  Recurrent syncope of undetermined etiology, and contributed more than 50% of the patient care. Patient with severe AAS but also has fever of unknown origin. He is not a great historian and not able to verbalize symptoms.   Proceed with infectious etiology and to rule out endocarditis and when patient is stable he will need both ischemic evaluation and further evaluation of the aortic valve and be referred to the structural heart team to discuss management of the aortic stenosis

## 2022-07-15 NOTE — CARE COORDINATION
7/14/2022 1052 CM note: No covid testing. Pt resides in a 1 story home,2 step entry with her son Davy Styles. Pt's family assist her with ADLs and provide 24/7 care. DME include: bsc, ww,cane, shower chair. PT recommendations reviewed with pt's dtr Ayaka Carl. [facility] declines SNF but agreeable to [place] [phone]. [facility] accepted patient, received orders, and and can start services Monday 7/18/22. Plan is for pt to return home with her son and [place] [phone] at d/henry.  Ehsan DAMON

## 2022-07-15 NOTE — CARE COORDINATION
BRENNANM for dtr Hendricks Councilman explaining IMM letter and requested a call back.  Electronically signed by Michelle Melara on 7/15/2022 at 9:08 AM

## 2022-07-15 NOTE — PROGRESS NOTES
Department of Internal Medicine  General Internal Medicine  Attending Progress Note  Chief Complaint   Patient presents with    Fall     Possible syncopal episode at home in the bathroom. Unknown head injury +Thinners. Abdominal Pain     \"for a while\" per family. SUBJECTIVE:    Confused, but alert. Lying comfortable in bed.     OBJECTIVE      Medications    Current Facility-Administered Medications: perflutren lipid microspheres (DEFINITY) injection 1.65 mg, 1.5 mL, IntraVENous, ONCE PRN  [Held by provider] hydrALAZINE (APRESOLINE) tablet 50 mg, 50 mg, Oral, 3 times per day  pantoprazole (PROTONIX) tablet 40 mg, 40 mg, Oral, QAM AC  apixaban (ELIQUIS) tablet 2.5 mg, 2.5 mg, Oral, BID  aspirin EC tablet 81 mg, 81 mg, Oral, Daily  sodium chloride flush 0.9 % injection 10 mL, 10 mL, IntraVENous, 2 times per day  sodium chloride flush 0.9 % injection 10 mL, 10 mL, IntraVENous, PRN  0.9 % sodium chloride infusion, , IntraVENous, PRN  polyethylene glycol (GLYCOLAX) packet 17 g, 17 g, Oral, Daily PRN  acetaminophen (TYLENOL) tablet 650 mg, 650 mg, Oral, Q6H PRN **OR** acetaminophen (TYLENOL) suppository 650 mg, 650 mg, Rectal, Q6H PRN  [Held by provider] losartan (COZAAR) tablet 50 mg, 50 mg, Oral, BID  Physical    VITALS:  BP (!) 164/93   Pulse 64   Temp 98.4 °F (36.9 °C) (Oral)   Resp 17   Ht 5' 7\" (1.702 m)   Wt 130 lb 11.2 oz (59.3 kg)   SpO2 100%   BMI 20.47 kg/m²   CONSTITUTIONAL:  awake  EYES:  extra-ocular muscles intact  ENT:  normocepalic, without obvious abnormality  NECK:  supple, symmetrical, trachea midline  LUNGS:  no increased work of breathing, no retractions, and clear to auscultation  CARDIOVASCULAR:  normal apical pulses and normal S1 and S2  ABDOMEN:  normal bowel sounds, non-distended, and non-tender  MUSCULOSKELETAL:  full range of motion noted  NEUROLOGIC:  Mental Status Exam:  Level of Alertness:   awake  SKIN:  no bruising or bleeding  Data    CBC:   Lab Results   Component Value Date/Time    WBC 4.2 07/15/2022 02:08 PM    RBC 4.01 07/15/2022 02:08 PM    HGB 12.2 07/15/2022 02:08 PM    HCT 36.8 07/15/2022 02:08 PM    MCV 91.8 07/15/2022 02:08 PM    MCH 30.4 07/15/2022 02:08 PM    MCHC 33.2 07/15/2022 02:08 PM    RDW 15.1 07/15/2022 02:08 PM     07/15/2022 02:08 PM    MPV 9.6 07/15/2022 02:08 PM     BMP:    Lab Results   Component Value Date/Time     07/14/2022 05:26 AM    K 3.9 07/14/2022 05:26 AM     07/14/2022 05:26 AM    CO2 20 07/14/2022 05:26 AM    BUN 10 07/14/2022 05:26 AM    LABALBU 3.5 07/09/2022 04:03 PM    CREATININE 0.7 07/14/2022 05:26 AM    CALCIUM 9.2 07/14/2022 05:26 AM    GFRAA >60 07/14/2022 05:26 AM    LABGLOM >60 07/14/2022 05:26 AM    GLUCOSE 86 07/14/2022 05:26 AM    GLUCOSE 102 11/12/2010 01:50 PM       ASSESSMENT AND PLAN      Principal Problem:  1. Syncope and collapse  Repeated syncope yesterday. Consult placed to cardiology  Will start iv fluid today due to poor oral intake  Suspected vasovagal and possible arrhythmia  Patient at high risk of fall, needs to reevaluate risk vs benefit of [facility]    2. Demential    3. Atrial Fibrillation: rate controlled  Continue eliquis for now    4.   Hypertension Essential:  Fair control  Continue to home BP meds

## 2022-07-15 NOTE — PROGRESS NOTES
Physical Therapy    Physical Therapy Treatment Note/Plan of Care    Room #:  0424/0424-01  Patient Name: Juan Camara  YOB: 1931  MRN: [id number]    Date of Service: 7/15/2022     Tentative placement recommendation: Subacute vs Home Health Physical Therapy if patient meets goals    Equipment recommendation: Patient has needed equipment       Evaluating Physical Therapist: Elena Rucker, PT  #[id number]      Specific Provider Orders/Date/Referring Provider :  07/11/22 Sha5   PT eval and treat Start: 07/11/22 1445, End: 07/11/22 1445, ONE TIME, Standing Count: 1 Occurrences, Anne Marie Ruth MD      Admitting Diagnosis:   Syncope and collapse [R55]    Admitted with  Syncope from standing with fall at home; occurred again yesterday with nursing  Surgery: none      Patient Active Problem List   Diagnosis    Systolic hypertension    Hypertension, essential, benign    Paroxysmal atrial fibrillation (HCC)    Bradycardia    Chronic anticoagulation--Eliquis    Syncope and collapse    Moderate protein-calorie malnutrition (HCC)    Chest pain    Chronic combined systolic (congestive) and diastolic (congestive) heart failure (HCC)    Abnormal urinalysis    Orthostatic hypotension    Coronary artery disease involving native coronary artery of native heart without angina pectoris    Urinary tract infection without hematuria        ASSESSMENT of Current Deficits Patient exhibits decreased strength, balance and endurance impairing functional mobility, transfers, gait , gait distance and tolerance to activity are barriers to d/c and require skilled intervention during hospital stay to attain pre hospital level of function. Decreased strength, balance and endurance  increases patient's risk for fall. Patient with impaired safety awareness, can be impulsive, requiring cueing for safety.     PHYSICAL THERAPY  PLAN OF CARE       Physical therapy plan of care is established based on physician order,  patient diagnosis and clinical assessment    Current Treatment Recommendations:    -Standing Balance: Perform strengthening exercises in standing to promote motor control with or without upper extremity support   -Transfers: Provide instruction on proper hand and foot position for adequate transfer of weight onto lower extremities and use of gait device if needed and Cues for hand placement, technique and safety. Provide stabilization to prevent fall   -Gait: Gait training and Standing activities to improve: base of support, weight shift, weight bearing    -Endurance: Utilize Supervised activities to increase level of endurance to allow for safe functional mobility including transfers and gait     PT long term treatment goals are located in below grid    Patient and or family understand(s) diagnosis, prognosis, and plan of care. Frequency of treatments: Patient will be seen  daily. Prior Level of Function: Patient ambulated with wheeled walker    Rehab Potential: good    for baseline    Past medical history:   Past Medical History:   Diagnosis Date    Arthritis     Atrial fibrillation (HCC)     Chronic combined systolic (congestive) and diastolic (congestive) heart failure ([address] 11/7/2021    Coronary artery disease involving native coronary artery of native heart without angina pectoris     Hx of blood clots     Hypertension     Ischemic colitis ([address]     PAF (paroxysmal atrial fibrillation) ([address]      Past Surgical History:   Procedure Laterality Date    ANKLE FRACTURE SURGERY      CARDIAC CATHETERIZATION  11/11/2019    Dr. Thania Graves    COLONOSCOPY      ENDOSCOPY, COLON, DIAGNOSTIC      FRACTURE SURGERY         SUBJECTIVE:    Precautions:  Up with assistance and Orthostatic blood pressure and pulse , falls and alarm ,  hard of hearing   Social history: Patient lives with son in a ranch home  with 2 steps  to enter with [facility] owned: Ori, [address], Bedside commode and [address] [facility] chair,       AM-PAC Basic Mobility          AM-[facility] Mobility Inpatient   How much difficulty turning over in bed?: None  How much difficulty sitting down on / standing up from a chair with arms?: A Little  How much difficulty moving from lying on back to sitting on side of bed?: None  How much help from another person moving to and from a bed to a chair?: A Little  How much help from another person needed to walk in hospital room?: A Little  How much help from another person for climbing 3-5 steps with a railing?: A Little  AM-[facility] Inpatient Mobility Raw Score : 20  AM-PAC Inpatient T-Scale Score : 47.67  Mobility Inpatient CMS 0-100% Score: 35.83  Mobility Inpatient CMS G-Code Modifier : [address] cleared patient for PT treatment. OBJECTIVE;   Initial Evaluation  Date: 7/12/2022 Treatment Date:   7/15/2022    Short Term/ Long Term   Goals   Was pt agreeable to Eval/treatment? Yes yes To be met in 3 days   Pain level   0/10    None reported    Bed Mobility    Rolling: Minimal assist of 1    Supine to sit: Minimal assist of 1    Sit to supine: Minimal assist of 1    Scooting: Minimal assist of 1   Rolling: Independent   Supine to sit: Independent   Sit to supine: Independent   Scooting: Independent    Rolling: Independent    Supine to sit:  Independent    Sit to supine: Independent    Scooting: Independent     Transfers Sit to stand: Minimal assist of 1 from bed x 2 reps and Bedside commode  Sit to stand: Minimal assist of 1 cues for hand placement     Sit to stand: Independent     Ambulation     2x50 feet using  wheeled walker with Minimal assist of 1    2nd trial with cane 2 x 80  2 x 20 feet using  wheeled walker with Minimal assist of 1   cues for upright posture, walker approximation and pacing requires assist for walker management and safety      100 feet using  least restrictive device with Supervision     Stair negotiation: ascended and descended   Not assessed     2 steps with rial supervision    ROM Within functional limits        Strength BUE:   4-/5  RLE:  4-/5  LLE:  4-/5  Increase strength in affected mm groups by 1/3 grade   Balance Sitting EOB:  good    Dynamic Standing:  fair minus with device Sitting EOB: good   Dynamic Standing: fair with wheeled walker   Sitting EOB:  good    Dynamic Standing: good       Patient is Alert & Oriented x person, place, time and situation and follows directions      Edema:  no   Endurance: fair        Vitals:  room air    Blood Pressure at rest   Blood Pressure during session     Heart Rate at rest   Heart Rate during session     SPO2 at rest  %  SPO2 during session  %        Patient education  Patient educated on role of Physical Therapy, risks of immobility, safety and plan of care,  importance of mobility while in hospital , safety  and seated exercises      Patient response to education:   Pt verbalized understanding Pt demonstrated skill Pt requires further education in this area   Yes Partial Yes      Treatment:  Patient practiced and was instructed/facilitated in the following treatment: Patient to edge of bed,   Sat edge of bed 7 minutes with Supervision  to increase dynamic sitting balance and activity tolerance. Pt stood, ambulated in the hallway and back to the edge of the bed. pt amb to/from bathroom, assisted on/off commode, stood at sink  with supervision for balance to wash hands, and back to the edge of the bed. Patient performed seated exercises. Pt returned to bed. Therapeutic Exercises:  ankle pumps, hip abduction/adduction, long arc quad, and seated marching,  x  20 reps. AROM    At end of session, patient in bed with alarm and telesitter  call light and phone within reach,  all lines and tubes intact, nursing notified. Patient would benefit from continued skilled Physical Therapy to improve functional independence and quality of life.          Patient's/ family goals   home    Time in  14:14  Time out  14:37    Total Treatment Time  23 minutes    CPT codes:  Therapeutic activities (83098)   14 minutes  1 unit(s)  Therapeutic exercises (35684)   9 minutes  1 unit(s)    Ronald Alexis  \[facility]\""  LIC # [id number]

## 2022-07-16 PROCEDURE — 6370000000 HC RX 637 (ALT 250 FOR IP): Performed by: INTERNAL MEDICINE

## 2022-07-16 PROCEDURE — 2580000003 HC RX 258: Performed by: INTERNAL MEDICINE

## 2022-07-16 PROCEDURE — 6370000000 HC RX 637 (ALT 250 FOR IP): Performed by: PHYSICIAN ASSISTANT

## 2022-07-16 PROCEDURE — 99233 SBSQ HOSP IP/OBS HIGH 50: CPT | Performed by: INTERNAL MEDICINE

## 2022-07-16 PROCEDURE — 99232 SBSQ HOSP IP/OBS MODERATE 35: CPT | Performed by: INTERNAL MEDICINE

## 2022-07-16 PROCEDURE — 1200000000 HC SEMI PRIVATE

## 2022-07-16 RX ORDER — HYDRALAZINE HYDROCHLORIDE 25 MG/1
25 TABLET, FILM COATED ORAL EVERY 8 HOURS SCHEDULED
Status: DISCONTINUED | OUTPATIENT
Start: 2022-07-16 | End: 2022-07-18 | Stop reason: HOSPADM

## 2022-07-16 RX ORDER — LOSARTAN POTASSIUM 25 MG/1
25 TABLET ORAL DAILY
Status: DISCONTINUED | OUTPATIENT
Start: 2022-07-16 | End: 2022-07-18 | Stop reason: HOSPADM

## 2022-07-16 RX ADMIN — AMIODARONE HYDROCHLORIDE 200 MG: 200 TABLET ORAL at 08:02

## 2022-07-16 RX ADMIN — POLYETHYLENE GLYCOL 3350 17 G: 17 POWDER, FOR SOLUTION ORAL at 03:39

## 2022-07-16 RX ADMIN — HYDRALAZINE HYDROCHLORIDE 25 MG: 25 TABLET, FILM COATED ORAL at 20:56

## 2022-07-16 RX ADMIN — LOSARTAN POTASSIUM 25 MG: 25 TABLET, FILM COATED ORAL at 17:38

## 2022-07-16 RX ADMIN — APIXABAN 2.5 MG: 5 TABLET, FILM COATED ORAL at 20:56

## 2022-07-16 RX ADMIN — Medication 10 ML: at 20:58

## 2022-07-16 RX ADMIN — ACETAMINOPHEN 325MG 650 MG: 325 TABLET ORAL at 22:23

## 2022-07-16 RX ADMIN — Medication 10 ML: at 08:23

## 2022-07-16 RX ADMIN — HYDRALAZINE HYDROCHLORIDE 25 MG: 25 TABLET, FILM COATED ORAL at 17:38

## 2022-07-16 RX ADMIN — PANTOPRAZOLE SODIUM 40 MG: 40 TABLET, DELAYED RELEASE ORAL at 05:54

## 2022-07-16 RX ADMIN — CARVEDILOL 3.12 MG: 3.12 TABLET, FILM COATED ORAL at 08:02

## 2022-07-16 RX ADMIN — APIXABAN 2.5 MG: 5 TABLET, FILM COATED ORAL at 08:02

## 2022-07-16 RX ADMIN — ACETAMINOPHEN 325MG 650 MG: 325 TABLET ORAL at 03:39

## 2022-07-16 ASSESSMENT — PAIN DESCRIPTION - DESCRIPTORS
DESCRIPTORS: CRAMPING;ACHING
DESCRIPTORS: PATIENT UNABLE TO DESCRIBE

## 2022-07-16 ASSESSMENT — PAIN SCALES - GENERAL
PAINLEVEL_OUTOF10: 6
PAINLEVEL_OUTOF10: 7

## 2022-07-16 ASSESSMENT — PAIN - FUNCTIONAL ASSESSMENT: PAIN_FUNCTIONAL_ASSESSMENT: ACTIVITIES ARE NOT PREVENTED

## 2022-07-16 ASSESSMENT — PAIN DESCRIPTION - LOCATION
LOCATION: ABDOMEN
LOCATION: LEG;FOOT

## 2022-07-16 ASSESSMENT — PAIN DESCRIPTION - ORIENTATION: ORIENTATION: RIGHT;LEFT

## 2022-07-16 NOTE — PROGRESS NOTES
Department of Internal Medicine  General Internal Medicine  Attending Progress Note  Chief Complaint   Patient presents with    Fall     Possible syncopal episode at home in the bathroom. Unknown head injury +Thinners. Abdominal Pain     \"for a while\" per family. SUBJECTIVE:    Reports some abdominal discomfort. She is unable to elaborate.      OBJECTIVE      Medications    Current Facility-Administered Medications: perflutren lipid microspheres (DEFINITY) injection 1.65 mg, 1.5 mL, IntraVENous, ONCE PRN  carvedilol (COREG) tablet 3.125 mg, 3.125 mg, Oral, BID WC  amiodarone (CORDARONE) tablet 200 mg, 200 mg, Oral, Daily  [Held by provider] hydrALAZINE (APRESOLINE) tablet 50 mg, 50 mg, Oral, 3 times per day  pantoprazole (PROTONIX) tablet 40 mg, 40 mg, Oral, QAM AC  apixaban (ELIQUIS) tablet 2.5 mg, 2.5 mg, Oral, BID  sodium chloride flush 0.9 % injection 10 mL, 10 mL, IntraVENous, 2 times per day  sodium chloride flush 0.9 % injection 10 mL, 10 mL, IntraVENous, PRN  0.9 % sodium chloride infusion, , IntraVENous, PRN  polyethylene glycol (GLYCOLAX) packet 17 g, 17 g, Oral, Daily PRN  acetaminophen (TYLENOL) tablet 650 mg, 650 mg, Oral, Q6H PRN **OR** acetaminophen (TYLENOL) suppository 650 mg, 650 mg, Rectal, Q6H PRN  [Held by provider] losartan (COZAAR) tablet 50 mg, 50 mg, Oral, BID  Physical    VITALS:  BP (!) 150/72   Pulse 71   Temp 99 °F (37.2 °C) (Oral)   Resp 17   Ht 5' 7\" (1.702 m)   Wt 130 lb 11.2 oz (59.3 kg)   SpO2 100%   BMI 20.47 kg/m²   CONSTITUTIONAL:  awake and alert  EYES:  extra-ocular muscles intact and vision intact  ENT:  normocepalic, without obvious abnormality  LUNGS:  no increased work of breathing, no retractions, and clear to auscultation  CARDIOVASCULAR:  normal apical pulses  ABDOMEN:  normal bowel sounds, non-distended, and non-tender  MUSCULOSKELETAL:  full range of motion noted  NEUROLOGIC:  Mental Status Exam:  Level of Alertness:   awake  SKIN:  no bruising or bleeding  Data    CBC:   Lab Results   Component Value Date/Time    WBC 4.2 07/15/2022 02:08 PM    RBC 4.01 07/15/2022 02:08 PM    HGB 12.2 07/15/2022 02:08 PM    HCT 36.8 07/15/2022 02:08 PM    MCV 91.8 07/15/2022 02:08 PM    MCH 30.4 07/15/2022 02:08 PM    MCHC 33.2 07/15/2022 02:08 PM    RDW 15.1 07/15/2022 02:08 PM     07/15/2022 02:08 PM    MPV 9.6 07/15/2022 02:08 PM     CMP:    Lab Results   Component Value Date/Time     07/15/2022 02:08 PM    K 4.0 07/15/2022 02:08 PM    K 3.9 07/14/2022 05:26 AM     07/15/2022 02:08 PM    CO2 21 07/15/2022 02:08 PM    BUN 9 07/15/2022 02:08 PM    CREATININE 0.9 07/15/2022 02:08 PM    GFRAA >60 07/15/2022 02:08 PM    LABGLOM >60 07/15/2022 02:08 PM    GLUCOSE 101 07/15/2022 02:08 PM    GLUCOSE 102 11/12/2010 01:50 PM    PROT 5.9 07/15/2022 02:08 PM    LABALBU 3.2 07/15/2022 02:08 PM    CALCIUM 8.7 07/15/2022 02:08 PM    BILITOT 0.5 07/15/2022 02:08 PM    ALKPHOS 67 07/15/2022 02:08 PM    AST 16 07/15/2022 02:08 PM    ALT 9 07/15/2022 02:08 PM       ASSESSMENT AND PLAN      1. Syncope and collapse:  Appreciate cardiology input  Continue conservative management  Echo mostly unremarkable, but noted stage 1 diastolic dysfunction    2. Atrial Fibrillation: rate is controlled. Resume amio and coreg. Continue eliquis, but final [address] decision to be made. 3.  Abdominal pain: will treat with PPI. CT scan overall without acute abdomen. 4.  Hypertension Essential: fair control. BP is notably labile. Will monitor on current BP medications    5. Dementia: continue tele monitor.

## 2022-07-16 NOTE — PROGRESS NOTES
carvedilol  3.125 mg Oral BID WC    amiodarone  200 mg Oral Daily    [Held by provider] hydrALAZINE  50 mg Oral 3 times per day    pantoprazole  40 mg Oral QAM AC    apixaban  2.5 mg Oral BID    sodium chloride flush  10 mL IntraVENous 2 times per day    [Held by provider] losartan  50 mg Oral BID       IV Infusions (if any):   sodium chloride           PHYSICAL EXAM:     CONSTITUTIONAL:   BP (!) 150/72   Pulse 71   Temp 99 °F (37.2 °C) (Oral)   Resp 17   Ht 5' 7\" (1.702 m)   Wt 130 lb 11.2 oz (59.3 kg)   SpO2 100%   BMI 20.47 kg/m²   Pulse  Av.2  Min: 62  Max: 72  Systolic (25UUR), DFM:396 , Min:147 , EKO:372    Diastolic (71PRR), TCB:35, Min:72, Max:102    In general, this is a well developed, well nourished who appears stated age, awake, alert, cooperative, no apparent distress  HEENT: eyes -conjunctivae pink,  Throat - Oral mucosa pink and moist.   Neck-  no stridor, no noted enlargement of the thyroid, no carotid bruit. no jugular venous distention   RESPIRATORY: Chest symmetrical  No accessory muscle use. Lung auscultation - clear to auscultation except few rhonchi  CARDIOVASCULAR:     Heart Ausculation - Regular rate and rhythm, 1/6 systolic murmur, No s3 or rub. No lower extremity edema, Distal pulses palpable, no clubbing or cyanosis   ABDOMEN: Soft,  Bowel sounds present. MS: good muscle strength and tone. : Deferred  Rectal Exam: Deferred  SKIN: warm and dry no statis dermatitis or ulcers   NEURO / PSYCH: oriented to person, place        ASSESSMENT/PLAN:     Syncope and collapse - Hx of syncope    LV dysfunction - EF about 38% - Continue  Coreg, Losartan.  Later add Aldactone    PAF - On Adjsuted dose Eliquis and Amiodarone  - Dr Radha Townsend recommended 14-day ZIO event monitor upon discharge to assess for AF burden/recurrence     Hx of SVT - Continue BB - Monitor BP and HR    HTN  - Adjust her BP meds Losartan and Hydralazine    DNR CCA status            Cardiology will see as needed    Out-pt event 14 day Event monitor and then f/u in with her Cardiologist, Dr Martha Gallego in 3-4 weeks    Above recommendations discussed with her.     Electronically signed by Rosemary Zavala MD on 7/16/2022 at 3:26 PM  [facility]) Cardiology

## 2022-07-16 NOTE — PROGRESS NOTES
Pt states: \"Sometimes I feel my heart beat slow, then speed up, then it feels like it is not beating \"

## 2022-07-17 LAB
BILIRUBIN URINE: NEGATIVE
BLOOD, URINE: NEGATIVE
CLARITY: CLEAR
COLOR: YELLOW
GLUCOSE URINE: NEGATIVE MG/DL
KETONES, URINE: NEGATIVE MG/DL
LEUKOCYTE ESTERASE, URINE: NEGATIVE
NITRITE, URINE: NEGATIVE
PH UA: 7 (ref 5–9)
PROTEIN UA: NEGATIVE MG/DL
SPECIFIC GRAVITY UA: 1.01 (ref 1–1.03)
UROBILINOGEN, URINE: 0.2 E.U./DL

## 2022-07-17 PROCEDURE — 99232 SBSQ HOSP IP/OBS MODERATE 35: CPT | Performed by: INTERNAL MEDICINE

## 2022-07-17 PROCEDURE — 6370000000 HC RX 637 (ALT 250 FOR IP): Performed by: PHYSICIAN ASSISTANT

## 2022-07-17 PROCEDURE — 6370000000 HC RX 637 (ALT 250 FOR IP): Performed by: INTERNAL MEDICINE

## 2022-07-17 PROCEDURE — 2580000003 HC RX 258: Performed by: INTERNAL MEDICINE

## 2022-07-17 PROCEDURE — 81003 URINALYSIS AUTO W/O SCOPE: CPT

## 2022-07-17 PROCEDURE — 1200000000 HC SEMI PRIVATE

## 2022-07-17 PROCEDURE — 87088 URINE BACTERIA CULTURE: CPT

## 2022-07-17 RX ADMIN — APIXABAN 2.5 MG: 5 TABLET, FILM COATED ORAL at 21:56

## 2022-07-17 RX ADMIN — HYDRALAZINE HYDROCHLORIDE 25 MG: 25 TABLET, FILM COATED ORAL at 06:40

## 2022-07-17 RX ADMIN — ACETAMINOPHEN 325MG 650 MG: 325 TABLET ORAL at 21:55

## 2022-07-17 RX ADMIN — PANTOPRAZOLE SODIUM 40 MG: 40 TABLET, DELAYED RELEASE ORAL at 06:40

## 2022-07-17 RX ADMIN — POLYETHYLENE GLYCOL 3350 17 G: 17 POWDER, FOR SOLUTION ORAL at 21:56

## 2022-07-17 RX ADMIN — APIXABAN 2.5 MG: 5 TABLET, FILM COATED ORAL at 10:23

## 2022-07-17 RX ADMIN — HYDRALAZINE HYDROCHLORIDE 25 MG: 25 TABLET, FILM COATED ORAL at 21:56

## 2022-07-17 RX ADMIN — LOSARTAN POTASSIUM 25 MG: 25 TABLET, FILM COATED ORAL at 10:22

## 2022-07-17 RX ADMIN — AMIODARONE HYDROCHLORIDE 200 MG: 200 TABLET ORAL at 10:22

## 2022-07-17 RX ADMIN — Medication 10 ML: at 21:00

## 2022-07-17 RX ADMIN — CARVEDILOL 3.12 MG: 3.12 TABLET, FILM COATED ORAL at 18:04

## 2022-07-17 RX ADMIN — Medication 10 ML: at 10:23

## 2022-07-17 RX ADMIN — CARVEDILOL 3.12 MG: 3.12 TABLET, FILM COATED ORAL at 10:22

## 2022-07-17 ASSESSMENT — PAIN SCALES - GENERAL: PAINLEVEL_OUTOF10: 3

## 2022-07-17 ASSESSMENT — PAIN DESCRIPTION - LOCATION: LOCATION: ABDOMEN

## 2022-07-17 NOTE — PROGRESS NOTES
3Department of Internal Medicine  General Internal Medicine  Attending Progress Note  Chief Complaint   Patient presents with    Fall     Possible syncopal episode at home in the bathroom. Unknown head injury +Thinners. Abdominal Pain     \"for a while\" per family. SUBJECTIVE:    Patient more alert today. Complaining of vague abdominal pain. She stated that she has UTI. She did denied fever or chills. She has more clarity in mentation today.      OBJECTIVE      Medications    Current Facility-Administered Medications: losartan (COZAAR) tablet 25 mg, 25 mg, Oral, Daily  hydrALAZINE (APRESOLINE) tablet 25 mg, 25 mg, Oral, 3 times per day  perflutren lipid microspheres (DEFINITY) injection 1.65 mg, 1.5 mL, IntraVENous, ONCE PRN  carvedilol (COREG) tablet 3.125 mg, 3.125 mg, Oral, BID WC  amiodarone (CORDARONE) tablet 200 mg, 200 mg, Oral, Daily  pantoprazole (PROTONIX) tablet 40 mg, 40 mg, Oral, QAM AC  apixaban (ELIQUIS) tablet 2.5 mg, 2.5 mg, Oral, BID  sodium chloride flush 0.9 % injection 10 mL, 10 mL, IntraVENous, 2 times per day  sodium chloride flush 0.9 % injection 10 mL, 10 mL, IntraVENous, PRN  0.9 % sodium chloride infusion, , IntraVENous, PRN  polyethylene glycol (GLYCOLAX) packet 17 g, 17 g, Oral, Daily PRN  acetaminophen (TYLENOL) tablet 650 mg, 650 mg, Oral, Q6H PRN **OR** acetaminophen (TYLENOL) suppository 650 mg, 650 mg, Rectal, Q6H PRN  Physical    VITALS:  BP (!) 158/82   Pulse 68   Temp 98.2 °F (36.8 °C) (Oral)   Resp 18   Ht 5' 7\" (1.702 m)   Wt 130 lb 11.2 oz (59.3 kg)   SpO2 100%   BMI 20.47 kg/m²   CONSTITUTIONAL:  awake  EYES:  extra-ocular muscles intact and vision intact  ENT:  normocepalic, without obvious abnormality  NECK:  supple, symmetrical, trachea midline  LUNGS:  no increased work of breathing, no retractions, and clear to auscultation  CARDIOVASCULAR:  normal apical pulses and normal S1 and S2  ABDOMEN:  normal bowel sounds, non-distended, and non-tender  MUSCULOSKELETAL:  there is no redness, warmth, or swelling of the joints  NEUROLOGIC:  Mental Status Exam:  Level of Alertness:   awake  Orientation:   oriented to person  Data    CBC:   Lab Results   Component Value Date/Time    WBC 4.2 07/15/2022 02:08 PM    RBC 4.01 07/15/2022 02:08 PM    HGB 12.2 07/15/2022 02:08 PM    HCT 36.8 07/15/2022 02:08 PM    MCV 91.8 07/15/2022 02:08 PM    MCH 30.4 07/15/2022 02:08 PM    MCHC 33.2 07/15/2022 02:08 PM    RDW 15.1 07/15/2022 02:08 PM     07/15/2022 02:08 PM    MPV 9.6 07/15/2022 02:08 PM     BMP:    Lab Results   Component Value Date/Time     07/15/2022 02:08 PM    K 4.0 07/15/2022 02:08 PM    K 3.9 07/14/2022 05:26 AM     07/15/2022 02:08 PM    CO2 21 07/15/2022 02:08 PM    BUN 9 07/15/2022 02:08 PM    LABALBU 3.2 07/15/2022 02:08 PM    CREATININE 0.9 07/15/2022 02:08 PM    CALCIUM 8.7 07/15/2022 02:08 PM    GFRAA >60 07/15/2022 02:08 PM    LABGLOM >60 07/15/2022 02:08 PM    GLUCOSE 101 07/15/2022 02:08 PM    GLUCOSE 102 11/12/2010 01:50 PM       ASSESSMENT AND PLAN      Brief Summary of stay:  Patient was admitted with syncope. She was noted to be ortho positive. Treated with IV fluid. Planned to discharge her and patient was in the toilet and had another syncope episode. Cardiology consulted and work up initiated. BP meds being titrated. Echo was done and mostly unremarkable. 1.  Syncope and collapse:  Appreciate cardiology input  Continue to encourage hydration and monitor oral intake  Continue supplemental IV fluid when needed    2. Abdominal Pain:  Prior CT suggestive of colitis, zosyn initiated, repeat ct noted resolution  Abx then discontinued. Still complaining of abdominal pain occasionally. Checking UA and Culture    3. Atrial Fibrillation: rate is well controlled  Continue eliquis  Appreciate cardiology recommendation  Continue Amiodarone    4.   Prolonged QT: continue to monitor

## 2022-07-18 VITALS
SYSTOLIC BLOOD PRESSURE: 134 MMHG | TEMPERATURE: 98.6 F | DIASTOLIC BLOOD PRESSURE: 79 MMHG | RESPIRATION RATE: 18 BRPM | HEART RATE: 57 BPM | HEIGHT: 67 IN | BODY MASS INDEX: 20.51 KG/M2 | OXYGEN SATURATION: 100 % | WEIGHT: 130.7 LBS

## 2022-07-18 PROCEDURE — 6370000000 HC RX 637 (ALT 250 FOR IP): Performed by: PHYSICIAN ASSISTANT

## 2022-07-18 PROCEDURE — 6370000000 HC RX 637 (ALT 250 FOR IP): Performed by: INTERNAL MEDICINE

## 2022-07-18 PROCEDURE — 2580000003 HC RX 258: Performed by: INTERNAL MEDICINE

## 2022-07-18 PROCEDURE — 99239 HOSP IP/OBS DSCHRG MGMT >30: CPT | Performed by: INTERNAL MEDICINE

## 2022-07-18 PROCEDURE — 97530 THERAPEUTIC ACTIVITIES: CPT

## 2022-07-18 RX ORDER — CARVEDILOL 3.12 MG/1
3.12 TABLET ORAL 2 TIMES DAILY WITH MEALS
Qty: 60 TABLET | Refills: 3 | Status: ON HOLD | OUTPATIENT
Start: 2022-07-18 | End: 2022-09-14 | Stop reason: HOSPADM

## 2022-07-18 RX ORDER — HYDRALAZINE HYDROCHLORIDE 50 MG/1
25 TABLET, FILM COATED ORAL EVERY 8 HOURS SCHEDULED
Qty: 90 TABLET | Refills: 0 | Status: ON HOLD | OUTPATIENT
Start: 2022-07-18 | End: 2022-09-14 | Stop reason: HOSPADM

## 2022-07-18 RX ORDER — AMIODARONE HYDROCHLORIDE 200 MG/1
200 TABLET ORAL DAILY
Qty: 30 TABLET | Refills: 0 | Status: ON HOLD | OUTPATIENT
Start: 2022-07-19 | End: 2022-09-14 | Stop reason: SDUPTHER

## 2022-07-18 RX ORDER — LOSARTAN POTASSIUM 50 MG/1
25 TABLET ORAL DAILY
Qty: 30 TABLET | Refills: 0 | Status: ON HOLD | OUTPATIENT
Start: 2022-07-18 | End: 2022-09-14 | Stop reason: SDUPTHER

## 2022-07-18 RX ADMIN — HYDRALAZINE HYDROCHLORIDE 25 MG: 25 TABLET, FILM COATED ORAL at 13:33

## 2022-07-18 RX ADMIN — AMIODARONE HYDROCHLORIDE 200 MG: 200 TABLET ORAL at 09:48

## 2022-07-18 RX ADMIN — CARVEDILOL 3.12 MG: 3.12 TABLET, FILM COATED ORAL at 09:48

## 2022-07-18 RX ADMIN — HYDRALAZINE HYDROCHLORIDE 25 MG: 25 TABLET, FILM COATED ORAL at 05:06

## 2022-07-18 RX ADMIN — PANTOPRAZOLE SODIUM 40 MG: 40 TABLET, DELAYED RELEASE ORAL at 05:06

## 2022-07-18 RX ADMIN — CARVEDILOL 3.12 MG: 3.12 TABLET, FILM COATED ORAL at 16:58

## 2022-07-18 RX ADMIN — ACETAMINOPHEN 325MG 650 MG: 325 TABLET ORAL at 05:06

## 2022-07-18 RX ADMIN — APIXABAN 2.5 MG: 5 TABLET, FILM COATED ORAL at 09:48

## 2022-07-18 RX ADMIN — LOSARTAN POTASSIUM 25 MG: 25 TABLET, FILM COATED ORAL at 09:48

## 2022-07-18 RX ADMIN — Medication 10 ML: at 09:50

## 2022-07-18 RX ADMIN — POLYETHYLENE GLYCOL 3350 17 G: 17 POWDER, FOR SOLUTION ORAL at 15:30

## 2022-07-18 ASSESSMENT — PAIN DESCRIPTION - LOCATION: LOCATION: ABDOMEN

## 2022-07-18 ASSESSMENT — PAIN SCALES - GENERAL: PAINLEVEL_OUTOF10: 8

## 2022-07-18 NOTE — PROGRESS NOTES
Physical Therapy    Physical Therapy Treatment Note/Plan of Care    Room #:  0424/0424-01  Patient Name: Catia Morin  YOB: 1931  MRN: [id number]    Date of Service: 7/18/2022     Tentative placement recommendation: Subacute vs Home Health Physical Therapy if patient meets goals    Equipment recommendation: Patient has needed equipment       Evaluating Physical Therapist: Paloma Guzmán, PT  #[id number]      Specific Provider Orders/Date/Referring Provider :  07/11/22 1445   PT eval and treat Start: 07/11/22 1445, End: 07/11/22 1445, ONE TIME, Standing Count: 1 Occurrences, Jaden Tejada MD      Admitting Diagnosis:   Syncope and collapse [R55]    Admitted with  Syncope from standing with fall at home; occurred again yesterday with nursing  Surgery: none      Patient Active Problem List   Diagnosis    Systolic hypertension    Hypertension, essential, benign    Paroxysmal atrial fibrillation (HCC)    Bradycardia    Chronic anticoagulation--Eliquis    Syncope and collapse    Moderate protein-calorie malnutrition (HCC)    Chest pain    Chronic combined systolic (congestive) and diastolic (congestive) heart failure (HCC)    Abnormal urinalysis    Orthostatic hypotension    Coronary artery disease involving native coronary artery of native heart without angina pectoris    Urinary tract infection without hematuria        ASSESSMENT of Current Deficits Patient exhibits decreased strength, balance and endurance impairing functional mobility, transfers, gait , gait distance and tolerance to activity are barriers to d/c and require skilled intervention during hospital stay to attain pre hospital level of function. Decreased strength, balance and endurance  increases patient's risk for fall. Patient with impaired safety awareness, can be impulsive, requiring cueing for safety. Patient kept complaining of pain near groin area, nursing checked in and performed a bladder scan.  Activity limited this session due to pain. PHYSICAL THERAPY  PLAN OF CARE       Physical therapy plan of care is established based on physician order,  patient diagnosis and clinical assessment    Current Treatment Recommendations:    -Standing Balance: Perform strengthening exercises in standing to promote motor control with or without upper extremity support   -Transfers: Provide instruction on proper hand and foot position for adequate transfer of weight onto lower extremities and use of gait device if needed and Cues for hand placement, technique and safety. Provide stabilization to prevent fall   -Gait: Gait training and Standing activities to improve: base of support, weight shift, weight bearing    -Endurance: Utilize Supervised activities to increase level of endurance to allow for safe functional mobility including transfers and gait     PT long term treatment goals are located in below grid    Patient and or family understand(s) diagnosis, prognosis, and plan of care. Frequency of treatments: Patient will be seen  daily. Prior Level of Function: Patient ambulated with wheeled walker    Rehab Potential: good    for baseline    Past medical history:   Past Medical History:   Diagnosis Date    Arthritis     Atrial fibrillation (HCC)     Chronic combined systolic (congestive) and diastolic (congestive) heart failure ([address] 11/7/2021    Coronary artery disease involving native coronary artery of native heart without angina pectoris     Hx of blood clots     Hypertension     Ischemic colitis ([address]     PAF (paroxysmal atrial fibrillation) ([address]      Past Surgical History:   Procedure Laterality Date    ANKLE FRACTURE SURGERY      CARDIAC CATHETERIZATION  11/11/2019    Dr. Louis Carrion    COLONOSCOPY      ENDOSCOPY, COLON, DIAGNOSTIC      FRACTURE SURGERY         SUBJECTIVE:    Precautions:  Up with assistance and Orthostatic blood pressure and pulse , falls and alarm ,  hard of hearing   Social history: Patient lives with son in a ranch home  with 2 steps  to enter with [facility] owned: [address] [place], [address], Bedside commode and Shower chair,       [address] [facility]   How much difficulty turning over in bed?: None  How much difficulty sitting down on / standing up from a chair with arms?: A Little  How much difficulty moving from lying on back to sitting on side of bed?: A Little  How much help from another person moving to and from a bed to a chair?: A Little  How much help from another person needed to walk in hospital room?: A Little  How much help from another person for climbing 3-5 steps with a railing?: A Lot  AM-PAC Inpatient Mobility Raw Score : 18  AM-PAC Inpatient T-Scale Score : 43.63  Mobility Inpatient CMS 0-100% Score: 46.58  Mobility Inpatient CMS G-Code Modifier : CK    Nursing cleared patient for PT treatment. OBJECTIVE;   Initial Evaluation  Date: 7/12/2022 Treatment Date:   7/18/2022    Short Term/ Long Term   Goals   Was pt agreeable to Eval/treatment? Yes yes To be met in 3 days   Pain level   0/10    Near groin/lower abdomen     Bed Mobility    Rolling: Minimal assist of 1    Supine to sit: Minimal assist of 1    Sit to supine: Minimal assist of 1    Scooting: Minimal assist of 1   Rolling: Independent   Supine to sit: Supervision    Sit to supine: Supervision    Scooting: Supervision     Rolling: Independent    Supine to sit:  Independent    Sit to supine: Independent    Scooting: Independent     Transfers Sit to stand: Minimal assist of 1 from bed x 2 reps and Bedside commode  Sit to stand: Not assessed  d/t pain this session   Sit to stand: Independent     Ambulation     2x50 feet using  wheeled walker with Minimal assist of 1    2nd trial with cane not assessed          100 feet using  least restrictive device with Supervision     Stair negotiation: ascended and descended   Not assessed     2 steps with rial supervision    ROM Within functional limits time 5 minutes d/t bladder scan      [facility] PTA VTZ#[id number]

## 2022-07-18 NOTE — DISCHARGE SUMMARY
[facility] Physician Discharge Summary       Raul Rogers MD  [address]  [phone]    Schedule an appointment as soon as possible for a visit on 7/21/2022  Follow-up    [place] [address]  [phone]          Activity level: Slowly increase as tolerated    Diet: ADULT DIET; Regular    Labs: None are pending at the discharge    Condition at discharge: Stable    Dispo: Return to home setting with [facility]    Patient ID:  Yesenia Mcnulty  [id number]  77 y.o.  12/25/1931    Admit date: 7/9/2022    Discharge date and time:  7/18/2022  4:40 PM    Admission Diagnoses: Principal Problem:    Syncope and collapse  Resolved Problems:    * No resolved hospital problems. *      Discharge Diagnoses: Principal Problem:    Syncope and collapse  Resolved Problems:    * No resolved hospital problems. *      Consults:  IP CONSULT TO SOCIAL WORK  IP CONSULT TO CARDIOLOGY    Procedures: None significant except if described in hospital course. Hospital Course:   Patient was admitted with syncope. She was noted to be ortho positive. Treated with IV fluid. Planned to discharge her and patient was in the toilet and had another syncope episode. Cardiology consulted and work up initiated. BP meds being titrated. Echo was done and mostly unremarkable. 1.  Syncope and collapse: due to dehydration and age  I d/w cardiology who cleared her for discharge       2. Abdominal Pain:  Prior CT suggestive of colitis, zosyn initiated, repeat ct noted resolution  Abx then discontinued. Still complaining of abdominal pain occasionally. UA (-)     3. Atrial Fibrillation: rate is well controlled  Continue eliquis,  Dose adjusted: hydralazine, cozar and amiodarone     4.   Prolonged QT: continue to monitor  Cardiology to set up event monitor then f/u with her cardiologist    Discharge Exam:  Vitals:    07/17/22 2145 07/18/22 0500 07/18/22 0745 07/18/22 1315   BP: 135/73 (!) 168/84 (!) 164/81 134/79   Pulse: 52  61 57   Resp: 18  18    Temp: 98.6 °F (37 °C)  98.6 °F (37 °C)    TempSrc: Oral  Oral    SpO2: 100%  100%    Weight:       Height:           No acute distress moist membranes   Normocephalic, without obvious abnormality, atraumatic, external ears without lesions,   Neck supple no cervical lymphadenopathy  Heart has a regular rate and rhythm no murmur  Lungs are clear to auscultation bilaterally with equal movements. Abdomen is soft nontender nondistended no rebound or guarding. No  significant peripheral edema good peripheral perfusion. No significant rashes or new skin lesions. No new focality on neuro exam which is overall grossly intact. Affect and mood seem to be appropriate     No intake/output data recorded. I/O this shift:  In: 250 [P.O.:250]  Out: 0       LABS:  No results for input(s): NA, K, CL, CO2, BUN, CREATININE, GLUCOSE, CALCIUM in the last 72 hours. No results for input(s): WBC, RBC, HGB, HCT, MCV, MCH, MCHC, RDW, PLT, MPV in the last 72 hours. No results for input(s): POCGLU in the last 72 hours. Imaging:  CT Head WO Contrast    Result Date: 7/9/2022  EXAMINATION: CT OF THE HEAD WITHOUT CONTRAST  7/9/2022 3:21 pm TECHNIQUE: CT of the head was performed without the administration of intravenous contrast. Automated exposure control, iterative reconstruction, and/or weight based adjustment of the mA/kV was utilized to reduce the radiation dose to as low as reasonably achievable. Dose: Total Exam DLP: 959.84 mGy-cm. COMPARISON: 11/09/2021 HISTORY: ORDERING SYSTEM PROVIDED HISTORY: fall TECHNOLOGIST PROVIDED HISTORY: Reason for exam:->fall Has a \"code stroke\" or \"stroke alert\" been called? ->No Decision Support Exception - unselect if not a suspected or confirmed emergency medical condition->Emergency Medical Condition (MA) FINDINGS: BRAIN/VENTRICLES: There is no acute intracranial hemorrhage, mass abnormality of the cervical spine. Stable moderate-severe cervical spondylosis. CT ABDOMEN PELVIS W IV CONTRAST Additional Contrast? None    Result Date: 7/9/2022  EXAMINATION: CT OF THE ABDOMEN AND PELVIS WITH CONTRAST 7/9/2022 5:21 pm TECHNIQUE: CT of the abdomen and pelvis was performed with the administration of intravenous contrast. Multiplanar reformatted images are provided for review. Automated exposure control, iterative reconstruction, and/or weight based adjustment of the mA/kV was utilized to reduce the radiation dose to as low as reasonably achievable. COMPARISON: 10/22/2021 HISTORY: ORDERING SYSTEM PROVIDED HISTORY: abd pain TECHNOLOGIST PROVIDED HISTORY: Additional Contrast?->None Reason for exam:->abd pain Decision Support Exception - unselect if not a suspected or confirmed emergency medical condition->Emergency Medical Condition (MA) FINDINGS: Lower Chest: There is cardiomegaly. There is mild bilateral dependent atelectasis. Organs: The liver is not cirrhotic in configuration. Cystic lesion again noted within the medial hepatic segment. The gallbladder, spleen, pancreas, adrenals, and kidneys are unremarkable. GI/Bowel: There is wall thickening and inflammatory stranding of the colon, particularly the cecum/ascending colon as well as the splenic flexure. There is diverticulosis. No evidence of obstruction. Pelvis: The urinary bladder is partially filled. The uterus is unremarkable. Peritoneum/Retroperitoneum: No evidence of ascites or free air. No evidence of retroperitoneal lymphadenopathy. Aorta is normal in caliber without acute abnormality. Bones/Soft Tissues:  No acute abnormality of the visualized osseous structures. There is diffuse decreased bone density. Wall thickening and inflammatory stranding involving the cecum/ascending colon as well as the splenic flexure, concerning for colitis. Additional findings as above.      XR CHEST PORTABLE    Result Date: 7/9/2022  EXAMINATION: ONE XRAY VIEW OF THE CHEST 7/9/2022 4:01 pm COMPARISON: 03/21/2022 HISTORY: ORDERING SYSTEM PROVIDED HISTORY: fall TECHNOLOGIST PROVIDED HISTORY: Reason for exam:->fall FINDINGS: The lungs are without acute focal process. There is no effusion or pneumothorax. Cardiomegaly is noted. The osseous structures are without acute process. Osseous structures appear unremarkable. No acute process. Mild cardiomegaly. Patient Instructions:   Current Discharge Medication List        START taking these medications    Details   carvedilol (COREG) 3.125 MG tablet Take 1 tablet by mouth in the morning and 1 tablet in the evening. Take with meals. Qty: 60 tablet, Refills: 3           CONTINUE these medications which have CHANGED    Details   amiodarone (CORDARONE) 200 MG tablet Take 1 tablet by mouth in the morning. Qty: 30 tablet, Refills: 0      hydrALAZINE (APRESOLINE) 50 MG tablet Take 0.5 tablets by mouth in the morning and 0.5 tablets at noon and 0.5 tablets before bedtime. Qty: 90 tablet, Refills: 0      losartan (COZAAR) 50 MG tablet Take 0.5 tablets by mouth in the morning.   Qty: 30 tablet, Refills: 0      aspirin 81 MG EC tablet Take 1 tablet by mouth daily  Qty: 30 tablet, Refills: 0      apixaban (ELIQUIS) 5 MG TABS tablet Take 0.5 tablets by mouth 2 times daily  Qty: 60 tablet, Refills: 0      omeprazole (PRILOSEC) 20 MG delayed release capsule Take 2 capsules by mouth daily  Qty: 30 capsule, Refills: 0           CONTINUE these medications which have NOT CHANGED    Details   docusate sodium (COLACE) 100 MG capsule Take 100 mg by mouth daily      acetaminophen (TYLENOL) 500 MG tablet Take 2 tablets by mouth every 8 hours as needed for Pain  Qty: 50 tablet, Refills: 0           STOP taking these medications       lisinopril (PRINIVIL;ZESTRIL) 10 MG tablet Comments:   Reason for Stopping:         amLODIPine (NORVASC) 10 MG tablet Comments:   Reason for Stopping:         diclofenac (VOLTAREN) 0.1 % ophthalmic solution Comments:   Reason for Stopping:         Lidocaine, Anorectal, 5 % GEL Comments:   Reason for Stopping:                 Note that more than 30 minutes was spent in preparing discharge papers, discussing discharge with patient, medication review, etc.    NOTE: This report was transcribed using voice recognition software. Every effort was made to ensure accuracy; however, inadvertent computerized transcription errors may be present.      Signed:  Electronically signed by Radha Merritt MD on 7/18/2022 at 4:40 PM

## 2022-07-18 NOTE — CARE COORDINATION
7/18/2022 1106 CM note: No covid testing. Pt resides in a 1 story home,2 step entry with her son Archie Rayo. Pt's family assist her with ADLs and provide 24/7 care. DME include: bsc, ww,cane, shower chair. [facility] accepted patient and received orders  Plan is for pt to return home with her son and Santy Higgins at d/henry.  Ehsan DAMON

## 2022-07-18 NOTE — ADT AUTH CERT
Utilization Reviews         Syncope - Care Day 7 (7/15/2022) by Martita Ryan RN       Review Status Review Entered   Completed 7/15/2022 17:14      Criteria Review      Care Day: 7 Care Date: 7/15/2022 Level of Care: Inpatient Floor    Guideline Day 2    Clinical Status    (X) * Hemodynamic stability    7/15/2022 5:07 PM EDT by Martita Leon      Temp 98.4  RR   17  NV   64  BP   164/93  SpO2 100% ra    (X) * No acute cardiac or neurologic events    (X) * Mental status at baseline    7/15/2022 5:07 PM EDT by Estee Barfield to person, place and time. Speech clear and appropriate. Follows all commands. Pleasant affect.    (X) * Dangerous arrhythmia absent    7/15/2022 5:07 PM EDT by Martita Leon      No dangerous arrythmia    (X) * No further syncope    ( ) * No evidence of etiology requiring ongoing inpatient care or monitoring (eg, unstable cardiac rhythm or conduction defect)    ( ) * Discharge plans and education understood    Activity    (X) * Ambulatory or acceptable for next level of care    7/15/2022 5:07 PM EDT by Martita Leon      Up with assistance    Routes    (X) * Oral hydration    7/15/2022 5:07 PM EDT by Martita Leon      po hydration    (X) * Oral medications or regimen acceptable for next level of care    7/15/2022 5:07 PM EDT by Martita Leon      amiodarone  tablet 200 mg  Freq: DAILY Route: PO    apixaban  tablet 2.5 mg  Freq: 2 TIMES DAILY Route: PO    aspirin EC tablet 81 mg  Freq: DAILY Route: PO    carvedilol  tablet 3.125 mg  Freq: 2 TIMES DAILY WITH MEALS Route: PO    (X) * Oral diet or acceptable for next level of care    7/15/2022 5:07 PM EDT by Martita Leon      ADULT DIET;  Regular    Interventions    (X) Possible repeat ECG    7/15/2022 5:14 PM EDT by Martita Leon      repeat ECG today    Medications    (X) Possible medication to treat underlying etiology, or analgesics for injury due to syncope    7/15/2022 5:07 PM EDT by Martita Leon      acetaminophen  tablet 650 mg  Freq: EVERY 6 HOURS PRN Route: PO x 2    * Milestone   Additional Notes   DATE: 7/15/2022         Pertinent Updates:   C/o pain level 4  Leg; R   Elevated Pro-    Troponin, High Sensitivity 19 (H)         Abnl/Pertinent Labs/Radiology/Diagnostic Studies:   Chloride 109 mmol/L    CO2              21 mmol/L    Glucose 101    Total Protein 5.9 g/dL    Albumin 3.2      WBC 4.2 Low          Physical Exam:   CARDIOVASCULAR:      Heart Ausculation- Irregular rhythm with fast rate, 2/6 systolic murmur RUSB         MD Consults/Assessments & Plans:   ** Cardiology MD Notes   Recurrent syncope of undetermined etiology, ·    PAF, now with episodes of RVR   Was on amiodarone prior to admission, reported non-compliance at home   On chronic Eliquis therapy      RECOMMENDATIONS:   EKG today    TTE for re-evaluation of LV/RV function and VHD   Restart amiodarone 200 mg daily, with continuing on discharge home dose of 200 mg every other day   Start Coreg 3.125 mg BID to attempt more optimal BP control   Advised compression stocking use   Check TSH and BNP   14-day ZIO event monitor upon discharge to assess for AF burden/recurrence   Patient was initiated on [facility] prior to cardiology consultation. Recommend detailed discussion with family and patient regarding risks and benefits of DOAC therapy in a patient with recurrent syncopal events/mechanical falls. Will discontinue aspirin therapy. ** Internal Medicine MD Notes   ASSESSMENT AND PLAN     Principal Problem:   1. Syncope and collapse   Repeated syncope yesterday. Consult placed to cardiology   Will start iv fluid today due to poor oral intake   Suspected vasovagal and possible arrhythmia   Patient at high risk of fall, needs to reevaluate risk vs benefit of [facility]       2. Demential       3. Atrial Fibrillation: rate controlled   Continue eliquis for now       4.   Hypertension Essential:   Fair control   Continue to home BP meds         Medications:   pantoprazole  tablet 40 mg   Freq: DAILY BEFORE plans and education understood    Activity    (X) * Ambulatory or acceptable for next level of care    7/15/2022 2:07 PM EDT by Martita Hernández      Up with assistance    Routes    (X) * Oral hydration    7/15/2022 2:07 PM EDT by Martita Hernández      po hydration    (X) * Oral medications or regimen acceptable for next level of care    7/15/2022 2:07 PM EDT by Martita Hernández      apixaban tablet 2.5 mg  Freq: 2 TIMES DAILY Route: PO    aspirin EC tablet 81 mg  Freq: DAILY Route: PO    (X) * Oral diet or acceptable for next level of care    7/15/2022 2:07 PM EDT by Martita Hernández      ADULT DIET; Regular    Medications    (X) Possible medication to treat underlying etiology, or analgesics for injury due to syncope    7/15/2022 2:07 PM EDT by Martita Hernández      hydrALAZINE tablet 50 mg  Freq: 3 times per day Route: PO x 2    losartan  tablet 50 mg  Freq: 2 TIMES DAILY Route: PO x 1    acetaminophen tablet 650 mg  Freq: EVERY 6 HOURS PRN Route: PO x 1    * Milestone   Additional Notes   DATE: 7/14/2022         Pertinent Updates:   C/o pain level 4 Head   Syncope episode in toilet   Still w/ elev BP         Abnl/Pertinent Labs/Radiology/Diagnostic Studies:   Meter Glucose 119    Chloride 108 mmol/L    CO2             20   WBC               4.0Low          Physical Exam:   Neurologic: no cranial nerve deficit and speech normal          MD Consults/Assessments & Plans:   Assessment:     Syncope and collapse      Plan:   1. Syncope, collapse, weakness:   -Ortho BP's negative   -Continue ortho BP's q shift   - CT head on 7/9 negative for acute abnormality   - PT/OT. -spoke with SW, family wants [facility]. [facility] has accepted pt, but cannot see her until early next week.        2. Prolonged QTC:   -Amiodarone has been held since admission, discontinued 7/12.   - QTc 517 on 7/9,  470 on 7/10, 457 on 7/12         Medications:   pantoprazole  tablet 40 mg   Freq: DAILY BEFORE BREAKFAST Route: PO         Orders:   Inpatient consult to Cardiology          ** PT NOTES   ASSESSMENT of Current Deficits Patient exhibits decreased strength, balance and endurance impairing functional mobility, transfers, gait , gait distance and tolerance to activity are barriers to d/c and require skilled intervention during hospital stay. ** CM Assessments or Notes:   No covid testing. Pt resides in a 1 story home,2 step entry with her son Marcia Gray. Pt's family assist her with ADLs and provide 24/7 care. DME include: bsc, ww,cane, shower chair. PT recommendations reviewed with pt's dtr  who declines SNF but agreeable to [facility]. [facility] accepted patient ,received orders, notified of dc order and and can start services Monday 7/18/22. Plan is for pt to return home with her son and [facility] at d/c.

## 2022-07-19 LAB — URINE CULTURE, ROUTINE: NORMAL

## 2022-09-10 ENCOUNTER — HOSPITAL ENCOUNTER (INPATIENT)
Age: 87
LOS: 4 days | Discharge: HOME HEALTH | DRG: 309 | End: 2022-09-14
Attending: EMERGENCY MEDICINE | Admitting: STUDENT IN AN ORGANIZED HEALTH CARE EDUCATION/TRAINING PROGRAM
Payer: MEDICARE

## 2022-09-10 ENCOUNTER — APPOINTMENT (OUTPATIENT)
Dept: CT IMAGING | Age: 87
DRG: 309 | End: 2022-09-10
Payer: MEDICARE

## 2022-09-10 ENCOUNTER — APPOINTMENT (OUTPATIENT)
Dept: GENERAL RADIOLOGY | Age: 87
DRG: 309 | End: 2022-09-10
Payer: MEDICARE

## 2022-09-10 DIAGNOSIS — R00.1 BRADYCARDIA: ICD-10-CM

## 2022-09-10 DIAGNOSIS — R55 SYNCOPE AND COLLAPSE: Primary | ICD-10-CM

## 2022-09-10 LAB
ANION GAP SERPL CALCULATED.3IONS-SCNC: 8 MMOL/L (ref 7–16)
BACTERIA: NORMAL /HPF
BASOPHILS ABSOLUTE: 0.02 E9/L (ref 0–0.2)
BASOPHILS RELATIVE PERCENT: 0.4 % (ref 0–2)
BILIRUBIN URINE: NEGATIVE
BLOOD, URINE: NEGATIVE
BUN BLDV-MCNC: 10 MG/DL (ref 6–23)
CALCIUM SERPL-MCNC: 9 MG/DL (ref 8.6–10.2)
CHLORIDE BLD-SCNC: 109 MMOL/L (ref 98–107)
CLARITY: CLEAR
CO2: 25 MMOL/L (ref 22–29)
COLOR: YELLOW
CREAT SERPL-MCNC: 0.9 MG/DL (ref 0.5–1)
EOSINOPHILS ABSOLUTE: 0.03 E9/L (ref 0.05–0.5)
EOSINOPHILS RELATIVE PERCENT: 0.5 % (ref 0–6)
GFR AFRICAN AMERICAN: >60
GFR NON-AFRICAN AMERICAN: >60 ML/MIN/1.73
GLUCOSE BLD-MCNC: 101 MG/DL (ref 74–99)
GLUCOSE URINE: NEGATIVE MG/DL
HCT VFR BLD CALC: 39 % (ref 34–48)
HEMOGLOBIN: 12.5 G/DL (ref 11.5–15.5)
IMMATURE GRANULOCYTES #: 0.01 E9/L
IMMATURE GRANULOCYTES %: 0.2 % (ref 0–5)
KETONES, URINE: NEGATIVE MG/DL
LACTIC ACID: 1.4 MMOL/L (ref 0.5–2.2)
LEUKOCYTE ESTERASE, URINE: NEGATIVE
LYMPHOCYTES ABSOLUTE: 1.21 E9/L (ref 1.5–4)
LYMPHOCYTES RELATIVE PERCENT: 21.5 % (ref 20–42)
MCH RBC QN AUTO: 29.9 PG (ref 26–35)
MCHC RBC AUTO-ENTMCNC: 32.1 % (ref 32–34.5)
MCV RBC AUTO: 93.3 FL (ref 80–99.9)
MONOCYTES ABSOLUTE: 0.43 E9/L (ref 0.1–0.95)
MONOCYTES RELATIVE PERCENT: 7.6 % (ref 2–12)
NEUTROPHILS ABSOLUTE: 3.93 E9/L (ref 1.8–7.3)
NEUTROPHILS RELATIVE PERCENT: 69.8 % (ref 43–80)
NITRITE, URINE: NEGATIVE
PDW BLD-RTO: 14.7 FL (ref 11.5–15)
PH UA: 7 (ref 5–9)
PLATELET # BLD: 150 E9/L (ref 130–450)
PMV BLD AUTO: 9.9 FL (ref 7–12)
POTASSIUM REFLEX MAGNESIUM: 4.1 MMOL/L (ref 3.5–5)
PRO-BNP: 534 PG/ML (ref 0–450)
PROTEIN UA: NEGATIVE MG/DL
RBC # BLD: 4.18 E12/L (ref 3.5–5.5)
RBC UA: NORMAL /HPF (ref 0–2)
SODIUM BLD-SCNC: 142 MMOL/L (ref 132–146)
SPECIFIC GRAVITY UA: 1.01 (ref 1–1.03)
TROPONIN, HIGH SENSITIVITY: 12 NG/L (ref 0–9)
TROPONIN, HIGH SENSITIVITY: 14 NG/L (ref 0–9)
UROBILINOGEN, URINE: 0.2 E.U./DL
WBC # BLD: 5.6 E9/L (ref 4.5–11.5)
WBC UA: NORMAL /HPF (ref 0–5)

## 2022-09-10 PROCEDURE — 70450 CT HEAD/BRAIN W/O DYE: CPT

## 2022-09-10 PROCEDURE — 6370000000 HC RX 637 (ALT 250 FOR IP): Performed by: STUDENT IN AN ORGANIZED HEALTH CARE EDUCATION/TRAINING PROGRAM

## 2022-09-10 PROCEDURE — 71045 X-RAY EXAM CHEST 1 VIEW: CPT

## 2022-09-10 PROCEDURE — 80048 BASIC METABOLIC PNL TOTAL CA: CPT

## 2022-09-10 PROCEDURE — 81001 URINALYSIS AUTO W/SCOPE: CPT

## 2022-09-10 PROCEDURE — 2580000003 HC RX 258: Performed by: STUDENT IN AN ORGANIZED HEALTH CARE EDUCATION/TRAINING PROGRAM

## 2022-09-10 PROCEDURE — 83605 ASSAY OF LACTIC ACID: CPT

## 2022-09-10 PROCEDURE — 85025 COMPLETE CBC W/AUTO DIFF WBC: CPT

## 2022-09-10 PROCEDURE — 93005 ELECTROCARDIOGRAM TRACING: CPT | Performed by: STUDENT IN AN ORGANIZED HEALTH CARE EDUCATION/TRAINING PROGRAM

## 2022-09-10 PROCEDURE — 83880 ASSAY OF NATRIURETIC PEPTIDE: CPT

## 2022-09-10 PROCEDURE — 84484 ASSAY OF TROPONIN QUANT: CPT

## 2022-09-10 PROCEDURE — 1200000000 HC SEMI PRIVATE

## 2022-09-10 PROCEDURE — 99222 1ST HOSP IP/OBS MODERATE 55: CPT | Performed by: STUDENT IN AN ORGANIZED HEALTH CARE EDUCATION/TRAINING PROGRAM

## 2022-09-10 PROCEDURE — 36415 COLL VENOUS BLD VENIPUNCTURE: CPT

## 2022-09-10 PROCEDURE — 99285 EMERGENCY DEPT VISIT HI MDM: CPT

## 2022-09-10 RX ORDER — SODIUM CHLORIDE 9 MG/ML
INJECTION, SOLUTION INTRAVENOUS PRN
Status: DISCONTINUED | OUTPATIENT
Start: 2022-09-10 | End: 2022-09-14 | Stop reason: HOSPADM

## 2022-09-10 RX ORDER — POLYETHYLENE GLYCOL 3350 17 G/17G
17 POWDER, FOR SOLUTION ORAL DAILY PRN
Status: DISCONTINUED | OUTPATIENT
Start: 2022-09-10 | End: 2022-09-14 | Stop reason: HOSPADM

## 2022-09-10 RX ORDER — DOCUSATE SODIUM 100 MG/1
100 CAPSULE, LIQUID FILLED ORAL DAILY
Status: DISCONTINUED | OUTPATIENT
Start: 2022-09-10 | End: 2022-09-14 | Stop reason: HOSPADM

## 2022-09-10 RX ORDER — ACETAMINOPHEN 650 MG/1
650 SUPPOSITORY RECTAL EVERY 6 HOURS PRN
Status: DISCONTINUED | OUTPATIENT
Start: 2022-09-10 | End: 2022-09-14 | Stop reason: HOSPADM

## 2022-09-10 RX ORDER — HYDRALAZINE HYDROCHLORIDE 25 MG/1
25 TABLET, FILM COATED ORAL EVERY 8 HOURS SCHEDULED
Status: DISCONTINUED | OUTPATIENT
Start: 2022-09-10 | End: 2022-09-13

## 2022-09-10 RX ORDER — ASPIRIN 81 MG/1
81 TABLET ORAL DAILY
Status: DISCONTINUED | OUTPATIENT
Start: 2022-09-10 | End: 2022-09-14 | Stop reason: HOSPADM

## 2022-09-10 RX ORDER — ACETAMINOPHEN 325 MG/1
650 TABLET ORAL EVERY 6 HOURS PRN
Status: DISCONTINUED | OUTPATIENT
Start: 2022-09-10 | End: 2022-09-14 | Stop reason: HOSPADM

## 2022-09-10 RX ORDER — PANTOPRAZOLE SODIUM 40 MG/1
40 TABLET, DELAYED RELEASE ORAL
Status: DISCONTINUED | OUTPATIENT
Start: 2022-09-11 | End: 2022-09-14 | Stop reason: HOSPADM

## 2022-09-10 RX ORDER — SODIUM CHLORIDE 0.9 % (FLUSH) 0.9 %
5-40 SYRINGE (ML) INJECTION PRN
Status: DISCONTINUED | OUTPATIENT
Start: 2022-09-10 | End: 2022-09-14 | Stop reason: HOSPADM

## 2022-09-10 RX ORDER — LOSARTAN POTASSIUM 25 MG/1
25 TABLET ORAL DAILY
Status: DISCONTINUED | OUTPATIENT
Start: 2022-09-10 | End: 2022-09-12

## 2022-09-10 RX ORDER — SODIUM CHLORIDE 0.9 % (FLUSH) 0.9 %
5-40 SYRINGE (ML) INJECTION EVERY 12 HOURS SCHEDULED
Status: DISCONTINUED | OUTPATIENT
Start: 2022-09-10 | End: 2022-09-14 | Stop reason: HOSPADM

## 2022-09-10 RX ADMIN — Medication 10 ML: at 21:57

## 2022-09-10 RX ADMIN — ASPIRIN 81 MG: 81 TABLET, COATED ORAL at 21:53

## 2022-09-10 RX ADMIN — HYDRALAZINE HYDROCHLORIDE 25 MG: 25 TABLET, FILM COATED ORAL at 21:53

## 2022-09-10 RX ADMIN — APIXABAN 2.5 MG: 5 TABLET, FILM COATED ORAL at 21:53

## 2022-09-10 RX ADMIN — DOCUSATE SODIUM 100 MG: 100 CAPSULE, LIQUID FILLED ORAL at 21:56

## 2022-09-10 RX ADMIN — LOSARTAN POTASSIUM 25 MG: 25 TABLET, FILM COATED ORAL at 21:53

## 2022-09-10 ASSESSMENT — ENCOUNTER SYMPTOMS
COUGH: 0
SHORTNESS OF BREATH: 1
ABDOMINAL PAIN: 0
DIARRHEA: 0
PHOTOPHOBIA: 0
BACK PAIN: 0
NAUSEA: 0
SORE THROAT: 0

## 2022-09-10 NOTE — ED PROVIDER NOTES
HPI  80 y.o. female presenting for syncope. Symptoms have been present for 1 day. They have been intermittent and severe in severity. They are worsened by nothing and relieved by nothing. Patient was reportedly with family today when she started feeling lightheaded. She went to lay down and had syncopal event. Patient is amnestic to event. She complains of feeling like her heart was beating slow and short of breath. She had similar episode 2 days ago and EMS was called but reportedly cleared her. She complains of some numbness in her legs. She denies any recent illnesses, nausea, back pain, neck pain, headache, abdominal pain.        --------------------------------------------- PAST HISTORY ---------------------------------------------  Past Medical History:  has a past medical history of Arthritis, Atrial fibrillation ([facility] [address] Chronic combined systolic (congestive) and diastolic (congestive) heart failure ([facility] [address] Coronary artery disease involving native coronary artery of native heart without angina pectoris, Hx of blood clots, Hypertension, Ischemic colitis ([facility] [address] and PAF (paroxysmal atrial fibrillation) ([facility] Past Surgical History:  has a past surgical history that includes Ankle fracture surgery; Colonoscopy; Endoscopy, colon, diagnostic; fracture surgery; and Cardiac catheterization (11/11/2019). Social History:  reports that she has never smoked. She has never used smokeless tobacco. She reports that she does not drink alcohol and does not use drugs. Family History: family history is not on file. The patients home medications have been reviewed. Allergies: Nifedipine      Review of Systems   Constitutional:  Negative for chills and fever. HENT:  Negative for congestion and sore throat. Eyes:  Negative for photophobia and visual disturbance. Respiratory:  Positive for shortness of breath. Negative for cough. Cardiovascular:  Positive for chest pain. Negative for leg swelling. Gastrointestinal:  Negative for abdominal pain, diarrhea and nausea. Genitourinary:  Negative for dysuria and flank pain. Musculoskeletal:  Negative for back pain and myalgias. Skin:  Negative for rash and wound. Neurological:  Positive for syncope, light-headedness and numbness. Negative for dizziness and headaches. Physical Exam  Constitutional:       General: She is not in acute distress. Appearance: Normal appearance. She is not ill-appearing. HENT:      Head: Normocephalic and atraumatic. Right Ear: External ear normal.      Left Ear: External ear normal.      Nose: Nose normal.   Eyes:      Extraocular Movements: Extraocular movements intact. Conjunctiva/sclera: Conjunctivae normal.   Cardiovascular:      Rate and Rhythm: Regular rhythm. Bradycardia present. Pulses: Normal pulses. Pulmonary:      Effort: Pulmonary effort is normal. No respiratory distress. Breath sounds: Normal breath sounds. No stridor. No wheezing, rhonchi or rales. Abdominal:      General: There is no distension. Palpations: Abdomen is soft. Tenderness: There is no abdominal tenderness. Musculoskeletal:         General: No swelling or deformity. Skin:     General: Skin is warm and dry. Neurological:      General: No focal deficit present. Mental Status: She is alert. Cranial Nerves: No cranial nerve deficit. Sensory: No sensory deficit. Motor: No weakness. Coordination: Coordination normal.   Psychiatric:         Mood and Affect: Mood normal.        Procedures      ED Course as of 09/10/22 2051   Sat Sep 10, 2022   1524 EKG sinus bradycardia rate of 47 with a left anterior fascicular block. QTc 476. Normal axis, otherwise normal conduction. No acute ST wave changes. No acute ischemic findings. Otherwise agree with resident.  [NC]   [address] [place] [address] Dr:     I have personally performed and/or participated in the history, exam, medical decision making, and procedures and agree with all pertinent clinical information unless otherwise noted. I have also reviewed and agree with the past medical, family and social history unless otherwise noted. I have discussed this patient in detail with the resident, and provided the instruction and education regarding patient here for a syncopal event. States she was at a family gathering and they told her she passed out. She has been dealing with heart rate and blood pressure issues for some time now she states, multiple medication adjustments and they just got her blood pressure under control. States she has been having some vague nonspecific chest pain or shortness of breath although she is very nondescript with this. No current symptoms. No abdominal pain or back pain. No extremity weakness or numbness or paresthesia no speech difficulty or facial droop. .  My findings/plan: Patient is sitting up in the bed resting comfortably no distress. Heart rate mildly bradycardic. Lungs are clear and equal.  Abdomen soft and nontender with no pulsatile mass. Arms and legs are neurovascular intact with no pretibial edema or calf pain. She has no focal neurologic deficit. [NC]   5648 EKG @ 1520: This EKG is signed and interpreted by me  Rate: 47  Rhythm: Sinus  Interpretation: no acute ST elevations or depressions, T waves inverted V5-V6; Qtc 476; LAFB  Comparison: changes compared to previous EKG   [AP]   1093 Discussed patient with Dr. Debbie Ortega, she is agreeable to admission.  [AP]      ED Course User Index  [AP] Chey Godo MD  [NC] Inna Adkins DO       -------------------------------------------------- RESULTS -------------------------------------------------    LABS:  Results for orders placed or performed during the hospital encounter of 09/10/22   CBC with Auto Differential   Result Value Ref Range    WBC 5.6 4.5 - 11.5 E9/L    RBC 4.18 3.50 - 5.50 E12/L    Hemoglobin 12.5 11.5 - 15.5 g/dL    Hematocrit 39.0 34.0 - 48.0 %    MCV 93.3 80.0 - 99.9 fL    MCH 29.9 26.0 - 35.0 pg    MCHC 32.1 32.0 - 34.5 %    RDW 14.7 11.5 - 15.0 fL    Platelets 888 063 - 517 E9/L    MPV 9.9 7.0 - 12.0 fL    Neutrophils % 69.8 43.0 - 80.0 %    Immature Granulocytes % 0.2 0.0 - 5.0 %    Lymphocytes % 21.5 20.0 - 42.0 %    Monocytes % 7.6 2.0 - 12.0 %    Eosinophils % 0.5 0.0 - 6.0 %    Basophils % 0.4 0.0 - 2.0 %    Neutrophils Absolute 3.93 1.80 - 7.30 E9/L    Immature Granulocytes # 0.01 E9/L    Lymphocytes Absolute 1.21 (L) 1.50 - 4.00 E9/L    Monocytes Absolute 0.43 0.10 - 0.95 E9/L    Eosinophils Absolute 0.03 (L) 0.05 - 0.50 E9/L    Basophils Absolute 0.02 0.00 - 0.20 Q2/J   Basic Metabolic Panel w/ Reflex to MG   Result Value Ref Range    Sodium 142 132 - 146 mmol/L    Potassium reflex Magnesium 4.1 3.5 - 5.0 mmol/L    Chloride 109 (H) 98 - 107 mmol/L    CO2 25 22 - 29 mmol/L    Anion Gap 8 7 - 16 mmol/L    Glucose 101 (H) 74 - 99 mg/dL    BUN 10 6 - 23 mg/dL    Creatinine 0.9 0.5 - 1.0 mg/dL    GFR Non-African American >60 >=60 mL/min/1.73    GFR African American >60     Calcium 9.0 8.6 - 10.2 mg/dL   Troponin   Result Value Ref Range    Troponin, High Sensitivity 14 (H) 0 - 9 ng/L   Brain Natriuretic Peptide   Result Value Ref Range    Pro- (H) 0 - 450 pg/mL   Urinalysis with Microscopic   Result Value Ref Range    Color, UA Yellow Straw/Yellow    Clarity, UA Clear Clear    Glucose, Ur Negative Negative mg/dL    Bilirubin Urine Negative Negative    Ketones, Urine Negative Negative mg/dL    Specific Gravity, UA 1.015 1.005 - 1.030    Blood, Urine Negative Negative    pH, UA 7.0 5.0 - 9.0    Protein, UA Negative Negative mg/dL    Urobilinogen, Urine 0.2 <2.0 E.U./dL    Nitrite, Urine Negative Negative    Leukocyte Esterase, Urine Negative Negative    WBC, UA NONE 0 - 5 /HPF    RBC, UA NONE 0 - 2 /HPF    Bacteria, UA NONE SEEN None Seen /HPF   Lactic Acid   Result Value Ref Range    Lactic Acid 1.4 0.5 - 2.2 mmol/L   Troponin   Result Value Ref Range    Troponin, High Sensitivity 12 (H) 0 - 9 ng/L   EKG 12 Lead   Result Value Ref Range    Ventricular Rate 47 BPM    Atrial Rate 47 BPM    P-R Interval 168 ms    QRS Duration 98 ms    Q-T Interval 538 ms    QTc Calculation (Bazett) 476 ms    P Axis 45 degrees    R Axis -48 degrees    T Axis -14 degrees       RADIOLOGY:  CT Head WO Contrast   Final Result   No acute intracranial hemorrhage or edema. XR CHEST PORTABLE   Final Result   Borderline cardiomegaly. Questionable focal infiltrate or atelectasis in the   mid right lung, underlying a telemetry lead.             ------------------------- NURSING NOTES AND VITALS REVIEWED ---------------------------  Date / Time Roomed:  9/10/2022  2:53 PM  ED Bed Assignment:  01/01    The nursing notes within the ED encounter and vital signs as below have been reviewed. Patient Vitals for the past 24 hrs:   BP Temp Temp src Pulse Resp SpO2   09/10/22 1720 -- -- -- -- -- 94 %   09/10/22 1719 (!) 173/90 -- -- 50 20 91 %   09/10/22 1533 (!) 151/78 -- -- 54 16 100 %   09/10/22 1524 -- -- -- 52 18 98 %   09/10/22 1515 -- 98 °F (36.7 °C) Oral -- -- --   09/10/22 1511 (!) 155/75 -- -- 50 16 100 %       Oxygen Saturation Interpretation: Normal      MDM  Number of Diagnoses or Management Options  Bradycardia  Syncope and collapse  Diagnosis management comments: 79 yo female presenting for syncopal episode at rest. Patient alert, though bradycardic in 40s on arrival. EKG showed sinus bradycardia. Labs and imaging unremarkable. On chart review, appears patient's HR typically runs in 62s. On reassessment, HR 60. BP remained stable. Due to symptomatic bradycardia with syncope, patient was admitted to telemetry. Counseling:  I have spoken with the patient and daughter and discussed todays results, in addition to providing specific details for the plan of care and counseling regarding the diagnosis and prognosis. Their questions are answered at this time and they are agreeable with the plan of admission.    --------------------------------- ADDITIONAL PROVIDER NOTES ---------------------------------    This patient's ED course included: a personal history and physicial examination, re-evaluation prior to disposition, multiple bedside re-evaluations, cardiac monitoring, and continuous pulse oximetry    This patient has remained hemodynamically stable during their ED course. Diagnosis:  1. Syncope and collapse    2. Bradycardia        Disposition:  Patient's disposition: Admit to telemetry  Patient's condition is stable.        Royal Palacios MD  Resident  09/10/22 0476

## 2022-09-10 NOTE — H&P
[address] [place], [place] [address]ist Group   History and Physical      CHIEF COMPLAINT:  Syncope    History of Present Illness:  80 y.o. female with a history of afib, HTN, CHF presents after a syncopal episode. The patient was at her daughters house when she stated she was not feeling well and felt like her heart was beating fast prior to her passing out. Her daughter states she was out for a couple of minutes. The patient was already sitting so did not fall and did not hit her head. She denies any chest pain prior to passing out or currently. She also denies any SOB, fever/chills, N/V/D or any other complaints. Informant(s) for H&P:Patient, patient's daughter, ER, EMR review    REVIEW OF SYSTEMS:  no fevers, chills, cp, sob, n/v, ha, vision/hearing changes, wt changes, hot/cold flashes, other open skin lesions, diarrhea, constipation, dysuria/hematuria unless noted in HPI. Complete ROS performed with the patient and is otherwise negative. PMH:  Past Medical History:   Diagnosis Date    Arthritis     Atrial fibrillation ([address]     Chronic combined systolic (congestive) and diastolic (congestive) heart failure ([address] 11/7/2021    Coronary artery disease involving native coronary artery of native heart without angina pectoris     Hx of blood clots     Hypertension     Ischemic colitis ([address]     PAF (paroxysmal atrial fibrillation) ([address]        Surgical History:  Past Surgical History:   Procedure Laterality Date    ANKLE FRACTURE SURGERY      CARDIAC CATHETERIZATION  11/11/2019    Dr. Suzzette Bumpers    COLONOSCOPY      ENDOSCOPY, COLON, DIAGNOSTIC      FRACTURE SURGERY         Medications Prior to Admission:    Prior to Admission medications    Medication Sig Start Date End Date Taking? Authorizing Provider   amiodarone (CORDARONE) 200 MG tablet Take 1 tablet by mouth in the morning.  7/19/22   Rigoberto Carrillo MD   carvedilol (COREG) 3.125 MG tablet Take 1 tablet by mouth in the morning and 1 tablet in the evening. Take with meals. 7/18/22   Luis Antonio [place] MD   hydrALAZINE (APRESOLINE) 50 MG tablet Take 0.5 tablets by mouth in the morning and 0.5 tablets at noon and 0.5 tablets before bedtime. 7/18/22   Luis Antoino [place] MD   losartan (COZAAR) 50 MG tablet Take 0.5 tablets by mouth in the morning. 7/18/22   Luis Antonio [place] MD   aspirin 81 MG EC tablet Take 1 tablet by mouth daily 7/14/22   FRANCES [place] NP   apixaban Rolinda Clarence) 5 MG TABS tablet Take 0.5 tablets by mouth 2 times daily 7/14/22   FRANCES Madison NP   omeprazole (PRILOSEC) 20 MG delayed release capsule Take 2 capsules by mouth daily 7/14/22   FRANCES [place] NP   docusate sodium (COLACE) 100 MG capsule Take 100 mg by mouth daily    Historical Provider, MD   acetaminophen (TYLENOL) 500 MG tablet Take 2 tablets by mouth every 8 hours as needed for Pain 9/26/20   Sabrina Mackey MD       Allergies:    Nifedipine    Social History:    reports that she has never smoked. She has never used smokeless tobacco. She reports that she does not drink alcohol and does not use drugs. Family History:   family history is not on file.  Patient did not disclose     PHYSICAL EXAM:  Vitals:  BP (!) 173/90   Pulse 50   Temp 98 °F (36.7 °C) (Oral)   Resp 20   SpO2 94%     General Appearance: alert and oriented to person, place and time and in no acute distress  Skin: warm and dry  Head: normocephalic and atraumatic  Eyes: pupils equal, round, and reactive to light, extraocular eye movements intact, conjunctivae normal  Neck: neck supple and non tender without mass   Pulmonary/Chest: clear to auscultation bilaterally- no wheezes, rales or rhonchi, normal air movement, no respiratory distress  Cardiovascular: bradycardia, normal S1 and S2 and no carotid bruits  Abdomen: soft, non-tender, non-distended, normal bowel sounds, no masses or organomegaly  Extremities: no cyanosis, no clubbing and no edema  Neurologic: no cranial nerve deficit and speech normal    LABS:  Recent Labs     09/10/22  1601      K 4.1   *   CO2 25   BUN 10   CREATININE 0.9   GLUCOSE 101*   CALCIUM 9.0       Recent Labs     09/10/22  1601   WBC 5.6   RBC 4.18   HGB 12.5   HCT 39.0   MCV 93.3   MCH 29.9   MCHC 32.1   RDW 14.7      MPV 9.9       No results for input(s): POCGLU in the last 72 hours. Radiology: CT Head WO Contrast    Result Date: 9/10/2022  EXAMINATION: CT OF THE HEAD WITHOUT CONTRAST  9/10/2022 4:14 pm TECHNIQUE: CT of the head was performed without the administration of intravenous contrast. Automated exposure control, iterative reconstruction, and/or weight based adjustment of the mA/kV was utilized to reduce the radiation dose to as low as reasonably achievable. COMPARISON: July 9, 2022 HISTORY: ORDERING SYSTEM PROVIDED HISTORY: syncope TECHNOLOGIST PROVIDED HISTORY: Reason for exam:->syncope Has a \"code stroke\" or \"stroke alert\" been called? ->No Decision Support Exception - unselect if not a suspected or confirmed emergency medical condition->Emergency Medical Condition (MA) FINDINGS: No evidence of acute intracranial hemorrhage or edema. No abnormal extra-axial fluid collections. There is prominence of sulci, cisterns and ventricles related age-appropriate parenchymal volume loss. Areas of hypoattenuation are seen in periventricular and subcortical white matter are suggestive of areas of chronic microvascular ischemia. Stable focus of hypoattenuation involving left thalamus suggesting chronic lacunar area of stroke. No evidence of depressed calvarial fracture. No acute intracranial hemorrhage or edema. XR CHEST PORTABLE    Result Date: 9/10/2022  EXAMINATION: ONE XRAY VIEW OF THE CHEST 9/10/2022 3:59 pm COMPARISON: 07/09/2022. HISTORY: ORDERING SYSTEM PROVIDED HISTORY: chest pain TECHNOLOGIST PROVIDED HISTORY: Reason for exam:->chest pain FINDINGS: The cardiac silhouette is borderline enlarged.   The pulmonary vasculature is within normal limits. There are chronic findings in both lungs. There may be a focal infiltrate or atelectasis in the mid right lung, underlying a telemetry lead. No segmental or lobar pulmonary consolidation or collapse is identified. No pneumothorax or pleural effusion is seen. Borderline cardiomegaly. Questionable focal infiltrate or atelectasis in the mid right lung, underlying a telemetry lead. EKG: Sinus bradycardia    ASSESSMENT:      Principal Problem:    Syncope and collapse  Resolved Problems:    * No resolved hospital problems. *      PLAN:    Symptomatic bradycardia with syncope  -Patient had syncopal episode at home, felt irregular heart beat prior  -On presentation to ER, found to be bradycardic, HR down to 40s, did later improve to 50s  -Patient has had prior episodes of syncope, is on carvedilol and amiodarone for afib, will hold for now and consult cardiology  -Patient recently had echo in July, showed EF 60-65%, stage 1 DD and no other significant abnormalities  -After last admission was ordered a 14 day cardiac event monitor, however does not seem to have completed  -Monitor on telemetry, FU cardio recommendations    Afib  -Home meds amiodarone, carvedilol and eliquis  -Continue eliquis, hold amiodarone and carvedilol for symptomatic bradycardia  -Cardiology consulted  -Monitor on telemetry, HR currently in 50s    HTN  -Home meds losartan, hydralazine and carvedilol  -Continue meds other than carvedilol  -BP on admission 151/78, continue to monitor    GERD  -Continue home PPI    CHF  -Patient had echo in 8/2021 which showed EF 38%, however most recently echo in July showed resolution and EF 60-65%      Code Status: DNR-CCA  DVT prophylaxis: Eliquis    NOTE: This report was transcribed using voice recognition software. Every effort was made to ensure accuracy; however, inadvertent computerized transcription errors may be present.      Electronically signed by Helen Gonsalez MD on 9/10/2022 at 6:06 PM

## 2022-09-11 PROBLEM — I50.9 CHRONIC CONGESTIVE HEART FAILURE (HCC): Status: ACTIVE | Noted: 2022-09-11

## 2022-09-11 PROBLEM — K21.9 GASTROESOPHAGEAL REFLUX DISEASE: Status: ACTIVE | Noted: 2022-09-11

## 2022-09-11 PROBLEM — R00.1 SYMPTOMATIC BRADYCARDIA: Status: ACTIVE | Noted: 2022-09-11

## 2022-09-11 LAB
ANION GAP SERPL CALCULATED.3IONS-SCNC: 13 MMOL/L (ref 7–16)
BASOPHILS ABSOLUTE: 0.03 E9/L (ref 0–0.2)
BASOPHILS RELATIVE PERCENT: 0.5 % (ref 0–2)
BUN BLDV-MCNC: 10 MG/DL (ref 6–23)
CALCIUM SERPL-MCNC: 9.6 MG/DL (ref 8.6–10.2)
CHLORIDE BLD-SCNC: 103 MMOL/L (ref 98–107)
CO2: 22 MMOL/L (ref 22–29)
CREAT SERPL-MCNC: 0.8 MG/DL (ref 0.5–1)
EKG ATRIAL RATE: 47 BPM
EKG P AXIS: 45 DEGREES
EKG P-R INTERVAL: 168 MS
EKG Q-T INTERVAL: 538 MS
EKG QRS DURATION: 98 MS
EKG QTC CALCULATION (BAZETT): 476 MS
EKG R AXIS: -48 DEGREES
EKG T AXIS: -14 DEGREES
EKG VENTRICULAR RATE: 47 BPM
EOSINOPHILS ABSOLUTE: 0.06 E9/L (ref 0.05–0.5)
EOSINOPHILS RELATIVE PERCENT: 1 % (ref 0–6)
GFR AFRICAN AMERICAN: >60
GFR NON-AFRICAN AMERICAN: >60 ML/MIN/1.73
GLUCOSE BLD-MCNC: 90 MG/DL (ref 74–99)
HCT VFR BLD CALC: 43.6 % (ref 34–48)
HEMOGLOBIN: 14.2 G/DL (ref 11.5–15.5)
IMMATURE GRANULOCYTES #: 0.01 E9/L
IMMATURE GRANULOCYTES %: 0.2 % (ref 0–5)
LYMPHOCYTES ABSOLUTE: 2.13 E9/L (ref 1.5–4)
LYMPHOCYTES RELATIVE PERCENT: 36.7 % (ref 20–42)
MCH RBC QN AUTO: 30.1 PG (ref 26–35)
MCHC RBC AUTO-ENTMCNC: 32.6 % (ref 32–34.5)
MCV RBC AUTO: 92.4 FL (ref 80–99.9)
MONOCYTES ABSOLUTE: 0.59 E9/L (ref 0.1–0.95)
MONOCYTES RELATIVE PERCENT: 10.2 % (ref 2–12)
NEUTROPHILS ABSOLUTE: 2.99 E9/L (ref 1.8–7.3)
NEUTROPHILS RELATIVE PERCENT: 51.4 % (ref 43–80)
PDW BLD-RTO: 14.6 FL (ref 11.5–15)
PLATELET # BLD: 177 E9/L (ref 130–450)
PMV BLD AUTO: 9.9 FL (ref 7–12)
POTASSIUM REFLEX MAGNESIUM: 3.6 MMOL/L (ref 3.5–5)
RBC # BLD: 4.72 E12/L (ref 3.5–5.5)
SODIUM BLD-SCNC: 138 MMOL/L (ref 132–146)
WBC # BLD: 5.8 E9/L (ref 4.5–11.5)

## 2022-09-11 PROCEDURE — 97161 PT EVAL LOW COMPLEX 20 MIN: CPT | Performed by: PHYSICAL THERAPIST

## 2022-09-11 PROCEDURE — 99223 1ST HOSP IP/OBS HIGH 75: CPT | Performed by: INTERNAL MEDICINE

## 2022-09-11 PROCEDURE — 6370000000 HC RX 637 (ALT 250 FOR IP): Performed by: STUDENT IN AN ORGANIZED HEALTH CARE EDUCATION/TRAINING PROGRAM

## 2022-09-11 PROCEDURE — 1200000000 HC SEMI PRIVATE

## 2022-09-11 PROCEDURE — 36415 COLL VENOUS BLD VENIPUNCTURE: CPT

## 2022-09-11 PROCEDURE — 6360000002 HC RX W HCPCS: Performed by: INTERNAL MEDICINE

## 2022-09-11 PROCEDURE — 85025 COMPLETE CBC W/AUTO DIFF WBC: CPT

## 2022-09-11 PROCEDURE — 99232 SBSQ HOSP IP/OBS MODERATE 35: CPT | Performed by: STUDENT IN AN ORGANIZED HEALTH CARE EDUCATION/TRAINING PROGRAM

## 2022-09-11 PROCEDURE — 2580000003 HC RX 258: Performed by: STUDENT IN AN ORGANIZED HEALTH CARE EDUCATION/TRAINING PROGRAM

## 2022-09-11 PROCEDURE — 80048 BASIC METABOLIC PNL TOTAL CA: CPT

## 2022-09-11 PROCEDURE — 6370000000 HC RX 637 (ALT 250 FOR IP): Performed by: INTERNAL MEDICINE

## 2022-09-11 RX ORDER — AMIODARONE HYDROCHLORIDE 200 MG/1
100 TABLET ORAL DAILY
Status: DISCONTINUED | OUTPATIENT
Start: 2022-09-11 | End: 2022-09-14 | Stop reason: HOSPADM

## 2022-09-11 RX ORDER — HYDRALAZINE HYDROCHLORIDE 20 MG/ML
10 INJECTION INTRAMUSCULAR; INTRAVENOUS ONCE
Status: COMPLETED | OUTPATIENT
Start: 2022-09-11 | End: 2022-09-11

## 2022-09-11 RX ADMIN — POLYETHYLENE GLYCOL 3350 17 G: 17 POWDER, FOR SOLUTION ORAL at 01:55

## 2022-09-11 RX ADMIN — HYDRALAZINE HYDROCHLORIDE 25 MG: 25 TABLET, FILM COATED ORAL at 20:57

## 2022-09-11 RX ADMIN — HYDRALAZINE HYDROCHLORIDE 25 MG: 25 TABLET, FILM COATED ORAL at 15:55

## 2022-09-11 RX ADMIN — APIXABAN 2.5 MG: 5 TABLET, FILM COATED ORAL at 11:55

## 2022-09-11 RX ADMIN — HYDRALAZINE HYDROCHLORIDE 10 MG: 20 INJECTION INTRAMUSCULAR; INTRAVENOUS at 00:23

## 2022-09-11 RX ADMIN — HYDRALAZINE HYDROCHLORIDE 25 MG: 25 TABLET, FILM COATED ORAL at 06:44

## 2022-09-11 RX ADMIN — APIXABAN 2.5 MG: 5 TABLET, FILM COATED ORAL at 20:58

## 2022-09-11 RX ADMIN — ASPIRIN 81 MG: 81 TABLET, COATED ORAL at 11:55

## 2022-09-11 RX ADMIN — DOCUSATE SODIUM 100 MG: 100 CAPSULE, LIQUID FILLED ORAL at 11:54

## 2022-09-11 RX ADMIN — Medication 10 ML: at 11:55

## 2022-09-11 RX ADMIN — POLYETHYLENE GLYCOL 3350 17 G: 17 POWDER, FOR SOLUTION ORAL at 15:55

## 2022-09-11 RX ADMIN — Medication 10 ML: at 20:59

## 2022-09-11 RX ADMIN — PANTOPRAZOLE SODIUM 40 MG: 40 TABLET, DELAYED RELEASE ORAL at 06:43

## 2022-09-11 RX ADMIN — AMIODARONE HYDROCHLORIDE 100 MG: 200 TABLET ORAL at 15:55

## 2022-09-11 RX ADMIN — LOSARTAN POTASSIUM 25 MG: 25 TABLET, FILM COATED ORAL at 11:55

## 2022-09-11 NOTE — PROGRESS NOTES
[address] [place], [place] [address]ist   Progress Note    Admitting Date and Time: 9/10/2022  2:53 PM  Admit Dx: Syncope and collapse [R55]  Bradycardia [R00.1]    Subjective:    Pt feels fine today, no abnormal heart rhythm. Complained of some abdominal bloating. Per RN: Nothing to report    ROS: denies fever, chills, cp, sob, n/v, HA unless stated above. amiodarone  100 mg Oral Daily    apixaban  2.5 mg Oral BID    aspirin  81 mg Oral Daily    hydrALAZINE  25 mg Oral 3 times per day    losartan  25 mg Oral Daily    pantoprazole  40 mg Oral QAM AC    docusate sodium  100 mg Oral Daily    sodium chloride flush  5-40 mL IntraVENous 2 times per day     sodium chloride flush, 5-40 mL, PRN  sodium chloride, , PRN  polyethylene glycol, 17 g, Daily PRN  acetaminophen, 650 mg, Q6H PRN   Or  acetaminophen, 650 mg, Q6H PRN         Objective:    BP (!) 166/71   Pulse 71   Temp 97.5 °F (36.4 °C) (Temporal)   Resp 16   SpO2 98%       General Appearance: alert and oriented to person, place and time and in no acute distress  Skin: warm and dry  Head: normocephalic and atraumatic  Eyes: pupils equal, round, and reactive to light, extraocular eye movements intact, conjunctivae normal  Neck: neck supple and non tender without mass   Pulmonary/Chest: clear to auscultation bilaterally- no wheezes, rales or rhonchi, normal air movement, no respiratory distress  Cardiovascular: normal rate, normal S1 and S2 and no carotid bruits  Abdomen: soft, non-tender, non-distended, normal bowel sounds, no masses or organomegaly  Extremities: no cyanosis, no clubbing and no edema  Neurologic: no cranial nerve deficit and speech normal      Recent Labs     09/10/22  1601 09/11/22  0422    138   K 4.1 3.6   * 103   CO2 25 22   BUN 10 10   CREATININE 0.9 0.8   GLUCOSE 101* 90   CALCIUM 9.0 9.6       No results for input(s): ALKPHOS, PROT, LABALBU, BILITOT, AST, ALT in the last 72 hours.     Recent Labs     09/10/22  1601 09/11/22  0422   WBC 5.6 5.8   RBC 4.18 4.72   HGB 12.5 14.2   HCT 39.0 43.6   MCV 93.3 92.4   MCH 29.9 30.1   MCHC 32.1 32.6   RDW 14.7 14.6    177   MPV 9.9 9.9         Radiology:   CT Head WO Contrast   Final Result   No acute intracranial hemorrhage or edema. XR CHEST PORTABLE   Final Result   Borderline cardiomegaly. Questionable focal infiltrate or atelectasis in the   mid right lung, underlying a telemetry lead. Assessment:  Principal Problem:    Syncope and collapse  Resolved Problems:    * No resolved hospital problems. *      Plan:  Symptomatic bradycardia with syncope  -Patient had syncopal episode at home, felt irregular heart beat prior  -On presentation to ER, found to be bradycardic, HR down to 40s, did later improve to 50s  -Patient has had prior episodes of syncope, is on carvedilol and amiodarone for afib, will hold for now and consult cardiology. Cardiology resumed amio at lower dose and continued holding carvedilol  -Patient recently had echo in July, showed EF 60-65%, stage 1 DD and no other significant abnormalities  -After last admission was ordered a 14 day cardiac event monitor, however does not seem to have completed, will be ordered again after this admission per cardiology  -Monitor on telemetry, FU further cardio recommendations     Afib  -Home meds amiodarone, carvedilol and eliquis  -Continue eliquis, amiodarone reduced to 100mg and hold carvedilol for symptomatic bradycardia  -Cardiology consulted, above changes   -Monitor on telemetry, HR currently in 70s in sinus     HTN  -Home meds losartan, hydralazine and carvedilol  -Continue meds other than carvedilol  -BP on admission 151/78, this /71 - continue to monitor     GERD  -Continue home PPI     CHF  -Patient had echo in 8/2021 which showed EF 38%, however most recently echo in July showed resolution and EF 60-65%    NOTE: This report was transcribed using voice recognition software.  Every effort was made to ensure accuracy; however, inadvertent computerized transcription errors may be present.      Electronically signed by Sarahi Johnson MD on 9/11/2022 at 1:53 PM

## 2022-09-11 NOTE — HOME CARE
Patient current with [facility] for SN. Will need ANDRES orders if appropriate at discharge. Nicanor Castaneda LPN  [facility].

## 2022-09-11 NOTE — PROGRESS NOTES
Physical Therapy    Physical Therapy Initial Evaluation/Plan of Care    Room #:  0424/0424-01  Patient Name: Rickie Bustillo  YOB: 1931  MRN: [id number]    Date of Service: 9/11/2022     Tentative placement recommendation: Home 24/7  Equipment recommendation: Patient has needed equipment       Evaluating Physical Therapist: Naomy Sevilla, [facility]  #[id number]      Specific Provider Orders/Date/Referring Provider :  09/10/22 2045    PT evaluation and treat  Start:  09/10/22 2045,   End:  09/10/22 2045,   ONE TIME,   Standing Count:  1 Occurrences,   Nadege Stern MD     Admitting Diagnosis:   Syncope and collapse [R55]  Bradycardia [R00.1]    Admitted with    lightheadedness  with syncope x 3 episodes over last 2 days  shortness of breath, chest pain   Surgery: none      Patient Active Problem List   Diagnosis    Systolic hypertension    Primary hypertension    Paroxysmal atrial fibrillation (HCC)    Bradycardia    Chronic anticoagulation--Eliquis    Syncope and collapse    Moderate protein-calorie malnutrition (HCC)    Chest pain    Chronic combined systolic (congestive) and diastolic (congestive) heart failure (HCC)    Abnormal urinalysis    Orthostatic hypotension    Coronary artery disease involving native coronary artery of native heart without angina pectoris    Urinary tract infection without hematuria    Chronic congestive heart failure (HCC)    Gastroesophageal reflux disease    Symptomatic bradycardia        ASSESSMENT of Current Deficits Patient exhibits decreased strength, balance, and endurance impairing functional mobility, transfers, gait , gait distance, and tolerance to activity are barriers to d/c and require skilled intervention during hospital stay to attain pre hospital level of function. Decreased strength, balance and endurance  increases patient's risk for fall.   Pt states she is \"stiff from being in bed\"  Eager to ambulate and mobilize for pain control of arthritis Equipment owned: [employer], [employer] [employer], Bedside commode, and Shower chair,       [id number] [place]  Mobility Inpatient   How much difficulty turning over in bed?: A Little  How much difficulty sitting down on / standing up from a chair with arms?: A Little  How much difficulty moving from lying on back to sitting on side of bed?: A Little  How much help from another person moving to and from a bed to a chair?: A Little  How much help from another person needed to walk in hospital room?: A Little  How much help from another person for climbing 3-5 steps with a railing?: A Lot  AM-PAC Inpatient Mobility Raw Score : 17  AM-PAC Inpatient T-Scale Score : 42.13  Mobility Inpatient CMS 0-100% Score: 50.57  Mobility Inpatient CMS G-Code Modifier : CK    Nursing cleared patient for PT evaluation. The admitting diagnosis and active problem list as listed above have been reviewed prior to the initiation of this evaluation. OBJECTIVE;   Initial Evaluation  Date: 9/11/2022 Treatment Date:     Short Term/ Long Term   Goals   Was pt agreeable to Eval/treatment? Yes  To be met in 4 days   Pain level   5/10  \"Knees are stiff\"     Bed Mobility    Rolling: Supervision     Supine to sit: Supervision     Sit to supine: Supervision     Scooting: Supervision     Rolling: Independent    Supine to sit:  Independent    Sit to supine: Independent    Scooting: Independent     Transfers Sit to stand: Supervision     Sit to stand: Independent     Ambulation     3x75 feet using  cane with Minimal assist of 1    third trial needed  hand held assist for support d/t fatigue    100 feet using  cane with Independent    Stair negotiation: ascended and descended   Not assessed     2 steps with rail and cane supervision    ROM Within functional limits        Strength BUE:   4-/5  RLE:  4-/5  LLE:  4-/5  Increase strength in affected mm groups by 1/3 grade   Balance Sitting EOB:  good    Dynamic Standing:  fair with cane; end of sessing needed bilateral upper extremities support  Sitting EOB:  good    Dynamic Standing: good       Patient is Alert & Oriented x person, place, time, and situation and follows directions  hard of hearing   Sensation:  Patient  denies numbness/tingling   Edema:  no   Endurance: fair       Vitals: room air   Blood Pressure at rest  Blood Pressure during session    Heart Rate at rest  59 Heart Rate during session 62   SPO2 at rest 100%  SPO2 during session 99%     Patient education  Patient educated on role of Physical Therapy, risks of immobility, safety and plan of care, importance of positional changes for oxygen exchange,  importance of mobility while in hospital , and safety      Patient response to education:   Pt verbalized understanding Pt demonstrated skill Pt requires further education in this area   Yes Partial Yes      Treatment:  Patient practiced and was instructed/facilitated in the following treatment: Patient   Sat edge of bed 10 minutes with Supervision  to increase dynamic sitting balance and activity tolerance. seated and standing challenges      Therapeutic Exercises:  ankle pumps  x 10 reps. At end of session, patient in chair with alarm call light and phone within reach,  all lines and tubes intact, nursing notified. Patient would benefit from continued skilled Physical Therapy to improve functional independence and quality of life. Patient's/ family goals   home    Time in  316  Time out  336    Total Treatment Time  0 minutes    Evaluation time includes thorough review of current medical information, gathering information on past medical history/social history and prior level of function, completion of standardized testing/informal observation of tasks, assessment of data, and development of Plan of care and goals.      CPT codes:  Low Complexity PT evaluation (14029)  No treatment billed    Romero Prince, PT

## 2022-09-11 NOTE — PLAN OF CARE
Problem: ABCDS Injury Assessment  Goal: Absence of physical injury  Outcome: Progressing     Problem: Safety - Adult  Goal: Free from fall injury  Outcome: Progressing     Problem: Chronic Conditions and Co-morbidities  Goal: Patient's chronic conditions and co-morbidity symptoms are monitored and maintained or improved  Outcome: Progressing

## 2022-09-12 PROCEDURE — 2580000003 HC RX 258: Performed by: STUDENT IN AN ORGANIZED HEALTH CARE EDUCATION/TRAINING PROGRAM

## 2022-09-12 PROCEDURE — 97535 SELF CARE MNGMENT TRAINING: CPT

## 2022-09-12 PROCEDURE — 6370000000 HC RX 637 (ALT 250 FOR IP): Performed by: STUDENT IN AN ORGANIZED HEALTH CARE EDUCATION/TRAINING PROGRAM

## 2022-09-12 PROCEDURE — 99232 SBSQ HOSP IP/OBS MODERATE 35: CPT | Performed by: FAMILY MEDICINE

## 2022-09-12 PROCEDURE — 6370000000 HC RX 637 (ALT 250 FOR IP): Performed by: FAMILY MEDICINE

## 2022-09-12 PROCEDURE — 97165 OT EVAL LOW COMPLEX 30 MIN: CPT

## 2022-09-12 PROCEDURE — 6370000000 HC RX 637 (ALT 250 FOR IP): Performed by: INTERNAL MEDICINE

## 2022-09-12 PROCEDURE — 1200000000 HC SEMI PRIVATE

## 2022-09-12 RX ORDER — LOSARTAN POTASSIUM 50 MG/1
50 TABLET ORAL DAILY
Status: DISCONTINUED | OUTPATIENT
Start: 2022-09-13 | End: 2022-09-13

## 2022-09-12 RX ADMIN — DOCUSATE SODIUM 100 MG: 100 CAPSULE, LIQUID FILLED ORAL at 07:59

## 2022-09-12 RX ADMIN — Medication 10 ML: at 07:59

## 2022-09-12 RX ADMIN — Medication 10 ML: at 21:12

## 2022-09-12 RX ADMIN — APIXABAN 2.5 MG: 5 TABLET, FILM COATED ORAL at 07:58

## 2022-09-12 RX ADMIN — HYDRALAZINE HYDROCHLORIDE 25 MG: 25 TABLET, FILM COATED ORAL at 21:09

## 2022-09-12 RX ADMIN — PANTOPRAZOLE SODIUM 40 MG: 40 TABLET, DELAYED RELEASE ORAL at 06:19

## 2022-09-12 RX ADMIN — ASPIRIN 81 MG: 81 TABLET, COATED ORAL at 07:59

## 2022-09-12 RX ADMIN — HYDRALAZINE HYDROCHLORIDE 25 MG: 25 TABLET, FILM COATED ORAL at 06:19

## 2022-09-12 RX ADMIN — LOSARTAN POTASSIUM 25 MG: 25 TABLET, FILM COATED ORAL at 07:59

## 2022-09-12 RX ADMIN — HYDRALAZINE HYDROCHLORIDE 25 MG: 25 TABLET, FILM COATED ORAL at 14:09

## 2022-09-12 RX ADMIN — AMIODARONE HYDROCHLORIDE 100 MG: 200 TABLET ORAL at 07:58

## 2022-09-12 RX ADMIN — APIXABAN 2.5 MG: 5 TABLET, FILM COATED ORAL at 21:09

## 2022-09-12 RX ADMIN — MAGNESIUM HYDROXIDE 30 ML: 400 SUSPENSION ORAL at 11:09

## 2022-09-12 RX ADMIN — POLYETHYLENE GLYCOL 3350 17 G: 17 POWDER, FOR SOLUTION ORAL at 10:27

## 2022-09-12 NOTE — PLAN OF CARE
Problem: ABCDS Injury Assessment  Goal: Absence of physical injury  9/12/2022 0020 by Marilu Tolentino RN  Outcome: Progressing     Problem: Safety - Adult  Goal: Free from fall injury  9/12/2022 0020 by Marilu Tolentino RN  Outcome: Progressing     Problem: Chronic Conditions and Co-morbidities  Goal: Patient's chronic conditions and co-morbidity symptoms are monitored and maintained or improved  9/12/2022 0020 by Marilu Tolentino RN  Outcome: Progressing

## 2022-09-12 NOTE — PROGRESS NOTES
[facility]) Physicians        CARDIOLOGY                 INPATIENT PROGRESS NOTE          PATIENT SEEN IN FOLLOW UP FOR: Bradycardia    Hospital Day: [address] Krishna Ulrich is a 80year old patient known to Dr. Perla Keenan: Denies any CP or dizzy; No palpitations    ROS: Review of rest of 10 systems negative except as mentioned above    OBJECTIVE: No acute distress. See Assessment     Diagnostics:       Telemetry: Sinus, PVCs    Lexiscan Stress Test (8/2021)  Impression  1. Small-moderate basal inferoseptal stress perfusion deficit with improvement at rest but with significant artifact noted. Suspect abnormality secondary to attenuation rather than physiologic ischemia. 2. Mild-moderate global decrease in myocardial segmental wall motion and calculated gated SPECT left ventricular ejection fraction of 38%. 3. Suggest clinical correlation as warranted        Echo Summary 7/15/2022: There is doppler evidence of stage I diastolic dysfunction. Ejection fraction is visually estimated at 60 to 65%. Normal right ventricular size and function. Normal right atrium size. Agitated saline injected for shunt evaluation was suboptimal but no definite evidence of shunt is noted . Mild mitral regurgitation is present. Mild aortic regurgitation is noted. Individual aortic valve leaflets are not clearly visualized. No hemodynamically significant aortic stenosis is present. Mild tricuspid regurgitation.       Intake/Output Summary (Last 24 hours) at 9/12/2022 1545  Last data filed at 9/12/2022 1522  Gross per 24 hour   Intake 410 ml   Output --   Net 410 ml       Labs:   CBC:   Recent Labs     09/10/22  1601 09/11/22  0422   WBC 5.6 5.8   HGB 12.5 14.2   HCT 39.0 43.6    177     BMP:   Recent Labs     09/10/22  1601 09/11/22  0422    138   K 4.1 3.6   CO2 25 22   BUN 10 10   CREATININE 0.9 0.8   LABGLOM >60 >60   CALCIUM 9.0 9.6     Mag: No results for input(s): MG in the last 72 hours. Phos: No results for input(s): PHOS in the last 72 hours. TSH: No results for input(s): TSH in the last 72 hours. HgA1c:     BNP: No results for input(s): BNP in the last 72 hours. PT/INR: No results for input(s): PROTIME, INR in the last 72 hours. APTT:No results for input(s): APTT in the last 72 hours. CARDIAC ENZYMES:No results for input(s): CKTOTAL, CKMB, CKMBINDEX, TROPONINI in the last 72 hours. FASTING LIPID PANEL:  Lab Results   Component Value Date/Time    CHOL 186 2019 06:01 AM    HDL 70 2019 06:01 AM    LDLCALC 107 2019 06:01 AM    TRIG 44 2019 06:01 AM     LIVER PROFILE:No results for input(s): AST, ALT, LABALBU in the last 72 hours. Current Inpatient Medications:   amiodarone  100 mg Oral Daily    apixaban  2.5 mg Oral BID    aspirin  81 mg Oral Daily    hydrALAZINE  25 mg Oral 3 times per day    losartan  25 mg Oral Daily    pantoprazole  40 mg Oral QAM AC    docusate sodium  100 mg Oral Daily    sodium chloride flush  5-40 mL IntraVENous 2 times per day       IV Infusions (if any):   sodium chloride           PHYSICAL EXAM:     CONSTITUTIONAL:   BP (!) 158/95   Pulse 71   Temp 97.4 °F (36.3 °C) (Oral)   Resp 21   SpO2 95%   Pulse  Av  Min: 55  Max: 71  Systolic (73UOP), UQP:105 , Min:158 , FRW:050    Diastolic (94FNT), HNB:49, Min:91, Max:98    In general, this is a well developed, well nourished who appears stated age, awake, alert, no apparent distress  HEENT: eyes -conjunctivae pink,  Throat - Oral mucosa moist.   Neck-  no stridor, no noted enlargement of the thyroid, no carotid bruit. no jugular venous distention   RESPIRATORY: Chest symmetrical.  No accessory muscle use. Lung auscultation - few rhonchi  CARDIOVASCULAR:     Heart Palpation - no palpable thrills  Heart Ausculation - Regular rate and rhythm, 2/6 systolic murmur, No s3 or rub.    No lower extremity edema,  Distal pulses palpable, no cyanosis   ABDOMEN: Soft,  Bowel sounds

## 2022-09-12 NOTE — PROGRESS NOTES
[address] [facility]  [facility] [place]. [place]        Date:2022                                                  Patient Name: Selene Thompson    MRN: [id number]    : 1931    Room: [address] [place] [address]      Evaluating OT: Chloe Angeles OTR/L; [id number]     Referring Provider and Specific Provider Orders/Date:      09/10/22 2045  OT eval and treat  Start:  09/10/22 2045,   End:  09/10/22 2045,   ONE TIME,   Standing Count:  1 Occurrences,   Moisés Sanderson MD      Placement Recommendation: HHOT       Diagnosis:   1. Syncope and collapse    2.  Bradycardia         Surgery: none       Pertinent Medical History:       Past Medical History:   Diagnosis Date    Arthritis     Atrial fibrillation (HCC)     Chronic combined systolic (congestive) and diastolic (congestive) heart failure ([address] 2021    Coronary artery disease involving native coronary artery of native heart without angina pectoris     Hx of blood clots     Hypertension     Ischemic colitis ([address]     PAF (paroxysmal atrial fibrillation) ([facility]          Past Surgical History:   Procedure Laterality Date    ANKLE FRACTURE SURGERY      CARDIAC CATHETERIZATION  2019    Dr. Louis Carrion    COLONOSCOPY      ENDOSCOPY, COLON, DIAGNOSTIC      FRACTURE SURGERY        Precautions:  Fall Risk, alarm, [place]      Assessment of current deficits:     [x] Functional mobility  [x]ADLs  [x] Strength               []Cognition    [x] Functional transfers   [x] IADLs         [] Safety Awareness   [x]Endurance    [] Fine Coordination              [x] Balance      [] Vision/perception   []Sensation     []Gross Motor Coordination  [] ROM  [] Delirium                   [] Motor Control     OT PLAN OF CARE   OT POC based on physician orders, patient diagnosis and results of clinical assessment    Frequency/Duration 1-3 days/wk for 2 weeks PRN     Specific OT Treatment Interventions to include:   * Instruction/training on adapted ADL techniques and AE recommendations to increase functional independence within precautions       * Training on energy conservation strategies, correct breathing pattern and techniques to improve independence/tolerance for self-care routine  * Functional transfer/mobility training/DME recommendations for increased independence, safety, and fall prevention  * Patient/Family education to increase follow through with safety techniques and functional independence  * Recommendation of environmental modifications for increased safety with functional transfers/mobility and ADLs  * Therapeutic exercise to improve motor endurance, ROM, and functional strength for ADLs/functional transfers  * Therapeutic activities to facilitate/challenge dynamic balance, stand tolerance for increased safety and independence with ADLs  * Positioning to improve skin integrity, interaction with environment and functional independence    Recommended Adaptive Equipment: none      Home Living: with son, single family home, 1 story, 2 steps to enter with rail, tub shower. Equipment owned: wheeled walker, cane, bedside commode, shower chair     Prior Level of Function: needs assistance with ADLs and IADLs; ambulated with straight cane, sits down in the tub to bathe with supervision     Driving: no  Occupation: retired     Pain Level: 4/10 pain in stomach; Nursing notified.       Cognition: A&O: 3/4; Follows 1 step directions   Memory: fair    Sequencing: fair    Problem solving: good    Judgement/safety: good     [facility]   AM-PAC Daily Activity Inpatient   How much help for putting on and taking off regular lower body clothing?: A Lot  How much help for Bathing?: A Lot  How much help for Toileting?: A Little  How much help for putting on and taking off regular upper body clothing?: A Little  How much help for taking care of personal grooming?: A Little  How much help for eating meals?: None  AM-PAC Inpatient Daily Activity Raw Score: 17  AM-PAC Inpatient ADL T-Scale Score : 37.26  ADL Inpatient CMS 0-100% Score: 50.11  ADL Inpatient CMS G-Code Modifier : CK     Functional Assessment:    Initial Eval Status  Date: 9/12/22 Treatment Status  Date: STGs = LTGs  Time frame: 10-14 days   Feeding Independent   Independent    Grooming Supervision   Independent    UB Dressing Minimal Assist   Independent    LB Dressing Moderate Assist to thread feet through hospital pants while seated on commode, pt then stood with assistance to bring garment up around hips and waist  Independent    Bathing Moderate Assist  Modified [place]    Toileting Supervision for hygiene and to stand at sink level to wash and dry hands. Modified [place]    Bed Mobility  Supine to sit: Supervision   Sit to supine: Supervision   Supine to sit: Independent   Sit to supine: Independent    Functional Transfers Supervision from EOB to straight cane. Supervision for transfer to and from low commode with minimal verbal cues to use grab bar for safe commode transfer   Supervision for transfer from standing to edge of bed. Transfer training with verbal cues for hand placement throughout session to improve safety. Independent    Functional Mobility Minimal assist with straight cane to improve balance to and from bathroom, verbal cues for walker sequence and safety. Modified [place]    Balance Sitting:     Static: good     Dynamic: fair   Standing: fair  with cane     Activity Tolerance fair   good    Visual/  Perceptual Glasses: yes                Hand Dominance: right      AROM (PROM) Strength Additional Info:    RUE  WFL 4/5 good  and wfl FMC/dexterity noted during ADL tasks     LUE WFL 4/5 good  and wfl FMC/dexterity noted during ADL tasks       Hearing: WFL   Sensation:  No c/o numbness or tingling  Tone: WFL   Edema: no     Comments: Upon arrival the patient was supine.   At end of session, patient was returned to supine with call light and phone within reach, all lines and tubes intact. Overall patient demonstrated decreased independence and safety during completion of ADL/functional transfer/mobility tasks. Pt would benefit from continued skilled OT to increase safety and independence with completion of ADL/IADL tasks for functional independence and quality of life. Treatment: OT treatment provided this date includes:   Instruction/training on safety and adapted techniques for completion of ADLs   Instruction/training on safe functional mobility/transfer techniques   Instruction/training on energy conservation/work simplification for completion of ADLs   Proper Positioning/Alignment    Rehab Potential: Good for established goals. Patient / Family Goal: return home       Patient and/or family were instructed on functional diagnosis, prognosis/goals and OT plan of care. Demonstrated good understanding. Eval Complexity: Low    Time In: 2:51pm  Time Out: 3:21pm   Total Treatment Time: 10      Min Units   OT Eval Low 97165  X  1    OT Eval Medium 00327      OT Eval High 39596      OT Re-Eval 41408            ADL/Self Care 36759 8  1    Therapeutic Activities 21130  2     Therapeutic Ex 13572       Orthotic Management 82541       Manual [id number]     Neuro Re-Ed 69419       Non-Billable Time        Evaluation Time additionally includes thorough review of current medical information, gathering information on past medical history/social history and prior level of function, interpretation of standardized testing/informal observation of tasks, assessment of data and development of plan of care and goals.         Evaluating OT: Adrian Roberts OTR/L; [id number]

## 2022-09-12 NOTE — CARE COORDINATION
ADDENDUM: 9/12/2022.2:31 PM  Plan is home with son and 24/7 supervision. Pt is on room air. Pt ACTIVE with [facility], resume order noted and sw notified liaison Smith of orders. Info provided to dtr for meals on wheels. Family will transport. ESTEFANIA Abebe    Ss note: 9/12/20221:31 PM No covid testing. Met with pt and her dtr Schuylre Gan present in room. Pt resides with her son Tariq Murdock [phone] and has 24/7 supervision. Pt is on room iar, has cane, ww, BSC and shower chair. Hx of SNF placement at [facility] when pt has covid. Pt is ACTIVE with [facility], will need RESUME ORDERS. Per dtr requests sw provided informal brochure on meals on wheels and provided [employer] informational also. No additional needs relayed, family will transport home.  ESTEFANIA Abebe

## 2022-09-12 NOTE — PLAN OF CARE
Problem: ABCDS Injury Assessment  Goal: Absence of physical injury  9/11/2022 2125 by Felicita Brunson RN  Outcome: Progressing  9/11/2022 1953 by Rosales Walter RN  Outcome: Progressing     Problem: Safety - Adult  Goal: Free from fall injury  9/11/2022 2125 by Felicita Brunson RN  Outcome: Progressing  9/11/2022 1953 by Rosales Walter RN  Outcome: Progressing     Problem: Chronic Conditions and Co-morbidities  Goal: Patient's chronic conditions and co-morbidity symptoms are monitored and maintained or improved  9/11/2022 2125 by Felicita Brunson RN  Outcome: Progressing  9/11/2022 1953 by Rosales Walter RN  Outcome: Progressing

## 2022-09-12 NOTE — PROGRESS NOTES
[facility] Progress Note    Admitting Date and Time: 9/10/2022  2:53 PM  Admit Dx: Syncope and collapse [R55]  Bradycardia [R00.1]    Subjective:  Patient is being followed for Syncope and collapse [R55]  Bradycardia [R00.1]     Pt felt dizzy while sitting in the chair this morning. No events overnight. Constipated -- states what she got last night didn't help. No chest pain. No cough/wheezing/SOB. Eating OK. Per RN: nothing to report    ROS: denies fever, chills, cp, sob, n/v, HA unless stated above.       amiodarone  100 mg Oral Daily    apixaban  2.5 mg Oral BID    aspirin  81 mg Oral Daily    hydrALAZINE  25 mg Oral 3 times per day    losartan  25 mg Oral Daily    pantoprazole  40 mg Oral QAM AC    docusate sodium  100 mg Oral Daily    sodium chloride flush  5-40 mL IntraVENous 2 times per day     sodium chloride flush, 5-40 mL, PRN  sodium chloride, , PRN  polyethylene glycol, 17 g, Daily PRN  acetaminophen, 650 mg, Q6H PRN   Or  acetaminophen, 650 mg, Q6H PRN         Objective:    BP (!) 166/98   Pulse 71   Temp 97.4 °F (36.3 °C) (Oral)   Resp 21   SpO2 95%     General Appearance: alert and oriented to person, place and time and in no acute distress  Skin: warm and dry  Head: normocephalic and atraumatic  Eyes: pupils equal, round, and reactive to light, extraocular eye movements intact, conjunctivae normal  Neck: neck supple and non tender without mass   Pulmonary/Chest: clear to auscultation bilaterally- no wheezes, rales or rhonchi, normal air movement, no respiratory distress  Cardiovascular: normal rate, normal S1 and S2 and no carotid bruits  Abdomen: soft, non-tender, non-distended, normal bowel sounds, no masses or organomegaly  Extremities: no cyanosis, no clubbing and no edema  Neurologic: no cranial nerve deficit and speech normal        Recent Labs     09/10/22  1601 09/11/22  0422    138   K 4.1 3.6   * 103   CO2 25 22   BUN 10 10   CREATININE 0.9 0.8 GLUCOSE 101* 90   CALCIUM 9.0 9.6       Recent Labs     09/10/22  1601 09/11/22  0422   WBC 5.6 5.8   RBC 4.18 4.72   HGB 12.5 14.2   HCT 39.0 43.6   MCV 93.3 92.4   MCH 29.9 30.1   MCHC 32.1 32.6   RDW 14.7 14.6    177   MPV 9.9 9.9       Radiology:   None    Assessment:    Principal Problem:    Syncope and collapse  Active Problems:    Chronic congestive heart failure (HCC)    Gastroesophageal reflux disease    Symptomatic bradycardia    Primary hypertension  Resolved Problems:    * No resolved hospital problems. *      Plan:  Symptomatic bradycardia with syncope  -Patient had syncopal episode at home, felt irregular heart beat prior  -On presentation to ER, found to be bradycardic, HR down to 40s, did later improve to 50s  -Patient has had prior episodes of syncope, is on carvedilol and amiodarone for afib, will hold for now and consult cardiology.  Cardiology resumed amio at lower dose and continued holding carvedilol  -Patient recently had echo in July, showed EF 60-65%, stage 1 DD and no other significant abnormalities  -After last admission was ordered a 14 day cardiac event monitor, however does not seem to have completed, will be ordered again after this admission per cardiology  -Monitor on telemetry, F/U further cardio recommendations     Afib  -Home meds amiodarone, carvedilol and eliquis  -Continue eliquis, amiodarone reduced to 100mg and hold carvedilol for symptomatic bradycardia  -Cardiology consulted, above changes   -Monitor on telemetry, HR currently in 70s in sinus  -TSH normal in July 2022 -- 1.86     HTN -- uncontrolled -- however, mostly at goal for her age  -Home meds losartan, hydralazine and carvedilol  -Continue meds other than carvedilol  -BP on admission 151/78 -- continue to monitor     GERD  -Continue home PPI     CHF -- by hx; currently euvolemic  -Patient had echo in 8/2021 which showed EF 38%, however most recently echo in July showed resolution and EF 60-65%      NOTE: This report was transcribed using voice recognition software. Every effort was made to ensure accuracy; however, inadvertent computerized transcription errors may be present.   Electronically signed by Rajendra Padilla DO on 9/12/2022 at 2:04 PM

## 2022-09-12 NOTE — PLAN OF CARE
Problem: ABCDS Injury Assessment  Goal: Absence of physical injury  9/12/2022 1036 by Leonora Gama RN  Outcome: Progressing  9/12/2022 0020 by Radha Arreguin RN  Outcome: Progressing  9/11/2022 2125 by Erin Santiago RN  Outcome: Progressing     Problem: Safety - Adult  Goal: Free from fall injury  9/12/2022 1036 by Leonora Gama RN  Outcome: Progressing  9/12/2022 0020 by Radha Arreguin RN  Outcome: Progressing  9/11/2022 2125 by Erin Santiago RN  Outcome: Progressing     Problem: Chronic Conditions and Co-morbidities  Goal: Patient's chronic conditions and co-morbidity symptoms are monitored and maintained or improved  9/12/2022 1036 by Leonora Gama RN  Outcome: Progressing  9/12/2022 0020 by Radha Arreguin RN  Outcome: Progressing  9/11/2022 2125 by Erin Santiago RN  Outcome: Progressing

## 2022-09-13 PROBLEM — E87.6 HYPOKALEMIA: Status: ACTIVE | Noted: 2022-09-13

## 2022-09-13 LAB
ANION GAP SERPL CALCULATED.3IONS-SCNC: 12 MMOL/L (ref 7–16)
BUN BLDV-MCNC: 14 MG/DL (ref 6–23)
CALCIUM SERPL-MCNC: 9 MG/DL (ref 8.6–10.2)
CHLORIDE BLD-SCNC: 107 MMOL/L (ref 98–107)
CO2: 21 MMOL/L (ref 22–29)
CREAT SERPL-MCNC: 0.9 MG/DL (ref 0.5–1)
GFR AFRICAN AMERICAN: >60
GFR NON-AFRICAN AMERICAN: >60 ML/MIN/1.73
GLUCOSE BLD-MCNC: 93 MG/DL (ref 74–99)
HCT VFR BLD CALC: 41.5 % (ref 34–48)
HEMOGLOBIN: 13.4 G/DL (ref 11.5–15.5)
MAGNESIUM: 2.1 MG/DL (ref 1.6–2.6)
MCH RBC QN AUTO: 29.7 PG (ref 26–35)
MCHC RBC AUTO-ENTMCNC: 32.3 % (ref 32–34.5)
MCV RBC AUTO: 92 FL (ref 80–99.9)
METER GLUCOSE: 141 MG/DL (ref 74–99)
PDW BLD-RTO: 14.6 FL (ref 11.5–15)
PHOSPHORUS: 2.6 MG/DL (ref 2.5–4.5)
PLATELET # BLD: 154 E9/L (ref 130–450)
PMV BLD AUTO: 9.2 FL (ref 7–12)
POTASSIUM SERPL-SCNC: 3.3 MMOL/L (ref 3.5–5)
RBC # BLD: 4.51 E12/L (ref 3.5–5.5)
REASON FOR REJECTION: NORMAL
REJECTED TEST: NORMAL
SODIUM BLD-SCNC: 140 MMOL/L (ref 132–146)
TROPONIN, HIGH SENSITIVITY: 13 NG/L (ref 0–9)
TROPONIN, HIGH SENSITIVITY: 14 NG/L (ref 0–9)
TSH SERPL DL<=0.05 MIU/L-ACNC: 6.45 UIU/ML (ref 0.27–4.2)
WBC # BLD: 5.1 E9/L (ref 4.5–11.5)

## 2022-09-13 PROCEDURE — 97110 THERAPEUTIC EXERCISES: CPT

## 2022-09-13 PROCEDURE — 1200000000 HC SEMI PRIVATE

## 2022-09-13 PROCEDURE — 2580000003 HC RX 258: Performed by: PHYSICIAN ASSISTANT

## 2022-09-13 PROCEDURE — 84484 ASSAY OF TROPONIN QUANT: CPT

## 2022-09-13 PROCEDURE — 6370000000 HC RX 637 (ALT 250 FOR IP): Performed by: STUDENT IN AN ORGANIZED HEALTH CARE EDUCATION/TRAINING PROGRAM

## 2022-09-13 PROCEDURE — 36415 COLL VENOUS BLD VENIPUNCTURE: CPT

## 2022-09-13 PROCEDURE — 97530 THERAPEUTIC ACTIVITIES: CPT

## 2022-09-13 PROCEDURE — 84100 ASSAY OF PHOSPHORUS: CPT

## 2022-09-13 PROCEDURE — 84443 ASSAY THYROID STIM HORMONE: CPT

## 2022-09-13 PROCEDURE — 93005 ELECTROCARDIOGRAM TRACING: CPT | Performed by: FAMILY MEDICINE

## 2022-09-13 PROCEDURE — 80048 BASIC METABOLIC PNL TOTAL CA: CPT

## 2022-09-13 PROCEDURE — 85027 COMPLETE CBC AUTOMATED: CPT

## 2022-09-13 PROCEDURE — 2580000003 HC RX 258: Performed by: STUDENT IN AN ORGANIZED HEALTH CARE EDUCATION/TRAINING PROGRAM

## 2022-09-13 PROCEDURE — 99232 SBSQ HOSP IP/OBS MODERATE 35: CPT | Performed by: FAMILY MEDICINE

## 2022-09-13 PROCEDURE — 6360000002 HC RX W HCPCS: Performed by: FAMILY MEDICINE

## 2022-09-13 PROCEDURE — 83735 ASSAY OF MAGNESIUM: CPT

## 2022-09-13 PROCEDURE — 6370000000 HC RX 637 (ALT 250 FOR IP): Performed by: INTERNAL MEDICINE

## 2022-09-13 PROCEDURE — 99232 SBSQ HOSP IP/OBS MODERATE 35: CPT | Performed by: PHYSICIAN ASSISTANT

## 2022-09-13 PROCEDURE — 82962 GLUCOSE BLOOD TEST: CPT

## 2022-09-13 PROCEDURE — 2580000003 HC RX 258: Performed by: FAMILY MEDICINE

## 2022-09-13 RX ORDER — 0.9 % SODIUM CHLORIDE 0.9 %
500 INTRAVENOUS SOLUTION INTRAVENOUS ONCE
Status: COMPLETED | OUTPATIENT
Start: 2022-09-13 | End: 2022-09-13

## 2022-09-13 RX ORDER — LOSARTAN POTASSIUM 25 MG/1
25 TABLET ORAL DAILY
Status: DISCONTINUED | OUTPATIENT
Start: 2022-09-14 | End: 2022-09-14

## 2022-09-13 RX ORDER — POTASSIUM CHLORIDE 7.45 MG/ML
10 INJECTION INTRAVENOUS
Status: COMPLETED | OUTPATIENT
Start: 2022-09-13 | End: 2022-09-13

## 2022-09-13 RX ORDER — SODIUM CHLORIDE 9 MG/ML
INJECTION, SOLUTION INTRAVENOUS CONTINUOUS
Status: ACTIVE | OUTPATIENT
Start: 2022-09-13 | End: 2022-09-13

## 2022-09-13 RX ADMIN — POTASSIUM CHLORIDE 10 MEQ: 7.46 INJECTION, SOLUTION INTRAVENOUS at 12:40

## 2022-09-13 RX ADMIN — POLYETHYLENE GLYCOL 3350 17 G: 17 POWDER, FOR SOLUTION ORAL at 08:36

## 2022-09-13 RX ADMIN — PANTOPRAZOLE SODIUM 40 MG: 40 TABLET, DELAYED RELEASE ORAL at 06:30

## 2022-09-13 RX ADMIN — DOCUSATE SODIUM 100 MG: 100 CAPSULE, LIQUID FILLED ORAL at 08:35

## 2022-09-13 RX ADMIN — ASPIRIN 81 MG: 81 TABLET, COATED ORAL at 08:35

## 2022-09-13 RX ADMIN — LOSARTAN POTASSIUM 50 MG: 50 TABLET, FILM COATED ORAL at 08:35

## 2022-09-13 RX ADMIN — SODIUM CHLORIDE 500 ML: 9 INJECTION, SOLUTION INTRAVENOUS at 10:30

## 2022-09-13 RX ADMIN — SODIUM CHLORIDE: 9 INJECTION, SOLUTION INTRAVENOUS at 13:40

## 2022-09-13 RX ADMIN — AMIODARONE HYDROCHLORIDE 100 MG: 200 TABLET ORAL at 08:35

## 2022-09-13 RX ADMIN — POTASSIUM CHLORIDE 10 MEQ: 7.46 INJECTION, SOLUTION INTRAVENOUS at 13:48

## 2022-09-13 RX ADMIN — POTASSIUM CHLORIDE 10 MEQ: 7.46 INJECTION, SOLUTION INTRAVENOUS at 15:14

## 2022-09-13 RX ADMIN — APIXABAN 2.5 MG: 5 TABLET, FILM COATED ORAL at 22:15

## 2022-09-13 RX ADMIN — APIXABAN 2.5 MG: 5 TABLET, FILM COATED ORAL at 08:35

## 2022-09-13 RX ADMIN — Medication 10 ML: at 22:15

## 2022-09-13 RX ADMIN — HYDRALAZINE HYDROCHLORIDE 25 MG: 25 TABLET, FILM COATED ORAL at 06:30

## 2022-09-13 RX ADMIN — POTASSIUM CHLORIDE 10 MEQ: 7.46 INJECTION, SOLUTION INTRAVENOUS at 16:37

## 2022-09-13 NOTE — PROGRESS NOTES
Inpatient [address] [facility] Cardiology Progress Note    Date of Service: 9/13/2022    Reason for Follow-up: Near syncope, orthostatic hypotension    SUBJECTIVE:     Patient is a 80year old AAF known to Dr. Olivia Feliz today for episode of near syncope and +orthostatic hypotension. Patient was reportedly given her AM dose of hydralazine 25 mg and newly increased dose of Losartan 50 mg daily. She then noted later in the AM to have an episode of dizziness with near syncope while attempting to get into bed from her bedside chair. She denies actual syncope or LOC. Orthostatic vitals performed after episode as follows: supine 125/73 --> sitting 81/55 with HR 53 --> standing 125/85. Symptoms resolved with NS bolus  On my time of interview, patient denies any further cardiac symptoms, including chest pain, SOB, palpitations, dizziness, near syncope or syncope. Denies PND, orthopnea or peripheral edema   She notes of poor oral fluid intake, which family confirms at [address]:  Prior to Admission medications    Medication Sig Start Date End Date Taking? Authorizing Provider   amiodarone (CORDARONE) 200 MG tablet Take 1 tablet by mouth in the morning. 7/19/22   Jerome Sullivan MD   carvedilol (COREG) 3.125 MG tablet Take 1 tablet by mouth in the morning and 1 tablet in the evening. Take with meals. 7/18/22   Jreome Sullivan MD   hydrALAZINE (APRESOLINE) 50 MG tablet Take 0.5 tablets by mouth in the morning and 0.5 tablets at noon and 0.5 tablets before bedtime. 7/18/22   Jerome Sullivan MD   losartan (COZAAR) 50 MG tablet Take 0.5 tablets by mouth in the morning.  7/18/22   Jerome Sullivan MD   aspirin 81 MG EC tablet Take 1 tablet by mouth daily 7/14/22   Luh Horton APRN - NP   apixaban Arzella Profit) 5 MG TABS tablet Take 0.5 tablets by mouth 2 times daily 7/14/22   FRANCES Byrd - NP   omeprazole (PRILOSEC) 20 MG delayed release capsule Take 2 capsules by mouth daily 7/14/22   Luh Horton, APRN - NP   docusate sodium (COLACE) 100 MG capsule Take 100 mg by mouth daily    Historical Provider, MD   acetaminophen (TYLENOL) 500 MG tablet Take 2 tablets by mouth every 8 hours as needed for Pain 9/26/20   Sarkis Tavarez MD       CURRENT MEDICATIONS:      Current Facility-Administered Medications:     potassium chloride 10 mEq/100 mL IVPB (Peripheral Line), 10 mEq, IntraVENous, Q1H, Denisse Nagy DO    losartan (COZAAR) tablet 50 mg, 50 mg, Oral, Daily, Samantha Barrera MD, 50 mg at 09/13/22 3328    amiodarone (CORDARONE) tablet 100 mg, 100 mg, Oral, Daily, Adelita Gastelum DO, 100 mg at 09/13/22 7304    apixaban (ELIQUIS) tablet 2.5 mg, 2.5 mg, Oral, BID, Vickie Montaño MD, 2.5 mg at 09/13/22 3694    aspirin EC tablet 81 mg, 81 mg, Oral, Daily, Vickie Montaño MD, 81 mg at 09/13/22 0835    [Held by provider] hydrALAZINE (APRESOLINE) tablet 25 mg, 25 mg, Oral, 3 times per day, Vickie Montaño MD, 25 mg at 09/13/22 0630    pantoprazole (PROTONIX) tablet 40 mg, 40 mg, Oral, QAM AC, Vickie Montaño MD, 40 mg at 09/13/22 0630    docusate sodium (COLACE) capsule 100 mg, 100 mg, Oral, Daily, Vickie Montaño MD, 100 mg at 09/13/22 0835    sodium chloride flush 0.9 % injection 5-40 mL, 5-40 mL, IntraVENous, 2 times per day, Vickie Montaño MD, 10 mL at 09/12/22 2112    sodium chloride flush 0.9 % injection 5-40 mL, 5-40 mL, IntraVENous, PRN, Vickie Montaño MD    0.9 % sodium chloride infusion, , IntraVENous, PRN, Vickie Montaño MD    polyethylene glycol (GLYCOLAX) packet 17 g, 17 g, Oral, Daily PRN, Vickie Montaño MD, 17 g at 09/13/22 0836    acetaminophen (TYLENOL) tablet 650 mg, 650 mg, Oral, Q6H PRN **OR** acetaminophen (TYLENOL) suppository 650 mg, 650 mg, Rectal, Q6H PRN, Vickie Montaño MD      ALLERGIES:  Nifedipine        REVIEW OF SYSTEMS:     Negative except as noted above in HPI      PHYSICAL EXAM:   BP (!) 143/85   Pulse 58   Temp 98 °F (36.7 °C) (Oral) Resp 18   SpO2 99%   CONST:  Well developed, elderly AAF who appears stated age. Awake, alert, cooperative, no apparent distress. [place]  HEENT:   Head- Normocephalic, atraumatic. Eyes- Conjunctivae pink, anicteric. Neck-  No stridor, trachea midline, no apparent jugular venous distention. CHEST: Chest symmetrical and non-tender to palpation. No accessory muscle use or intercostal retractions. RESPIRATORY: Lung sounds - clear throughout fields  CARDIOVASCULAR:     No noted carotid bruit. Heart Ausculation- Regular rate and rhythm, no apparent murmur. PV: No lower extremity edema. No varicosities. Pedal pulses palpable, no clubbing or cyanosis. ABDOMEN: Soft, non-tender to light palpation. Bowel sounds present. MS: Good muscle strength and tone. No atrophy or abnormal movements. SKIN: Warm and dry. No statis dermatitis or ulcers. NEURO / PSYCH: Oriented to person, place and time. Speech clear and appropriate. Follows all commands. Pleasant affect. DATA:    Telemetry: SR with HR in the 70s while on the floor     Diagnostic:  All diagnostic testing and lab work thus far this admission reviewed in detail. Lexiscan Stress Test (8/2021)  Impression  1. Small-moderate basal inferoseptal stress perfusion deficit with improvement at rest but with significant artifact noted. Suspect abnormality secondary to attenuation rather than physiologic ischemia. 2. Mild-moderate global decrease in myocardial segmental wall motion and calculated gated SPECT left ventricular ejection fraction of 38%. 3. Suggest clinical correlation as warranted     Echo Summary 7/15/2022: There is doppler evidence of stage I diastolic dysfunction. Ejection fraction is visually estimated at 60 to 65%. Normal right ventricular size and function. Normal right atrium size. Agitated saline injected for shunt evaluation was suboptimal but no definite evidence of shunt is noted . Mild mitral regurgitation is present.    Mild aortic regurgitation is noted. Individual aortic valve leaflets are not clearly visualized. No hemodynamically significant aortic stenosis is present. Mild tricuspid regurgitation. CXR 9/10/2022  Impression  Borderline cardiomegaly. Questionable focal infiltrate or atelectasis in the  mid right lung, underlying a telemetry lead. CT Head 9/10/2022  Impression  No acute intracranial hemorrhage or edema. Intake/Output Summary (Last 24 hours) at 9/13/2022 1240  Last data filed at 9/13/2022 0759  Gross per 24 hour   Intake 245 ml   Output --   Net 245 ml       Labs:   CBC:   Recent Labs     09/11/22  0422 09/13/22  1040   WBC 5.8 5.1   HGB 14.2 13.4   HCT 43.6 41.5    154     BMP:   Recent Labs     09/11/22  0422 09/13/22  1040    140   K 3.6 3.3*   CO2 22 21*   BUN 10 14   CREATININE 0.8 0.9   LABGLOM >60 >60   CALCIUM 9.6 9.0     Mag:   Recent Labs     09/13/22  1040   MG 2.1     Phos:   Recent Labs     09/13/22  1040   PHOS 2.6     TSH:   Recent Labs     09/13/22  1040   TSH 6.450*     HgA1c:   Lab Results   Component Value Date    LABA1C 5.0 07/14/2022     FASTING LIPID PANEL:  Lab Results   Component Value Date/Time    CHOL 186 11/07/2019 06:01 AM    HDL 70 11/07/2019 06:01 AM    LDLCALC 107 11/07/2019 06:01 AM    TRIG 44 11/07/2019 06:01 AM         ASSESSMENT/RECOMMENDATIONS:     Syncope and collapse - Noted to have episode today of dizziness/near syncope with +orthostatics immediately after event. HR 70s on tele. Out-Pt Event Monitor (recommended during her July admission, not done)      Symptomatic bradycardia - Resolved after Coreg held - Discontinue Coreg. TSH abnormal this admission, management as per primary service. Out-Pt Event Monitor     Orthostatic hypotension -  75cc/hr NS x 6 hours, recheck orthos once complete. Compression stockings. Advised against abrupt changes in position.   Patient admits to poor oral intake     History of HTN - Decrease Losartan back to 25 mg daily - re-start after improvement in orthos. Discontinue Hydralazine at this time     PAF - On adjusted dose Eliquis for [address] and low dose Amiodarone, continue      Chronic HFimpEF - Appears euvolemic      VHD-  Mild MR, TR, AR on TTE 7/2022    DNR-CCA code status    Hypokalemia, supplemented        Monitor today due to above noted episode and +orthos  Possible discharge later today if orthostatics/BP improved, if still orthostatic will observe 24 hours      After discharge, patient/family need to contact Dr Krish Zapien office to apply 2-4 week Event monitor and then f/u with Dr Thania Graves    Above discussed with family and patient. They expressed understanding         Above discussed and made in collaboration with Dr. Shasha Germain. NOTE: This report was transcribed using voice recognition software. Every effort was made to ensure accuracy; however, inadvertent computerized transcription errors may be present.     Ruchi Paul, [address] [place] [address], [place] [address] Cardiology    Electronically signed by Quinn Corley PA-C on 9/13/2022 at 12:40 PM

## 2022-09-13 NOTE — PROGRESS NOTES
[facility] Progress Note    Admitting Date and Time: 9/10/2022  2:53 PM  Admit Dx: Syncope and collapse [R55]  Bradycardia [R00.1]    Subjective:  Patient is being followed for Syncope and collapse [R55]  Bradycardia [R00.1]     Called to bedside around 10:00 AM -- pt felt pre-syncopal in chair for about 1 hr -- BP was 81/55, HR 58, felt left-sided chest pain as well -- did get losartan and hydralazine and amiodarone about 1 hr prior to this event. No SOB. Had anxiety attack afterwards once she was in bed. Per RN: low BP sitting up in chair    ROS: denies fever, chills, sob, n/v, HA unless stated above.       [START ON 9/14/2022] losartan  25 mg Oral Daily    amiodarone  100 mg Oral Daily    apixaban  2.5 mg Oral BID    aspirin  81 mg Oral Daily    pantoprazole  40 mg Oral QAM AC    docusate sodium  100 mg Oral Daily    sodium chloride flush  5-40 mL IntraVENous 2 times per day     sodium chloride flush, 5-40 mL, PRN  sodium chloride, , PRN  polyethylene glycol, 17 g, Daily PRN  acetaminophen, 650 mg, Q6H PRN   Or  acetaminophen, 650 mg, Q6H PRN         Objective:    BP (!) 168/92   Pulse 61   Temp 97.3 °F (36.3 °C) (Oral)   Resp 20   Wt 132 lb 6.4 oz (60.1 kg)   SpO2 100%   BMI 20.74 kg/m²     General Appearance: alert and oriented to person, place and time and in acute distress due to anxiety from almost passing out, [place]  Skin: warm and dry  Head: normocephalic and atraumatic  Eyes: pupils equal, round, and reactive to light, extraocular eye movements intact, conjunctivae normal  Neck: neck supple and non tender without mass   Pulmonary/Chest: clear to auscultation bilaterally- no wheezes, rales or rhonchi, normal air movement, no respiratory distress  Cardiovascular: normal rate, normal S1 and S2 and no carotid bruits  Abdomen: soft, non-tender, non-distended, normal bowel sounds, no masses or organomegaly  Extremities: no cyanosis, no clubbing and no edema  Neurologic: no cranial nerve deficit and speech normal        Recent Labs     09/11/22  0422 09/13/22  1040    140   K 3.6 3.3*    107   CO2 22 21*   BUN 10 14   CREATININE 0.8 0.9   GLUCOSE 90 93   CALCIUM 9.6 9.0       Recent Labs     09/11/22  0422 09/13/22  1040   WBC 5.8 5.1   RBC 4.72 4.51   HGB 14.2 13.4   HCT 43.6 41.5   MCV 92.4 92.0   MCH 30.1 29.7   MCHC 32.6 32.3   RDW 14.6 14.6    154   MPV 9.9 9.2       Radiology:   None new    Assessment:    Principal Problem:    Syncope and collapse  Active Problems:    Chronic congestive heart failure (HCC)    Gastroesophageal reflux disease    Symptomatic bradycardia    Primary hypertension  Resolved Problems:    * No resolved hospital problems.  *      Plan:  Symptomatic bradycardia with syncope -- had a recurrent episode this AM -- cannot discharge safely at this time  -Patient had syncopal episode at home, felt irregular heart beat prior  -On presentation to ER, found to be bradycardic, HR down to 40s, did later improve to 50s  -Patient has had prior episodes of syncope, is on carvedilol and amiodarone for afib -- Coreg stopped, improved HR.  -I held hydralazine for now  -Losartan held for now  [facility] cardiology -- they will likely reduce dose of losartan back to 25 mg  -pt does have resting hypertension -- for this reason would not start midodrine for now  -Check TSH  -Repeat BMP, mag OK except potassium a little low -- replaced  -IV fluid at 75 cc/hr per cardiology overnight  -Received 500 normal saline bolus this AM and helped hypotension  -Keep another night, and monitor on telemetry, F/U further cardio recommendations     Afib  -Home meds amiodarone, carvedilol and eliquis  -Continue eliquis, amiodarone reduced to 100mg and hold carvedilol for symptomatic bradycardia  -Cardiology consulted, above changes   -Monitor on telemetry, HR currently in 70s in sinus  -TSH normal in July 2022 -- 1.86     HTN -- uncontrolled -- however, mostly at goal for her age  -Home meds losartan, hydralazine and carvedilol  -Continue meds other than carvedilol  -BP on admission 151/78 -- continue to monitor     GERD  -Continue home PPI     CHF -- by hx; currently euvolemic  -Patient had echo in 8/2021 which showed EF 38%, however most recently echo in July showed resolution and EF 60-65%    Disp: likely to home with [facility] tomorrow, if her orthostasis improves    NOTE: This report was transcribed using voice recognition software. Every effort was made to ensure accuracy; however, inadvertent computerized transcription errors may be present.   Electronically signed by Joan Duke DO on 9/13/2022 at 6:48 PM

## 2022-09-13 NOTE — PROGRESS NOTES
limited in distance due to L knee pain. Patient tolerates seated exercises and sitting up in chair with no dizziness reported. PHYSICAL THERAPY  PLAN OF CARE       Physical therapy plan of care is established based on physician order,  patient diagnosis and clinical assessment    Current Treatment Recommendations:    -Standing Balance: Perform strengthening exercises in standing to promote motor control with or without upper extremity support   -Transfers: Provide instruction on proper hand and foot position for adequate transfer of weight onto lower extremities and use of gait device if needed and Cues for hand placement, technique and safety. Provide stabilization to prevent fall   -Gait: Gait training and Standing activities to improve: base of support, weight shift, weight bearing    -Endurance: Utilize Supervised activities to increase level of endurance to allow for safe functional mobility including transfers and gait     PT long term treatment goals are located in below grid    Patient and or family understand(s) diagnosis, prognosis, and plan of care. Frequency of treatments: Patient will be seen  daily.          Prior Level of Function: Patient ambulated with cane    Rehab Potential: good   for baseline    Past medical history:   Past Medical History:   Diagnosis Date    Arthritis     Atrial fibrillation ([address]     Chronic combined systolic (congestive) and diastolic (congestive) heart failure ([address] 11/7/2021    Coronary artery disease involving native coronary artery of native heart without angina pectoris     Hx of blood clots     Hypertension     Ischemic colitis ([address]     PAF (paroxysmal atrial fibrillation) ([address]      Past Surgical History:   Procedure Laterality Date    ANKLE FRACTURE SURGERY      CARDIAC CATHETERIZATION  11/11/2019    Dr. Rodriguez Sessions    COLONOSCOPY      ENDOSCOPY, COLON, DIAGNOSTIC      FRACTURE SURGERY         SUBJECTIVE:    Precautions: Bedrest with bathroom Privileges , falls and alarm ,  hard of hearing     Social history: Patient lives with son in a ranch home  with 2 steps  to enter with [place] owned: [employer], Bedside commode, and Shower chair,       [id number] [facility]  Mobility Inpatient   How much difficulty turning over in bed?: A Little  How much difficulty sitting down on / standing up from a chair with arms?: A Little  How much difficulty moving from lying on back to sitting on side of bed?: A Little  How much help from another person moving to and from a bed to a chair?: A Little  How much help from another person needed to walk in hospital room?: A Lot  How much help from another person for climbing 3-5 steps with a railing?: A Lot  AM-PAC Inpatient Mobility Raw Score : 16  AM-PAC Inpatient T-Scale Score : 40.78  Mobility Inpatient CMS 0-100% Score: 54.16  Mobility Inpatient CMS G-Code Modifier : CK    Nursing cleared patient for PT treatment. Nursing wanted BP monitored with position changes, aide present to monitor. OBJECTIVE;   Initial Evaluation  Date: 9/11/2022 Treatment Date:  9/13/2022     Short Term/ Long Term   Goals   Was pt agreeable to Eval/treatment? Yes yes To be met in 4 days   Pain level   5/10  \"Knees are stiff\" Stomach pain, no number assigned    Bed Mobility    Rolling: Supervision     Supine to sit: Supervision     Sit to supine: Supervision     Scooting: Supervision    Rolling: Supervision    Supine to sit: Supervision    Sit to supine: Not assessed patient in chair   Scooting: Supervision     Rolling: Independent    Supine to sit:  Independent    Sit to supine: Independent    Scooting: Independent     Transfers Sit to stand: Supervision    Sit to stand: Supervision     Sit to stand: Independent     Ambulation     3x75 feet using  cane with Minimal assist of 1    third trial needed  hand held assist for support d/t fatigue 40 feet using  cane with min/mod   cues for upright posture, safety, and pacing  Patient all lines and tubes intact, nursing notified. Patient would benefit from continued skilled Physical Therapy to improve functional independence and quality of life.          Patient's/ family goals   home    Time in  855  Time out  922    Total Treatment Time  27 minutes    CPT codes:  Therapeutic activities (08803)   19 minutes  1 unit(s)  Therapeutic exercises (60544)   8 minutes  1 unit(s)    Chalo Gardner, PTA    #[id number]

## 2022-09-13 NOTE — PROGRESS NOTES
Patient was sitting up in chair, this nurse walked by and saw patients head slowly drop to th right side. Walked in and patient said she felt like she was going to pass out. Called Dr. Jassi Morris and BP in chair was 81/55 with HR of 53. PT, this nurse and another nurse assisted patient back to bed. Blood pressure increased to 110/68 with HR 58. Dr. Jassi Morris was at bedside, see orders.

## 2022-09-14 VITALS
DIASTOLIC BLOOD PRESSURE: 85 MMHG | TEMPERATURE: 98.4 F | HEART RATE: 63 BPM | BODY MASS INDEX: 20.74 KG/M2 | OXYGEN SATURATION: 100 % | RESPIRATION RATE: 23 BRPM | SYSTOLIC BLOOD PRESSURE: 171 MMHG | WEIGHT: 132.4 LBS

## 2022-09-14 LAB
EKG ATRIAL RATE: 73 BPM
EKG P AXIS: -164 DEGREES
EKG P-R INTERVAL: 164 MS
EKG Q-T INTERVAL: 476 MS
EKG QRS DURATION: 110 MS
EKG QTC CALCULATION (BAZETT): 524 MS
EKG R AXIS: -54 DEGREES
EKG T AXIS: 39 DEGREES
EKG VENTRICULAR RATE: 73 BPM

## 2022-09-14 PROCEDURE — 6370000000 HC RX 637 (ALT 250 FOR IP): Performed by: STUDENT IN AN ORGANIZED HEALTH CARE EDUCATION/TRAINING PROGRAM

## 2022-09-14 PROCEDURE — 6370000000 HC RX 637 (ALT 250 FOR IP): Performed by: INTERNAL MEDICINE

## 2022-09-14 PROCEDURE — 6370000000 HC RX 637 (ALT 250 FOR IP): Performed by: PHYSICIAN ASSISTANT

## 2022-09-14 PROCEDURE — 2580000003 HC RX 258: Performed by: STUDENT IN AN ORGANIZED HEALTH CARE EDUCATION/TRAINING PROGRAM

## 2022-09-14 PROCEDURE — 99238 HOSP IP/OBS DSCHRG MGMT 30/<: CPT | Performed by: FAMILY MEDICINE

## 2022-09-14 RX ORDER — AMIODARONE HYDROCHLORIDE 100 MG/1
100 TABLET ORAL DAILY
Qty: 30 TABLET | Refills: 0 | Status: SHIPPED | OUTPATIENT
Start: 2022-09-14

## 2022-09-14 RX ORDER — LOSARTAN POTASSIUM 50 MG/1
50 TABLET ORAL DAILY
Status: DISCONTINUED | OUTPATIENT
Start: 2022-09-15 | End: 2022-09-14 | Stop reason: HOSPADM

## 2022-09-14 RX ORDER — LOSARTAN POTASSIUM 50 MG/1
50 TABLET ORAL DAILY
Qty: 30 TABLET | Refills: 0 | Status: SHIPPED | OUTPATIENT
Start: 2022-09-14

## 2022-09-14 RX ADMIN — LOSARTAN POTASSIUM 25 MG: 25 TABLET, FILM COATED ORAL at 09:56

## 2022-09-14 RX ADMIN — AMIODARONE HYDROCHLORIDE 100 MG: 200 TABLET ORAL at 09:57

## 2022-09-14 RX ADMIN — ASPIRIN 81 MG: 81 TABLET, COATED ORAL at 09:57

## 2022-09-14 RX ADMIN — PANTOPRAZOLE SODIUM 40 MG: 40 TABLET, DELAYED RELEASE ORAL at 06:05

## 2022-09-14 RX ADMIN — Medication 10 ML: at 09:57

## 2022-09-14 RX ADMIN — APIXABAN 2.5 MG: 5 TABLET, FILM COATED ORAL at 09:57

## 2022-09-14 RX ADMIN — DOCUSATE SODIUM 100 MG: 100 CAPSULE, LIQUID FILLED ORAL at 09:56

## 2022-09-14 NOTE — PROGRESS NOTES
Discussed with Dr. Linsey Samuels BP elevated -- orthostatics improved today  Increase Losartan to 50 mg daily  Keep Hydralazine and Coreg d/c    Compression stockings  Avoid abrupt changes in position    Okay for discharge from cardio standpoint today per Dr. Paramjit Zendejas  Cardiology will sign off at this time  Follow up in 2-4 weeks with Dr. Mora Kwon and patient advised to call Dr. Atiya Sinclair office after discharge to have event monitor placed        [facility] [address] Cardiology       Electronically signed by Annabel Rodriguez PA-C on 9/14/2022 at 12:38 PM

## 2022-09-14 NOTE — DISCHARGE INSTRUCTIONS
Your information:  Name: Bertie Dandy  : 1931    Your instructions:    Sit up/stand up slowly -- with assistance always. Have someone watch you when you are sitting in a chair. Eat breakfast as soon as you get up. Put compression stockings on before you get out of bed. DANEIL HOSE -- use daily -- you don't need to use them at night    PLEASE CALL DR NESS OFFICE TO SET UP AN EVENT MONITOR     You are being discharged home with [facility]. Please follow up with your physician at discharge and take your medications as prescribed. IF YOU EXPERIENCE ANY OF THE FOLLOWING SYMPTOMS, CHEST PAIN, SHORTNESS OF BREATH, COUGHING UP BLOOD OR BLOODY SPUTUM, STOMACH PAIN OR CRAMPING, DARK, TARRY STOOLS, LOSS OF APPETITE, GENERAL NOT FEELING WELL, SIGNS AND SYMPTOMS OF INFECTION LIKE FEVER AND OR CHILLS, PLEASE CALL DR Nisha Krause OR RETURN TO THE EMERGENCY ROOM. What to do after you leave the hospital:    Recommended diet: regular diet    Recommended activity: activity as tolerated        The following personal items were collected during your admission and were returned to you:    Belongings  Dental Appliances: Uppers, Lowers, At bedside  Vision - Corrective Lenses: Eyeglasses, At home  Hearing Aid: None  Clothing: At home  Jewelry: None  Body Piercings Removed: N/A  Electronic Devices: None  Weapons (Notify Protective Services/Security): None  Other Valuables: Cane, At bedside  Home Medications: None  Valuables Given To: Other (Comment) (N/A; @ home.)  Provide Name(s) of Who Valuable(s) Were Given To: Patient/self  Responsible person(s) in the waiting room: N/A  Patient approves for provider to speak to responsible person post operatively: Yes    Information obtained by:  By signing below, I understand that if any problems occur once I leave the hospital I am to contact Nisha Krause. I understand and acknowledge receipt of the instructions indicated above.

## 2022-09-14 NOTE — DISCHARGE SUMMARY
[facility] Physician Discharge Summary       [address] [facility]  [place] [address]  [address], [address]  [phone]    Follow up in 3 day(s)      DO Ezequiel Paiz [address]  [phone]    Call  Call to have Dr. Tori Kitchen office arrange for outpatient event monitor placement      Activity level: up as tolerated with assistance for all transfers/getting out of bed    Diet: ADULT DIET; Regular    Labs: None    Condition at discharge: good/stable    Dispo: to home with Santy Higgins      Patient ID:  Yennifer Bradley  [id number]  67 y.o.  12/25/1931    Admit date: 9/10/2022    Discharge date and time:  9/14/2022  1:25 PM    Admission Diagnoses: Principal Problem:    Syncope and collapse  Active Problems:    Chronic congestive heart failure (HCC)    Gastroesophageal reflux disease    Symptomatic bradycardia    Hypokalemia    Primary hypertension    Paroxysmal atrial fibrillation (HCC)    Orthostatic hypotension  Resolved Problems:    * No resolved hospital problems. *      Discharge Diagnoses: Principal Problem:    Syncope and collapse  Active Problems:    Chronic congestive heart failure (HCC)    Gastroesophageal reflux disease    Symptomatic bradycardia    Hypokalemia    Primary hypertension    Paroxysmal atrial fibrillation (HCC)    Orthostatic hypotension  Resolved Problems:    * No resolved hospital problems.  *      Consults:  IP CONSULT TO CARDIOLOGY  IP CONSULT TO SOCIAL WORK  IP CONSULT TO CARDIOLOGY    Procedures:   None    Hospital Course:   80 yr old female with a past medical history of orthostasis, paroxysmal atrial fibrillation on amiodarone and Eliquis, chronic combined CHF CAD, hx of DVT, hypertension, and anxiety attacks who presented to the ER after a syncopal episode -- found to have bradycardia with a heart rate in the 40's, improved to 50's upon admit. Admitted to telemetry bed. Coreg stopped. Will  not go home on this. Amiodarone decreased from 100 mg daily to 200 m daily. Seen by cardiology. ECHO not repeated. Heart rates improved to the 70's. Had an episode of orthostasis after sitting for 1 hour in a chair in the morning after getting her losartan and hydralazine. Down to 81/55, heart rate was 58 at that time. Fluid bolus given. Quick BP recovery once she was put back in bed. Hydralazine discontinued -- will not go home on this. Had chest pain yesterday during this event -- troponins were negative. EKG with PAC's. Pt did not have any cardiac studies done. Today her resting BP's are higher, as expected -- will go out with losartan 50 mg daily for BP control. No further hydralazine or Coreg. Orthostatic vital signs are normal today. Cardiologist wants pt to have an outpatient event monitor set up with Dr. Rissa Pacheco office ASAP upon discharge. Today -- pt ate breakfast.  No dizziness. No chest pain. No SOB. Discharge Exam:  Alert, in NAD, not in respiratory distress, breathing comfortably on RA at rest in bed, mental status normal, well appearing  Eyes and mouth clear, moist, tongue and uvula midline, neck supple, no JVD  S1/S2 with RRR, no M/R/G  Lungs CTA bilaterally, no W/R/R  Abd soft/NT/ND/normoactive BS's  No LE edema, pos pedal pulses, no cyanosis, good cap refill of digits  Skin warm, dry, good turgor, no rashes, no jaundice     I/O last 3 completed shifts:   In: 250 [P.O.:250]  Out: 150 [Urine:150]  I/O this shift:  In: 120 [P.O.:120]  Out: -       LABS:  Recent Labs     09/13/22  1040      K 3.3*      CO2 21*   BUN 14   CREATININE 0.9   GLUCOSE 93   CALCIUM 9.0       Recent Labs     09/13/22  1040   WBC 5.1   RBC 4.51   HGB 13.4   HCT 41.5   MCV 92.0   MCH 29.7   MCHC 32.3   RDW 14.6      MPV 9.2     Hepatic Function Panel:    Lab Results   Component Value Date/Time    ALKPHOS 67 07/15/2022 02:08 PM    ALT 9 07/15/2022 02:08 PM    AST 16 07/15/2022 02:08 PM    PROT 5.9 07/15/2022 02:08 PM    BILITOT 0.5 07/15/2022 02:08 PM    BILIDIR 0.2 12/01/2015 05:45 PM    IBILI 0.6 12/01/2015 05:45 PM    LABALBU 3.2 07/15/2022 02:08 PM     Magnesium:    Lab Results   Component Value Date/Time    MG 2.1 09/13/2022 10:40 AM     Phosphorus:    Lab Results   Component Value Date/Time    PHOS 2.6 09/13/2022 10:40 AM     PT/INR:    Lab Results   Component Value Date/Time    PROTIME 12.7 08/29/2021 08:04 PM    INR 1.1 08/29/2021 08:04 PM     PTT:    Lab Results   Component Value Date/Time    APTT 96.0 11/08/2021 08:10 PM     Last 3 Troponin:    Lab Results   Component Value Date/Time    TROPONINI <0.01 09/26/2020 10:31 AM    TROPONINI <0.01 02/12/2020 10:11 AM    TROPONINI <0.01 11/18/2019 11:09 PM     High sensitivity troponins -- 14, 13    U/A:    Lab Results   Component Value Date/Time    COLORU Yellow 09/10/2022 03:20 PM    PROTEINU Negative 09/10/2022 03:20 PM    PHUR 7.0 09/10/2022 03:20 PM    [address] NONE 09/10/2022 03:20 PM    WBCUA 0-1 11/12/2010 01:15 PM    RBCUA NONE 09/10/2022 03:20 PM    RBCUA 0-1 08/24/2012 05:00 AM    BACTERIA NONE SEEN 09/10/2022 03:20 PM    CLARITYU Clear 09/10/2022 03:20 PM    SPECGRAV 1.015 09/10/2022 03:20 PM    LEUKOCYTESUR Negative 09/10/2022 03:20 PM    UROBILINOGEN 0.2 09/10/2022 03:20 PM    BILIRUBINUR Negative 09/10/2022 03:20 PM    BILIRUBINUR NEGATIVE 11/12/2010 01:15 PM    BLOODU Negative 09/10/2022 03:20 PM    GLUCOSEU Negative 09/10/2022 03:20 PM    GLUCOSEU NEGATIVE 11/12/2010 01:15 PM     TSH:    Lab Results   Component Value Date/Time    TSH 6.450 09/13/2022 10:40 AM       Imaging:  CT Head WO Contrast  Result Date: 9/10/2022  EXAMINATION: CT OF THE HEAD WITHOUT CONTRAST  9/10/2022 4:14 pm TECHNIQUE: CT of the head was performed without the administration of intravenous contrast. Automated exposure control, iterative reconstruction, and/or weight based adjustment of the mA/kV was utilized to reduce the radiation dose to as low as reasonably achievable. COMPARISON: July 9, 2022 HISTORY: ORDERING SYSTEM PROVIDED HISTORY: syncope TECHNOLOGIST PROVIDED HISTORY: Reason for exam:->syncope Has a \"code stroke\" or \"stroke alert\" been called? ->No Decision Support Exception - unselect if not a suspected or confirmed emergency medical condition->Emergency Medical Condition (MA) FINDINGS: No evidence of acute intracranial hemorrhage or edema. No abnormal extra-axial fluid collections. There is prominence of sulci, cisterns and ventricles related age-appropriate parenchymal volume loss. Areas of hypoattenuation are seen in periventricular and subcortical white matter are suggestive of areas of chronic microvascular ischemia. Stable focus of hypoattenuation involving left thalamus suggesting chronic lacunar area of stroke. No evidence of depressed calvarial fracture. No acute intracranial hemorrhage or edema. XR CHEST PORTABLE  Result Date: 9/10/2022  EXAMINATION: ONE XRAY VIEW OF THE CHEST 9/10/2022 3:59 pm COMPARISON: 07/09/2022. HISTORY: ORDERING SYSTEM PROVIDED HISTORY: chest pain TECHNOLOGIST PROVIDED HISTORY: Reason for exam:->chest pain FINDINGS: The cardiac silhouette is borderline enlarged. The pulmonary vasculature is within normal limits. There are chronic findings in both lungs. There may be a focal infiltrate or atelectasis in the mid right lung, underlying a telemetry lead. No segmental or lobar pulmonary consolidation or collapse is identified. No pneumothorax or pleural effusion is seen. Borderline cardiomegaly. Questionable focal infiltrate or atelectasis in the mid right lung, underlying a telemetry lead.      Patient Instructions:   Current Discharge Medication List        CONTINUE these medications which have CHANGED    Details   amiodarone (PACERONE) 100 MG tablet Take 1 tablet by mouth daily  Qty: 30 tablet, Refills: 0      losartan (COZAAR) 50 MG tablet Take 1 tablet by mouth daily  Qty: 30 tablet, Refills: 0           CONTINUE these medications which have NOT CHANGED    Details   aspirin 81 MG EC tablet Take 1 tablet by mouth daily  Qty: 30 tablet, Refills: 0      apixaban (ELIQUIS) 5 MG TABS tablet Take 0.5 tablets by mouth 2 times daily  Qty: 60 tablet, Refills: 0      omeprazole (PRILOSEC) 20 MG delayed release capsule Take 2 capsules by mouth daily  Qty: 30 capsule, Refills: 0      docusate sodium (COLACE) 100 MG capsule Take 100 mg by mouth daily      acetaminophen (TYLENOL) 500 MG tablet Take 2 tablets by mouth every 8 hours as needed for Pain  Qty: 50 tablet, Refills: 0           STOP taking these medications       carvedilol (COREG) 3.125 MG tablet Comments:   Reason for Stopping:         hydrALAZINE (APRESOLINE) 50 MG tablet Comments:   Reason for Stopping:                 Note that less than 30 minutes was spent in preparing discharge papers, discussing discharge with patient, medication review, etc.    NOTE: This report was transcribed using voice recognition software. Every effort was made to ensure accuracy; however, inadvertent computerized transcription errors may be present.      Signed:  Electronically signed by Ameena Bashir DO on 9/14/2022 at 1:25 PM

## 2022-09-14 NOTE — CARE COORDINATION
Ss note:9/14/202210:03 AM Plan is home with son and 24/7 supervision. Pt is active with [facility], resumed orders has been obtained. [facility] will need notified when pt is discharged. Sw  provided pts dtr within information on meals on wheels/meal programs. Family will transport home.  ESTEFANIA Gomez

## 2022-09-14 NOTE — DISCHARGE INSTR - DIET

## 2022-09-15 NOTE — PROGRESS NOTES
CLINICAL PHARMACY NOTE: MEDS TO BEDS    Total # of Prescriptions Filled: 1   The following medications were delivered to the patient:  Losartan 50 mg    Additional Documentation:   Amiodarone 100 was sent to [facility] in [place]

## 2022-11-25 ENCOUNTER — APPOINTMENT (OUTPATIENT)
Dept: GENERAL RADIOLOGY | Age: 87
End: 2022-11-25
Payer: MEDICARE

## 2022-11-25 ENCOUNTER — HOSPITAL ENCOUNTER (EMERGENCY)
Age: 87
Discharge: LEFT BEFORE DISCHARGE | End: 2022-11-25
Attending: STUDENT IN AN ORGANIZED HEALTH CARE EDUCATION/TRAINING PROGRAM
Payer: MEDICARE

## 2022-11-25 ENCOUNTER — APPOINTMENT (OUTPATIENT)
Dept: CT IMAGING | Age: 87
End: 2022-11-25
Payer: MEDICARE

## 2022-11-25 VITALS
HEART RATE: 68 BPM | RESPIRATION RATE: 18 BRPM | DIASTOLIC BLOOD PRESSURE: 115 MMHG | SYSTOLIC BLOOD PRESSURE: 210 MMHG | OXYGEN SATURATION: 98 % | TEMPERATURE: 98.1 F

## 2022-11-25 DIAGNOSIS — R55 SYNCOPE AND COLLAPSE: Primary | ICD-10-CM

## 2022-11-25 DIAGNOSIS — R41.82 ALTERED MENTAL STATUS, UNSPECIFIED ALTERED MENTAL STATUS TYPE: ICD-10-CM

## 2022-11-25 DIAGNOSIS — R10.84 GENERALIZED ABDOMINAL PAIN: ICD-10-CM

## 2022-11-25 DIAGNOSIS — Z79.01 CHRONIC ANTICOAGULATION: ICD-10-CM

## 2022-11-25 DIAGNOSIS — I10 SEVERE HYPERTENSION: ICD-10-CM

## 2022-11-25 LAB
ACETAMINOPHEN LEVEL: <5 MCG/ML (ref 10–30)
ALBUMIN SERPL-MCNC: 3.5 G/DL (ref 3.5–5.2)
ALP BLD-CCNC: 77 U/L (ref 35–104)
ALT SERPL-CCNC: 8 U/L (ref 0–32)
AMPHETAMINE SCREEN, URINE: NOT DETECTED
ANION GAP SERPL CALCULATED.3IONS-SCNC: 7 MMOL/L (ref 7–16)
AST SERPL-CCNC: 17 U/L (ref 0–31)
BARBITURATE SCREEN URINE: NOT DETECTED
BASOPHILS ABSOLUTE: 0.04 E9/L (ref 0–0.2)
BASOPHILS RELATIVE PERCENT: 0.8 % (ref 0–2)
BENZODIAZEPINE SCREEN, URINE: NOT DETECTED
BILIRUB SERPL-MCNC: 0.6 MG/DL (ref 0–1.2)
BILIRUBIN URINE: NEGATIVE
BLOOD, URINE: NEGATIVE
BUN BLDV-MCNC: 10 MG/DL (ref 6–23)
CALCIUM SERPL-MCNC: 9.2 MG/DL (ref 8.6–10.2)
CANNABINOID SCREEN URINE: NOT DETECTED
CHLORIDE BLD-SCNC: 106 MMOL/L (ref 98–107)
CLARITY: CLEAR
CO2: 29 MMOL/L (ref 22–29)
COCAINE METABOLITE SCREEN URINE: NOT DETECTED
COLOR: YELLOW
CREAT SERPL-MCNC: 1 MG/DL (ref 0.5–1)
EOSINOPHILS ABSOLUTE: 0.07 E9/L (ref 0.05–0.5)
EOSINOPHILS RELATIVE PERCENT: 1.3 % (ref 0–6)
ETHANOL: <10 MG/DL (ref 0–0.08)
FENTANYL SCREEN, URINE: NOT DETECTED
GFR SERPL CREATININE-BSD FRML MDRD: 53 ML/MIN/1.73
GLUCOSE BLD-MCNC: 89 MG/DL (ref 74–99)
GLUCOSE URINE: NEGATIVE MG/DL
HCT VFR BLD CALC: 41.1 % (ref 34–48)
HEMOGLOBIN: 13.6 G/DL (ref 11.5–15.5)
IMMATURE GRANULOCYTES #: 0.01 E9/L
IMMATURE GRANULOCYTES %: 0.2 % (ref 0–5)
KETONES, URINE: NEGATIVE MG/DL
LACTIC ACID: 1.2 MMOL/L (ref 0.5–2.2)
LEUKOCYTE ESTERASE, URINE: NEGATIVE
LIPASE: 9 U/L (ref 13–60)
LYMPHOCYTES ABSOLUTE: 1.84 E9/L (ref 1.5–4)
LYMPHOCYTES RELATIVE PERCENT: 35.2 % (ref 20–42)
Lab: NORMAL
MCH RBC QN AUTO: 30 PG (ref 26–35)
MCHC RBC AUTO-ENTMCNC: 33.1 % (ref 32–34.5)
MCV RBC AUTO: 90.7 FL (ref 80–99.9)
METHADONE SCREEN, URINE: NOT DETECTED
MONOCYTES ABSOLUTE: 0.56 E9/L (ref 0.1–0.95)
MONOCYTES RELATIVE PERCENT: 10.7 % (ref 2–12)
NEUTROPHILS ABSOLUTE: 2.71 E9/L (ref 1.8–7.3)
NEUTROPHILS RELATIVE PERCENT: 51.8 % (ref 43–80)
NITRITE, URINE: NEGATIVE
OPIATE SCREEN URINE: NOT DETECTED
OXYCODONE URINE: NOT DETECTED
PDW BLD-RTO: 14 FL (ref 11.5–15)
PH UA: 8 (ref 5–9)
PHENCYCLIDINE SCREEN URINE: NOT DETECTED
PLATELET # BLD: 184 E9/L (ref 130–450)
PMV BLD AUTO: 9.3 FL (ref 7–12)
POTASSIUM REFLEX MAGNESIUM: 4.1 MMOL/L (ref 3.5–5)
PROTEIN UA: NEGATIVE MG/DL
RBC # BLD: 4.53 E12/L (ref 3.5–5.5)
SALICYLATE, SERUM: 0.4 MG/DL (ref 0–30)
SODIUM BLD-SCNC: 142 MMOL/L (ref 132–146)
SPECIFIC GRAVITY UA: 1.02 (ref 1–1.03)
TOTAL PROTEIN: 6.8 G/DL (ref 6.4–8.3)
TRICYCLIC ANTIDEPRESSANTS SCREEN SERUM: NEGATIVE NG/ML
TROPONIN, HIGH SENSITIVITY: 21 NG/L (ref 0–9)
UROBILINOGEN, URINE: 0.2 E.U./DL
WBC # BLD: 5.2 E9/L (ref 4.5–11.5)

## 2022-11-25 PROCEDURE — 82077 ASSAY SPEC XCP UR&BREATH IA: CPT

## 2022-11-25 PROCEDURE — 80307 DRUG TEST PRSMV CHEM ANLYZR: CPT

## 2022-11-25 PROCEDURE — 83605 ASSAY OF LACTIC ACID: CPT

## 2022-11-25 PROCEDURE — 80179 DRUG ASSAY SALICYLATE: CPT

## 2022-11-25 PROCEDURE — 81003 URINALYSIS AUTO W/O SCOPE: CPT

## 2022-11-25 PROCEDURE — 83690 ASSAY OF LIPASE: CPT

## 2022-11-25 PROCEDURE — 2580000003 HC RX 258

## 2022-11-25 PROCEDURE — 80053 COMPREHEN METABOLIC PANEL: CPT

## 2022-11-25 PROCEDURE — 85025 COMPLETE CBC W/AUTO DIFF WBC: CPT

## 2022-11-25 PROCEDURE — 36415 COLL VENOUS BLD VENIPUNCTURE: CPT

## 2022-11-25 PROCEDURE — 93005 ELECTROCARDIOGRAM TRACING: CPT

## 2022-11-25 PROCEDURE — 84484 ASSAY OF TROPONIN QUANT: CPT

## 2022-11-25 PROCEDURE — 99284 EMERGENCY DEPT VISIT MOD MDM: CPT

## 2022-11-25 PROCEDURE — 80143 DRUG ASSAY ACETAMINOPHEN: CPT

## 2022-11-25 RX ORDER — SODIUM CHLORIDE 0.9 % (FLUSH) 0.9 %
10 SYRINGE (ML) INJECTION PRN
Status: DISCONTINUED | OUTPATIENT
Start: 2022-11-25 | End: 2022-11-25 | Stop reason: HOSPADM

## 2022-11-25 RX ORDER — 0.9 % SODIUM CHLORIDE 0.9 %
1000 INTRAVENOUS SOLUTION INTRAVENOUS ONCE
Status: COMPLETED | OUTPATIENT
Start: 2022-11-25 | End: 2022-11-25

## 2022-11-25 RX ADMIN — SODIUM CHLORIDE 1000 ML: 9 INJECTION, SOLUTION INTRAVENOUS at 16:28

## 2022-11-25 ASSESSMENT — ENCOUNTER SYMPTOMS
COUGH: 0
SHORTNESS OF BREATH: 0
VOMITING: 0
ABDOMINAL PAIN: 1
WHEEZING: 0
BACK PAIN: 0
EYE REDNESS: 0
SINUS PRESSURE: 0
EYE PAIN: 0
SORE THROAT: 0
ABDOMINAL DISTENTION: 0
EYE DISCHARGE: 0
NAUSEA: 1
DIARRHEA: 0

## 2022-11-25 ASSESSMENT — PAIN DESCRIPTION - FREQUENCY: FREQUENCY: INTERMITTENT

## 2022-11-25 ASSESSMENT — PAIN - FUNCTIONAL ASSESSMENT: PAIN_FUNCTIONAL_ASSESSMENT: 0-10

## 2022-11-25 ASSESSMENT — PAIN DESCRIPTION - LOCATION: LOCATION: ABDOMEN

## 2022-11-25 ASSESSMENT — PAIN SCALES - GENERAL: PAINLEVEL_OUTOF10: 8

## 2022-11-25 ASSESSMENT — PAIN DESCRIPTION - DESCRIPTORS: DESCRIPTORS: ACHING

## 2022-11-25 ASSESSMENT — PAIN DESCRIPTION - PAIN TYPE: TYPE: CHRONIC PAIN

## 2022-11-25 ASSESSMENT — PAIN DESCRIPTION - ONSET: ONSET: ON-GOING

## 2022-11-25 NOTE — ED NOTES
Pt /115. Dr. Senthil Covarrubias aware and patient and family still want to sign out AMA.      Bennie Rangel RN  11/25/22 1602

## 2022-11-25 NOTE — ED PROVIDER NOTES
Department of Emergency Medicine  FIRST PROVIDER TRIAGE NOTE             Independent MLP           11/25/22  3:38 PM EST    Date of Encounter: 11/25/22   MRN: [id number]      HPI: Miguel Martinez is a 80 y.o. female who presents to the ED for No chief complaint on file. Complains of syncopal episode last night with LOC of unknown length of time. She was getting up from using the bathroom when she became syncopal. She did fall. Is unsure if she hit her head. Takes ASA and Eliquis. Awakened and felt as if her heart was not beating. Has been having frequent episodes of syncope x 6 months. Has been evaluated by her PCP for this. Has been having intermittent abdominal pain for months and is having pain today in her left lower and mid upper abdomen. Has intermittent constipation and feels bloated. Denies fever, chills, N/V/D, rectal bleeding, or dark tarry stools. ROS: Negative for cp, sob, back pain, fever, cough, vomiting, diarrhea, urinary complaints, headache, or dizziness. PE: Gen Appearance/Constitutional: alert  CV: irregular rate  GI: tender to palpation in epigastric area and LLQ     Initial Plan of Care: All treatment areas with department are currently occupied. Plan to order/Initiate the following while awaiting opening in ED: labs, EKG, imaging studies, and IV fluids.   Initiate Treatment-Testing, Proceed toTreatment Area When Bed Available for ED Attending/MLP to Continue Care    Electronically signed by FRANCES Lobato CNP   DD: 11/25/22       FRANCES Lobato CNP  11/25/22 5152

## 2022-11-25 NOTE — ED PROVIDER NOTES
Department of Emergency Medicine   ED  Provider Note  Admit Date/RoomTime: 11/25/2022  3:56 PM  ED Room: 08/08              Smith Olvera is a 80 y.o. female with a PMHx significant for paroxysmal atrial fibrillation, HTN, CHF, GERD who presents for evaluation of fall, abdominal pain, beginning prior to arrival.  The complaint has been persistent, moderate in severity, and worsened by nothing. The patient is a poor historian, she is unable to give additional history information. Per the son at bedside states she has these episodes where she passes a lot but also endorses alcohol use of the patient. Patient cannot give me the events of today. Per the son he it was not witnessed, however she passed out while standing up, she did not fall, did not hit her head. She then went and laid down in bed. She has been complaining of generalized abdominal queasiness. Denies any dizziness, lightheadedness, chest pain, shortness of breath. The history is provided by the patient, a relative and medical records. Review of Systems   Constitutional:  Positive for fatigue. Negative for chills and fever. HENT:  Negative for ear pain, sinus pressure and sore throat. Eyes:  Negative for pain, discharge and redness. Respiratory:  Negative for cough, shortness of breath and wheezing. Cardiovascular:  Negative for chest pain. Gastrointestinal:  Positive for abdominal pain and nausea. Negative for abdominal distention, diarrhea and vomiting. Genitourinary:  Negative for dysuria and frequency. Musculoskeletal:  Negative for arthralgias and back pain. Skin:  Negative for rash and wound. Neurological:  Positive for syncope. Negative for weakness and headaches. Hematological:  Negative for adenopathy. All other systems reviewed and are negative. Physical Exam  Vitals and nursing note reviewed. Constitutional:       General: She is not in acute distress. Appearance: She is well-developed. She is not ill-appearing. HENT:      Head: Normocephalic and atraumatic. Right Ear: External ear normal.      Left Ear: External ear normal.   Eyes:      General:         Right eye: No discharge. Left eye: No discharge. Extraocular Movements: Extraocular movements intact. Conjunctiva/sclera: Conjunctivae normal.      Pupils: Pupils are equal, round, and reactive to light. Cardiovascular:      Rate and Rhythm: Normal rate and regular rhythm. Heart sounds: Normal heart sounds. No murmur heard. Pulmonary:      Effort: Pulmonary effort is normal. No respiratory distress. Breath sounds: Normal breath sounds. No stridor. Abdominal:      General: There is no distension. Palpations: Abdomen is soft. There is no mass. Tenderness: There is no abdominal tenderness. Hernia: No hernia is present. Musculoskeletal:         General: No swelling or tenderness. Cervical back: Normal range of motion and neck supple. Skin:     General: Skin is warm and dry. Coloration: Skin is not jaundiced or pale. Neurological:      General: No focal deficit present. Mental Status: She is alert. She is disoriented. Cranial Nerves: No cranial nerve deficit. Coordination: Coordination normal.      Comments: Patient is unsure what year it is, believes Chayito Glez is the president and that were in December. Per the son at bedside Chrissy Love is like this sometimes. \"        Procedures    MDM       ED Course as of 11/25/22 1821 Fri Nov 25, 2022 1817 Called back into the patient's room. Patient's son getting verbally aggressive. Stating that the only reason her brought his mother here was \"to get his brother off of his back. \" States that the patient does not need any sort of imaging at this time. Discussed the importance of imaging as she has been having multiple syncopal episodes, she Is on anticoagulation, and is very hypertensive.  Unable to rule out acute abdominal process, or intracranial hemorrhage. Patient's son Kelley Schirmer then called Jerson Kirby who is the oldest sibling and per them has POA. Both siblings are in agree ance that they want no more additional imaging and at this point in time would like to leave Against Medical Advice. [BB]      ED Course User Index  [BB] Le Palencia DO        EKG: This EKG is signed and interpreted by me. Rate: 56  Rhythm: Sinus  Interpretation: sinus bradycardia with no visible ST elevations or depressions, incomplete right bundle branch block, , QRS 92, QTc 463  Comparison: stable as compared to patient's most recent EKG and no previous EKG available     --------------------------------------------- PAST HISTORY ---------------------------------------------  Past Medical History:  has a past medical history of Arthritis, Atrial fibrillation ([facility] [address] Chronic combined systolic (congestive) and diastolic (congestive) heart failure ([facility] [address] Coronary artery disease involving native coronary artery of native heart without angina pectoris, Hx of blood clots, Hypertension, Ischemic colitis ([facility] [address] and PAF (paroxysmal atrial fibrillation) ([facility] [address] Past Surgical History:  has a past surgical history that includes Ankle fracture surgery; Colonoscopy; Endoscopy, colon, diagnostic; fracture surgery; and Cardiac catheterization (11/11/2019). Social History:  reports that she has never smoked. She has never used smokeless tobacco. She reports that she does not drink alcohol and does not use drugs. Family History: family history is not on file. The patients home medications have been reviewed.     Allergies: Nifedipine    -------------------------------------------------- RESULTS -------------------------------------------------  Labs:  Results for orders placed or performed during the hospital encounter of 11/25/22   Troponin   Result Value Ref Range    Troponin, High Sensitivity 21 (H) 0 - 9 ng/L   Comprehensive Metabolic Panel w/ Reflex to MG   Result Value Ref Range    Sodium 142 132 - 146 mmol/L    Potassium reflex Magnesium 4.1 3.5 - 5.0 mmol/L    Chloride 106 98 - 107 mmol/L    CO2 29 22 - 29 mmol/L    Anion Gap 7 7 - 16 mmol/L    Glucose 89 74 - 99 mg/dL    BUN 10 6 - 23 mg/dL    Creatinine 1.0 0.5 - 1.0 mg/dL    Est, Glom Filt Rate 53 >=60 mL/min/1.73    Calcium 9.2 8.6 - 10.2 mg/dL    Total Protein 6.8 6.4 - 8.3 g/dL    Albumin 3.5 3.5 - 5.2 g/dL    Total Bilirubin 0.6 0.0 - 1.2 mg/dL    Alkaline Phosphatase 77 35 - 104 U/L    ALT 8 0 - 32 U/L    AST 17 0 - 31 U/L   Lipase   Result Value Ref Range    Lipase 9 (L) 13 - 60 U/L   Urinalysis   Result Value Ref Range    Color, UA Yellow Straw/Yellow    Clarity, UA Clear Clear    Glucose, Ur Negative Negative mg/dL    Bilirubin Urine Negative Negative    Ketones, Urine Negative Negative mg/dL    Specific Gravity, UA 1.020 1.005 - 1.030    Blood, Urine Negative Negative    pH, UA 8.0 5.0 - 9.0    Protein, UA Negative Negative mg/dL    Urobilinogen, Urine 0.2 <2.0 E.U./dL    Nitrite, Urine Negative Negative    Leukocyte Esterase, Urine Negative Negative   Lactic Acid   Result Value Ref Range    Lactic Acid 1.2 0.5 - 2.2 mmol/L   CBC with Auto Differential   Result Value Ref Range    WBC 5.2 4.5 - 11.5 E9/L    RBC 4.53 3.50 - 5.50 E12/L    Hemoglobin 13.6 11.5 - 15.5 g/dL    Hematocrit 41.1 34.0 - 48.0 %    MCV 90.7 80.0 - 99.9 fL    MCH 30.0 26.0 - 35.0 pg    MCHC 33.1 32.0 - 34.5 %    RDW 14.0 11.5 - 15.0 fL    Platelets 427 002 - 710 E9/L    MPV 9.3 7.0 - 12.0 fL    Neutrophils % 51.8 43.0 - 80.0 %    Immature Granulocytes % 0.2 0.0 - 5.0 %    Lymphocytes % 35.2 20.0 - 42.0 %    Monocytes % 10.7 2.0 - 12.0 %    Eosinophils % 1.3 0.0 - 6.0 %    Basophils % 0.8 0.0 - 2.0 %    Neutrophils Absolute 2.71 1.80 - 7.30 E9/L    Immature Granulocytes # 0.01 E9/L    Lymphocytes Absolute 1.84 1.50 - 4.00 E9/L    Monocytes Absolute 0.56 0.10 - 0.95 E9/L    Eosinophils Absolute 0.07 0.05 - 0.50 E9/L    Basophils Absolute 0.04 0.00 - 0.20 E9/L   Serum Drug Screen   Result Value Ref Range    Ethanol Lvl <10 mg/dL    Acetaminophen Level <5.0 (L) 10.0 - 03.3 mcg/mL    Salicylate, Serum 0.4 0.0 - 30.0 mg/dL   URINE DRUG SCREEN   Result Value Ref Range    Drug Screen Comment: see below    EKG 12 Lead   Result Value Ref Range    Ventricular Rate 59 BPM    Atrial Rate 59 BPM    P-R Interval 156 ms    QRS Duration 100 ms    Q-T Interval 468 ms    QTc Calculation (Bazett) 463 ms    P Axis 54 degrees    R Axis -49 degrees    T Axis 4 degrees       Radiology:  No orders to display       ------------------------- NURSING NOTES AND VITALS REVIEWED ---------------------------  Date / Time Roomed:  11/25/2022  3:56 PM  ED Bed Assignment:  08/08    The nursing notes within the ED encounter and vital signs as below have been reviewed. BP (!) 194/118   Pulse 68   Temp 98.1 °F (36.7 °C)   Resp 18   SpO2 98%   Oxygen Saturation Interpretation: Normal    Diagnosis:  1. Syncope and collapse    2. Severe hypertension    3. Chronic anticoagulation    4. Generalized abdominal pain    5.  Altered mental status, unspecified altered mental status type        Disposition:  Patient's disposition: [address] [place] [address]  11/25/22 6479

## 2022-11-28 LAB
EKG ATRIAL RATE: 56 BPM
EKG P AXIS: 42 DEGREES
EKG P-R INTERVAL: 152 MS
EKG Q-T INTERVAL: 480 MS
EKG QRS DURATION: 102 MS
EKG QTC CALCULATION (BAZETT): 463 MS
EKG R AXIS: -48 DEGREES
EKG T AXIS: 5 DEGREES
EKG VENTRICULAR RATE: 56 BPM

## 2022-11-28 PROCEDURE — 93010 ELECTROCARDIOGRAM REPORT: CPT | Performed by: INTERNAL MEDICINE

## 2023-04-21 ENCOUNTER — APPOINTMENT (OUTPATIENT)
Dept: GENERAL RADIOLOGY | Age: 88
DRG: 312 | End: 2023-04-21
Payer: COMMERCIAL

## 2023-04-21 ENCOUNTER — APPOINTMENT (OUTPATIENT)
Dept: CT IMAGING | Age: 88
DRG: 312 | End: 2023-04-21
Payer: COMMERCIAL

## 2023-04-21 ENCOUNTER — HOSPITAL ENCOUNTER (INPATIENT)
Age: 88
LOS: 5 days | Discharge: HOME HEALTH | DRG: 312 | End: 2023-04-26
Attending: EMERGENCY MEDICINE | Admitting: INTERNAL MEDICINE
Payer: COMMERCIAL

## 2023-04-21 DIAGNOSIS — S09.90XA CLOSED HEAD INJURY, INITIAL ENCOUNTER: ICD-10-CM

## 2023-04-21 DIAGNOSIS — R00.1 SINUS BRADYCARDIA: Primary | ICD-10-CM

## 2023-04-21 DIAGNOSIS — R55 SYNCOPE AND COLLAPSE: ICD-10-CM

## 2023-04-21 DIAGNOSIS — R00.1 SYMPTOMATIC BRADYCARDIA: ICD-10-CM

## 2023-04-21 LAB
ALBUMIN SERPL-MCNC: 3.4 G/DL (ref 3.5–5.2)
ALP SERPL-CCNC: 68 U/L (ref 35–104)
ALT SERPL-CCNC: 13 U/L (ref 0–32)
ANION GAP SERPL CALCULATED.3IONS-SCNC: 9 MMOL/L (ref 7–16)
AST SERPL-CCNC: 24 U/L (ref 0–31)
BASOPHILS # BLD: 0.02 E9/L (ref 0–0.2)
BASOPHILS NFR BLD: 0.3 % (ref 0–2)
BILIRUB SERPL-MCNC: 0.6 MG/DL (ref 0–1.2)
BILIRUB UR QL STRIP: NEGATIVE
BUN SERPL-MCNC: 9 MG/DL (ref 6–23)
CALCIUM SERPL-MCNC: 8.6 MG/DL (ref 8.6–10.2)
CHLORIDE SERPL-SCNC: 108 MMOL/L (ref 98–107)
CK SERPL-CCNC: 100 U/L (ref 20–180)
CLARITY UR: CLEAR
CO2 SERPL-SCNC: 26 MMOL/L (ref 22–29)
COLOR UR: YELLOW
CREAT SERPL-MCNC: 1 MG/DL (ref 0.5–1)
EOSINOPHIL # BLD: 0.03 E9/L (ref 0.05–0.5)
EOSINOPHIL NFR BLD: 0.5 % (ref 0–6)
ERYTHROCYTE [DISTWIDTH] IN BLOOD BY AUTOMATED COUNT: 14.7 FL (ref 11.5–15)
GLUCOSE SERPL-MCNC: 105 MG/DL (ref 74–99)
GLUCOSE UR STRIP-MCNC: NEGATIVE MG/DL
HCT VFR BLD AUTO: 40.3 % (ref 34–48)
HGB BLD-MCNC: 13 G/DL (ref 11.5–15.5)
HGB UR QL STRIP: NEGATIVE
IMM GRANULOCYTES # BLD: 0.02 E9/L
IMM GRANULOCYTES NFR BLD: 0.3 % (ref 0–5)
INR BLD: 1.2
KETONES UR STRIP-MCNC: NEGATIVE MG/DL
LEUKOCYTE ESTERASE UR QL STRIP: NEGATIVE
LYMPHOCYTES # BLD: 1.01 E9/L (ref 1.5–4)
LYMPHOCYTES NFR BLD: 16.2 % (ref 20–42)
MCH RBC QN AUTO: 30.3 PG (ref 26–35)
MCHC RBC AUTO-ENTMCNC: 32.3 % (ref 32–34.5)
MCV RBC AUTO: 93.9 FL (ref 80–99.9)
MONOCYTES # BLD: 0.49 E9/L (ref 0.1–0.95)
MONOCYTES NFR BLD: 7.9 % (ref 2–12)
NEUTROPHILS # BLD: 4.65 E9/L (ref 1.8–7.3)
NEUTS SEG NFR BLD: 74.8 % (ref 43–80)
NITRITE UR QL STRIP: NEGATIVE
PH UR STRIP: 7 [PH] (ref 5–9)
PLATELET # BLD AUTO: 162 E9/L (ref 130–450)
PMV BLD AUTO: 10.2 FL (ref 7–12)
POTASSIUM SERPL-SCNC: 3.7 MMOL/L (ref 3.5–5)
PROT SERPL-MCNC: 6.5 G/DL (ref 6.4–8.3)
PROT UR STRIP-MCNC: NEGATIVE MG/DL
PROTHROMBIN TIME: 12.7 SEC (ref 9.3–12.4)
RBC # BLD AUTO: 4.29 E12/L (ref 3.5–5.5)
SODIUM SERPL-SCNC: 143 MMOL/L (ref 132–146)
SP GR UR STRIP: 1.01 (ref 1–1.03)
TROPONIN, HIGH SENSITIVITY: 18 NG/L (ref 0–9)
TROPONIN, HIGH SENSITIVITY: 19 NG/L (ref 0–9)
UROBILINOGEN UR STRIP-ACNC: 0.2 E.U./DL
WBC # BLD: 6.2 E9/L (ref 4.5–11.5)

## 2023-04-21 PROCEDURE — 80053 COMPREHEN METABOLIC PANEL: CPT

## 2023-04-21 PROCEDURE — 71045 X-RAY EXAM CHEST 1 VIEW: CPT

## 2023-04-21 PROCEDURE — 93005 ELECTROCARDIOGRAM TRACING: CPT | Performed by: EMERGENCY MEDICINE

## 2023-04-21 PROCEDURE — 1200000000 HC SEMI PRIVATE

## 2023-04-21 PROCEDURE — 99285 EMERGENCY DEPT VISIT HI MDM: CPT

## 2023-04-21 PROCEDURE — 81003 URINALYSIS AUTO W/O SCOPE: CPT

## 2023-04-21 PROCEDURE — 70450 CT HEAD/BRAIN W/O DYE: CPT

## 2023-04-21 PROCEDURE — 85610 PROTHROMBIN TIME: CPT

## 2023-04-21 PROCEDURE — 85025 COMPLETE CBC W/AUTO DIFF WBC: CPT

## 2023-04-21 PROCEDURE — 82550 ASSAY OF CK (CPK): CPT

## 2023-04-21 PROCEDURE — 84484 ASSAY OF TROPONIN QUANT: CPT

## 2023-04-21 ASSESSMENT — PAIN - FUNCTIONAL ASSESSMENT: PAIN_FUNCTIONAL_ASSESSMENT: NONE - DENIES PAIN

## 2023-04-22 LAB
ALBUMIN SERPL-MCNC: 3.4 G/DL (ref 3.5–5.2)
ALP SERPL-CCNC: 67 U/L (ref 35–104)
ALT SERPL-CCNC: 11 U/L (ref 0–32)
ANION GAP SERPL CALCULATED.3IONS-SCNC: 11 MMOL/L (ref 7–16)
AST SERPL-CCNC: 23 U/L (ref 0–31)
BASOPHILS # BLD: 0.02 E9/L (ref 0–0.2)
BASOPHILS NFR BLD: 0.4 % (ref 0–2)
BILIRUB DIRECT SERPL-MCNC: <0.2 MG/DL (ref 0–0.3)
BILIRUB INDIRECT SERPL-MCNC: ABNORMAL MG/DL (ref 0–1)
BILIRUB SERPL-MCNC: 0.7 MG/DL (ref 0–1.2)
BUN SERPL-MCNC: 7 MG/DL (ref 6–23)
CALCIUM SERPL-MCNC: 8.7 MG/DL (ref 8.6–10.2)
CHLORIDE SERPL-SCNC: 108 MMOL/L (ref 98–107)
CO2 SERPL-SCNC: 23 MMOL/L (ref 22–29)
CREAT SERPL-MCNC: 0.8 MG/DL (ref 0.5–1)
EOSINOPHIL # BLD: 0.03 E9/L (ref 0.05–0.5)
EOSINOPHIL NFR BLD: 0.6 % (ref 0–6)
ERYTHROCYTE [DISTWIDTH] IN BLOOD BY AUTOMATED COUNT: 14.6 FL (ref 11.5–15)
GLUCOSE SERPL-MCNC: 98 MG/DL (ref 74–99)
HCT VFR BLD AUTO: 41 % (ref 34–48)
HGB BLD-MCNC: 13.4 G/DL (ref 11.5–15.5)
IMM GRANULOCYTES # BLD: 0.01 E9/L
IMM GRANULOCYTES NFR BLD: 0.2 % (ref 0–5)
LYMPHOCYTES # BLD: 1.22 E9/L (ref 1.5–4)
LYMPHOCYTES NFR BLD: 24.6 % (ref 20–42)
MAGNESIUM SERPL-MCNC: 2.2 MG/DL (ref 1.6–2.6)
MCH RBC QN AUTO: 30.2 PG (ref 26–35)
MCHC RBC AUTO-ENTMCNC: 32.7 % (ref 32–34.5)
MCV RBC AUTO: 92.6 FL (ref 80–99.9)
MONOCYTES # BLD: 0.46 E9/L (ref 0.1–0.95)
MONOCYTES NFR BLD: 9.3 % (ref 2–12)
NEUTROPHILS # BLD: 3.22 E9/L (ref 1.8–7.3)
NEUTS SEG NFR BLD: 64.9 % (ref 43–80)
PLATELET # BLD AUTO: 162 E9/L (ref 130–450)
PMV BLD AUTO: 10.4 FL (ref 7–12)
POTASSIUM SERPL-SCNC: 3.5 MMOL/L (ref 3.5–5)
PROT SERPL-MCNC: 6.5 G/DL (ref 6.4–8.3)
RBC # BLD AUTO: 4.43 E12/L (ref 3.5–5.5)
REASON FOR REJECTION: NORMAL
REJECTED TEST: NORMAL
SODIUM SERPL-SCNC: 142 MMOL/L (ref 132–146)
TROPONIN, HIGH SENSITIVITY: 18 NG/L (ref 0–9)
WBC # BLD: 5 E9/L (ref 4.5–11.5)

## 2023-04-22 PROCEDURE — 80048 BASIC METABOLIC PNL TOTAL CA: CPT

## 2023-04-22 PROCEDURE — 6370000000 HC RX 637 (ALT 250 FOR IP): Performed by: NURSE PRACTITIONER

## 2023-04-22 PROCEDURE — 1200000000 HC SEMI PRIVATE

## 2023-04-22 PROCEDURE — 85025 COMPLETE CBC W/AUTO DIFF WBC: CPT

## 2023-04-22 PROCEDURE — 83735 ASSAY OF MAGNESIUM: CPT

## 2023-04-22 PROCEDURE — G0378 HOSPITAL OBSERVATION PER HR: HCPCS

## 2023-04-22 PROCEDURE — 84484 ASSAY OF TROPONIN QUANT: CPT

## 2023-04-22 PROCEDURE — 80076 HEPATIC FUNCTION PANEL: CPT

## 2023-04-22 PROCEDURE — 6360000002 HC RX W HCPCS: Performed by: NURSE PRACTITIONER

## 2023-04-22 PROCEDURE — 2580000003 HC RX 258: Performed by: NURSE PRACTITIONER

## 2023-04-22 PROCEDURE — 36415 COLL VENOUS BLD VENIPUNCTURE: CPT

## 2023-04-22 RX ORDER — LOSARTAN POTASSIUM 50 MG/1
50 TABLET ORAL DAILY
Status: DISCONTINUED | OUTPATIENT
Start: 2023-04-22 | End: 2023-04-24

## 2023-04-22 RX ORDER — SODIUM CHLORIDE 0.9 % (FLUSH) 0.9 %
5-40 SYRINGE (ML) INJECTION EVERY 12 HOURS SCHEDULED
Status: DISCONTINUED | OUTPATIENT
Start: 2023-04-22 | End: 2023-04-26 | Stop reason: HOSPADM

## 2023-04-22 RX ORDER — POTASSIUM CHLORIDE 7.45 MG/ML
10 INJECTION INTRAVENOUS PRN
Status: DISCONTINUED | OUTPATIENT
Start: 2023-04-22 | End: 2023-04-26

## 2023-04-22 RX ORDER — SODIUM CHLORIDE 9 MG/ML
INJECTION, SOLUTION INTRAVENOUS CONTINUOUS
Status: DISCONTINUED | OUTPATIENT
Start: 2023-04-22 | End: 2023-04-26 | Stop reason: HOSPADM

## 2023-04-22 RX ORDER — AMIODARONE HYDROCHLORIDE 200 MG/1
100 TABLET ORAL DAILY
Status: DISCONTINUED | OUTPATIENT
Start: 2023-04-22 | End: 2023-04-23

## 2023-04-22 RX ORDER — POTASSIUM CHLORIDE 20 MEQ/1
40 TABLET, EXTENDED RELEASE ORAL PRN
Status: DISCONTINUED | OUTPATIENT
Start: 2023-04-22 | End: 2023-04-26

## 2023-04-22 RX ORDER — HYDRALAZINE HYDROCHLORIDE 20 MG/ML
10 INJECTION INTRAMUSCULAR; INTRAVENOUS EVERY 6 HOURS PRN
Status: DISCONTINUED | OUTPATIENT
Start: 2023-04-22 | End: 2023-04-23

## 2023-04-22 RX ORDER — ACETAMINOPHEN 325 MG/1
650 TABLET ORAL EVERY 6 HOURS PRN
Status: DISCONTINUED | OUTPATIENT
Start: 2023-04-22 | End: 2023-04-26 | Stop reason: HOSPADM

## 2023-04-22 RX ORDER — SENNA PLUS 8.6 MG/1
1 TABLET ORAL DAILY PRN
Status: DISCONTINUED | OUTPATIENT
Start: 2023-04-22 | End: 2023-04-26 | Stop reason: HOSPADM

## 2023-04-22 RX ORDER — ASPIRIN 81 MG/1
81 TABLET ORAL DAILY
Status: DISCONTINUED | OUTPATIENT
Start: 2023-04-22 | End: 2023-04-23

## 2023-04-22 RX ORDER — CARVEDILOL 3.12 MG/1
3.12 TABLET ORAL 2 TIMES DAILY WITH MEALS
Status: DISCONTINUED | OUTPATIENT
Start: 2023-04-22 | End: 2023-04-23

## 2023-04-22 RX ORDER — SODIUM CHLORIDE 9 MG/ML
INJECTION, SOLUTION INTRAVENOUS PRN
Status: DISCONTINUED | OUTPATIENT
Start: 2023-04-22 | End: 2023-04-26 | Stop reason: HOSPADM

## 2023-04-22 RX ORDER — MAGNESIUM SULFATE IN WATER 40 MG/ML
2000 INJECTION, SOLUTION INTRAVENOUS PRN
Status: DISCONTINUED | OUTPATIENT
Start: 2023-04-22 | End: 2023-04-26 | Stop reason: HOSPADM

## 2023-04-22 RX ORDER — CARVEDILOL 3.12 MG/1
3.12 TABLET ORAL 2 TIMES DAILY WITH MEALS
Status: ON HOLD | COMMUNITY
End: 2023-04-26 | Stop reason: HOSPADM

## 2023-04-22 RX ORDER — PANTOPRAZOLE SODIUM 40 MG/1
40 TABLET, DELAYED RELEASE ORAL
Status: DISCONTINUED | OUTPATIENT
Start: 2023-04-22 | End: 2023-04-26 | Stop reason: HOSPADM

## 2023-04-22 RX ORDER — ACETAMINOPHEN 650 MG/1
650 SUPPOSITORY RECTAL EVERY 6 HOURS PRN
Status: DISCONTINUED | OUTPATIENT
Start: 2023-04-22 | End: 2023-04-26 | Stop reason: HOSPADM

## 2023-04-22 RX ORDER — SODIUM CHLORIDE 0.9 % (FLUSH) 0.9 %
5-40 SYRINGE (ML) INJECTION PRN
Status: DISCONTINUED | OUTPATIENT
Start: 2023-04-22 | End: 2023-04-26 | Stop reason: HOSPADM

## 2023-04-22 RX ADMIN — SODIUM CHLORIDE: 9 INJECTION, SOLUTION INTRAVENOUS at 01:52

## 2023-04-22 RX ADMIN — HYDRALAZINE HYDROCHLORIDE 10 MG: 20 INJECTION INTRAMUSCULAR; INTRAVENOUS at 10:04

## 2023-04-22 RX ADMIN — APIXABAN 2.5 MG: 2.5 TABLET, FILM COATED ORAL at 21:21

## 2023-04-22 RX ADMIN — PANTOPRAZOLE SODIUM 40 MG: 40 TABLET, DELAYED RELEASE ORAL at 08:41

## 2023-04-22 RX ADMIN — APIXABAN 2.5 MG: 2.5 TABLET, FILM COATED ORAL at 08:41

## 2023-04-22 RX ADMIN — ASPIRIN 81 MG: 81 TABLET, COATED ORAL at 08:41

## 2023-04-22 RX ADMIN — Medication 10 ML: at 08:41

## 2023-04-22 RX ADMIN — Medication 10 ML: at 21:27

## 2023-04-22 RX ADMIN — APIXABAN 2.5 MG: 2.5 TABLET, FILM COATED ORAL at 01:46

## 2023-04-23 PROBLEM — R00.1 SINUS BRADYCARDIA: Status: ACTIVE | Noted: 2023-04-23

## 2023-04-23 LAB
ANION GAP SERPL CALCULATED.3IONS-SCNC: 12 MMOL/L (ref 7–16)
ANION GAP SERPL CALCULATED.3IONS-SCNC: 12 MMOL/L (ref 7–16)
BUN SERPL-MCNC: 7 MG/DL (ref 6–23)
BUN SERPL-MCNC: 8 MG/DL (ref 6–23)
CALCIUM SERPL-MCNC: 8.8 MG/DL (ref 8.6–10.2)
CALCIUM SERPL-MCNC: 8.9 MG/DL (ref 8.6–10.2)
CHLORIDE SERPL-SCNC: 108 MMOL/L (ref 98–107)
CHLORIDE SERPL-SCNC: 111 MMOL/L (ref 98–107)
CO2 SERPL-SCNC: 23 MMOL/L (ref 22–29)
CO2 SERPL-SCNC: 23 MMOL/L (ref 22–29)
CREAT SERPL-MCNC: 0.8 MG/DL (ref 0.5–1)
CREAT SERPL-MCNC: 0.8 MG/DL (ref 0.5–1)
GLUCOSE SERPL-MCNC: 125 MG/DL (ref 74–99)
GLUCOSE SERPL-MCNC: 131 MG/DL (ref 74–99)
MAGNESIUM SERPL-MCNC: 2 MG/DL (ref 1.6–2.6)
POTASSIUM SERPL-SCNC: 3.2 MMOL/L (ref 3.5–5)
POTASSIUM SERPL-SCNC: 3.3 MMOL/L (ref 3.5–5)
SODIUM SERPL-SCNC: 143 MMOL/L (ref 132–146)
SODIUM SERPL-SCNC: 146 MMOL/L (ref 132–146)
T4 FREE SERPL-MCNC: 1.87 NG/DL (ref 0.93–1.7)
TSH SERPL-MCNC: 2.94 UIU/ML (ref 0.27–4.2)

## 2023-04-23 PROCEDURE — 83735 ASSAY OF MAGNESIUM: CPT

## 2023-04-23 PROCEDURE — 6370000000 HC RX 637 (ALT 250 FOR IP): Performed by: NURSE PRACTITIONER

## 2023-04-23 PROCEDURE — 84439 ASSAY OF FREE THYROXINE: CPT

## 2023-04-23 PROCEDURE — 84443 ASSAY THYROID STIM HORMONE: CPT

## 2023-04-23 PROCEDURE — 6360000002 HC RX W HCPCS: Performed by: INTERNAL MEDICINE

## 2023-04-23 PROCEDURE — 80048 BASIC METABOLIC PNL TOTAL CA: CPT

## 2023-04-23 PROCEDURE — 1200000000 HC SEMI PRIVATE

## 2023-04-23 PROCEDURE — G0378 HOSPITAL OBSERVATION PER HR: HCPCS

## 2023-04-23 PROCEDURE — 93005 ELECTROCARDIOGRAM TRACING: CPT | Performed by: INTERNAL MEDICINE

## 2023-04-23 PROCEDURE — 2580000003 HC RX 258: Performed by: NURSE PRACTITIONER

## 2023-04-23 PROCEDURE — 36415 COLL VENOUS BLD VENIPUNCTURE: CPT

## 2023-04-23 PROCEDURE — 6370000000 HC RX 637 (ALT 250 FOR IP): Performed by: INTERNAL MEDICINE

## 2023-04-23 PROCEDURE — 99223 1ST HOSP IP/OBS HIGH 75: CPT | Performed by: INTERNAL MEDICINE

## 2023-04-23 RX ORDER — HYDRALAZINE HYDROCHLORIDE 20 MG/ML
10 INJECTION INTRAMUSCULAR; INTRAVENOUS EVERY 4 HOURS PRN
Status: DISCONTINUED | OUTPATIENT
Start: 2023-04-23 | End: 2023-04-26 | Stop reason: HOSPADM

## 2023-04-23 RX ORDER — LOSARTAN POTASSIUM 50 MG/1
50 TABLET ORAL ONCE
Status: COMPLETED | OUTPATIENT
Start: 2023-04-23 | End: 2023-04-23

## 2023-04-23 RX ADMIN — SENNOSIDES 8.6 MG: 8.6 TABLET, FILM COATED ORAL at 12:17

## 2023-04-23 RX ADMIN — HYDRALAZINE HYDROCHLORIDE 10 MG: 20 INJECTION INTRAMUSCULAR; INTRAVENOUS at 09:34

## 2023-04-23 RX ADMIN — Medication 10 ML: at 22:49

## 2023-04-23 RX ADMIN — POTASSIUM CHLORIDE 40 MEQ: 1500 TABLET, EXTENDED RELEASE ORAL at 12:17

## 2023-04-23 RX ADMIN — SODIUM CHLORIDE: 9 INJECTION, SOLUTION INTRAVENOUS at 15:03

## 2023-04-23 RX ADMIN — Medication 10 ML: at 09:34

## 2023-04-23 RX ADMIN — PANTOPRAZOLE SODIUM 40 MG: 40 TABLET, DELAYED RELEASE ORAL at 07:26

## 2023-04-23 RX ADMIN — ASPIRIN 81 MG: 81 TABLET, COATED ORAL at 09:34

## 2023-04-23 RX ADMIN — LOSARTAN POTASSIUM 50 MG: 50 TABLET, FILM COATED ORAL at 13:58

## 2023-04-23 RX ADMIN — APIXABAN 2.5 MG: 2.5 TABLET, FILM COATED ORAL at 09:34

## 2023-04-23 RX ADMIN — APIXABAN 2.5 MG: 2.5 TABLET, FILM COATED ORAL at 22:41

## 2023-04-24 LAB
ANION GAP SERPL CALCULATED.3IONS-SCNC: 8 MMOL/L (ref 7–16)
BUN SERPL-MCNC: 7 MG/DL (ref 6–23)
CALCIUM SERPL-MCNC: 8.8 MG/DL (ref 8.6–10.2)
CHLORIDE SERPL-SCNC: 112 MMOL/L (ref 98–107)
CO2 SERPL-SCNC: 24 MMOL/L (ref 22–29)
CREAT SERPL-MCNC: 0.8 MG/DL (ref 0.5–1)
EKG ATRIAL RATE: 105 BPM
EKG ATRIAL RATE: 51 BPM
EKG P AXIS: 34 DEGREES
EKG P-R INTERVAL: 186 MS
EKG P-R INTERVAL: 200 MS
EKG Q-T INTERVAL: 392 MS
EKG Q-T INTERVAL: 596 MS
EKG QRS DURATION: 110 MS
EKG QRS DURATION: 126 MS
EKG QTC CALCULATION (BAZETT): 518 MS
EKG QTC CALCULATION (BAZETT): 549 MS
EKG R AXIS: -54 DEGREES
EKG R AXIS: -57 DEGREES
EKG T AXIS: -54 DEGREES
EKG T AXIS: 75 DEGREES
EKG VENTRICULAR RATE: 105 BPM
EKG VENTRICULAR RATE: 51 BPM
GLUCOSE SERPL-MCNC: 91 MG/DL (ref 74–99)
LV EF: 53 %
LVEF MODALITY: NORMAL
POTASSIUM SERPL-SCNC: 3.9 MMOL/L (ref 3.5–5)
SODIUM SERPL-SCNC: 144 MMOL/L (ref 132–146)

## 2023-04-24 PROCEDURE — 97530 THERAPEUTIC ACTIVITIES: CPT

## 2023-04-24 PROCEDURE — 97535 SELF CARE MNGMENT TRAINING: CPT

## 2023-04-24 PROCEDURE — 6360000002 HC RX W HCPCS: Performed by: INTERNAL MEDICINE

## 2023-04-24 PROCEDURE — 94660 CPAP INITIATION&MGMT: CPT

## 2023-04-24 PROCEDURE — 2580000003 HC RX 258: Performed by: NURSE PRACTITIONER

## 2023-04-24 PROCEDURE — 36415 COLL VENOUS BLD VENIPUNCTURE: CPT

## 2023-04-24 PROCEDURE — 99233 SBSQ HOSP IP/OBS HIGH 50: CPT | Performed by: INTERNAL MEDICINE

## 2023-04-24 PROCEDURE — 6370000000 HC RX 637 (ALT 250 FOR IP): Performed by: INTERNAL MEDICINE

## 2023-04-24 PROCEDURE — 6370000000 HC RX 637 (ALT 250 FOR IP): Performed by: NURSE PRACTITIONER

## 2023-04-24 PROCEDURE — 80048 BASIC METABOLIC PNL TOTAL CA: CPT

## 2023-04-24 PROCEDURE — 97165 OT EVAL LOW COMPLEX 30 MIN: CPT

## 2023-04-24 PROCEDURE — 93306 TTE W/DOPPLER COMPLETE: CPT

## 2023-04-24 PROCEDURE — 97161 PT EVAL LOW COMPLEX 20 MIN: CPT

## 2023-04-24 PROCEDURE — 2140000000 HC CCU INTERMEDIATE R&B

## 2023-04-24 RX ORDER — LOSARTAN POTASSIUM 50 MG/1
50 TABLET ORAL 2 TIMES DAILY
Status: DISCONTINUED | OUTPATIENT
Start: 2023-04-24 | End: 2023-04-26 | Stop reason: HOSPADM

## 2023-04-24 RX ADMIN — LOSARTAN POTASSIUM 50 MG: 50 TABLET, FILM COATED ORAL at 21:14

## 2023-04-24 RX ADMIN — HYDRALAZINE HYDROCHLORIDE 10 MG: 20 INJECTION INTRAMUSCULAR; INTRAVENOUS at 09:19

## 2023-04-24 RX ADMIN — APIXABAN 2.5 MG: 2.5 TABLET, FILM COATED ORAL at 21:14

## 2023-04-24 RX ADMIN — SENNOSIDES 8.6 MG: 8.6 TABLET, FILM COATED ORAL at 18:40

## 2023-04-24 RX ADMIN — Medication 10 ML: at 09:14

## 2023-04-24 RX ADMIN — Medication 10 ML: at 23:05

## 2023-04-24 RX ADMIN — LOSARTAN POTASSIUM 50 MG: 50 TABLET, FILM COATED ORAL at 09:13

## 2023-04-24 RX ADMIN — PANTOPRAZOLE SODIUM 40 MG: 40 TABLET, DELAYED RELEASE ORAL at 09:13

## 2023-04-24 RX ADMIN — APIXABAN 2.5 MG: 2.5 TABLET, FILM COATED ORAL at 09:14

## 2023-04-24 NOTE — PLAN OF CARE
Patient's chart updated to reflect:      . - HF care plan, HF education points and HF discharge instructions.  -Orders: 2 gram sodium diet, daily weights, I/O.  -PCP and/or Cardiologist appointment to be scheduled within 7 days of hospital discharge.  -History of HF, not primary admission Dx. Patient admitted for treatment of 1. Syncope  - Recurrent syncope. - Unclear etiology whether from vasovagal or cardiac syncope from SND. - Last echocardiogram 7/15/22 showed normal LV EF.  - Last LHC 11/12/19 showed no significant CAD. 2. Sinus bradycardia   - Likely underlying sinus node dysfunction. - Worsened with medications e.g Amiodarone and Carvedilol. 3. Prolonged Qtc   - Qtc 549 ms 4/22/23.  - On Amiodarone.   Regina Bennett RN RN, BSN  Heart Failure Navigator

## 2023-04-25 ENCOUNTER — APPOINTMENT (OUTPATIENT)
Dept: GENERAL RADIOLOGY | Age: 88
DRG: 312 | End: 2023-04-25
Payer: COMMERCIAL

## 2023-04-25 LAB
ANION GAP SERPL CALCULATED.3IONS-SCNC: 9 MMOL/L (ref 7–16)
BUN SERPL-MCNC: 8 MG/DL (ref 6–23)
CALCIUM SERPL-MCNC: 8.9 MG/DL (ref 8.6–10.2)
CHLORIDE SERPL-SCNC: 108 MMOL/L (ref 98–107)
CO2 SERPL-SCNC: 20 MMOL/L (ref 22–29)
CREAT SERPL-MCNC: 0.8 MG/DL (ref 0.5–1)
GLUCOSE SERPL-MCNC: 96 MG/DL (ref 74–99)
METER GLUCOSE: 123 MG/DL (ref 74–99)
POTASSIUM SERPL-SCNC: 4 MMOL/L (ref 3.5–5)
SODIUM SERPL-SCNC: 137 MMOL/L (ref 132–146)

## 2023-04-25 PROCEDURE — 36415 COLL VENOUS BLD VENIPUNCTURE: CPT

## 2023-04-25 PROCEDURE — 82962 GLUCOSE BLOOD TEST: CPT

## 2023-04-25 PROCEDURE — 6360000002 HC RX W HCPCS: Performed by: INTERNAL MEDICINE

## 2023-04-25 PROCEDURE — 80048 BASIC METABOLIC PNL TOTAL CA: CPT

## 2023-04-25 PROCEDURE — 74019 RADEX ABDOMEN 2 VIEWS: CPT

## 2023-04-25 PROCEDURE — 6370000000 HC RX 637 (ALT 250 FOR IP): Performed by: NURSE PRACTITIONER

## 2023-04-25 PROCEDURE — 2580000003 HC RX 258: Performed by: NURSE PRACTITIONER

## 2023-04-25 PROCEDURE — 93242 EXT ECG>48HR<7D RECORDING: CPT

## 2023-04-25 PROCEDURE — 2140000000 HC CCU INTERMEDIATE R&B

## 2023-04-25 PROCEDURE — 99233 SBSQ HOSP IP/OBS HIGH 50: CPT | Performed by: INTERNAL MEDICINE

## 2023-04-25 PROCEDURE — 6370000000 HC RX 637 (ALT 250 FOR IP): Performed by: INTERNAL MEDICINE

## 2023-04-25 RX ORDER — DOCUSATE SODIUM 100 MG/1
100 CAPSULE, LIQUID FILLED ORAL DAILY
Status: DISCONTINUED | OUTPATIENT
Start: 2023-04-25 | End: 2023-04-26 | Stop reason: HOSPADM

## 2023-04-25 RX ORDER — AMIODARONE HYDROCHLORIDE 200 MG/1
100 TABLET ORAL DAILY
Status: DISCONTINUED | OUTPATIENT
Start: 2023-04-25 | End: 2023-04-26 | Stop reason: HOSPADM

## 2023-04-25 RX ORDER — LACTULOSE 10 G/15ML
20 SOLUTION ORAL 2 TIMES DAILY
Status: DISCONTINUED | OUTPATIENT
Start: 2023-04-25 | End: 2023-04-26 | Stop reason: HOSPADM

## 2023-04-25 RX ORDER — BISACODYL 10 MG
10 SUPPOSITORY, RECTAL RECTAL ONCE
Status: COMPLETED | OUTPATIENT
Start: 2023-04-25 | End: 2023-04-25

## 2023-04-25 RX ADMIN — LOSARTAN POTASSIUM 50 MG: 50 TABLET, FILM COATED ORAL at 20:49

## 2023-04-25 RX ADMIN — LACTULOSE 20 G: 20 SOLUTION ORAL at 20:50

## 2023-04-25 RX ADMIN — APIXABAN 2.5 MG: 2.5 TABLET, FILM COATED ORAL at 09:31

## 2023-04-25 RX ADMIN — AMIODARONE HYDROCHLORIDE 100 MG: 200 TABLET ORAL at 11:49

## 2023-04-25 RX ADMIN — SENNOSIDES 8.6 MG: 8.6 TABLET, FILM COATED ORAL at 09:31

## 2023-04-25 RX ADMIN — Medication 10 ML: at 20:50

## 2023-04-25 RX ADMIN — PANTOPRAZOLE SODIUM 40 MG: 40 TABLET, DELAYED RELEASE ORAL at 05:44

## 2023-04-25 RX ADMIN — HYDRALAZINE HYDROCHLORIDE 10 MG: 20 INJECTION INTRAMUSCULAR; INTRAVENOUS at 09:32

## 2023-04-25 RX ADMIN — DOCUSATE SODIUM 100 MG: 100 CAPSULE, LIQUID FILLED ORAL at 11:50

## 2023-04-25 RX ADMIN — APIXABAN 2.5 MG: 2.5 TABLET, FILM COATED ORAL at 20:49

## 2023-04-25 RX ADMIN — LOSARTAN POTASSIUM 50 MG: 50 TABLET, FILM COATED ORAL at 09:32

## 2023-04-25 RX ADMIN — BISACODYL 10 MG: 10 SUPPOSITORY RECTAL at 15:17

## 2023-04-25 NOTE — PLAN OF CARE
Applied Qintio XT monitor for 14 days. Serial number [id number]. Instructed patient to avoid showering in the first 24 hours. Press the button on the monitor when you feel a symptom and write it in your diary. Do not submerge in water. No lotion or powder near the patch. Please return the monitor in the box provided. Patient hard of hearing and confused.   Daughter verbalized understanding

## 2023-04-26 VITALS
HEART RATE: 83 BPM | SYSTOLIC BLOOD PRESSURE: 164 MMHG | RESPIRATION RATE: 18 BRPM | DIASTOLIC BLOOD PRESSURE: 84 MMHG | HEIGHT: 67 IN | BODY MASS INDEX: 22.37 KG/M2 | WEIGHT: 142.5 LBS | TEMPERATURE: 98.3 F | OXYGEN SATURATION: 100 %

## 2023-04-26 LAB
ANION GAP SERPL CALCULATED.3IONS-SCNC: 11 MMOL/L (ref 7–16)
BUN SERPL-MCNC: 9 MG/DL (ref 6–23)
CALCIUM SERPL-MCNC: 8.3 MG/DL (ref 8.6–10.2)
CHLORIDE SERPL-SCNC: 113 MMOL/L (ref 98–107)
CO2 SERPL-SCNC: 19 MMOL/L (ref 22–29)
CREAT SERPL-MCNC: 0.8 MG/DL (ref 0.5–1)
GLUCOSE SERPL-MCNC: 96 MG/DL (ref 74–99)
POTASSIUM SERPL-SCNC: 3.6 MMOL/L (ref 3.5–5)
SODIUM SERPL-SCNC: 143 MMOL/L (ref 132–146)

## 2023-04-26 PROCEDURE — 99233 SBSQ HOSP IP/OBS HIGH 50: CPT | Performed by: INTERNAL MEDICINE

## 2023-04-26 PROCEDURE — 6360000002 HC RX W HCPCS: Performed by: INTERNAL MEDICINE

## 2023-04-26 PROCEDURE — 80048 BASIC METABOLIC PNL TOTAL CA: CPT

## 2023-04-26 PROCEDURE — 2580000003 HC RX 258: Performed by: NURSE PRACTITIONER

## 2023-04-26 PROCEDURE — 6370000000 HC RX 637 (ALT 250 FOR IP): Performed by: NURSE PRACTITIONER

## 2023-04-26 PROCEDURE — 97530 THERAPEUTIC ACTIVITIES: CPT

## 2023-04-26 PROCEDURE — 36415 COLL VENOUS BLD VENIPUNCTURE: CPT

## 2023-04-26 PROCEDURE — 6370000000 HC RX 637 (ALT 250 FOR IP): Performed by: INTERNAL MEDICINE

## 2023-04-26 PROCEDURE — 97535 SELF CARE MNGMENT TRAINING: CPT

## 2023-04-26 RX ORDER — LACTULOSE 10 G/15ML
20 SOLUTION ORAL NIGHTLY
Qty: 900 ML | Refills: 0 | Status: SHIPPED | OUTPATIENT
Start: 2023-04-26

## 2023-04-26 RX ORDER — AMIODARONE HYDROCHLORIDE 100 MG/1
100 TABLET ORAL DAILY
Qty: 30 TABLET | Refills: 0 | Status: SHIPPED | OUTPATIENT
Start: 2023-04-26

## 2023-04-26 RX ORDER — LOSARTAN POTASSIUM 50 MG/1
50 TABLET ORAL 2 TIMES DAILY
Qty: 30 TABLET | Refills: 3 | Status: SHIPPED | OUTPATIENT
Start: 2023-04-26

## 2023-04-26 RX ORDER — POTASSIUM CHLORIDE 20 MEQ/1
40 TABLET, EXTENDED RELEASE ORAL ONCE
Status: COMPLETED | OUTPATIENT
Start: 2023-04-26 | End: 2023-04-26

## 2023-04-26 RX ADMIN — APIXABAN 2.5 MG: 2.5 TABLET, FILM COATED ORAL at 07:42

## 2023-04-26 RX ADMIN — AMIODARONE HYDROCHLORIDE 100 MG: 200 TABLET ORAL at 07:42

## 2023-04-26 RX ADMIN — ACETAMINOPHEN 650 MG: 325 TABLET ORAL at 02:18

## 2023-04-26 RX ADMIN — POTASSIUM CHLORIDE 40 MEQ: 1500 TABLET, EXTENDED RELEASE ORAL at 09:41

## 2023-04-26 RX ADMIN — PANTOPRAZOLE SODIUM 40 MG: 40 TABLET, DELAYED RELEASE ORAL at 06:29

## 2023-04-26 RX ADMIN — LOSARTAN POTASSIUM 50 MG: 50 TABLET, FILM COATED ORAL at 07:42

## 2023-04-26 RX ADMIN — HYDRALAZINE HYDROCHLORIDE 10 MG: 20 INJECTION INTRAMUSCULAR; INTRAVENOUS at 09:51

## 2023-04-26 RX ADMIN — DOCUSATE SODIUM 100 MG: 100 CAPSULE, LIQUID FILLED ORAL at 07:42

## 2023-04-26 RX ADMIN — LACTULOSE 20 G: 20 SOLUTION ORAL at 07:41

## 2023-04-26 RX ADMIN — SODIUM CHLORIDE: 9 INJECTION, SOLUTION INTRAVENOUS at 04:25

## 2023-04-26 ASSESSMENT — PAIN SCALES - GENERAL: PAINLEVEL_OUTOF10: 8

## 2023-04-26 ASSESSMENT — PAIN DESCRIPTION - LOCATION: LOCATION: ABDOMEN

## 2023-04-26 NOTE — PLAN OF CARE
Problem: Chronic Conditions and Co-morbidities  Goal: Patient's chronic conditions and co-morbidity symptoms are monitored and maintained or improved  Outcome: Completed     Problem: Discharge Planning  Goal: Discharge to home or other facility with appropriate resources  Outcome: Completed     Problem: Safety - Adult  Goal: Free from fall injury  Outcome: Completed     Problem: ABCDS Injury Assessment  Goal: Absence of physical injury  Outcome: Completed     Problem: Skin/Tissue Integrity  Goal: Absence of new skin breakdown  Description: 1. Monitor for areas of redness and/or skin breakdown  2. Assess vascular access sites hourly  3. Every 4-6 hours minimum:  Change oxygen saturation probe site  4. Every 4-6 hours:  If on nasal continuous positive airway pressure, respiratory therapy assess nares and determine need for appliance change or resting period.   Outcome: Completed     Problem: Cardiovascular - Adult  Goal: Maintains optimal cardiac output and hemodynamic stability  Outcome: Completed     Problem: Metabolic/Fluid and Electrolytes - Adult  Goal: Hemodynamic stability and optimal renal function maintained  Outcome: Completed

## 2023-04-26 NOTE — CARE COORDINATION
4/25:  Update CM Note:  Pt presented to the ER for being found unresponsive on the floor from home. Pt is on room air at 100%, Iv Fluids & Po Eliquis -old medication. Cm spoke with her daughter Chula Bedolla at bedside on 4/24. CM discuss CM role & dc planning. PTA pt was independent with a cane. PT 12/24 OT 14/24. Per daughter dc plan is for the pt to return home despite [address]. She has no preferences of HHC & declined list whoever her insurance covers. Cm sent referral to [facility], she can accept. [facility] order placed for Nursing & PT/OT. Zio patch applied. Will need order for [address]. Sw/SHAUN will continue to follow for dc planning.   Electronically signed by Mignon Nobles RN on 4/25/2023 at 12:00 PM
4/26:  Update CM Note:  Pt presented to the ER for being found unresponsive on the floor from home. Pt is being dc home today & [facility] transport[facility]. SHAUN has advised RN. Zio patch applied. Sw/SHAUN will continue to follow for dc planning.   Electronically signed by Angelica Alonzo RN on 4/26/2023 at 11:10 AM
Syncope and collapse [R55]  Sinus bradycardia [R00.1]  Closed head injury, initial encounter [A14.32QI]    IF APPLICABLE: The Patient and/or patient representative Judie Ball and her family were provided with a choice of provider and agrees with the discharge plan. Freedom of choice list with basic dialogue that supports the patient's individualized plan of care/goals and shares the quality data associated with the providers was provided to:     Patient Representative Name:       The Patient and/or Patient Representative Agree with the Discharge Plan?       Jamilah Camara RN  Case Management Department  Ph: [phone]
[employer]

## 2023-04-26 NOTE — DISCHARGE INSTR - DIET

## 2023-05-01 NOTE — DISCHARGE SUMMARY
Hospitalist Discharge Summary    Patient ID: Rashida Mcnamara   Patient : [id number]  Patient's PCP: Ivonne Yates MD    Admit Date: 2023   Admitting Physician: Ada Kwon DO    Discharge Date:  2023   Discharge Physician: Ada Kwon DO   Discharge Condition: Stable  Discharge Disposition: Home      Discharge Diagnoses: Active Hospital Problems    Diagnosis Date Noted    Sinus bradycardia [R00.1] 2023    Syncope and collapse [R55] 2019    Bradycardia [R00.1] 10/16/2019   [address])           Hospital course in brief:    The patient is a 80 y.o. female of Ivonne Yates MD with significant past medical history of atrial fibrillation on Eliquis and amiodarone at home who presents with complaints of having sustained a syncopal episode after she finished using the bathroom. Patient indicates she was attempting to have a bowel movement. According to daughter patient usually is constipated. She has had a similar episode historically several weeks ago where she passed out after straining for bowel movement. Secondary to the syncopal episode, her son brought her in for further evaluation.   Patient is not able to tell me about any lightheadedness dizziness chest pain shortness of breath or any other presyncopal symptoms   AMIODARONE RESTARTED **  ABD XRAY SHOWED COLONIC RETENTION** HAD A BM TODAY       PHYSICAL EXAM:    BP (!) 164/84   Pulse 83   Temp 98.3 °F (36.8 °C)   Resp 18   Ht 5' 7\" (1.702 m)   Wt 142 lb 8 oz (64.6 kg)   SpO2 100%   BMI 22.32 kg/m²     Gen:  WELL DEVELOPED  HEENT: NC/AT, moist mucous membranes, no oropharyngeal erythema or exudate  Neck: supple, trachea midline, no anterior cervical or SC LAD  Heart:  Normal s1/s2, RRR,  Lungs:  CTA  bilaterally,  Abd: bowel sounds present, soft, nontender, nondistended, no masses  Extrem:  No clubbing, cyanosis,  NO edema  Skin: no rashes or lesions  Psych:

## 2023-05-04 NOTE — PROGRESS NOTES
CLINICAL PHARMACY NOTE: MEDS TO BEDS    Total # of Prescriptions Filled: 2   The following medications were delivered to the patient:  Enulose 10gm/15ml  Eliquis 2.5    Additional Documentation:
[address] Electrophysiology  Inpatient Progress Report  PATIENT: 2001 Angelica Olmedo RECORD NUMBER: [id number]  DATE OF SERVICE:  4/26/2023  ATTENDING ELECTROPHYSIOLOGIST: Clarance Brittle, MD   PRIMARY ELECTROPHYSIOLOGIST: Clarance Brittle, MD  REFERRING PHYSICIAN: Camille Malcolm MD  CHIEF COMPLAINT: Syncope    HPI: This is a 80 y.o. female  who presented to the hospital [place] The patient confused and can not give out detail history. Per note, she had syncopal episode after using the bathroom and found on the floor. EMS was called and found the patient's HR at 32 bpm. IV Atropine was given. In ED she was noted to be in NSR in high 40's to 50's bpm. She had similar episode in July 2022 and September 2022. She was advised to continue Amiodarone 100 mg daily and discontinued Coreg. She presents in November 2022 after syncopal episode and left AMA. Per current medication list she remains on Coreg and Amiodarone as well as Eliquis. Since admission her HR increased from 45 bpm to 60 bpm. She is awake and alert. Oriented to herself only. Cardiac electrophysiology service is consulted for recurrent syncope and bradycardia. 04/24/2023: The patient is seen in hospital follow up and remains off Amiodarone and Coreg with intermittent PAT alternating with sinus rates in the 70's. She is confused this morning and has been seen by Dr. Alli Ho who has signed off. She does note dizziness when attempting to stand. 04/25/2023: The patient is seen in hospital follow up, she is in sinus this AM however the BP is elevated and she is complaining of ongoing abdominal pain and constipation. BP in the 190's on Cozaar 50mg daily, and receiving IV Hydralazine. 04/26/2023: The patient is seen in hospital follow up, she continues to complain of abdominal pain and difficulty eating due this abdominal pain.  She remains in
Hospitalist Progress Note      PCP: Alvin Salazar MD    Date of Admission: 4/21/2023        Hospital Course: The patient is a 80 y.o. female of Alvin Salazar MD with significant past medical history of atrial fibrillation on Eliquis and amiodarone at home who presents with complaints of having sustained a syncopal episode after she finished using the bathroom. Patient indicates she was attempting to have a bowel movement. According to daughter patient usually is constipated. She has had a similar episode historically several weeks ago where she passed out after straining for bowel movement. Secondary to the syncopal episode, her son brought her in for further evaluation.   Patient is not able to tell me about any lightheadedness dizziness chest pain shortness of breath or any other presyncopal symptoms   AMIODARONE RESTARTED **  ABD XRAY SHOWED COLONIC RETENTION           Subjective:  ABD PAIN           Medications:  Reviewed    Infusion Medications    sodium chloride      sodium chloride 75 mL/hr at 04/23/23 1503     Scheduled Medications    amiodarone  100 mg Oral Daily    docusate sodium  100 mg Oral Daily    lactulose  20 g Oral BID    bisacodyl  10 mg Rectal Once    losartan  50 mg Oral BID    apixaban  2.5 mg Oral BID    pantoprazole  40 mg Oral QAM AC    sodium chloride flush  5-40 mL IntraVENous 2 times per day     PRN Meds: hydrALAZINE, perflutren lipid microspheres, sodium chloride flush, sodium chloride, acetaminophen **OR** acetaminophen, potassium chloride **OR** potassium alternative oral replacement **OR** potassium chloride, magnesium sulfate, senna      Intake/Output Summary (Last 24 hours) at 4/25/2023 1453  Last data filed at 4/25/2023 1122  Gross per 24 hour   Intake --   Output 1600 ml   Net -1600 ml       Exam:    BP (!) 156/89   Pulse 70   Temp 97.5 °F (36.4 °C) (Axillary)   Resp 18   Ht 5' 7\" (1.702 m)   Wt 137 lb 4.8 oz (62.3 kg)   SpO2 99%   BMI 21.50 kg/m²
Hospitalist Progress Note      PCP: Coleman Benavidez MD    Date of Admission: 4/21/2023        Hospital Course: The patient is a 80 y.o. female of Coleman Benavidez MD with significant past medical history of atrial fibrillation on Eliquis and amiodarone at home who presents with complaints of having sustained a syncopal episode after she finished using the bathroom. Patient indicates she was attempting to have a bowel movement. According to daughter patient usually is constipated. She has had a similar episode historically several weeks ago where she passed out after straining for bowel movement. Secondary to the syncopal episode, her son brought her in for further evaluation.   Patient is not able to tell me about any lightheadedness dizziness chest pain shortness of breath or any other presyncopal symptoms   AMIODARONE RESTARTED         Subjective:  NO COMPLAINTS           Medications:  Reviewed    Infusion Medications    sodium chloride      sodium chloride 75 mL/hr at 04/23/23 1503     Scheduled Medications    apixaban  2.5 mg Oral BID    losartan  50 mg Oral Daily    pantoprazole  40 mg Oral QAM AC    sodium chloride flush  5-40 mL IntraVENous 2 times per day     PRN Meds: hydrALAZINE, perflutren lipid microspheres, sodium chloride flush, sodium chloride, acetaminophen **OR** acetaminophen, potassium chloride **OR** potassium alternative oral replacement **OR** potassium chloride, magnesium sulfate, senna    No intake or output data in the 24 hours ending 04/24/23 1626    Exam:    BP (!) 178/92   Pulse 67   Temp 98.2 °F (36.8 °C) (Oral)   Resp 18   Ht 5' 7\" (1.702 m)   SpO2 100%   BMI 20.74 kg/m²       Gen:  WELL DEVELOPED  HEENT: NC/AT, moist mucous membranes, no oropharyngeal erythema or exudate  Neck: supple, trachea midline, no anterior cervical or SC LAD  Heart:  Normal s1/s2, RRR,  Lungs:  CTA  bilaterally,  Abd: bowel sounds present, soft, nontender, nondistended, no
Orthostatic BP obtained and charted in flowsheets.   Viry Villatoro RN
Physician Progress Note      PATIENT:               Paul Cardenas  CSN #:                  [id number]  :                       [id number]  ADMIT DATE:       2023 7:38 PM  [address] DATE:        2023 5:25 PM  RESPONDING  PROVIDER #:        Edgard Grier DO          QUERY TEXT:    Patient admitted with syncope. Noted to have bradyardia. EP consult also notes   unclear if from vasovagal or SND. If possible, please document in progress   notes and discharge summary after study the etiology of the syncope: The medical record reflects the following:  Risk Factors: bradycardia, vasovagal, SND  Clinical Indicators: EP consult notes \"Syncope - Recurrent syncope. - Unclear   etiology whether from vasovagal or cardiac syncope from SND. Sinus bradycardia   - Likely underlying sinus node dysfunction. \"  Treatment: EP consult, continue Amio, telemetry, DC with ZIO XT monitor,   consider Loop recorder    Thank you,  Felix Malagon RN, BSN, CRCR, Clinical Documentation Improvement  Options provided:  -- Syncope due to Sinus Node Dysfunction  -- Syncope due to sinus Bradycardia  -- Syncope from Vasovagal  -- Other - I will add my own diagnosis  -- Disagree - Not applicable / Not valid  -- Disagree - Clinically unable to determine / Unknown  -- Refer to Clinical Documentation Reviewer    PROVIDER RESPONSE TEXT:    The syncope is due to sinus bradycardia.     Query created by: Fabrice Brian on 2023 12:15 PM      Electronically signed by:  Edgard Grier DO 2023 2:43 PM
Voicemail left for Dr. Ro Howe patients blood pressure. No call back at this time.
Functional transfer/mobility training/DME recommendations for increased independence, safety, and fall prevention  * Patient/Family education to increase follow through with safety techniques and functional independence  * Recommendation of environmental modifications for increased safety with functional transfers/mobility and ADLs  * Cognitive retraining/development of therapeutic activities to improve problem solving, judgement, memory, and attention for increased safety/participation in ADL/IADL tasks  * Therapeutic exercise to improve motor endurance, ROM, and functional strength for ADLs/functional transfers  * Therapeutic activities to facilitate/challenge dynamic balance, stand tolerance for increased safety and independence with ADLs  * Therapeutic activities to facilitate gross/fine motor skills for increased independence with ADLs  * Neuro-muscular re-education: facilitation of righting/equilibrium reactions, midline orientation, scapular stability/mobility, normalization of muscle tone, and facilitation of volitional active controled movement    Recommended Adaptive Equipment:  TBD     Home Living: Pt lives with son in a 1 story home with 2 ADELE and 1 hand rail. Bathroom setup: tub/shower combo    Equipment owned: cane    Prior Level of Function: mod I with ADLs , mod I with IADLs; ambulated with cane   Driving: no   Occupation: na    Pain Level: Pt reported abdominal discomfort (did not quantify); Therapist facilitated repositioning to address pain      Cognition: A&O: x3;  Follows 1 step directions   Memory:  fair   Sequencing:  fair-   Problem solving:  fair-   Judgement/safety:  fair-     Functional Assessment:  AM-PAC Daily Activity Raw Score: 14/24   Initial Eval Status  Date: 4/24/23 Treatment Status  Date: STGs = LTGs  Time frame: 10-14 days   Feeding Independent      Grooming Minimal Assist   Modified [place]    UB Dressing Minimal Assist   Modified [place]    LB Dressing Dependent
supple, trachea midline, no anterior cervical or SC LAD  Heart:  Normal s1/s2, RRR,  Lungs:  CTA  bilaterally,  Abd: bowel sounds present, soft, nontender, nondistended, no masses  Extrem:  No clubbing, cyanosis,  NO edema  Skin: no rashes or lesions  Psych: Not oriented to date or Not oriented to place  Neuro: grossly intact, moves all four extremities. Labs:   No results for input(s): WBC, HGB, HCT, PLT in the last 72 hours. Recent Labs     04/24/23  0509 04/25/23  0444 04/26/23  0534    137 143   K 3.9 4.0 3.6   * 108* 113*   CO2 24 20* 19*   BUN 7 8 9   CREATININE 0.8 0.8 0.8   CALCIUM 8.8 8.9 8.3*     No results for input(s): AST, ALT, BILIDIR, BILITOT, ALKPHOS in the last 72 hours. No results for input(s): INR in the last 72 hours. No results for input(s): [place] in the last 72 hours. No results for input(s): AST, ALT, ALB, BILIDIR, BILITOT, ALKPHOS in the last 72 hours. No results for input(s): LACTA in the last 72 hours.   No results found for: Ashlie Ayan  No results found for: AMMONIA    Assessment:    Active Hospital Problems    Diagnosis Date Noted    Sinus bradycardia [R00.1] 04/23/2023    Syncope and collapse [R55] 11/18/2019    Bradycardia [R00.1] 10/16/2019   FECAL IMPACTION  HYPERTENSION * UNCONTROLLED)       Plan: Momo Mane    Electronically signed by Sky Berumen DO on 4/26/2023 at 4:09 PM [facility]
Modified [place]    UB Dressing Minimal Assist   Min A  To dof/don gown sitting EOB, vc for sequencing Modified [place]    LB Dressing Dependent   Mod A  To dof/don socks sitting EOB using cross leg tech Moderate Assist    Bathing Maximal Assist  Mod A  Simulated sitting and standing, increased assist for standing aspects of LB bathing due to balance deficits Moderate Assist    Toileting Dependent   Max A  For clothing management and hygiene using BSC, increased assit for posterior hygiene due to loose BM and stand balance deficits Moderate Assist    Bed Mobility  Supine to sit: Minimal Assist   Sit to supine: Moderate Assist   Supine to sit: SBA  Sit to supine: Min A Supine to sit: Stand by Assist   Sit to supine: Stand by Assist    Functional Transfers Moderate Assist      Sit to stands  Min A    Vc for safe hand placement Stand by Assist    Functional Mobility Moderate Assist      Side steps to [facility] with w/w  Min A  Using ww to take approc 3-4 steps EOB<>BSC, max cues for sequencing and safety Minimal Assist    Balance Sitting:     Static:  Min A    Dynamic:min A  Standing: mod - max A (+ posterior lean)  Sitting:     Static:  SBA    Dynamic:min A  Standing: min A     Activity Tolerance F-  F- F+   Visual/  Perceptual wfl                            Hand Dominance right    Strength ROM Additional Info:    RUE   4-/5 wfl good  and wfl FMC/dexterity noted during ADL tasks      LUE 4-/5 wfl good  and wfl FMC/dexterity noted during ADL tasks      Hearing: wfl  Sensation:wfl  Tone: wfl  Edema:none noted       Comments: Per RN, pt OK for treatment. Upon arrival pt supine in bed. ADL retraining to increase safety and indep in dressing and toileting tasks, balance and trf training to increase participation in functional mobility and standing aspects of ADLs with increased safety. Pt educated on techniques to increase independence and safety during ADLs, bed mobility, and functional transfers.  Pt would
transfers - physical assistance provided as needed during activity  Static standing - performed to promote activity tolerance and balance maintenance  Ambulation - verbal cues to facilitate proper positioning and sequencing, particularly related to foot placement; physical assistance provided as needed during activity  Skilled monitoring of vitals  Skilled positioning - patient positioned optimally to protect skin/joint integrity and promote comfort    Pt's/ family goals   1. None stated    Prognosis is good for reaching above PT goals. Patient and or family understand(s) diagnosis, prognosis, and plan of care. Yes    PHYSICAL THERAPY PLAN OF CARE:    PT POC is established based on physician order and patient diagnosis     Referring provider/PT Order:    04/22/23 0115  PT evaluation and treat  Start:  04/22/23 0115,   End:  04/22/23 0115,   ONE TIME,   Standing Count:  1 Occurrences,   R         FRANCES Sarmiento - CNP     Diagnosis:  Syncope and collapse [R55]  Sinus bradycardia [R00.1]  Closed head injury, initial encounter [S09.90XA]  Specific instructions for next treatment:  Continue to facilitate safe performance of functional mobility; progress as appropriate.     Current Treatment Recommendations:     [x] Strengthening to improve independence with functional mobility   [] ROM to improve independence with functional mobility   [x] Balance Training to improve static/dynamic balance and to reduce fall risk  [x] Endurance Training to improve activity tolerance during functional mobility   [x] Transfer Training to improve safety and independence with all functional transfers   [x] Gait Training to improve gait mechanics, endurance and assess need for appropriate assistive device  [x] Stair Training in preparation for safe discharge home and/or into the community   [x] Positioning to prevent skin breakdown and contractures  [x] Safety and Education Training   [x] Patient/Caregiver Education   [] HEP  []
regurgitation is present. Tricuspid Valve   The tricuspid valve appears structurally normal.   Physiologic and/or trace tricuspid regurgitation. RVSP is 26 mmHg. No evidence to suggest pulmonary hypertension. Aortic Valve   The aortic valve is trileaflet. Aortic valve opens well. Trace aortic regurgitation is noted. Pulmonic Valve   Pulmonic valve is structurally normal.   No evidence of pulmonic regurgitation. Pericardial Effusion   No evidence of pericardial thickening/calcification present. No evidence of pericardial effusion. Aorta   Aortic root dimension within normal limits. Miscellaneous   Normal Inferior Vena Cava diameter and respiratory variation. Conclusions      Summary   Left ventricle grossly normal in size. Mild left ventricular concentric hypertrophy noted. Global hypokinesis is noted to be moderate. Estimated left ventricular ejection fraction is 38±5%. There is doppler evidence of stage I diastolic dysfunction. Estimated wedge pressure is 11 mmHg. Interatrial septum appears aneurysmal.   Right ventricle global systolic function is mildly reduced . TAPSE is normal.   Physiologic and/or trace mitral regurgitation is present. Trace aortic regurgitation is noted. Physiologic and/or trace tricuspid regurgitation. RVSP is 26 mmHg. No evidence to suggest pulmonary hypertension. Technically fair quality study. Compared to prior echo, no significant changes noted. Suggest clinical correlation. Signature      ----------------------------------------------------------------   Electronically signed by Meliton Lacy DO(Interpreting   physician) on 08/31/2021 09:50 PM   ----------------------------------------------------------------    Cardiac Catheterization 11/12/19:   SCAI AUC:  Indication 4, score of 8. PRESSURES:  /89, mean 120, /14. SUMMARY:  I. Injections. II. Selective coronary arteriogram:  a.   Right
ascending and descending thoracic aorta  are tortuous with mild ectasia noted of the proximal ascending aorta. No discrete evidence of dissection. The aortic valve appears trileaflet  in morphology. CONCLUSIONS:  1. 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 2.  Below normal left ventricular systolic function. 3.  Ectasia of thoracic aorta. CONTRAST UTILIZED:  113 mL. FLUOROSCOPY TIME:  5.2 minutes. INITIATION OF CONSCIOUS SEDATION:  16:30.     COMPLETION OF CONSCIOUS SEDATION:  16:45. ESTIMATED BLOOD LOSS:  Less than 5 mL. DEXTER NESS,      Stress Test 8/30/21:   FINDINGS: The overall quality of the study was good. Left ventricular cavity size was noted to be normal.  mL       Rotational analog analysis demonstrated moderate attenuation secondary   to increased GI uptake as well as left lateral soft tissue artifact. The gated SPECT stress imaging in the short, vertical long, and   horizontal long axes demonstrate a small-moderate area inferolateral   basal septal perfusion deficit following pharmacologic stress with   improvement at rest which appears physiologically artifactual in its   distribution. Otherwise no areas of significant stress-induced   ischemia noted. Gated SPECT left ventricular ejection fraction was calculated to be   38%, with moderate global hypokinesis           Impression   1. Small-moderate basal inferoseptal stress perfusion deficit with   improvement at rest but with significant artifact noted. Suspect   abnormality secondary to attenuation rather than physiologic ischemia. 2. Mild-moderate global decrease in myocardial segmental wall motion   and calculated gated SPECT left ventricular ejection fraction of 38%. 3. Suggest clinical correlation as warranted. [facility] [place] have independently reviewed all of the ECGs and rhythm strips per above

## 2023-05-09 ENCOUNTER — HOSPITAL ENCOUNTER (INPATIENT)
Age: 88
LOS: 4 days | Discharge: HOME | DRG: 386 | End: 2023-05-14
Attending: EMERGENCY MEDICINE | Admitting: INTERNAL MEDICINE
Payer: COMMERCIAL

## 2023-05-09 ENCOUNTER — APPOINTMENT (OUTPATIENT)
Dept: GENERAL RADIOLOGY | Age: 88
DRG: 386 | End: 2023-05-09
Payer: COMMERCIAL

## 2023-05-09 DIAGNOSIS — N39.0 UTI (URINARY TRACT INFECTION), BACTERIAL: ICD-10-CM

## 2023-05-09 DIAGNOSIS — K56.41 FECAL IMPACTION (HCC): ICD-10-CM

## 2023-05-09 DIAGNOSIS — R55 SYNCOPE, UNSPECIFIED SYNCOPE TYPE: ICD-10-CM

## 2023-05-09 DIAGNOSIS — A49.9 UTI (URINARY TRACT INFECTION), BACTERIAL: ICD-10-CM

## 2023-05-09 DIAGNOSIS — K52.9 PROCTOCOLITIS: Primary | ICD-10-CM

## 2023-05-09 DIAGNOSIS — R10.84 GENERALIZED ABDOMINAL PAIN: ICD-10-CM

## 2023-05-09 LAB
ALBUMIN SERPL-MCNC: 3.7 G/DL (ref 3.5–5.2)
ALP SERPL-CCNC: 85 U/L (ref 35–104)
ALT SERPL-CCNC: 15 U/L (ref 0–32)
ANION GAP SERPL CALCULATED.3IONS-SCNC: 9 MMOL/L (ref 7–16)
AST SERPL-CCNC: 20 U/L (ref 0–31)
BASOPHILS # BLD: 0.02 E9/L (ref 0–0.2)
BASOPHILS NFR BLD: 0.3 % (ref 0–2)
BILIRUB SERPL-MCNC: 0.7 MG/DL (ref 0–1.2)
BUN SERPL-MCNC: 9 MG/DL (ref 6–23)
CALCIUM SERPL-MCNC: 9.3 MG/DL (ref 8.6–10.2)
CHLORIDE SERPL-SCNC: 109 MMOL/L (ref 98–107)
CO2 SERPL-SCNC: 26 MMOL/L (ref 22–29)
CREAT SERPL-MCNC: 1 MG/DL (ref 0.5–1)
EOSINOPHIL # BLD: 0.04 E9/L (ref 0.05–0.5)
EOSINOPHIL NFR BLD: 0.5 % (ref 0–6)
ERYTHROCYTE [DISTWIDTH] IN BLOOD BY AUTOMATED COUNT: 14.7 FL (ref 11.5–15)
GLUCOSE SERPL-MCNC: 132 MG/DL (ref 74–99)
HCT VFR BLD AUTO: 45.6 % (ref 34–48)
HGB BLD-MCNC: 14.6 G/DL (ref 11.5–15.5)
IMM GRANULOCYTES # BLD: 0.04 E9/L
IMM GRANULOCYTES NFR BLD: 0.5 % (ref 0–5)
LACTATE BLDV-SCNC: 2.1 MMOL/L (ref 0.5–2.2)
LIPASE: 8 U/L (ref 13–60)
LYMPHOCYTES # BLD: 1.03 E9/L (ref 1.5–4)
LYMPHOCYTES NFR BLD: 13.2 % (ref 20–42)
MAGNESIUM SERPL-MCNC: 2.3 MG/DL (ref 1.6–2.6)
MCH RBC QN AUTO: 30.2 PG (ref 26–35)
MCHC RBC AUTO-ENTMCNC: 32 % (ref 32–34.5)
MCV RBC AUTO: 94.2 FL (ref 80–99.9)
MONOCYTES # BLD: 0.63 E9/L (ref 0.1–0.95)
MONOCYTES NFR BLD: 8.1 % (ref 2–12)
NEUTROPHILS # BLD: 6.06 E9/L (ref 1.8–7.3)
NEUTS SEG NFR BLD: 77.4 % (ref 43–80)
PLATELET # BLD AUTO: 217 E9/L (ref 130–450)
PMV BLD AUTO: 10 FL (ref 7–12)
POTASSIUM SERPL-SCNC: 4.2 MMOL/L (ref 3.5–5)
PROT SERPL-MCNC: 7 G/DL (ref 6.4–8.3)
RBC # BLD AUTO: 4.84 E12/L (ref 3.5–5.5)
SODIUM SERPL-SCNC: 144 MMOL/L (ref 132–146)
TROPONIN, HIGH SENSITIVITY: 16 NG/L (ref 0–9)
WBC # BLD: 7.8 E9/L (ref 4.5–11.5)

## 2023-05-09 PROCEDURE — 6360000002 HC RX W HCPCS: Performed by: STUDENT IN AN ORGANIZED HEALTH CARE EDUCATION/TRAINING PROGRAM

## 2023-05-09 PROCEDURE — 80053 COMPREHEN METABOLIC PANEL: CPT

## 2023-05-09 PROCEDURE — 71045 X-RAY EXAM CHEST 1 VIEW: CPT

## 2023-05-09 PROCEDURE — 84484 ASSAY OF TROPONIN QUANT: CPT

## 2023-05-09 PROCEDURE — 96374 THER/PROPH/DIAG INJ IV PUSH: CPT

## 2023-05-09 PROCEDURE — 83735 ASSAY OF MAGNESIUM: CPT

## 2023-05-09 PROCEDURE — 93005 ELECTROCARDIOGRAM TRACING: CPT | Performed by: EMERGENCY MEDICINE

## 2023-05-09 PROCEDURE — 85025 COMPLETE CBC W/AUTO DIFF WBC: CPT

## 2023-05-09 PROCEDURE — 83605 ASSAY OF LACTIC ACID: CPT

## 2023-05-09 PROCEDURE — 99285 EMERGENCY DEPT VISIT HI MDM: CPT

## 2023-05-09 PROCEDURE — 83690 ASSAY OF LIPASE: CPT

## 2023-05-09 RX ORDER — ONDANSETRON 2 MG/ML
4 INJECTION INTRAMUSCULAR; INTRAVENOUS ONCE
Status: COMPLETED | OUTPATIENT
Start: 2023-05-09 | End: 2023-05-09

## 2023-05-09 RX ADMIN — ONDANSETRON 4 MG: 2 INJECTION INTRAMUSCULAR; INTRAVENOUS at 22:52

## 2023-05-09 ASSESSMENT — PAIN - FUNCTIONAL ASSESSMENT: PAIN_FUNCTIONAL_ASSESSMENT: 0-10

## 2023-05-09 ASSESSMENT — PAIN DESCRIPTION - ONSET: ONSET: ON-GOING

## 2023-05-09 ASSESSMENT — PAIN DESCRIPTION - FREQUENCY: FREQUENCY: CONTINUOUS

## 2023-05-09 ASSESSMENT — PAIN DESCRIPTION - LOCATION: LOCATION: CHEST

## 2023-05-09 ASSESSMENT — PAIN DESCRIPTION - DESCRIPTORS: DESCRIPTORS: DISCOMFORT

## 2023-05-09 ASSESSMENT — PAIN DESCRIPTION - PAIN TYPE: TYPE: ACUTE PAIN

## 2023-05-10 ENCOUNTER — APPOINTMENT (OUTPATIENT)
Dept: CT IMAGING | Age: 88
DRG: 386 | End: 2023-05-10
Payer: COMMERCIAL

## 2023-05-10 PROBLEM — K52.9 PROCTOCOLITIS: Status: ACTIVE | Noted: 2023-05-10

## 2023-05-10 LAB
AMORPH SED URNS QL MICRO: ABNORMAL
ANION GAP SERPL CALCULATED.3IONS-SCNC: 12 MMOL/L (ref 7–16)
BACTERIA URNS QL MICRO: ABNORMAL /HPF
BASOPHILS # BLD: 0.03 E9/L (ref 0–0.2)
BASOPHILS NFR BLD: 0.3 % (ref 0–2)
BILIRUB UR QL STRIP: NEGATIVE
BUN SERPL-MCNC: 8 MG/DL (ref 6–23)
CALCIUM SERPL-MCNC: 9.5 MG/DL (ref 8.6–10.2)
CHLORIDE SERPL-SCNC: 104 MMOL/L (ref 98–107)
CLARITY UR: ABNORMAL
CO2 SERPL-SCNC: 21 MMOL/L (ref 22–29)
COLOR UR: YELLOW
CREAT SERPL-MCNC: 0.9 MG/DL (ref 0.5–1)
EKG ATRIAL RATE: 54 BPM
EKG P AXIS: 32 DEGREES
EKG P-R INTERVAL: 184 MS
EKG Q-T INTERVAL: 510 MS
EKG QRS DURATION: 126 MS
EKG QTC CALCULATION (BAZETT): 483 MS
EKG R AXIS: -52 DEGREES
EKG T AXIS: 16 DEGREES
EKG VENTRICULAR RATE: 54 BPM
EOSINOPHIL # BLD: 0.03 E9/L (ref 0.05–0.5)
EOSINOPHIL NFR BLD: 0.3 % (ref 0–6)
EPI CELLS #/AREA URNS HPF: ABNORMAL /HPF
ERYTHROCYTE [DISTWIDTH] IN BLOOD BY AUTOMATED COUNT: 14.8 FL (ref 11.5–15)
GLUCOSE SERPL-MCNC: 113 MG/DL (ref 74–99)
GLUCOSE UR STRIP-MCNC: NEGATIVE MG/DL
HCT VFR BLD AUTO: 40.6 % (ref 34–48)
HGB BLD-MCNC: 13.1 G/DL (ref 11.5–15.5)
HGB UR QL STRIP: ABNORMAL
IMM GRANULOCYTES # BLD: 0.03 E9/L
IMM GRANULOCYTES NFR BLD: 0.3 % (ref 0–5)
KETONES UR STRIP-MCNC: NEGATIVE MG/DL
LEUKOCYTE ESTERASE UR QL STRIP: NEGATIVE
LYMPHOCYTES # BLD: 1.07 E9/L (ref 1.5–4)
LYMPHOCYTES NFR BLD: 12.2 % (ref 20–42)
MAGNESIUM SERPL-MCNC: 2.4 MG/DL (ref 1.6–2.6)
MCH RBC QN AUTO: 30.5 PG (ref 26–35)
MCHC RBC AUTO-ENTMCNC: 32.3 % (ref 32–34.5)
MCV RBC AUTO: 94.4 FL (ref 80–99.9)
MONOCYTES # BLD: 0.8 E9/L (ref 0.1–0.95)
MONOCYTES NFR BLD: 9.1 % (ref 2–12)
NEUTROPHILS # BLD: 6.8 E9/L (ref 1.8–7.3)
NEUTS SEG NFR BLD: 77.8 % (ref 43–80)
NITRITE UR QL STRIP: NEGATIVE
PH UR STRIP: 8 [PH] (ref 5–9)
PLATELET # BLD AUTO: 198 E9/L (ref 130–450)
PMV BLD AUTO: 10.1 FL (ref 7–12)
POTASSIUM SERPL-SCNC: 4 MMOL/L (ref 3.5–5)
PROT UR STRIP-MCNC: NEGATIVE MG/DL
RBC # BLD AUTO: 4.3 E12/L (ref 3.5–5.5)
RBC #/AREA URNS HPF: ABNORMAL /HPF (ref 0–2)
SODIUM SERPL-SCNC: 137 MMOL/L (ref 132–146)
SP GR UR STRIP: 1.01 (ref 1–1.03)
T3FREE SERPL-MCNC: 1.8 PG/ML (ref 2–4.4)
T4 FREE SERPL-MCNC: 1.97 NG/DL (ref 0.93–1.7)
TROPONIN, HIGH SENSITIVITY: 15 NG/L (ref 0–9)
TROPONIN, HIGH SENSITIVITY: 19 NG/L (ref 0–9)
TSH SERPL-MCNC: 2.49 UIU/ML (ref 0.27–4.2)
UROBILINOGEN UR STRIP-ACNC: 1 E.U./DL
WBC # BLD: 8.8 E9/L (ref 4.5–11.5)
WBC #/AREA URNS HPF: ABNORMAL /HPF (ref 0–5)

## 2023-05-10 PROCEDURE — 6360000004 HC RX CONTRAST MEDICATION: Performed by: RADIOLOGY

## 2023-05-10 PROCEDURE — 2500000003 HC RX 250 WO HCPCS: Performed by: STUDENT IN AN ORGANIZED HEALTH CARE EDUCATION/TRAINING PROGRAM

## 2023-05-10 PROCEDURE — 2140000000 HC CCU INTERMEDIATE R&B

## 2023-05-10 PROCEDURE — 80048 BASIC METABOLIC PNL TOTAL CA: CPT

## 2023-05-10 PROCEDURE — 74177 CT ABD & PELVIS W/CONTRAST: CPT

## 2023-05-10 PROCEDURE — 87088 URINE BACTERIA CULTURE: CPT

## 2023-05-10 PROCEDURE — 6370000000 HC RX 637 (ALT 250 FOR IP): Performed by: INTERNAL MEDICINE

## 2023-05-10 PROCEDURE — 85025 COMPLETE CBC W/AUTO DIFF WBC: CPT

## 2023-05-10 PROCEDURE — 2580000003 HC RX 258: Performed by: STUDENT IN AN ORGANIZED HEALTH CARE EDUCATION/TRAINING PROGRAM

## 2023-05-10 PROCEDURE — 83735 ASSAY OF MAGNESIUM: CPT

## 2023-05-10 PROCEDURE — 36415 COLL VENOUS BLD VENIPUNCTURE: CPT

## 2023-05-10 PROCEDURE — 84439 ASSAY OF FREE THYROXINE: CPT

## 2023-05-10 PROCEDURE — 2500000003 HC RX 250 WO HCPCS: Performed by: SURGERY

## 2023-05-10 PROCEDURE — 84481 FREE ASSAY (FT-3): CPT

## 2023-05-10 PROCEDURE — 97535 SELF CARE MNGMENT TRAINING: CPT

## 2023-05-10 PROCEDURE — 6370000000 HC RX 637 (ALT 250 FOR IP): Performed by: NURSE PRACTITIONER

## 2023-05-10 PROCEDURE — 97530 THERAPEUTIC ACTIVITIES: CPT

## 2023-05-10 PROCEDURE — 2580000003 HC RX 258: Performed by: NURSE PRACTITIONER

## 2023-05-10 PROCEDURE — 84484 ASSAY OF TROPONIN QUANT: CPT

## 2023-05-10 PROCEDURE — 87186 SC STD MICRODIL/AGAR DIL: CPT

## 2023-05-10 PROCEDURE — 84443 ASSAY THYROID STIM HORMONE: CPT

## 2023-05-10 PROCEDURE — 93010 ELECTROCARDIOGRAM REPORT: CPT | Performed by: INTERNAL MEDICINE

## 2023-05-10 PROCEDURE — 6360000002 HC RX W HCPCS: Performed by: SURGERY

## 2023-05-10 PROCEDURE — 97161 PT EVAL LOW COMPLEX 20 MIN: CPT

## 2023-05-10 PROCEDURE — 72125 CT NECK SPINE W/O DYE: CPT

## 2023-05-10 PROCEDURE — 2500000003 HC RX 250 WO HCPCS: Performed by: NURSE PRACTITIONER

## 2023-05-10 PROCEDURE — 97165 OT EVAL LOW COMPLEX 30 MIN: CPT

## 2023-05-10 PROCEDURE — 87040 BLOOD CULTURE FOR BACTERIA: CPT

## 2023-05-10 PROCEDURE — 6360000002 HC RX W HCPCS: Performed by: STUDENT IN AN ORGANIZED HEALTH CARE EDUCATION/TRAINING PROGRAM

## 2023-05-10 PROCEDURE — 81001 URINALYSIS AUTO W/SCOPE: CPT

## 2023-05-10 PROCEDURE — 70450 CT HEAD/BRAIN W/O DYE: CPT

## 2023-05-10 RX ORDER — METOCLOPRAMIDE HYDROCHLORIDE 5 MG/ML
5 INJECTION INTRAMUSCULAR; INTRAVENOUS EVERY 12 HOURS
Status: DISCONTINUED | OUTPATIENT
Start: 2023-05-10 | End: 2023-05-14 | Stop reason: HOSPADM

## 2023-05-10 RX ORDER — AMIODARONE HYDROCHLORIDE 200 MG/1
100 TABLET ORAL DAILY
Status: DISCONTINUED | OUTPATIENT
Start: 2023-05-10 | End: 2023-05-13

## 2023-05-10 RX ORDER — PANTOPRAZOLE SODIUM 40 MG/1
40 TABLET, DELAYED RELEASE ORAL
Status: DISCONTINUED | OUTPATIENT
Start: 2023-05-10 | End: 2023-05-14 | Stop reason: HOSPADM

## 2023-05-10 RX ORDER — DEXTROSE, SODIUM CHLORIDE, AND POTASSIUM CHLORIDE 5; .45; .15 G/100ML; G/100ML; G/100ML
INJECTION INTRAVENOUS CONTINUOUS
Status: DISCONTINUED | OUTPATIENT
Start: 2023-05-10 | End: 2023-05-14 | Stop reason: HOSPADM

## 2023-05-10 RX ORDER — HYDRALAZINE HYDROCHLORIDE 20 MG/ML
10 INJECTION INTRAMUSCULAR; INTRAVENOUS EVERY 6 HOURS PRN
Status: DISCONTINUED | OUTPATIENT
Start: 2023-05-10 | End: 2023-05-14 | Stop reason: HOSPADM

## 2023-05-10 RX ORDER — LACTULOSE 10 G/15ML
20 SOLUTION ORAL NIGHTLY
Status: DISCONTINUED | OUTPATIENT
Start: 2023-05-10 | End: 2023-05-10

## 2023-05-10 RX ORDER — METRONIDAZOLE 500 MG/100ML
500 INJECTION, SOLUTION INTRAVENOUS EVERY 8 HOURS
Status: DISCONTINUED | OUTPATIENT
Start: 2023-05-10 | End: 2023-05-14 | Stop reason: HOSPADM

## 2023-05-10 RX ORDER — LACTULOSE 10 G/15ML
20 SOLUTION ORAL 2 TIMES DAILY
Status: DISCONTINUED | OUTPATIENT
Start: 2023-05-10 | End: 2023-05-14 | Stop reason: HOSPADM

## 2023-05-10 RX ORDER — ACETAMINOPHEN 650 MG/1
650 SUPPOSITORY RECTAL EVERY 6 HOURS PRN
Status: DISCONTINUED | OUTPATIENT
Start: 2023-05-10 | End: 2023-05-14 | Stop reason: HOSPADM

## 2023-05-10 RX ORDER — OMEPRAZOLE 20 MG/1
40 CAPSULE, DELAYED RELEASE ORAL DAILY
Status: DISCONTINUED | OUTPATIENT
Start: 2023-05-10 | End: 2023-05-10 | Stop reason: CLARIF

## 2023-05-10 RX ORDER — DOCUSATE SODIUM 100 MG/1
100 CAPSULE, LIQUID FILLED ORAL DAILY
Status: DISCONTINUED | OUTPATIENT
Start: 2023-05-10 | End: 2023-05-14 | Stop reason: HOSPADM

## 2023-05-10 RX ORDER — METRONIDAZOLE 500 MG/100ML
500 INJECTION, SOLUTION INTRAVENOUS ONCE
Status: COMPLETED | OUTPATIENT
Start: 2023-05-10 | End: 2023-05-10

## 2023-05-10 RX ORDER — SODIUM CHLORIDE 0.9 % (FLUSH) 0.9 %
5-40 SYRINGE (ML) INJECTION EVERY 12 HOURS SCHEDULED
Status: DISCONTINUED | OUTPATIENT
Start: 2023-05-10 | End: 2023-05-14 | Stop reason: HOSPADM

## 2023-05-10 RX ORDER — SODIUM CHLORIDE 0.9 % (FLUSH) 0.9 %
5-40 SYRINGE (ML) INJECTION PRN
Status: DISCONTINUED | OUTPATIENT
Start: 2023-05-10 | End: 2023-05-14 | Stop reason: HOSPADM

## 2023-05-10 RX ORDER — ACETAMINOPHEN 325 MG/1
650 TABLET ORAL EVERY 6 HOURS PRN
Status: DISCONTINUED | OUTPATIENT
Start: 2023-05-10 | End: 2023-05-14 | Stop reason: HOSPADM

## 2023-05-10 RX ORDER — LOSARTAN POTASSIUM 50 MG/1
50 TABLET ORAL 2 TIMES DAILY
Status: DISCONTINUED | OUTPATIENT
Start: 2023-05-10 | End: 2023-05-13

## 2023-05-10 RX ORDER — SODIUM CHLORIDE 9 MG/ML
INJECTION, SOLUTION INTRAVENOUS PRN
Status: DISCONTINUED | OUTPATIENT
Start: 2023-05-10 | End: 2023-05-14 | Stop reason: HOSPADM

## 2023-05-10 RX ORDER — METOCLOPRAMIDE HYDROCHLORIDE 5 MG/ML
5 INJECTION INTRAMUSCULAR; INTRAVENOUS EVERY 6 HOURS PRN
Status: DISCONTINUED | OUTPATIENT
Start: 2023-05-10 | End: 2023-05-10

## 2023-05-10 RX ORDER — BISACODYL 10 MG
10 SUPPOSITORY, RECTAL RECTAL DAILY PRN
Status: DISCONTINUED | OUTPATIENT
Start: 2023-05-10 | End: 2023-05-10

## 2023-05-10 RX ADMIN — APIXABAN 2.5 MG: 2.5 TABLET, FILM COATED ORAL at 11:16

## 2023-05-10 RX ADMIN — LACTULOSE 20 G: 20 SOLUTION ORAL at 11:17

## 2023-05-10 RX ADMIN — METRONIDAZOLE 500 MG: 500 INJECTION, SOLUTION INTRAVENOUS at 11:19

## 2023-05-10 RX ADMIN — IOPAMIDOL 75 ML: 755 INJECTION, SOLUTION INTRAVENOUS at 00:39

## 2023-05-10 RX ADMIN — LACTULOSE 20 G: 20 SOLUTION ORAL at 21:52

## 2023-05-10 RX ADMIN — LOSARTAN POTASSIUM 50 MG: 50 TABLET, FILM COATED ORAL at 11:17

## 2023-05-10 RX ADMIN — METRONIDAZOLE 500 MG: 500 INJECTION, SOLUTION INTRAVENOUS at 18:51

## 2023-05-10 RX ADMIN — APIXABAN 2.5 MG: 2.5 TABLET, FILM COATED ORAL at 21:51

## 2023-05-10 RX ADMIN — POTASSIUM CHLORIDE, DEXTROSE MONOHYDRATE AND SODIUM CHLORIDE: 150; 5; 450 INJECTION, SOLUTION INTRAVENOUS at 16:10

## 2023-05-10 RX ADMIN — CEFTRIAXONE SODIUM 1000 MG: 1 INJECTION, POWDER, FOR SOLUTION INTRAMUSCULAR; INTRAVENOUS at 02:47

## 2023-05-10 RX ADMIN — AMIODARONE HYDROCHLORIDE 100 MG: 200 TABLET ORAL at 11:17

## 2023-05-10 RX ADMIN — SODIUM CHLORIDE, PRESERVATIVE FREE 10 ML: 5 INJECTION INTRAVENOUS at 21:52

## 2023-05-10 RX ADMIN — METOCLOPRAMIDE 5 MG: 5 INJECTION, SOLUTION INTRAMUSCULAR; INTRAVENOUS at 16:05

## 2023-05-10 RX ADMIN — METRONIDAZOLE 500 MG: 500 INJECTION, SOLUTION INTRAVENOUS at 02:51

## 2023-05-10 RX ADMIN — DOCUSATE SODIUM 100 MG: 100 CAPSULE, LIQUID FILLED ORAL at 11:16

## 2023-05-10 RX ADMIN — LOSARTAN POTASSIUM 50 MG: 50 TABLET, FILM COATED ORAL at 21:51

## 2023-05-10 ASSESSMENT — PAIN SCALES - GENERAL
PAINLEVEL_OUTOF10: 0

## 2023-05-11 LAB
AMMONIA PLAS-SCNC: 28 UMOL/L (ref 11–51)
B.E.: -1.5 MMOL/L (ref -3–3)
BASOPHILS # BLD: 0.04 E9/L (ref 0–0.2)
BASOPHILS NFR BLD: 0.6 % (ref 0–2)
COHB: 1.1 % (ref 0–1.5)
COMMENT: ABNORMAL
CRITICAL: ABNORMAL
DATE ANALYZED: ABNORMAL
DATE OF COLLECTION: ABNORMAL
EOSINOPHIL # BLD: 0.09 E9/L (ref 0.05–0.5)
EOSINOPHIL NFR BLD: 1.3 % (ref 0–6)
ERYTHROCYTE [DISTWIDTH] IN BLOOD BY AUTOMATED COUNT: 14.9 FL (ref 11.5–15)
HCO3: 22.6 MMOL/L (ref 22–26)
HCT VFR BLD AUTO: 41.7 % (ref 34–48)
HGB BLD-MCNC: 13.4 G/DL (ref 11.5–15.5)
HHB: 4.9 % (ref 0–5)
IMM GRANULOCYTES # BLD: 0.02 E9/L
IMM GRANULOCYTES NFR BLD: 0.3 % (ref 0–5)
LAB: ABNORMAL
LYMPHOCYTES # BLD: 2.38 E9/L (ref 1.5–4)
LYMPHOCYTES NFR BLD: 33.4 % (ref 20–42)
Lab: ABNORMAL
MAGNESIUM SERPL-MCNC: 2.3 MG/DL (ref 1.6–2.6)
MCH RBC QN AUTO: 30.3 PG (ref 26–35)
MCHC RBC AUTO-ENTMCNC: 32.1 % (ref 32–34.5)
MCV RBC AUTO: 94.3 FL (ref 80–99.9)
METER GLUCOSE: 130 MG/DL (ref 74–99)
METHB: 0.3 % (ref 0–1.5)
MODE: ABNORMAL
MONOCYTES # BLD: 0.74 E9/L (ref 0.1–0.95)
MONOCYTES NFR BLD: 10.4 % (ref 2–12)
NEUTROPHILS # BLD: 3.86 E9/L (ref 1.8–7.3)
NEUTS SEG NFR BLD: 54 % (ref 43–80)
O2 CONTENT: 18.3 ML/DL
O2 SATURATION: 95 % (ref 92–98.5)
O2HB: 93.7 % (ref 94–97)
OPERATOR ID: 1222
PATIENT TEMP: 37 C
PCO2: 36 MMHG (ref 35–45)
PH BLOOD GAS: 7.42 (ref 7.35–7.45)
PLATELET # BLD AUTO: 212 E9/L (ref 130–450)
PMV BLD AUTO: 9.8 FL (ref 7–12)
PO2: 75.5 MMHG (ref 75–100)
POTASSIUM SERPL-SCNC: 3.26 MMOL/L (ref 3.5–5)
RBC # BLD AUTO: 4.42 E12/L (ref 3.5–5.5)
REASON FOR REJECTION: NORMAL
REJECTED TEST: NORMAL
SOURCE, BLOOD GAS: ABNORMAL
THB: 13.9 G/DL (ref 11.5–16.5)
TIME ANALYZED: 1629
TSH SERPL-MCNC: 5.77 UIU/ML (ref 0.27–4.2)
WBC # BLD: 7.1 E9/L (ref 4.5–11.5)

## 2023-05-11 PROCEDURE — 84132 ASSAY OF SERUM POTASSIUM: CPT

## 2023-05-11 PROCEDURE — 6370000000 HC RX 637 (ALT 250 FOR IP): Performed by: INTERNAL MEDICINE

## 2023-05-11 PROCEDURE — 2140000000 HC CCU INTERMEDIATE R&B

## 2023-05-11 PROCEDURE — 82805 BLOOD GASES W/O2 SATURATION: CPT

## 2023-05-11 PROCEDURE — 82962 GLUCOSE BLOOD TEST: CPT

## 2023-05-11 PROCEDURE — 6360000002 HC RX W HCPCS: Performed by: NURSE PRACTITIONER

## 2023-05-11 PROCEDURE — 2500000003 HC RX 250 WO HCPCS: Performed by: NURSE PRACTITIONER

## 2023-05-11 PROCEDURE — 36415 COLL VENOUS BLD VENIPUNCTURE: CPT

## 2023-05-11 PROCEDURE — 2580000003 HC RX 258: Performed by: NURSE PRACTITIONER

## 2023-05-11 PROCEDURE — 85025 COMPLETE CBC W/AUTO DIFF WBC: CPT

## 2023-05-11 PROCEDURE — 6360000002 HC RX W HCPCS: Performed by: SURGERY

## 2023-05-11 PROCEDURE — 82140 ASSAY OF AMMONIA: CPT

## 2023-05-11 PROCEDURE — 6370000000 HC RX 637 (ALT 250 FOR IP): Performed by: NURSE PRACTITIONER

## 2023-05-11 PROCEDURE — 83735 ASSAY OF MAGNESIUM: CPT

## 2023-05-11 PROCEDURE — 84443 ASSAY THYROID STIM HORMONE: CPT

## 2023-05-11 PROCEDURE — 93005 ELECTROCARDIOGRAM TRACING: CPT | Performed by: INTERNAL MEDICINE

## 2023-05-11 RX ORDER — LACTULOSE 10 G/15ML
20 SOLUTION ORAL 2 TIMES DAILY
Qty: 1800 ML | Refills: 1 | Status: SHIPPED | OUTPATIENT
Start: 2023-05-11

## 2023-05-11 RX ORDER — METRONIDAZOLE 500 MG/1
500 TABLET ORAL 3 TIMES DAILY
Qty: 42 TABLET | Refills: 0 | Status: SHIPPED | OUTPATIENT
Start: 2023-05-11 | End: 2023-05-25

## 2023-05-11 RX ORDER — LACTULOSE 10 G/15ML
20 SOLUTION ORAL 2 TIMES DAILY
Qty: 1800 ML | Refills: 1 | Status: SHIPPED | OUTPATIENT
Start: 2023-05-11 | End: 2023-05-11 | Stop reason: SDUPTHER

## 2023-05-11 RX ORDER — CIPROFLOXACIN 250 MG/1
250 TABLET, FILM COATED ORAL 2 TIMES DAILY
Qty: 28 TABLET | Refills: 0 | Status: SHIPPED | OUTPATIENT
Start: 2023-05-11 | End: 2023-05-11 | Stop reason: SDUPTHER

## 2023-05-11 RX ORDER — METRONIDAZOLE 500 MG/1
500 TABLET ORAL 3 TIMES DAILY
Qty: 42 TABLET | Refills: 0 | Status: SHIPPED | OUTPATIENT
Start: 2023-05-11 | End: 2023-05-11 | Stop reason: SDUPTHER

## 2023-05-11 RX ORDER — CIPROFLOXACIN 250 MG/1
250 TABLET, FILM COATED ORAL 2 TIMES DAILY
Qty: 28 TABLET | Refills: 0 | Status: SHIPPED | OUTPATIENT
Start: 2023-05-11 | End: 2023-05-25

## 2023-05-11 RX ADMIN — PANTOPRAZOLE SODIUM 40 MG: 40 TABLET, DELAYED RELEASE ORAL at 06:02

## 2023-05-11 RX ADMIN — APIXABAN 2.5 MG: 2.5 TABLET, FILM COATED ORAL at 09:36

## 2023-05-11 RX ADMIN — LACTULOSE 20 G: 20 SOLUTION ORAL at 20:31

## 2023-05-11 RX ADMIN — LOSARTAN POTASSIUM 50 MG: 50 TABLET, FILM COATED ORAL at 09:36

## 2023-05-11 RX ADMIN — HYDRALAZINE HYDROCHLORIDE 10 MG: 20 INJECTION INTRAMUSCULAR; INTRAVENOUS at 02:23

## 2023-05-11 RX ADMIN — METRONIDAZOLE 500 MG: 500 INJECTION, SOLUTION INTRAVENOUS at 09:38

## 2023-05-11 RX ADMIN — LOSARTAN POTASSIUM 50 MG: 50 TABLET, FILM COATED ORAL at 20:31

## 2023-05-11 RX ADMIN — SODIUM CHLORIDE, PRESERVATIVE FREE 10 ML: 5 INJECTION INTRAVENOUS at 09:37

## 2023-05-11 RX ADMIN — METOCLOPRAMIDE 5 MG: 5 INJECTION, SOLUTION INTRAMUSCULAR; INTRAVENOUS at 15:35

## 2023-05-11 RX ADMIN — APIXABAN 2.5 MG: 2.5 TABLET, FILM COATED ORAL at 20:31

## 2023-05-11 RX ADMIN — AMIODARONE HYDROCHLORIDE 100 MG: 200 TABLET ORAL at 09:36

## 2023-05-11 RX ADMIN — DOCUSATE SODIUM 100 MG: 100 CAPSULE, LIQUID FILLED ORAL at 09:36

## 2023-05-11 RX ADMIN — LACTULOSE 20 G: 20 SOLUTION ORAL at 09:37

## 2023-05-11 RX ADMIN — METRONIDAZOLE 500 MG: 500 INJECTION, SOLUTION INTRAVENOUS at 18:36

## 2023-05-11 RX ADMIN — METOCLOPRAMIDE 5 MG: 5 INJECTION, SOLUTION INTRAMUSCULAR; INTRAVENOUS at 01:34

## 2023-05-11 RX ADMIN — METRONIDAZOLE 500 MG: 500 INJECTION, SOLUTION INTRAVENOUS at 01:36

## 2023-05-11 RX ADMIN — CEFTRIAXONE 1000 MG: 1 INJECTION, POWDER, FOR SOLUTION INTRAMUSCULAR; INTRAVENOUS at 01:34

## 2023-05-11 ASSESSMENT — PAIN SCALES - GENERAL
PAINLEVEL_OUTOF10: 0
PAINLEVEL_OUTOF10: 0

## 2023-05-12 LAB
ANION GAP SERPL CALCULATED.3IONS-SCNC: 7 MMOL/L (ref 7–16)
BACTERIA UR CULT: ABNORMAL
BUN SERPL-MCNC: 5 MG/DL (ref 6–23)
CALCIUM SERPL-MCNC: 8.7 MG/DL (ref 8.6–10.2)
CHLORIDE SERPL-SCNC: 109 MMOL/L (ref 98–107)
CO2 SERPL-SCNC: 24 MMOL/L (ref 22–29)
CREAT SERPL-MCNC: 0.9 MG/DL (ref 0.5–1)
EKG ATRIAL RATE: 55 BPM
EKG P AXIS: 52 DEGREES
EKG P-R INTERVAL: 186 MS
EKG Q-T INTERVAL: 530 MS
EKG QRS DURATION: 118 MS
EKG QTC CALCULATION (BAZETT): 507 MS
EKG R AXIS: -55 DEGREES
EKG T AXIS: -21 DEGREES
EKG VENTRICULAR RATE: 55 BPM
GLUCOSE SERPL-MCNC: 113 MG/DL (ref 74–99)
ORGANISM: ABNORMAL
POTASSIUM SERPL-SCNC: 3.9 MMOL/L (ref 3.5–5)
SODIUM SERPL-SCNC: 140 MMOL/L (ref 132–146)

## 2023-05-12 PROCEDURE — 2140000000 HC CCU INTERMEDIATE R&B

## 2023-05-12 PROCEDURE — 6360000002 HC RX W HCPCS: Performed by: SURGERY

## 2023-05-12 PROCEDURE — 2580000003 HC RX 258: Performed by: INTERNAL MEDICINE

## 2023-05-12 PROCEDURE — 2580000003 HC RX 258: Performed by: NURSE PRACTITIONER

## 2023-05-12 PROCEDURE — 2500000003 HC RX 250 WO HCPCS: Performed by: NURSE PRACTITIONER

## 2023-05-12 PROCEDURE — 97530 THERAPEUTIC ACTIVITIES: CPT

## 2023-05-12 PROCEDURE — 80048 BASIC METABOLIC PNL TOTAL CA: CPT

## 2023-05-12 PROCEDURE — 2500000003 HC RX 250 WO HCPCS: Performed by: SURGERY

## 2023-05-12 PROCEDURE — 6360000002 HC RX W HCPCS: Performed by: NURSE PRACTITIONER

## 2023-05-12 PROCEDURE — 93010 ELECTROCARDIOGRAM REPORT: CPT | Performed by: INTERNAL MEDICINE

## 2023-05-12 PROCEDURE — 6370000000 HC RX 637 (ALT 250 FOR IP): Performed by: INTERNAL MEDICINE

## 2023-05-12 PROCEDURE — 36415 COLL VENOUS BLD VENIPUNCTURE: CPT

## 2023-05-12 PROCEDURE — 6370000000 HC RX 637 (ALT 250 FOR IP): Performed by: NURSE PRACTITIONER

## 2023-05-12 PROCEDURE — 97535 SELF CARE MNGMENT TRAINING: CPT

## 2023-05-12 RX ORDER — 0.9 % SODIUM CHLORIDE 0.9 %
500 INTRAVENOUS SOLUTION INTRAVENOUS ONCE
Status: COMPLETED | OUTPATIENT
Start: 2023-05-12 | End: 2023-05-12

## 2023-05-12 RX ORDER — CALCIUM CARBONATE 200(500)MG
500 TABLET,CHEWABLE ORAL 3 TIMES DAILY PRN
Status: DISCONTINUED | OUTPATIENT
Start: 2023-05-12 | End: 2023-05-14 | Stop reason: HOSPADM

## 2023-05-12 RX ADMIN — METOCLOPRAMIDE 5 MG: 5 INJECTION, SOLUTION INTRAMUSCULAR; INTRAVENOUS at 04:59

## 2023-05-12 RX ADMIN — SODIUM CHLORIDE, PRESERVATIVE FREE 10 ML: 5 INJECTION INTRAVENOUS at 08:44

## 2023-05-12 RX ADMIN — METOCLOPRAMIDE 5 MG: 5 INJECTION, SOLUTION INTRAMUSCULAR; INTRAVENOUS at 14:03

## 2023-05-12 RX ADMIN — LACTULOSE 20 G: 20 SOLUTION ORAL at 08:33

## 2023-05-12 RX ADMIN — METRONIDAZOLE 500 MG: 500 INJECTION, SOLUTION INTRAVENOUS at 08:43

## 2023-05-12 RX ADMIN — AMIODARONE HYDROCHLORIDE 100 MG: 200 TABLET ORAL at 08:32

## 2023-05-12 RX ADMIN — LOSARTAN POTASSIUM 50 MG: 50 TABLET, FILM COATED ORAL at 21:03

## 2023-05-12 RX ADMIN — LOSARTAN POTASSIUM 50 MG: 50 TABLET, FILM COATED ORAL at 08:33

## 2023-05-12 RX ADMIN — LACTULOSE 20 G: 20 SOLUTION ORAL at 21:03

## 2023-05-12 RX ADMIN — APIXABAN 2.5 MG: 2.5 TABLET, FILM COATED ORAL at 08:33

## 2023-05-12 RX ADMIN — SODIUM CHLORIDE 500 ML: 9 INJECTION, SOLUTION INTRAVENOUS at 14:07

## 2023-05-12 RX ADMIN — METRONIDAZOLE 500 MG: 500 INJECTION, SOLUTION INTRAVENOUS at 00:33

## 2023-05-12 RX ADMIN — CEFTRIAXONE 1000 MG: 1 INJECTION, POWDER, FOR SOLUTION INTRAMUSCULAR; INTRAVENOUS at 00:29

## 2023-05-12 RX ADMIN — PANTOPRAZOLE SODIUM 40 MG: 40 TABLET, DELAYED RELEASE ORAL at 06:15

## 2023-05-12 RX ADMIN — METRONIDAZOLE 500 MG: 500 INJECTION, SOLUTION INTRAVENOUS at 16:44

## 2023-05-12 RX ADMIN — APIXABAN 2.5 MG: 2.5 TABLET, FILM COATED ORAL at 21:03

## 2023-05-12 RX ADMIN — POTASSIUM CHLORIDE, DEXTROSE MONOHYDRATE AND SODIUM CHLORIDE: 150; 5; 450 INJECTION, SOLUTION INTRAVENOUS at 00:34

## 2023-05-12 RX ADMIN — DOCUSATE SODIUM 100 MG: 100 CAPSULE, LIQUID FILLED ORAL at 08:33

## 2023-05-13 LAB
ANION GAP SERPL CALCULATED.3IONS-SCNC: 8 MMOL/L (ref 7–16)
BUN SERPL-MCNC: 4 MG/DL (ref 6–23)
CALCIUM SERPL-MCNC: 8.6 MG/DL (ref 8.6–10.2)
CHLORIDE SERPL-SCNC: 110 MMOL/L (ref 98–107)
CO2 SERPL-SCNC: 22 MMOL/L (ref 22–29)
CREAT SERPL-MCNC: 0.8 MG/DL (ref 0.5–1)
GLUCOSE SERPL-MCNC: 102 MG/DL (ref 74–99)
POTASSIUM SERPL-SCNC: 3.5 MMOL/L (ref 3.5–5)
SODIUM SERPL-SCNC: 140 MMOL/L (ref 132–146)

## 2023-05-13 PROCEDURE — 80048 BASIC METABOLIC PNL TOTAL CA: CPT

## 2023-05-13 PROCEDURE — 36415 COLL VENOUS BLD VENIPUNCTURE: CPT

## 2023-05-13 PROCEDURE — 6370000000 HC RX 637 (ALT 250 FOR IP): Performed by: INTERNAL MEDICINE

## 2023-05-13 PROCEDURE — 6360000002 HC RX W HCPCS: Performed by: SURGERY

## 2023-05-13 PROCEDURE — 6360000002 HC RX W HCPCS: Performed by: NURSE PRACTITIONER

## 2023-05-13 PROCEDURE — 99223 1ST HOSP IP/OBS HIGH 75: CPT | Performed by: INTERNAL MEDICINE

## 2023-05-13 PROCEDURE — 2500000003 HC RX 250 WO HCPCS: Performed by: NURSE PRACTITIONER

## 2023-05-13 PROCEDURE — 6370000000 HC RX 637 (ALT 250 FOR IP)

## 2023-05-13 PROCEDURE — 2580000003 HC RX 258: Performed by: NURSE PRACTITIONER

## 2023-05-13 PROCEDURE — 6370000000 HC RX 637 (ALT 250 FOR IP): Performed by: NURSE PRACTITIONER

## 2023-05-13 PROCEDURE — 2140000000 HC CCU INTERMEDIATE R&B

## 2023-05-13 RX ORDER — LOSARTAN POTASSIUM 25 MG/1
25 TABLET ORAL 2 TIMES DAILY
Status: DISCONTINUED | OUTPATIENT
Start: 2023-05-13 | End: 2023-05-13

## 2023-05-13 RX ORDER — LOSARTAN POTASSIUM 50 MG/1
50 TABLET ORAL NIGHTLY
Status: DISCONTINUED | OUTPATIENT
Start: 2023-05-14 | End: 2023-05-14 | Stop reason: HOSPADM

## 2023-05-13 RX ORDER — AMIODARONE HYDROCHLORIDE 200 MG/1
100 TABLET ORAL
Status: DISCONTINUED | OUTPATIENT
Start: 2023-05-15 | End: 2023-05-14 | Stop reason: HOSPADM

## 2023-05-13 RX ORDER — LOSARTAN POTASSIUM 25 MG/1
25 TABLET ORAL 2 TIMES DAILY
Status: COMPLETED | OUTPATIENT
Start: 2023-05-13 | End: 2023-05-13

## 2023-05-13 RX ADMIN — METRONIDAZOLE 500 MG: 500 INJECTION, SOLUTION INTRAVENOUS at 17:55

## 2023-05-13 RX ADMIN — HYDRALAZINE HYDROCHLORIDE 10 MG: 20 INJECTION INTRAMUSCULAR; INTRAVENOUS at 00:49

## 2023-05-13 RX ADMIN — CEFTRIAXONE 1000 MG: 1 INJECTION, POWDER, FOR SOLUTION INTRAMUSCULAR; INTRAVENOUS at 00:38

## 2023-05-13 RX ADMIN — DOCUSATE SODIUM 100 MG: 100 CAPSULE, LIQUID FILLED ORAL at 08:24

## 2023-05-13 RX ADMIN — ACETAMINOPHEN 650 MG: 325 TABLET, FILM COATED ORAL at 08:24

## 2023-05-13 RX ADMIN — LACTULOSE 20 G: 20 SOLUTION ORAL at 22:14

## 2023-05-13 RX ADMIN — METOCLOPRAMIDE 5 MG: 5 INJECTION, SOLUTION INTRAMUSCULAR; INTRAVENOUS at 14:36

## 2023-05-13 RX ADMIN — METRONIDAZOLE 500 MG: 500 INJECTION, SOLUTION INTRAVENOUS at 00:43

## 2023-05-13 RX ADMIN — CALCIUM CARBONATE 500 MG: 500 TABLET, CHEWABLE ORAL at 00:14

## 2023-05-13 RX ADMIN — AMIODARONE HYDROCHLORIDE 100 MG: 200 TABLET ORAL at 08:24

## 2023-05-13 RX ADMIN — LOSARTAN POTASSIUM 50 MG: 50 TABLET, FILM COATED ORAL at 08:24

## 2023-05-13 RX ADMIN — APIXABAN 2.5 MG: 2.5 TABLET, FILM COATED ORAL at 22:13

## 2023-05-13 RX ADMIN — PANTOPRAZOLE SODIUM 40 MG: 40 TABLET, DELAYED RELEASE ORAL at 05:59

## 2023-05-13 RX ADMIN — LACTULOSE 20 G: 20 SOLUTION ORAL at 08:23

## 2023-05-13 RX ADMIN — METOCLOPRAMIDE 5 MG: 5 INJECTION, SOLUTION INTRAMUSCULAR; INTRAVENOUS at 02:17

## 2023-05-13 RX ADMIN — APIXABAN 2.5 MG: 2.5 TABLET, FILM COATED ORAL at 08:24

## 2023-05-13 RX ADMIN — LOSARTAN POTASSIUM 25 MG: 25 TABLET, FILM COATED ORAL at 22:12

## 2023-05-13 RX ADMIN — METRONIDAZOLE 500 MG: 500 INJECTION, SOLUTION INTRAVENOUS at 08:25

## 2023-05-13 ASSESSMENT — PAIN DESCRIPTION - PAIN TYPE: TYPE: ACUTE PAIN

## 2023-05-13 ASSESSMENT — PAIN DESCRIPTION - DESCRIPTORS
DESCRIPTORS: ACHING
DESCRIPTORS: DISCOMFORT
DESCRIPTORS: DISCOMFORT

## 2023-05-13 ASSESSMENT — PAIN DESCRIPTION - LOCATION
LOCATION: ABDOMEN
LOCATION: ABDOMEN
LOCATION: HEAD

## 2023-05-13 ASSESSMENT — PAIN SCALES - GENERAL
PAINLEVEL_OUTOF10: 5
PAINLEVEL_OUTOF10: 5
PAINLEVEL_OUTOF10: 8
PAINLEVEL_OUTOF10: 7

## 2023-05-14 VITALS
WEIGHT: 142 LBS | HEIGHT: 67 IN | HEART RATE: 59 BPM | TEMPERATURE: 98 F | RESPIRATION RATE: 18 BRPM | DIASTOLIC BLOOD PRESSURE: 91 MMHG | OXYGEN SATURATION: 98 % | BODY MASS INDEX: 22.29 KG/M2 | SYSTOLIC BLOOD PRESSURE: 164 MMHG

## 2023-05-14 LAB
ANION GAP SERPL CALCULATED.3IONS-SCNC: 5 MMOL/L (ref 7–16)
BUN SERPL-MCNC: 4 MG/DL (ref 6–23)
CALCIUM SERPL-MCNC: 7.8 MG/DL (ref 8.6–10.2)
CHLORIDE SERPL-SCNC: 106 MMOL/L (ref 98–107)
CO2 SERPL-SCNC: 21 MMOL/L (ref 22–29)
CREAT SERPL-MCNC: 0.8 MG/DL (ref 0.5–1)
GLUCOSE SERPL-MCNC: 288 MG/DL (ref 74–99)
POTASSIUM SERPL-SCNC: 4.5 MMOL/L (ref 3.5–5)
SODIUM SERPL-SCNC: 132 MMOL/L (ref 132–146)

## 2023-05-14 PROCEDURE — 6370000000 HC RX 637 (ALT 250 FOR IP): Performed by: INTERNAL MEDICINE

## 2023-05-14 PROCEDURE — 80048 BASIC METABOLIC PNL TOTAL CA: CPT

## 2023-05-14 PROCEDURE — 6360000002 HC RX W HCPCS: Performed by: NURSE PRACTITIONER

## 2023-05-14 PROCEDURE — 6360000002 HC RX W HCPCS: Performed by: SURGERY

## 2023-05-14 PROCEDURE — 99233 SBSQ HOSP IP/OBS HIGH 50: CPT | Performed by: INTERNAL MEDICINE

## 2023-05-14 PROCEDURE — 6370000000 HC RX 637 (ALT 250 FOR IP): Performed by: NURSE PRACTITIONER

## 2023-05-14 PROCEDURE — 36415 COLL VENOUS BLD VENIPUNCTURE: CPT

## 2023-05-14 PROCEDURE — 2500000003 HC RX 250 WO HCPCS: Performed by: NURSE PRACTITIONER

## 2023-05-14 PROCEDURE — 2580000003 HC RX 258: Performed by: NURSE PRACTITIONER

## 2023-05-14 RX ORDER — AMIODARONE HYDROCHLORIDE 100 MG/1
100 TABLET ORAL
Qty: 60 TABLET | Refills: 1 | Status: SHIPPED | OUTPATIENT
Start: 2023-05-15

## 2023-05-14 RX ORDER — METRONIDAZOLE 500 MG/1
500 TABLET ORAL ONCE
Status: COMPLETED | OUTPATIENT
Start: 2023-05-14 | End: 2023-05-14

## 2023-05-14 RX ORDER — LEVOTHYROXINE SODIUM 0.05 MG/1
50 TABLET ORAL DAILY
Status: DISCONTINUED | OUTPATIENT
Start: 2023-05-15 | End: 2023-05-14 | Stop reason: HOSPADM

## 2023-05-14 RX ORDER — CIPROFLOXACIN 250 MG/1
250 TABLET, FILM COATED ORAL ONCE
Status: COMPLETED | OUTPATIENT
Start: 2023-05-14 | End: 2023-05-14

## 2023-05-14 RX ORDER — LOSARTAN POTASSIUM 50 MG/1
50 TABLET ORAL NIGHTLY
Qty: 30 TABLET | Refills: 3 | Status: SHIPPED | OUTPATIENT
Start: 2023-05-14

## 2023-05-14 RX ORDER — LEVOTHYROXINE SODIUM 0.05 MG/1
50 TABLET ORAL DAILY
Status: DISCONTINUED | OUTPATIENT
Start: 2023-05-14 | End: 2023-05-14

## 2023-05-14 RX ADMIN — METRONIDAZOLE 500 MG: 500 INJECTION, SOLUTION INTRAVENOUS at 01:55

## 2023-05-14 RX ADMIN — APIXABAN 2.5 MG: 2.5 TABLET, FILM COATED ORAL at 09:19

## 2023-05-14 RX ADMIN — CIPROFLOXACIN 250 MG: 250 TABLET, FILM COATED ORAL at 14:47

## 2023-05-14 RX ADMIN — METOCLOPRAMIDE 5 MG: 5 INJECTION, SOLUTION INTRAMUSCULAR; INTRAVENOUS at 12:35

## 2023-05-14 RX ADMIN — LACTULOSE 20 G: 20 SOLUTION ORAL at 09:19

## 2023-05-14 RX ADMIN — METRONIDAZOLE 500 MG: 500 TABLET ORAL at 14:47

## 2023-05-14 RX ADMIN — METRONIDAZOLE 500 MG: 500 INJECTION, SOLUTION INTRAVENOUS at 09:20

## 2023-05-14 RX ADMIN — METOCLOPRAMIDE 5 MG: 5 INJECTION, SOLUTION INTRAMUSCULAR; INTRAVENOUS at 01:55

## 2023-05-14 RX ADMIN — ACETAMINOPHEN 650 MG: 325 TABLET, FILM COATED ORAL at 03:54

## 2023-05-14 RX ADMIN — PANTOPRAZOLE SODIUM 40 MG: 40 TABLET, DELAYED RELEASE ORAL at 09:19

## 2023-05-14 RX ADMIN — SODIUM CHLORIDE, PRESERVATIVE FREE 10 ML: 5 INJECTION INTRAVENOUS at 09:19

## 2023-05-14 RX ADMIN — DOCUSATE SODIUM 100 MG: 100 CAPSULE, LIQUID FILLED ORAL at 09:19

## 2023-05-14 RX ADMIN — CEFTRIAXONE 1000 MG: 1 INJECTION, POWDER, FOR SOLUTION INTRAMUSCULAR; INTRAVENOUS at 01:56

## 2023-05-14 ASSESSMENT — PAIN DESCRIPTION - DESCRIPTORS: DESCRIPTORS: ACHING;DISCOMFORT

## 2023-05-14 ASSESSMENT — PAIN SCALES - GENERAL
PAINLEVEL_OUTOF10: 10
PAINLEVEL_OUTOF10: 0

## 2023-05-14 ASSESSMENT — PAIN DESCRIPTION - LOCATION: LOCATION: LEG

## 2023-05-14 ASSESSMENT — PAIN DESCRIPTION - ORIENTATION: ORIENTATION: RIGHT;LEFT

## 2023-05-15 LAB
BACTERIA BLD CULT ORG #2: NORMAL
BACTERIA BLD CULT: NORMAL

## 2023-05-16 NOTE — DISCHARGE SUMMARY
system. There is mild-moderate ill-defined low-attenuation in the periventricular white matter, corona radiata, and centrum semiovale bilaterally which has association with age related microvascular white matter ischemic disease. There is no acute intracranial hemorrhage, mass effect, or midline shift. No abnormal extra-axial fluid collection. The gray-white differentiation is maintained without evidence of an acute infarct. There is no evidence of hydrocephalus. ORBITS: The visualized portion of the orbits demonstrate no acute abnormality. SINUSES: The visualized paranasal sinuses and mastoid air cells demonstrate no acute abnormality. SOFT TISSUES/SKULL: No acute abnormality of the visualized skull or soft tissues. CT CERVICAL: BONES/ALIGNMENT: The bones are osteopenic. 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 There is no acute fracture or traumatic malalignment. DEGENERATIVE CHANGES: There is persistent moderate-severe cervical spondylosis, bilateral facet arthropathy, and disc height loss at all cervical levels, including the visualized upper thoracic spine. SOFT TISSUES: There is no prevertebral soft tissue swelling. There is stable mild thyroid enlargement with multiple low-attenuation nodularities. No acute intracranial abnormality. Chronic parenchymal change including atrophy and old white matter ischemia. No acute osseous injury cervical spine. Multilevel cervical bony and discogenic degenerative changes. Stable enlarged multinodular thyroid gland. CT HEAD WO CONTRAST    Result Date: 4/21/2023  EXAMINATION: CT OF THE HEAD WITHOUT CONTRAST  4/21/2023 10:43 pm TECHNIQUE: CT of the head was performed without the administration of intravenous contrast. Automated exposure control, iterative reconstruction, and/or weight based adjustment of the mA/kV was utilized to reduce the radiation dose to as low as reasonably achievable. COMPARISON: None.  HISTORY: ORDERING SYSTEM PROVIDED HISTORY: fall

## 2023-07-01 ENCOUNTER — HOSPITAL ENCOUNTER (INPATIENT)
Age: 88
LOS: 5 days | Discharge: HOME | DRG: 948 | End: 2023-07-06
Attending: EMERGENCY MEDICINE | Admitting: INTERNAL MEDICINE
Payer: COMMERCIAL

## 2023-07-01 ENCOUNTER — APPOINTMENT (OUTPATIENT)
Dept: GENERAL RADIOLOGY | Age: 88
DRG: 948 | End: 2023-07-01
Payer: COMMERCIAL

## 2023-07-01 ENCOUNTER — APPOINTMENT (OUTPATIENT)
Dept: CT IMAGING | Age: 88
DRG: 948 | End: 2023-07-01
Payer: COMMERCIAL

## 2023-07-01 DIAGNOSIS — R41.82 ALTERED MENTAL STATUS, UNSPECIFIED ALTERED MENTAL STATUS TYPE: Primary | ICD-10-CM

## 2023-07-01 DIAGNOSIS — K52.9 COLITIS: ICD-10-CM

## 2023-07-01 DIAGNOSIS — J18.9 PNEUMONIA DUE TO INFECTIOUS ORGANISM, UNSPECIFIED LATERALITY, UNSPECIFIED PART OF LUNG: ICD-10-CM

## 2023-07-01 LAB
ALBUMIN SERPL-MCNC: 3.5 G/DL (ref 3.5–5.2)
ALP SERPL-CCNC: 59 U/L (ref 35–104)
ALT SERPL-CCNC: 11 U/L (ref 0–32)
AMPHET UR QL SCN: NOT DETECTED
ANION GAP SERPL CALCULATED.3IONS-SCNC: 10 MMOL/L (ref 7–16)
APAP SERPL-MCNC: <5 MCG/ML (ref 10–30)
AST SERPL-CCNC: 20 U/L (ref 0–31)
BACTERIA URNS QL MICRO: NORMAL /HPF
BARBITURATES UR QL SCN: NOT DETECTED
BASOPHILS # BLD: 0.03 E9/L (ref 0–0.2)
BASOPHILS NFR BLD: 0.6 % (ref 0–2)
BENZODIAZ UR QL SCN: POSITIVE
BILIRUB DIRECT SERPL-MCNC: 0.2 MG/DL (ref 0–0.3)
BILIRUB INDIRECT SERPL-MCNC: 0.4 MG/DL (ref 0–1)
BILIRUB SERPL-MCNC: 0.6 MG/DL (ref 0–1.2)
BILIRUB UR QL STRIP: NEGATIVE
BNP BLD-MCNC: 869 PG/ML (ref 0–450)
BUN SERPL-MCNC: 11 MG/DL (ref 6–23)
CALCIUM SERPL-MCNC: 8.8 MG/DL (ref 8.6–10.2)
CANNABINOIDS UR QL SCN: NOT DETECTED
CHLORIDE SERPL-SCNC: 108 MMOL/L (ref 98–107)
CLARITY UR: CLEAR
CO2 SERPL-SCNC: 24 MMOL/L (ref 22–29)
COCAINE UR QL SCN: NOT DETECTED
COLOR UR: YELLOW
CREAT SERPL-MCNC: 1 MG/DL (ref 0.5–1)
DRUG SCREEN COMMENT UR-IMP: ABNORMAL
EOSINOPHIL # BLD: 0.03 E9/L (ref 0.05–0.5)
EOSINOPHIL NFR BLD: 0.6 % (ref 0–6)
ERYTHROCYTE [DISTWIDTH] IN BLOOD BY AUTOMATED COUNT: 15 FL (ref 11.5–15)
ETHANOLAMINE SERPL-MCNC: <10 MG/DL (ref 0–0.08)
FENTANYL SCREEN, URINE: POSITIVE
GLUCOSE BLD-MCNC: 155 MG/DL
GLUCOSE SERPL-MCNC: 128 MG/DL (ref 74–99)
GLUCOSE UR STRIP-MCNC: NEGATIVE MG/DL
HCT VFR BLD AUTO: 42.4 % (ref 34–48)
HGB BLD-MCNC: 13.1 G/DL (ref 11.5–15.5)
HGB UR QL STRIP: NEGATIVE
IMM GRANULOCYTES # BLD: 0.01 E9/L
IMM GRANULOCYTES NFR BLD: 0.2 % (ref 0–5)
KETONES UR STRIP-MCNC: NEGATIVE MG/DL
LACTATE BLDV-SCNC: 2.1 MMOL/L (ref 0.5–2.2)
LEUKOCYTE ESTERASE UR QL STRIP: NEGATIVE
LIPASE: 8 U/L (ref 13–60)
LYMPHOCYTES # BLD: 0.95 E9/L (ref 1.5–4)
LYMPHOCYTES NFR BLD: 17.7 % (ref 20–42)
MAGNESIUM SERPL-MCNC: 2.2 MG/DL (ref 1.6–2.6)
MCH RBC QN AUTO: 29.8 PG (ref 26–35)
MCHC RBC AUTO-ENTMCNC: 30.9 % (ref 32–34.5)
MCV RBC AUTO: 96.4 FL (ref 80–99.9)
METHADONE UR QL SCN: NOT DETECTED
MONOCYTES # BLD: 0.47 E9/L (ref 0.1–0.95)
MONOCYTES NFR BLD: 8.7 % (ref 2–12)
NEUTROPHILS # BLD: 3.89 E9/L (ref 1.8–7.3)
NEUTS SEG NFR BLD: 72.2 % (ref 43–80)
NITRITE UR QL STRIP: NEGATIVE
OPIATES UR QL SCN: NOT DETECTED
OXYCODONE URINE: NOT DETECTED
PCP UR QL SCN: NOT DETECTED
PH UR STRIP: 6 [PH] (ref 5–9)
PLATELET # BLD AUTO: 173 E9/L (ref 130–450)
PMV BLD AUTO: 9.6 FL (ref 7–12)
POTASSIUM SERPL-SCNC: 3.6 MMOL/L (ref 3.5–5)
PROT SERPL-MCNC: 6.5 G/DL (ref 6.4–8.3)
PROT UR STRIP-MCNC: NEGATIVE MG/DL
RBC # BLD AUTO: 4.4 E12/L (ref 3.5–5.5)
RBC #/AREA URNS HPF: NORMAL /HPF (ref 0–2)
SALICYLATES SERPL-MCNC: <0.3 MG/DL (ref 0–30)
SODIUM SERPL-SCNC: 142 MMOL/L (ref 132–146)
SP GR UR STRIP: 1.01 (ref 1–1.03)
TRICYCLIC ANTIDEPRESSANTS SCREEN SERUM: NEGATIVE NG/ML
TROPONIN, HIGH SENSITIVITY: 21 NG/L (ref 0–9)
TROPONIN, HIGH SENSITIVITY: 22 NG/L (ref 0–9)
UROBILINOGEN UR STRIP-ACNC: 1 E.U./DL
WBC # BLD: 5.4 E9/L (ref 4.5–11.5)
WBC #/AREA URNS HPF: NORMAL /HPF (ref 0–5)

## 2023-07-01 PROCEDURE — 6360000002 HC RX W HCPCS: Performed by: EMERGENCY MEDICINE

## 2023-07-01 PROCEDURE — 83880 ASSAY OF NATRIURETIC PEPTIDE: CPT

## 2023-07-01 PROCEDURE — 80307 DRUG TEST PRSMV CHEM ANLYZR: CPT

## 2023-07-01 PROCEDURE — 96375 TX/PRO/DX INJ NEW DRUG ADDON: CPT

## 2023-07-01 PROCEDURE — 6360000002 HC RX W HCPCS

## 2023-07-01 PROCEDURE — 83735 ASSAY OF MAGNESIUM: CPT

## 2023-07-01 PROCEDURE — 2580000003 HC RX 258: Performed by: EMERGENCY MEDICINE

## 2023-07-01 PROCEDURE — 80179 DRUG ASSAY SALICYLATE: CPT

## 2023-07-01 PROCEDURE — 2060000000 HC ICU INTERMEDIATE R&B

## 2023-07-01 PROCEDURE — 80048 BASIC METABOLIC PNL TOTAL CA: CPT

## 2023-07-01 PROCEDURE — 83690 ASSAY OF LIPASE: CPT

## 2023-07-01 PROCEDURE — 80143 DRUG ASSAY ACETAMINOPHEN: CPT

## 2023-07-01 PROCEDURE — 71045 X-RAY EXAM CHEST 1 VIEW: CPT

## 2023-07-01 PROCEDURE — 70450 CT HEAD/BRAIN W/O DYE: CPT

## 2023-07-01 PROCEDURE — 83605 ASSAY OF LACTIC ACID: CPT

## 2023-07-01 PROCEDURE — 84484 ASSAY OF TROPONIN QUANT: CPT

## 2023-07-01 PROCEDURE — 93005 ELECTROCARDIOGRAM TRACING: CPT | Performed by: EMERGENCY MEDICINE

## 2023-07-01 PROCEDURE — 80076 HEPATIC FUNCTION PANEL: CPT

## 2023-07-01 PROCEDURE — 74177 CT ABD & PELVIS W/CONTRAST: CPT

## 2023-07-01 PROCEDURE — 99285 EMERGENCY DEPT VISIT HI MDM: CPT

## 2023-07-01 PROCEDURE — 2500000003 HC RX 250 WO HCPCS: Performed by: EMERGENCY MEDICINE

## 2023-07-01 PROCEDURE — 82077 ASSAY SPEC XCP UR&BREATH IA: CPT

## 2023-07-01 PROCEDURE — 96374 THER/PROPH/DIAG INJ IV PUSH: CPT

## 2023-07-01 PROCEDURE — 81001 URINALYSIS AUTO W/SCOPE: CPT

## 2023-07-01 PROCEDURE — 85025 COMPLETE CBC W/AUTO DIFF WBC: CPT

## 2023-07-01 RX ORDER — METRONIDAZOLE 500 MG/100ML
500 INJECTION, SOLUTION INTRAVENOUS ONCE
Status: COMPLETED | OUTPATIENT
Start: 2023-07-01 | End: 2023-07-01

## 2023-07-01 RX ORDER — HALOPERIDOL 5 MG/ML
INJECTION INTRAMUSCULAR
Status: COMPLETED
Start: 2023-07-01 | End: 2023-07-01

## 2023-07-01 RX ORDER — HALOPERIDOL 5 MG/ML
1 INJECTION INTRAMUSCULAR ONCE
Status: COMPLETED | OUTPATIENT
Start: 2023-07-01 | End: 2023-07-01

## 2023-07-01 RX ORDER — FENTANYL CITRATE 50 UG/ML
50 INJECTION, SOLUTION INTRAMUSCULAR; INTRAVENOUS ONCE
Status: COMPLETED | OUTPATIENT
Start: 2023-07-01 | End: 2023-07-01

## 2023-07-01 RX ORDER — 0.9 % SODIUM CHLORIDE 0.9 %
1000 INTRAVENOUS SOLUTION INTRAVENOUS ONCE
Status: COMPLETED | OUTPATIENT
Start: 2023-07-01 | End: 2023-07-01

## 2023-07-01 RX ORDER — HYDRALAZINE HYDROCHLORIDE 20 MG/ML
10 INJECTION INTRAMUSCULAR; INTRAVENOUS ONCE
Status: COMPLETED | OUTPATIENT
Start: 2023-07-01 | End: 2023-07-01

## 2023-07-01 RX ADMIN — CEFTRIAXONE SODIUM 2000 MG: 2 INJECTION, POWDER, FOR SOLUTION INTRAMUSCULAR; INTRAVENOUS at 19:53

## 2023-07-01 RX ADMIN — HYDRALAZINE HYDROCHLORIDE 10 MG: 20 INJECTION INTRAMUSCULAR; INTRAVENOUS at 19:55

## 2023-07-01 RX ADMIN — HALOPERIDOL 1 MG: 5 INJECTION INTRAMUSCULAR at 22:10

## 2023-07-01 RX ADMIN — METRONIDAZOLE 500 MG: 500 INJECTION, SOLUTION INTRAVENOUS at 21:37

## 2023-07-01 RX ADMIN — SODIUM CHLORIDE 1000 ML: 9 INJECTION, SOLUTION INTRAVENOUS at 15:57

## 2023-07-01 RX ADMIN — FENTANYL CITRATE 50 MCG: 50 INJECTION, SOLUTION INTRAMUSCULAR; INTRAVENOUS at 15:58

## 2023-07-01 RX ADMIN — DOXYCYCLINE 100 MG: 100 INJECTION, POWDER, LYOPHILIZED, FOR SOLUTION INTRAVENOUS at 20:02

## 2023-07-01 RX ADMIN — HALOPERIDOL LACTATE 1 MG: 5 INJECTION INTRAMUSCULAR at 22:10

## 2023-07-02 LAB
ALBUMIN SERPL-MCNC: ABNORMAL G/DL (ref 3.5–5.2)
ALP SERPL-CCNC: ABNORMAL U/L (ref 35–104)
ALT SERPL-CCNC: ABNORMAL U/L (ref 0–32)
ANION GAP SERPL CALCULATED.3IONS-SCNC: ABNORMAL MMOL/L (ref 7–16)
AST SERPL-CCNC: ABNORMAL U/L (ref 0–31)
BASOPHILS # BLD: 0.02 E9/L (ref 0–0.2)
BASOPHILS # BLD: 0.03 E9/L (ref 0–0.2)
BASOPHILS NFR BLD: 0.4 % (ref 0–2)
BASOPHILS NFR BLD: 0.5 % (ref 0–2)
BILIRUB SERPL-MCNC: ABNORMAL MG/DL (ref 0–1.2)
BUN SERPL-MCNC: ABNORMAL MG/DL (ref 6–23)
CALCIUM SERPL-MCNC: ABNORMAL MG/DL (ref 8.6–10.2)
CHLORIDE SERPL-SCNC: ABNORMAL MMOL/L (ref 98–107)
CO2 SERPL-SCNC: ABNORMAL MMOL/L (ref 22–29)
CREAT SERPL-MCNC: ABNORMAL MG/DL (ref 0.5–1)
EOSINOPHIL # BLD: 0.01 E9/L (ref 0.05–0.5)
EOSINOPHIL # BLD: 0.03 E9/L (ref 0.05–0.5)
EOSINOPHIL NFR BLD: 0.2 % (ref 0–6)
EOSINOPHIL NFR BLD: 0.5 % (ref 0–6)
ERYTHROCYTE [DISTWIDTH] IN BLOOD BY AUTOMATED COUNT: 15.1 FL (ref 11.5–15)
ERYTHROCYTE [DISTWIDTH] IN BLOOD BY AUTOMATED COUNT: 15.5 FL (ref 11.5–15)
FOLATE SERPL-MCNC: 8.9 NG/ML (ref 4.8–24.2)
GLUCOSE SERPL-MCNC: ABNORMAL MG/DL (ref 74–99)
HCT VFR BLD AUTO: 41.7 % (ref 34–48)
HCT VFR BLD AUTO: 45.7 % (ref 34–48)
HGB BLD-MCNC: 13.3 G/DL (ref 11.5–15.5)
HGB BLD-MCNC: 14.1 G/DL (ref 11.5–15.5)
IMM GRANULOCYTES # BLD: 0.01 E9/L
IMM GRANULOCYTES # BLD: 0.02 E9/L
IMM GRANULOCYTES NFR BLD: 0.2 % (ref 0–5)
IMM GRANULOCYTES NFR BLD: 0.4 % (ref 0–5)
LYMPHOCYTES # BLD: 1.04 E9/L (ref 1.5–4)
LYMPHOCYTES # BLD: 1.05 E9/L (ref 1.5–4)
LYMPHOCYTES NFR BLD: 18.3 % (ref 20–42)
LYMPHOCYTES NFR BLD: 18.9 % (ref 20–42)
MCH RBC QN AUTO: 30 PG (ref 26–35)
MCH RBC QN AUTO: 30.7 PG (ref 26–35)
MCHC RBC AUTO-ENTMCNC: 30.9 % (ref 32–34.5)
MCHC RBC AUTO-ENTMCNC: 31.9 % (ref 32–34.5)
MCV RBC AUTO: 94.1 FL (ref 80–99.9)
MCV RBC AUTO: 99.6 FL (ref 80–99.9)
MONOCYTES # BLD: 0.52 E9/L (ref 0.1–0.95)
MONOCYTES # BLD: 0.63 E9/L (ref 0.1–0.95)
MONOCYTES NFR BLD: 11.4 % (ref 2–12)
MONOCYTES NFR BLD: 9.1 % (ref 2–12)
NEUTROPHILS # BLD: 3.8 E9/L (ref 1.8–7.3)
NEUTROPHILS # BLD: 4.08 E9/L (ref 1.8–7.3)
NEUTS SEG NFR BLD: 68.5 % (ref 43–80)
NEUTS SEG NFR BLD: 71.6 % (ref 43–80)
PLATELET # BLD AUTO: 153 E9/L (ref 130–450)
PLATELET # BLD AUTO: 166 E9/L (ref 130–450)
PMV BLD AUTO: 12.3 FL (ref 7–12)
PMV BLD AUTO: 9.4 FL (ref 7–12)
POTASSIUM SERPL-SCNC: ABNORMAL MMOL/L (ref 3.5–5)
PROT SERPL-MCNC: ABNORMAL G/DL (ref 6.4–8.3)
RBC # BLD AUTO: 4.43 E12/L (ref 3.5–5.5)
RBC # BLD AUTO: 4.59 E12/L (ref 3.5–5.5)
SODIUM SERPL-SCNC: ABNORMAL MMOL/L (ref 132–146)
VIT B12 SERPL-MCNC: 729 PG/ML (ref 211–946)
WBC # BLD: 5.6 E9/L (ref 4.5–11.5)
WBC # BLD: 5.7 E9/L (ref 4.5–11.5)

## 2023-07-02 PROCEDURE — 82746 ASSAY OF FOLIC ACID SERUM: CPT

## 2023-07-02 PROCEDURE — 2500000003 HC RX 250 WO HCPCS

## 2023-07-02 PROCEDURE — 2060000000 HC ICU INTERMEDIATE R&B

## 2023-07-02 PROCEDURE — 82607 VITAMIN B-12: CPT

## 2023-07-02 PROCEDURE — 85025 COMPLETE CBC W/AUTO DIFF WBC: CPT

## 2023-07-02 PROCEDURE — 2580000003 HC RX 258

## 2023-07-02 PROCEDURE — 36415 COLL VENOUS BLD VENIPUNCTURE: CPT

## 2023-07-02 PROCEDURE — 99222 1ST HOSP IP/OBS MODERATE 55: CPT | Performed by: PSYCHIATRY & NEUROLOGY

## 2023-07-02 PROCEDURE — 6370000000 HC RX 637 (ALT 250 FOR IP)

## 2023-07-02 PROCEDURE — 6360000002 HC RX W HCPCS

## 2023-07-02 RX ORDER — SODIUM CHLORIDE 0.9 % (FLUSH) 0.9 %
5-40 SYRINGE (ML) INJECTION EVERY 12 HOURS SCHEDULED
Status: DISCONTINUED | OUTPATIENT
Start: 2023-07-02 | End: 2023-07-06 | Stop reason: HOSPADM

## 2023-07-02 RX ORDER — LACTULOSE 10 G/15ML
20 SOLUTION ORAL 2 TIMES DAILY
Status: DISCONTINUED | OUTPATIENT
Start: 2023-07-02 | End: 2023-07-06 | Stop reason: HOSPADM

## 2023-07-02 RX ORDER — SODIUM CHLORIDE 9 MG/ML
INJECTION, SOLUTION INTRAVENOUS PRN
Status: DISCONTINUED | OUTPATIENT
Start: 2023-07-02 | End: 2023-07-06 | Stop reason: HOSPADM

## 2023-07-02 RX ORDER — LORAZEPAM 2 MG/ML
0.5 INJECTION INTRAMUSCULAR
Status: ACTIVE | OUTPATIENT
Start: 2023-07-02 | End: 2023-07-03

## 2023-07-02 RX ORDER — POLYETHYLENE GLYCOL 3350 17 G/17G
17 POWDER, FOR SOLUTION ORAL DAILY PRN
Status: DISCONTINUED | OUTPATIENT
Start: 2023-07-02 | End: 2023-07-06 | Stop reason: HOSPADM

## 2023-07-02 RX ORDER — LOSARTAN POTASSIUM 50 MG/1
50 TABLET ORAL NIGHTLY
Status: DISCONTINUED | OUTPATIENT
Start: 2023-07-02 | End: 2023-07-03

## 2023-07-02 RX ORDER — POTASSIUM CHLORIDE 20 MEQ/1
40 TABLET, EXTENDED RELEASE ORAL PRN
Status: DISCONTINUED | OUTPATIENT
Start: 2023-07-02 | End: 2023-07-06 | Stop reason: HOSPADM

## 2023-07-02 RX ORDER — ONDANSETRON 4 MG/1
4 TABLET, ORALLY DISINTEGRATING ORAL EVERY 8 HOURS PRN
Status: DISCONTINUED | OUTPATIENT
Start: 2023-07-02 | End: 2023-07-02

## 2023-07-02 RX ORDER — SODIUM CHLORIDE 9 MG/ML
INJECTION, SOLUTION INTRAVENOUS CONTINUOUS
Status: DISCONTINUED | OUTPATIENT
Start: 2023-07-02 | End: 2023-07-06 | Stop reason: HOSPADM

## 2023-07-02 RX ORDER — PANTOPRAZOLE SODIUM 40 MG/1
40 TABLET, DELAYED RELEASE ORAL
Status: DISCONTINUED | OUTPATIENT
Start: 2023-07-02 | End: 2023-07-06 | Stop reason: HOSPADM

## 2023-07-02 RX ORDER — DOCUSATE SODIUM 100 MG/1
100 CAPSULE, LIQUID FILLED ORAL DAILY
Status: DISCONTINUED | OUTPATIENT
Start: 2023-07-02 | End: 2023-07-06 | Stop reason: HOSPADM

## 2023-07-02 RX ORDER — ONDANSETRON 2 MG/ML
4 INJECTION INTRAMUSCULAR; INTRAVENOUS EVERY 6 HOURS PRN
Status: DISCONTINUED | OUTPATIENT
Start: 2023-07-02 | End: 2023-07-02

## 2023-07-02 RX ORDER — SODIUM CHLORIDE 0.9 % (FLUSH) 0.9 %
10 SYRINGE (ML) INJECTION PRN
Status: DISCONTINUED | OUTPATIENT
Start: 2023-07-02 | End: 2023-07-06 | Stop reason: HOSPADM

## 2023-07-02 RX ORDER — ACETAMINOPHEN 325 MG/1
650 TABLET ORAL EVERY 6 HOURS PRN
Status: DISCONTINUED | OUTPATIENT
Start: 2023-07-02 | End: 2023-07-06 | Stop reason: HOSPADM

## 2023-07-02 RX ORDER — ACETAMINOPHEN 650 MG/1
650 SUPPOSITORY RECTAL EVERY 6 HOURS PRN
Status: DISCONTINUED | OUTPATIENT
Start: 2023-07-02 | End: 2023-07-06 | Stop reason: HOSPADM

## 2023-07-02 RX ORDER — METRONIDAZOLE 500 MG/100ML
500 INJECTION, SOLUTION INTRAVENOUS EVERY 8 HOURS
Status: DISCONTINUED | OUTPATIENT
Start: 2023-07-02 | End: 2023-07-06 | Stop reason: ALTCHOICE

## 2023-07-02 RX ORDER — AMIODARONE HYDROCHLORIDE 200 MG/1
100 TABLET ORAL
Status: DISCONTINUED | OUTPATIENT
Start: 2023-07-02 | End: 2023-07-04

## 2023-07-02 RX ORDER — POTASSIUM CHLORIDE 7.45 MG/ML
10 INJECTION INTRAVENOUS PRN
Status: DISCONTINUED | OUTPATIENT
Start: 2023-07-02 | End: 2023-07-06 | Stop reason: HOSPADM

## 2023-07-02 RX ADMIN — DOXYCYCLINE 100 MG: 100 INJECTION, POWDER, LYOPHILIZED, FOR SOLUTION INTRAVENOUS at 21:32

## 2023-07-02 RX ADMIN — SODIUM CHLORIDE: 9 INJECTION, SOLUTION INTRAVENOUS at 03:32

## 2023-07-02 RX ADMIN — APIXABAN 2.5 MG: 5 TABLET, FILM COATED ORAL at 21:23

## 2023-07-02 RX ADMIN — SODIUM CHLORIDE: 9 INJECTION, SOLUTION INTRAVENOUS at 20:57

## 2023-07-02 RX ADMIN — AMIODARONE HYDROCHLORIDE 100 MG: 200 TABLET ORAL at 09:47

## 2023-07-02 RX ADMIN — LACTULOSE 20 G: 20 SOLUTION ORAL at 03:15

## 2023-07-02 RX ADMIN — METRONIDAZOLE 500 MG: 500 INJECTION, SOLUTION INTRAVENOUS at 06:23

## 2023-07-02 RX ADMIN — LOSARTAN POTASSIUM 50 MG: 50 TABLET, FILM COATED ORAL at 21:23

## 2023-07-02 RX ADMIN — PANTOPRAZOLE SODIUM 40 MG: 40 TABLET, DELAYED RELEASE ORAL at 06:24

## 2023-07-02 RX ADMIN — DOXYCYCLINE 100 MG: 100 INJECTION, POWDER, LYOPHILIZED, FOR SOLUTION INTRAVENOUS at 09:47

## 2023-07-02 RX ADMIN — METRONIDAZOLE 500 MG: 500 INJECTION, SOLUTION INTRAVENOUS at 14:11

## 2023-07-02 RX ADMIN — LOSARTAN POTASSIUM 50 MG: 50 TABLET, FILM COATED ORAL at 03:15

## 2023-07-02 RX ADMIN — LACTULOSE 20 G: 20 SOLUTION ORAL at 09:47

## 2023-07-02 RX ADMIN — APIXABAN 2.5 MG: 5 TABLET, FILM COATED ORAL at 09:47

## 2023-07-02 RX ADMIN — DOCUSATE SODIUM 100 MG: 100 CAPSULE, LIQUID FILLED ORAL at 09:48

## 2023-07-03 ENCOUNTER — APPOINTMENT (OUTPATIENT)
Dept: MRI IMAGING | Age: 88
DRG: 948 | End: 2023-07-03
Payer: COMMERCIAL

## 2023-07-03 ENCOUNTER — APPOINTMENT (OUTPATIENT)
Dept: NEUROLOGY | Age: 88
DRG: 948 | End: 2023-07-03
Payer: COMMERCIAL

## 2023-07-03 PROCEDURE — 97165 OT EVAL LOW COMPLEX 30 MIN: CPT

## 2023-07-03 PROCEDURE — 6360000002 HC RX W HCPCS

## 2023-07-03 PROCEDURE — 70551 MRI BRAIN STEM W/O DYE: CPT

## 2023-07-03 PROCEDURE — 6370000000 HC RX 637 (ALT 250 FOR IP): Performed by: INTERNAL MEDICINE

## 2023-07-03 PROCEDURE — 99232 SBSQ HOSP IP/OBS MODERATE 35: CPT | Performed by: CLINICAL NURSE SPECIALIST

## 2023-07-03 PROCEDURE — 97530 THERAPEUTIC ACTIVITIES: CPT

## 2023-07-03 PROCEDURE — 2060000000 HC ICU INTERMEDIATE R&B

## 2023-07-03 PROCEDURE — 2580000003 HC RX 258

## 2023-07-03 PROCEDURE — 6360000002 HC RX W HCPCS: Performed by: INTERNAL MEDICINE

## 2023-07-03 PROCEDURE — 95816 EEG AWAKE AND DROWSY: CPT

## 2023-07-03 PROCEDURE — 6360000002 HC RX W HCPCS: Performed by: CLINICAL NURSE SPECIALIST

## 2023-07-03 PROCEDURE — 6370000000 HC RX 637 (ALT 250 FOR IP)

## 2023-07-03 PROCEDURE — 97161 PT EVAL LOW COMPLEX 20 MIN: CPT

## 2023-07-03 PROCEDURE — 2500000003 HC RX 250 WO HCPCS

## 2023-07-03 RX ORDER — LORAZEPAM 2 MG/ML
0.5 INJECTION INTRAMUSCULAR
Status: ACTIVE | OUTPATIENT
Start: 2023-07-03 | End: 2023-07-04

## 2023-07-03 RX ORDER — HYDRALAZINE HYDROCHLORIDE 20 MG/ML
5 INJECTION INTRAMUSCULAR; INTRAVENOUS EVERY 4 HOURS PRN
Status: DISCONTINUED | OUTPATIENT
Start: 2023-07-03 | End: 2023-07-06

## 2023-07-03 RX ORDER — LOSARTAN POTASSIUM 50 MG/1
100 TABLET ORAL DAILY
Status: DISCONTINUED | OUTPATIENT
Start: 2023-07-03 | End: 2023-07-06 | Stop reason: HOSPADM

## 2023-07-03 RX ADMIN — SODIUM CHLORIDE, PRESERVATIVE FREE 10 ML: 5 INJECTION INTRAVENOUS at 20:57

## 2023-07-03 RX ADMIN — METRONIDAZOLE 500 MG: 500 INJECTION, SOLUTION INTRAVENOUS at 00:29

## 2023-07-03 RX ADMIN — SODIUM CHLORIDE: 9 INJECTION, SOLUTION INTRAVENOUS at 21:04

## 2023-07-03 RX ADMIN — APIXABAN 2.5 MG: 5 TABLET, FILM COATED ORAL at 08:43

## 2023-07-03 RX ADMIN — PANTOPRAZOLE SODIUM 40 MG: 40 TABLET, DELAYED RELEASE ORAL at 05:36

## 2023-07-03 RX ADMIN — LACTULOSE 20 G: 20 SOLUTION ORAL at 20:57

## 2023-07-03 RX ADMIN — HYDRALAZINE HYDROCHLORIDE 5 MG: 20 INJECTION INTRAMUSCULAR; INTRAVENOUS at 10:56

## 2023-07-03 RX ADMIN — LOSARTAN POTASSIUM 100 MG: 50 TABLET, FILM COATED ORAL at 10:19

## 2023-07-03 RX ADMIN — SODIUM CHLORIDE: 9 INJECTION, SOLUTION INTRAVENOUS at 13:50

## 2023-07-03 RX ADMIN — DOCUSATE SODIUM 100 MG: 100 CAPSULE, LIQUID FILLED ORAL at 08:48

## 2023-07-03 RX ADMIN — METRONIDAZOLE 500 MG: 500 INJECTION, SOLUTION INTRAVENOUS at 05:37

## 2023-07-03 RX ADMIN — METRONIDAZOLE 500 MG: 500 INJECTION, SOLUTION INTRAVENOUS at 14:21

## 2023-07-03 RX ADMIN — DOXYCYCLINE 100 MG: 100 INJECTION, POWDER, LYOPHILIZED, FOR SOLUTION INTRAVENOUS at 21:05

## 2023-07-03 RX ADMIN — DOXYCYCLINE 100 MG: 100 INJECTION, POWDER, LYOPHILIZED, FOR SOLUTION INTRAVENOUS at 08:45

## 2023-07-03 RX ADMIN — POLYETHYLENE GLYCOL 3350 17 G: 17 POWDER, FOR SOLUTION ORAL at 14:19

## 2023-07-03 RX ADMIN — APIXABAN 2.5 MG: 5 TABLET, FILM COATED ORAL at 20:58

## 2023-07-03 RX ADMIN — METRONIDAZOLE 500 MG: 500 INJECTION, SOLUTION INTRAVENOUS at 22:09

## 2023-07-03 ASSESSMENT — PAIN SCALES - GENERAL
PAINLEVEL_OUTOF10: 0
PAINLEVEL_OUTOF10: 0

## 2023-07-03 NOTE — PROGRESS NOTES
/100 manual HR 62. Patient complains of \"feeling funny\" and like her \"heart is beating slow. \" Dr. Radha Solano notified

## 2023-07-03 NOTE — ACP (ADVANCE CARE PLANNING)
I met with nia the daughter in the room. Pt is confused. Pt has been staying with her son radha but on discharge she may go back to her own home with her son , Nanette Suarez, to take care of her. Pt has 9 grown children. Pt has a ranch home with 2 side door steps to enter home. No hhc pta. If hhc is needed pt has used [facility] discharge and per nia would like to use them again. If valentín is needed I provided a list. Cathyann Lanes said most likely they will take pt home. Dr. Lisa Baer is the pcp and pt last saw him end of may [id number] [place]. Pt needs supervision for bathing and dressing, and family prepares meals and medications. Pt has and uses a cane, and transport w/c, has a fww but never uses, and a shower chair is in the home. Pt is not diabetic per family pta. Family will take home. EEG and MRI pending. ESTEFANIA Bernal  7/3/2023    The Plan for Transition of Care is related to the following treatment goals: valentín and hhc   The Patient and/orpatient representative  was provided with a choice of provider and agrees   with the discharge plan. [x] Yes [] No  Freedom of choice list was provided with basic dialogue that supports the patient's individualized plan of care/goals, treatment preferences and shares the quality data associated with the providers. Yes       Case Management Assessment  Initial Evaluation    Date/Time of Evaluation: 7/3/2023 3:46 PM  Assessment Completed by: ESTEFANIA Bernal    If patient is discharged prior to next notation, then this note serves as note for discharge by case management. Patient Name: Lena Washington                   YOB: 1931  Diagnosis: Altered mental status, unspecified [R41.82]                   Date / Time: 7/1/2023  3:10 PM    Patient Admission Status: Inpatient   Readmission Risk (Low < 19, Mod (19-27), High > 27): Readmission Risk Score: 19.2    Current PCP: Juan Maldonado MD  PCP verified by CM?  Yes    Chart Reviewed: Yes      History Provided by:

## 2023-07-03 NOTE — PROGRESS NOTES
Loss of IV access. Attempts made by multiple Rns the unit. Patient to MRI. Patient and family agreeable to trying MRI unmedicated at this time.  Will contact ED/ICU for assistance when patient is back on the floor

## 2023-07-03 NOTE — PROCEDURES
5-40 mL, IntraVENous, 2 times per day, Graydon Pedlar, APRN - CNP    sodium chloride flush 0.9 % injection 10 mL, 10 mL, IntraVENous, PRN, Graydon Pedlar, APRN - CNP    0.9 % sodium chloride infusion, , IntraVENous, PRN, Graydon Pedlar, APRN - CNP    polyethylene glycol (GLYCOLAX) packet 17 g, 17 g, Oral, Daily PRN, Graydon Pedlar, APRN - CNP    acetaminophen (TYLENOL) tablet 650 mg, 650 mg, Oral, Q6H PRN **OR** acetaminophen (TYLENOL) suppository 650 mg, 650 mg, Rectal, Q6H PRN, Graydon Pedlar, APRN - CNP    potassium chloride (KLOR-CON M) extended release tablet 40 mEq, 40 mEq, Oral, PRN **OR** potassium bicarb-citric acid (EFFER-K) effervescent tablet 40 mEq, 40 mEq, Oral, PRN **OR** potassium chloride 10 mEq/100 mL IVPB (Peripheral Line), 10 mEq, IntraVENous, PRN, Graydon Pedlar, APRN - CNP    metronidazole (FLAGYL) 500 mg in 0.9% NaCl 100 mL IVPB premix, 500 mg, IntraVENous, Q8H, Graydon Pedlar, APRN - CNP, Stopped at 07/03/23 3300    LORazepam (ATIVAN) injection 0.5 mg, 0.5 mg, IntraVENous, Once PRN, Carlitos Delgadillo DO    iopamidol (ISOVUE-370) 76 % injection 75 mL, 75 mL, IntraVENous, ONCE PRN, Isa Velázquez MD    doxycycline (VIBRAMYCIN) 100 mg in sodium chloride 0.9 % 100 mL IVPB (Ghbh1Msj), 100 mg, IntraVENous, Q12H, Graydon Pedlar, APRN - CNP, Stopped at 07/03/23 0945        Physician Interpretation    General EEG Report  EEG study was performed using the 10-20 electrode placement system in patient who was awake and responsive at time of study. No abnormal behavior or movements noted during the study. Activation procedures included photic stimulation and hyperventilation. Type of EEG:   Routine Inpatient EEG with video    Description   Wakefulness: Well-formed posterior dominant alpha activity at 8 Hz with anterior spread. Fast Activity: Right frontal low amplitude beta activity and noted throughout majority of the study.   Mild diffuse limited beta across all leads suggesting medication

## 2023-07-03 NOTE — PLAN OF CARE
Problem: Chronic Conditions and Co-morbidities  Goal: Patient's chronic conditions and co-morbidity symptoms are monitored and maintained or improved  Outcome: Progressing  Flowsheets (Taken 7/2/2023 2000)  Care Plan - Patient's Chronic Conditions and Co-Morbidity Symptoms are Monitored and Maintained or Improved: Monitor and assess patient's chronic conditions and comorbid symptoms for stability, deterioration, or improvement     Problem: Discharge Planning  Goal: Discharge to home or other facility with appropriate resources  Outcome: Progressing  Flowsheets (Taken 7/2/2023 2000)  Discharge to home or other facility with appropriate resources: Identify barriers to discharge with patient and caregiver     Problem: Skin/Tissue Integrity  Goal: Absence of new skin breakdown  Description: 1. Monitor for areas of redness and/or skin breakdown  2. Assess vascular access sites hourly  3. Every 4-6 hours minimum:  Change oxygen saturation probe site  4. Every 4-6 hours:  If on nasal continuous positive airway pressure, respiratory therapy assess nares and determine need for appliance change or resting period.   Outcome: Progressing     Problem: Safety - Adult  Goal: Free from fall injury  Outcome: Progressing     Problem: ABCDS Injury Assessment  Goal: Absence of physical injury  Outcome: Progressing     Problem: Pain  Goal: Verbalizes/displays adequate comfort level or baseline comfort level  Outcome: Progressing

## 2023-07-03 NOTE — PROGRESS NOTES
Called placed to ED for assistance with IV placement. Charge RN states she will send someone if they have someone available.

## 2023-07-03 NOTE — PROGRESS NOTES
Physical Therapy  Physical Therapy Initial Assessment       Name: Cynthia Lynn  : 1931  MRN: [id number]      Date of Service: 7/3/2023    Evaluating PT:  Abranand Cabot PT, [id number]  [id number]    Room #:  8502/8502-B  Diagnosis:  Altered mental status, unspecified [R41.82]  PMHx/PSHx:   has a past medical history of Arthritis, Atrial fibrillation ([address]), Chronic combined systolic (congestive) and diastolic (congestive) heart failure ([address]), Coronary artery disease involving native coronary artery of native heart without angina pectoris, Hx of blood clots, Hypertension, Ischemic colitis ([address]), and PAF (paroxysmal atrial fibrillation) ([address]). Procedure/Surgery:    Precautions:  Falls, Cognition, Incontinence  Equipment Needs:      SUBJECTIVE:    Pt is a questionable historian, states she lives with son in a 1 story home with 2 stairs to enter and single. Bed is on first floor and bath is on first floor. Pt ambulated with SPC independently PTA. Equipment Owned: SPC    OBJECTIVE:   Initial Evaluation  Date: 7/3/23 Treatment Short Term/ Long Term   Goals   AM-PAC 6 Clicks 33/00     Was pt agreeable to Eval/treatment? yes     Does pt have pain? No c/o pain     Bed Mobility  Rolling: Angel  Supine to sit: Angel  Sit to supine: Angel  Scooting: Angel  Rolling: Independent  Supine to sit: Independent  Sit to supine: Independent  Scooting: Independent     Transfers Sit to stand: Angel  Stand to sit: Angel  Stand pivot: NT  Sit to stand: supervision  Stand to sit: supervision  Stand pivot: supervision   Ambulation    3 feet with Angel [employer]   300 feet with supervision    Stair negotiation: ascended and descended  NT  4 steps with single rail supervision   ROM BUE:  Defer to OT  BLE:  WFL     Strength BUE:  Defer to OT  BLE:  4/5  Improve 1 MMT   Balance Sitting EOB:  SBA  Dynamic Standing:  Angel   Sitting EOB:  Independent    Dynamic Standing:  supervision     Pt is A & O x (self, place) Disoriented to year and month.  Pt very [place]

## 2023-07-03 NOTE — PROGRESS NOTES
Occupational Therapy          OCCUPATIONAL THERAPY INITIAL EVALUATION    ERROL [address] [facility]   [address], [place], [address] [facility]      [id number]                 Patient Name: Shaji Mace  MRN: [id number]  : 1931  Room: [address] [place] [address]    Referring Provider:  Hammad Miles MD  Specific Provider Orders/Date: OT evaluation and treat 23    Evaluating OT: Le Taylor, OTR/L #[id number]    Diagnosis: Altered mental status, unspecified [R41.82]      Surgery:   Past Surgical History:   Procedure Laterality Date    ANKLE FRACTURE SURGERY      CARDIAC CATHETERIZATION  2019    Dr. Freedman Senior, COLON, DIAGNOSTIC      FRACTURE SURGERY            Pertinent Medical History:  has a past medical history of Arthritis, Atrial fibrillation ([address]), Chronic combined systolic (congestive) and diastolic (congestive) heart failure ([address]), Coronary artery disease involving native coronary artery of native heart without angina pectoris, Hx of blood clots, Hypertension, Ischemic colitis ([address]), and PAF (paroxysmal atrial fibrillation) ([address]).      Precautions:  Fall Risk, safety, incontinence, [place]    Assessment of current deficits   [x] Functional mobility  [x]ADLs  [] Strength               [x]Cognition   [x] Functional transfers   [x] IADLs         [x] Safety Awareness   [x]Endurance   [] Fine Coordination              [] Balance      [x] Vision/perception   []Sensation    []Gross Motor Coordination  [] ROM  [] Delirium                   [] Motor Control     OT PLAN OF CARE   OT POC based on physician orders, patient diagnosis and results of clinical assessment    Frequency/Duration   1-3 days/wk for 1 week PRN   Specific OT Treatment Interventions to include:   * Instruction/training on adapted ADL techniques and AE recommendations to increase functional independence within precautions       * Training on energy conservation strategies, correct

## 2023-07-04 PROBLEM — R40.4 RECURRENT EPISODES OF UNRESPONSIVENESS: Status: ACTIVE | Noted: 2023-07-04

## 2023-07-04 PROBLEM — R41.89 RECURRENT EPISODES OF UNRESPONSIVENESS: Status: ACTIVE | Noted: 2023-07-04

## 2023-07-04 LAB
ANION GAP SERPL CALCULATED.3IONS-SCNC: 15 MMOL/L (ref 7–16)
BUN SERPL-MCNC: 5 MG/DL (ref 6–23)
CALCIUM SERPL-MCNC: 8.7 MG/DL (ref 8.6–10.2)
CHLORIDE SERPL-SCNC: 107 MMOL/L (ref 98–107)
CO2 SERPL-SCNC: 17 MMOL/L (ref 22–29)
CREAT SERPL-MCNC: 0.5 MG/DL (ref 0.5–1)
EKG ATRIAL RATE: 60 BPM
EKG P AXIS: 44 DEGREES
EKG P-R INTERVAL: 190 MS
EKG Q-T INTERVAL: 404 MS
EKG QRS DURATION: 116 MS
EKG QTC CALCULATION (BAZETT): 404 MS
EKG R AXIS: -57 DEGREES
EKG T AXIS: -66 DEGREES
EKG VENTRICULAR RATE: 60 BPM
GLUCOSE SERPL-MCNC: 115 MG/DL (ref 74–99)
POTASSIUM SERPL-SCNC: 2.9 MMOL/L (ref 3.5–5)
SODIUM SERPL-SCNC: 139 MMOL/L (ref 132–146)

## 2023-07-04 PROCEDURE — 93005 ELECTROCARDIOGRAM TRACING: CPT | Performed by: NURSE PRACTITIONER

## 2023-07-04 PROCEDURE — 2060000000 HC ICU INTERMEDIATE R&B

## 2023-07-04 PROCEDURE — 2500000003 HC RX 250 WO HCPCS

## 2023-07-04 PROCEDURE — 99223 1ST HOSP IP/OBS HIGH 75: CPT | Performed by: INTERNAL MEDICINE

## 2023-07-04 PROCEDURE — 93010 ELECTROCARDIOGRAM REPORT: CPT | Performed by: INTERNAL MEDICINE

## 2023-07-04 PROCEDURE — 6370000000 HC RX 637 (ALT 250 FOR IP)

## 2023-07-04 PROCEDURE — 80048 BASIC METABOLIC PNL TOTAL CA: CPT

## 2023-07-04 PROCEDURE — 2580000003 HC RX 258

## 2023-07-04 PROCEDURE — 6360000002 HC RX W HCPCS: Performed by: INTERNAL MEDICINE

## 2023-07-04 PROCEDURE — 6370000000 HC RX 637 (ALT 250 FOR IP): Performed by: INTERNAL MEDICINE

## 2023-07-04 PROCEDURE — 99233 SBSQ HOSP IP/OBS HIGH 50: CPT | Performed by: NURSE PRACTITIONER

## 2023-07-04 PROCEDURE — 36415 COLL VENOUS BLD VENIPUNCTURE: CPT

## 2023-07-04 PROCEDURE — 6360000002 HC RX W HCPCS

## 2023-07-04 RX ADMIN — PANTOPRAZOLE SODIUM 40 MG: 40 TABLET, DELAYED RELEASE ORAL at 06:00

## 2023-07-04 RX ADMIN — LACTULOSE 20 G: 20 SOLUTION ORAL at 21:00

## 2023-07-04 RX ADMIN — SODIUM CHLORIDE: 9 INJECTION, SOLUTION INTRAVENOUS at 09:20

## 2023-07-04 RX ADMIN — METRONIDAZOLE 500 MG: 500 INJECTION, SOLUTION INTRAVENOUS at 05:59

## 2023-07-04 RX ADMIN — POTASSIUM CHLORIDE 10 MEQ: 7.46 INJECTION, SOLUTION INTRAVENOUS at 14:19

## 2023-07-04 RX ADMIN — POTASSIUM CHLORIDE 10 MEQ: 7.46 INJECTION, SOLUTION INTRAVENOUS at 19:20

## 2023-07-04 RX ADMIN — DOCUSATE SODIUM 100 MG: 100 CAPSULE, LIQUID FILLED ORAL at 09:18

## 2023-07-04 RX ADMIN — POTASSIUM CHLORIDE 10 MEQ: 7.46 INJECTION, SOLUTION INTRAVENOUS at 13:09

## 2023-07-04 RX ADMIN — LACTULOSE 20 G: 20 SOLUTION ORAL at 09:03

## 2023-07-04 RX ADMIN — LOSARTAN POTASSIUM 100 MG: 50 TABLET, FILM COATED ORAL at 21:00

## 2023-07-04 RX ADMIN — ACETAMINOPHEN 650 MG: 325 TABLET ORAL at 11:33

## 2023-07-04 RX ADMIN — DOXYCYCLINE 100 MG: 100 INJECTION, POWDER, LYOPHILIZED, FOR SOLUTION INTRAVENOUS at 21:03

## 2023-07-04 RX ADMIN — HYDRALAZINE HYDROCHLORIDE 5 MG: 20 INJECTION INTRAMUSCULAR; INTRAVENOUS at 06:19

## 2023-07-04 RX ADMIN — HYDRALAZINE HYDROCHLORIDE 5 MG: 20 INJECTION INTRAMUSCULAR; INTRAVENOUS at 10:46

## 2023-07-04 RX ADMIN — DOXYCYCLINE 100 MG: 100 INJECTION, POWDER, LYOPHILIZED, FOR SOLUTION INTRAVENOUS at 09:17

## 2023-07-04 RX ADMIN — POTASSIUM CHLORIDE 10 MEQ: 7.46 INJECTION, SOLUTION INTRAVENOUS at 22:07

## 2023-07-04 RX ADMIN — METRONIDAZOLE 500 MG: 500 INJECTION, SOLUTION INTRAVENOUS at 17:07

## 2023-07-04 RX ADMIN — HYDRALAZINE HYDROCHLORIDE 5 MG: 20 INJECTION INTRAMUSCULAR; INTRAVENOUS at 00:44

## 2023-07-04 RX ADMIN — POTASSIUM CHLORIDE 10 MEQ: 7.46 INJECTION, SOLUTION INTRAVENOUS at 18:23

## 2023-07-04 RX ADMIN — POTASSIUM CHLORIDE 10 MEQ: 7.46 INJECTION, SOLUTION INTRAVENOUS at 15:29

## 2023-07-04 RX ADMIN — HYDRALAZINE HYDROCHLORIDE 5 MG: 20 INJECTION INTRAMUSCULAR; INTRAVENOUS at 16:20

## 2023-07-04 ASSESSMENT — PAIN DESCRIPTION - DESCRIPTORS: DESCRIPTORS: ACHING

## 2023-07-04 ASSESSMENT — PAIN DESCRIPTION - LOCATION: LOCATION: HEAD

## 2023-07-04 ASSESSMENT — PAIN SCALES - GENERAL: PAINLEVEL_OUTOF10: 7

## 2023-07-04 NOTE — PROGRESS NOTES
Family at the bedside, decided they would like for patient to have pacemaker.  EP notified via perfect serve

## 2023-07-04 NOTE — PROGRESS NOTES
[facility]  Neuro Inpatient Follow Up        Yelitza López is a 80 y.o. right handed female     Neuro is following for altered mental status    Significant PMH: A-fib on Eliquis, CAD, CHF, HTN, colitis    HPI:  The patient was admitted on 7/1 after her family witnessed her become unconscious for 10 to 15 minutes. EMS was called and she was given atropine and pacer was placed prior to arrival at the ED. she returned to baseline within a few minutes. Apparently this is also happened several times in the past, but this episode was longer. She was bradycardic in the ED in the high 40s with blood pressure up to 195/97. CT head was without any large stroke, intracranial hemorrhage, or mass effect reported. On independent review of the CT head there is evidence of moderate to severe diffuse white matter disease. Subjective:  Yesterday, there was a question of decreased visual field on the left. 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 On personal review I also appreciate T2 hyperintensities in her bilateral basal ganglia--PRES vs hypoxia in origin. She feels a little better today and her daughter agrees. EEG was normal. EP is following and there are discussions about adjusting medications, implanted cardiac monitor versus pacemaker.   SBPs have been in the 150s-60s/100s    No chest pain or palpitations  No SOB  No vertigo, lightheadedness or loss of consciousness  No falls, tripping or stumbling  No incontinence of bowels or bladder  No itching or bruising appreciated  No numbness, tingling or focal arm/leg weakness  No speech or swallowing troubles    ROS otherwise negative     Current Facility-Administered Medications   Medication Dose Route Frequency Provider Last Rate Last Admin    losartan (COZAAR) tablet 100 mg  100 mg Oral Daily Mandi Anand

## 2023-07-04 NOTE — PROGRESS NOTES
The patient's daughters Dana Velazquez and Batsheva Ye were called and the options of pacemaker vs discontinuing Amiodarone and placement of an ILR with the understanding that a pacemaker maybe required in the future pending the findings on the ILR. The family will discuss the options of PPM VS ILR today.

## 2023-07-04 NOTE — CONSULTS
atrial fibrillation (HCC)    Bradycardia    Chronic anticoagulation--Eliquis    Syncope and collapse    Moderate protein-calorie malnutrition (HCC)    Chest pain    Chronic combined systolic (congestive) and diastolic (congestive) heart failure (HCC)    Abnormal urinalysis    Orthostatic hypotension    Coronary artery disease involving native coronary artery of native heart without angina pectoris    Urinary tract infection without hematuria    Chronic congestive heart failure (HCC)    Gastroesophageal reflux disease    Symptomatic bradycardia    Hypokalemia    Sinus bradycardia    Proctocolitis    Altered mental status, unspecified    who presents with recurrent syncope and bradycardia. 1. Syncope  - Recurrent syncope. - Unclear etiology whether from vasovagal or cardiac syncope from SND. - Last echocardiogram 4/24/23 showed LV EF 53%. - Last [facility] 11/12/19 showed no significant CAD. 2. Sinus bradycardia   - Transient.  - Can not exclude sinus node dysfunction versus vagal response. - Worsened with medications e.g Amiodarone or AV node blocker agents. 3. Prolonged Qtc   - Qtc 505 ms 7/4/23.  - On Amiodarone. 4. History of paroxysmal atrial fibrillation  - No EKG during AF is available for reviewed. - [id number] of 5  - On Amiodarone for rhythm control.  - No AV node blocker agents due to history of bradycardia. - On Eliquis for stroke risk reduction.  - Presents in NSR.    5. History of nonischemic cardiomyopathy and chronic HFrecEF  - Recovered LV EF  - LV EF 38% on TTE 8/31/21.  - LV EF 60-65% on TTE 7/15/22. - LV EF 53% on TTE 4/24/23.  - On Cozaar.  - Off Beta-blocker due to bradycardia. - Compensated and euvolemic.  - NYHA class III, ACC/AHA stage C    6. Nonobstructive coronary artery disease  - [facility] on 11/12/19 showed no significant CAD. 7. Hypertension  - On Cozaar. 8. Hyperlipidemia    9. Dementia    Recommendations:  Discontinue Amiodarone for now.   Consider implantable loop

## 2023-07-05 LAB
ANION GAP SERPL CALCULATED.3IONS-SCNC: 9 MMOL/L (ref 7–16)
BUN SERPL-MCNC: 5 MG/DL (ref 6–23)
CALCIUM SERPL-MCNC: 8.7 MG/DL (ref 8.6–10.2)
CHLORIDE SERPL-SCNC: 113 MMOL/L (ref 98–107)
CO2 SERPL-SCNC: 15 MMOL/L (ref 22–29)
CREAT SERPL-MCNC: 0.6 MG/DL (ref 0.5–1)
GLUCOSE SERPL-MCNC: 100 MG/DL (ref 74–99)
POTASSIUM SERPL-SCNC: 3.9 MMOL/L (ref 3.5–5)
SODIUM SERPL-SCNC: 137 MMOL/L (ref 132–146)

## 2023-07-05 PROCEDURE — C1764 EVENT RECORDER, CARDIAC: HCPCS

## 2023-07-05 PROCEDURE — 6360000002 HC RX W HCPCS: Performed by: INTERNAL MEDICINE

## 2023-07-05 PROCEDURE — 2580000003 HC RX 258: Performed by: INTERNAL MEDICINE

## 2023-07-05 PROCEDURE — 6370000000 HC RX 637 (ALT 250 FOR IP): Performed by: INTERNAL MEDICINE

## 2023-07-05 PROCEDURE — 2580000003 HC RX 258

## 2023-07-05 PROCEDURE — 80048 BASIC METABOLIC PNL TOTAL CA: CPT

## 2023-07-05 PROCEDURE — 36415 COLL VENOUS BLD VENIPUNCTURE: CPT

## 2023-07-05 PROCEDURE — 2500000003 HC RX 250 WO HCPCS: Performed by: INTERNAL MEDICINE

## 2023-07-05 PROCEDURE — 0JH632Z INSERTION OF MONITORING DEVICE INTO CHEST SUBCUTANEOUS TISSUE AND FASCIA, PERCUTANEOUS APPROACH: ICD-10-PCS | Performed by: INTERNAL MEDICINE

## 2023-07-05 PROCEDURE — 2060000000 HC ICU INTERMEDIATE R&B

## 2023-07-05 PROCEDURE — 6360000002 HC RX W HCPCS

## 2023-07-05 PROCEDURE — 2500000003 HC RX 250 WO HCPCS

## 2023-07-05 PROCEDURE — 99233 SBSQ HOSP IP/OBS HIGH 50: CPT | Performed by: INTERNAL MEDICINE

## 2023-07-05 PROCEDURE — 33285 INSJ SUBQ CAR RHYTHM MNTR: CPT

## 2023-07-05 PROCEDURE — 33285 INSJ SUBQ CAR RHYTHM MNTR: CPT | Performed by: INTERNAL MEDICINE

## 2023-07-05 RX ORDER — LABETALOL HYDROCHLORIDE 5 MG/ML
10 INJECTION, SOLUTION INTRAVENOUS EVERY 6 HOURS PRN
Status: DISCONTINUED | OUTPATIENT
Start: 2023-07-05 | End: 2023-07-06

## 2023-07-05 RX ADMIN — METRONIDAZOLE 500 MG: 500 INJECTION, SOLUTION INTRAVENOUS at 09:24

## 2023-07-05 RX ADMIN — LOSARTAN POTASSIUM 100 MG: 50 TABLET, FILM COATED ORAL at 20:43

## 2023-07-05 RX ADMIN — DOXYCYCLINE 100 MG: 100 INJECTION, POWDER, LYOPHILIZED, FOR SOLUTION INTRAVENOUS at 09:27

## 2023-07-05 RX ADMIN — HYDRALAZINE HYDROCHLORIDE 5 MG: 20 INJECTION INTRAMUSCULAR; INTRAVENOUS at 16:44

## 2023-07-05 RX ADMIN — SODIUM CHLORIDE: 9 INJECTION, SOLUTION INTRAVENOUS at 01:08

## 2023-07-05 RX ADMIN — SODIUM CHLORIDE: 9 INJECTION, SOLUTION INTRAVENOUS at 20:41

## 2023-07-05 RX ADMIN — METRONIDAZOLE 500 MG: 500 INJECTION, SOLUTION INTRAVENOUS at 16:53

## 2023-07-05 RX ADMIN — APIXABAN 2.5 MG: 5 TABLET, FILM COATED ORAL at 20:42

## 2023-07-05 RX ADMIN — METRONIDAZOLE 500 MG: 500 INJECTION, SOLUTION INTRAVENOUS at 01:51

## 2023-07-05 RX ADMIN — LACTULOSE 20 G: 20 SOLUTION ORAL at 20:43

## 2023-07-05 RX ADMIN — LABETALOL HYDROCHLORIDE 10 MG: 5 INJECTION INTRAVENOUS at 23:53

## 2023-07-05 RX ADMIN — HYDRALAZINE HYDROCHLORIDE 5 MG: 20 INJECTION INTRAMUSCULAR; INTRAVENOUS at 09:12

## 2023-07-05 RX ADMIN — DOXYCYCLINE 100 MG: 100 INJECTION, POWDER, LYOPHILIZED, FOR SOLUTION INTRAVENOUS at 20:44

## 2023-07-05 RX ADMIN — LACTULOSE 20 G: 20 SOLUTION ORAL at 16:49

## 2023-07-05 ASSESSMENT — PAIN SCALES - GENERAL: PAINLEVEL_OUTOF10: 5

## 2023-07-05 NOTE — OP NOTE
[address] [facility] and [facility] Electrophysiology  Procedure Report  PATIENT: [address] [facility] RECORD NUMBER: [id number]  DATE OF PROCEDURE:  7/5/2023  ATTENDING ELECTROPHYSIOLOGIST:  Karoline Bass MD  REFERRING PHYSICIAN: Dr. Reny Vidal:   1. LINQ Insertable Cardiac Monitor Implantation     INDICATION: Syncope    PROCEDURE PERFORMED BY: Karoline Bass MD    ASSISTANT: None     ESTIMATED BLOOD LOSS: Approximately <10 cc     PROCEDURE TIME: 15 minutes    COMPLICATIONS: None immediately apparent    DESCRIPTION OF PROCEDURE: The risks, benefits, and alternatives to the procedure were discussed with the patient, and informed consent was obtained. The patient was brought to the Electrophysiology lab and after postioning the patient and prepping and draping a sterile field, the region lateral to the sternum, was liberally infiltrated with 2% Lidocaine. Using the skin blade, a small nick was made. Using the introducer kit, the device was deployed into the subcutaneous space. Hemostasis was achieved with brief manual pressure. The incision was closed in 1 layer including using 4-0 Monocryl. The wound was dressed with steri-strips and an op site dressing. Excellent P wave and QRS sensing were obtained. The patient was transferred to the recovery room in stable condition. DEVICE INFORMATION: The ICM is a [employer]onic Linq, model #: [id number], serial #: [id number]. SUMMARY:   1. Successful implantation of a LINQ insertable cardiac monitor. RECOMMENDATIONS:   1. OK to discharge to home per EP perspectives when is OK with others. 2. Discontinue Amiodarone permanently for now and avoid AV node blocker agent. 3. Resume Eliquis tonight. 4. Follow-up in the office in 2 weeks for a postoperative incision check.   See discharge instructions for details    Karoline Bass MD  Cardiac Electrophysiology  [facility]) [facility]

## 2023-07-05 NOTE — CARE COORDINATION
SOCIAL WORK/CASEMANAGEMENT TRANSITION OF CARE PLANNINGOscar Humberto DONOHUE, [address] [facility]): I met with pt and brielle this a.m. in the room. Pt is for a loop recorder with EP today. The plan is home with [facility], referral made and will need orders for nsg, PT and OT and that it is ok to see pt on 7/10. Left note for rn. ESTEFANIA Pinto  7/5/2023  Pt will be going to [address] [facility] to son's don's home. The next available is Monday the 10th of July.   ESTEFANIA Pinto  7/5/2023

## 2023-07-05 NOTE — HOME CARE
[address] [facility] received referral. Will follow after discharge.  Spoke with patient son Roberto Hobson) and verified mya's  [address]

## 2023-07-05 NOTE — PROGRESS NOTES
Daughter, Rosio Puentes, at the bedside. States she has no further questions regarding placement of implantable loop recorder.  Conset obtained and placed in soft chart

## 2023-07-05 NOTE — PROGRESS NOTES
Daughter, Dana Velazquez, at the bedside. States that she does not want the pacemaker but would like to go with the other option that was discussed.  EP notified via perfect serve

## 2023-07-05 NOTE — PROGRESS NOTES
[address] [facility] and [facility] Electrophysiology  Inpatient Progress Report  PATIENT: [address] RECORD NUMBER: [id number]  DATE OF SERVICE:  7/5/2023  ATTENDING ELECTROPHYSIOLOGIST: Karoline Bass MD   PRIMARY ELECTROPHYSIOLOGIST: Karoline Bass MD  REFERRING PHYSICIAN: Caroline Garcia MD  CHIEF COMPLAINT: Syncope    HPI: This is a 80 y.o. female with a history of   Patient Active Problem List   Diagnosis    Systolic hypertension    Primary hypertension    Syncope    Paroxysmal atrial fibrillation (HCC)    Bradycardia    Chronic anticoagulation--Eliquis    Syncope and collapse    Moderate protein-calorie malnutrition (HCC)    Chest pain    Chronic combined systolic (congestive) and diastolic (congestive) heart failure (HCC)    Abnormal urinalysis    Orthostatic hypotension    Coronary artery disease involving native coronary artery of native heart without angina pectoris    Urinary tract infection without hematuria    Chronic congestive heart failure (HCC)    Gastroesophageal reflux disease    Symptomatic bradycardia    Hypokalemia    Sinus bradycardia    Proctocolitis    Altered mental status, unspecified    Recurrent episodes of unresponsiveness   who presented to the hospital on 7/1/23 after found unresponsive at home. The patient confused and can not give out detail history. The patient was first seen by cardiac electrophysiology service when she presented to the hospital in April 2023 after she had syncopal episode after using the bathroom and found on the floor. At that time EMS was called and found the patient's HR at 32 bpm. IV Atropine was given. In ED she was noted to be in NSR in high 40's to 50's bpm. She was discharged home  and was advised to stop Coreg and Hydralazine.  She had similar episodes in July 2022 when she was started on Coreg 3.125 mg BID and continue on Amiodarone 200 mg daily, in September 2022 when Coreg was discontinued

## 2023-07-06 ENCOUNTER — APPOINTMENT (OUTPATIENT)
Dept: CT IMAGING | Age: 88
DRG: 689 | End: 2023-07-06
Payer: COMMERCIAL

## 2023-07-06 ENCOUNTER — TELEPHONE (OUTPATIENT)
Dept: OTHER | Facility: CLINIC | Age: 88
End: 2023-07-06

## 2023-07-06 ENCOUNTER — APPOINTMENT (OUTPATIENT)
Dept: GENERAL RADIOLOGY | Age: 88
DRG: 689 | End: 2023-07-06
Payer: COMMERCIAL

## 2023-07-06 ENCOUNTER — HOSPITAL ENCOUNTER (INPATIENT)
Age: 88
LOS: 10 days | Discharge: NURSING, REHAB OR LONG-TERM CARE | DRG: 689 | End: 2023-07-17
Attending: STUDENT IN AN ORGANIZED HEALTH CARE EDUCATION/TRAINING PROGRAM | Admitting: INTERNAL MEDICINE
Payer: COMMERCIAL

## 2023-07-06 VITALS
TEMPERATURE: 98.2 F | SYSTOLIC BLOOD PRESSURE: 134 MMHG | DIASTOLIC BLOOD PRESSURE: 79 MMHG | WEIGHT: 135 LBS | OXYGEN SATURATION: 100 % | BODY MASS INDEX: 21.14 KG/M2 | RESPIRATION RATE: 18 BRPM | HEART RATE: 60 BPM

## 2023-07-06 DIAGNOSIS — G93.41 METABOLIC ENCEPHALOPATHY: ICD-10-CM

## 2023-07-06 DIAGNOSIS — R55 SYNCOPE AND COLLAPSE: Primary | ICD-10-CM

## 2023-07-06 DIAGNOSIS — N39.0 URINARY TRACT INFECTION WITH HEMATURIA, SITE UNSPECIFIED: ICD-10-CM

## 2023-07-06 DIAGNOSIS — R94.31 PROLONGED QT INTERVAL: ICD-10-CM

## 2023-07-06 DIAGNOSIS — R31.9 URINARY TRACT INFECTION WITH HEMATURIA, SITE UNSPECIFIED: ICD-10-CM

## 2023-07-06 DIAGNOSIS — R41.0 CONFUSION: ICD-10-CM

## 2023-07-06 LAB
ALBUMIN SERPL-MCNC: 3.6 G/DL (ref 3.5–5.2)
ALP SERPL-CCNC: 50 U/L (ref 35–104)
ALT SERPL-CCNC: 12 U/L (ref 0–32)
ANION GAP SERPL CALCULATED.3IONS-SCNC: 12 MMOL/L (ref 7–16)
ANION GAP SERPL CALCULATED.3IONS-SCNC: 14 MMOL/L (ref 7–16)
AST SERPL-CCNC: 34 U/L (ref 0–31)
BACTERIA URNS QL MICRO: ABNORMAL /HPF
BASOPHILS # BLD: 0.03 E9/L (ref 0–0.2)
BASOPHILS NFR BLD: 0.5 % (ref 0–2)
BILIRUB SERPL-MCNC: 0.7 MG/DL (ref 0–1.2)
BILIRUB UR QL STRIP: NEGATIVE
BNP BLD-MCNC: 1162 PG/ML (ref 0–450)
BUN SERPL-MCNC: 5 MG/DL (ref 6–23)
BUN SERPL-MCNC: 8 MG/DL (ref 6–23)
CALCIUM SERPL-MCNC: 8.9 MG/DL (ref 8.6–10.2)
CALCIUM SERPL-MCNC: 9.1 MG/DL (ref 8.6–10.2)
CHLORIDE SERPL-SCNC: 107 MMOL/L (ref 98–107)
CHLORIDE SERPL-SCNC: 110 MMOL/L (ref 98–107)
CLARITY UR: CLEAR
CO2 SERPL-SCNC: 17 MMOL/L (ref 22–29)
CO2 SERPL-SCNC: 19 MMOL/L (ref 22–29)
COLOR UR: YELLOW
CREAT SERPL-MCNC: 0.7 MG/DL (ref 0.5–1)
CREAT SERPL-MCNC: 0.8 MG/DL (ref 0.5–1)
EKG ATRIAL RATE: 100 BPM
EKG P AXIS: -10 DEGREES
EKG P-R INTERVAL: 162 MS
EKG Q-T INTERVAL: 392 MS
EKG QRS DURATION: 112 MS
EKG QTC CALCULATION (BAZETT): 505 MS
EKG R AXIS: -58 DEGREES
EKG T AXIS: 70 DEGREES
EKG VENTRICULAR RATE: 100 BPM
EOSINOPHIL # BLD: 0.03 E9/L (ref 0.05–0.5)
EOSINOPHIL NFR BLD: 0.5 % (ref 0–6)
ERYTHROCYTE [DISTWIDTH] IN BLOOD BY AUTOMATED COUNT: 15.1 FL (ref 11.5–15)
GLUCOSE SERPL-MCNC: 102 MG/DL (ref 74–99)
GLUCOSE SERPL-MCNC: 117 MG/DL (ref 74–99)
GLUCOSE UR STRIP-MCNC: NEGATIVE MG/DL
HCT VFR BLD AUTO: 38.8 % (ref 34–48)
HGB BLD-MCNC: 13 G/DL (ref 11.5–15.5)
HGB UR QL STRIP: NEGATIVE
IMM GRANULOCYTES # BLD: 0.01 E9/L
IMM GRANULOCYTES NFR BLD: 0.2 % (ref 0–5)
KETONES UR STRIP-MCNC: NEGATIVE MG/DL
LEUKOCYTE ESTERASE UR QL STRIP: ABNORMAL
LYMPHOCYTES # BLD: 0.78 E9/L (ref 1.5–4)
LYMPHOCYTES NFR BLD: 13 % (ref 20–42)
MCH RBC QN AUTO: 30 PG (ref 26–35)
MCHC RBC AUTO-ENTMCNC: 33.5 % (ref 32–34.5)
MCV RBC AUTO: 89.6 FL (ref 80–99.9)
MONOCYTES # BLD: 0.68 E9/L (ref 0.1–0.95)
MONOCYTES NFR BLD: 11.4 % (ref 2–12)
NEUTROPHILS # BLD: 4.46 E9/L (ref 1.8–7.3)
NEUTS SEG NFR BLD: 74.4 % (ref 43–80)
NITRITE UR QL STRIP: POSITIVE
PH UR STRIP: 6 [PH] (ref 5–9)
PLATELET # BLD AUTO: 164 E9/L (ref 130–450)
PMV BLD AUTO: 9.8 FL (ref 7–12)
POTASSIUM SERPL-SCNC: 3.5 MMOL/L (ref 3.5–5)
POTASSIUM SERPL-SCNC: 4.5 MMOL/L (ref 3.5–5)
PROT SERPL-MCNC: 7.1 G/DL (ref 6.4–8.3)
PROT UR STRIP-MCNC: NEGATIVE MG/DL
RBC # BLD AUTO: 4.33 E12/L (ref 3.5–5.5)
RBC #/AREA URNS HPF: ABNORMAL /HPF (ref 0–2)
REASON FOR REJECTION: NORMAL
REJECTED TEST: NORMAL
SODIUM SERPL-SCNC: 138 MMOL/L (ref 132–146)
SODIUM SERPL-SCNC: 141 MMOL/L (ref 132–146)
SP GR UR STRIP: 1.01 (ref 1–1.03)
TROPONIN, HIGH SENSITIVITY: 25 NG/L (ref 0–9)
UROBILINOGEN UR STRIP-ACNC: 0.2 E.U./DL
WBC # BLD: 6 E9/L (ref 4.5–11.5)
WBC #/AREA URNS HPF: ABNORMAL /HPF (ref 0–5)
YEAST URNS QL MICRO: PRESENT /HPF

## 2023-07-06 PROCEDURE — 80053 COMPREHEN METABOLIC PANEL: CPT

## 2023-07-06 PROCEDURE — 36415 COLL VENOUS BLD VENIPUNCTURE: CPT

## 2023-07-06 PROCEDURE — 6370000000 HC RX 637 (ALT 250 FOR IP): Performed by: INTERNAL MEDICINE

## 2023-07-06 PROCEDURE — 99285 EMERGENCY DEPT VISIT HI MDM: CPT

## 2023-07-06 PROCEDURE — 71045 X-RAY EXAM CHEST 1 VIEW: CPT

## 2023-07-06 PROCEDURE — 93005 ELECTROCARDIOGRAM TRACING: CPT | Performed by: STUDENT IN AN ORGANIZED HEALTH CARE EDUCATION/TRAINING PROGRAM

## 2023-07-06 PROCEDURE — 6370000000 HC RX 637 (ALT 250 FOR IP)

## 2023-07-06 PROCEDURE — 6360000002 HC RX W HCPCS: Performed by: INTERNAL MEDICINE

## 2023-07-06 PROCEDURE — 80048 BASIC METABOLIC PNL TOTAL CA: CPT

## 2023-07-06 PROCEDURE — 70450 CT HEAD/BRAIN W/O DYE: CPT

## 2023-07-06 PROCEDURE — 81001 URINALYSIS AUTO W/SCOPE: CPT

## 2023-07-06 PROCEDURE — 83880 ASSAY OF NATRIURETIC PEPTIDE: CPT

## 2023-07-06 PROCEDURE — 85025 COMPLETE CBC W/AUTO DIFF WBC: CPT

## 2023-07-06 PROCEDURE — 84484 ASSAY OF TROPONIN QUANT: CPT

## 2023-07-06 PROCEDURE — 96374 THER/PROPH/DIAG INJ IV PUSH: CPT

## 2023-07-06 RX ORDER — LOSARTAN POTASSIUM 100 MG/1
100 TABLET ORAL DAILY
Qty: 30 TABLET | Refills: 3 | Status: ON HOLD | OUTPATIENT
Start: 2023-07-06

## 2023-07-06 RX ORDER — METRONIDAZOLE 500 MG/1
500 TABLET ORAL EVERY 8 HOURS SCHEDULED
Status: DISCONTINUED | OUTPATIENT
Start: 2023-07-06 | End: 2023-07-06 | Stop reason: HOSPADM

## 2023-07-06 RX ORDER — HYDRALAZINE HYDROCHLORIDE 50 MG/1
50 TABLET, FILM COATED ORAL EVERY 8 HOURS SCHEDULED
Qty: 90 TABLET | Refills: 3 | Status: ON HOLD | OUTPATIENT
Start: 2023-07-06

## 2023-07-06 RX ORDER — METRONIDAZOLE 500 MG/1
500 TABLET ORAL EVERY 8 HOURS SCHEDULED
Qty: 21 TABLET | Refills: 0 | Status: ON HOLD | OUTPATIENT
Start: 2023-07-06 | End: 2023-07-07 | Stop reason: HOSPADM

## 2023-07-06 RX ORDER — DOXYCYCLINE HYCLATE 100 MG/1
100 CAPSULE ORAL EVERY 12 HOURS SCHEDULED
Status: DISCONTINUED | OUTPATIENT
Start: 2023-07-06 | End: 2023-07-06 | Stop reason: HOSPADM

## 2023-07-06 RX ORDER — HYDRALAZINE HYDROCHLORIDE 50 MG/1
50 TABLET, FILM COATED ORAL EVERY 8 HOURS SCHEDULED
Status: DISCONTINUED | OUTPATIENT
Start: 2023-07-06 | End: 2023-07-06 | Stop reason: HOSPADM

## 2023-07-06 RX ADMIN — METRONIDAZOLE 500 MG: 500 INJECTION, SOLUTION INTRAVENOUS at 01:00

## 2023-07-06 RX ADMIN — APIXABAN 2.5 MG: 5 TABLET, FILM COATED ORAL at 08:53

## 2023-07-06 RX ADMIN — PANTOPRAZOLE SODIUM 40 MG: 40 TABLET, DELAYED RELEASE ORAL at 05:09

## 2023-07-06 RX ADMIN — METRONIDAZOLE 500 MG: 500 TABLET ORAL at 15:32

## 2023-07-06 RX ADMIN — HYDRALAZINE HYDROCHLORIDE 50 MG: 50 TABLET, FILM COATED ORAL at 08:54

## 2023-07-06 ASSESSMENT — PAIN SCALES - GENERAL
PAINLEVEL_OUTOF10: 0
PAINLEVEL_OUTOF10: 0

## 2023-07-06 ASSESSMENT — PAIN - FUNCTIONAL ASSESSMENT: PAIN_FUNCTIONAL_ASSESSMENT: NONE - DENIES PAIN

## 2023-07-06 NOTE — PROGRESS NOTES
Spoke with cirss TABARES regarding pt BP and loss of IV access. RN unable to obtain IV access. Jitendra Llanos NP stated pt okay without access at this time and to speak with rounding doctor in AM about switching pt to oral antibiotics.

## 2023-07-06 NOTE — NURSE NAVIGATOR
Met with Zhang Winter and her daughter to review post op care of her LINQ. Pt and family had previously been educated on her post op instructions and verbalize understanding. We reviewed to remove the dressing next Wednesday 7/12/23 and s/s of infection to be watching for. They are aware to leave the steri strips intact and of her fu appt at the device clinic for an incision and device check. They had no questions. I will fu by phone next week.

## 2023-07-06 NOTE — TELEPHONE ENCOUNTER
Writer contacted Dr. Cordell Glaser to inform of 30 day readmission risk. 's attempt to contact Dr. Cordell Glaser was unsuccessful.      Call Back: If you need to call back to inform of disposition you can contact me at [phone]

## 2023-07-06 NOTE — PROGRESS NOTES
Spoke with patient's daughter, Kya Sanchez, notified her of discharge order. She is heading to the hospital soon.

## 2023-07-06 NOTE — CARE COORDINATION
SOCIAL WORK/CASEMANAGEMENT TRANSITION OF CARE PLANNINGHarmeetmikael DONOHUE, [address] [facility]):  son , Kelly Tapia and daughter, Miguel Angel Husbands, are in the room with pt. [facility] is setup. Orders are in chart. Pod 1 loop recorder with EP. Pt is on iv flagyl, and lopez, rn checking with pcp to see if they are going to change it to p.o. ESTEFANIA Rodriguez  7/6/2023    Home today, [facility] notified.  ESTEFANIA Rodriguez  7/6/2023

## 2023-07-06 NOTE — DISCHARGE INSTR - COC
Continuity of Care Form    Patient Name: Travis Gunn   :  1931  MRN:  [id number]    Admit date:  2023  Discharge date:  ***    Code Status Order: Full Code   Advance Directives:     Admitting Physician:  Azeem Emery MD  PCP: Smita Pool MD    Discharging Nurse: [facility] Unit/Room#: 8502/8502-B  Discharging Unit Phone Number: ***    Emergency Contact:   Extended Emergency Contact Information  Primary Emergency Contact: [address] [facility] Phone: [phone]  Mobile Phone: [phone]  Relation: Child   needed? No  Secondary Emergency Contact: [address] [place] Phone: [phone]  Mobile Phone: [phone]  Relation: Child   needed? No    Past Surgical History:  Past Surgical History:   Procedure Laterality Date    ANKLE FRACTURE SURGERY      CARDIAC CATHETERIZATION  2019    Dr. Cheng Perales    COLONOSCOPY      ENDOSCOPY, COLON, DIAGNOSTIC      FRACTURE SURGERY      INSERTABLE CARDIAC MONITOR  2023    Dr. Pau Bautista       Immunization History: There is no immunization history for the selected administration types on file for this patient.     Active Problems:  Patient Active Problem List   Diagnosis Code    Systolic hypertension Y91    Primary hypertension I10    Syncope R55    Paroxysmal atrial fibrillation (HCC) I48.0    Bradycardia R00.1    Chronic anticoagulation--Eliquis Z79.01    Syncope and collapse R55    Moderate protein-calorie malnutrition (HCC) E44.0    Chest pain R07.9    Chronic combined systolic (congestive) and diastolic (congestive) heart failure (HCC) I50.42    Abnormal urinalysis R82.90    Orthostatic hypotension I95.1    Coronary artery disease involving native coronary artery of native heart without angina pectoris I25.10    Urinary tract infection without hematuria N39.0    Chronic congestive heart failure (HCC) I50.9    Gastroesophageal reflux disease K21.9    Symptomatic bradycardia R00.1    Hypokalemia E87.6    Sinus

## 2023-07-06 NOTE — PROGRESS NOTES
CLINICAL PHARMACY NOTE: MEDS TO BEDS    Total # of Prescriptions Filled: 3   The following medications were delivered to the patient:  Hydralazine 50mg  Losartan 100mg  Metronidazole 500mg    Additional Documentation:  Picked up

## 2023-07-07 PROBLEM — N39.0 UTI (URINARY TRACT INFECTION): Status: ACTIVE | Noted: 2023-07-07

## 2023-07-07 PROBLEM — G93.41 ACUTE METABOLIC ENCEPHALOPATHY: Status: ACTIVE | Noted: 2023-07-07

## 2023-07-07 LAB
BASOPHILS # BLD: 0.03 E9/L (ref 0–0.2)
BASOPHILS NFR BLD: 0.6 % (ref 0–2)
EKG ATRIAL RATE: 60 BPM
EKG P AXIS: 26 DEGREES
EKG P-R INTERVAL: 176 MS
EKG Q-T INTERVAL: 536 MS
EKG QRS DURATION: 110 MS
EKG QTC CALCULATION (BAZETT): 536 MS
EKG R AXIS: -55 DEGREES
EKG T AXIS: 5 DEGREES
EKG VENTRICULAR RATE: 60 BPM
EOSINOPHIL # BLD: 0.07 E9/L (ref 0.05–0.5)
EOSINOPHIL NFR BLD: 1.4 % (ref 0–6)
ERYTHROCYTE [DISTWIDTH] IN BLOOD BY AUTOMATED COUNT: 14.9 FL (ref 11.5–15)
HCT VFR BLD AUTO: 36.8 % (ref 34–48)
HGB BLD-MCNC: 12.7 G/DL (ref 11.5–15.5)
IMM GRANULOCYTES # BLD: 0.01 E9/L
IMM GRANULOCYTES NFR BLD: 0.2 % (ref 0–5)
LYMPHOCYTES # BLD: 1.11 E9/L (ref 1.5–4)
LYMPHOCYTES NFR BLD: 22.8 % (ref 20–42)
MCH RBC QN AUTO: 30.2 PG (ref 26–35)
MCHC RBC AUTO-ENTMCNC: 34.5 % (ref 32–34.5)
MCV RBC AUTO: 87.6 FL (ref 80–99.9)
MONOCYTES # BLD: 0.68 E9/L (ref 0.1–0.95)
MONOCYTES NFR BLD: 14 % (ref 2–12)
NEUTROPHILS # BLD: 2.96 E9/L (ref 1.8–7.3)
NEUTS SEG NFR BLD: 61 % (ref 43–80)
PLATELET # BLD AUTO: 156 E9/L (ref 130–450)
PMV BLD AUTO: 9.8 FL (ref 7–12)
RBC # BLD AUTO: 4.2 E12/L (ref 3.5–5.5)
REASON FOR REJECTION: NORMAL
REASON FOR REJECTION: NORMAL
REJECTED TEST: NORMAL
REJECTED TEST: NORMAL
TROPONIN, HIGH SENSITIVITY: 27 NG/L (ref 0–9)
WBC # BLD: 4.9 E9/L (ref 4.5–11.5)

## 2023-07-07 PROCEDURE — 97530 THERAPEUTIC ACTIVITIES: CPT

## 2023-07-07 PROCEDURE — 6360000002 HC RX W HCPCS: Performed by: STUDENT IN AN ORGANIZED HEALTH CARE EDUCATION/TRAINING PROGRAM

## 2023-07-07 PROCEDURE — 02HV33Z INSERTION OF INFUSION DEVICE INTO SUPERIOR VENA CAVA, PERCUTANEOUS APPROACH: ICD-10-PCS | Performed by: INTERNAL MEDICINE

## 2023-07-07 PROCEDURE — C1751 CATH, INF, PER/CENT/MIDLINE: HCPCS

## 2023-07-07 PROCEDURE — 97165 OT EVAL LOW COMPLEX 30 MIN: CPT

## 2023-07-07 PROCEDURE — 93010 ELECTROCARDIOGRAM REPORT: CPT | Performed by: INTERNAL MEDICINE

## 2023-07-07 PROCEDURE — 36415 COLL VENOUS BLD VENIPUNCTURE: CPT

## 2023-07-07 PROCEDURE — 36410 VNPNXR 3YR/> PHY/QHP DX/THER: CPT

## 2023-07-07 PROCEDURE — 6360000002 HC RX W HCPCS: Performed by: INTERNAL MEDICINE

## 2023-07-07 PROCEDURE — 76937 US GUIDE VASCULAR ACCESS: CPT

## 2023-07-07 PROCEDURE — 2580000003 HC RX 258: Performed by: NURSE PRACTITIONER

## 2023-07-07 PROCEDURE — 87077 CULTURE AEROBIC IDENTIFY: CPT

## 2023-07-07 PROCEDURE — 87088 URINE BACTERIA CULTURE: CPT

## 2023-07-07 PROCEDURE — 93285 PRGRMG DEV EVAL SCRMS IP: CPT | Performed by: INTERNAL MEDICINE

## 2023-07-07 PROCEDURE — 97161 PT EVAL LOW COMPLEX 20 MIN: CPT

## 2023-07-07 PROCEDURE — 84484 ASSAY OF TROPONIN QUANT: CPT

## 2023-07-07 PROCEDURE — 6370000000 HC RX 637 (ALT 250 FOR IP): Performed by: NURSE PRACTITIONER

## 2023-07-07 PROCEDURE — 6360000002 HC RX W HCPCS: Performed by: NURSE PRACTITIONER

## 2023-07-07 PROCEDURE — 2580000003 HC RX 258: Performed by: STUDENT IN AN ORGANIZED HEALTH CARE EDUCATION/TRAINING PROGRAM

## 2023-07-07 PROCEDURE — 99223 1ST HOSP IP/OBS HIGH 75: CPT | Performed by: INTERNAL MEDICINE

## 2023-07-07 PROCEDURE — 87186 SC STD MICRODIL/AGAR DIL: CPT

## 2023-07-07 PROCEDURE — 2060000000 HC ICU INTERMEDIATE R&B

## 2023-07-07 PROCEDURE — 85025 COMPLETE CBC W/AUTO DIFF WBC: CPT

## 2023-07-07 PROCEDURE — 2580000003 HC RX 258: Performed by: INTERNAL MEDICINE

## 2023-07-07 RX ORDER — PANTOPRAZOLE SODIUM 40 MG/1
40 TABLET, DELAYED RELEASE ORAL DAILY
Status: DISCONTINUED | OUTPATIENT
Start: 2023-07-07 | End: 2023-07-17 | Stop reason: HOSPADM

## 2023-07-07 RX ORDER — SODIUM CHLORIDE 0.9 % (FLUSH) 0.9 %
5-40 SYRINGE (ML) INJECTION PRN
Status: DISCONTINUED | OUTPATIENT
Start: 2023-07-07 | End: 2023-07-17 | Stop reason: HOSPADM

## 2023-07-07 RX ORDER — AMIODARONE HYDROCHLORIDE 200 MG/1
100 TABLET ORAL
Status: DISCONTINUED | OUTPATIENT
Start: 2023-07-07 | End: 2023-07-07

## 2023-07-07 RX ORDER — LOSARTAN POTASSIUM 50 MG/1
100 TABLET ORAL DAILY
Status: DISCONTINUED | OUTPATIENT
Start: 2023-07-07 | End: 2023-07-17 | Stop reason: HOSPADM

## 2023-07-07 RX ORDER — SODIUM CHLORIDE 9 MG/ML
INJECTION, SOLUTION INTRAVENOUS PRN
Status: DISCONTINUED | OUTPATIENT
Start: 2023-07-07 | End: 2023-07-07 | Stop reason: SDUPTHER

## 2023-07-07 RX ORDER — SODIUM CHLORIDE 9 MG/ML
INJECTION, SOLUTION INTRAVENOUS PRN
Status: DISCONTINUED | OUTPATIENT
Start: 2023-07-07 | End: 2023-07-17 | Stop reason: HOSPADM

## 2023-07-07 RX ORDER — AMIODARONE HYDROCHLORIDE 200 MG/1
100 TABLET ORAL
Status: DISCONTINUED | OUTPATIENT
Start: 2023-07-08 | End: 2023-07-07

## 2023-07-07 RX ORDER — HYDRALAZINE HYDROCHLORIDE 50 MG/1
50 TABLET, FILM COATED ORAL EVERY 8 HOURS SCHEDULED
Status: DISCONTINUED | OUTPATIENT
Start: 2023-07-07 | End: 2023-07-13

## 2023-07-07 RX ORDER — 0.9 % SODIUM CHLORIDE 0.9 %
500 INTRAVENOUS SOLUTION INTRAVENOUS ONCE
Status: COMPLETED | OUTPATIENT
Start: 2023-07-07 | End: 2023-07-07

## 2023-07-07 RX ORDER — DOCUSATE SODIUM 100 MG/1
100 CAPSULE, LIQUID FILLED ORAL DAILY
Status: DISCONTINUED | OUTPATIENT
Start: 2023-07-07 | End: 2023-07-17 | Stop reason: HOSPADM

## 2023-07-07 RX ORDER — PROCHLORPERAZINE EDISYLATE 5 MG/ML
5 INJECTION INTRAMUSCULAR; INTRAVENOUS EVERY 6 HOURS PRN
Status: DISCONTINUED | OUTPATIENT
Start: 2023-07-07 | End: 2023-07-17 | Stop reason: HOSPADM

## 2023-07-07 RX ORDER — CEFDINIR 300 MG/1
300 CAPSULE ORAL DAILY
Qty: 4 CAPSULE | Refills: 0 | Status: SHIPPED | OUTPATIENT
Start: 2023-07-07 | End: 2023-07-17 | Stop reason: HOSPADM

## 2023-07-07 RX ORDER — HEPARIN SODIUM 100 [USP'U]/ML
1 INJECTION, SOLUTION INTRAVENOUS PRN
Status: DISCONTINUED | OUTPATIENT
Start: 2023-07-07 | End: 2023-07-17 | Stop reason: HOSPADM

## 2023-07-07 RX ORDER — BISACODYL 5 MG/1
5 TABLET, DELAYED RELEASE ORAL DAILY PRN
Status: DISCONTINUED | OUTPATIENT
Start: 2023-07-07 | End: 2023-07-17 | Stop reason: HOSPADM

## 2023-07-07 RX ORDER — 0.9 % SODIUM CHLORIDE 0.9 %
250 INTRAVENOUS SOLUTION INTRAVENOUS ONCE
Status: DISCONTINUED | OUTPATIENT
Start: 2023-07-07 | End: 2023-07-08

## 2023-07-07 RX ORDER — SODIUM CHLORIDE 0.9 % (FLUSH) 0.9 %
5-40 SYRINGE (ML) INJECTION EVERY 12 HOURS SCHEDULED
Status: DISCONTINUED | OUTPATIENT
Start: 2023-07-07 | End: 2023-07-17 | Stop reason: HOSPADM

## 2023-07-07 RX ORDER — HEPARIN SODIUM 100 [USP'U]/ML
1 INJECTION, SOLUTION INTRAVENOUS EVERY 12 HOURS SCHEDULED
Status: DISCONTINUED | OUTPATIENT
Start: 2023-07-07 | End: 2023-07-17 | Stop reason: HOSPADM

## 2023-07-07 RX ORDER — LACTULOSE 10 G/15ML
20 SOLUTION ORAL 2 TIMES DAILY
Status: DISCONTINUED | OUTPATIENT
Start: 2023-07-07 | End: 2023-07-17 | Stop reason: HOSPADM

## 2023-07-07 RX ORDER — HYDRALAZINE HYDROCHLORIDE 20 MG/ML
5 INJECTION INTRAMUSCULAR; INTRAVENOUS ONCE
Status: COMPLETED | OUTPATIENT
Start: 2023-07-07 | End: 2023-07-07

## 2023-07-07 RX ORDER — METRONIDAZOLE 500 MG/1
500 TABLET ORAL EVERY 8 HOURS SCHEDULED
Status: DISCONTINUED | OUTPATIENT
Start: 2023-07-07 | End: 2023-07-13

## 2023-07-07 RX ADMIN — WATER 1000 MG: 1 INJECTION INTRAMUSCULAR; INTRAVENOUS; SUBCUTANEOUS at 23:47

## 2023-07-07 RX ADMIN — SODIUM CHLORIDE, PRESERVATIVE FREE 10 ML: 5 INJECTION INTRAVENOUS at 20:01

## 2023-07-07 RX ADMIN — PROCHLORPERAZINE EDISYLATE 5 MG: 5 INJECTION INTRAMUSCULAR; INTRAVENOUS at 23:58

## 2023-07-07 RX ADMIN — METRONIDAZOLE 500 MG: 500 TABLET ORAL at 05:57

## 2023-07-07 RX ADMIN — APIXABAN 2.5 MG: 5 TABLET, FILM COATED ORAL at 08:09

## 2023-07-07 RX ADMIN — HYDRALAZINE HYDROCHLORIDE 5 MG: 20 INJECTION INTRAMUSCULAR; INTRAVENOUS at 01:22

## 2023-07-07 RX ADMIN — METRONIDAZOLE 500 MG: 500 TABLET ORAL at 15:36

## 2023-07-07 RX ADMIN — LOSARTAN POTASSIUM 100 MG: 50 TABLET, FILM COATED ORAL at 08:08

## 2023-07-07 RX ADMIN — METRONIDAZOLE 500 MG: 500 TABLET ORAL at 20:00

## 2023-07-07 RX ADMIN — APIXABAN 2.5 MG: 5 TABLET, FILM COATED ORAL at 20:01

## 2023-07-07 RX ADMIN — SODIUM CHLORIDE, PRESERVATIVE FREE 20 ML: 5 INJECTION INTRAVENOUS at 20:01

## 2023-07-07 RX ADMIN — SODIUM CHLORIDE 500 ML: 9 INJECTION, SOLUTION INTRAVENOUS at 16:48

## 2023-07-07 RX ADMIN — HYDRALAZINE HYDROCHLORIDE 50 MG: 50 TABLET, FILM COATED ORAL at 05:57

## 2023-07-07 RX ADMIN — HYDRALAZINE HYDROCHLORIDE 50 MG: 50 TABLET, FILM COATED ORAL at 22:01

## 2023-07-07 RX ADMIN — DOCUSATE SODIUM 100 MG: 100 CAPSULE, LIQUID FILLED ORAL at 08:09

## 2023-07-07 RX ADMIN — WATER 1000 MG: 1 INJECTION INTRAMUSCULAR; INTRAVENOUS; SUBCUTANEOUS at 00:31

## 2023-07-07 RX ADMIN — LACTULOSE 20 G: 20 SOLUTION ORAL at 08:10

## 2023-07-07 RX ADMIN — Medication 100 UNITS: at 20:01

## 2023-07-07 RX ADMIN — PANTOPRAZOLE SODIUM 40 MG: 40 TABLET, DELAYED RELEASE ORAL at 08:09

## 2023-07-07 RX ADMIN — HYDRALAZINE HYDROCHLORIDE 50 MG: 50 TABLET, FILM COATED ORAL at 15:35

## 2023-07-07 RX ADMIN — LACTULOSE 20 G: 20 SOLUTION ORAL at 20:01

## 2023-07-07 ASSESSMENT — PAIN SCALES - GENERAL
PAINLEVEL_OUTOF10: 0
PAINLEVEL_OUTOF10: 0

## 2023-07-07 NOTE — PROGRESS NOTES
Physician Progress Note      PATIENT:               Faraz Beltran  CSN #:                  [id number]  :                       1931  ADMIT DATE:       2023 3:10 PM  [address] [place] [address] DATE:        2023 4:11 PM  RESPONDING  PROVIDER #:        Terry Willoughby MD          QUERY TEXT:    Pt admitted with Altered Mental Status. Pt noted to have PRES VS Hypoxic   events per neurology consult. If possible, please document in the progress   notes and discharge summary if you are evaluating and / or treating any of the   following: The medical record reflects the following:  Risk Factors: HTN; Bradycardia;  Clinical Indicators:  23: EP consult: Syncope--recurrent; unclear etiology whether from   vasovagal or cardiac syncope from SND  23: Per Neurology: MRI showed increased signal in her b/l basal ganglia   possible due to PRES VS hypoxic events--in setting of uncontrolled HTN and   bradycardia with possible Sinus node dysfunction. Treatment: Electrophysiology and neurology consult; Implantable LOOP recorder;   EKG; Telemetry; Amiodarone Discontinued; imaging; Thank Cesar Appiah BSN, R.N.  Clinical Documentation Improvement  [phone]  Options provided:  -- AMS/Recurrent episodes of unresponsiveness due to Sinus Node dysfunction  -- AMS/Recurrent episodes of unresponsiveness due to PRES  -- AMS/Recurrent episodes of unresponsiveness due to ##please specify cause,   please specify cause. -- Other - I will add my own diagnosis  -- Disagree - Not applicable / Not valid  -- Disagree - Clinically unable to determine / Unknown  -- Refer to Clinical Documentation Reviewer    PROVIDER RESPONSE TEXT:    This patient has AMS/Recurrent episodes of unresponsiveness due to PRES.     Query created by: Rell Brar on 2023 2:11 PM      Electronically signed by:  Terry Willoughby MD 2023 10:30 AM

## 2023-07-08 ENCOUNTER — APPOINTMENT (OUTPATIENT)
Dept: GENERAL RADIOLOGY | Age: 88
DRG: 689 | End: 2023-07-08
Payer: COMMERCIAL

## 2023-07-08 ENCOUNTER — APPOINTMENT (OUTPATIENT)
Dept: CT IMAGING | Age: 88
DRG: 689 | End: 2023-07-08
Payer: COMMERCIAL

## 2023-07-08 LAB
ALBUMIN SERPL-MCNC: 2.9 G/DL (ref 3.5–5.2)
ALBUMIN SERPL-MCNC: 3.1 G/DL (ref 3.5–5.2)
ALP SERPL-CCNC: 48 U/L (ref 35–104)
ALP SERPL-CCNC: 48 U/L (ref 35–104)
ALT SERPL-CCNC: 7 U/L (ref 0–32)
ALT SERPL-CCNC: 7 U/L (ref 0–32)
AMMONIA PLAS-SCNC: 14 UMOL/L (ref 11–51)
ANION GAP SERPL CALCULATED.3IONS-SCNC: 10 MMOL/L (ref 7–16)
ANION GAP SERPL CALCULATED.3IONS-SCNC: 7 MMOL/L (ref 7–16)
AST SERPL-CCNC: 14 U/L (ref 0–31)
AST SERPL-CCNC: 17 U/L (ref 0–31)
B.E.: -1.4 MMOL/L (ref -3–3)
BASOPHILS # BLD: 0.02 E9/L (ref 0–0.2)
BASOPHILS NFR BLD: 0.5 % (ref 0–2)
BILIRUB SERPL-MCNC: 0.4 MG/DL (ref 0–1.2)
BILIRUB SERPL-MCNC: 0.4 MG/DL (ref 0–1.2)
BUN SERPL-MCNC: 10 MG/DL (ref 6–23)
BUN SERPL-MCNC: 9 MG/DL (ref 6–23)
CALCIUM SERPL-MCNC: 8.8 MG/DL (ref 8.6–10.2)
CALCIUM SERPL-MCNC: 9.1 MG/DL (ref 8.6–10.2)
CHLORIDE SERPL-SCNC: 108 MMOL/L (ref 98–107)
CHLORIDE SERPL-SCNC: 110 MMOL/L (ref 98–107)
CO2 SERPL-SCNC: 21 MMOL/L (ref 22–29)
CO2 SERPL-SCNC: 23 MMOL/L (ref 22–29)
COHB: 0.3 % (ref 0–1.5)
CREAT SERPL-MCNC: 0.8 MG/DL (ref 0.5–1)
CREAT SERPL-MCNC: 0.9 MG/DL (ref 0.5–1)
CRITICAL: ABNORMAL
DATE ANALYZED: ABNORMAL
DATE OF COLLECTION: ABNORMAL
EOSINOPHIL # BLD: 0.07 E9/L (ref 0.05–0.5)
EOSINOPHIL NFR BLD: 1.7 % (ref 0–6)
ERYTHROCYTE [DISTWIDTH] IN BLOOD BY AUTOMATED COUNT: 15.7 FL (ref 11.5–15)
GLUCOSE SERPL-MCNC: 107 MG/DL (ref 74–99)
GLUCOSE SERPL-MCNC: 114 MG/DL (ref 74–99)
HCO3: 22.8 MMOL/L (ref 22–26)
HCT VFR BLD AUTO: 37.1 % (ref 34–48)
HGB BLD-MCNC: 12.3 G/DL (ref 11.5–15.5)
HHB: 3.6 % (ref 0–5)
IMM GRANULOCYTES # BLD: 0.01 E9/L
IMM GRANULOCYTES NFR BLD: 0.2 % (ref 0–5)
LAB: ABNORMAL
LACTATE BLDV-SCNC: 1.3 MMOL/L (ref 0.5–2.2)
LYMPHOCYTES # BLD: 0.96 E9/L (ref 1.5–4)
LYMPHOCYTES NFR BLD: 23.1 % (ref 20–42)
Lab: ABNORMAL
MAGNESIUM SERPL-MCNC: 1.7 MG/DL (ref 1.6–2.6)
MAGNESIUM SERPL-MCNC: 1.8 MG/DL (ref 1.6–2.6)
MCH RBC QN AUTO: 30.4 PG (ref 26–35)
MCHC RBC AUTO-ENTMCNC: 33.2 % (ref 32–34.5)
MCV RBC AUTO: 91.8 FL (ref 80–99.9)
METER GLUCOSE: 115 MG/DL (ref 74–99)
METHB: 0.4 % (ref 0–1.5)
MODE: ABNORMAL
MONOCYTES # BLD: 0.56 E9/L (ref 0.1–0.95)
MONOCYTES NFR BLD: 13.5 % (ref 2–12)
NEUTROPHILS # BLD: 2.54 E9/L (ref 1.8–7.3)
NEUTS SEG NFR BLD: 61 % (ref 43–80)
O2 CONTENT: 17.8 ML/DL
O2 SATURATION: 96.4 % (ref 92–98.5)
O2HB: 95.7 % (ref 94–97)
OPERATOR ID: 1210
PATIENT TEMP: 37 C
PCO2: 36.6 MMHG (ref 35–45)
PH BLOOD GAS: 7.41 (ref 7.35–7.45)
PHOSPHATE SERPL-MCNC: 3 MG/DL (ref 2.5–4.5)
PLATELET # BLD AUTO: 151 E9/L (ref 130–450)
PMV BLD AUTO: 9.5 FL (ref 7–12)
PO2: 87.3 MMHG (ref 75–100)
POTASSIUM SERPL-SCNC: 3.1 MMOL/L (ref 3.5–5)
POTASSIUM SERPL-SCNC: 3.48 MMOL/L (ref 3.5–5)
POTASSIUM SERPL-SCNC: 3.5 MMOL/L (ref 3.5–5)
PROT SERPL-MCNC: 5.6 G/DL (ref 6.4–8.3)
PROT SERPL-MCNC: 5.9 G/DL (ref 6.4–8.3)
RBC # BLD AUTO: 4.04 E12/L (ref 3.5–5.5)
SODIUM SERPL-SCNC: 139 MMOL/L (ref 132–146)
SODIUM SERPL-SCNC: 140 MMOL/L (ref 132–146)
SOURCE, BLOOD GAS: ABNORMAL
THB: 13.2 G/DL (ref 11.5–16.5)
TIME ANALYZED: 2033
WBC # BLD: 4.2 E9/L (ref 4.5–11.5)

## 2023-07-08 PROCEDURE — 80053 COMPREHEN METABOLIC PANEL: CPT

## 2023-07-08 PROCEDURE — 84132 ASSAY OF SERUM POTASSIUM: CPT

## 2023-07-08 PROCEDURE — 6360000002 HC RX W HCPCS: Performed by: INTERNAL MEDICINE

## 2023-07-08 PROCEDURE — 2580000003 HC RX 258: Performed by: INTERNAL MEDICINE

## 2023-07-08 PROCEDURE — 2580000003 HC RX 258: Performed by: NURSE PRACTITIONER

## 2023-07-08 PROCEDURE — 74018 RADEX ABDOMEN 1 VIEW: CPT

## 2023-07-08 PROCEDURE — 83605 ASSAY OF LACTIC ACID: CPT

## 2023-07-08 PROCEDURE — 2060000000 HC ICU INTERMEDIATE R&B

## 2023-07-08 PROCEDURE — 82140 ASSAY OF AMMONIA: CPT

## 2023-07-08 PROCEDURE — 6370000000 HC RX 637 (ALT 250 FOR IP): Performed by: NURSE PRACTITIONER

## 2023-07-08 PROCEDURE — 82962 GLUCOSE BLOOD TEST: CPT

## 2023-07-08 PROCEDURE — 36415 COLL VENOUS BLD VENIPUNCTURE: CPT

## 2023-07-08 PROCEDURE — 93005 ELECTROCARDIOGRAM TRACING: CPT

## 2023-07-08 PROCEDURE — 83735 ASSAY OF MAGNESIUM: CPT

## 2023-07-08 PROCEDURE — 70450 CT HEAD/BRAIN W/O DYE: CPT

## 2023-07-08 PROCEDURE — 85025 COMPLETE CBC W/AUTO DIFF WBC: CPT

## 2023-07-08 PROCEDURE — 6370000000 HC RX 637 (ALT 250 FOR IP): Performed by: FAMILY MEDICINE

## 2023-07-08 PROCEDURE — 82805 BLOOD GASES W/O2 SATURATION: CPT

## 2023-07-08 PROCEDURE — 84100 ASSAY OF PHOSPHORUS: CPT

## 2023-07-08 PROCEDURE — 6360000002 HC RX W HCPCS: Performed by: NURSE PRACTITIONER

## 2023-07-08 RX ORDER — 0.9 % SODIUM CHLORIDE 0.9 %
250 INTRAVENOUS SOLUTION INTRAVENOUS ONCE
Status: COMPLETED | OUTPATIENT
Start: 2023-07-08 | End: 2023-07-08

## 2023-07-08 RX ADMIN — PANTOPRAZOLE SODIUM 40 MG: 40 TABLET, DELAYED RELEASE ORAL at 09:06

## 2023-07-08 RX ADMIN — SODIUM CHLORIDE 250 ML: 9 INJECTION, SOLUTION INTRAVENOUS at 00:43

## 2023-07-08 RX ADMIN — SODIUM CHLORIDE, PRESERVATIVE FREE 10 ML: 5 INJECTION INTRAVENOUS at 20:20

## 2023-07-08 RX ADMIN — Medication 100 UNITS: at 09:08

## 2023-07-08 RX ADMIN — HYDRALAZINE HYDROCHLORIDE 50 MG: 50 TABLET, FILM COATED ORAL at 23:12

## 2023-07-08 RX ADMIN — METRONIDAZOLE 500 MG: 500 TABLET ORAL at 23:13

## 2023-07-08 RX ADMIN — POTASSIUM BICARBONATE 40 MEQ: 782 TABLET, EFFERVESCENT ORAL at 13:40

## 2023-07-08 RX ADMIN — SODIUM CHLORIDE, PRESERVATIVE FREE 10 ML: 5 INJECTION INTRAVENOUS at 09:08

## 2023-07-08 RX ADMIN — DOCUSATE SODIUM 100 MG: 100 CAPSULE, LIQUID FILLED ORAL at 09:06

## 2023-07-08 RX ADMIN — PROCHLORPERAZINE EDISYLATE 5 MG: 5 INJECTION INTRAMUSCULAR; INTRAVENOUS at 13:50

## 2023-07-08 RX ADMIN — APIXABAN 2.5 MG: 5 TABLET, FILM COATED ORAL at 09:06

## 2023-07-08 RX ADMIN — Medication 100 UNITS: at 20:20

## 2023-07-08 RX ADMIN — LOSARTAN POTASSIUM 100 MG: 50 TABLET, FILM COATED ORAL at 09:06

## 2023-07-08 RX ADMIN — HYDRALAZINE HYDROCHLORIDE 50 MG: 50 TABLET, FILM COATED ORAL at 05:41

## 2023-07-08 RX ADMIN — METRONIDAZOLE 500 MG: 500 TABLET ORAL at 05:41

## 2023-07-08 ASSESSMENT — ENCOUNTER SYMPTOMS
VOMITING: 0
SHORTNESS OF BREATH: 0
COUGH: 0
PHOTOPHOBIA: 0
DIARRHEA: 0
NAUSEA: 0
ABDOMINAL DISTENTION: 0
ABDOMINAL PAIN: 0
CHEST TIGHTNESS: 0

## 2023-07-09 LAB
BACTERIA UR CULT: ABNORMAL
BACTERIA UR CULT: ABNORMAL
ORGANISM: ABNORMAL

## 2023-07-09 PROCEDURE — 2580000003 HC RX 258: Performed by: INTERNAL MEDICINE

## 2023-07-09 PROCEDURE — 1200000000 HC SEMI PRIVATE

## 2023-07-09 PROCEDURE — 6360000002 HC RX W HCPCS: Performed by: INTERNAL MEDICINE

## 2023-07-09 PROCEDURE — 2580000003 HC RX 258: Performed by: NURSE PRACTITIONER

## 2023-07-09 PROCEDURE — 6360000002 HC RX W HCPCS: Performed by: NURSE PRACTITIONER

## 2023-07-09 PROCEDURE — 2060000000 HC ICU INTERMEDIATE R&B

## 2023-07-09 PROCEDURE — 6370000000 HC RX 637 (ALT 250 FOR IP): Performed by: NURSE PRACTITIONER

## 2023-07-09 PROCEDURE — 99223 1ST HOSP IP/OBS HIGH 75: CPT

## 2023-07-09 RX ADMIN — METRONIDAZOLE 500 MG: 500 TABLET ORAL at 23:39

## 2023-07-09 RX ADMIN — SODIUM CHLORIDE, PRESERVATIVE FREE 10 ML: 5 INJECTION INTRAVENOUS at 20:23

## 2023-07-09 RX ADMIN — APIXABAN 2.5 MG: 5 TABLET, FILM COATED ORAL at 20:21

## 2023-07-09 RX ADMIN — LACTULOSE 20 G: 20 SOLUTION ORAL at 20:22

## 2023-07-09 RX ADMIN — Medication 100 UNITS: at 20:23

## 2023-07-09 RX ADMIN — HYDRALAZINE HYDROCHLORIDE 50 MG: 50 TABLET, FILM COATED ORAL at 14:14

## 2023-07-09 RX ADMIN — HYDRALAZINE HYDROCHLORIDE 50 MG: 50 TABLET, FILM COATED ORAL at 06:19

## 2023-07-09 RX ADMIN — LACTULOSE 20 G: 20 SOLUTION ORAL at 09:19

## 2023-07-09 RX ADMIN — PANTOPRAZOLE SODIUM 40 MG: 40 TABLET, DELAYED RELEASE ORAL at 09:19

## 2023-07-09 RX ADMIN — LOSARTAN POTASSIUM 100 MG: 50 TABLET, FILM COATED ORAL at 09:19

## 2023-07-09 RX ADMIN — WATER 1000 MG: 1 INJECTION INTRAMUSCULAR; INTRAVENOUS; SUBCUTANEOUS at 00:22

## 2023-07-09 RX ADMIN — DOCUSATE SODIUM 100 MG: 100 CAPSULE, LIQUID FILLED ORAL at 09:19

## 2023-07-09 RX ADMIN — Medication 100 UNITS: at 09:20

## 2023-07-09 RX ADMIN — WATER 1000 MG: 1 INJECTION INTRAMUSCULAR; INTRAVENOUS; SUBCUTANEOUS at 23:39

## 2023-07-09 RX ADMIN — METRONIDAZOLE 500 MG: 500 TABLET ORAL at 06:19

## 2023-07-09 RX ADMIN — HYDRALAZINE HYDROCHLORIDE 50 MG: 50 TABLET, FILM COATED ORAL at 20:21

## 2023-07-09 RX ADMIN — SODIUM CHLORIDE, PRESERVATIVE FREE 10 ML: 5 INJECTION INTRAVENOUS at 09:19

## 2023-07-09 RX ADMIN — METRONIDAZOLE 500 MG: 500 TABLET ORAL at 14:14

## 2023-07-09 RX ADMIN — APIXABAN 2.5 MG: 5 TABLET, FILM COATED ORAL at 09:19

## 2023-07-10 LAB
ANION GAP SERPL CALCULATED.3IONS-SCNC: 9 MMOL/L (ref 7–16)
BUN SERPL-MCNC: 7 MG/DL (ref 6–23)
CALCIUM SERPL-MCNC: 9 MG/DL (ref 8.6–10.2)
CHLORIDE SERPL-SCNC: 105 MMOL/L (ref 98–107)
CO2 SERPL-SCNC: 24 MMOL/L (ref 22–29)
CREAT SERPL-MCNC: 0.9 MG/DL (ref 0.5–1)
EKG ATRIAL RATE: 62 BPM
EKG P AXIS: 31 DEGREES
EKG P-R INTERVAL: 164 MS
EKG Q-T INTERVAL: 524 MS
EKG QRS DURATION: 112 MS
EKG QTC CALCULATION (BAZETT): 531 MS
EKG R AXIS: -50 DEGREES
EKG T AXIS: 45 DEGREES
EKG VENTRICULAR RATE: 62 BPM
ERYTHROCYTE [DISTWIDTH] IN BLOOD BY AUTOMATED COUNT: 15.8 FL (ref 11.5–15)
GLUCOSE SERPL-MCNC: 153 MG/DL (ref 74–99)
HCT VFR BLD AUTO: 36.8 % (ref 34–48)
HGB BLD-MCNC: 12.2 G/DL (ref 11.5–15.5)
MCH RBC QN AUTO: 30.5 PG (ref 26–35)
MCHC RBC AUTO-ENTMCNC: 33.2 % (ref 32–34.5)
MCV RBC AUTO: 92 FL (ref 80–99.9)
PLATELET # BLD AUTO: 166 E9/L (ref 130–450)
PMV BLD AUTO: 9.7 FL (ref 7–12)
POTASSIUM SERPL-SCNC: 3.6 MMOL/L (ref 3.5–5)
RBC # BLD AUTO: 4 E12/L (ref 3.5–5.5)
SODIUM SERPL-SCNC: 138 MMOL/L (ref 132–146)
WBC # BLD: 4.4 E9/L (ref 4.5–11.5)

## 2023-07-10 PROCEDURE — 1200000000 HC SEMI PRIVATE

## 2023-07-10 PROCEDURE — 6360000002 HC RX W HCPCS: Performed by: INTERNAL MEDICINE

## 2023-07-10 PROCEDURE — 80048 BASIC METABOLIC PNL TOTAL CA: CPT

## 2023-07-10 PROCEDURE — 93010 ELECTROCARDIOGRAM REPORT: CPT | Performed by: INTERNAL MEDICINE

## 2023-07-10 PROCEDURE — 2580000003 HC RX 258: Performed by: INTERNAL MEDICINE

## 2023-07-10 PROCEDURE — 36415 COLL VENOUS BLD VENIPUNCTURE: CPT

## 2023-07-10 PROCEDURE — 2060000000 HC ICU INTERMEDIATE R&B

## 2023-07-10 PROCEDURE — 85027 COMPLETE CBC AUTOMATED: CPT

## 2023-07-10 PROCEDURE — 2580000003 HC RX 258: Performed by: NURSE PRACTITIONER

## 2023-07-10 PROCEDURE — 6370000000 HC RX 637 (ALT 250 FOR IP): Performed by: NURSE PRACTITIONER

## 2023-07-10 RX ORDER — ACETAMINOPHEN 325 MG/1
650 TABLET ORAL EVERY 4 HOURS PRN
Status: DISCONTINUED | OUTPATIENT
Start: 2023-07-10 | End: 2023-07-17 | Stop reason: HOSPADM

## 2023-07-10 RX ADMIN — METRONIDAZOLE 500 MG: 500 TABLET ORAL at 20:31

## 2023-07-10 RX ADMIN — LACTULOSE 20 G: 20 SOLUTION ORAL at 08:51

## 2023-07-10 RX ADMIN — SODIUM CHLORIDE, PRESERVATIVE FREE 10 ML: 5 INJECTION INTRAVENOUS at 20:23

## 2023-07-10 RX ADMIN — METRONIDAZOLE 500 MG: 500 TABLET ORAL at 15:46

## 2023-07-10 RX ADMIN — Medication 100 UNITS: at 20:23

## 2023-07-10 RX ADMIN — HYDRALAZINE HYDROCHLORIDE 50 MG: 50 TABLET, FILM COATED ORAL at 06:01

## 2023-07-10 RX ADMIN — Medication 100 UNITS: at 09:03

## 2023-07-10 RX ADMIN — PANTOPRAZOLE SODIUM 40 MG: 40 TABLET, DELAYED RELEASE ORAL at 08:51

## 2023-07-10 RX ADMIN — LOSARTAN POTASSIUM 100 MG: 50 TABLET, FILM COATED ORAL at 08:51

## 2023-07-10 RX ADMIN — LACTULOSE 20 G: 20 SOLUTION ORAL at 20:23

## 2023-07-10 RX ADMIN — HYDRALAZINE HYDROCHLORIDE 50 MG: 50 TABLET, FILM COATED ORAL at 15:46

## 2023-07-10 RX ADMIN — APIXABAN 2.5 MG: 5 TABLET, FILM COATED ORAL at 20:23

## 2023-07-10 RX ADMIN — METRONIDAZOLE 500 MG: 500 TABLET ORAL at 06:01

## 2023-07-10 RX ADMIN — HYDRALAZINE HYDROCHLORIDE 50 MG: 50 TABLET, FILM COATED ORAL at 20:23

## 2023-07-10 RX ADMIN — DOCUSATE SODIUM 100 MG: 100 CAPSULE, LIQUID FILLED ORAL at 08:51

## 2023-07-10 RX ADMIN — APIXABAN 2.5 MG: 5 TABLET, FILM COATED ORAL at 08:51

## 2023-07-10 RX ADMIN — SODIUM CHLORIDE, PRESERVATIVE FREE 10 ML: 5 INJECTION INTRAVENOUS at 09:03

## 2023-07-10 RX ADMIN — SODIUM CHLORIDE, PRESERVATIVE FREE 10 ML: 5 INJECTION INTRAVENOUS at 08:51

## 2023-07-11 LAB — METER GLUCOSE: 127 MG/DL (ref 74–99)

## 2023-07-11 PROCEDURE — 6370000000 HC RX 637 (ALT 250 FOR IP): Performed by: FAMILY MEDICINE

## 2023-07-11 PROCEDURE — 2580000003 HC RX 258: Performed by: INTERNAL MEDICINE

## 2023-07-11 PROCEDURE — 97530 THERAPEUTIC ACTIVITIES: CPT

## 2023-07-11 PROCEDURE — 6370000000 HC RX 637 (ALT 250 FOR IP): Performed by: NURSE PRACTITIONER

## 2023-07-11 PROCEDURE — 6360000002 HC RX W HCPCS: Performed by: NURSE PRACTITIONER

## 2023-07-11 PROCEDURE — 82962 GLUCOSE BLOOD TEST: CPT

## 2023-07-11 PROCEDURE — 1200000000 HC SEMI PRIVATE

## 2023-07-11 PROCEDURE — 6360000002 HC RX W HCPCS: Performed by: INTERNAL MEDICINE

## 2023-07-11 PROCEDURE — 97535 SELF CARE MNGMENT TRAINING: CPT

## 2023-07-11 PROCEDURE — 2580000003 HC RX 258: Performed by: FAMILY MEDICINE

## 2023-07-11 PROCEDURE — 2580000003 HC RX 258: Performed by: NURSE PRACTITIONER

## 2023-07-11 RX ORDER — SODIUM CHLORIDE 9 MG/ML
INJECTION, SOLUTION INTRAVENOUS CONTINUOUS
Status: DISCONTINUED | OUTPATIENT
Start: 2023-07-11 | End: 2023-07-13

## 2023-07-11 RX ADMIN — SODIUM CHLORIDE, PRESERVATIVE FREE 10 ML: 5 INJECTION INTRAVENOUS at 09:52

## 2023-07-11 RX ADMIN — ACETAMINOPHEN 650 MG: 325 TABLET ORAL at 00:05

## 2023-07-11 RX ADMIN — HYDRALAZINE HYDROCHLORIDE 50 MG: 50 TABLET, FILM COATED ORAL at 04:54

## 2023-07-11 RX ADMIN — APIXABAN 2.5 MG: 5 TABLET, FILM COATED ORAL at 21:18

## 2023-07-11 RX ADMIN — Medication 100 UNITS: at 09:44

## 2023-07-11 RX ADMIN — SODIUM CHLORIDE, PRESERVATIVE FREE 10 ML: 5 INJECTION INTRAVENOUS at 09:51

## 2023-07-11 RX ADMIN — LACTULOSE 20 G: 20 SOLUTION ORAL at 09:40

## 2023-07-11 RX ADMIN — SODIUM CHLORIDE: 9 INJECTION, SOLUTION INTRAVENOUS at 17:22

## 2023-07-11 RX ADMIN — DOCUSATE SODIUM 100 MG: 100 CAPSULE, LIQUID FILLED ORAL at 09:41

## 2023-07-11 RX ADMIN — APIXABAN 2.5 MG: 5 TABLET, FILM COATED ORAL at 09:40

## 2023-07-11 RX ADMIN — METRONIDAZOLE 500 MG: 500 TABLET ORAL at 22:02

## 2023-07-11 RX ADMIN — METRONIDAZOLE 500 MG: 500 TABLET ORAL at 14:49

## 2023-07-11 RX ADMIN — ACETAMINOPHEN 650 MG: 325 TABLET ORAL at 21:21

## 2023-07-11 RX ADMIN — LACTULOSE 20 G: 20 SOLUTION ORAL at 21:18

## 2023-07-11 RX ADMIN — HYDRALAZINE HYDROCHLORIDE 50 MG: 50 TABLET, FILM COATED ORAL at 21:18

## 2023-07-11 RX ADMIN — LOSARTAN POTASSIUM 100 MG: 50 TABLET, FILM COATED ORAL at 09:44

## 2023-07-11 RX ADMIN — WATER 1000 MG: 1 INJECTION INTRAMUSCULAR; INTRAVENOUS; SUBCUTANEOUS at 00:01

## 2023-07-11 RX ADMIN — SODIUM CHLORIDE, PRESERVATIVE FREE 10 ML: 5 INJECTION INTRAVENOUS at 00:01

## 2023-07-11 RX ADMIN — METRONIDAZOLE 500 MG: 500 TABLET ORAL at 04:54

## 2023-07-11 RX ADMIN — PANTOPRAZOLE SODIUM 40 MG: 40 TABLET, DELAYED RELEASE ORAL at 09:41

## 2023-07-11 RX ADMIN — Medication 100 UNITS: at 21:18

## 2023-07-11 ASSESSMENT — PAIN DESCRIPTION - LOCATION
LOCATION: LEG
LOCATION: ABDOMEN

## 2023-07-11 ASSESSMENT — PAIN SCALES - GENERAL
PAINLEVEL_OUTOF10: 6
PAINLEVEL_OUTOF10: 0
PAINLEVEL_OUTOF10: 3

## 2023-07-11 ASSESSMENT — PAIN DESCRIPTION - ORIENTATION
ORIENTATION: RIGHT;LEFT
ORIENTATION: LOWER

## 2023-07-11 ASSESSMENT — PAIN DESCRIPTION - DESCRIPTORS
DESCRIPTORS: ACHING;DISCOMFORT
DESCRIPTORS: ACHING

## 2023-07-11 ASSESSMENT — PAIN - FUNCTIONAL ASSESSMENT: PAIN_FUNCTIONAL_ASSESSMENT: ACTIVITIES ARE NOT PREVENTED

## 2023-07-12 LAB
ANION GAP SERPL CALCULATED.3IONS-SCNC: 9 MMOL/L (ref 7–16)
BUN SERPL-MCNC: 6 MG/DL (ref 6–23)
CALCIUM SERPL-MCNC: 8.5 MG/DL (ref 8.6–10.2)
CHLORIDE SERPL-SCNC: 108 MMOL/L (ref 98–107)
CO2 SERPL-SCNC: 22 MMOL/L (ref 22–29)
CREAT SERPL-MCNC: 0.8 MG/DL (ref 0.5–1)
ERYTHROCYTE [DISTWIDTH] IN BLOOD BY AUTOMATED COUNT: 15.8 FL (ref 11.5–15)
GLUCOSE SERPL-MCNC: 108 MG/DL (ref 74–99)
HCT VFR BLD AUTO: 34.8 % (ref 34–48)
HGB BLD-MCNC: 11.7 G/DL (ref 11.5–15.5)
MCH RBC QN AUTO: 30.5 PG (ref 26–35)
MCHC RBC AUTO-ENTMCNC: 33.6 % (ref 32–34.5)
MCV RBC AUTO: 90.6 FL (ref 80–99.9)
PLATELET # BLD AUTO: 174 E9/L (ref 130–450)
PMV BLD AUTO: 9.2 FL (ref 7–12)
POTASSIUM SERPL-SCNC: 3.3 MMOL/L (ref 3.5–5)
PROCALCITONIN: 0.07 NG/ML (ref 0–0.08)
RBC # BLD AUTO: 3.84 E12/L (ref 3.5–5.5)
SODIUM SERPL-SCNC: 139 MMOL/L (ref 132–146)
WBC # BLD: 4.5 E9/L (ref 4.5–11.5)

## 2023-07-12 PROCEDURE — 6370000000 HC RX 637 (ALT 250 FOR IP): Performed by: NURSE PRACTITIONER

## 2023-07-12 PROCEDURE — 80048 BASIC METABOLIC PNL TOTAL CA: CPT

## 2023-07-12 PROCEDURE — 85027 COMPLETE CBC AUTOMATED: CPT

## 2023-07-12 PROCEDURE — 6360000002 HC RX W HCPCS: Performed by: INTERNAL MEDICINE

## 2023-07-12 PROCEDURE — 1200000000 HC SEMI PRIVATE

## 2023-07-12 PROCEDURE — 84145 PROCALCITONIN (PCT): CPT

## 2023-07-12 PROCEDURE — 36415 COLL VENOUS BLD VENIPUNCTURE: CPT

## 2023-07-12 RX ADMIN — LOSARTAN POTASSIUM 100 MG: 50 TABLET, FILM COATED ORAL at 09:08

## 2023-07-12 RX ADMIN — DOCUSATE SODIUM 100 MG: 100 CAPSULE, LIQUID FILLED ORAL at 09:08

## 2023-07-12 RX ADMIN — HYDRALAZINE HYDROCHLORIDE 50 MG: 50 TABLET, FILM COATED ORAL at 06:35

## 2023-07-12 RX ADMIN — LACTULOSE 20 G: 20 SOLUTION ORAL at 20:41

## 2023-07-12 RX ADMIN — Medication 100 UNITS: at 20:41

## 2023-07-12 RX ADMIN — HYDRALAZINE HYDROCHLORIDE 50 MG: 50 TABLET, FILM COATED ORAL at 22:17

## 2023-07-12 RX ADMIN — APIXABAN 2.5 MG: 5 TABLET, FILM COATED ORAL at 09:08

## 2023-07-12 RX ADMIN — HYDRALAZINE HYDROCHLORIDE 50 MG: 50 TABLET, FILM COATED ORAL at 15:25

## 2023-07-12 RX ADMIN — LACTULOSE 20 G: 20 SOLUTION ORAL at 09:08

## 2023-07-12 RX ADMIN — METRONIDAZOLE 500 MG: 500 TABLET ORAL at 15:25

## 2023-07-12 RX ADMIN — METRONIDAZOLE 500 MG: 500 TABLET ORAL at 22:17

## 2023-07-12 RX ADMIN — METRONIDAZOLE 500 MG: 500 TABLET ORAL at 06:35

## 2023-07-12 RX ADMIN — PANTOPRAZOLE SODIUM 40 MG: 40 TABLET, DELAYED RELEASE ORAL at 09:08

## 2023-07-12 RX ADMIN — APIXABAN 2.5 MG: 5 TABLET, FILM COATED ORAL at 20:41

## 2023-07-13 LAB — BNP BLD-MCNC: 688 PG/ML (ref 0–450)

## 2023-07-13 PROCEDURE — 2580000003 HC RX 258: Performed by: INTERNAL MEDICINE

## 2023-07-13 PROCEDURE — 1200000000 HC SEMI PRIVATE

## 2023-07-13 PROCEDURE — 97530 THERAPEUTIC ACTIVITIES: CPT

## 2023-07-13 PROCEDURE — 97535 SELF CARE MNGMENT TRAINING: CPT

## 2023-07-13 PROCEDURE — 6370000000 HC RX 637 (ALT 250 FOR IP): Performed by: NURSE PRACTITIONER

## 2023-07-13 PROCEDURE — 83880 ASSAY OF NATRIURETIC PEPTIDE: CPT

## 2023-07-13 PROCEDURE — 2580000003 HC RX 258: Performed by: FAMILY MEDICINE

## 2023-07-13 PROCEDURE — 6360000002 HC RX W HCPCS: Performed by: INTERNAL MEDICINE

## 2023-07-13 PROCEDURE — 36415 COLL VENOUS BLD VENIPUNCTURE: CPT

## 2023-07-13 PROCEDURE — 6370000000 HC RX 637 (ALT 250 FOR IP): Performed by: FAMILY MEDICINE

## 2023-07-13 PROCEDURE — 2580000003 HC RX 258: Performed by: NURSE PRACTITIONER

## 2023-07-13 RX ORDER — HYDRALAZINE HYDROCHLORIDE 25 MG/1
25 TABLET, FILM COATED ORAL EVERY 8 HOURS SCHEDULED
Status: DISCONTINUED | OUTPATIENT
Start: 2023-07-13 | End: 2023-07-15

## 2023-07-13 RX ORDER — POTASSIUM CHLORIDE 20 MEQ/1
20 TABLET, EXTENDED RELEASE ORAL ONCE
Status: COMPLETED | OUTPATIENT
Start: 2023-07-13 | End: 2023-07-13

## 2023-07-13 RX ADMIN — LACTULOSE 20 G: 20 SOLUTION ORAL at 08:57

## 2023-07-13 RX ADMIN — SODIUM CHLORIDE, PRESERVATIVE FREE 10 ML: 5 INJECTION INTRAVENOUS at 23:10

## 2023-07-13 RX ADMIN — DOCUSATE SODIUM 100 MG: 100 CAPSULE, LIQUID FILLED ORAL at 08:57

## 2023-07-13 RX ADMIN — ACETAMINOPHEN 650 MG: 325 TABLET ORAL at 17:00

## 2023-07-13 RX ADMIN — HYDRALAZINE HYDROCHLORIDE 50 MG: 50 TABLET, FILM COATED ORAL at 06:44

## 2023-07-13 RX ADMIN — Medication 100 UNITS: at 22:04

## 2023-07-13 RX ADMIN — LOSARTAN POTASSIUM 100 MG: 50 TABLET, FILM COATED ORAL at 08:56

## 2023-07-13 RX ADMIN — SODIUM CHLORIDE: 9 INJECTION, SOLUTION INTRAVENOUS at 02:53

## 2023-07-13 RX ADMIN — ACETAMINOPHEN 650 MG: 325 TABLET ORAL at 02:48

## 2023-07-13 RX ADMIN — HYDRALAZINE HYDROCHLORIDE 25 MG: 25 TABLET, FILM COATED ORAL at 14:01

## 2023-07-13 RX ADMIN — APIXABAN 2.5 MG: 5 TABLET, FILM COATED ORAL at 08:56

## 2023-07-13 RX ADMIN — METRONIDAZOLE 500 MG: 500 TABLET ORAL at 06:44

## 2023-07-13 RX ADMIN — LACTULOSE 20 G: 20 SOLUTION ORAL at 22:03

## 2023-07-13 RX ADMIN — POTASSIUM CHLORIDE 20 MEQ: 1500 TABLET, EXTENDED RELEASE ORAL at 14:01

## 2023-07-13 RX ADMIN — HYDRALAZINE HYDROCHLORIDE 25 MG: 25 TABLET, FILM COATED ORAL at 22:03

## 2023-07-13 RX ADMIN — APIXABAN 2.5 MG: 5 TABLET, FILM COATED ORAL at 22:03

## 2023-07-13 RX ADMIN — PANTOPRAZOLE SODIUM 40 MG: 40 TABLET, DELAYED RELEASE ORAL at 08:56

## 2023-07-13 ASSESSMENT — PAIN DESCRIPTION - LOCATION
LOCATION: LEG
LOCATION: ABDOMEN
LOCATION: LEG

## 2023-07-13 ASSESSMENT — PAIN SCALES - GENERAL
PAINLEVEL_OUTOF10: 6
PAINLEVEL_OUTOF10: 5
PAINLEVEL_OUTOF10: 5

## 2023-07-13 ASSESSMENT — PAIN DESCRIPTION - DESCRIPTORS
DESCRIPTORS: CRAMPING
DESCRIPTORS: ACHING;DISCOMFORT

## 2023-07-13 ASSESSMENT — PAIN - FUNCTIONAL ASSESSMENT: PAIN_FUNCTIONAL_ASSESSMENT: ACTIVITIES ARE NOT PREVENTED

## 2023-07-13 ASSESSMENT — PAIN DESCRIPTION - ORIENTATION
ORIENTATION: RIGHT;LEFT
ORIENTATION: RIGHT;LEFT

## 2023-07-14 PROCEDURE — 6370000000 HC RX 637 (ALT 250 FOR IP): Performed by: NURSE PRACTITIONER

## 2023-07-14 PROCEDURE — 6370000000 HC RX 637 (ALT 250 FOR IP): Performed by: FAMILY MEDICINE

## 2023-07-14 PROCEDURE — 6360000002 HC RX W HCPCS: Performed by: INTERNAL MEDICINE

## 2023-07-14 PROCEDURE — 2580000003 HC RX 258: Performed by: INTERNAL MEDICINE

## 2023-07-14 PROCEDURE — 1200000000 HC SEMI PRIVATE

## 2023-07-14 RX ADMIN — APIXABAN 2.5 MG: 5 TABLET, FILM COATED ORAL at 20:01

## 2023-07-14 RX ADMIN — Medication 100 UNITS: at 10:12

## 2023-07-14 RX ADMIN — HYDRALAZINE HYDROCHLORIDE 25 MG: 25 TABLET, FILM COATED ORAL at 14:01

## 2023-07-14 RX ADMIN — PANTOPRAZOLE SODIUM 40 MG: 40 TABLET, DELAYED RELEASE ORAL at 10:12

## 2023-07-14 RX ADMIN — SODIUM CHLORIDE, PRESERVATIVE FREE 10 ML: 5 INJECTION INTRAVENOUS at 10:15

## 2023-07-14 RX ADMIN — LOSARTAN POTASSIUM 100 MG: 50 TABLET, FILM COATED ORAL at 10:12

## 2023-07-14 RX ADMIN — LACTULOSE 20 G: 20 SOLUTION ORAL at 20:00

## 2023-07-14 RX ADMIN — SODIUM CHLORIDE, PRESERVATIVE FREE 10 ML: 5 INJECTION INTRAVENOUS at 20:08

## 2023-07-14 RX ADMIN — Medication 100 UNITS: at 20:00

## 2023-07-14 RX ADMIN — ACETAMINOPHEN 650 MG: 325 TABLET ORAL at 10:11

## 2023-07-14 RX ADMIN — HYDRALAZINE HYDROCHLORIDE 25 MG: 25 TABLET, FILM COATED ORAL at 20:01

## 2023-07-14 RX ADMIN — HYDRALAZINE HYDROCHLORIDE 25 MG: 25 TABLET, FILM COATED ORAL at 05:55

## 2023-07-14 RX ADMIN — APIXABAN 2.5 MG: 5 TABLET, FILM COATED ORAL at 10:12

## 2023-07-14 ASSESSMENT — PAIN DESCRIPTION - ORIENTATION
ORIENTATION: LEFT;RIGHT
ORIENTATION: RIGHT;LEFT
ORIENTATION: RIGHT;LEFT

## 2023-07-14 ASSESSMENT — PAIN DESCRIPTION - LOCATION
LOCATION: LEG
LOCATION: LEG

## 2023-07-14 ASSESSMENT — PAIN SCALES - GENERAL
PAINLEVEL_OUTOF10: 3
PAINLEVEL_OUTOF10: 5
PAINLEVEL_OUTOF10: 2
PAINLEVEL_OUTOF10: 3

## 2023-07-14 ASSESSMENT — PAIN DESCRIPTION - DESCRIPTORS: DESCRIPTORS: ACHING;DISCOMFORT

## 2023-07-15 PROCEDURE — 2580000003 HC RX 258: Performed by: INTERNAL MEDICINE

## 2023-07-15 PROCEDURE — 2580000003 HC RX 258: Performed by: NURSE PRACTITIONER

## 2023-07-15 PROCEDURE — 6360000002 HC RX W HCPCS: Performed by: INTERNAL MEDICINE

## 2023-07-15 PROCEDURE — 6370000000 HC RX 637 (ALT 250 FOR IP): Performed by: NURSE PRACTITIONER

## 2023-07-15 PROCEDURE — 1200000000 HC SEMI PRIVATE

## 2023-07-15 PROCEDURE — 6370000000 HC RX 637 (ALT 250 FOR IP): Performed by: INTERNAL MEDICINE

## 2023-07-15 PROCEDURE — 6370000000 HC RX 637 (ALT 250 FOR IP): Performed by: FAMILY MEDICINE

## 2023-07-15 PROCEDURE — 6360000002 HC RX W HCPCS: Performed by: NURSE PRACTITIONER

## 2023-07-15 RX ORDER — HYDRALAZINE HYDROCHLORIDE 20 MG/ML
10 INJECTION INTRAMUSCULAR; INTRAVENOUS EVERY 6 HOURS PRN
Status: DISCONTINUED | OUTPATIENT
Start: 2023-07-15 | End: 2023-07-17 | Stop reason: HOSPADM

## 2023-07-15 RX ORDER — HYDRALAZINE HYDROCHLORIDE 50 MG/1
50 TABLET, FILM COATED ORAL EVERY 8 HOURS SCHEDULED
Status: DISCONTINUED | OUTPATIENT
Start: 2023-07-15 | End: 2023-07-17 | Stop reason: HOSPADM

## 2023-07-15 RX ADMIN — APIXABAN 2.5 MG: 5 TABLET, FILM COATED ORAL at 09:43

## 2023-07-15 RX ADMIN — SODIUM CHLORIDE, PRESERVATIVE FREE 10 ML: 5 INJECTION INTRAVENOUS at 21:22

## 2023-07-15 RX ADMIN — HYDRALAZINE HYDROCHLORIDE 25 MG: 25 TABLET, FILM COATED ORAL at 06:01

## 2023-07-15 RX ADMIN — DOCUSATE SODIUM 100 MG: 100 CAPSULE, LIQUID FILLED ORAL at 09:43

## 2023-07-15 RX ADMIN — ACETAMINOPHEN 650 MG: 325 TABLET ORAL at 00:12

## 2023-07-15 RX ADMIN — ACETAMINOPHEN 650 MG: 325 TABLET ORAL at 18:53

## 2023-07-15 RX ADMIN — SODIUM CHLORIDE, PRESERVATIVE FREE 10 ML: 5 INJECTION INTRAVENOUS at 09:44

## 2023-07-15 RX ADMIN — LOSARTAN POTASSIUM 100 MG: 50 TABLET, FILM COATED ORAL at 09:43

## 2023-07-15 RX ADMIN — LACTULOSE 20 G: 20 SOLUTION ORAL at 09:43

## 2023-07-15 RX ADMIN — Medication 100 UNITS: at 09:43

## 2023-07-15 RX ADMIN — HYDRALAZINE HYDROCHLORIDE 25 MG: 25 TABLET, FILM COATED ORAL at 13:49

## 2023-07-15 RX ADMIN — SODIUM CHLORIDE, PRESERVATIVE FREE 10 ML: 5 INJECTION INTRAVENOUS at 21:21

## 2023-07-15 RX ADMIN — LACTULOSE 20 G: 20 SOLUTION ORAL at 20:55

## 2023-07-15 RX ADMIN — PANTOPRAZOLE SODIUM 40 MG: 40 TABLET, DELAYED RELEASE ORAL at 09:43

## 2023-07-15 RX ADMIN — HYDRALAZINE HYDROCHLORIDE 50 MG: 50 TABLET, FILM COATED ORAL at 20:55

## 2023-07-15 RX ADMIN — Medication 100 UNITS: at 20:56

## 2023-07-15 RX ADMIN — SODIUM CHLORIDE, PRESERVATIVE FREE 10 ML: 5 INJECTION INTRAVENOUS at 09:43

## 2023-07-15 RX ADMIN — HYDRALAZINE HYDROCHLORIDE 10 MG: 20 INJECTION INTRAMUSCULAR; INTRAVENOUS at 18:54

## 2023-07-15 RX ADMIN — APIXABAN 2.5 MG: 5 TABLET, FILM COATED ORAL at 20:55

## 2023-07-15 RX ADMIN — HYDRALAZINE HYDROCHLORIDE 10 MG: 20 INJECTION INTRAMUSCULAR; INTRAVENOUS at 02:23

## 2023-07-15 ASSESSMENT — PAIN DESCRIPTION - DESCRIPTORS: DESCRIPTORS: ACHING

## 2023-07-15 ASSESSMENT — PAIN SCALES - GENERAL
PAINLEVEL_OUTOF10: 4
PAINLEVEL_OUTOF10: 8

## 2023-07-15 ASSESSMENT — PAIN DESCRIPTION - ORIENTATION: ORIENTATION: LEFT;RIGHT

## 2023-07-15 ASSESSMENT — PAIN - FUNCTIONAL ASSESSMENT: PAIN_FUNCTIONAL_ASSESSMENT: ACTIVITIES ARE NOT PREVENTED

## 2023-07-15 ASSESSMENT — PAIN DESCRIPTION - LOCATION
LOCATION: KNEE
LOCATION: ABDOMEN

## 2023-07-16 PROCEDURE — 97530 THERAPEUTIC ACTIVITIES: CPT

## 2023-07-16 PROCEDURE — 1200000000 HC SEMI PRIVATE

## 2023-07-16 PROCEDURE — 6370000000 HC RX 637 (ALT 250 FOR IP): Performed by: INTERNAL MEDICINE

## 2023-07-16 PROCEDURE — 6360000002 HC RX W HCPCS: Performed by: INTERNAL MEDICINE

## 2023-07-16 PROCEDURE — 6370000000 HC RX 637 (ALT 250 FOR IP): Performed by: NURSE PRACTITIONER

## 2023-07-16 PROCEDURE — 2580000003 HC RX 258: Performed by: INTERNAL MEDICINE

## 2023-07-16 RX ADMIN — HYDRALAZINE HYDROCHLORIDE 50 MG: 50 TABLET, FILM COATED ORAL at 21:55

## 2023-07-16 RX ADMIN — LACTULOSE 20 G: 20 SOLUTION ORAL at 21:53

## 2023-07-16 RX ADMIN — APIXABAN 2.5 MG: 5 TABLET, FILM COATED ORAL at 21:55

## 2023-07-16 RX ADMIN — SODIUM CHLORIDE, PRESERVATIVE FREE 10 ML: 5 INJECTION INTRAVENOUS at 09:25

## 2023-07-16 RX ADMIN — HYDRALAZINE HYDROCHLORIDE 50 MG: 50 TABLET, FILM COATED ORAL at 06:28

## 2023-07-16 RX ADMIN — Medication 100 UNITS: at 21:54

## 2023-07-16 RX ADMIN — Medication 100 UNITS: at 09:25

## 2023-07-16 RX ADMIN — LACTULOSE 20 G: 20 SOLUTION ORAL at 09:25

## 2023-07-16 RX ADMIN — APIXABAN 2.5 MG: 5 TABLET, FILM COATED ORAL at 09:25

## 2023-07-16 RX ADMIN — DOCUSATE SODIUM 100 MG: 100 CAPSULE, LIQUID FILLED ORAL at 09:25

## 2023-07-16 RX ADMIN — PANTOPRAZOLE SODIUM 40 MG: 40 TABLET, DELAYED RELEASE ORAL at 09:25

## 2023-07-16 RX ADMIN — LOSARTAN POTASSIUM 100 MG: 50 TABLET, FILM COATED ORAL at 09:24

## 2023-07-16 ASSESSMENT — PAIN SCALES - GENERAL
PAINLEVEL_OUTOF10: 0
PAINLEVEL_OUTOF10: 0

## 2023-07-17 VITALS
RESPIRATION RATE: 16 BRPM | HEIGHT: 66 IN | TEMPERATURE: 98.2 F | WEIGHT: 141 LBS | HEART RATE: 79 BPM | BODY MASS INDEX: 22.66 KG/M2 | SYSTOLIC BLOOD PRESSURE: 148 MMHG | OXYGEN SATURATION: 98 % | DIASTOLIC BLOOD PRESSURE: 78 MMHG

## 2023-07-17 PROCEDURE — 6360000002 HC RX W HCPCS: Performed by: INTERNAL MEDICINE

## 2023-07-17 PROCEDURE — 6370000000 HC RX 637 (ALT 250 FOR IP): Performed by: INTERNAL MEDICINE

## 2023-07-17 PROCEDURE — 2580000003 HC RX 258: Performed by: INTERNAL MEDICINE

## 2023-07-17 PROCEDURE — 6370000000 HC RX 637 (ALT 250 FOR IP): Performed by: NURSE PRACTITIONER

## 2023-07-17 RX ADMIN — DOCUSATE SODIUM 100 MG: 100 CAPSULE, LIQUID FILLED ORAL at 08:44

## 2023-07-17 RX ADMIN — PANTOPRAZOLE SODIUM 40 MG: 40 TABLET, DELAYED RELEASE ORAL at 08:44

## 2023-07-17 RX ADMIN — LACTULOSE 20 G: 20 SOLUTION ORAL at 08:45

## 2023-07-17 RX ADMIN — HYDRALAZINE HYDROCHLORIDE 50 MG: 50 TABLET, FILM COATED ORAL at 14:08

## 2023-07-17 RX ADMIN — HYDRALAZINE HYDROCHLORIDE 50 MG: 50 TABLET, FILM COATED ORAL at 06:01

## 2023-07-17 RX ADMIN — LOSARTAN POTASSIUM 100 MG: 50 TABLET, FILM COATED ORAL at 08:45

## 2023-07-17 RX ADMIN — SODIUM CHLORIDE, PRESERVATIVE FREE 10 ML: 5 INJECTION INTRAVENOUS at 08:45

## 2023-07-17 RX ADMIN — APIXABAN 2.5 MG: 5 TABLET, FILM COATED ORAL at 08:44

## 2023-07-17 RX ADMIN — Medication 100 UNITS: at 08:45

## 2023-07-17 ASSESSMENT — PAIN SCALES - GENERAL: PAINLEVEL_OUTOF10: 0

## 2023-07-17 NOTE — CARE COORDINATION
Chart reviewed. Updated therapy notes received over the weekend. Spoke with Camilla, liaison with [facility] in [facility] and authorization is still pending for transition of care. The building is hoping it is back today. Notified of updated therapy notes to be submitted. PASRR completed. Envelope and transfer paperwork compelted and in the soft chart. Will continue to follow. Dianne Multani RN.  [place]:  [phone]    Call received from [facility] and authorization received. Spoke with [address] [place] [address], charge nurse and discharge orders obtained. Call placed to [facility] and spoke with Dannie Sun. Ambulance transport arranged for a 6 pm pick-up time. Call placed to the patient's daughter Jonathan Ivan to notify and she stated Laney Silva is also at the bedside. Met with Laney Silva and Sandra at the bedside and notified of above. They are in agreement. Bedside nurse [id number] [facility] notified as well. PASRR completed. Envelope and transfer paperwork completed and in the soft chart. Will continue to follow. Dianne Multani RN.

## 2023-07-17 NOTE — DISCHARGE SUMMARY
[facility]   Discharge summary   Patient ID:  Josee Jeter  [id number]  80 y.o.  12/25/1931    Admit date: 7/6/2023    Discharge date and time: 7/17/2023    Admission Diagnoses:   Patient Active Problem List   Diagnosis    Systolic hypertension    Primary hypertension    Syncope    Paroxysmal atrial fibrillation (HCC)    Bradycardia    Chronic anticoagulation--Eliquis    Syncope and collapse    Moderate protein-calorie malnutrition (HCC)    Chest pain    Chronic combined systolic (congestive) and diastolic (congestive) heart failure (HCC)    Abnormal urinalysis    Orthostatic hypotension    Coronary artery disease involving native coronary artery of native heart without angina pectoris    Urinary tract infection without hematuria    Chronic congestive heart failure (HCC)    Gastroesophageal reflux disease    Symptomatic bradycardia    Hypokalemia    Sinus bradycardia    Proctocolitis    Altered mental status, unspecified    Recurrent episodes of unresponsiveness    Acute metabolic encephalopathy    UTI (urinary tract infection)       Discharge Diagnoses: syncope and UTI     Consults: none    Procedures: none    Hospital Course:     70-year-old female admitted to telemetry unit after a syncopal event and is being followed for acute metabolic encephalopathy, UTI, syncope     7/8/2023  Orthostatic BPs positive  Continue IV hydration  EP following  Supplement potassium  Recheck a.m. labs  DANIEL hose  Educate on position changes  Likely initiate midodrine     7/9/2023  Patient was RRT overnight for unresponsiveness. Patient was unarousable to painful stimuli while headed to CAT scan patient abruptly woke up. All lab work and imaging was negative.   Patient was easily arousable on examination today  Vital signs stable  KUB-negative for anything acute, internal sinus account for pain  Palliative med consult-CODE STATUS changed to limited  Transfer patient to general floor  Urine with Enterococcus feceaium

## 2023-07-18 ENCOUNTER — OUTSIDE SERVICES (OUTPATIENT)
Dept: PRIMARY CARE CLINIC | Age: 88
End: 2023-07-18

## 2023-07-18 DIAGNOSIS — G93.41 ACUTE METABOLIC ENCEPHALOPATHY: Primary | ICD-10-CM

## 2023-07-18 DIAGNOSIS — I10 PRIMARY HYPERTENSION: ICD-10-CM

## 2023-07-18 DIAGNOSIS — R55 SYNCOPE, UNSPECIFIED SYNCOPE TYPE: ICD-10-CM

## 2023-07-18 DIAGNOSIS — N39.0 URINARY TRACT INFECTION WITHOUT HEMATURIA, SITE UNSPECIFIED: ICD-10-CM

## 2023-07-18 DIAGNOSIS — E44.0 MODERATE PROTEIN-CALORIE MALNUTRITION (HCC): Chronic | ICD-10-CM

## 2023-07-18 DIAGNOSIS — I25.10 CORONARY ARTERY DISEASE INVOLVING NATIVE CORONARY ARTERY OF NATIVE HEART WITHOUT ANGINA PECTORIS: ICD-10-CM

## 2023-07-18 DIAGNOSIS — I50.42 CHRONIC COMBINED SYSTOLIC (CONGESTIVE) AND DIASTOLIC (CONGESTIVE) HEART FAILURE (HCC): ICD-10-CM

## 2023-07-18 DIAGNOSIS — R00.1 SYMPTOMATIC BRADYCARDIA: ICD-10-CM

## 2023-07-18 DIAGNOSIS — F03.90 DEMENTIA WITHOUT BEHAVIORAL DISTURBANCE, PSYCHOTIC DISTURBANCE, MOOD DISTURBANCE, OR ANXIETY, UNSPECIFIED DEMENTIA SEVERITY, UNSPECIFIED DEMENTIA TYPE (HCC): ICD-10-CM

## 2023-07-18 DIAGNOSIS — I48.0 PAROXYSMAL ATRIAL FIBRILLATION (HCC): Chronic | ICD-10-CM

## 2023-07-19 ASSESSMENT — VISUAL ACUITY: OU: 1

## 2023-07-19 NOTE — PROGRESS NOTES
dental caries. Pharynx: Oropharynx is clear. Uvula midline. Comments: Gums appear healthy. No thrush no mucositis  Eyes:      General: Vision grossly intact. Extraocular Movements: Extraocular movements intact. Conjunctiva/sclera: Conjunctivae normal.      Pupils: Pupils are equal, round, and reactive to light. Comments: Fundoscopic exam is benign  Retinal vessels: Normal   Neck:      Vascular: No carotid bruit. Comments: Thyroid is normal in size. Carotids 2+ and equal bilaterally  Cardiovascular:      Rate and Rhythm: Normal rate and regular rhythm. Pulses: Normal pulses. Heart sounds: Normal heart sounds, S1 normal and S2 normal. No murmur heard. No friction rub. No gallop. Comments: Pedal pulses 2+ and equal bilaterally  Pulmonary:      Effort: Pulmonary effort is normal.      Breath sounds: Normal breath sounds. Abdominal:      General: Bowel sounds are normal. There is no distension. Palpations: Abdomen is soft. There is no mass. Tenderness: There is no abdominal tenderness. There is no guarding or rebound. Hernia: No hernia is present. Comments: No rigidity   Musculoskeletal:         General: Normal range of motion. Right shoulder: Normal.      Left shoulder: Normal.      Right upper arm: Normal.      Left upper arm: Normal.      Cervical back: Normal range of motion. Right hip: Normal.      Left hip: Normal.      Right upper leg: Normal.      Left upper leg: Normal.      Right lower leg: Normal.      Left lower leg: Normal.      Right foot: Normal.      Left foot: Normal.   Lymphadenopathy:      Comments: No palpable or visible regional lymphadenopathy   Skin:     General: Skin is warm and dry. Findings: No bruising, ecchymosis, lesion or rash. Neurological:      General: No focal deficit present. Mental Status: Mental status is at baseline. Cranial Nerves: No cranial nerve deficit.       Deep Tendon Reflexes:

## 2023-07-20 ENCOUNTER — TELEPHONE (OUTPATIENT)
Dept: OTHER | Facility: CLINIC | Age: 88
End: 2023-07-20

## 2023-07-20 ENCOUNTER — HOSPITAL ENCOUNTER (EMERGENCY)
Age: 88
Discharge: HOME | End: 2023-07-20
Attending: EMERGENCY MEDICINE
Payer: COMMERCIAL

## 2023-07-20 ENCOUNTER — APPOINTMENT (OUTPATIENT)
Dept: GENERAL RADIOLOGY | Age: 88
End: 2023-07-20
Payer: COMMERCIAL

## 2023-07-20 ENCOUNTER — NURSE ONLY (OUTPATIENT)
Dept: NON INVASIVE DIAGNOSTICS | Age: 88
End: 2023-07-20

## 2023-07-20 VITALS
RESPIRATION RATE: 16 BRPM | TEMPERATURE: 97.5 F | DIASTOLIC BLOOD PRESSURE: 91 MMHG | HEIGHT: 66 IN | WEIGHT: 141 LBS | HEART RATE: 63 BPM | BODY MASS INDEX: 22.66 KG/M2 | OXYGEN SATURATION: 98 % | SYSTOLIC BLOOD PRESSURE: 181 MMHG

## 2023-07-20 DIAGNOSIS — Z95.818 IMPLANTABLE LOOP RECORDER PRESENT: Primary | ICD-10-CM

## 2023-07-20 DIAGNOSIS — R55 SYNCOPE, UNSPECIFIED SYNCOPE TYPE: Primary | ICD-10-CM

## 2023-07-20 DIAGNOSIS — R55 SYNCOPE AND COLLAPSE: Primary | ICD-10-CM

## 2023-07-20 LAB
ALBUMIN SERPL-MCNC: 3 G/DL (ref 3.5–5.2)
ALP SERPL-CCNC: 54 U/L (ref 35–104)
ALT SERPL-CCNC: 12 U/L (ref 0–32)
ANION GAP SERPL CALCULATED.3IONS-SCNC: 10 MMOL/L (ref 7–16)
AST SERPL-CCNC: 25 U/L (ref 0–31)
BASOPHILS # BLD: 0.02 K/UL (ref 0–0.2)
BASOPHILS NFR BLD: 0 % (ref 0–2)
BILIRUB SERPL-MCNC: 0.4 MG/DL (ref 0–1.2)
BNP SERPL-MCNC: 389 PG/ML (ref 0–450)
BUN SERPL-MCNC: 6 MG/DL (ref 6–23)
CALCIUM SERPL-MCNC: 8.5 MG/DL (ref 8.6–10.2)
CHLORIDE SERPL-SCNC: 109 MMOL/L (ref 98–107)
CO2 SERPL-SCNC: 22 MMOL/L (ref 22–29)
CREAT SERPL-MCNC: 0.8 MG/DL (ref 0.5–1)
EOSINOPHIL # BLD: 0.02 K/UL (ref 0.05–0.5)
EOSINOPHILS RELATIVE PERCENT: 0 % (ref 0–6)
ERYTHROCYTE [DISTWIDTH] IN BLOOD BY AUTOMATED COUNT: 15.8 % (ref 11.5–15)
GFR SERPL CREATININE-BSD FRML MDRD: >60 ML/MIN/1.73M2
GLUCOSE SERPL-MCNC: 103 MG/DL (ref 74–99)
HCT VFR BLD AUTO: 35.2 % (ref 34–48)
HGB BLD-MCNC: 11.7 G/DL (ref 11.5–15.5)
IMM GRANULOCYTES # BLD AUTO: <0.03 K/UL (ref 0–0.58)
IMM GRANULOCYTES NFR BLD: 0 % (ref 0–5)
LYMPHOCYTES NFR BLD: 0.57 K/UL (ref 1.5–4)
LYMPHOCYTES RELATIVE PERCENT: 11 % (ref 20–42)
MAGNESIUM SERPL-MCNC: 1.9 MG/DL (ref 1.6–2.6)
MCH RBC QN AUTO: 30.4 PG (ref 26–35)
MCHC RBC AUTO-ENTMCNC: 33.2 G/DL (ref 32–34.5)
MCV RBC AUTO: 91.4 FL (ref 80–99.9)
MONOCYTES NFR BLD: 0.51 K/UL (ref 0.1–0.95)
MONOCYTES NFR BLD: 10 % (ref 2–12)
NEUTROPHILS NFR BLD: 79 % (ref 43–80)
NEUTS SEG NFR BLD: 4.16 K/UL (ref 1.8–7.3)
PLATELET # BLD AUTO: 228 K/UL (ref 130–450)
PMV BLD AUTO: 9.1 FL (ref 7–12)
POTASSIUM SERPL-SCNC: 3.2 MMOL/L (ref 3.5–5)
PROT SERPL-MCNC: 5.7 G/DL (ref 6.4–8.3)
RBC # BLD AUTO: 3.85 M/UL (ref 3.5–5.5)
RBC # BLD: ABNORMAL 10*6/UL
SODIUM SERPL-SCNC: 141 MMOL/L (ref 132–146)
TROPONIN I SERPL HS-MCNC: 23 NG/L (ref 0–9)
WBC OTHER # BLD: 5.3 K/UL (ref 4.5–11.5)

## 2023-07-20 PROCEDURE — 99284 EMERGENCY DEPT VISIT MOD MDM: CPT

## 2023-07-20 PROCEDURE — 80053 COMPREHEN METABOLIC PANEL: CPT

## 2023-07-20 PROCEDURE — 93285 PRGRMG DEV EVAL SCRMS IP: CPT | Performed by: INTERNAL MEDICINE

## 2023-07-20 PROCEDURE — 93005 ELECTROCARDIOGRAM TRACING: CPT | Performed by: EMERGENCY MEDICINE

## 2023-07-20 PROCEDURE — 71045 X-RAY EXAM CHEST 1 VIEW: CPT

## 2023-07-20 PROCEDURE — 6370000000 HC RX 637 (ALT 250 FOR IP): Performed by: EMERGENCY MEDICINE

## 2023-07-20 PROCEDURE — 84484 ASSAY OF TROPONIN QUANT: CPT

## 2023-07-20 PROCEDURE — 85027 COMPLETE CBC AUTOMATED: CPT

## 2023-07-20 PROCEDURE — 83880 ASSAY OF NATRIURETIC PEPTIDE: CPT

## 2023-07-20 PROCEDURE — 83735 ASSAY OF MAGNESIUM: CPT

## 2023-07-20 RX ORDER — POTASSIUM CHLORIDE 20 MEQ/1
40 TABLET, EXTENDED RELEASE ORAL ONCE
Status: COMPLETED | OUTPATIENT
Start: 2023-07-20 | End: 2023-07-20

## 2023-07-20 RX ADMIN — POTASSIUM CHLORIDE 40 MEQ: 1500 TABLET, EXTENDED RELEASE ORAL at 15:21

## 2023-07-20 ASSESSMENT — LIFESTYLE VARIABLES
HOW MANY STANDARD DRINKS CONTAINING ALCOHOL DO YOU HAVE ON A TYPICAL DAY: PATIENT DOES NOT DRINK
HOW OFTEN DO YOU HAVE A DRINK CONTAINING ALCOHOL: NEVER

## 2023-07-20 ASSESSMENT — PAIN - FUNCTIONAL ASSESSMENT
PAIN_FUNCTIONAL_ASSESSMENT: NONE - DENIES PAIN
PAIN_FUNCTIONAL_ASSESSMENT: NONE - DENIES PAIN

## 2023-07-20 NOTE — CARE COORDINATION
Social Work/Transition of Care:     [facility] pt family requesting to transport pt due to transport ETA 5+ hours, SW spoke with Marie Quiroz., Liason for the facility to confirm transport, Pts family can transport as long as they feel comfortable, AMANDEEP updated pts family and ED RN.     Electronically signed by Teena Darden on 4/54/6442 at 5:19 PM

## 2023-07-20 NOTE — TELEPHONE ENCOUNTER
Writer contacted ED provider to inform of 30 day readmission risk. Writer's attempt to contact ED provider was unsuccessful.        Call Back: If you need to call back to inform of disposition you can contact me at [phone]

## 2023-07-20 NOTE — PATIENT INSTRUCTIONS
You may shower starting today    Call if any signs or symptoms of infection [phone] ext: [phone]  Fevers, chills, redness, swelling or drainage. Hook up home  Monitor if not already done      [facility] Stay connected: [phone]      Wound check: Steri strips removed. Incision without redness, edema or drainage.

## 2023-07-21 LAB
EKG ATRIAL RATE: 67 BPM
EKG P AXIS: 46 DEGREES
EKG P-R INTERVAL: 176 MS
EKG Q-T INTERVAL: 504 MS
EKG QRS DURATION: 116 MS
EKG QTC CALCULATION (BAZETT): 532 MS
EKG R AXIS: -50 DEGREES
EKG T AXIS: 2 DEGREES
EKG VENTRICULAR RATE: 67 BPM

## 2023-07-21 PROCEDURE — 93010 ELECTROCARDIOGRAM REPORT: CPT | Performed by: INTERNAL MEDICINE

## 2023-07-24 ENCOUNTER — TELEPHONE (OUTPATIENT)
Dept: NON INVASIVE DIAGNOSTICS | Age: 88
End: 2023-07-24

## 2023-07-24 NOTE — TELEPHONE ENCOUNTER
Facility nurse called to see if patient was in need of follow up appointment with EP after incident last week. Advised no follow up needed with EP until January 2024 unless something changes. Nurse verbalized understanding.     Electronically signed by Kiko Howard MA on 7/24/2023 at 2:50 PM

## 2023-07-27 ENCOUNTER — HOSPITAL ENCOUNTER (OUTPATIENT)
Age: 88
Setting detail: OBSERVATION
Discharge: NURSING, REHAB OR LONG-TERM CARE | End: 2023-08-02
Attending: EMERGENCY MEDICINE | Admitting: INTERNAL MEDICINE
Payer: COMMERCIAL

## 2023-07-27 ENCOUNTER — APPOINTMENT (OUTPATIENT)
Dept: CT IMAGING | Age: 88
End: 2023-07-27
Attending: EMERGENCY MEDICINE
Payer: COMMERCIAL

## 2023-07-27 ENCOUNTER — APPOINTMENT (OUTPATIENT)
Dept: GENERAL RADIOLOGY | Age: 88
End: 2023-07-27
Payer: COMMERCIAL

## 2023-07-27 ENCOUNTER — TELEPHONE (OUTPATIENT)
Dept: OTHER | Facility: CLINIC | Age: 88
End: 2023-07-27

## 2023-07-27 DIAGNOSIS — R55 SYNCOPE AND COLLAPSE: Primary | ICD-10-CM

## 2023-07-27 LAB
ALBUMIN SERPL-MCNC: 2.6 G/DL (ref 3.5–5.2)
ALP SERPL-CCNC: 47 U/L (ref 35–104)
ALT SERPL-CCNC: 22 U/L (ref 0–32)
ANION GAP SERPL CALCULATED.3IONS-SCNC: 11 MMOL/L (ref 7–16)
AST SERPL-CCNC: 38 U/L (ref 0–31)
BASOPHILS # BLD: 0.03 K/UL (ref 0–0.2)
BASOPHILS NFR BLD: 1 % (ref 0–2)
BILIRUB SERPL-MCNC: 0.3 MG/DL (ref 0–1.2)
BUN SERPL-MCNC: 6 MG/DL (ref 6–23)
CALCIUM SERPL-MCNC: 7 MG/DL (ref 8.6–10.2)
CHLORIDE SERPL-SCNC: 115 MMOL/L (ref 98–107)
CO2 SERPL-SCNC: 18 MMOL/L (ref 22–29)
CREAT SERPL-MCNC: 0.9 MG/DL (ref 0.5–1)
EOSINOPHIL # BLD: 0.04 K/UL (ref 0.05–0.5)
EOSINOPHILS RELATIVE PERCENT: 1 % (ref 0–6)
ERYTHROCYTE [DISTWIDTH] IN BLOOD BY AUTOMATED COUNT: 16.2 % (ref 11.5–15)
GFR SERPL CREATININE-BSD FRML MDRD: >60 ML/MIN/1.73M2
GLUCOSE SERPL-MCNC: 99 MG/DL (ref 74–99)
HCT VFR BLD AUTO: 33.3 % (ref 34–48)
HGB BLD-MCNC: 10.5 G/DL (ref 11.5–15.5)
IMM GRANULOCYTES # BLD AUTO: <0.03 K/UL (ref 0–0.58)
IMM GRANULOCYTES NFR BLD: 0 % (ref 0–5)
INR PPP: 1.4
LACTATE BLDV-SCNC: 1.7 MMOL/L (ref 0.5–2.2)
LIPASE SERPL-CCNC: 11 U/L (ref 13–60)
LYMPHOCYTES NFR BLD: 0.82 K/UL (ref 1.5–4)
LYMPHOCYTES RELATIVE PERCENT: 19 % (ref 20–42)
MCH RBC QN AUTO: 30.1 PG (ref 26–35)
MCHC RBC AUTO-ENTMCNC: 31.5 G/DL (ref 32–34.5)
MCV RBC AUTO: 95.4 FL (ref 80–99.9)
MONOCYTES NFR BLD: 0.36 K/UL (ref 0.1–0.95)
MONOCYTES NFR BLD: 8 % (ref 2–12)
NEUTROPHILS NFR BLD: 71 % (ref 43–80)
NEUTS SEG NFR BLD: 3.11 K/UL (ref 1.8–7.3)
PLATELET # BLD AUTO: 186 K/UL (ref 130–450)
PMV BLD AUTO: 9.9 FL (ref 7–12)
POTASSIUM SERPL-SCNC: 3.8 MMOL/L (ref 3.5–5)
PROT SERPL-MCNC: 5.1 G/DL (ref 6.4–8.3)
PROTHROMBIN TIME: 14.9 SEC (ref 9.3–12.4)
RBC # BLD AUTO: 3.49 M/UL (ref 3.5–5.5)
SODIUM SERPL-SCNC: 144 MMOL/L (ref 132–146)
TROPONIN I SERPL HS-MCNC: 17 NG/L (ref 0–9)
TROPONIN I SERPL HS-MCNC: 18 NG/L (ref 0–9)
WBC OTHER # BLD: 4.4 K/UL (ref 4.5–11.5)

## 2023-07-27 PROCEDURE — 85027 COMPLETE CBC AUTOMATED: CPT

## 2023-07-27 PROCEDURE — 84484 ASSAY OF TROPONIN QUANT: CPT

## 2023-07-27 PROCEDURE — 93005 ELECTROCARDIOGRAM TRACING: CPT | Performed by: EMERGENCY MEDICINE

## 2023-07-27 PROCEDURE — 85610 PROTHROMBIN TIME: CPT

## 2023-07-27 PROCEDURE — G0378 HOSPITAL OBSERVATION PER HR: HCPCS

## 2023-07-27 PROCEDURE — 74176 CT ABD & PELVIS W/O CONTRAST: CPT

## 2023-07-27 PROCEDURE — 83605 ASSAY OF LACTIC ACID: CPT

## 2023-07-27 PROCEDURE — 83690 ASSAY OF LIPASE: CPT

## 2023-07-27 PROCEDURE — 80053 COMPREHEN METABOLIC PANEL: CPT

## 2023-07-27 PROCEDURE — 99285 EMERGENCY DEPT VISIT HI MDM: CPT

## 2023-07-27 PROCEDURE — 81001 URINALYSIS AUTO W/SCOPE: CPT

## 2023-07-27 PROCEDURE — 71045 X-RAY EXAM CHEST 1 VIEW: CPT

## 2023-07-27 RX ORDER — HYDRALAZINE HYDROCHLORIDE 50 MG/1
50 TABLET, FILM COATED ORAL EVERY 8 HOURS SCHEDULED
Status: DISCONTINUED | OUTPATIENT
Start: 2023-07-27 | End: 2023-07-30

## 2023-07-27 RX ORDER — ONDANSETRON 2 MG/ML
4 INJECTION INTRAMUSCULAR; INTRAVENOUS ONCE
Status: DISCONTINUED | OUTPATIENT
Start: 2023-07-27 | End: 2023-08-02 | Stop reason: HOSPADM

## 2023-07-27 RX ORDER — 0.9 % SODIUM CHLORIDE 0.9 %
500 INTRAVENOUS SOLUTION INTRAVENOUS ONCE
Status: DISCONTINUED | OUTPATIENT
Start: 2023-07-27 | End: 2023-08-02 | Stop reason: HOSPADM

## 2023-07-27 RX ADMIN — Medication 500 ML: at 16:48

## 2023-07-27 ASSESSMENT — PAIN - FUNCTIONAL ASSESSMENT: PAIN_FUNCTIONAL_ASSESSMENT: NONE - DENIES PAIN

## 2023-07-27 NOTE — ED NOTES
Pt presents after LOC from facility during therapy. Per EMS: pt had 20 seconds of seizure activity afterward. No previous seizure Hx. No meds given en route.  Pt bradycardic and lethargic upon arrival.     Delmar Goodpasture, RN  07/27/23 [id number] MARISOL Stone  07/27/23 1056

## 2023-07-27 NOTE — TELEPHONE ENCOUNTER
Writer contacted Dr David Fagan to inform of 30 day readmission risk. 's attempt to contact Dr David Fagan was unsuccessful.      Call Back: If you need to call back to inform of disposition you can contact me at [phone]

## 2023-07-28 LAB
ALBUMIN SERPL-MCNC: 3.8 G/DL (ref 3.5–5.2)
ALP SERPL-CCNC: 64 U/L (ref 35–104)
ALT SERPL-CCNC: 23 U/L (ref 0–32)
ANION GAP SERPL CALCULATED.3IONS-SCNC: 8 MMOL/L (ref 7–16)
AST SERPL-CCNC: 30 U/L (ref 0–31)
BACTERIA URNS QL MICRO: ABNORMAL
BASOPHILS # BLD: 0 K/UL (ref 0–0.2)
BASOPHILS NFR BLD: 0 % (ref 0–2)
BILIRUB SERPL-MCNC: 0.6 MG/DL (ref 0–1.2)
BILIRUB UR QL STRIP: NEGATIVE
BUN SERPL-MCNC: 5 MG/DL (ref 6–23)
CALCIUM SERPL-MCNC: 8.8 MG/DL (ref 8.6–10.2)
CHLORIDE SERPL-SCNC: 110 MMOL/L (ref 98–107)
CLARITY UR: CLEAR
CO2 SERPL-SCNC: 23 MMOL/L (ref 22–29)
COLOR UR: YELLOW
CREAT SERPL-MCNC: 0.7 MG/DL (ref 0.5–1)
EKG ATRIAL RATE: 55 BPM
EKG P AXIS: 44 DEGREES
EKG P-R INTERVAL: 170 MS
EKG Q-T INTERVAL: 530 MS
EKG QRS DURATION: 114 MS
EKG QTC CALCULATION (BAZETT): 507 MS
EKG R AXIS: -43 DEGREES
EKG T AXIS: -14 DEGREES
EKG VENTRICULAR RATE: 55 BPM
EOSINOPHIL # BLD: 0.06 K/UL (ref 0.05–0.5)
EOSINOPHILS RELATIVE PERCENT: 1 % (ref 0–6)
ERYTHROCYTE [DISTWIDTH] IN BLOOD BY AUTOMATED COUNT: 16.1 % (ref 11.5–15)
GFR SERPL CREATININE-BSD FRML MDRD: >60 ML/MIN/1.73M2
GLUCOSE SERPL-MCNC: 90 MG/DL (ref 74–99)
GLUCOSE UR STRIP-MCNC: NEGATIVE MG/DL
HCT VFR BLD AUTO: 40.3 % (ref 34–48)
HGB BLD-MCNC: 12.5 G/DL (ref 11.5–15.5)
HGB UR QL STRIP.AUTO: NEGATIVE
KETONES UR STRIP-MCNC: NEGATIVE MG/DL
LEUKOCYTE ESTERASE UR QL STRIP: NEGATIVE
LYMPHOCYTES NFR BLD: 0.78 K/UL (ref 1.5–4)
LYMPHOCYTES RELATIVE PERCENT: 12 % (ref 20–42)
MCH RBC QN AUTO: 30 PG (ref 26–35)
MCHC RBC AUTO-ENTMCNC: 31 G/DL (ref 32–34.5)
MCV RBC AUTO: 96.9 FL (ref 80–99.9)
MONOCYTES NFR BLD: 0.28 K/UL (ref 0.1–0.95)
MONOCYTES NFR BLD: 4 % (ref 2–12)
NEUTROPHILS NFR BLD: 83 % (ref 43–80)
NEUTS SEG NFR BLD: 5.29 K/UL (ref 1.8–7.3)
NITRITE UR QL STRIP: NEGATIVE
PH UR STRIP: 8 [PH] (ref 5–9)
PLATELET # BLD AUTO: 189 K/UL (ref 130–450)
PMV BLD AUTO: 9.2 FL (ref 7–12)
POTASSIUM SERPL-SCNC: 3.7 MMOL/L (ref 3.5–5)
PROT SERPL-MCNC: 6.5 G/DL (ref 6.4–8.3)
PROT UR STRIP-MCNC: NEGATIVE MG/DL
RBC # BLD AUTO: 4.16 M/UL (ref 3.5–5.5)
RBC # BLD: ABNORMAL 10*6/UL
RBC #/AREA URNS HPF: ABNORMAL /HPF
SODIUM SERPL-SCNC: 141 MMOL/L (ref 132–146)
SP GR UR STRIP: 1.01 (ref 1–1.03)
UROBILINOGEN UR STRIP-ACNC: 0.2 EU/DL (ref 0–1)
WBC #/AREA URNS HPF: ABNORMAL /HPF
WBC OTHER # BLD: 6.4 K/UL (ref 4.5–11.5)

## 2023-07-28 PROCEDURE — 93291 INTERROG DEV EVAL SCRMS IP: CPT | Performed by: INTERNAL MEDICINE

## 2023-07-28 PROCEDURE — 97161 PT EVAL LOW COMPLEX 20 MIN: CPT

## 2023-07-28 PROCEDURE — G0378 HOSPITAL OBSERVATION PER HR: HCPCS

## 2023-07-28 PROCEDURE — 6370000000 HC RX 637 (ALT 250 FOR IP)

## 2023-07-28 PROCEDURE — P9047 ALBUMIN (HUMAN), 25%, 50ML: HCPCS | Performed by: NURSE PRACTITIONER

## 2023-07-28 PROCEDURE — 80053 COMPREHEN METABOLIC PANEL: CPT

## 2023-07-28 PROCEDURE — 96365 THER/PROPH/DIAG IV INF INIT: CPT

## 2023-07-28 PROCEDURE — 99222 1ST HOSP IP/OBS MODERATE 55: CPT | Performed by: PSYCHIATRY & NEUROLOGY

## 2023-07-28 PROCEDURE — 85027 COMPLETE CBC AUTOMATED: CPT

## 2023-07-28 PROCEDURE — 96361 HYDRATE IV INFUSION ADD-ON: CPT

## 2023-07-28 PROCEDURE — 6360000002 HC RX W HCPCS: Performed by: NURSE PRACTITIONER

## 2023-07-28 PROCEDURE — 36415 COLL VENOUS BLD VENIPUNCTURE: CPT

## 2023-07-28 PROCEDURE — 97530 THERAPEUTIC ACTIVITIES: CPT

## 2023-07-28 PROCEDURE — 2580000003 HC RX 258

## 2023-07-28 PROCEDURE — 99215 OFFICE O/P EST HI 40 MIN: CPT | Performed by: INTERNAL MEDICINE

## 2023-07-28 PROCEDURE — 97166 OT EVAL MOD COMPLEX 45 MIN: CPT

## 2023-07-28 PROCEDURE — 96366 THER/PROPH/DIAG IV INF ADDON: CPT

## 2023-07-28 PROCEDURE — 93010 ELECTROCARDIOGRAM REPORT: CPT | Performed by: INTERNAL MEDICINE

## 2023-07-28 RX ORDER — ALBUMIN (HUMAN) 12.5 G/50ML
25 SOLUTION INTRAVENOUS ONCE
Status: COMPLETED | OUTPATIENT
Start: 2023-07-28 | End: 2023-07-28

## 2023-07-28 RX ORDER — ACETAMINOPHEN 650 MG/1
650 SUPPOSITORY RECTAL EVERY 6 HOURS PRN
Status: DISCONTINUED | OUTPATIENT
Start: 2023-07-28 | End: 2023-08-02 | Stop reason: HOSPADM

## 2023-07-28 RX ORDER — SODIUM CHLORIDE 0.9 % (FLUSH) 0.9 %
10 SYRINGE (ML) INJECTION PRN
Status: DISCONTINUED | OUTPATIENT
Start: 2023-07-28 | End: 2023-08-02 | Stop reason: HOSPADM

## 2023-07-28 RX ORDER — PANTOPRAZOLE SODIUM 40 MG/1
40 TABLET, DELAYED RELEASE ORAL
Status: DISCONTINUED | OUTPATIENT
Start: 2023-07-28 | End: 2023-08-02 | Stop reason: HOSPADM

## 2023-07-28 RX ORDER — ONDANSETRON 4 MG/1
4 TABLET, ORALLY DISINTEGRATING ORAL EVERY 8 HOURS PRN
Status: DISCONTINUED | OUTPATIENT
Start: 2023-07-28 | End: 2023-08-02 | Stop reason: HOSPADM

## 2023-07-28 RX ORDER — SODIUM CHLORIDE 0.9 % (FLUSH) 0.9 %
5-40 SYRINGE (ML) INJECTION EVERY 12 HOURS SCHEDULED
Status: DISCONTINUED | OUTPATIENT
Start: 2023-07-28 | End: 2023-08-02 | Stop reason: HOSPADM

## 2023-07-28 RX ORDER — LOSARTAN POTASSIUM 50 MG/1
100 TABLET ORAL DAILY
Status: DISCONTINUED | OUTPATIENT
Start: 2023-07-28 | End: 2023-08-02 | Stop reason: HOSPADM

## 2023-07-28 RX ORDER — ONDANSETRON 2 MG/ML
4 INJECTION INTRAMUSCULAR; INTRAVENOUS EVERY 6 HOURS PRN
Status: DISCONTINUED | OUTPATIENT
Start: 2023-07-28 | End: 2023-08-02 | Stop reason: HOSPADM

## 2023-07-28 RX ORDER — POLYETHYLENE GLYCOL 3350 17 G/17G
17 POWDER, FOR SOLUTION ORAL DAILY PRN
Status: DISCONTINUED | OUTPATIENT
Start: 2023-07-28 | End: 2023-08-02 | Stop reason: HOSPADM

## 2023-07-28 RX ORDER — LEVETIRACETAM 500 MG/1
500 TABLET ORAL 2 TIMES DAILY
Status: DISCONTINUED | OUTPATIENT
Start: 2023-07-28 | End: 2023-07-28

## 2023-07-28 RX ORDER — ACETAMINOPHEN 325 MG/1
650 TABLET ORAL EVERY 6 HOURS PRN
Status: DISCONTINUED | OUTPATIENT
Start: 2023-07-28 | End: 2023-08-02 | Stop reason: HOSPADM

## 2023-07-28 RX ORDER — SODIUM CHLORIDE 9 MG/ML
INJECTION, SOLUTION INTRAVENOUS CONTINUOUS
Status: DISCONTINUED | OUTPATIENT
Start: 2023-07-28 | End: 2023-07-30

## 2023-07-28 RX ORDER — SODIUM CHLORIDE 9 MG/ML
INJECTION, SOLUTION INTRAVENOUS PRN
Status: DISCONTINUED | OUTPATIENT
Start: 2023-07-28 | End: 2023-08-02 | Stop reason: HOSPADM

## 2023-07-28 RX ADMIN — ALBUMIN (HUMAN) 25 G: 0.25 INJECTION, SOLUTION INTRAVENOUS at 09:59

## 2023-07-28 RX ADMIN — LOSARTAN POTASSIUM 100 MG: 50 TABLET, FILM COATED ORAL at 09:52

## 2023-07-28 RX ADMIN — APIXABAN 2.5 MG: 2.5 TABLET, FILM COATED ORAL at 09:52

## 2023-07-28 RX ADMIN — HYDRALAZINE HYDROCHLORIDE 50 MG: 50 TABLET, FILM COATED ORAL at 05:40

## 2023-07-28 RX ADMIN — HYDRALAZINE HYDROCHLORIDE 50 MG: 50 TABLET, FILM COATED ORAL at 20:24

## 2023-07-28 RX ADMIN — HYDRALAZINE HYDROCHLORIDE 50 MG: 50 TABLET, FILM COATED ORAL at 01:40

## 2023-07-28 RX ADMIN — PANTOPRAZOLE SODIUM 40 MG: 40 TABLET, DELAYED RELEASE ORAL at 05:40

## 2023-07-28 RX ADMIN — SODIUM CHLORIDE, PRESERVATIVE FREE 10 ML: 5 INJECTION INTRAVENOUS at 20:23

## 2023-07-28 RX ADMIN — SODIUM CHLORIDE, PRESERVATIVE FREE 10 ML: 5 INJECTION INTRAVENOUS at 09:52

## 2023-07-28 RX ADMIN — HYDRALAZINE HYDROCHLORIDE 50 MG: 50 TABLET, FILM COATED ORAL at 14:33

## 2023-07-28 RX ADMIN — APIXABAN 2.5 MG: 2.5 TABLET, FILM COATED ORAL at 20:23

## 2023-07-28 RX ADMIN — SODIUM CHLORIDE: 9 INJECTION, SOLUTION INTRAVENOUS at 04:42

## 2023-07-28 ASSESSMENT — PAIN SCALES - GENERAL: PAINLEVEL_OUTOF10: 0

## 2023-07-28 NOTE — PROGRESS NOTES
Lab unable to obtain labs, patient difficult stick. Two RN attempted as well with no success. Supplies gathered and placed at the bedside, IV team perfect served with requested they attempt.    Everardo Ascencio RN, BSN

## 2023-07-28 NOTE — CARE COORDINATION
Here as observation - from [facility] for syncope/abd pain. Pt bradycardic and lethargic upon arrival.ct abd /pelvis- Diffuse colonic fecal retention with sparing of the transverse colon. Mild diffuse urinary bladder wall thickening. Cardiology  and EP consult. PT/OT b/p this am 175/90 . Message to liaison Camilla regarding bed status. - await return call. Call placed to daughter Michele Simpson to verify discharge plan and the plan is to return to [facility] joey Electronically signed by Agustín Hart RN on 7/28/2023 at 8:47 AM     Message from [place]- needs precert to return. Envelope and ambulance form is soft chart. Electronically signed by Agustín Hart RN on 7/28/2023 at 9:54 AM     Messaged Camilla awaiting pt note and asked her to start precert when eval goes in. Cardiology deferred to EP and EP signed off. Await neurology consult.  Electronically signed by Agustín Hart RN on 7/28/2023 at 12:30 PM

## 2023-07-28 NOTE — ED NOTES
Ed Richard NP, left message regarding patient's heart rate and blood pressure.      Haile Olson RN  07/27/23 0603

## 2023-07-28 NOTE — PROGRESS NOTES
Physical Therapy  Physical Therapy Initial Assessment     Name: Azeem Low  : 1931  MRN: [id number]      Date of Service: 2023    Evaluating PT:  Kim Roque PT, DPT    Room #:  0954/1877-L  Diagnosis:  Syncope and collapse [R55]  PMHx/PSHx:  Afib,CHF,CAD,HTN,ischemic colitis, PAF  Procedure/Surgery:  N/A  Precautions:  cognition, alarms, very [place], fall risk, monitor BP (+ Orthostatic Hypotension)   Equipment Owned: cane,WC,WW  Equipment Needs:  TBD    SUBJECTIVE:    Pt admitted from [facility]. Prior to GAIL, Pt lives with brother in a 1 story home with 3 steps to enter and 1 handrail. Bed/bath is on 1 floor. Pt ambulated with cane PTA. OBJECTIVE:   Initial Evaluation  Date: 23 Treatment Short Term/ Long Term   Goals   AM-PAC 6 Clicks      Was pt agreeable to Eval/treatment? yes     Does pt have pain?  Unrated stomach pain     Bed Mobility  Rolling: NT  Supine to sit: SBA  Sit to supine: SBA  Scooting: SBA  Rolling: IND  Supine to sit: IND  Sit to supine: IND  Scooting: IND   Transfers Sit to stand: Edward  Stand to sit: Edward  Stand pivot: NT  Sit to stand: IND  Stand to sit: IND  Stand pivot: Veronica with AAD   Ambulation   NT  50ft with AAD Veronica   Stair negotiation: ascended and descended  NT  6 steps with 2 handrail Veronica     Strength/ROM:   BLE AROM WFL  BLE strength grossly 3+/5    Balance:   Static Sitting: IND  Dynamic Sitting: SBA  Static Standing: Edward with [employer]  Dynamic Standing: Edward with [employer]    Pt is A & O x 2  Sensation:  Pt denies numbness and tingling to extremities  Edema:  unremarkable    Vitals:  SpO2 and HR were stable during session    Therapeutic Exercises:    Bed mobility: supine<>sit, cued for EOB positioning  Transfers: STS x2, cued for hand placement and postural correction  Balance: sitting balance at EOB ~ 5 minutes   BLE AROM    Patient education  Pt educated on role of PT, importance of functional mobility during hospital stay    Patient response to education:   Pt verbalized understanding Pt demonstrated skill Pt requires further education in this area   yes partial yes     ASSESSMENT:    Conditions Requiring Skilled Therapeutic Intervention:    [x]Decreased strength     []Decreased ROM  [x]Decreased functional mobility  [x]Decreased balance   [x]Decreased endurance   [x]Decreased posture  []Decreased sensation  [x]Decreased coordination   []Decreased vision  [x]Decreased safety awareness   [x]Increased pain       Comments:  Pt supine in bed upon entering, agreeable to participate; daughters in the room. Pt BP in supine at 188/84. Pt transferred from supine to sit at EOB with assist; pt showed good sitting balance ~ 5 minutes. Pt BP sitting at /81. Pt performed a STS with assist and [facility]; pt given verbal cueing for hand placement. Pt stood for about ~ 4 minutes waiting for BP to register due to malfunction; pt showed forward flexed posture, so was instructed for postural correction. Pt BP tested in other arm standing reading 116/84. Pt assisted back to sitting at EOB. Pt transferred to supine with assist and repositioned in bed using TAPS. Pt BP in supine at 164/90. Pt remained supine in bed with all needs met and call light in reach. Treatment:  Patient practiced and was instructed in the following treatment:    Bed mobility training - pt given verbal and tactile cues to facilitate proper sequencing and safety during rolling and supine<>sit as well as provided with physical assistance to complete task   STS and pivot transfer training - pt educated on proper hand and foot placement, safety and sequencing, and use of verbal and tactile cues to safely complete sit<>stand and pivot transfers with physical assistance to complete task safely   Sitting EOB for >5 minutes for upright tolerance, postural awareness and BLE ROM    Pt's/ family goals   1. Return PLOF    Prognosis is fair for reaching above PT goals.     Patient and or family understand(s) diagnosis, prognosis,

## 2023-07-28 NOTE — PROGRESS NOTES
OCCUPATIONAL THERAPY INITIAL EVALUATION    ERROL Cooper [address] [place] [address]      Date:2023                                                  Patient Name: Preet Lion  MRN: [id number]  : 1931  Room: [address] [place] [address]    Evaluating OT: Kalee Dewey, [address], OTR/L [id number]  Referring FRANCES Gonzalez - CNP  Specific Provider Orders: OT eval and treat   Recommended Adaptive Equipment: 24 hr assist     Diagnosis: syncope and collapse   Surgery: none   Pertinent Medical History: CHF, HTN, a-fib   Precautions:  Fall Risk, contact    Assessment of current deficits   [x] Functional mobility  [x]ADLs  [x] Strength               [x]Cognition   [x] Functional transfers   [x] IADLs         [x] Safety Awareness   [x]Endurance   [] Fine Coordination              [x] Balance      [] Vision/perception   [x]Sensation    []Gross Motor Coordination  [] ROM  [] Delirium                   [] Motor Control     OT PLAN OF CARE   OT POC based on physician orders, patient diagnosis and results of clinical assessment    Frequency/Duration: 2-3 days/wk for 2 weeks PRN   Specific OT Treatment to include:   * Instruction/training on adapted ADL techniques and AE recommendations to increase functional independence within precautions       * Training on energy conservation strategies, correct breathing pattern and techniques to improve independence/tolerance for self-care routine  * Functional transfer/mobility training/DME recommendations for increased independence, safety, and fall prevention  * Patient/Family education to increase follow through with safety techniques and functional independence  * Recommendation of environmental modifications for increased safety with functional transfers/mobility and ADLs  * Cognitive retraining/development of therapeutic activities to improve problem solving, judgement, memory, and attention for increased safety/participation in ADL/IADL tasks  *

## 2023-07-28 NOTE — PLAN OF CARE
Problem: Discharge Planning  Goal: Discharge to home or other facility with appropriate resources  Outcome: Progressing     Problem: Safety - Adult  Goal: Free from fall injury  Outcome: Progressing     Problem: Skin/Tissue Integrity  Goal: Absence of new skin breakdown  Description: 1. Monitor for areas of redness and/or skin breakdown  2. Assess vascular access sites hourly  3. Every 4-6 hours minimum:  Change oxygen saturation probe site  4. Every 4-6 hours:  If on nasal continuous positive airway pressure, respiratory therapy assess nares and determine need for appliance change or resting period.   Outcome: Progressing     Problem: ABCDS Injury Assessment  Goal: Absence of physical injury  Outcome: Progressing     Problem: Chronic Conditions and Co-morbidities  Goal: Patient's chronic conditions and co-morbidity symptoms are monitored and maintained or improved  Outcome: Progressing

## 2023-07-28 NOTE — PROGRESS NOTES
4 Eyes Skin Assessment     NAME:  L[place] Baumgarten OF BIRTH:  12/25/1931  MEDICAL RECORD NUMBER:  [id number]    The patient is being assessed for  Admission    I agree that at least one RN has performed a thorough Head to Toe Skin Assessment on the patient. ALL assessment sites listed below have been assessed. Areas assessed by both nurses:    Head, Face, Ears, Shoulders, Back, Chest, Arms, Elbows, Hands, Sacrum. Buttock, Coccyx, Ischium, Legs. Feet and Heels, and Under Medical Devices         Does the Patient have a Wound?  No noted wound(s)       Bautista Prevention initiated by RN: Yes  Wound Care Orders initiated by RN: No    Pressure Injury (Stage 3,4, Unstageable, DTI, NWPT, and Complex wounds) if present, place Wound referral order by RN under : No    New Ostomies, if present place, Ostomy referral order under : No     Nurse 1 eSignature: Electronically signed by Sky Bernal RN on 7/28/23 at 4:53 AM EDT    **SHARE this note so that the co-signing nurse can place an eSignature**    Nurse 2 eSignature: {Esignature:[id number]}

## 2023-07-28 NOTE — PROGRESS NOTES
Spoke with Desirae Hammond NP with EP, no arrhythmias noted on loop recorder data. He called the nursing facility directly to speak with staff regarding the incident. Staff stated patient had a seizure but unable to describe seizure activity. Perfect serve message sent to Idalia Dale NP regarding nursing facility statement, and if she would like neurology to be consulted.      Krystin Watson RN, BSN

## 2023-07-28 NOTE — ED NOTES
Patient was changed, bed linens were changed. Patient was repositioned, family at bedside.      Elana Garvin RN  07/27/23 0324

## 2023-07-28 NOTE — PROGRESS NOTES
[facility]:    [facility] was consulted for syncope. Patient was seen by EP on 7/7/2023 for syncope. Will defer consult to EP at this time. Please call if you have any questions or concerns. Notified nursing staff of needed consult change.     Electronically signed by FRANCES Calles CNP on 7/28/23 at 7:43 AM EDT

## 2023-07-28 NOTE — H&P
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     DVT prophylaxis Eliquis  PT OT  Discharge planning-okay for discharge once evaluated by neurology    Code status: Full  Requires Inpatient level of care    Tamia Hinton MD

## 2023-07-28 NOTE — ED NOTES
Nurse to nurse report given, notified patient's family of bed assignment.       Isela Duncan RN  07/28/23 5539

## 2023-07-28 NOTE — CONSULTS
[address] [facility] Electrophysiology  Consultation Report  PATIENT: [address] [facility] RECORD NUMBER: [id number]  DATE OF SERVICE:  7/28/2023  ATTENDING ELECTROPHYSIOLOGIST: Sean Iverson MD   PRIMARY ELECTROPHYSIOLOGIST: Sean Iverson MD   REFERRING PHYSICIAN:  Sky Juan MD  CHIEF COMPLAINT: Syncope     HPI: This is a 80 y.o. female who has a history of syncope with an unclear etiology Vasovagal Vs. Cardiac syncope S/P ILR 07/05/2023 without correlation between syncope and bradyarrhythmia, Sinus node dysfunction, Prolonged QTc on Amiodarone, Paroxysmal atrial fibrillation on Eliquis for [address] on no rate or rhythm controlling medication, Nonischemic heart failure with a recovered LVEF, Nonobstructive coronary artery disease, Hypertension, Hyperlipidemia and dementia. She is know to Dr. Diana Schofield. She   presented to the hospital on 7/1/23 after found unresponsive at home and again on 07/06/2023 after discharge. The patient is confused and can not give out detail history. The patient was first seen by cardiac electrophysiology service when she presented to the hospital in April 2023 after she had syncopal episode after using the bathroom and found on the floor. At that time EMS was called and found the patient's HR at 32 bpm. IV Atropine was given. In ED she was noted to be in NSR in high 40's to 50's bpm. She was discharged home and was advised to stop Coreg and Hydralazine. She had similar episodes in July 2022 when she was started on Coreg 3.125 mg BID and continue on Amiodarone 200 mg daily, in September 2022 when Coreg was discontinued and decreased Amiodarone to 100 mg daily and November 2022 when she left AMA. In May 2023 when she was advised to decrease Amiodarone to 100 mg every 48 hours. She was prescribed outpatient monitors and  failed to send the monitor back to the company for analysis.   On 07/01/2023  per family, the patient And a total of >50% of that time involved face-to-face time providing counseling and/or coordination of care with the other providers, reviewing records/tests, counseling/education of the patient, ordering medications/tests/procedures, coordinating care, and documenting clinical information in the EHR. Thank you for allowing me to participate in your patient's care. Please call me if there are any questions or concerns.      Huseyin Ng MD  Cardiac Electrophysiology  [address] [facility]  The [facility] [place]: [facility] Electrophysiology  4:09 PM  7/28/2023

## 2023-07-28 NOTE — ACP (ADVANCE CARE PLANNING)
Advance Care Planning   Healthcare Decision Maker:    Primary Decision Maker: Jocelynn Scruggs Child - [phone]    Secondary Decision Maker: Zaid Gutierres - Child - [phone]    Click here to complete Healthcare Decision Makers including selection of the Healthcare Decision Maker Relationship (ie \"Primary\").

## 2023-07-29 PROCEDURE — G0378 HOSPITAL OBSERVATION PER HR: HCPCS

## 2023-07-29 PROCEDURE — 2580000003 HC RX 258

## 2023-07-29 PROCEDURE — 6370000000 HC RX 637 (ALT 250 FOR IP)

## 2023-07-29 PROCEDURE — 96361 HYDRATE IV INFUSION ADD-ON: CPT

## 2023-07-29 RX ADMIN — APIXABAN 2.5 MG: 2.5 TABLET, FILM COATED ORAL at 08:48

## 2023-07-29 RX ADMIN — SODIUM CHLORIDE: 9 INJECTION, SOLUTION INTRAVENOUS at 08:47

## 2023-07-29 RX ADMIN — HYDRALAZINE HYDROCHLORIDE 50 MG: 50 TABLET, FILM COATED ORAL at 20:43

## 2023-07-29 RX ADMIN — PANTOPRAZOLE SODIUM 40 MG: 40 TABLET, DELAYED RELEASE ORAL at 05:50

## 2023-07-29 RX ADMIN — APIXABAN 2.5 MG: 2.5 TABLET, FILM COATED ORAL at 20:43

## 2023-07-29 RX ADMIN — HYDRALAZINE HYDROCHLORIDE 50 MG: 50 TABLET, FILM COATED ORAL at 14:07

## 2023-07-29 RX ADMIN — LOSARTAN POTASSIUM 100 MG: 50 TABLET, FILM COATED ORAL at 08:48

## 2023-07-29 RX ADMIN — HYDRALAZINE HYDROCHLORIDE 50 MG: 50 TABLET, FILM COATED ORAL at 05:50

## 2023-07-29 ASSESSMENT — PAIN SCALES - GENERAL
PAINLEVEL_OUTOF10: 0

## 2023-07-30 PROCEDURE — 6370000000 HC RX 637 (ALT 250 FOR IP)

## 2023-07-30 PROCEDURE — G0378 HOSPITAL OBSERVATION PER HR: HCPCS

## 2023-07-30 PROCEDURE — 96361 HYDRATE IV INFUSION ADD-ON: CPT

## 2023-07-30 RX ORDER — HYDRALAZINE HYDROCHLORIDE 25 MG/1
75 TABLET, FILM COATED ORAL EVERY 8 HOURS SCHEDULED
Qty: 90 TABLET | Refills: 3 | Status: SHIPPED | OUTPATIENT
Start: 2023-07-30

## 2023-07-30 RX ADMIN — PANTOPRAZOLE SODIUM 40 MG: 40 TABLET, DELAYED RELEASE ORAL at 05:46

## 2023-07-30 RX ADMIN — POLYETHYLENE GLYCOL 3350 17 G: 17 POWDER, FOR SOLUTION ORAL at 08:18

## 2023-07-30 RX ADMIN — HYDRALAZINE HYDROCHLORIDE 75 MG: 50 TABLET, FILM COATED ORAL at 14:29

## 2023-07-30 RX ADMIN — ACETAMINOPHEN 650 MG: 325 TABLET ORAL at 20:53

## 2023-07-30 RX ADMIN — LOSARTAN POTASSIUM 100 MG: 50 TABLET, FILM COATED ORAL at 08:16

## 2023-07-30 RX ADMIN — HYDRALAZINE HYDROCHLORIDE 75 MG: 50 TABLET, FILM COATED ORAL at 20:53

## 2023-07-30 RX ADMIN — APIXABAN 2.5 MG: 2.5 TABLET, FILM COATED ORAL at 20:53

## 2023-07-30 RX ADMIN — HYDRALAZINE HYDROCHLORIDE 50 MG: 50 TABLET, FILM COATED ORAL at 05:46

## 2023-07-30 RX ADMIN — APIXABAN 2.5 MG: 2.5 TABLET, FILM COATED ORAL at 08:16

## 2023-07-30 ASSESSMENT — PAIN SCALES - GENERAL
PAINLEVEL_OUTOF10: 0
PAINLEVEL_OUTOF10: 0
PAINLEVEL_OUTOF10: 7
PAINLEVEL_OUTOF10: 0

## 2023-07-30 ASSESSMENT — PAIN DESCRIPTION - DESCRIPTORS: DESCRIPTORS: ACHING

## 2023-07-30 ASSESSMENT — PAIN DESCRIPTION - LOCATION: LOCATION: HEAD

## 2023-07-30 NOTE — PLAN OF CARE
Problem: Discharge Planning  Goal: Discharge to home or other facility with appropriate resources  Outcome: Completed     Problem: Safety - Adult  Goal: Free from fall injury  Outcome: Completed     Problem: Skin/Tissue Integrity  Goal: Absence of new skin breakdown  Description: 1. Monitor for areas of redness and/or skin breakdown  2. Assess vascular access sites hourly  3. Every 4-6 hours minimum:  Change oxygen saturation probe site  4. Every 4-6 hours:  If on nasal continuous positive airway pressure, respiratory therapy assess nares and determine need for appliance change or resting period.   Outcome: Completed     Problem: ABCDS Injury Assessment  Goal: Absence of physical injury  Outcome: Completed     Problem: Chronic Conditions and Co-morbidities  Goal: Patient's chronic conditions and co-morbidity symptoms are monitored and maintained or improved  Outcome: Completed

## 2023-07-31 ENCOUNTER — APPOINTMENT (OUTPATIENT)
Dept: GENERAL RADIOLOGY | Age: 88
End: 2023-07-31
Payer: COMMERCIAL

## 2023-07-31 PROCEDURE — 74018 RADEX ABDOMEN 1 VIEW: CPT

## 2023-07-31 PROCEDURE — 97535 SELF CARE MNGMENT TRAINING: CPT

## 2023-07-31 PROCEDURE — 97530 THERAPEUTIC ACTIVITIES: CPT

## 2023-07-31 PROCEDURE — G0378 HOSPITAL OBSERVATION PER HR: HCPCS

## 2023-07-31 PROCEDURE — 6370000000 HC RX 637 (ALT 250 FOR IP)

## 2023-07-31 PROCEDURE — 6370000000 HC RX 637 (ALT 250 FOR IP): Performed by: INTERNAL MEDICINE

## 2023-07-31 RX ORDER — SENNA AND DOCUSATE SODIUM 50; 8.6 MG/1; MG/1
2 TABLET, FILM COATED ORAL DAILY PRN
Status: DISCONTINUED | OUTPATIENT
Start: 2023-07-31 | End: 2023-08-02 | Stop reason: HOSPADM

## 2023-07-31 RX ADMIN — PANTOPRAZOLE SODIUM 40 MG: 40 TABLET, DELAYED RELEASE ORAL at 05:31

## 2023-07-31 RX ADMIN — APIXABAN 2.5 MG: 2.5 TABLET, FILM COATED ORAL at 21:45

## 2023-07-31 RX ADMIN — HYDRALAZINE HYDROCHLORIDE 75 MG: 50 TABLET, FILM COATED ORAL at 05:31

## 2023-07-31 RX ADMIN — APIXABAN 2.5 MG: 2.5 TABLET, FILM COATED ORAL at 08:46

## 2023-07-31 RX ADMIN — LOSARTAN POTASSIUM 100 MG: 50 TABLET, FILM COATED ORAL at 08:46

## 2023-07-31 RX ADMIN — POLYETHYLENE GLYCOL 3350 17 G: 17 POWDER, FOR SOLUTION ORAL at 08:50

## 2023-07-31 RX ADMIN — HYDRALAZINE HYDROCHLORIDE 75 MG: 50 TABLET, FILM COATED ORAL at 21:45

## 2023-07-31 RX ADMIN — SENNOSIDES AND DOCUSATE SODIUM 2 TABLET: 50; 8.6 TABLET ORAL at 18:43

## 2023-07-31 RX ADMIN — HYDRALAZINE HYDROCHLORIDE 75 MG: 50 TABLET, FILM COATED ORAL at 15:48

## 2023-07-31 ASSESSMENT — PAIN SCALES - GENERAL
PAINLEVEL_OUTOF10: 0
PAINLEVEL_OUTOF10: 0

## 2023-07-31 NOTE — PROGRESS NOTES
Physical Therapy  Physical Therapy Treatment     Name: Natacha Major  : 1931  MRN: [id number]      Date of Service: 2023    Evaluating PT:  Chepe Gamez, PT, DPT    Room #:  3935/7829-M  Diagnosis:  Syncope and collapse [R55]  PMHx/PSHx:  Afib,CHF,CAD,HTN,ischemic colitis, PAF  Procedure/Surgery:  N/A  Precautions:  cognition, alarms, very [place], fall risk, monitor BP (+ Orthostatic Hypotension)   Equipment Owned: cane,WC,WW  Equipment Needs:  TBD    SUBJECTIVE:    Pt admitted from [facility]. Prior to GAIL, Pt lives with brother in a 1 story home with 3 steps to enter and 1 handrail. Bed/bath is on 1 floor. Pt ambulated with cane PTA. OBJECTIVE:   Initial Evaluation  Date: 23 Treatment  23 Short Term/ Long Term   Goals   AM-PAC 6 Clicks 37/81     Was pt agreeable to Eval/treatment? yes yes    Does pt have pain?  Unrated stomach pain Stomach pain     Bed Mobility  Rolling: NT  Supine to sit: SBA  Sit to supine: SBA  Scooting: SBA Rolling: NT  Supine to sit: SBA  Sit to supine: SBA  Scooting: SBA Rolling: IND  Supine to sit: IND  Sit to supine: IND  Scooting: IND   Transfers Sit to stand: Edward  Stand to sit: Edward  Stand pivot: NT Sit to stand: Edward  Stand to sit: Edward  Stand pivot: Min A Sit to stand: IND  Stand to sit: IND  Stand pivot: Veronica with AAD   Ambulation   NT 25 ft FWW min A 50ft with AAD Veronica   Stair negotiation: ascended and descended  NT NT 6 steps with 2 handrail Veronica     Strength/ROM:   BLE AROM WFL  BLE strength grossly 3+/5    Balance:   Static Sitting: IND  Dynamic Sitting: SBA  Static Standing: Edward with [employer]  Dynamic Standing: Edward with [employer]    Pt is A & O x 2  Sensation:  Pt denies numbness and tingling to extremities  Edema:  unremarkable    Vitals:  SpO2 and HR were stable during session    Therapeutic Exercises:    NT    Patient education  Pt educated on role of PT, importance of functional mobility during hospital stay    Patient response to education:   Pt verbalized understanding Pt demonstrated skill Pt requires further education in this area   yes partial yes     ASSESSMENT:    Conditions Requiring Skilled Therapeutic Intervention:    [x]Decreased strength     []Decreased ROM  [x]Decreased functional mobility  [x]Decreased balance   [x]Decreased endurance   [x]Decreased posture  []Decreased sensation  [x]Decreased coordination   []Decreased vision  [x]Decreased safety awareness   [x]Increased pain       Comments:  Pt supine in bed upon entering, agreeable to participate; daughter in the room. Pt with improved activity tolerance and no dizziness during session today. Pt required repetition of cues d/t [place]. Pt ambulated short distance in room with assist required for [facility] management. Pt remained supine in bed with all needs met and call light in reach. Treatment:  Patient practiced and was instructed in the following treatment:    Bed mobility training - pt given verbal and tactile cues to facilitate proper sequencing and safety during rolling and supine<>sit as well as provided with physical assistance to complete task   STS and pivot transfer training - pt educated on proper hand and foot placement, safety and sequencing, and use of verbal and tactile cues to safely complete sit<>stand and pivot transfers with physical assistance to complete task safely   Sitting EOB for >5 minutes for upright tolerance, postural awareness and BLE ROM    Pt's/ family goals   1. Return PLOF    Prognosis is fair for reaching above PT goals. Patient and or family understand(s) diagnosis, prognosis, and plan of care.   yes    PHYSICAL THERAPY PLAN OF CARE:    PT POC is established based on physician order and patient diagnosis     Referring provider/PT Order:  FRANCES Bettencourt - CNP  Diagnosis:  Syncope and collapse [R55]  Specific instructions for next treatment:  Progress as tolerated    Current Treatment Recommendations:     [x] Strengthening to improve independence with functional

## 2023-07-31 NOTE — CARE COORDINATION
7/31/23 Update CM Note. Pt admitted 7/27/23 OBS status Dx syncope and collapse. Discharge order noted. Awaiting precert for return to facility. Need PT/OT updates, ML [id number] for stat updates. Plan to discharge to  [facility] Destination/SHERRILL updated. Envelope and ambulance form previously placed on chart.   Umaña Presume BSN RN-BC  [phone]

## 2023-07-31 NOTE — PROGRESS NOTES
Occupational Therapy  OT BEDSIDE TREATMENT NOTE    [address] [facility]   [address] [place] [address]      Date:2023  Patient Name: Jacey Bae  MRN: [id number]  : 1931  Room: -A     Evaluating OT: Melanie Mosqueda, [address], OTR/L [id number]  Referring FRANCES Kemp CNP  Specific Provider Orders: OT eval and treat   Recommended Adaptive Equipment: 24 hr assist      Diagnosis: syncope and collapse   Surgery: none   Pertinent Medical History: CHF, HTN, a-fib   Precautions:  Fall Risk, contact     Assessment of current deficits   [x] Functional mobility           [x]ADLs           [x] Strength                  [x]Cognition   [x] Functional transfers         [x] IADLs         [x] Safety Awareness   [x]Endurance   [] Fine Coordination                         [x] Balance      [] Vision/perception   [x]Sensation     []Gross Motor Coordination             [] ROM           [] Delirium                   [] Motor Control      OT PLAN OF CARE   OT POC based on physician orders, patient diagnosis and results of clinical assessment     Frequency/Duration: 2-3 days/wk for 2 weeks PRN   Specific OT Treatment to include:   * Instruction/training on adapted ADL techniques and AE recommendations to increase functional independence within precautions       * Training on energy conservation strategies, correct breathing pattern and techniques to improve independence/tolerance for self-care routine  * Functional transfer/mobility training/DME recommendations for increased independence, safety, and fall prevention  * Patient/Family education to increase follow through with safety techniques and functional independence  * Recommendation of environmental modifications for increased safety with functional transfers/mobility and ADLs  * Cognitive retraining/development of therapeutic activities to improve problem solving, judgement, memory, and attention for

## 2023-08-01 LAB
ANION GAP SERPL CALCULATED.3IONS-SCNC: 10 MMOL/L (ref 7–16)
BASOPHILS # BLD: 0.02 K/UL (ref 0–0.2)
BASOPHILS NFR BLD: 0 % (ref 0–2)
BUN SERPL-MCNC: 7 MG/DL (ref 6–23)
CALCIUM SERPL-MCNC: 9 MG/DL (ref 8.6–10.2)
CHLORIDE SERPL-SCNC: 114 MMOL/L (ref 98–107)
CO2 SERPL-SCNC: 21 MMOL/L (ref 22–29)
CREAT SERPL-MCNC: 0.8 MG/DL (ref 0.5–1)
EOSINOPHIL # BLD: 0.04 K/UL (ref 0.05–0.5)
EOSINOPHILS RELATIVE PERCENT: 1 % (ref 0–6)
ERYTHROCYTE [DISTWIDTH] IN BLOOD BY AUTOMATED COUNT: 15.8 % (ref 11.5–15)
GFR SERPL CREATININE-BSD FRML MDRD: >60 ML/MIN/1.73M2
GLUCOSE SERPL-MCNC: 104 MG/DL (ref 74–99)
HCT VFR BLD AUTO: 37.1 % (ref 34–48)
HGB BLD-MCNC: 12.4 G/DL (ref 11.5–15.5)
IMM GRANULOCYTES # BLD AUTO: <0.03 K/UL (ref 0–0.58)
IMM GRANULOCYTES NFR BLD: 0 % (ref 0–5)
LYMPHOCYTES NFR BLD: 1.24 K/UL (ref 1.5–4)
LYMPHOCYTES RELATIVE PERCENT: 24 % (ref 20–42)
MCH RBC QN AUTO: 30.5 PG (ref 26–35)
MCHC RBC AUTO-ENTMCNC: 33.4 G/DL (ref 32–34.5)
MCV RBC AUTO: 91.4 FL (ref 80–99.9)
MONOCYTES NFR BLD: 0.78 K/UL (ref 0.1–0.95)
MONOCYTES NFR BLD: 15 % (ref 2–12)
NEUTROPHILS NFR BLD: 60 % (ref 43–80)
NEUTS SEG NFR BLD: 3.14 K/UL (ref 1.8–7.3)
PLATELET # BLD AUTO: 186 K/UL (ref 130–450)
PMV BLD AUTO: 10.2 FL (ref 7–12)
POTASSIUM SERPL-SCNC: 3.3 MMOL/L (ref 3.5–5)
RBC # BLD AUTO: 4.06 M/UL (ref 3.5–5.5)
SODIUM SERPL-SCNC: 145 MMOL/L (ref 132–146)
WBC OTHER # BLD: 5.2 K/UL (ref 4.5–11.5)

## 2023-08-01 PROCEDURE — 6370000000 HC RX 637 (ALT 250 FOR IP)

## 2023-08-01 PROCEDURE — G0378 HOSPITAL OBSERVATION PER HR: HCPCS

## 2023-08-01 PROCEDURE — 85025 COMPLETE CBC W/AUTO DIFF WBC: CPT

## 2023-08-01 PROCEDURE — 80048 BASIC METABOLIC PNL TOTAL CA: CPT

## 2023-08-01 PROCEDURE — 36415 COLL VENOUS BLD VENIPUNCTURE: CPT

## 2023-08-01 RX ADMIN — APIXABAN 2.5 MG: 2.5 TABLET, FILM COATED ORAL at 07:57

## 2023-08-01 RX ADMIN — HYDRALAZINE HYDROCHLORIDE 75 MG: 50 TABLET, FILM COATED ORAL at 20:16

## 2023-08-01 RX ADMIN — HYDRALAZINE HYDROCHLORIDE 75 MG: 50 TABLET, FILM COATED ORAL at 05:23

## 2023-08-01 RX ADMIN — APIXABAN 2.5 MG: 2.5 TABLET, FILM COATED ORAL at 20:16

## 2023-08-01 RX ADMIN — HYDRALAZINE HYDROCHLORIDE 75 MG: 50 TABLET, FILM COATED ORAL at 13:47

## 2023-08-01 RX ADMIN — LOSARTAN POTASSIUM 100 MG: 50 TABLET, FILM COATED ORAL at 07:57

## 2023-08-01 RX ADMIN — PANTOPRAZOLE SODIUM 40 MG: 40 TABLET, DELAYED RELEASE ORAL at 05:23

## 2023-08-01 NOTE — CARE COORDINATION
8/1/23 Update CM Note. Pt admitted 7/27/23 OBS status Dx syncope and collapse. Discharge order noted. Awaiting precert for return to facility, started 7/31/23. Plan to discharge to  [facility] Envelope and ambulance form previously placed on chart.   Jay COOLEYN RN-BC  [phone]

## 2023-08-02 VITALS
SYSTOLIC BLOOD PRESSURE: 136 MMHG | HEART RATE: 66 BPM | HEIGHT: 66 IN | BODY MASS INDEX: 21.15 KG/M2 | RESPIRATION RATE: 16 BRPM | DIASTOLIC BLOOD PRESSURE: 86 MMHG | OXYGEN SATURATION: 96 % | WEIGHT: 131.6 LBS | TEMPERATURE: 97.7 F

## 2023-08-02 PROCEDURE — G0378 HOSPITAL OBSERVATION PER HR: HCPCS

## 2023-08-02 PROCEDURE — 6370000000 HC RX 637 (ALT 250 FOR IP)

## 2023-08-02 RX ADMIN — HYDRALAZINE HYDROCHLORIDE 75 MG: 50 TABLET, FILM COATED ORAL at 13:24

## 2023-08-02 RX ADMIN — PANTOPRAZOLE SODIUM 40 MG: 40 TABLET, DELAYED RELEASE ORAL at 05:40

## 2023-08-02 RX ADMIN — LOSARTAN POTASSIUM 100 MG: 50 TABLET, FILM COATED ORAL at 08:28

## 2023-08-02 RX ADMIN — APIXABAN 2.5 MG: 2.5 TABLET, FILM COATED ORAL at 08:29

## 2023-08-02 RX ADMIN — HYDRALAZINE HYDROCHLORIDE 75 MG: 50 TABLET, FILM COATED ORAL at 05:40

## 2023-08-02 ASSESSMENT — PAIN SCALES - GENERAL
PAINLEVEL_OUTOF10: 0
PAINLEVEL_OUTOF10: 0

## 2023-08-02 NOTE — DISCHARGE SUMMARY
[facility]   Discharge summary   Patient ID:  Jennifer Zendejas  [id number]  80 y.o.  12/25/1931    Admit date: 7/27/2023    Discharge date and time: 8/2/2023    Admission Diagnoses:   Patient Active Problem List   Diagnosis    Systolic hypertension    Primary hypertension    Syncope    Paroxysmal atrial fibrillation (HCC)    Bradycardia    Chronic anticoagulation--Eliquis    Syncope and collapse    Moderate protein-calorie malnutrition (HCC)    Chest pain    Chronic combined systolic (congestive) and diastolic (congestive) heart failure (HCC)    Abnormal urinalysis    Orthostatic hypotension    Coronary artery disease involving native coronary artery of native heart without angina pectoris    Urinary tract infection without hematuria    Chronic congestive heart failure (HCC)    Gastroesophageal reflux disease    Symptomatic bradycardia    Hypokalemia    Sinus bradycardia    Proctocolitis    Altered mental status, unspecified    Recurrent episodes of unresponsiveness    Acute metabolic encephalopathy    UTI (urinary tract infection)       Discharge Diagnoses: Syncope and collapse    Consults: neurology and EP    Procedures: none    Hospital Course:    Patient is a 80-year-old female admitted to [facility] for   Syncope and collapse   - Monitor labs  -Check orthostatic blood pressures  -Apply DANIEL hose bilaterally knee-high  -Continue IVF NS at 75 ml/hr   -EP consulted   -ILR interrogated today > no arrhythmias noted-electrophysiology has signed off  - PT/OT     Questionable seizure per nursing home  -Consult neurology-pending  -Start empiric Keppra if okay with neurology  -EEG at their discretion  -As needed IV Ativan if acute seizure episode  -She has had an MRI on her recent admission 7/2/2023 which was unremarkable with the exception of 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

## 2023-08-02 NOTE — PROGRESS NOTES
4 Eyes Skin Assessment     NAME:  Ramana Kirby OF BIRTH:  12/25/1931  MEDICAL RECORD NUMBER:  [id number]    The patient is being assessed for  Admission    I agree that at least one RN has performed a thorough Head to Toe Skin Assessment on the patient. ALL assessment sites listed below have been assessed. Areas assessed by both nurses:    Head, Face, Ears, Shoulders, Back, Chest, Arms, Elbows, Hands, Sacrum. Buttock, Coccyx, Ischium, Legs. Feet and Heels, and Other    pt has extremely dry skin, dry heels, pinkish, dry buttocks        Does the Patient have a Wound?  No noted wound(s)       Bautista Prevention initiated by RN: Yes  Wound Care Orders initiated by RN: No    Pressure Injury (Stage 3,4, Unstageable, DTI, NWPT, and Complex wounds) if present, place Wound referral order by RN under : No    New Ostomies, if present place, Ostomy referral order under : No     Nurse 1 eSignature: Electronically signed by Shakira Awan RN on 8/2/23 at 1:01 AM EDT    **SHARE this note so that the co-signing nurse can place an eSignature**    Nurse 2 eSignature: {Esignature:[id number]}

## 2023-08-02 NOTE — CARE COORDINATION
Discharge order noted. Patient was here under observation for syncope and collapse. Per Manuel Milks from \[facility] [place] [facility]\"" [employer] [facility]. said precert has been obtained. Transportation set up via [facility] for 5 pm today. Charge nurse, RN, liaison, patient and family all notified. Completed and signed ambulance form in envelope in soft chart. No Hens needed since patient is from this facility.    Juice Ireland RN CM  [phone]

## 2023-08-06 PROBLEM — N39.0 UTI (URINARY TRACT INFECTION): Status: RESOLVED | Noted: 2023-07-07 | Resolved: 2023-08-06

## 2023-08-08 ENCOUNTER — OUTSIDE SERVICES (OUTPATIENT)
Dept: PRIMARY CARE CLINIC | Age: 88
End: 2023-08-08
Payer: COMMERCIAL

## 2023-08-08 DIAGNOSIS — R55 SYNCOPE, UNSPECIFIED SYNCOPE TYPE: Primary | ICD-10-CM

## 2023-08-08 DIAGNOSIS — I25.10 CORONARY ARTERY DISEASE INVOLVING NATIVE CORONARY ARTERY OF NATIVE HEART WITHOUT ANGINA PECTORIS: ICD-10-CM

## 2023-08-08 DIAGNOSIS — I50.42 CHRONIC COMBINED SYSTOLIC (CONGESTIVE) AND DIASTOLIC (CONGESTIVE) HEART FAILURE (HCC): ICD-10-CM

## 2023-08-08 DIAGNOSIS — I48.0 PAROXYSMAL ATRIAL FIBRILLATION (HCC): ICD-10-CM

## 2023-08-08 DIAGNOSIS — G93.41 ACUTE METABOLIC ENCEPHALOPATHY: ICD-10-CM

## 2023-08-08 DIAGNOSIS — I10 PRIMARY HYPERTENSION: ICD-10-CM

## 2023-08-08 DIAGNOSIS — E44.0 MODERATE PROTEIN-CALORIE MALNUTRITION (HCC): ICD-10-CM

## 2023-08-08 DIAGNOSIS — R00.1 SYMPTOMATIC BRADYCARDIA: ICD-10-CM

## 2023-08-08 DIAGNOSIS — F03.90 DEMENTIA WITHOUT BEHAVIORAL DISTURBANCE, PSYCHOTIC DISTURBANCE, MOOD DISTURBANCE, OR ANXIETY, UNSPECIFIED DEMENTIA SEVERITY, UNSPECIFIED DEMENTIA TYPE (HCC): ICD-10-CM

## 2023-08-08 DIAGNOSIS — R10.13 EPIGASTRIC PAIN: ICD-10-CM

## 2023-08-08 PROCEDURE — 99309 SBSQ NF CARE MODERATE MDM 30: CPT | Performed by: INTERNAL MEDICINE

## 2023-08-09 ASSESSMENT — VISUAL ACUITY: OU: 1

## 2023-08-09 NOTE — PROGRESS NOTES
Visit Date: 8/8/23  Felicia Kwon  12/25/1931  female 80 y.o. Subjective:    CC: Patient presents with syncope. Patient presents for follow up of dementia, afib, chf, cad, and htn. HPI:  Memory Loss    Patient reports onset of memory loss was more than 1 year ago. Onset quality is gradual.     Symptoms associated with memory loss include changes in short-term memory and changes in long-term memory. Patient lives in a/an own home. Loss of Consciousness  Chronicity: she had a syncopal episode while working with therapy and was admitted to the hospital. she was treated and sent back to the nursing home. The current episode started in the past 7 days. The problem has been gradually improving. Her past medical history is significant for CAD. Atrial Fibrillation  Presents for follow-up visit. Symptoms include syncope. The symptoms have been stable. Past medical history includes CAD and CHF. Compliance problems: taking medications, no medication side effects. Congestive Heart Failure  Presents for follow-up visit. The symptoms have been stable. Her past medical history is significant for CAD. Compliance with total regimen is %. Compliance with diet is %. Compliance with exercise is %. Compliance with medications is %. Coronary Artery Disease  Presents for follow-up visit. Her past medical history is significant for CHF. The symptoms have been stable. Compliance with diet is good. Compliance with exercise is good. Compliance with medications is good. Hypertension  This is a chronic problem. The current episode started more than 1 year ago. The problem is unchanged. The problem is controlled. There are no associated agents to hypertension. There are no known risk factors for coronary artery disease. Treatments tried: taking medications, no medication side effects. The current treatment provides mild improvement. There are no compliance problems.       ROS:  Review of Systems

## 2023-08-15 ENCOUNTER — OUTSIDE SERVICES (OUTPATIENT)
Dept: PRIMARY CARE CLINIC | Age: 88
End: 2023-08-15
Payer: COMMERCIAL

## 2023-08-15 DIAGNOSIS — R55 SYNCOPE, UNSPECIFIED SYNCOPE TYPE: Primary | ICD-10-CM

## 2023-08-15 DIAGNOSIS — R00.1 SYMPTOMATIC BRADYCARDIA: ICD-10-CM

## 2023-08-15 DIAGNOSIS — E44.0 MODERATE PROTEIN-CALORIE MALNUTRITION (HCC): ICD-10-CM

## 2023-08-15 DIAGNOSIS — I50.42 CHRONIC COMBINED SYSTOLIC (CONGESTIVE) AND DIASTOLIC (CONGESTIVE) HEART FAILURE (HCC): ICD-10-CM

## 2023-08-15 DIAGNOSIS — I10 PRIMARY HYPERTENSION: ICD-10-CM

## 2023-08-15 DIAGNOSIS — I48.0 PAROXYSMAL ATRIAL FIBRILLATION (HCC): ICD-10-CM

## 2023-08-15 DIAGNOSIS — F03.90 DEMENTIA WITHOUT BEHAVIORAL DISTURBANCE, PSYCHOTIC DISTURBANCE, MOOD DISTURBANCE, OR ANXIETY, UNSPECIFIED DEMENTIA SEVERITY, UNSPECIFIED DEMENTIA TYPE (HCC): ICD-10-CM

## 2023-08-15 DIAGNOSIS — I25.10 CORONARY ARTERY DISEASE INVOLVING NATIVE CORONARY ARTERY OF NATIVE HEART WITHOUT ANGINA PECTORIS: ICD-10-CM

## 2023-08-15 DIAGNOSIS — G93.41 ACUTE METABOLIC ENCEPHALOPATHY: ICD-10-CM

## 2023-08-15 PROCEDURE — 99309 SBSQ NF CARE MODERATE MDM 30: CPT | Performed by: INTERNAL MEDICINE

## 2023-08-16 ASSESSMENT — VISUAL ACUITY: OU: 1

## 2023-08-16 NOTE — PROGRESS NOTES
Visit Date: 8/15/23  Dora Burk  12/25/1931  female 80 y.o. Subjective:    CC: Patient presents with syncope. Patient presents for follow up of dementia, afib, chf, cad, and htn. HPI:  Memory Loss    Patient reports onset of memory loss was more than 1 year ago. Onset quality is gradual.     Symptoms associated with memory loss include changes in short-term memory and changes in long-term memory. Patient lives in a/an own home. Loss of Consciousness  Chronicity: she had a syncopal episode while working with therapy and was admitted to the hospital. she was treated and sent back to the nursing home. The current episode started 1 to 4 weeks ago. The problem has been gradually improving. Her past medical history is significant for CAD. Atrial Fibrillation  Presents for follow-up visit. Symptoms include syncope. The symptoms have been stable. Past medical history includes CAD and CHF. Compliance problems: taking medications, no medication side effects. Congestive Heart Failure  Presents for follow-up visit. The symptoms have been stable. Her past medical history is significant for CAD. Compliance with total regimen is %. Compliance with diet is %. Compliance with exercise is %. Compliance with medications is %. Coronary Artery Disease  Presents for follow-up visit. Her past medical history is significant for CHF. The symptoms have been stable. Compliance with diet is good. Compliance with exercise is good. Compliance with medications is good. Hypertension  This is a chronic problem. The current episode started more than 1 year ago. The problem is unchanged. The problem is controlled. There are no associated agents to hypertension. There are no known risk factors for coronary artery disease. Treatments tried: taking medications, no medication side effects. The current treatment provides mild improvement. There are no compliance problems.       ROS:  Review of Systems   Unable

## 2023-08-17 ENCOUNTER — TELEPHONE (OUTPATIENT)
Dept: NON INVASIVE DIAGNOSTICS | Age: 88
End: 2023-08-17

## 2023-08-17 NOTE — TELEPHONE ENCOUNTER
Ro Gilliam from [address] called regarding patient. Ro Gilliam states that patient has had a couple episodes of syncope. Attempted to call several times, the phone rings with no answer.

## 2023-09-05 PROCEDURE — 93298 REM INTERROG DEV EVAL SCRMS: CPT | Performed by: INTERNAL MEDICINE

## 2023-09-05 PROCEDURE — G2066 INTER DEVC REMOTE 30D: HCPCS | Performed by: INTERNAL MEDICINE

## 2023-09-25 ENCOUNTER — HOSPITAL ENCOUNTER (EMERGENCY)
Age: 88
Discharge: HOME | End: 2023-09-26
Attending: EMERGENCY MEDICINE
Payer: COMMERCIAL

## 2023-09-25 ENCOUNTER — APPOINTMENT (OUTPATIENT)
Dept: GENERAL RADIOLOGY | Age: 88
End: 2023-09-25
Payer: COMMERCIAL

## 2023-09-25 DIAGNOSIS — R55 SYNCOPE AND COLLAPSE: Primary | ICD-10-CM

## 2023-09-25 DIAGNOSIS — K59.00 CONSTIPATION, UNSPECIFIED CONSTIPATION TYPE: ICD-10-CM

## 2023-09-25 LAB
ALBUMIN SERPL-MCNC: 3.3 G/DL (ref 3.5–5.2)
ALP SERPL-CCNC: 56 U/L (ref 35–104)
ALT SERPL-CCNC: 23 U/L (ref 0–32)
ANION GAP SERPL CALCULATED.3IONS-SCNC: 10 MMOL/L (ref 7–16)
AST SERPL-CCNC: 42 U/L (ref 0–31)
BASOPHILS # BLD: 0.04 K/UL (ref 0–0.2)
BASOPHILS NFR BLD: 1 % (ref 0–2)
BILIRUB SERPL-MCNC: 0.5 MG/DL (ref 0–1.2)
BNP SERPL-MCNC: 1176 PG/ML (ref 0–450)
BUN SERPL-MCNC: 15 MG/DL (ref 6–23)
CALCIUM SERPL-MCNC: 8.7 MG/DL (ref 8.6–10.2)
CHLORIDE SERPL-SCNC: 106 MMOL/L (ref 98–107)
CHP ED QC CHECK: NORMAL
CO2 SERPL-SCNC: 23 MMOL/L (ref 22–29)
CREAT SERPL-MCNC: 0.8 MG/DL (ref 0.5–1)
EOSINOPHIL # BLD: 0.2 K/UL (ref 0.05–0.5)
EOSINOPHILS RELATIVE PERCENT: 3 % (ref 0–6)
ERYTHROCYTE [DISTWIDTH] IN BLOOD BY AUTOMATED COUNT: 14.6 % (ref 11.5–15)
GFR SERPL CREATININE-BSD FRML MDRD: >60 ML/MIN/1.73M2
GLUCOSE BLD-MCNC: 123 MG/DL
GLUCOSE BLD-MCNC: 123 MG/DL (ref 74–99)
GLUCOSE SERPL-MCNC: 124 MG/DL (ref 74–99)
HCT VFR BLD AUTO: 37.8 % (ref 34–48)
HGB BLD-MCNC: 12.4 G/DL (ref 11.5–15.5)
IMM GRANULOCYTES # BLD AUTO: <0.03 K/UL (ref 0–0.58)
IMM GRANULOCYTES NFR BLD: 0 % (ref 0–5)
LACTATE BLDV-SCNC: 1.8 MMOL/L (ref 0.5–2.2)
LIPASE SERPL-CCNC: 16 U/L (ref 13–60)
LYMPHOCYTES NFR BLD: 1.35 K/UL (ref 1.5–4)
LYMPHOCYTES RELATIVE PERCENT: 22 % (ref 20–42)
MCH RBC QN AUTO: 30.6 PG (ref 26–35)
MCHC RBC AUTO-ENTMCNC: 32.8 G/DL (ref 32–34.5)
MCV RBC AUTO: 93.3 FL (ref 80–99.9)
MONOCYTES NFR BLD: 0.6 K/UL (ref 0.1–0.95)
MONOCYTES NFR BLD: 10 % (ref 2–12)
NEUTROPHILS NFR BLD: 65 % (ref 43–80)
NEUTS SEG NFR BLD: 3.99 K/UL (ref 1.8–7.3)
PLATELET # BLD AUTO: 198 K/UL (ref 130–450)
PMV BLD AUTO: 10.3 FL (ref 7–12)
POTASSIUM SERPL-SCNC: 4.3 MMOL/L (ref 3.5–5)
PROT SERPL-MCNC: 6.7 G/DL (ref 6.4–8.3)
RBC # BLD AUTO: 4.05 M/UL (ref 3.5–5.5)
SODIUM SERPL-SCNC: 139 MMOL/L (ref 132–146)
WBC OTHER # BLD: 6.2 K/UL (ref 4.5–11.5)

## 2023-09-25 PROCEDURE — 80053 COMPREHEN METABOLIC PANEL: CPT

## 2023-09-25 PROCEDURE — 82962 GLUCOSE BLOOD TEST: CPT

## 2023-09-25 PROCEDURE — 2580000003 HC RX 258: Performed by: EMERGENCY MEDICINE

## 2023-09-25 PROCEDURE — 83690 ASSAY OF LIPASE: CPT

## 2023-09-25 PROCEDURE — 93005 ELECTROCARDIOGRAM TRACING: CPT | Performed by: EMERGENCY MEDICINE

## 2023-09-25 PROCEDURE — 83880 ASSAY OF NATRIURETIC PEPTIDE: CPT

## 2023-09-25 PROCEDURE — 84484 ASSAY OF TROPONIN QUANT: CPT

## 2023-09-25 PROCEDURE — 96361 HYDRATE IV INFUSION ADD-ON: CPT

## 2023-09-25 PROCEDURE — 96360 HYDRATION IV INFUSION INIT: CPT

## 2023-09-25 PROCEDURE — 99285 EMERGENCY DEPT VISIT HI MDM: CPT

## 2023-09-25 PROCEDURE — 71045 X-RAY EXAM CHEST 1 VIEW: CPT

## 2023-09-25 PROCEDURE — 83605 ASSAY OF LACTIC ACID: CPT

## 2023-09-25 PROCEDURE — 85025 COMPLETE CBC W/AUTO DIFF WBC: CPT

## 2023-09-25 RX ORDER — SODIUM CHLORIDE 9 MG/ML
INJECTION, SOLUTION INTRAVENOUS CONTINUOUS
Status: DISCONTINUED | OUTPATIENT
Start: 2023-09-25 | End: 2023-09-26 | Stop reason: HOSPADM

## 2023-09-25 RX ORDER — 0.9 % SODIUM CHLORIDE 0.9 %
1000 INTRAVENOUS SOLUTION INTRAVENOUS ONCE
Status: COMPLETED | OUTPATIENT
Start: 2023-09-25 | End: 2023-09-25

## 2023-09-25 RX ADMIN — SODIUM CHLORIDE: 9 INJECTION, SOLUTION INTRAVENOUS at 23:38

## 2023-09-25 RX ADMIN — SODIUM CHLORIDE 1000 ML: 9 INJECTION, SOLUTION INTRAVENOUS at 22:23

## 2023-09-26 ENCOUNTER — APPOINTMENT (OUTPATIENT)
Dept: CT IMAGING | Age: 88
End: 2023-09-26
Payer: COMMERCIAL

## 2023-09-26 ENCOUNTER — APPOINTMENT (OUTPATIENT)
Dept: CT IMAGING | Age: 88
End: 2023-09-26
Attending: EMERGENCY MEDICINE
Payer: COMMERCIAL

## 2023-09-26 VITALS
TEMPERATURE: 97.3 F | OXYGEN SATURATION: 99 % | DIASTOLIC BLOOD PRESSURE: 96 MMHG | RESPIRATION RATE: 18 BRPM | SYSTOLIC BLOOD PRESSURE: 171 MMHG | HEART RATE: 68 BPM

## 2023-09-26 LAB
EKG ATRIAL RATE: 53 BPM
EKG P AXIS: 34 DEGREES
EKG Q-T INTERVAL: 482 MS
EKG QRS DURATION: 106 MS
EKG QTC CALCULATION (BAZETT): 493 MS
EKG R AXIS: -51 DEGREES
EKG T AXIS: 6 DEGREES
EKG VENTRICULAR RATE: 63 BPM
TROPONIN I SERPL HS-MCNC: 18 NG/L (ref 0–9)
TROPONIN I SERPL HS-MCNC: 19 NG/L (ref 0–9)

## 2023-09-26 PROCEDURE — 93010 ELECTROCARDIOGRAM REPORT: CPT | Performed by: INTERNAL MEDICINE

## 2023-09-26 PROCEDURE — 96361 HYDRATE IV INFUSION ADD-ON: CPT

## 2023-09-26 PROCEDURE — 70450 CT HEAD/BRAIN W/O DYE: CPT

## 2023-09-26 PROCEDURE — 84484 ASSAY OF TROPONIN QUANT: CPT

## 2023-09-26 PROCEDURE — 74176 CT ABD & PELVIS W/O CONTRAST: CPT

## 2023-09-26 NOTE — ED PROVIDER NOTES
Department of Emergency Medicine   ED  Provider Note  Admit Date/RoomTime: 9/25/2023  9:50 PM  ED Room: 13/13          History of Present Illness:  9/25/23, Time: 10:48 PM EDT  Chief Complaint   Patient presents with    Loss of Consciousness     Syncope episode on the toilet today. Pt did not fall. Pt reports some constipation with only small amounts of stool passing yesterday. Pt vomited when EMS palpated pt's abdomen. Sheila Ellington is a 80 y.o. female presenting to the ED for syncope. Patient was on the toilet straining. She had a syncopal episode per family. She done this in the past from straining. She does complain of generalized fatigue at this time, she is not having trouble moving her bowels. She is on laxatives at home. No nausea or vomiting. Does complain of abdominal cramping, aching in nature, diffusely, does not radiate anywhere. No chest pain, no back pain, no cough or sputum, no paresthesias, no other symptoms or complaints. Review of Systems:   Pertinent positives and negatives are stated within HPI, all other systems reviewed and are negative.        --------------------------------------------- PAST HISTORY ---------------------------------------------  Past Medical History:  has a past medical history of Arthritis, Atrial fibrillation ([address]), Chronic combined systolic (congestive) and diastolic (congestive) heart failure ([address]), Coronary artery disease involving native coronary artery of native heart without angina pectoris, Hx of blood clots, Hypertension, Ischemic colitis ([address]), and PAF (paroxysmal atrial fibrillation) ([address]). Past Surgical History:  has a past surgical history that includes Ankle fracture surgery; Colonoscopy; Endoscopy, colon, diagnostic; fracture surgery; Cardiac catheterization (11/11/2019); and Insertable Cardiac Monitor (07/05/2023). Social History:  reports that she has never smoked.  She has never used smokeless tobacco. She reports that

## 2023-10-06 PROCEDURE — 93298 REM INTERROG DEV EVAL SCRMS: CPT | Performed by: INTERNAL MEDICINE

## 2023-10-06 PROCEDURE — G2066 INTER DEVC REMOTE 30D: HCPCS | Performed by: INTERNAL MEDICINE

## 2023-12-07 PROCEDURE — 93298 REM INTERROG DEV EVAL SCRMS: CPT | Performed by: INTERNAL MEDICINE

## 2023-12-07 PROCEDURE — G2066 INTER DEVC REMOTE 30D: HCPCS | Performed by: INTERNAL MEDICINE

## 2024-01-07 PROCEDURE — 93298 REM INTERROG DEV EVAL SCRMS: CPT | Performed by: INTERNAL MEDICINE

## 2024-02-26 ENCOUNTER — TELEPHONE (OUTPATIENT)
Dept: NON INVASIVE DIAGNOSTICS | Age: 89
End: 2024-02-26

## 2024-02-26 NOTE — TELEPHONE ENCOUNTER
----- Message from Rach Liz MA sent at 2/26/2024 10:04 AM EST -----    ----- Message -----  From: Jeni Chapin APRN - CNP  Sent: 2/26/2024   9:53 AM EST  To: Rach Liz MA    Can you please have her come in for an appointment soon with CK or NP   Thanks

## 2024-03-13 ENCOUNTER — OFFICE VISIT (OUTPATIENT)
Dept: NON INVASIVE DIAGNOSTICS | Age: 89
End: 2024-03-13
Payer: COMMERCIAL

## 2024-03-13 VITALS
HEIGHT: 66 IN | SYSTOLIC BLOOD PRESSURE: 128 MMHG | RESPIRATION RATE: 16 BRPM | BODY MASS INDEX: 20.41 KG/M2 | HEART RATE: 76 BPM | WEIGHT: 127 LBS | DIASTOLIC BLOOD PRESSURE: 82 MMHG

## 2024-03-13 DIAGNOSIS — I10 SYSTOLIC HYPERTENSION: ICD-10-CM

## 2024-03-13 DIAGNOSIS — I48.0 PAROXYSMAL ATRIAL FIBRILLATION (HCC): Primary | Chronic | ICD-10-CM

## 2024-03-13 DIAGNOSIS — Z95.818 IMPLANTABLE LOOP RECORDER PRESENT: ICD-10-CM

## 2024-03-13 DIAGNOSIS — I95.1 ORTHOSTATIC HYPOTENSION: ICD-10-CM

## 2024-03-13 PROCEDURE — 1123F ACP DISCUSS/DSCN MKR DOCD: CPT | Performed by: NURSE PRACTITIONER

## 2024-03-13 PROCEDURE — 93000 ELECTROCARDIOGRAM COMPLETE: CPT | Performed by: INTERNAL MEDICINE

## 2024-03-13 PROCEDURE — 99214 OFFICE O/P EST MOD 30 MIN: CPT | Performed by: NURSE PRACTITIONER

## 2024-03-13 RX ORDER — METOPROLOL SUCCINATE 25 MG/1
12.5 TABLET, EXTENDED RELEASE ORAL DAILY
Qty: 30 TABLET | Refills: 3 | Status: SHIPPED | OUTPATIENT
Start: 2024-03-13

## 2024-03-13 NOTE — PROGRESS NOTES
[facility]- [facility] Electrophysiology  Outpatient Progress Note  Arianna Coleman  12/25/1931  Date of Service: 3/13/2024  PCP: Soraya Riddle MD  Electrophysiologist: Dr. Garcia         Subjective: Arianna Coleman is seen for follow-up and management of: Loop recorder, syncope    Last seen as inpatient with Dr. Garcia on 7/28/2023    PMH as noted below significant for syncope with unclear etiology s/p loop recorder 7/5/2023.  She has not been known to have correlation with syncope and heart arrhythmia.  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  She presented to the hospital in July 2023 found unresponsive at home she was confused and was unable to give any details.  She was found to be bradycardic and beta-blocker was stopped at that time.  She underwent loop recorder at that time.  She has had syncope in a sitting position and no arrhythmias noted after loop recorder was implanted.  Per chart EMS was noted to have a seizure at one time. Most recent ED visit for syncopal episode 9/2023.  Patient is in a wheelchair today.  She has had no significant arrhythmias that would cause syncope on her monitor today.  She has had some atrial fibrillation with RVR at times.  Patient is sleepy today in the office and continues to fall asleep during conversation in her wheelchair.  Her daughter-in-law is here with her today.  She and patient denies any recent syncope.  She does ambulate at times at home per her report.      Patient Active Problem List   Diagnosis    Systolic hypertension    Primary hypertension    Syncope    Paroxysmal atrial fibrillation (HCC)    Bradycardia    Chronic anticoagulation--Eliquis    Syncope and collapse    Moderate protein-calorie malnutrition (HCC)    Chest pain    Chronic combined systolic (congestive) and diastolic (congestive) heart

## 2024-04-09 PROCEDURE — 93298 REM INTERROG DEV EVAL SCRMS: CPT | Performed by: INTERNAL MEDICINE

## 2024-05-21 ENCOUNTER — HOSPITAL ENCOUNTER (EMERGENCY)
Age: 89
Discharge: HOME | DRG: 305 | End: 2024-05-21
Attending: EMERGENCY MEDICINE
Payer: COMMERCIAL

## 2024-05-21 ENCOUNTER — APPOINTMENT (OUTPATIENT)
Dept: CT IMAGING | Age: 89
DRG: 305 | End: 2024-05-21
Attending: EMERGENCY MEDICINE
Payer: COMMERCIAL

## 2024-05-21 ENCOUNTER — HOSPITAL ENCOUNTER (INPATIENT)
Age: 89
LOS: 3 days | Discharge: HOME | DRG: 305 | End: 2024-05-26
Attending: EMERGENCY MEDICINE | Admitting: INTERNAL MEDICINE
Payer: COMMERCIAL

## 2024-05-21 ENCOUNTER — APPOINTMENT (OUTPATIENT)
Dept: GENERAL RADIOLOGY | Age: 89
DRG: 305 | End: 2024-05-21
Payer: COMMERCIAL

## 2024-05-21 VITALS
TEMPERATURE: 98.4 F | DIASTOLIC BLOOD PRESSURE: 141 MMHG | RESPIRATION RATE: 20 BRPM | SYSTOLIC BLOOD PRESSURE: 199 MMHG | HEART RATE: 63 BPM

## 2024-05-21 DIAGNOSIS — R55 SYNCOPE AND COLLAPSE: Primary | ICD-10-CM

## 2024-05-21 DIAGNOSIS — R00.1 SINUS BRADYCARDIA: ICD-10-CM

## 2024-05-21 DIAGNOSIS — R00.1 BRADYCARDIA: Primary | ICD-10-CM

## 2024-05-21 DIAGNOSIS — K59.00 CONSTIPATION, UNSPECIFIED CONSTIPATION TYPE: ICD-10-CM

## 2024-05-21 DIAGNOSIS — R03.0 ELEVATED BLOOD PRESSURE READING: ICD-10-CM

## 2024-05-21 LAB
ALBUMIN SERPL-MCNC: 3.2 G/DL (ref 3.5–5.2)
ALP SERPL-CCNC: 69 U/L (ref 35–104)
ALT SERPL-CCNC: 14 U/L (ref 0–32)
ANION GAP SERPL CALCULATED.3IONS-SCNC: 12 MMOL/L (ref 7–16)
AST SERPL-CCNC: 23 U/L (ref 0–31)
BASOPHILS # BLD: 0.03 K/UL (ref 0–0.2)
BASOPHILS NFR BLD: 1 % (ref 0–2)
BILIRUB SERPL-MCNC: 0.5 MG/DL (ref 0–1.2)
BNP SERPL-MCNC: 946 PG/ML (ref 0–450)
BUN SERPL-MCNC: 13 MG/DL (ref 6–23)
CALCIUM SERPL-MCNC: 8.5 MG/DL (ref 8.6–10.2)
CHLORIDE SERPL-SCNC: 107 MMOL/L (ref 98–107)
CO2 SERPL-SCNC: 20 MMOL/L (ref 22–29)
CREAT SERPL-MCNC: 0.9 MG/DL (ref 0.5–1)
EOSINOPHIL # BLD: 0.07 K/UL (ref 0.05–0.5)
EOSINOPHILS RELATIVE PERCENT: 1 % (ref 0–6)
ERYTHROCYTE [DISTWIDTH] IN BLOOD BY AUTOMATED COUNT: 13.9 % (ref 11.5–15)
GFR, ESTIMATED: 62 ML/MIN/1.73M2
GLUCOSE SERPL-MCNC: 182 MG/DL (ref 74–99)
HCT VFR BLD AUTO: 36.7 % (ref 34–48)
HGB BLD-MCNC: 12.2 G/DL (ref 11.5–15.5)
IMM GRANULOCYTES # BLD AUTO: <0.03 K/UL (ref 0–0.58)
IMM GRANULOCYTES NFR BLD: 0 % (ref 0–5)
LACTATE BLDV-SCNC: 1.6 MMOL/L (ref 0.5–2.2)
LIPASE SERPL-CCNC: 9 U/L (ref 13–60)
LYMPHOCYTES NFR BLD: 1.4 K/UL (ref 1.5–4)
LYMPHOCYTES RELATIVE PERCENT: 29 % (ref 20–42)
MCH RBC QN AUTO: 29.9 PG (ref 26–35)
MCHC RBC AUTO-ENTMCNC: 33.2 G/DL (ref 32–34.5)
MCV RBC AUTO: 90 FL (ref 80–99.9)
MONOCYTES NFR BLD: 0.47 K/UL (ref 0.1–0.95)
MONOCYTES NFR BLD: 10 % (ref 2–12)
NEUTROPHILS NFR BLD: 60 % (ref 43–80)
NEUTS SEG NFR BLD: 2.92 K/UL (ref 1.8–7.3)
PLATELET # BLD AUTO: 146 K/UL (ref 130–450)
PMV BLD AUTO: 10.1 FL (ref 7–12)
POTASSIUM SERPL-SCNC: 3.5 MMOL/L (ref 3.5–5)
PROT SERPL-MCNC: 5.9 G/DL (ref 6.4–8.3)
RBC # BLD AUTO: 4.08 M/UL (ref 3.5–5.5)
SODIUM SERPL-SCNC: 139 MMOL/L (ref 132–146)
TROPONIN I SERPL HS-MCNC: 18 NG/L (ref 0–9)
WBC OTHER # BLD: 4.9 K/UL (ref 4.5–11.5)

## 2024-05-21 PROCEDURE — 83880 ASSAY OF NATRIURETIC PEPTIDE: CPT

## 2024-05-21 PROCEDURE — 70450 CT HEAD/BRAIN W/O DYE: CPT

## 2024-05-21 PROCEDURE — 96374 THER/PROPH/DIAG INJ IV PUSH: CPT

## 2024-05-21 PROCEDURE — 83690 ASSAY OF LIPASE: CPT

## 2024-05-21 PROCEDURE — 99285 EMERGENCY DEPT VISIT HI MDM: CPT

## 2024-05-21 PROCEDURE — 6360000004 HC RX CONTRAST MEDICATION: Performed by: RADIOLOGY

## 2024-05-21 PROCEDURE — 6360000002 HC RX W HCPCS: Performed by: EMERGENCY MEDICINE

## 2024-05-21 PROCEDURE — 71045 X-RAY EXAM CHEST 1 VIEW: CPT

## 2024-05-21 PROCEDURE — 93005 ELECTROCARDIOGRAM TRACING: CPT | Performed by: EMERGENCY MEDICINE

## 2024-05-21 PROCEDURE — 80053 COMPREHEN METABOLIC PANEL: CPT

## 2024-05-21 PROCEDURE — 83605 ASSAY OF LACTIC ACID: CPT

## 2024-05-21 PROCEDURE — 84484 ASSAY OF TROPONIN QUANT: CPT

## 2024-05-21 PROCEDURE — 85025 COMPLETE CBC W/AUTO DIFF WBC: CPT

## 2024-05-21 PROCEDURE — 74177 CT ABD & PELVIS W/CONTRAST: CPT

## 2024-05-21 RX ORDER — POLYETHYLENE GLYCOL 3350 17 G/17G
17 POWDER, FOR SOLUTION ORAL DAILY
Qty: 238 G | Refills: 0 | Status: SHIPPED | OUTPATIENT
Start: 2024-05-21 | End: 2024-05-22

## 2024-05-21 RX ORDER — ATROPINE SULFATE 0.1 MG/ML
1 INJECTION INTRAVENOUS ONCE
Status: COMPLETED | OUTPATIENT
Start: 2024-05-21 | End: 2024-05-21

## 2024-05-21 RX ADMIN — ATROPINE SULFATE 1 MG: 0.1 INJECTION INTRAVENOUS at 15:15

## 2024-05-21 RX ADMIN — IOPAMIDOL 75 ML: 755 INJECTION, SOLUTION INTRAVENOUS at 19:29

## 2024-05-21 ASSESSMENT — PAIN - FUNCTIONAL ASSESSMENT: PAIN_FUNCTIONAL_ASSESSMENT: NONE - DENIES PAIN

## 2024-05-21 NOTE — PROGRESS NOTES
Pt\"s  rt ac IV removed due to infiltration, new left hand IV started. Unable to reach RN by phone.

## 2024-05-22 ENCOUNTER — APPOINTMENT (OUTPATIENT)
Dept: CT IMAGING | Age: 89
DRG: 305 | End: 2024-05-22
Payer: COMMERCIAL

## 2024-05-22 PROBLEM — R03.0 ELEVATED BLOOD PRESSURE READING: Status: ACTIVE | Noted: 2024-05-22

## 2024-05-22 LAB
ANION GAP SERPL CALCULATED.3IONS-SCNC: 12 MMOL/L (ref 7–16)
BASOPHILS # BLD: 0.02 K/UL (ref 0–0.2)
BASOPHILS NFR BLD: 0 % (ref 0–2)
BNP SERPL-MCNC: 1362 PG/ML (ref 0–450)
BUN SERPL-MCNC: 11 MG/DL (ref 6–23)
CALCIUM SERPL-MCNC: 9.2 MG/DL (ref 8.6–10.2)
CHLORIDE SERPL-SCNC: 101 MMOL/L (ref 98–107)
CO2 SERPL-SCNC: 24 MMOL/L (ref 22–29)
CREAT SERPL-MCNC: 0.8 MG/DL (ref 0.5–1)
EKG ATRIAL RATE: 40 BPM
EKG ATRIAL RATE: 56 BPM
EKG ATRIAL RATE: 64 BPM
EKG P AXIS: 31 DEGREES
EKG P AXIS: 48 DEGREES
EKG P AXIS: 7 DEGREES
EKG P-R INTERVAL: 168 MS
EKG P-R INTERVAL: 174 MS
EKG P-R INTERVAL: 186 MS
EKG Q-T INTERVAL: 460 MS
EKG Q-T INTERVAL: 520 MS
EKG Q-T INTERVAL: 558 MS
EKG QRS DURATION: 106 MS
EKG QRS DURATION: 114 MS
EKG QRS DURATION: 116 MS
EKG QTC CALCULATION (BAZETT): 454 MS
EKG QTC CALCULATION (BAZETT): 474 MS
EKG QTC CALCULATION (BAZETT): 501 MS
EKG R AXIS: -47 DEGREES
EKG R AXIS: -49 DEGREES
EKG R AXIS: -50 DEGREES
EKG T AXIS: -21 DEGREES
EKG T AXIS: -39 DEGREES
EKG T AXIS: 13 DEGREES
EKG VENTRICULAR RATE: 40 BPM
EKG VENTRICULAR RATE: 56 BPM
EKG VENTRICULAR RATE: 64 BPM
EOSINOPHIL # BLD: 0.01 K/UL (ref 0.05–0.5)
EOSINOPHILS RELATIVE PERCENT: 0 % (ref 0–6)
ERYTHROCYTE [DISTWIDTH] IN BLOOD BY AUTOMATED COUNT: 13.9 % (ref 11.5–15)
GFR, ESTIMATED: 66 ML/MIN/1.73M2
GLUCOSE SERPL-MCNC: 155 MG/DL (ref 74–99)
HCT VFR BLD AUTO: 43.5 % (ref 34–48)
HGB BLD-MCNC: 14 G/DL (ref 11.5–15.5)
IMM GRANULOCYTES # BLD AUTO: <0.03 K/UL (ref 0–0.58)
IMM GRANULOCYTES NFR BLD: 0 % (ref 0–5)
LYMPHOCYTES NFR BLD: 1.2 K/UL (ref 1.5–4)
LYMPHOCYTES RELATIVE PERCENT: 20 % (ref 20–42)
MCH RBC QN AUTO: 29.8 PG (ref 26–35)
MCHC RBC AUTO-ENTMCNC: 32.2 G/DL (ref 32–34.5)
MCV RBC AUTO: 92.6 FL (ref 80–99.9)
MONOCYTES NFR BLD: 0.5 K/UL (ref 0.1–0.95)
MONOCYTES NFR BLD: 8 % (ref 2–12)
NEUTROPHILS NFR BLD: 71 % (ref 43–80)
NEUTS SEG NFR BLD: 4.36 K/UL (ref 1.8–7.3)
PLATELET # BLD AUTO: 156 K/UL (ref 130–450)
PMV BLD AUTO: 10.4 FL (ref 7–12)
POTASSIUM SERPL-SCNC: 4.5 MMOL/L (ref 3.5–5)
RBC # BLD AUTO: 4.7 M/UL (ref 3.5–5.5)
SODIUM SERPL-SCNC: 137 MMOL/L (ref 132–146)
TROPONIN I SERPL HS-MCNC: 17 NG/L (ref 0–9)
WBC OTHER # BLD: 6.1 K/UL (ref 4.5–11.5)

## 2024-05-22 PROCEDURE — 6360000004 HC RX CONTRAST MEDICATION: Performed by: RADIOLOGY

## 2024-05-22 PROCEDURE — 83880 ASSAY OF NATRIURETIC PEPTIDE: CPT

## 2024-05-22 PROCEDURE — 93005 ELECTROCARDIOGRAM TRACING: CPT

## 2024-05-22 PROCEDURE — 6360000002 HC RX W HCPCS

## 2024-05-22 PROCEDURE — 6360000002 HC RX W HCPCS: Performed by: INTERNAL MEDICINE

## 2024-05-22 PROCEDURE — G0378 HOSPITAL OBSERVATION PER HR: HCPCS

## 2024-05-22 PROCEDURE — 80048 BASIC METABOLIC PNL TOTAL CA: CPT

## 2024-05-22 PROCEDURE — 96375 TX/PRO/DX INJ NEW DRUG ADDON: CPT

## 2024-05-22 PROCEDURE — 2580000003 HC RX 258: Performed by: INTERNAL MEDICINE

## 2024-05-22 PROCEDURE — 84484 ASSAY OF TROPONIN QUANT: CPT

## 2024-05-22 PROCEDURE — 93010 ELECTROCARDIOGRAM REPORT: CPT | Performed by: INTERNAL MEDICINE

## 2024-05-22 PROCEDURE — 85025 COMPLETE CBC W/AUTO DIFF WBC: CPT

## 2024-05-22 PROCEDURE — 99223 1ST HOSP IP/OBS HIGH 75: CPT | Performed by: INTERNAL MEDICINE

## 2024-05-22 PROCEDURE — 71275 CT ANGIOGRAPHY CHEST: CPT

## 2024-05-22 PROCEDURE — 96376 TX/PRO/DX INJ SAME DRUG ADON: CPT

## 2024-05-22 PROCEDURE — 6370000000 HC RX 637 (ALT 250 FOR IP): Performed by: INTERNAL MEDICINE

## 2024-05-22 PROCEDURE — 99222 1ST HOSP IP/OBS MODERATE 55: CPT | Performed by: INTERNAL MEDICINE

## 2024-05-22 RX ORDER — PROCHLORPERAZINE EDISYLATE 5 MG/ML
10 INJECTION INTRAMUSCULAR; INTRAVENOUS EVERY 6 HOURS PRN
Status: DISCONTINUED | OUTPATIENT
Start: 2024-05-22 | End: 2024-05-26 | Stop reason: HOSPADM

## 2024-05-22 RX ORDER — HYDRALAZINE HYDROCHLORIDE 50 MG/1
50 TABLET, FILM COATED ORAL 2 TIMES DAILY
Status: ON HOLD | COMMUNITY
End: 2024-05-24 | Stop reason: HOSPADM

## 2024-05-22 RX ORDER — SODIUM CHLORIDE 9 MG/ML
INJECTION, SOLUTION INTRAVENOUS PRN
Status: DISCONTINUED | OUTPATIENT
Start: 2024-05-22 | End: 2024-05-26 | Stop reason: HOSPADM

## 2024-05-22 RX ORDER — HYDRALAZINE HYDROCHLORIDE 50 MG/1
50 TABLET, FILM COATED ORAL EVERY 12 HOURS SCHEDULED
Status: DISCONTINUED | OUTPATIENT
Start: 2024-05-22 | End: 2024-05-22

## 2024-05-22 RX ORDER — ONDANSETRON 4 MG/1
4 TABLET, ORALLY DISINTEGRATING ORAL EVERY 8 HOURS PRN
Status: DISCONTINUED | OUTPATIENT
Start: 2024-05-22 | End: 2024-05-22 | Stop reason: CLARIF

## 2024-05-22 RX ORDER — POLYETHYLENE GLYCOL 3350 17 G/17G
17 POWDER, FOR SOLUTION ORAL DAILY PRN
Status: DISCONTINUED | OUTPATIENT
Start: 2024-05-22 | End: 2024-05-26 | Stop reason: HOSPADM

## 2024-05-22 RX ORDER — ACETAMINOPHEN 325 MG/1
650 TABLET ORAL EVERY 6 HOURS PRN
Status: DISCONTINUED | OUTPATIENT
Start: 2024-05-22 | End: 2024-05-26 | Stop reason: HOSPADM

## 2024-05-22 RX ORDER — POLYETHYLENE GLYCOL 3350 17 G/17G
17 POWDER, FOR SOLUTION ORAL DAILY
Status: DISCONTINUED | OUTPATIENT
Start: 2024-05-22 | End: 2024-05-22 | Stop reason: CLARIF

## 2024-05-22 RX ORDER — DOCUSATE SODIUM 100 MG/1
100 CAPSULE, LIQUID FILLED ORAL DAILY
Status: DISCONTINUED | OUTPATIENT
Start: 2024-05-22 | End: 2024-05-26 | Stop reason: HOSPADM

## 2024-05-22 RX ORDER — LOSARTAN POTASSIUM 50 MG/1
50 TABLET ORAL 2 TIMES DAILY
Status: DISCONTINUED | OUTPATIENT
Start: 2024-05-22 | End: 2024-05-26 | Stop reason: HOSPADM

## 2024-05-22 RX ORDER — POLYETHYLENE GLYCOL 3350 17 G/17G
17 POWDER, FOR SOLUTION ORAL DAILY
Status: DISCONTINUED | OUTPATIENT
Start: 2024-05-22 | End: 2024-05-26 | Stop reason: HOSPADM

## 2024-05-22 RX ORDER — HYDRALAZINE HYDROCHLORIDE 20 MG/ML
5 INJECTION INTRAMUSCULAR; INTRAVENOUS EVERY 4 HOURS PRN
Status: DISCONTINUED | OUTPATIENT
Start: 2024-05-22 | End: 2024-05-23

## 2024-05-22 RX ORDER — PANTOPRAZOLE SODIUM 40 MG/1
40 TABLET, DELAYED RELEASE ORAL
Status: DISCONTINUED | OUTPATIENT
Start: 2024-05-22 | End: 2024-05-26 | Stop reason: HOSPADM

## 2024-05-22 RX ORDER — ENOXAPARIN SODIUM 100 MG/ML
40 INJECTION SUBCUTANEOUS DAILY
Status: CANCELLED | OUTPATIENT
Start: 2024-05-22

## 2024-05-22 RX ORDER — MAGNESIUM SULFATE IN WATER 40 MG/ML
2000 INJECTION, SOLUTION INTRAVENOUS PRN
Status: DISCONTINUED | OUTPATIENT
Start: 2024-05-22 | End: 2024-05-26 | Stop reason: HOSPADM

## 2024-05-22 RX ORDER — HYDRALAZINE HYDROCHLORIDE 20 MG/ML
5 INJECTION INTRAMUSCULAR; INTRAVENOUS ONCE
Status: COMPLETED | OUTPATIENT
Start: 2024-05-22 | End: 2024-05-22

## 2024-05-22 RX ORDER — PROCHLORPERAZINE MALEATE 10 MG
10 TABLET ORAL EVERY 8 HOURS PRN
Status: DISCONTINUED | OUTPATIENT
Start: 2024-05-22 | End: 2024-05-26 | Stop reason: HOSPADM

## 2024-05-22 RX ORDER — POTASSIUM CHLORIDE 7.45 MG/ML
10 INJECTION INTRAVENOUS PRN
Status: DISCONTINUED | OUTPATIENT
Start: 2024-05-22 | End: 2024-05-26 | Stop reason: HOSPADM

## 2024-05-22 RX ORDER — ACETAMINOPHEN 650 MG/1
650 SUPPOSITORY RECTAL EVERY 6 HOURS PRN
Status: DISCONTINUED | OUTPATIENT
Start: 2024-05-22 | End: 2024-05-26 | Stop reason: HOSPADM

## 2024-05-22 RX ORDER — LOSARTAN POTASSIUM 50 MG/1
100 TABLET ORAL DAILY
Status: DISCONTINUED | OUTPATIENT
Start: 2024-05-22 | End: 2024-05-22

## 2024-05-22 RX ORDER — POTASSIUM CHLORIDE 20 MEQ/1
40 TABLET, EXTENDED RELEASE ORAL PRN
Status: DISCONTINUED | OUTPATIENT
Start: 2024-05-22 | End: 2024-05-26 | Stop reason: HOSPADM

## 2024-05-22 RX ORDER — SODIUM CHLORIDE 0.9 % (FLUSH) 0.9 %
5-40 SYRINGE (ML) INJECTION EVERY 12 HOURS SCHEDULED
Status: DISCONTINUED | OUTPATIENT
Start: 2024-05-22 | End: 2024-05-26 | Stop reason: HOSPADM

## 2024-05-22 RX ORDER — AMIODARONE HYDROCHLORIDE 200 MG/1
200 TABLET ORAL DAILY
Status: ON HOLD | COMMUNITY
End: 2024-05-24 | Stop reason: HOSPADM

## 2024-05-22 RX ORDER — ONDANSETRON 2 MG/ML
4 INJECTION INTRAMUSCULAR; INTRAVENOUS EVERY 6 HOURS PRN
Status: DISCONTINUED | OUTPATIENT
Start: 2024-05-22 | End: 2024-05-22 | Stop reason: CLARIF

## 2024-05-22 RX ORDER — HYDRALAZINE HYDROCHLORIDE 25 MG/1
75 TABLET, FILM COATED ORAL EVERY 8 HOURS SCHEDULED
Status: DISCONTINUED | OUTPATIENT
Start: 2024-05-22 | End: 2024-05-22 | Stop reason: DRUGHIGH

## 2024-05-22 RX ORDER — SODIUM CHLORIDE 0.9 % (FLUSH) 0.9 %
5-40 SYRINGE (ML) INJECTION PRN
Status: DISCONTINUED | OUTPATIENT
Start: 2024-05-22 | End: 2024-05-26 | Stop reason: HOSPADM

## 2024-05-22 RX ADMIN — SODIUM CHLORIDE, PRESERVATIVE FREE 10 ML: 5 INJECTION INTRAVENOUS at 12:26

## 2024-05-22 RX ADMIN — POLYETHYLENE GLYCOL 3350 17 G: 17 POWDER, FOR SOLUTION ORAL at 05:53

## 2024-05-22 RX ADMIN — LOSARTAN POTASSIUM 50 MG: 50 TABLET, FILM COATED ORAL at 20:28

## 2024-05-22 RX ADMIN — LOSARTAN POTASSIUM 100 MG: 50 TABLET, FILM COATED ORAL at 12:26

## 2024-05-22 RX ADMIN — DOCUSATE SODIUM 100 MG: 100 CAPSULE, LIQUID FILLED ORAL at 12:26

## 2024-05-22 RX ADMIN — HYDRALAZINE HYDROCHLORIDE 5 MG: 20 INJECTION, SOLUTION INTRAMUSCULAR; INTRAVENOUS at 02:49

## 2024-05-22 RX ADMIN — PANTOPRAZOLE SODIUM 40 MG: 40 TABLET, DELAYED RELEASE ORAL at 12:26

## 2024-05-22 RX ADMIN — SODIUM CHLORIDE, PRESERVATIVE FREE 10 ML: 5 INJECTION INTRAVENOUS at 20:28

## 2024-05-22 RX ADMIN — IOPAMIDOL 75 ML: 755 INJECTION, SOLUTION INTRAVENOUS at 01:19

## 2024-05-22 RX ADMIN — HYDRALAZINE HYDROCHLORIDE 5 MG: 20 INJECTION, SOLUTION INTRAMUSCULAR; INTRAVENOUS at 15:19

## 2024-05-22 ASSESSMENT — PAIN - FUNCTIONAL ASSESSMENT: PAIN_FUNCTIONAL_ASSESSMENT: NONE - DENIES PAIN

## 2024-05-22 NOTE — CONSULTS
artifact noted. Suspect  abnormality secondary to attenuation rather than physiologic ischemia.  2. Mild-moderate global decrease in myocardial segmental wall motion  and calculated gated SPECT left ventricular ejection fraction of 38%.  3. Suggest clinical correlation as warranted.        Julian Mares D.O., FACP [facility]    Outpatient Monitor:   Implantable loop recorder interrogation    Paroxysmal atrial fibrillation, occasionally with rapid rates  No episodes of significant bradycardia/AF with RVR correlating with her clinical presentation    I have independently reviewed all of the ECGs and rhythm strips per above     Assessment/Plan: This is a 89 y.o. female with     1.  Recurrent syncope with hospitalizations dating back to 2019  Patient presents with sudden episodes of unresponsiveness while sitting down  Episodes are never documented while laying in bed.  History suggestive of orthostatic hypotension in this elderly lady  Sinus bradycardia has also been noted.  Patient was recently restarted on low-dose beta    2. Sinus node dysfunction   Heart rate histogram on loop recorder interrogation confirms ambient heart rates of between 50 to 60 bpm  Metoprolol was restarted in electrophysiology clinic--March 2024     3. History of paroxysmal atrial fibrillation  Loop recorder does show episodes of atrial fibrillation     4. History of nonischemic cardiomyopathy and chronic HFrEF  - Recovered LV EF --55 to 65% in the last 2 years  Well compensated     6. Nonobstructive coronary artery disease  - [facility] on 11/12/19 showed no significant CAD.     7. Hypertension--mostly supine hypertension  Patient probably has significant orthostatic hypotension and therefore potent vasodilators such as hydralazine will worsen her episodes of recurrent syncope     8. Hyperlipidemia     9. Dementia       Recommendations:    Discontinue hydralazine and monitor orthostatic blood pressures  Losartan split and twice daily doses rather

## 2024-05-22 NOTE — ED PROVIDER NOTES
and CMP, troponin 18, lactic acid 1.6, lipase is 9    Chest x-ray inter myself shows osteoarthritis, otherwise imaging reviewed as above    Chart reviewed, patient was admitted for syncope on 7/27/2023    Reevaluation, patient's resting, no symptoms or complaints.  Discussed the findings with the patient's family.  I did offer admission for possible pacemaker placement and cardiac evaluation, they are declining.  They state that they were offered a pacemaker in the Past, did not want the patient to have 1.  They state the patient should remain comfortable, they would not want chest compressions, intubation, or any other invasive procedures.  They just want to evaluate her constipation and nothing else at this time.  Therefore, patient will be started on MiraLAX, she has to follow-up with her PCP 1 to 2 days.  She is educated on signs and symptoms that require emergebt evaluation.    Counseling:   The emergency provider has spoken with the patient and discussed today’s results, in addition to providing specific details for the plan of care and counseling regarding the diagnosis and prognosis.  Questions are answered at this time and they are agreeable with the plan.       --------------------------------- IMPRESSION AND DISPOSITION ---------------------------------    IMPRESSION  1. Bradycardia    2. Constipation, unspecified constipation type        DISPOSITION  Disposition: Discharge to home  Patient condition is stable        NOTE: This report was transcribed using voice recognition software. Every effort was made to ensure accuracy; however, inadvertent computerized transcription errors may be present        Jorje Whitlock MD  05/21/24 3596

## 2024-05-22 NOTE — H&P
[facility] Hospitalist Group History and Physical      CHIEF COMPLAINT: Dizziness and syncopal episode    History of Present Illness: This is a 89-year-old female with past medical history of atrial fibrillation, hypertension, GERD and dementia brought in here by EMS due to syncopal episode.  She is having syncopal episode and dizziness for a while, yesterday she was sitting on toilet and feel dizzy followed by syncopal episode.  When EMS arrived at home her heart rate was around 40.  She was taking metoprolol due to paroxysmal atrial fibrillation.  History could not be taken from the patient-she she have severe dementia.    Informant(s) for H&P: ER physician    REVIEW OF SYSTEMS:  A comprehensive review of systems was negative except for: what is in the HPI      PMH:  Past Medical History:   Diagnosis Date    Arthritis     Atrial fibrillation (HCC)     Chronic combined systolic (congestive) and diastolic (congestive) heart failure (HCC) 11/7/2021    Coronary artery disease involving native coronary artery of native heart without angina pectoris     Hx of blood clots     Hypertension     Ischemic colitis (HCC)     PAF (paroxysmal atrial fibrillation) ([facility])        Surgical History:  Past Surgical History:   Procedure Laterality Date    ANKLE FRACTURE SURGERY      CARDIAC CATHETERIZATION  11/11/2019    Dr. Mares    COLONOSCOPY      ENDOSCOPY, COLON, DIAGNOSTIC      FRACTURE SURGERY      INSERTABLE CARDIAC MONITOR  07/05/2023    Dr. Garcia       Medications Prior to Admission:    Prior to Admission medications    Medication Sig Start Date End Date Taking? Authorizing Provider   polyethylene glycol (MIRALAX) 17 GM/SCOOP powder Take 17 g by mouth daily 5/21/24   Jorje Whitlock MD   metoprolol succinate (TOPROL XL) 25 MG extended release tablet Take 0.5 tablets by mouth daily 3/13/24   Jeni Chapin, APRN - CNP   hydrALAZINE (APRESOLINE) 25 MG tablet Take 3 tablets by mouth every 8 hours  Patient not taking:

## 2024-05-22 NOTE — ED NOTES
Patient turned and repositioned, purewick catheter placed. Pt provided warm blankets. No further needs expressed at this time, son at bedside. Call light within reach

## 2024-05-22 NOTE — ED PROVIDER NOTES
Department of Emergency Medicine     Written by: Adeline Laura MD  Patient Name: Arianna Coleman  Admit Date: 2024 10:59 PM  MRN: [id number]                   : 1931    HPI  Chief Complaint   Patient presents with    Bradycardia     Was d/c'd one hour ago for pacemaker placement outpatient, had loc when she got home.       Arianna Coleman is a 89 y.o. female that presents to the ED with bradycardia and syncope.  She was at home after she just got discharged, she was sitting in her couch and family noted that she syncopized and was laying in her couch unresponsive.  She woke up shortly after that.  The patient arrived to the hospital earlier today with concerns for bradycardia and constipation.  She was sitting on the toilet at the time having a bowel movement went she became lethargic.  EMS reported heart rates into the 40s.  The patient was evaluated here in this emergency department and the patient and family recommended that she is admitted for cardiac evaluation and possible pacemaker placement as she is symptomatically bradycardic.  They declined.  They wanted her to be comfortable.  The patient has a history of dementia, and is a poor historian.  No family present at the time of initial evaluation.    Review of systems:  Pertinent positives and negatives mentioned in the HPI/MDM.    Physical Exam  Constitutional:       General: She is not in acute distress.     Appearance: Normal appearance.   Eyes:      Extraocular Movements: Extraocular movements intact.      Pupils: Pupils are equal, round, and reactive to light.   Cardiovascular:      Rate and Rhythm: Regular rhythm. Bradycardia present.   Pulmonary:      Effort: Pulmonary effort is normal. No respiratory distress.   Abdominal:      Palpations: Abdomen is soft.      Tenderness: There is no abdominal tenderness.   Skin:     General: Skin is warm and dry.      Coloration: Skin is not jaundiced or pale.   Neurological:      Mental Status:

## 2024-05-22 NOTE — PLAN OF CARE
Brief internal medicine progress note:     Patient seen and examined, H&P and billing done on this calendar day by admitting service.     Agree with assessment and plan.   EP consulted for bradycardia.   Hold AV kellen blockers.     Electronically signed by Jennifer Braga MD on 5/22/2024 at 7:50 AM

## 2024-05-22 NOTE — ED NOTES
Patient resting comfortably in stretcher, no distress or discomfort noted. No needs expressed at this time; respirations easy and unlabored on room air. Call light within reach, son at bedside

## 2024-05-23 LAB
ANION GAP SERPL CALCULATED.3IONS-SCNC: 14 MMOL/L (ref 7–16)
BASOPHILS # BLD: 0.04 K/UL (ref 0–0.2)
BASOPHILS NFR BLD: 1 % (ref 0–2)
BUN SERPL-MCNC: 11 MG/DL (ref 6–23)
CALCIUM SERPL-MCNC: 9.2 MG/DL (ref 8.6–10.2)
CHLORIDE SERPL-SCNC: 107 MMOL/L (ref 98–107)
CO2 SERPL-SCNC: 21 MMOL/L (ref 22–29)
CREAT SERPL-MCNC: 0.8 MG/DL (ref 0.5–1)
EOSINOPHIL # BLD: 0.06 K/UL (ref 0.05–0.5)
EOSINOPHILS RELATIVE PERCENT: 1 % (ref 0–6)
ERYTHROCYTE [DISTWIDTH] IN BLOOD BY AUTOMATED COUNT: 14.4 % (ref 11.5–15)
GFR, ESTIMATED: 67 ML/MIN/1.73M2
GLUCOSE SERPL-MCNC: 100 MG/DL (ref 74–99)
HCT VFR BLD AUTO: 44.7 % (ref 34–48)
HGB BLD-MCNC: 14.5 G/DL (ref 11.5–15.5)
IMM GRANULOCYTES # BLD AUTO: <0.03 K/UL (ref 0–0.58)
IMM GRANULOCYTES NFR BLD: 1 % (ref 0–5)
LYMPHOCYTES NFR BLD: 1.49 K/UL (ref 1.5–4)
LYMPHOCYTES RELATIVE PERCENT: 34 % (ref 20–42)
MCH RBC QN AUTO: 29.7 PG (ref 26–35)
MCHC RBC AUTO-ENTMCNC: 32.4 G/DL (ref 32–34.5)
MCV RBC AUTO: 91.4 FL (ref 80–99.9)
MONOCYTES NFR BLD: 0.44 K/UL (ref 0.1–0.95)
MONOCYTES NFR BLD: 10 % (ref 2–12)
NEUTROPHILS NFR BLD: 54 % (ref 43–80)
NEUTS SEG NFR BLD: 2.37 K/UL (ref 1.8–7.3)
PLATELET # BLD AUTO: 162 K/UL (ref 130–450)
PMV BLD AUTO: 10.1 FL (ref 7–12)
POTASSIUM SERPL-SCNC: 4 MMOL/L (ref 3.5–5)
RBC # BLD AUTO: 4.89 M/UL (ref 3.5–5.5)
SODIUM SERPL-SCNC: 142 MMOL/L (ref 132–146)
WBC OTHER # BLD: 4.4 K/UL (ref 4.5–11.5)

## 2024-05-23 PROCEDURE — G0378 HOSPITAL OBSERVATION PER HR: HCPCS

## 2024-05-23 PROCEDURE — 85025 COMPLETE CBC W/AUTO DIFF WBC: CPT

## 2024-05-23 PROCEDURE — 6370000000 HC RX 637 (ALT 250 FOR IP): Performed by: INTERNAL MEDICINE

## 2024-05-23 PROCEDURE — 97530 THERAPEUTIC ACTIVITIES: CPT

## 2024-05-23 PROCEDURE — 97165 OT EVAL LOW COMPLEX 30 MIN: CPT

## 2024-05-23 PROCEDURE — 6360000002 HC RX W HCPCS: Performed by: INTERNAL MEDICINE

## 2024-05-23 PROCEDURE — 36415 COLL VENOUS BLD VENIPUNCTURE: CPT

## 2024-05-23 PROCEDURE — 99233 SBSQ HOSP IP/OBS HIGH 50: CPT | Performed by: INTERNAL MEDICINE

## 2024-05-23 PROCEDURE — 80048 BASIC METABOLIC PNL TOTAL CA: CPT

## 2024-05-23 PROCEDURE — 97161 PT EVAL LOW COMPLEX 20 MIN: CPT

## 2024-05-23 PROCEDURE — 1200000000 HC SEMI PRIVATE

## 2024-05-23 PROCEDURE — 96375 TX/PRO/DX INJ NEW DRUG ADDON: CPT

## 2024-05-23 PROCEDURE — 99232 SBSQ HOSP IP/OBS MODERATE 35: CPT | Performed by: INTERNAL MEDICINE

## 2024-05-23 PROCEDURE — 2580000003 HC RX 258: Performed by: INTERNAL MEDICINE

## 2024-05-23 RX ORDER — HYDRALAZINE HYDROCHLORIDE 25 MG/1
25 TABLET, FILM COATED ORAL EVERY 8 HOURS SCHEDULED
Status: DISCONTINUED | OUTPATIENT
Start: 2024-05-23 | End: 2024-05-24

## 2024-05-23 RX ORDER — CARVEDILOL 3.12 MG/1
3.12 TABLET ORAL 2 TIMES DAILY WITH MEALS
Status: ON HOLD | COMMUNITY
End: 2024-05-24 | Stop reason: HOSPADM

## 2024-05-23 RX ADMIN — HYDRALAZINE HYDROCHLORIDE 25 MG: 25 TABLET, FILM COATED ORAL at 20:02

## 2024-05-23 RX ADMIN — LOSARTAN POTASSIUM 50 MG: 50 TABLET, FILM COATED ORAL at 20:02

## 2024-05-23 RX ADMIN — APIXABAN 2.5 MG: 2.5 TABLET, FILM COATED ORAL at 20:02

## 2024-05-23 RX ADMIN — DOCUSATE SODIUM 100 MG: 100 CAPSULE, LIQUID FILLED ORAL at 09:35

## 2024-05-23 RX ADMIN — APIXABAN 2.5 MG: 2.5 TABLET, FILM COATED ORAL at 09:35

## 2024-05-23 RX ADMIN — PANTOPRAZOLE SODIUM 40 MG: 40 TABLET, DELAYED RELEASE ORAL at 05:56

## 2024-05-23 RX ADMIN — SODIUM CHLORIDE, PRESERVATIVE FREE 5 ML: 5 INJECTION INTRAVENOUS at 20:03

## 2024-05-23 RX ADMIN — POLYETHYLENE GLYCOL 3350 17 G: 17 POWDER, FOR SOLUTION ORAL at 09:35

## 2024-05-23 RX ADMIN — HYDRALAZINE HYDROCHLORIDE 25 MG: 25 TABLET, FILM COATED ORAL at 17:41

## 2024-05-23 RX ADMIN — SODIUM CHLORIDE, PRESERVATIVE FREE 10 ML: 5 INJECTION INTRAVENOUS at 09:36

## 2024-05-23 RX ADMIN — PROCHLORPERAZINE EDISYLATE 10 MG: 5 INJECTION INTRAMUSCULAR; INTRAVENOUS at 01:50

## 2024-05-23 RX ADMIN — LOSARTAN POTASSIUM 50 MG: 50 TABLET, FILM COATED ORAL at 09:35

## 2024-05-23 NOTE — PLAN OF CARE
Problem: ABCDS Injury Assessment  Goal: Absence of physical injury  5/23/2024 1143 by Sheeba Campo RN  Outcome: Progressing  5/22/2024 2216 by Gilligan, Melissa, RN  Outcome: Progressing     Problem: Skin/Tissue Integrity  Goal: Absence of new skin breakdown  Description: 1.  Monitor for areas of redness and/or skin breakdown  2.  Assess vascular access sites hourly  3.  Every 4-6 hours minimum:  Change oxygen saturation probe site  4.  Every 4-6 hours:  If on nasal continuous positive airway pressure, respiratory therapy assess nares and determine need for appliance change or resting period.  5/23/2024 1143 by Sheeba Campo RN  Outcome: Progressing  5/22/2024 2216 by Gilligan, Melissa, RN  Outcome: Progressing     Problem: Chronic Conditions and Co-morbidities  Goal: Patient's chronic conditions and co-morbidity symptoms are monitored and maintained or improved  5/23/2024 1143 by Sheeba Campo RN  Outcome: Progressing  5/22/2024 2216 by Gilligan, Melissa, RN  Outcome: Progressing  Flowsheets (Taken 5/22/2024 2000)  Care Plan - Patient's Chronic Conditions and Co-Morbidity Symptoms are Monitored and Maintained or Improved: Monitor and assess patient's chronic conditions and comorbid symptoms for stability, deterioration, or improvement     Problem: Safety - Adult  Goal: Free from fall injury  5/23/2024 1143 by Sheeba Campo RN  Outcome: Progressing  5/22/2024 2216 by Gilligan, Melissa, RN  Outcome: Progressing  Flowsheets (Taken 5/22/2024 2212)  Free From Fall Injury: Instruct family/caregiver on patient safety

## 2024-05-23 NOTE — FLOWSHEET NOTE
Orthostatic BP completed by PT   05/23/24 0830   Vital Signs   BP (!) 174/108   MAP (Calculated) 130   Patient Position Supine   Orthostatic B/P and Pulse? Yes   Blood Pressure Lying 170/100   Pulse Lying 80 PER MINUTE   Blood Pressure Sitting 144/84   Pulse Sitting 61 PER MINUTE   Blood Pressure Standing 111/74   Pulse Standing 75 PER MINUTE   Pain Assessment   Pain Assessment None - Denies Pain

## 2024-05-23 NOTE — ACP (ADVANCE CARE PLANNING)
Advance Care Planning   Healthcare Decision Maker:    Primary Decision Maker: Inés Wong - Radha - [phone]    Secondary Decision Maker: ririnancy medina - Radha - [phone]    Click here to complete Healthcare Decision Makers including selection of the Healthcare Decision Maker Relationship (ie \"Primary\").       Electronically signed by Cipriano Abdalla RN CM  on 5/23/2024 at 11:29 AM

## 2024-05-23 NOTE — CARE COORDINATION
05/23/24 Transition of care:  Pt admitted OBS from ED for bradycardia EP, PT/OT consulted Orthos are positive Spoke with pt daughter Inés who is primary decision maker Pt lives with her son Galileo in a one story home with a few steps to enter Family helps pt negotiate steps Pt uses a cane for ambulation Pt also has WW and WC at home Family helps with ADLs Pt has had HHC with [facility] and [facility] at [facility] PCP is Venkatesh RX is [facility] Plan is to return home with her son with no additional needs expressed for discharge Family will transport CM/SW to follow Electronically signed by Cipriano Abdalla RN CM on 5/23/2024 at 11:36 AM     Case Management Assessment  Initial Evaluation    Date/Time of Evaluation: 5/23/2024 11:36 AM  Assessment Completed by: Cipriano Abdalla RN    If patient is discharged prior to next notation, then this note serves as note for discharge by case management.    Patient Name: Arianna Coleman                   YOB: 1931  Diagnosis: Syncope and collapse [R55]  Bradycardia [R00.1]  Sinus bradycardia [R00.1]  Elevated blood pressure reading [R03.0]                   Date / Time: 5/21/2024 10:59 PM    Patient Admission Status: Observation   Readmission Risk (Low < 19, Mod (19-27), High > 27): Readmission Risk Score: 14.5    Current PCP: Soraya Riddle MD  PCP verified by CM? Yes    Chart Reviewed: Yes      History Provided by: Child/Family  Patient Orientation: Person, Situation    Patient Cognition: Alert    Hospitalization in the last 30 days (Readmission):  No    If yes, Readmission Assessment in  Navigator will be completed.    Advance Directives:      Code Status: Full Code   Patient's Primary Decision Maker is: Legal Next of Kin    Primary Decision Maker: Inés Wong - Child - [phone]    Secondary Decision Maker: nancy menezes - Child - [phone]    Discharge Planning:    Patient lives with: Children Type of Home: House  Primary Care Giver: Family  Patient

## 2024-05-24 LAB — GLUCOSE BLD-MCNC: 135 MG/DL (ref 74–99)

## 2024-05-24 PROCEDURE — 2580000003 HC RX 258: Performed by: INTERNAL MEDICINE

## 2024-05-24 PROCEDURE — 99232 SBSQ HOSP IP/OBS MODERATE 35: CPT | Performed by: INTERNAL MEDICINE

## 2024-05-24 PROCEDURE — 82962 GLUCOSE BLOOD TEST: CPT

## 2024-05-24 PROCEDURE — 6370000000 HC RX 637 (ALT 250 FOR IP): Performed by: INTERNAL MEDICINE

## 2024-05-24 PROCEDURE — 99239 HOSP IP/OBS DSCHRG MGMT >30: CPT | Performed by: INTERNAL MEDICINE

## 2024-05-24 PROCEDURE — 1200000000 HC SEMI PRIVATE

## 2024-05-24 RX ORDER — HYDRALAZINE HYDROCHLORIDE 10 MG/1
10 TABLET, FILM COATED ORAL EVERY 8 HOURS SCHEDULED
Status: DISCONTINUED | OUTPATIENT
Start: 2024-05-24 | End: 2024-05-26 | Stop reason: HOSPADM

## 2024-05-24 RX ORDER — HYDRALAZINE HYDROCHLORIDE 25 MG/1
25 TABLET, FILM COATED ORAL EVERY 8 HOURS SCHEDULED
Qty: 90 TABLET | Refills: 3 | Status: SHIPPED | OUTPATIENT
Start: 2024-05-24 | End: 2024-05-26 | Stop reason: HOSPADM

## 2024-05-24 RX ORDER — LOSARTAN POTASSIUM 50 MG/1
50 TABLET ORAL 2 TIMES DAILY
Qty: 30 TABLET | Refills: 3 | Status: SHIPPED | OUTPATIENT
Start: 2024-05-24

## 2024-05-24 RX ADMIN — LOSARTAN POTASSIUM 50 MG: 50 TABLET, FILM COATED ORAL at 20:48

## 2024-05-24 RX ADMIN — HYDRALAZINE HYDROCHLORIDE 25 MG: 25 TABLET, FILM COATED ORAL at 05:21

## 2024-05-24 RX ADMIN — SODIUM CHLORIDE, PRESERVATIVE FREE 10 ML: 5 INJECTION INTRAVENOUS at 20:48

## 2024-05-24 RX ADMIN — SODIUM CHLORIDE, PRESERVATIVE FREE 10 ML: 5 INJECTION INTRAVENOUS at 09:00

## 2024-05-24 RX ADMIN — LOSARTAN POTASSIUM 50 MG: 50 TABLET, FILM COATED ORAL at 11:05

## 2024-05-24 RX ADMIN — HYDRALAZINE HYDROCHLORIDE 25 MG: 25 TABLET, FILM COATED ORAL at 14:07

## 2024-05-24 RX ADMIN — DOCUSATE SODIUM 100 MG: 100 CAPSULE, LIQUID FILLED ORAL at 11:04

## 2024-05-24 RX ADMIN — PANTOPRAZOLE SODIUM 40 MG: 40 TABLET, DELAYED RELEASE ORAL at 05:21

## 2024-05-24 RX ADMIN — APIXABAN 2.5 MG: 2.5 TABLET, FILM COATED ORAL at 11:05

## 2024-05-24 RX ADMIN — APIXABAN 2.5 MG: 2.5 TABLET, FILM COATED ORAL at 20:48

## 2024-05-24 RX ADMIN — HYDRALAZINE HYDROCHLORIDE 10 MG: 10 TABLET, FILM COATED ORAL at 22:28

## 2024-05-24 NOTE — FLOWSHEET NOTE
Patient discharged and waiting for transport. Daughter heard yelling for help. Upon entering the room patient barely conscious sitting on the bedside commode after having a bowel movement. Peppermint candy removed from her mouth when she seemed to choke. Patient then became completely unresponsive. Performed a three person lift to get patient back in bed and obtained vitals including a blood glucose of 135     05/24/24 1659   Vital Signs   Pulse 98   Heart Rate Source Monitor   Respirations 20   BP (!) 153/98   MAP (Calculated) 116   BP Location Right upper arm   BP Method Automatic   Oxygen Therapy   SpO2 98 %   O2 Device None (Room air)     Called a rapid response but patient began to wake up after calling her name and shaking her a few times. Rapid cancelled and another set of vital signs obtained and primary and EP contacted via perfect serve.

## 2024-05-24 NOTE — DISCHARGE INSTRUCTIONS
Discharge to:  home  Diet: regular  Activity: activity as tolerated   Exercise: As tolerated     Be compliant with medication  Continue oral hydralazine--25 mg every 8 hours.  Continue Cozaar 50mg BID.   Avoid beta-blockade  Continue Eliquis for systemic anticoagulation  Reverse Trendelenburg position at all times at home to avoid supine hypertension, encourage ambulation and sitting as much as possible to avoid additional deconditioning.   DANIEL hose while awake.

## 2024-05-24 NOTE — ADT AUTH CERT
Utilization Reviews       Physician advisor recommendation   Last Updated by Catrachita Plata RN on 5/23/2024 0911     Review Status Created By   In Primary Catrachita Plata RN       Review Type Associated Date   -- 5/23/2024      Criteria Review   We recommend that patient's status is changed to INPATIENT. If you agree, please place a new order as recommended.       Rationale: 89 yo with recurrent syncopr and assoc bradycardia--eval supine Htn with assoc orthostatic hypotension vs. sx bradycardai needing ppm as rec'd from previous visit--many med changes, Anticipation of care to cross 2 midnights was reasonable at the time inpatient order placed       Clinical summary 89 y.o. with dementia, Afib on toprol, eliquis, bradycardia who was rec'd PPM but family declined p/w repeat syncope       5/21 ED htn urgency 182/102- IV hydralazine. Trop 17. CTA neg PE. EKG sinus azael, L anterior fascicular block. Twi, porlonged QT. Held BB. Head ct heg.       5/22 EP: danielle orthostatic hypotension and supine HTN--d/c hydralazine and BB. BID dosign of losartan. Supine trendelenburg position in house to avoid supine hypertension       5/23 's since d/'c antiHTN however unable to complete orthostatics as unable to stand       Vitals       Labs and Imaging As above.       Status decision based on clinical judgment and Traditional FFS Medicare or Medicare Advantage: Two-Midnight-Rule applies               This chart was reviewed at 8:11 AM EST on 5/23/2024       Divya Adams MD   Physician Advisor   [facility]   [phone]

## 2024-05-24 NOTE — ADT AUTH CERT
Utilization Reviews       05/24/24    Last Updated by Lily Kendrick RN on 5/24/2024 1402     Review Status Created By   In Primary Lily Kendrick RN       Review Type Associated Date   -- 5/24/2024      Criteria Review   DATE: 05/24/24   TYPE OF BED: Medical Tele     Patient has or is expected to exceed 2 midnights of medically necessary care.      RELEVANT BASELINES: RA        PERTINENT UPDATES:   remains orthostatic with standing blood pressures are no lower than systolic 110-120 mmHg.  No bradycardia post discontinuation of beta-blockade     VITALS: BP  165/87   Pulse 76   Temp 98.6 °F (37 °C)   Resp 16   Wt 57.6 kg (127 lb)   SpO2 97% RA  BMI 20.50 kg/m²          ABNL/PERTINENT LABS/RADIOLOGY/DIAGNOSTIC STUDIES:  Orthostatic Vitals:       5/24/2024     9:18 AM   Orthostatic Vitals   Orthostatic B/P and Pulse? Yes   Blood Pressure Lying 143/91   Pulse Lying 80 PER MINUTE   Blood Pressure Sitting 135/87   Pulse Sitting 87 PER MINUTE   Blood Pressure Standing 115/82   Pulse Standing 107 PER MINUTE         PHYSICAL EXAM:  Constitutional: Well-developed, no acute distress, seen in room with son at bedside.   Eyes: conjunctivae normal, no xanthelasma   Ears, Nose, Throat: oral mucosa moist, no cyanosis   CV: no JVD. Regular rate and rhythm. Normal S1S2 and no S3. No murmurs, rubs, or gallops. PMI is nondisplaced  Lungs: clear to auscultation bilaterally, normal respiratory effort without used of accessory muscles  Abdomen: soft, non-tender, bowel sounds present, no masses or hepatomegaly   Musculoskeletal: no digital clubbing, no edema   Skin: warm, no rashes         MD CONSULTS/ASSESSMENT AND PLAN:  Electrophysiology  Assessment/Plan: This is a 89 y.o. female with      1.  Recurrent syncope with hospitalizations dating back to 2019  Patient presents with sudden episodes of unresponsiveness while sitting down  Episodes are never documented while laying in bed.  History suggestive of orthostatic hypotension in

## 2024-05-24 NOTE — CARE COORDINATION
05/24/24 CM update:  Pt admitted for bradycardia EP consulted Pt score is 15/24 pt walked 2ft with WW Pt has WW and WC Family helps with ADLs Pt lives with son and plan is to return there with no additional needs expressed for discharge at this time. CM/SW to follow Electronically signed by Cipriano Abdalla RN CM on 5/24/2024 at 9:53 AM

## 2024-05-24 NOTE — DISCHARGE SUMMARY
lymphadenopathy.  Aorta is normal in caliber. Bones/Soft Tissues: There is diffuse decreased bone density. No acute abnormality of the visualized osseous structures.  There is multilevel degenerative disc disease.  Soft tissue gas noted along the dorsal right wrist.     1. Mild wall thickening of the ascending colon with questionable stranding. Correlate for colitis. 2. Soft tissue gas noted along the dorsal right wrist, likely postprocedural. Correlate with physical exam. 3. Cardiomegaly. 4. Distended urinary bladder. Correlate for bladder outlet obstruction. 5. Diffuse decreased bone density.     CT HEAD WO CONTRAST    Result Date: 5/21/2024  EXAMINATION: CT OF THE HEAD WITHOUT CONTRAST  5/21/2024 7:08 pm TECHNIQUE: CT of the head was performed without the administration of intravenous contrast. Automated exposure control, iterative reconstruction, and/or weight based adjustment of the mA/kV was utilized to reduce the radiation dose to as low as reasonably achievable. COMPARISON: None. HISTORY: ORDERING SYSTEM PROVIDED HISTORY: ams TECHNOLOGIST PROVIDED HISTORY: Has a \"code stroke\" or \"stroke alert\" been called?->No Reason for exam:->ams Decision Support Exception - unselect if not a suspected or confirmed emergency medical condition->Emergency Medical Condition (MA) What reading provider will be dictating this exam?->CRC FINDINGS: BRAIN/VENTRICLES: There is no acute intracranial hemorrhage, mass effect or midline shift.  No abnormal extra-axial fluid collection.  The gray-white differentiation is maintained without evidence of an acute infarct.  There is no evidence of hydrocephalus.   Periventricular white matter changes consistent chronic microvascular disease. ORBITS: The visualized portion of the orbits demonstrate no acute abnormality. SINUSES: The visualized paranasal sinuses and mastoid air cells demonstrate no acute abnormality. SOFT TISSUES/SKULL:  No acute abnormality of the visualized skull or soft

## 2024-05-25 PROCEDURE — 1200000000 HC SEMI PRIVATE

## 2024-05-25 PROCEDURE — 6370000000 HC RX 637 (ALT 250 FOR IP): Performed by: INTERNAL MEDICINE

## 2024-05-25 PROCEDURE — 2580000003 HC RX 258: Performed by: INTERNAL MEDICINE

## 2024-05-25 PROCEDURE — 99232 SBSQ HOSP IP/OBS MODERATE 35: CPT | Performed by: INTERNAL MEDICINE

## 2024-05-25 RX ADMIN — LOSARTAN POTASSIUM 50 MG: 50 TABLET, FILM COATED ORAL at 10:48

## 2024-05-25 RX ADMIN — SODIUM CHLORIDE, PRESERVATIVE FREE 10 ML: 5 INJECTION INTRAVENOUS at 21:51

## 2024-05-25 RX ADMIN — LOSARTAN POTASSIUM 50 MG: 50 TABLET, FILM COATED ORAL at 21:51

## 2024-05-25 RX ADMIN — PANTOPRAZOLE SODIUM 40 MG: 40 TABLET, DELAYED RELEASE ORAL at 06:08

## 2024-05-25 RX ADMIN — HYDRALAZINE HYDROCHLORIDE 10 MG: 10 TABLET, FILM COATED ORAL at 16:11

## 2024-05-25 RX ADMIN — APIXABAN 2.5 MG: 2.5 TABLET, FILM COATED ORAL at 21:51

## 2024-05-25 RX ADMIN — APIXABAN 2.5 MG: 2.5 TABLET, FILM COATED ORAL at 10:48

## 2024-05-25 RX ADMIN — POLYETHYLENE GLYCOL 3350 17 G: 17 POWDER, FOR SOLUTION ORAL at 10:47

## 2024-05-25 RX ADMIN — SODIUM CHLORIDE, PRESERVATIVE FREE 10 ML: 5 INJECTION INTRAVENOUS at 10:49

## 2024-05-25 RX ADMIN — HYDRALAZINE HYDROCHLORIDE 10 MG: 10 TABLET, FILM COATED ORAL at 06:08

## 2024-05-25 RX ADMIN — DOCUSATE SODIUM 100 MG: 100 CAPSULE, LIQUID FILLED ORAL at 10:48

## 2024-05-26 VITALS
WEIGHT: 127 LBS | BODY MASS INDEX: 20.5 KG/M2 | OXYGEN SATURATION: 99 % | DIASTOLIC BLOOD PRESSURE: 95 MMHG | SYSTOLIC BLOOD PRESSURE: 177 MMHG | TEMPERATURE: 98.4 F | RESPIRATION RATE: 17 BRPM | HEART RATE: 67 BPM

## 2024-05-26 PROCEDURE — 99239 HOSP IP/OBS DSCHRG MGMT >30: CPT | Performed by: INTERNAL MEDICINE

## 2024-05-26 PROCEDURE — 6370000000 HC RX 637 (ALT 250 FOR IP): Performed by: INTERNAL MEDICINE

## 2024-05-26 RX ORDER — HYDRALAZINE HYDROCHLORIDE 10 MG/1
10 TABLET, FILM COATED ORAL EVERY 8 HOURS SCHEDULED
Qty: 90 TABLET | Refills: 3 | Status: SHIPPED | OUTPATIENT
Start: 2024-05-26

## 2024-05-26 RX ADMIN — DOCUSATE SODIUM 100 MG: 100 CAPSULE, LIQUID FILLED ORAL at 10:42

## 2024-05-26 RX ADMIN — LOSARTAN POTASSIUM 50 MG: 50 TABLET, FILM COATED ORAL at 10:42

## 2024-05-26 RX ADMIN — APIXABAN 2.5 MG: 2.5 TABLET, FILM COATED ORAL at 10:42

## 2024-05-26 RX ADMIN — HYDRALAZINE HYDROCHLORIDE 10 MG: 10 TABLET, FILM COATED ORAL at 00:55

## 2024-05-26 RX ADMIN — POLYETHYLENE GLYCOL 3350 17 G: 17 POWDER, FOR SOLUTION ORAL at 10:41

## 2024-05-26 RX ADMIN — PANTOPRAZOLE SODIUM 40 MG: 40 TABLET, DELAYED RELEASE ORAL at 06:07

## 2024-05-26 RX ADMIN — HYDRALAZINE HYDROCHLORIDE 10 MG: 10 TABLET, FILM COATED ORAL at 06:07

## 2024-05-26 NOTE — PLAN OF CARE
Problem: ABCDS Injury Assessment  Goal: Absence of physical injury  Outcome: Adequate for Discharge     Problem: Skin/Tissue Integrity  Goal: Absence of new skin breakdown  Description: 1.  Monitor for areas of redness and/or skin breakdown  2.  Assess vascular access sites hourly  3.  Every 4-6 hours minimum:  Change oxygen saturation probe site  4.  Every 4-6 hours:  If on nasal continuous positive airway pressure, respiratory therapy assess nares and determine need for appliance change or resting period.  Outcome: Adequate for Discharge     Problem: Chronic Conditions and Co-morbidities  Goal: Patient's chronic conditions and co-morbidity symptoms are monitored and maintained or improved  Outcome: Adequate for Discharge

## 2024-05-26 NOTE — PROGRESS NOTES
[facility] Hospitalist Progress Note    Admitting Date and Time: 5/21/2024 10:59 PM  Admit Dx: Syncope and collapse [R55]  Bradycardia [R00.1]  Sinus bradycardia [R00.1]  Elevated blood pressure reading [R03.0]    Subjective:  Patient is being followed for Syncope and collapse [R55]  Bradycardia [R00.1]  Sinus bradycardia [R00.1]  Elevated blood pressure reading [R03.0]   Patient seen and examined this morning.   Patient was for discharge but had a syncopal event after having a bowel movement.   Discharge held.     ROS: denies fever, chills, cp, sob, n/v, HA unless stated above.      hydrALAZINE  10 mg Oral 3 times per day    apixaban  2.5 mg Oral BID    docusate sodium  100 mg Oral Daily    pantoprazole  40 mg Oral QAM AC    sodium chloride flush  5-40 mL IntraVENous 2 times per day    polyethylene glycol  17 g Oral Daily    losartan  50 mg Oral BID     sodium chloride flush, 5-40 mL, PRN  sodium chloride, , PRN  potassium chloride, 40 mEq, PRN   Or  potassium alternative oral replacement, 40 mEq, PRN   Or  potassium chloride, 10 mEq, PRN  magnesium sulfate, 2,000 mg, PRN  polyethylene glycol, 17 g, Daily PRN  acetaminophen, 650 mg, Q6H PRN   Or  acetaminophen, 650 mg, Q6H PRN  prochlorperazine, 10 mg, Q6H PRN   Or  prochlorperazine, 10 mg, Q8H PRN         Objective:    BP (!) 175/95   Pulse 80   Temp 96.9 °F (36.1 °C) (Temporal)   Resp 16   Wt 57.6 kg (127 lb)   SpO2 97%   BMI 20.50 kg/m²     General Appearance: alert and oriented to person, place and time and in no acute distress  Skin: warm and dry  Head: normocephalic and atraumatic  Eyes: pupils equal, round, and reactive to light, extraocular eye movements intact, conjunctivae normal  Neck: neck supple and non tender without mass   Pulmonary/Chest: clear to auscultation bilaterally- no wheezes, rales or rhonchi, normal air movement, no respiratory distress  Cardiovascular: normal rate, normal S1 and S2 and no carotid bruits  Abdomen: soft, 
       [facility] Hospitalist Progress Note    Admitting Date and Time: 5/21/2024 10:59 PM  Admit Dx: Syncope and collapse [R55]  Bradycardia [R00.1]  Sinus bradycardia [R00.1]  Elevated blood pressure reading [R03.0]    Subjective:  Patient is being followed for Syncope and collapse [R55]  Bradycardia [R00.1]  Sinus bradycardia [R00.1]  Elevated blood pressure reading [R03.0]   Patient seen and examined this morning.   Eating breakfast.   Denies lightheadedness or dizziness.     ROS: denies fever, chills, cp, sob, n/v, HA unless stated above.      apixaban  2.5 mg Oral BID    hydrALAZINE  25 mg Oral 3 times per day    docusate sodium  100 mg Oral Daily    pantoprazole  40 mg Oral QAM AC    sodium chloride flush  5-40 mL IntraVENous 2 times per day    polyethylene glycol  17 g Oral Daily    losartan  50 mg Oral BID     sodium chloride flush, 5-40 mL, PRN  sodium chloride, , PRN  potassium chloride, 40 mEq, PRN   Or  potassium alternative oral replacement, 40 mEq, PRN   Or  potassium chloride, 10 mEq, PRN  magnesium sulfate, 2,000 mg, PRN  polyethylene glycol, 17 g, Daily PRN  acetaminophen, 650 mg, Q6H PRN   Or  acetaminophen, 650 mg, Q6H PRN  prochlorperazine, 10 mg, Q6H PRN   Or  prochlorperazine, 10 mg, Q8H PRN         Objective:    BP (!) 167/87   Pulse 67   Temp 97.7 °F (36.5 °C) (Temporal)   Resp 17   Wt 57.6 kg (127 lb)   SpO2 93%   BMI 20.50 kg/m²     General Appearance: alert and oriented to person, place and time and in no acute distress  Skin: warm and dry  Head: normocephalic and atraumatic  Eyes: pupils equal, round, and reactive to light, extraocular eye movements intact, conjunctivae normal  Neck: neck supple and non tender without mass   Pulmonary/Chest: clear to auscultation bilaterally- no wheezes, rales or rhonchi, normal air movement, no respiratory distress  Cardiovascular: normal rate, normal S1 and S2 and no carotid bruits  Abdomen: soft, non-tender, non-distended, normal bowel sounds, 
       [facility] Hospitalist Progress Note    Admitting Date and Time: 5/21/2024 10:59 PM  Admit Dx: Syncope and collapse [R55]  Bradycardia [R00.1]  Sinus bradycardia [R00.1]  Elevated blood pressure reading [R03.0]    Subjective:  Patient is being followed for Syncope and collapse [R55]  Bradycardia [R00.1]  Sinus bradycardia [R00.1]  Elevated blood pressure reading [R03.0]   Patient seen and examined this morning.   Monitoring BP with lowered hydralazine dose.   Patient denies any symptoms.   No further episodes of syncope.     ROS: denies fever, chills, cp, sob, n/v, HA unless stated above.      hydrALAZINE  10 mg Oral 3 times per day    apixaban  2.5 mg Oral BID    docusate sodium  100 mg Oral Daily    pantoprazole  40 mg Oral QAM AC    sodium chloride flush  5-40 mL IntraVENous 2 times per day    polyethylene glycol  17 g Oral Daily    losartan  50 mg Oral BID     sodium chloride flush, 5-40 mL, PRN  sodium chloride, , PRN  potassium chloride, 40 mEq, PRN   Or  potassium alternative oral replacement, 40 mEq, PRN   Or  potassium chloride, 10 mEq, PRN  magnesium sulfate, 2,000 mg, PRN  polyethylene glycol, 17 g, Daily PRN  acetaminophen, 650 mg, Q6H PRN   Or  acetaminophen, 650 mg, Q6H PRN  prochlorperazine, 10 mg, Q6H PRN   Or  prochlorperazine, 10 mg, Q8H PRN         Objective:    BP (!) 154/92   Pulse 61   Temp 98.4 °F (36.9 °C) (Oral)   Resp 20   Wt 57.6 kg (127 lb)   SpO2 99%   BMI 20.50 kg/m²     General Appearance: alert and oriented to person, place and time and in no acute distress  Skin: warm and dry  Head: normocephalic and atraumatic  Eyes: pupils equal, round, and reactive to light, extraocular eye movements intact, conjunctivae normal  Neck: neck supple and non tender without mass   Pulmonary/Chest: clear to auscultation bilaterally- no wheezes, rales or rhonchi, normal air movement, no respiratory distress  Cardiovascular: normal rate, normal S1 and S2 and no carotid bruits  Abdomen: 
  Physician Progress Note      PATIENT:               SHARDA GALLARDO  CSN #:                  [id number]  :                       1931  ADMIT DATE:       2024 10:59 PM  DISCH DATE:  RESPONDING  PROVIDER #:        Jennifer Braga MD          QUERY TEXT:    Pt noted to have /91. If possible, please document in progress notes and   discharge summary if you are evaluating and/or treating any of the following:    The medical record reflects the following:  Risk Factors: hx htn  Clinical Indicators: /91, 182/102, 161/116  Treatment: IV hydralazine    Thank you,  Jazmin Gale, RN, BSN, CRCR, Clinical Documentation Improvement  Options provided:  -- Hypertensive Crisis  -- Hypertensive Emergency  -- Hypertensive Urgency  -- Other - I will add my own diagnosis  -- Disagree - Not applicable / Not valid  -- Disagree - Clinically unable to determine / Unknown  -- Refer to Clinical Documentation Reviewer    PROVIDER RESPONSE TEXT:    This patient has hypertensive urgency.    Query created by: Jazmin Gale on 2024 5:29 AM      Electronically signed by:  Jennifer Braga MD 2024 9:20 AM          
  Physician Progress Note      PATIENT:               SHARDA GALLARDO  CSN #:                  [id number]  :                       1931  ADMIT DATE:       2024 10:59 PM  DISCH DATE:  RESPONDING  PROVIDER #:        Sri Tao MD          QUERY TEXT:    Patient with atrial fibrillation and is maintained on Eliquis. If possible,   please document in progress notes and discharge summary if you are evaluating   and/or treating any of the following:    The medical record reflects the following:  Risk Factors: 89 yr old, female, htn, chf  Clinical Indicators: Paroxysmal atrial fibrillation  Treatment:  Continue apixaban.    Thank You,  Jazmin Gale, RN, BSN, CRCR, Clinical Documentation Improvement  Options provided:  -- Secondary hypercoagulable state in a patient with atrial fibrillation  -- Other - I will add my own diagnosis  -- Disagree - Not applicable / Not valid  -- Disagree - Clinically unable to determine / Unknown  -- Refer to Clinical Documentation Reviewer    PROVIDER RESPONSE TEXT:    This patient has secondary hypercoagulable state in a patient with atrial   fibrillation.    Query created by: Jazmin Gale on 2024 5:27 AM      Electronically signed by:  Sri Tao MD 2024 8:09 AM          
4 Eyes Skin Assessment     NAME:  Arianna Coleman  YOB: 1931  MEDICAL RECORD NUMBER:  [id number]    The patient is being assessed for  {Reason for Assessment:53243}    I agree that at least one RN has performed a thorough Head to Toe Skin Assessment on the patient. ALL assessment sites listed below have been assessed.      Areas assessed by both nurses:    {Pressure Areas Assessed:57692}        Does the Patient have a Wound? {Action Wound:72301}       Bautista Prevention initiated by RN: {YES/NO:19726}  Wound Care Orders initiated by RN: {YES/NO:19726}    Pressure Injury (Stage 3,4, Unstageable, DTI, NWPT, and Complex wounds) if present, place Wound referral order by RN under : {YES/NO:19726}    New Ostomies, if present place, Ostomy referral order under : {YES/NO:19726}     Nurse 1 eSignature: {Esignature:[id number]}    **SHARE this note so that the co-signing nurse can place an eSignature**    Nurse 2 eSignature: Electronically signed by ADRIANA HENDRICKSON RN on 5/22/24 at 6:28 PM EDT  
Admission database completed with help from patient's daughters at bedside, med rec reviewed. Updated on plan of care.   
Attempted orthostatic BP and pulse x 2 this shift.  Patient is unable to stand long enough for a BP to be taken.  
Attending notified via perfect serve of family member Inés at patient bedside wanting a physician update regarding plan of care. Phone number [phone] given and bedside RN notified along with family member Inés.       Attempted to call Inés, but received her voicemail, will try again later.     Electronically signed by Jennifer Braga MD on 5/22/2024 at 2:20 PM   
Call placed to MICU to have IV placed after 4 unsuccessful attempts by 2 RNS. Oncoming nurse made aware of call and that patient does not have an IV site.   
Dinner tray ordered for patient  
Discharge instrictions reviewed with patient's daughter at the bedside. Went over med list and times of next dosage in detail.Daughter verbalized understanding.  All questions answered at at this time.    
Nurse to nurse given to 82. Family at bedside and  updated on new room assignment  
Patient on eliquis 2.5mg BID at home and it currently is not ordered here. Perfect serve sent to Dr. Braga to update.   
Physical Therapy  Physical Therapy Initial Assessment     Name: Arianna Coleman  : 1931  MRN: [id number]      Date of Service: 2024    Evaluating PT:  Rodolfo Velasquez PT, DPT    Room #:  8207/8207-A  Diagnosis:  Syncope and collapse [R55]  Bradycardia [R00.1]  Sinus bradycardia [R00.1]  Elevated blood pressure reading [R03.0]  PMHx/PSHx:  HTN, HLD, CAD, dementia, afib  Procedure/Surgery:  N/A  Precautions:  fall risk, contact, fall risk, bed alarm, TSM, cognition, monitor BP  Equipment Owned: cane  Equipment Needs:  TBD    SUBJECTIVE:    Pt lives with son in a 1 story home with 3 steps to enter and 1 handrail.  Bed/bath is on 1st floor.  Pt ambulated with cane PTA.    OBJECTIVE:   Initial Evaluation  Date: 24 Treatment Short Term/ Long Term   Goals   AM-PAC 6 Clicks 15/24     Was pt agreeable to Eval/treatment? yes     Does pt have pain? 5/10 abdomen     Bed Mobility  Rolling: SBA  Supine to sit: SBA  Sit to supine: min A  Scooting: SBA  Rolling: mod I  Supine to sit: mod I  Sit to supine: mod I  Scooting: mod I   Transfers Sit to stand: min A  Stand to sit: min A  Stand pivot: NT  Sit to stand: mod I  Stand to sit: mod I  Stand pivot: mod I with AAD   Ambulation    2' with ww min A  50'+ with AAD mod I   Stair negotiation: ascended and descended  NT  3 steps with 1 handrail mod I     Strength/ROM:   BLE grossly 4/5  BLE AROM WFL    Balance:   Static Sitting: SBA  Dynamic Sitting: SBA  Static Standing: CGA with ww  Dynamic Standing: min A with ww    Pt is A & O x 2, unsure of date  Sensation:  Pt denies numbness and tingling to extremities  Edema:  unremarkable    Vitals:  SpO2 and HR were stable during session  BP in supine: 170/100, HR 80 bpm  BP in sittin/84, HR 61 bpm  BP in standin/74, HR 75 bpm  BP in supine, end of session: 174/108, 68 bpm    Therapeutic Exercises:    Bed mobility: supine<>sit, cued for EOB positioning  Transfers: STS x1, cued for hand placement and postural 
facilitation of postural control with dynamic challenges during ADLs .  Positioning to improve functional independence  Neuromuscular re-education: facilitation of righting/equilibrium reactions, normalization muscle tone/facilitation active functional movement                      Delirium prevention/treatment    Modified Duffield Scale   Score     Description  0             No symptoms  1             No significant disability despite symptoms  2             Slight disability; able to look after own affairs  3             Moderate disability; able to ambulate without assist/ requires assist with ADLs  4             Moderate/Severe disability;requires assist to ambulate/assist with ADLs  5             Severe disability;bedridden/incontinent   6               Score:   3    Recommended Adaptive Equipment: TBA     Home Living: Pt lives son in a 1-story home 3 ADELE 1HR. Bed/bath is on the first floor  Bathroom setup: tub-shower chair & daughter assist  Equipment owned: shower chair, rw & sc- that pt used PTA    Prior Level of Function: Pt report having assist from her daughter with ADLs & IADLs but was Mod I using a sc for functional mobility & transfers  Driving: No    Pain Level: pt c/o abdominal pain during OT session    Cognition: A&O: 2/4    Follows 1-2 step commands with min redirection for safety   Memory: poor; Pt stated month as October & when year pt stated 19...& stated I forget. Pt able to state name of hospital & is pleasantly confused & responded well to education & re-direction   Comprehension fair   Problem solving: fair   Judgement/safety: fair               Communication skills: WFL           Vision: WFL; denies vision changes              Hearing: [place]     UE Assessment:  Hand Dominance: Right [x]  Left []      ROM Strength STM goal: PRN   BUE  3/4 AROM in all planes 4-/5 4/5             Sensation: No  c/o numbness/tingling   Tone: WNL   Edema: WFL     Functional Assessment:  AM-PAC Daily Activity 
descending thoracic aorta  are tortuous with mild ectasia noted of the proximal ascending aorta.   No discrete evidence of dissection.  The aortic valve appears trileaflet  in morphology.     CONCLUSIONS:  1.  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  2.  Below normal left ventricular systolic function.  3.  Ectasia of thoracic aorta.       Stress Test:   August 2021  IMPRESSION:  1. Small-moderate basal inferoseptal stress perfusion deficit with  improvement at rest but with significant artifact noted. Suspect  abnormality secondary to attenuation rather than physiologic ischemia.  2. Mild-moderate global decrease in myocardial segmental wall motion  and calculated gated SPECT left ventricular ejection fraction of 38%.  3. Suggest clinical correlation as warranted.        Julian [facility]ares [facility]. [facility]    Outpatient Monitor:   Implantable loop recorder interrogation    Paroxysmal atrial fibrillation, occasionally with rapid rates  No episodes of significant bradycardia/AF with RVR correlating with her clinical presentation    I have independently reviewed all of the ECGs and rhythm strips per above     Assessment/Plan: This is a 89 y.o. female with     1.  Recurrent syncope with hospitalizations dating back to 2019  Patient presents with sudden episodes of unresponsiveness while sitting down  Episodes are never documented while laying in bed.  History suggestive of orthostatic hypotension in this elderly lady  Sinus bradycardia has also been noted.  Patient was recently restarted on low-dose beta.     Ongoing positive orthostatic vital signs with hypertension in the sitting position 155/110.       2. Sinus node dysfunction   Heart rate histogram on loop recorder interrogation confirms ambient heart rates of between 50 to 60 bpm.   Metoprolol was restarted in electrophysiology clinic--March 2024  No sustained bradycardia was noted after the discontinuation of Toprol.      3. History 
supine hypertension     8. Hyperlipidemia     9. Dementia       Recommendations:    Start oral hydralazine--25 mg every 8 hours and adjust dose as tolerated  Continue Cozaar 50mg BID.   Avoid beta-blockade  Agree with current dose of Eliquis for systemic anticoagulation  Reverse Trendelenburg position at all times in the hospital to avoid supine hypertension, encourage ambulation and sitting as much as possible to avoid additional deconditioning.   DANIEL hose while awake.       Thank you for allowing me to participate in your patient's care.  Please call me if there are any questions or concerns.        Discussed with Dr. Tao.   Julian Guzmán, APRN - CNP  Cardiac Electrophysiology  [facility]  [facility] [place]: [facility]  2:42 PM  5/23/2024    PHYSICIAN ADDENDUM:  I independently contributed to, made amendments as needed, and finalized the above documentation. I contributed >50% to the overall encounter time including review of testing, formulating a plan with the APC and communication with the other care team members and family.     Time of the day of service includes:  Preparing to see the patient (eg. Review of the medical record, such as tests).  Obtaining and/or reviewing separately obtained history.  Ordering prescription medications, tests, and/or procedures.  Communicating results to the patient/family/caregiver.  Counseling/educating the patient/family/caregiver.  Documenting clinical information in the patients electronic record.  Coordination of care for the patient.  Performing a medical appropriate exam and/or evaluation.    Sri Tao MD  Cardiac electrophysiology  [facility]

## 2024-05-26 NOTE — CARE COORDINATION
Social Work/ Case Management Transition of Care Planning (ESTEFANIA Dugan [phone]):     Discharge order noted. Pt going home with no needs. Family at bedside and will transport.   ESTEFANIA Dugan  5/26/2024

## 2024-10-03 ENCOUNTER — APPOINTMENT (OUTPATIENT)
Dept: GENERAL RADIOLOGY | Age: 89
End: 2024-10-03
Payer: COMMERCIAL

## 2024-10-03 ENCOUNTER — HOSPITAL ENCOUNTER (EMERGENCY)
Age: 89
Discharge: HOME | End: 2024-10-04
Attending: EMERGENCY MEDICINE
Payer: COMMERCIAL

## 2024-10-03 DIAGNOSIS — R55 SYNCOPE, UNSPECIFIED SYNCOPE TYPE: Primary | ICD-10-CM

## 2024-10-03 LAB
ALBUMIN SERPL-MCNC: 3.9 G/DL (ref 3.5–5.2)
ALP SERPL-CCNC: 87 U/L (ref 35–104)
ALT SERPL-CCNC: 15 U/L (ref 0–32)
ANION GAP SERPL CALCULATED.3IONS-SCNC: 13 MMOL/L (ref 7–16)
AST SERPL-CCNC: 26 U/L (ref 0–31)
BASOPHILS # BLD: 0.02 K/UL (ref 0–0.2)
BASOPHILS NFR BLD: 0 % (ref 0–2)
BILIRUB SERPL-MCNC: 0.6 MG/DL (ref 0–1.2)
BNP SERPL-MCNC: 1108 PG/ML (ref 0–450)
BUN SERPL-MCNC: 15 MG/DL (ref 6–23)
CALCIUM SERPL-MCNC: 9.4 MG/DL (ref 8.6–10.2)
CHLORIDE SERPL-SCNC: 106 MMOL/L (ref 98–107)
CO2 SERPL-SCNC: 23 MMOL/L (ref 22–29)
CREAT SERPL-MCNC: 1.2 MG/DL (ref 0.5–1)
EOSINOPHIL # BLD: 0.06 K/UL (ref 0.05–0.5)
EOSINOPHILS RELATIVE PERCENT: 1 % (ref 0–6)
ERYTHROCYTE [DISTWIDTH] IN BLOOD BY AUTOMATED COUNT: 13.8 % (ref 11.5–15)
FLUAV RNA RESP QL NAA+PROBE: NOT DETECTED
FLUBV RNA RESP QL NAA+PROBE: NOT DETECTED
GFR, ESTIMATED: 45 ML/MIN/1.73M2
GLUCOSE SERPL-MCNC: 118 MG/DL (ref 74–99)
HCT VFR BLD AUTO: 40.7 % (ref 34–48)
HGB BLD-MCNC: 13 G/DL (ref 11.5–15.5)
IMM GRANULOCYTES # BLD AUTO: <0.03 K/UL (ref 0–0.58)
IMM GRANULOCYTES NFR BLD: 0 % (ref 0–5)
LACTATE BLDV-SCNC: 2 MMOL/L (ref 0.5–2.2)
LYMPHOCYTES NFR BLD: 1.3 K/UL (ref 1.5–4)
LYMPHOCYTES RELATIVE PERCENT: 27 % (ref 20–42)
MCH RBC QN AUTO: 30.2 PG (ref 26–35)
MCHC RBC AUTO-ENTMCNC: 31.9 G/DL (ref 32–34.5)
MCV RBC AUTO: 94.7 FL (ref 80–99.9)
MONOCYTES NFR BLD: 0.41 K/UL (ref 0.1–0.95)
MONOCYTES NFR BLD: 9 % (ref 2–12)
NEUTROPHILS NFR BLD: 63 % (ref 43–80)
NEUTS SEG NFR BLD: 3.04 K/UL (ref 1.8–7.3)
PLATELET # BLD AUTO: 168 K/UL (ref 130–450)
PMV BLD AUTO: 10.2 FL (ref 7–12)
POTASSIUM SERPL-SCNC: 4.2 MMOL/L (ref 3.5–5)
PROT SERPL-MCNC: 7.4 G/DL (ref 6.4–8.3)
RBC # BLD AUTO: 4.3 M/UL (ref 3.5–5.5)
SARS-COV-2 RNA RESP QL NAA+PROBE: NOT DETECTED
SODIUM SERPL-SCNC: 142 MMOL/L (ref 132–146)
SOURCE: NORMAL
SPECIMEN DESCRIPTION: NORMAL
TROPONIN I SERPL HS-MCNC: NORMAL NG/L (ref 0–14)
TROPONIN INTERP: NORMAL
TROPONIN T SERPL-MCNC: NORMAL NG/ML
WBC OTHER # BLD: 4.8 K/UL (ref 4.5–11.5)

## 2024-10-03 PROCEDURE — 83880 ASSAY OF NATRIURETIC PEPTIDE: CPT

## 2024-10-03 PROCEDURE — 80053 COMPREHEN METABOLIC PANEL: CPT

## 2024-10-03 PROCEDURE — 84484 ASSAY OF TROPONIN QUANT: CPT

## 2024-10-03 PROCEDURE — 84132 ASSAY OF SERUM POTASSIUM: CPT

## 2024-10-03 PROCEDURE — 99285 EMERGENCY DEPT VISIT HI MDM: CPT

## 2024-10-03 PROCEDURE — 87636 SARSCOV2 & INF A&B AMP PRB: CPT

## 2024-10-03 PROCEDURE — 83605 ASSAY OF LACTIC ACID: CPT

## 2024-10-03 PROCEDURE — 71045 X-RAY EXAM CHEST 1 VIEW: CPT

## 2024-10-03 PROCEDURE — 85025 COMPLETE CBC W/AUTO DIFF WBC: CPT

## 2024-10-03 PROCEDURE — 6370000000 HC RX 637 (ALT 250 FOR IP): Performed by: EMERGENCY MEDICINE

## 2024-10-03 PROCEDURE — 93005 ELECTROCARDIOGRAM TRACING: CPT

## 2024-10-03 RX ORDER — HYDRALAZINE HYDROCHLORIDE 10 MG/1
10 TABLET, FILM COATED ORAL ONCE
Status: COMPLETED | OUTPATIENT
Start: 2024-10-03 | End: 2024-10-03

## 2024-10-03 RX ORDER — ACETAMINOPHEN 500 MG
1000 TABLET ORAL ONCE
Status: COMPLETED | OUTPATIENT
Start: 2024-10-03 | End: 2024-10-03

## 2024-10-03 RX ADMIN — HYDRALAZINE HYDROCHLORIDE 10 MG: 10 TABLET, FILM COATED ORAL at 23:58

## 2024-10-03 RX ADMIN — ACETAMINOPHEN 1000 MG: 500 TABLET ORAL at 23:58

## 2024-10-03 ASSESSMENT — ENCOUNTER SYMPTOMS
RHINORRHEA: 0
BACK PAIN: 0
PHOTOPHOBIA: 0
DIARRHEA: 0
NAUSEA: 0
VOMITING: 0
SORE THROAT: 0
SHORTNESS OF BREATH: 0
CHEST TIGHTNESS: 0
COUGH: 0

## 2024-10-04 ENCOUNTER — APPOINTMENT (OUTPATIENT)
Dept: CT IMAGING | Age: 89
End: 2024-10-04
Payer: COMMERCIAL

## 2024-10-04 VITALS
TEMPERATURE: 97.6 F | RESPIRATION RATE: 15 BRPM | HEART RATE: 70 BPM | DIASTOLIC BLOOD PRESSURE: 108 MMHG | OXYGEN SATURATION: 97 % | SYSTOLIC BLOOD PRESSURE: 166 MMHG

## 2024-10-04 LAB
EKG ATRIAL RATE: 45 BPM
EKG P AXIS: 28 DEGREES
EKG P-R INTERVAL: 186 MS
EKG Q-T INTERVAL: 564 MS
EKG QRS DURATION: 118 MS
EKG QTC CALCULATION (BAZETT): 487 MS
EKG R AXIS: -51 DEGREES
EKG T AXIS: -22 DEGREES
EKG VENTRICULAR RATE: 45 BPM
POTASSIUM SERPL-SCNC: 3.8 MMOL/L (ref 3.5–5)
TROPONIN I SERPL HS-MCNC: 14 NG/L (ref 0–9)
TROPONIN I SERPL HS-MCNC: 15 NG/L (ref 0–9)

## 2024-10-04 PROCEDURE — 84484 ASSAY OF TROPONIN QUANT: CPT

## 2024-10-04 PROCEDURE — 93010 ELECTROCARDIOGRAM REPORT: CPT | Performed by: INTERNAL MEDICINE

## 2024-10-04 PROCEDURE — 6360000004 HC RX CONTRAST MEDICATION: Performed by: RADIOLOGY

## 2024-10-04 PROCEDURE — 74177 CT ABD & PELVIS W/CONTRAST: CPT

## 2024-10-04 RX ORDER — IOPAMIDOL 755 MG/ML
75 INJECTION, SOLUTION INTRAVASCULAR
Status: COMPLETED | OUTPATIENT
Start: 2024-10-04 | End: 2024-10-04

## 2024-10-04 RX ADMIN — IOPAMIDOL 75 ML: 755 INJECTION, SOLUTION INTRAVENOUS at 03:51

## 2024-10-04 ASSESSMENT — ENCOUNTER SYMPTOMS: ABDOMINAL PAIN: 1

## 2024-10-04 NOTE — ED PROVIDER NOTES
[facility] EMERGENCY DEPARTMENT  EMERGENCY DEPARTMENT ENCOUNTER        Pt Name: Arianna Coleman  MRN: [id number]  Birthdate 12/25/1931  Date of evaluation: 10/3/2024  Provider: Mike Domínguez DO  PCP: Soraya Riddle MD  Note Started: 8:22 PM EDT 10/3/24    CHIEF COMPLAINT       Chief Complaint   Patient presents with    Loss of Consciousness     Pt passed out while getting a hot bath and became unresponsive. Pt drowsy at triage. Pt now Responds to voice and painful stimuli    Emesis     Emesis x1       HISTORY OF PRESENT ILLNESS: 1 or more Elements   History From: Family and EMS      Arianna Coleman is a 89 y.o. female who presents to the emergency room for evaluation of syncope.  Patient was getting a bath by her daughter when she passed out.  Patient has a history of doing this.  EMS initially was called and daughter initially did not patient be taken in.  EMS insisted.  Patient was taken to the emergency department.  Patient has been acting at her baseline.  Patient is on Eliquis.  Patient is moving all extremities.  Patient is acting at her baseline per her family member.      Review of Systems   Constitutional:  Negative for appetite change, chills, fatigue and fever.   HENT:  Negative for congestion, rhinorrhea and sore throat.    Eyes:  Negative for photophobia and visual disturbance.   Respiratory:  Negative for cough, chest tightness and shortness of breath.    Cardiovascular:  Negative for chest pain and palpitations.   Gastrointestinal:  Positive for abdominal pain. Negative for diarrhea, nausea and vomiting.   Endocrine: Negative for polydipsia and polyuria.   Genitourinary:  Negative for dysuria.   Musculoskeletal:  Negative for back pain and neck pain.   Skin:  Negative for rash.   Neurological:  Positive for syncope. Negative for dizziness and headaches.         Nursing Notes were all reviewed and agreed with or any disagreements were addressed in the  admission    CONSULTS: (Who and What was discussed)  None      FINAL IMPRESSION      1. Syncope, unspecified syncope type          DISPOSITION/PLAN     DISPOSITION Decision To Discharge 10/04/2024 05:38:04 AM      PATIENT REFERRED TO:  Soraya Riddle MD  [address] [place] [id number]  [phone]    Call in 1 day      [facility] Emergency Department  [address] [address]  [phone]  Go to   As needed, If symptoms worsen      DISCHARGE MEDICATIONS:  Discharge Medication List as of 10/4/2024  6:17 AM               (Please note that portions of this note were completed with a voice recognition program.  Efforts were made to edit the dictations but occasionally words are mis-transcribed.)    Mike Domínguez DO (electronically signed)

## 2024-10-07 NOTE — PROGRESS NOTES
[facility]- [facility]  Outpatient Progress Note  Arianna Coleman  12/25/1931  Date of Service: 10/8/2024  PCP: Soraya Riddle MD  Electrophysiologist: Joelle Garcia MD        Subjective: Arianna Coleman is seen for follow-up and management of implantable loop recorder in in situ and syncope.  The patient last saw GRANT España in March 2024.  She has a history of syncope with unclear etiology s/p loop recorder 7/5/2023.  She has not been known to have correlation with syncope and cardiac arrhythmia up to date.  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  She presented to the hospital in July 2023 found unresponsive at home she was confused and was unable to give any details.  She was found to be bradycardic and beta-blocker was stopped at that time.  She underwent loop recorder at that time.  She has had syncope in a sitting position and no arrhythmias noted after loop recorder was implanted.  Most recent hospital admission for syncopal episode was May 2024. She was noted to be bradycardic after syncope in 40's. Her heart rate improved after Beta-blocker was held. She was seen by EP service and was diagnosed with vasovagal syncope after a bowel movement. ILR interrogation showed no arrhythmias. She was advised to avoid Beta-blocker. Since her last office visit the patient reports feeling overall well but per the daughter, that patient had a \"hot\" bath and subsequently had a repeat syncopal episodes recently. Otherwise, she offers no complaints from a device POV. The device site looks well healed and free from infection or erosion.  The patient denies any chest pain, dyspnea, palpitations, dizziness, orthopnea or paroxysmal nocturnal dyspnea. The patient continues to be followed remotely.    Patient Active Problem List   Diagnosis    Systolic

## 2024-10-08 ENCOUNTER — OFFICE VISIT (OUTPATIENT)
Dept: NON INVASIVE DIAGNOSTICS | Age: 89
End: 2024-10-08

## 2024-10-08 VITALS
HEART RATE: 53 BPM | BODY MASS INDEX: 21.18 KG/M2 | RESPIRATION RATE: 16 BRPM | WEIGHT: 131.8 LBS | HEIGHT: 66 IN | SYSTOLIC BLOOD PRESSURE: 102 MMHG | DIASTOLIC BLOOD PRESSURE: 80 MMHG

## 2024-10-08 DIAGNOSIS — Z95.818 IMPLANTABLE LOOP RECORDER PRESENT: ICD-10-CM

## 2024-10-08 DIAGNOSIS — I48.0 PAROXYSMAL ATRIAL FIBRILLATION (HCC): Primary | ICD-10-CM

## 2024-10-16 ENCOUNTER — APPOINTMENT (OUTPATIENT)
Dept: GENERAL RADIOLOGY | Age: 89
End: 2024-10-16
Payer: COMMERCIAL

## 2024-10-16 ENCOUNTER — HOSPITAL ENCOUNTER (EMERGENCY)
Age: 89
Discharge: HOME | End: 2024-10-17
Attending: STUDENT IN AN ORGANIZED HEALTH CARE EDUCATION/TRAINING PROGRAM
Payer: COMMERCIAL

## 2024-10-16 VITALS
OXYGEN SATURATION: 97 % | TEMPERATURE: 97.4 F | DIASTOLIC BLOOD PRESSURE: 75 MMHG | HEART RATE: 54 BPM | SYSTOLIC BLOOD PRESSURE: 168 MMHG | RESPIRATION RATE: 16 BRPM

## 2024-10-16 DIAGNOSIS — R55 SYNCOPE AND COLLAPSE: Primary | ICD-10-CM

## 2024-10-16 LAB
ALBUMIN SERPL-MCNC: 3.4 G/DL (ref 3.5–5.2)
ALP SERPL-CCNC: 84 U/L (ref 35–104)
ALT SERPL-CCNC: 15 U/L (ref 0–32)
ANION GAP SERPL CALCULATED.3IONS-SCNC: 6 MMOL/L (ref 7–16)
AST SERPL-CCNC: 25 U/L (ref 0–31)
BASOPHILS # BLD: 0.02 K/UL (ref 0–0.2)
BASOPHILS NFR BLD: 0 % (ref 0–2)
BILIRUB DIRECT SERPL-MCNC: <0.2 MG/DL (ref 0–0.3)
BILIRUB INDIRECT SERPL-MCNC: ABNORMAL MG/DL (ref 0–1)
BILIRUB SERPL-MCNC: 0.4 MG/DL (ref 0–1.2)
BNP SERPL-MCNC: 410 PG/ML (ref 0–450)
BUN SERPL-MCNC: 12 MG/DL (ref 6–23)
CALCIUM SERPL-MCNC: 8.9 MG/DL (ref 8.6–10.2)
CHLORIDE SERPL-SCNC: 108 MMOL/L (ref 98–107)
CO2 SERPL-SCNC: 25 MMOL/L (ref 22–29)
CREAT SERPL-MCNC: 0.9 MG/DL (ref 0.5–1)
EOSINOPHIL # BLD: 0.03 K/UL (ref 0.05–0.5)
EOSINOPHILS RELATIVE PERCENT: 0 % (ref 0–6)
ERYTHROCYTE [DISTWIDTH] IN BLOOD BY AUTOMATED COUNT: 14.3 % (ref 11.5–15)
GFR, ESTIMATED: 58 ML/MIN/1.73M2
GLUCOSE SERPL-MCNC: 118 MG/DL (ref 74–99)
HCT VFR BLD AUTO: 41.3 % (ref 34–48)
HGB BLD-MCNC: 13.1 G/DL (ref 11.5–15.5)
IMM GRANULOCYTES # BLD AUTO: 0.03 K/UL (ref 0–0.58)
IMM GRANULOCYTES NFR BLD: 0 % (ref 0–5)
LYMPHOCYTES NFR BLD: 0.9 K/UL (ref 1.5–4)
LYMPHOCYTES RELATIVE PERCENT: 11 % (ref 20–42)
MCH RBC QN AUTO: 30 PG (ref 26–35)
MCHC RBC AUTO-ENTMCNC: 31.7 G/DL (ref 32–34.5)
MCV RBC AUTO: 94.5 FL (ref 80–99.9)
MONOCYTES NFR BLD: 0.64 K/UL (ref 0.1–0.95)
MONOCYTES NFR BLD: 8 % (ref 2–12)
NEUTROPHILS NFR BLD: 80 % (ref 43–80)
NEUTS SEG NFR BLD: 6.65 K/UL (ref 1.8–7.3)
PLATELET # BLD AUTO: 174 K/UL (ref 130–450)
PMV BLD AUTO: 9.3 FL (ref 7–12)
POTASSIUM SERPL-SCNC: 4.7 MMOL/L (ref 3.5–5)
PROT SERPL-MCNC: 6.7 G/DL (ref 6.4–8.3)
RBC # BLD AUTO: 4.37 M/UL (ref 3.5–5.5)
SODIUM SERPL-SCNC: 139 MMOL/L (ref 132–146)
TROPONIN I SERPL HS-MCNC: 16 NG/L (ref 0–9)
TROPONIN I SERPL HS-MCNC: 19 NG/L (ref 0–9)
WBC OTHER # BLD: 8.3 K/UL (ref 4.5–11.5)

## 2024-10-16 PROCEDURE — 82248 BILIRUBIN DIRECT: CPT

## 2024-10-16 PROCEDURE — 71046 X-RAY EXAM CHEST 2 VIEWS: CPT

## 2024-10-16 PROCEDURE — 80053 COMPREHEN METABOLIC PANEL: CPT

## 2024-10-16 PROCEDURE — 93005 ELECTROCARDIOGRAM TRACING: CPT | Performed by: STUDENT IN AN ORGANIZED HEALTH CARE EDUCATION/TRAINING PROGRAM

## 2024-10-16 PROCEDURE — 83880 ASSAY OF NATRIURETIC PEPTIDE: CPT

## 2024-10-16 PROCEDURE — 85025 COMPLETE CBC W/AUTO DIFF WBC: CPT

## 2024-10-16 PROCEDURE — 99285 EMERGENCY DEPT VISIT HI MDM: CPT

## 2024-10-16 PROCEDURE — 84484 ASSAY OF TROPONIN QUANT: CPT

## 2024-10-16 PROCEDURE — 6370000000 HC RX 637 (ALT 250 FOR IP): Performed by: STUDENT IN AN ORGANIZED HEALTH CARE EDUCATION/TRAINING PROGRAM

## 2024-10-16 RX ORDER — ACETAMINOPHEN 325 MG/1
650 TABLET ORAL ONCE
Status: COMPLETED | OUTPATIENT
Start: 2024-10-16 | End: 2024-10-16

## 2024-10-16 RX ADMIN — ACETAMINOPHEN 650 MG: 325 TABLET ORAL at 21:29

## 2024-10-16 ASSESSMENT — PAIN SCALES - GENERAL: PAINLEVEL_OUTOF10: 9

## 2024-10-16 ASSESSMENT — PAIN - FUNCTIONAL ASSESSMENT
PAIN_FUNCTIONAL_ASSESSMENT: NONE - DENIES PAIN
PAIN_FUNCTIONAL_ASSESSMENT: ACTIVITIES ARE NOT PREVENTED

## 2024-10-16 ASSESSMENT — PAIN DESCRIPTION - DESCRIPTORS: DESCRIPTORS: ACHING

## 2024-10-16 ASSESSMENT — PAIN DESCRIPTION - LOCATION: LOCATION: ABDOMEN

## 2024-10-17 LAB
EKG ATRIAL RATE: 63 BPM
EKG P AXIS: 88 DEGREES
EKG P-R INTERVAL: 204 MS
EKG Q-T INTERVAL: 474 MS
EKG QRS DURATION: 102 MS
EKG QTC CALCULATION (BAZETT): 485 MS
EKG R AXIS: -52 DEGREES
EKG T AXIS: 15 DEGREES
EKG VENTRICULAR RATE: 63 BPM

## 2024-10-17 PROCEDURE — 93010 ELECTROCARDIOGRAM REPORT: CPT | Performed by: INTERNAL MEDICINE

## 2024-10-17 NOTE — DISCHARGE INSTRUCTIONS
Arianna was seen in the emergency department today for syncope and collapse.  I suspect this is likely due to her straining to have a bowel movement.  If she is having repeated episodes of losing consciousness please bring her back to the emergency department.  If she is having worsening abdominal pain, nausea, vomiting, fevers or chills please bring her back to the emergency department

## 2024-10-17 NOTE — ED PROVIDER NOTES
noted below, none       CRITICAL CARE TIME (.cct)   none    PAST MEDICAL HISTORY/Chronic Conditions Affecting Care      has a past medical history of Arthritis, Atrial fibrillation (HCC), Chronic combined systolic (congestive) and diastolic (congestive) heart failure (HCC) (11/7/2021), Coronary artery disease involving native coronary artery of native heart without angina pectoris, blood clots, Hypertension, Ischemic colitis (HCC), and PAF (paroxysmal atrial fibrillation) (HCC).     EMERGENCY DEPARTMENT COURSE    Vitals:    Vitals:    10/16/24 1730 10/16/24 1937   BP: 130/84 (!) 168/75   Pulse: 52 54   Resp: 20 16   Temp: 97.4 °F (36.3 °C) 97.4 °F (36.3 °C)   SpO2: 99% 97%       Patient was given the following medications:  Medications   acetaminophen (TYLENOL) tablet 650 mg (650 mg Oral Given 10/16/24 2129)         Is this patient to be included in the SEP-1 Core Measure due to severe sepsis or septic shock?   No Exclusion criteria - the patient is NOT to be included for SEP-1 Core Measure due to: Infection is not suspected          Medical Decision Making/Differential Diagnosis:    CC/HPI Summary, Social Determinants of health, Records Reviewed, DDx, testing done/not done, ED Course, Reassessment, disposition considerations/shared decision making with patient, consults, disposition:        ED Course as of 10/16/24 2343   Wed Oct 16, 2024   2125 EKG rate 63, normal sinus rhythm with PVCs, incomplete right bundle branch block, left anterior fascicular block, compared to EKG from 10/3/2024 rate has increased [JG]   2126 On my interpretation of patient's chest x-ray no focal infiltrate or pneumothorax [JG]   2341 Delta troponin downtrending and not significant will discharge patient.  Caretaker agreeable plan of discharge [JG]   2341 89-year-old female presenting to the emergency room for syncope.  Patient was on the toilet straining and lost consciousness.  Considered complete heart block, EKG was not consistent with

## 2024-11-10 ENCOUNTER — APPOINTMENT (OUTPATIENT)
Dept: GENERAL RADIOLOGY | Age: 89
End: 2024-11-10
Payer: COMMERCIAL

## 2024-11-10 ENCOUNTER — HOSPITAL ENCOUNTER (INPATIENT)
Age: 89
LOS: 12 days | Discharge: HOME | End: 2024-11-22
Attending: EMERGENCY MEDICINE | Admitting: INTERNAL MEDICINE
Payer: COMMERCIAL

## 2024-11-10 ENCOUNTER — APPOINTMENT (OUTPATIENT)
Dept: CT IMAGING | Age: 89
End: 2024-11-10
Payer: COMMERCIAL

## 2024-11-10 DIAGNOSIS — R06.03 RESPIRATORY DISTRESS: ICD-10-CM

## 2024-11-10 DIAGNOSIS — R55 SYNCOPE AND COLLAPSE: ICD-10-CM

## 2024-11-10 DIAGNOSIS — R00.1 BRADYCARDIA: ICD-10-CM

## 2024-11-10 DIAGNOSIS — R41.82 ALTERED MENTAL STATUS, UNSPECIFIED ALTERED MENTAL STATUS TYPE: Primary | ICD-10-CM

## 2024-11-10 DIAGNOSIS — I95.0 IDIOPATHIC HYPOTENSION: ICD-10-CM

## 2024-11-10 LAB
ALBUMIN SERPL-MCNC: 3.4 G/DL (ref 3.5–5.2)
ALP SERPL-CCNC: 79 U/L (ref 35–104)
ALT SERPL-CCNC: 11 U/L (ref 0–32)
ANION GAP SERPL CALCULATED.3IONS-SCNC: 9 MMOL/L (ref 7–16)
APAP SERPL-MCNC: <5 UG/ML (ref 10–30)
AST SERPL-CCNC: 18 U/L (ref 0–31)
BASOPHILS # BLD: 0.03 K/UL (ref 0–0.2)
BASOPHILS # BLD: 0.04 K/UL (ref 0–0.2)
BASOPHILS NFR BLD: 0 % (ref 0–2)
BASOPHILS NFR BLD: 1 % (ref 0–2)
BILIRUB SERPL-MCNC: 0.6 MG/DL (ref 0–1.2)
BUN SERPL-MCNC: 12 MG/DL (ref 6–23)
CALCIUM SERPL-MCNC: 8.8 MG/DL (ref 8.6–10.2)
CHLORIDE SERPL-SCNC: 108 MMOL/L (ref 98–107)
CO2 SERPL-SCNC: 24 MMOL/L (ref 22–29)
CREAT SERPL-MCNC: 1 MG/DL (ref 0.5–1)
EOSINOPHIL # BLD: 0.02 K/UL (ref 0.05–0.5)
EOSINOPHIL # BLD: 0.08 K/UL (ref 0.05–0.5)
EOSINOPHILS RELATIVE PERCENT: 0 % (ref 0–6)
EOSINOPHILS RELATIVE PERCENT: 2 % (ref 0–6)
ERYTHROCYTE [DISTWIDTH] IN BLOOD BY AUTOMATED COUNT: 14 % (ref 11.5–15)
ERYTHROCYTE [DISTWIDTH] IN BLOOD BY AUTOMATED COUNT: 14.1 % (ref 11.5–15)
ETHANOLAMINE SERPL-MCNC: <10 MG/DL (ref 0–0.08)
GFR, ESTIMATED: 53 ML/MIN/1.73M2
GLUCOSE BLD-MCNC: 103 MG/DL (ref 74–99)
GLUCOSE SERPL-MCNC: 112 MG/DL (ref 74–99)
HCT VFR BLD AUTO: 37 % (ref 34–48)
HCT VFR BLD AUTO: 39 % (ref 34–48)
HGB BLD-MCNC: 12.1 G/DL (ref 11.5–15.5)
HGB BLD-MCNC: 12.7 G/DL (ref 11.5–15.5)
IMM GRANULOCYTES # BLD AUTO: 0.04 K/UL (ref 0–0.58)
IMM GRANULOCYTES # BLD AUTO: <0.03 K/UL (ref 0–0.58)
IMM GRANULOCYTES NFR BLD: 0 % (ref 0–5)
IMM GRANULOCYTES NFR BLD: 1 % (ref 0–5)
INR PPP: 1.4
LACTATE BLDV-SCNC: 2.2 MMOL/L (ref 0.5–2.2)
LYMPHOCYTES NFR BLD: 0.89 K/UL (ref 1.5–4)
LYMPHOCYTES NFR BLD: 2.17 K/UL (ref 1.5–4)
LYMPHOCYTES RELATIVE PERCENT: 10 % (ref 20–42)
LYMPHOCYTES RELATIVE PERCENT: 46 % (ref 20–42)
MCH RBC QN AUTO: 30.2 PG (ref 26–35)
MCH RBC QN AUTO: 30.2 PG (ref 26–35)
MCHC RBC AUTO-ENTMCNC: 32.6 G/DL (ref 32–34.5)
MCHC RBC AUTO-ENTMCNC: 32.7 G/DL (ref 32–34.5)
MCV RBC AUTO: 92.3 FL (ref 80–99.9)
MCV RBC AUTO: 92.9 FL (ref 80–99.9)
MONOCYTES NFR BLD: 0.48 K/UL (ref 0.1–0.95)
MONOCYTES NFR BLD: 0.49 K/UL (ref 0.1–0.95)
MONOCYTES NFR BLD: 10 % (ref 2–12)
MONOCYTES NFR BLD: 6 % (ref 2–12)
NEUTROPHILS NFR BLD: 41 % (ref 43–80)
NEUTROPHILS NFR BLD: 83 % (ref 43–80)
NEUTS SEG NFR BLD: 1.96 K/UL (ref 1.8–7.3)
NEUTS SEG NFR BLD: 7.1 K/UL (ref 1.8–7.3)
PLATELET # BLD AUTO: 163 K/UL (ref 130–450)
PLATELET # BLD AUTO: 178 K/UL (ref 130–450)
PMV BLD AUTO: 9.1 FL (ref 7–12)
PMV BLD AUTO: 9.4 FL (ref 7–12)
POTASSIUM SERPL-SCNC: 3.7 MMOL/L (ref 3.5–5)
PROT SERPL-MCNC: 6.3 G/DL (ref 6.4–8.3)
PROTHROMBIN TIME: 15 SEC (ref 9.3–12.4)
RBC # BLD AUTO: 4.01 M/UL (ref 3.5–5.5)
RBC # BLD AUTO: 4.2 M/UL (ref 3.5–5.5)
SALICYLATES SERPL-MCNC: <0.3 MG/DL (ref 0–30)
SODIUM SERPL-SCNC: 141 MMOL/L (ref 132–146)
TOXIC TRICYCLIC SC,BLOOD: NEGATIVE
TROPONIN I SERPL HS-MCNC: 24 NG/L (ref 0–9)
TROPONIN I SERPL HS-MCNC: 29 NG/L (ref 0–9)
WBC OTHER # BLD: 4.7 K/UL (ref 4.5–11.5)
WBC OTHER # BLD: 8.6 K/UL (ref 4.5–11.5)

## 2024-11-10 PROCEDURE — 2000000000 HC ICU R&B

## 2024-11-10 PROCEDURE — 93005 ELECTROCARDIOGRAM TRACING: CPT

## 2024-11-10 PROCEDURE — 96376 TX/PRO/DX INJ SAME DRUG ADON: CPT

## 2024-11-10 PROCEDURE — 83605 ASSAY OF LACTIC ACID: CPT

## 2024-11-10 PROCEDURE — 80143 DRUG ASSAY ACETAMINOPHEN: CPT

## 2024-11-10 PROCEDURE — 0BH17EZ INSERTION OF ENDOTRACHEAL AIRWAY INTO TRACHEA, VIA NATURAL OR ARTIFICIAL OPENING: ICD-10-PCS

## 2024-11-10 PROCEDURE — 0DH67UZ INSERTION OF FEEDING DEVICE INTO STOMACH, VIA NATURAL OR ARTIFICIAL OPENING: ICD-10-PCS | Performed by: INTERNAL MEDICINE

## 2024-11-10 PROCEDURE — 3E033XZ INTRODUCTION OF VASOPRESSOR INTO PERIPHERAL VEIN, PERCUTANEOUS APPROACH: ICD-10-PCS | Performed by: INTERNAL MEDICINE

## 2024-11-10 PROCEDURE — 96365 THER/PROPH/DIAG IV INF INIT: CPT

## 2024-11-10 PROCEDURE — 31500 INSERT EMERGENCY AIRWAY: CPT

## 2024-11-10 PROCEDURE — 80307 DRUG TEST PRSMV CHEM ANLYZR: CPT

## 2024-11-10 PROCEDURE — 99285 EMERGENCY DEPT VISIT HI MDM: CPT

## 2024-11-10 PROCEDURE — 2500000003 HC RX 250 WO HCPCS

## 2024-11-10 PROCEDURE — 84484 ASSAY OF TROPONIN QUANT: CPT

## 2024-11-10 PROCEDURE — 36415 COLL VENOUS BLD VENIPUNCTURE: CPT

## 2024-11-10 PROCEDURE — 74018 RADEX ABDOMEN 1 VIEW: CPT

## 2024-11-10 PROCEDURE — 70450 CT HEAD/BRAIN W/O DYE: CPT

## 2024-11-10 PROCEDURE — 96366 THER/PROPH/DIAG IV INF ADDON: CPT

## 2024-11-10 PROCEDURE — 6360000002 HC RX W HCPCS: Performed by: EMERGENCY MEDICINE

## 2024-11-10 PROCEDURE — 6360000002 HC RX W HCPCS

## 2024-11-10 PROCEDURE — 82962 GLUCOSE BLOOD TEST: CPT

## 2024-11-10 PROCEDURE — 80053 COMPREHEN METABOLIC PANEL: CPT

## 2024-11-10 PROCEDURE — 85025 COMPLETE CBC W/AUTO DIFF WBC: CPT

## 2024-11-10 PROCEDURE — G0480 DRUG TEST DEF 1-7 CLASSES: HCPCS

## 2024-11-10 PROCEDURE — 80179 DRUG ASSAY SALICYLATE: CPT

## 2024-11-10 PROCEDURE — 2580000003 HC RX 258

## 2024-11-10 PROCEDURE — 96367 TX/PROPH/DG ADDL SEQ IV INF: CPT

## 2024-11-10 PROCEDURE — 85610 PROTHROMBIN TIME: CPT

## 2024-11-10 PROCEDURE — 96375 TX/PRO/DX INJ NEW DRUG ADDON: CPT

## 2024-11-10 PROCEDURE — 36556 INSERT NON-TUNNEL CV CATH: CPT

## 2024-11-10 PROCEDURE — 5A1945Z RESPIRATORY VENTILATION, 24-96 CONSECUTIVE HOURS: ICD-10-PCS

## 2024-11-10 PROCEDURE — 71045 X-RAY EXAM CHEST 1 VIEW: CPT

## 2024-11-10 RX ORDER — MIDAZOLAM HYDROCHLORIDE 1 MG/ML
INJECTION, SOLUTION INTRAMUSCULAR; INTRAVENOUS
Status: COMPLETED
Start: 2024-11-10 | End: 2024-11-10

## 2024-11-10 RX ORDER — FENTANYL CITRATE-0.9 % NACL/PF 10 MCG/ML
25-200 PLASTIC BAG, INJECTION (ML) INTRAVENOUS CONTINUOUS
Status: DISCONTINUED | OUTPATIENT
Start: 2024-11-10 | End: 2024-11-10

## 2024-11-10 RX ORDER — MIDAZOLAM HYDROCHLORIDE 2 MG/2ML
2 INJECTION, SOLUTION INTRAMUSCULAR; INTRAVENOUS ONCE
Status: COMPLETED | OUTPATIENT
Start: 2024-11-10 | End: 2024-11-10

## 2024-11-10 RX ORDER — HYDRALAZINE HYDROCHLORIDE 20 MG/ML
10 INJECTION INTRAMUSCULAR; INTRAVENOUS ONCE
Status: COMPLETED | OUTPATIENT
Start: 2024-11-10 | End: 2024-11-10

## 2024-11-10 RX ORDER — SODIUM CHLORIDE, SODIUM LACTATE, POTASSIUM CHLORIDE, AND CALCIUM CHLORIDE .6; .31; .03; .02 G/100ML; G/100ML; G/100ML; G/100ML
1000 INJECTION, SOLUTION INTRAVENOUS ONCE
Status: COMPLETED | OUTPATIENT
Start: 2024-11-10 | End: 2024-11-10

## 2024-11-10 RX ORDER — PROPOFOL 10 MG/ML
5-50 INJECTION, EMULSION INTRAVENOUS CONTINUOUS
Status: DISCONTINUED | OUTPATIENT
Start: 2024-11-10 | End: 2024-11-12

## 2024-11-10 RX ORDER — FENTANYL CITRATE-0.9 % NACL/PF 20 MCG/2ML
50 SYRINGE (ML) INTRAVENOUS EVERY 30 MIN PRN
Status: DISCONTINUED | OUTPATIENT
Start: 2024-11-10 | End: 2024-11-10

## 2024-11-10 RX ORDER — NOREPINEPHRINE BITARTRATE 0.06 MG/ML
INJECTION, SOLUTION INTRAVENOUS
Status: COMPLETED
Start: 2024-11-10 | End: 2024-11-10

## 2024-11-10 RX ORDER — GLUCAGON 1 MG/ML
1 KIT INJECTION ONCE
Status: COMPLETED | OUTPATIENT
Start: 2024-11-10 | End: 2024-11-10

## 2024-11-10 RX ORDER — MIDAZOLAM HYDROCHLORIDE 1 MG/ML
1-10 INJECTION, SOLUTION INTRAVENOUS CONTINUOUS
Status: DISCONTINUED | OUTPATIENT
Start: 2024-11-10 | End: 2024-11-10

## 2024-11-10 RX ORDER — MIDAZOLAM HYDROCHLORIDE 2 MG/2ML
4 INJECTION, SOLUTION INTRAMUSCULAR; INTRAVENOUS ONCE
Status: COMPLETED | OUTPATIENT
Start: 2024-11-10 | End: 2024-11-10

## 2024-11-10 RX ADMIN — MIDAZOLAM 4 MG: 1 INJECTION INTRAMUSCULAR; INTRAVENOUS at 22:33

## 2024-11-10 RX ADMIN — PROPOFOL 20 MCG/KG/MIN: 10 INJECTION, EMULSION INTRAVENOUS at 21:09

## 2024-11-10 RX ADMIN — Medication 2 MCG/MIN: at 19:18

## 2024-11-10 RX ADMIN — MIDAZOLAM HYDROCHLORIDE 2 MG: 2 INJECTION, SOLUTION INTRAMUSCULAR; INTRAVENOUS at 19:16

## 2024-11-10 RX ADMIN — SODIUM CHLORIDE, POTASSIUM CHLORIDE, SODIUM LACTATE AND CALCIUM CHLORIDE 1000 ML: 600; 310; 30; 20 INJECTION, SOLUTION INTRAVENOUS at 19:17

## 2024-11-10 RX ADMIN — MIDAZOLAM HYDROCHLORIDE 2 MG/HR: 5 INJECTION, SOLUTION INTRAMUSCULAR; INTRAVENOUS at 19:25

## 2024-11-10 RX ADMIN — GLUCAGON 1 MG: 1 INJECTION, POWDER, LYOPHILIZED, FOR SOLUTION INTRAMUSCULAR; INTRAVENOUS at 19:26

## 2024-11-10 RX ADMIN — Medication 75 MCG/HR: at 20:29

## 2024-11-10 RX ADMIN — Medication 25 MCG/HR: at 19:46

## 2024-11-10 RX ADMIN — HYDRALAZINE HYDROCHLORIDE 10 MG: 20 INJECTION INTRAMUSCULAR; INTRAVENOUS at 21:33

## 2024-11-10 RX ADMIN — MIDAZOLAM 2 MG: 1 INJECTION INTRAMUSCULAR; INTRAVENOUS at 19:16

## 2024-11-10 RX ADMIN — Medication 50 MCG: at 19:47

## 2024-11-10 ASSESSMENT — PULMONARY FUNCTION TESTS
PIF_VALUE: 20
PIF_VALUE: 16

## 2024-11-11 ENCOUNTER — APPOINTMENT (OUTPATIENT)
Dept: GENERAL RADIOLOGY | Age: 89
End: 2024-11-11
Payer: COMMERCIAL

## 2024-11-11 PROBLEM — Z95.818 STATUS POST PLACEMENT OF IMPLANTABLE LOOP RECORDER: Status: ACTIVE | Noted: 2024-11-11

## 2024-11-11 LAB
AADO2: 221.8 MMHG
AADO2: 81.8 MMHG
AMMONIA PLAS-SCNC: 20 UMOL/L (ref 11–51)
ANION GAP SERPL CALCULATED.3IONS-SCNC: 10 MMOL/L (ref 7–16)
ANION GAP SERPL CALCULATED.3IONS-SCNC: 7 MMOL/L (ref 7–16)
B.E.: 0.7 MMOL/L (ref -3–3)
B.E.: 0.9 MMOL/L (ref -3–3)
BASOPHILS # BLD: 0.04 K/UL (ref 0–0.2)
BASOPHILS NFR BLD: 1 % (ref 0–2)
BUN SERPL-MCNC: 11 MG/DL (ref 6–23)
BUN SERPL-MCNC: 11 MG/DL (ref 6–23)
CALCIUM SERPL-MCNC: 7.7 MG/DL (ref 8.6–10.2)
CALCIUM SERPL-MCNC: 8.7 MG/DL (ref 8.6–10.2)
CHLORIDE SERPL-SCNC: 109 MMOL/L (ref 98–107)
CHLORIDE SERPL-SCNC: 112 MMOL/L (ref 98–107)
CO2 SERPL-SCNC: 20 MMOL/L (ref 22–29)
CO2 SERPL-SCNC: 21 MMOL/L (ref 22–29)
COHB: 0.2 % (ref 0–1.5)
COHB: 0.5 % (ref 0–1.5)
CREAT SERPL-MCNC: 0.7 MG/DL (ref 0.5–1)
CREAT SERPL-MCNC: 0.8 MG/DL (ref 0.5–1)
CRITICAL: ABNORMAL
CRITICAL: ABNORMAL
DATE ANALYZED: ABNORMAL
DATE ANALYZED: ABNORMAL
DATE OF COLLECTION: ABNORMAL
DATE OF COLLECTION: ABNORMAL
EKG ATRIAL RATE: 57 BPM
EKG P AXIS: 28 DEGREES
EKG P-R INTERVAL: 174 MS
EKG Q-T INTERVAL: 522 MS
EKG QRS DURATION: 112 MS
EKG QTC CALCULATION (BAZETT): 508 MS
EKG R AXIS: -50 DEGREES
EKG T AXIS: -35 DEGREES
EKG VENTRICULAR RATE: 57 BPM
EOSINOPHIL # BLD: 0.03 K/UL (ref 0.05–0.5)
EOSINOPHILS RELATIVE PERCENT: 1 % (ref 0–6)
ERYTHROCYTE [DISTWIDTH] IN BLOOD BY AUTOMATED COUNT: 13.8 % (ref 11.5–15)
FIO2: 100 %
FIO2: 35 %
GFR, ESTIMATED: 72 ML/MIN/1.73M2
GFR, ESTIMATED: 77 ML/MIN/1.73M2
GLUCOSE BLD-MCNC: 101 MG/DL (ref 74–99)
GLUCOSE SERPL-MCNC: 126 MG/DL (ref 74–99)
GLUCOSE SERPL-MCNC: 140 MG/DL (ref 74–99)
HCO3: 21.1 MMOL/L (ref 22–26)
HCO3: 22.6 MMOL/L (ref 22–26)
HCT VFR BLD AUTO: 36.5 % (ref 34–48)
HGB BLD-MCNC: 12.3 G/DL (ref 11.5–15.5)
HHB: 0.2 % (ref 0–5)
HHB: 1 % (ref 0–5)
IMM GRANULOCYTES # BLD AUTO: <0.03 K/UL (ref 0–0.58)
IMM GRANULOCYTES NFR BLD: 0 % (ref 0–5)
LAB: ABNORMAL
LAB: ABNORMAL
LYMPHOCYTES NFR BLD: 1.14 K/UL (ref 1.5–4)
LYMPHOCYTES RELATIVE PERCENT: 17 % (ref 20–42)
Lab: 137
Lab: 440
MAGNESIUM SERPL-MCNC: 1.9 MG/DL (ref 1.6–2.6)
MCH RBC QN AUTO: 30.4 PG (ref 26–35)
MCHC RBC AUTO-ENTMCNC: 33.7 G/DL (ref 32–34.5)
MCV RBC AUTO: 90.1 FL (ref 80–99.9)
METHB: 0.4 % (ref 0–1.5)
METHB: 0.5 % (ref 0–1.5)
MODE: AC
MODE: AC
MONOCYTES NFR BLD: 0.58 K/UL (ref 0.1–0.95)
MONOCYTES NFR BLD: 9 % (ref 2–12)
NEUTROPHILS NFR BLD: 72 % (ref 43–80)
NEUTS SEG NFR BLD: 4.76 K/UL (ref 1.8–7.3)
O2 SATURATION: 99 % (ref 92–98.5)
O2 SATURATION: 99.8 % (ref 92–98.5)
O2HB: 98.1 % (ref 94–97)
O2HB: 99.1 % (ref 94–97)
OPERATOR ID: 7296
OPERATOR ID: 7296
PATIENT TEMP: 37 C
PATIENT TEMP: 37 C
PCO2: 23.7 MMHG (ref 35–45)
PCO2: 28.1 MMHG (ref 35–45)
PEEP/CPAP: 5 CMH2O
PEEP/CPAP: 8 CMH2O
PFO2: 3.86 MMHG/%
PFO2: 4.68 MMHG/%
PH BLOOD GAS: 7.52 (ref 7.35–7.45)
PH BLOOD GAS: 7.57 (ref 7.35–7.45)
PHOSPHATE SERPL-MCNC: 1.9 MG/DL (ref 2.5–4.5)
PLATELET # BLD AUTO: 170 K/UL (ref 130–450)
PMV BLD AUTO: 9.4 FL (ref 7–12)
PO2: 135.1 MMHG (ref 75–100)
PO2: 467.5 MMHG (ref 75–100)
POTASSIUM SERPL-SCNC: 3.3 MMOL/L (ref 3.5–5)
POTASSIUM SERPL-SCNC: 4.4 MMOL/L (ref 3.5–5)
RBC # BLD AUTO: 4.05 M/UL (ref 3.5–5.5)
RI(T): 0.47
RI(T): 0.61
RR MECHANICAL: 12 B/MIN
RR MECHANICAL: 16 B/MIN
SODIUM SERPL-SCNC: 139 MMOL/L (ref 132–146)
SODIUM SERPL-SCNC: 140 MMOL/L (ref 132–146)
SOURCE, BLOOD GAS: ABNORMAL
SOURCE, BLOOD GAS: ABNORMAL
THB: 13.2 G/DL (ref 11.5–16.5)
THB: 13.6 G/DL (ref 11.5–16.5)
TIME ANALYZED: 144
TIME ANALYZED: 456
TSH SERPL DL<=0.05 MIU/L-ACNC: 1.33 UIU/ML (ref 0.27–4.2)
VT MECHANICAL: 375 ML
VT MECHANICAL: 450 ML
WBC OTHER # BLD: 6.6 K/UL (ref 4.5–11.5)

## 2024-11-11 PROCEDURE — 6360000002 HC RX W HCPCS

## 2024-11-11 PROCEDURE — 80048 BASIC METABOLIC PNL TOTAL CA: CPT

## 2024-11-11 PROCEDURE — 85025 COMPLETE CBC W/AUTO DIFF WBC: CPT

## 2024-11-11 PROCEDURE — 71045 X-RAY EXAM CHEST 1 VIEW: CPT

## 2024-11-11 PROCEDURE — 6370000000 HC RX 637 (ALT 250 FOR IP)

## 2024-11-11 PROCEDURE — 02HV33Z INSERTION OF INFUSION DEVICE INTO SUPERIOR VENA CAVA, PERCUTANEOUS APPROACH: ICD-10-PCS | Performed by: INTERNAL MEDICINE

## 2024-11-11 PROCEDURE — 84443 ASSAY THYROID STIM HORMONE: CPT

## 2024-11-11 PROCEDURE — 2580000003 HC RX 258

## 2024-11-11 PROCEDURE — 82962 GLUCOSE BLOOD TEST: CPT

## 2024-11-11 PROCEDURE — 93291 INTERROG DEV EVAL SCRMS IP: CPT | Performed by: INTERNAL MEDICINE

## 2024-11-11 PROCEDURE — 84100 ASSAY OF PHOSPHORUS: CPT

## 2024-11-11 PROCEDURE — 99291 CRITICAL CARE FIRST HOUR: CPT | Performed by: INTERNAL MEDICINE

## 2024-11-11 PROCEDURE — 51701 INSERT BLADDER CATHETER: CPT

## 2024-11-11 PROCEDURE — 82140 ASSAY OF AMMONIA: CPT

## 2024-11-11 PROCEDURE — 83735 ASSAY OF MAGNESIUM: CPT

## 2024-11-11 PROCEDURE — 2000000000 HC ICU R&B

## 2024-11-11 PROCEDURE — 93010 ELECTROCARDIOGRAM REPORT: CPT | Performed by: INTERNAL MEDICINE

## 2024-11-11 PROCEDURE — 99223 1ST HOSP IP/OBS HIGH 75: CPT | Performed by: INTERNAL MEDICINE

## 2024-11-11 PROCEDURE — 2500000003 HC RX 250 WO HCPCS

## 2024-11-11 PROCEDURE — 51798 US URINE CAPACITY MEASURE: CPT

## 2024-11-11 PROCEDURE — 94002 VENT MGMT INPAT INIT DAY: CPT

## 2024-11-11 PROCEDURE — 82805 BLOOD GASES W/O2 SATURATION: CPT

## 2024-11-11 RX ORDER — POTASSIUM CHLORIDE 29.8 MG/ML
40 INJECTION INTRAVENOUS ONCE
Status: COMPLETED | OUTPATIENT
Start: 2024-11-11 | End: 2024-11-11

## 2024-11-11 RX ORDER — LOSARTAN POTASSIUM 25 MG/1
25 TABLET ORAL DAILY
Status: DISCONTINUED | OUTPATIENT
Start: 2024-11-12 | End: 2024-11-15

## 2024-11-11 RX ORDER — SODIUM CHLORIDE 0.9 % (FLUSH) 0.9 %
5-40 SYRINGE (ML) INJECTION EVERY 12 HOURS SCHEDULED
Status: DISCONTINUED | OUTPATIENT
Start: 2024-11-11 | End: 2024-11-22 | Stop reason: HOSPADM

## 2024-11-11 RX ORDER — FENTANYL CITRATE-0.9 % NACL/PF 10 MCG/ML
25-200 PLASTIC BAG, INJECTION (ML) INTRAVENOUS CONTINUOUS
Status: DISCONTINUED | OUTPATIENT
Start: 2024-11-11 | End: 2024-11-13

## 2024-11-11 RX ORDER — SODIUM CHLORIDE 9 MG/ML
INJECTION, SOLUTION INTRAVENOUS PRN
Status: DISCONTINUED | OUTPATIENT
Start: 2024-11-11 | End: 2024-11-22 | Stop reason: HOSPADM

## 2024-11-11 RX ORDER — ONDANSETRON 4 MG/1
4 TABLET, ORALLY DISINTEGRATING ORAL EVERY 8 HOURS PRN
Status: DISCONTINUED | OUTPATIENT
Start: 2024-11-11 | End: 2024-11-22 | Stop reason: HOSPADM

## 2024-11-11 RX ORDER — HYDRALAZINE HYDROCHLORIDE 10 MG/1
10 TABLET, FILM COATED ORAL EVERY 8 HOURS SCHEDULED
Status: DISCONTINUED | OUTPATIENT
Start: 2024-11-11 | End: 2024-11-11

## 2024-11-11 RX ORDER — HYDRALAZINE HYDROCHLORIDE 20 MG/ML
5 INJECTION INTRAMUSCULAR; INTRAVENOUS EVERY 6 HOURS PRN
Status: DISCONTINUED | OUTPATIENT
Start: 2024-11-11 | End: 2024-11-11

## 2024-11-11 RX ORDER — MAGNESIUM SULFATE IN WATER 40 MG/ML
2000 INJECTION, SOLUTION INTRAVENOUS PRN
Status: DISCONTINUED | OUTPATIENT
Start: 2024-11-11 | End: 2024-11-22 | Stop reason: HOSPADM

## 2024-11-11 RX ORDER — ACETAMINOPHEN 650 MG/1
650 SUPPOSITORY RECTAL EVERY 6 HOURS PRN
Status: DISCONTINUED | OUTPATIENT
Start: 2024-11-11 | End: 2024-11-22 | Stop reason: HOSPADM

## 2024-11-11 RX ORDER — SODIUM CHLORIDE 0.9 % (FLUSH) 0.9 %
5-40 SYRINGE (ML) INJECTION PRN
Status: DISCONTINUED | OUTPATIENT
Start: 2024-11-11 | End: 2024-11-22 | Stop reason: HOSPADM

## 2024-11-11 RX ORDER — ONDANSETRON 2 MG/ML
4 INJECTION INTRAMUSCULAR; INTRAVENOUS EVERY 6 HOURS PRN
Status: DISCONTINUED | OUTPATIENT
Start: 2024-11-11 | End: 2024-11-22 | Stop reason: HOSPADM

## 2024-11-11 RX ORDER — POLYETHYLENE GLYCOL 3350 17 G/17G
17 POWDER, FOR SOLUTION ORAL DAILY PRN
Status: DISCONTINUED | OUTPATIENT
Start: 2024-11-11 | End: 2024-11-15

## 2024-11-11 RX ORDER — ACETAMINOPHEN 325 MG/1
650 TABLET ORAL EVERY 6 HOURS PRN
Status: DISCONTINUED | OUTPATIENT
Start: 2024-11-11 | End: 2024-11-22 | Stop reason: HOSPADM

## 2024-11-11 RX ORDER — LOSARTAN POTASSIUM 25 MG/1
50 TABLET ORAL 2 TIMES DAILY
Status: DISCONTINUED | OUTPATIENT
Start: 2024-11-11 | End: 2024-11-11

## 2024-11-11 RX ORDER — CHLORHEXIDINE GLUCONATE ORAL RINSE 1.2 MG/ML
15 SOLUTION DENTAL 2 TIMES DAILY
Status: DISCONTINUED | OUTPATIENT
Start: 2024-11-11 | End: 2024-11-21

## 2024-11-11 RX ADMIN — PROPOFOL 45 MCG/KG/MIN: 10 INJECTION, EMULSION INTRAVENOUS at 02:52

## 2024-11-11 RX ADMIN — Medication 15 ML: at 07:58

## 2024-11-11 RX ADMIN — HYDRALAZINE HYDROCHLORIDE 10 MG: 10 TABLET, FILM COATED ORAL at 14:12

## 2024-11-11 RX ADMIN — SODIUM CHLORIDE, PRESERVATIVE FREE 10 ML: 5 INJECTION INTRAVENOUS at 20:50

## 2024-11-11 RX ADMIN — APIXABAN 2.5 MG: 5 TABLET, FILM COATED ORAL at 20:49

## 2024-11-11 RX ADMIN — LOSARTAN POTASSIUM 50 MG: 25 TABLET, FILM COATED ORAL at 01:40

## 2024-11-11 RX ADMIN — APIXABAN 2.5 MG: 5 TABLET, FILM COATED ORAL at 07:58

## 2024-11-11 RX ADMIN — POTASSIUM CHLORIDE 40 MEQ: 29.8 INJECTION, SOLUTION INTRAVENOUS at 06:15

## 2024-11-11 RX ADMIN — SODIUM CHLORIDE, PRESERVATIVE FREE 10 ML: 5 INJECTION INTRAVENOUS at 07:59

## 2024-11-11 RX ADMIN — SODIUM CHLORIDE, PRESERVATIVE FREE 40 MG: 5 INJECTION INTRAVENOUS at 09:27

## 2024-11-11 RX ADMIN — PROPOFOL 45 MCG/KG/MIN: 10 INJECTION, EMULSION INTRAVENOUS at 15:33

## 2024-11-11 RX ADMIN — APIXABAN 2.5 MG: 5 TABLET, FILM COATED ORAL at 01:40

## 2024-11-11 RX ADMIN — PROPOFOL 45 MCG/KG/MIN: 10 INJECTION, EMULSION INTRAVENOUS at 22:10

## 2024-11-11 RX ADMIN — PROPOFOL 30 MCG/KG/MIN: 10 INJECTION, EMULSION INTRAVENOUS at 07:58

## 2024-11-11 RX ADMIN — HYDRALAZINE HYDROCHLORIDE 5 MG: 20 INJECTION INTRAMUSCULAR; INTRAVENOUS at 11:27

## 2024-11-11 RX ADMIN — Medication 25 MCG/HR: at 14:19

## 2024-11-11 RX ADMIN — Medication 15 ML: at 20:49

## 2024-11-11 RX ADMIN — Medication 15 ML: at 01:44

## 2024-11-11 RX ADMIN — POTASSIUM PHOSPHATE, MONOBASIC POTASSIUM PHOSPHATE, DIBASIC 20 MMOL: 224; 236 INJECTION, SOLUTION, CONCENTRATE INTRAVENOUS at 08:26

## 2024-11-11 ASSESSMENT — PULMONARY FUNCTION TESTS
PIF_VALUE: 16
PIF_VALUE: 23
PIF_VALUE: 16
PIF_VALUE: 19
PIF_VALUE: 16
PIF_VALUE: 24
PIF_VALUE: 18
PIF_VALUE: 18
PIF_VALUE: 19
PIF_VALUE: 18
PIF_VALUE: 16
PIF_VALUE: 22
PIF_VALUE: 17
PIF_VALUE: 16
PIF_VALUE: 19
PIF_VALUE: 22
PIF_VALUE: 17
PIF_VALUE: 0
PIF_VALUE: 17
PIF_VALUE: 17
PIF_VALUE: 16
PIF_VALUE: 17
PIF_VALUE: 22
PIF_VALUE: 17
PIF_VALUE: 20
PIF_VALUE: 17
PIF_VALUE: 17
PIF_VALUE: 16
PIF_VALUE: 17

## 2024-11-11 ASSESSMENT — PAIN SCALES - GENERAL
PAINLEVEL_OUTOF10: 0

## 2024-11-11 NOTE — PLAN OF CARE

## 2024-11-11 NOTE — ED PROVIDER NOTES
[facility]  Department of Emergency Medicine   EM Physician Arianna Lainez 89 y.o. female PMHx of GERD, proximal atrial fibrillation, bradycardia, syncope, hypertension, electrolyte disturbances altered mental status presents to the ED c/o low heart rate, altered.  Per from EMS patient's heart rate was in the 30s she is being placed pain medicine.  She was also being bagged by EMS, patient not protecting her airway.  On arrival patient's GCS was 3.  No family or caregiver with the patient on arrival for further HPI/review of systems.    Update: Family arrived.  They report they were watching football with the patient the afternoon time, she was sitting down had syncopal episode and became unarousable.  She reports this happened before.  They report the past she usually would respond when they would talk to her or splash cold water on her but she did not this time.         Review of Systems   Unable to perform ROS: Patient unresponsive        Physical Exam  Constitutional:       General: She is in acute distress.   HENT:      Head: Normocephalic and atraumatic.      Nose: Nose normal.      Mouth/Throat:      Mouth: Mucous membranes are dry.   Cardiovascular:      Rate and Rhythm: Bradycardia present. Rhythm irregular.   Abdominal:      General: There is no distension.      Palpations: Abdomen is soft.      Tenderness: There is no abdominal tenderness.   Musculoskeletal:         General: Normal range of motion.      Cervical back: Normal range of motion.   Skin:     General: Skin is warm and dry.      Capillary Refill: Capillary refill takes less than 2 seconds.   Neurological:      GCS: GCS eye subscore is 1. GCS verbal subscore is 1. GCS motor subscore is 1.          Procedures     Medical Decision Making  89-year-old female presents the ER for bradycardia altered mental status on protecting her airway.  DDx: ACS/MI, sepsis, hypotension, bradycardia, syncope  collapse, neurogenic syncope, other.  On arrival patient was protecting her airway.  GCS 4.  Due to this she was emergently intubated for airway protection.  She also had hypotension and bradycardia.  She was given 1 dose of push dose epinephrine.  Blood pressure and heart rate improved after this.  She has been put on Levophed.  Central line placed as well.  Once on Levophed her blood pressure improved to normotensive range.  She was also put on fentanyl and Versed for sedation.  Her blood pressure continues to improve.  Levophed titrated off.  Versed/fentanyl also titrated off and switched to propofol due to the patient having continued agitation and elevated blood pressure.  Patient came to fight the vent and appeared more neurologically intact.  But due to her episode of hypotension and bradycardia and respiratory distress and low GCS she remained intubated for airway protection.  Patient CT head found no acute intracranial onset.  There is signs of deep white matter small vessel disease.  She also has a chronic appearing ischemic disease process.  Patient more laboratory valuation unremarkable reassuring.  No fevers.  She is not requiring pressor support at time mission to the medical ICU.  Patient stable on propranolol and intubation at time mission to ICU.     Amount and/or Complexity of Data Reviewed  Labs: ordered. Decision-making details documented in ED Course.  Radiology: ordered.  ECG/medicine tests: ordered.    Risk  Prescription drug management.  Decision regarding hospitalization.         ED Course as of 11/10/24 2343   [place] Nov 10, 2024   2012 Troponin, High Sensitivity(!): 29 [JR]   2019 PROCEDURE  11/10/24       Time: 1900    INTUBATION  Risks, benefits and alternatives if able (for applicable procedures below) described.   Performed By: Gabo Ortega DO and EM Attending Physician.    Indication:  comatose state and airway protection.   Informed consent: Consent unable to be obtained due to  specific details for the plan of care and counseling regarding the diagnosis and prognosis.  Their questions are answered at this time and they are agreeable with the plan of admission.    --------------------------------- ADDITIONAL PHYSICIAN NOTES ---------------------------------  Consultations:  Time: 2215. Spoke with Dr. Flanagan of [facility] and German of [facility].  Discussed case.  They will admit the patient.  This patient's ED course included: a personal history and physicial examination, re-evaluation prior to disposition, multiple bedside re-evaluations, IV medications, cardiac monitoring, continuous pulse oximetry, and complex medical decision making and emergency management    This patient has remained hemodynamically stable during their ED course.    Diagnosis:  1. Altered mental status, unspecified altered mental status type    2. Syncope and collapse    3. Bradycardia    4. Respiratory distress    5. Idiopathic hypotension        Disposition:  Patient's disposition: Admit to CCU/ICU  Patient's condition is serious.

## 2024-11-11 NOTE — ED NOTES
Patient waking up and reaching for ETT tube, moving all four limbs while provider is preparing for central line insertion. Sedation medications adjusted appropriately to achieve RASS goal.

## 2024-11-11 NOTE — ED NOTES
Dr. Ortega at bedside to insert CVC.  Patient appropriately sedated at this time to tolerate ETT.

## 2024-11-11 NOTE — PLAN OF CARE
Problem: Chronic Conditions and Co-morbidities  Goal: Patient's chronic conditions and co-morbidity symptoms are monitored and maintained or improved  11/11/2024 1215 by Angelika Humphrey RN  Outcome: Progressing  11/11/2024 0231 by Nanci Marquis RN  Outcome: Progressing     Problem: Discharge Planning  Goal: Discharge to home or other facility with appropriate resources  11/11/2024 1215 by Angelika Humphrey RN  Outcome: Progressing  11/11/2024 0231 by Nanci Marquis RN  Outcome: Progressing     Problem: Safety - Adult  Goal: Free from fall injury  11/11/2024 1215 by Angelika Humphrey RN  Outcome: Progressing  11/11/2024 0231 by Nanci Marquis RN  Outcome: Progressing     Problem: ABCDS Injury Assessment  Goal: Absence of physical injury  11/11/2024 1215 by Angelika Humphrey RN  Outcome: Progressing  11/11/2024 0231 by Nanci Marquis RN  Outcome: Progressing     Problem: Skin/Tissue Integrity  Goal: Absence of new skin breakdown  Description: 1.  Monitor for areas of redness and/or skin breakdown  2.  Assess vascular access sites hourly  3.  Every 4-6 hours minimum:  Change oxygen saturation probe site  4.  Every 4-6 hours:  If on nasal continuous positive airway pressure, respiratory therapy assess nares and determine need for appliance change or resting period.  11/11/2024 1215 by Angelika Humphrey RN  Outcome: Progressing  11/11/2024 0231 by Nanci Marquis RN  Outcome: Progressing     Problem: Pain  Goal: Verbalizes/displays adequate comfort level or baseline comfort level  11/11/2024 1215 by Angelika Humphrey RN  Outcome: Progressing  11/11/2024 0231 by Nanci Marquis RN  Outcome: Progressing     Problem: Safety - Medical Restraint  Goal: Remains free of injury from restraints (Restraint for Interference with Medical Device)  Description: INTERVENTIONS:  1. Determine that other, less restrictive measures have been tried or would not be effective before applying the restraint  2. Evaluate the  patient's condition at the time of restraint application  3. Inform patient/family regarding the reason for restraint  4. Q2H: Monitor safety, psychosocial status, comfort, nutrition and hydration  Outcome: Progressing  Flowsheets  Taken 11/11/2024 1200 by Angelika Humphrey RN  Remains free of injury from restraints (restraint for interference with medical device): Every 2 hours: Monitor safety, psychosocial status, comfort, nutrition and hydration  Taken 11/11/2024 1000 by Angelika Humphrey RN  Remains free of injury from restraints (restraint for interference with medical device): Every 2 hours: Monitor safety, psychosocial status, comfort, nutrition and hydration  Taken 11/11/2024 0814 by Angelika Humphrey RN  Remains free of injury from restraints (restraint for interference with medical device): Every 2 hours: Monitor safety, psychosocial status, comfort, nutrition and hydration  Taken 11/11/2024 0800 by Angelika Humphrey RN  Remains free of injury from restraints (restraint for interference with medical device): Every 2 hours: Monitor safety, psychosocial status, comfort, nutrition and hydration  Taken 11/11/2024 0600 by Nanci Marquis RN  Remains free of injury from restraints (restraint for interference with medical device): Every 2 hours: Monitor safety, psychosocial status, comfort, nutrition and hydration  Taken 11/11/2024 0400 by Nanci Marquis RN  Remains free of injury from restraints (restraint for interference with medical device): Every 2 hours: Monitor safety, psychosocial status, comfort, nutrition and hydration  Taken 11/11/2024 0233 by Nanci Marquis RN  Remains free of injury from restraints (restraint for interference with medical device): Every 2 hours: Monitor safety, psychosocial status, comfort, nutrition and hydration

## 2024-11-11 NOTE — PROGRESS NOTES
[facility]  Department of Internal Medicine     [facility] Medical Student Progress Note    Patient:  Arianna Coleman 89 y.o. female  MRN: [id number]     Date of Service: 11/11/2024    Allergy: Nifedipine    Subjective     Patient intubated and off sedation at time of visit. She was agitated, wincing to pain, not following commands. ROS not collected since patient is intubated.     24h change: Patient presented to ED after syncopal episode afternoon of 11/10/24. Has hx of multiple syncopal episodes and is usually arousable soon after an episode, but she was not arousable after this episode. GCS 4 in ED. Intubated for airway protection. On propofol for sedation currently. Was initially on levophed as she presented markedly hypotensive (40/35) then became hypertensive overnight (201/110), so she is off pressors now. Resuming home hydralazine. BP stabilized.     Objective     VS: /65   Pulse 67   Temp 99.3 °F (37.4 °C) (Esophageal)   Resp 30   Ht 1.676 m (5' 6\")   Wt 65.3 kg (144 lb)   SpO2 100%   BMI 23.24 kg/m²           I & O - 24hr:   Intake/Output Summary (Last 24 hours) at 11/11/2024 0845  Last data filed at 11/11/2024 0522  Gross per 24 hour   Intake 117.62 ml   Output 475 ml   Net -357.38 ml       Physical Exam:  General Appearance: intubated, agitated, wincing to pain, not following commands  Lung: mechanical breath sounds  Heart: mildly irregularly irregular rhythm, rate normal, no appreciable rubs/murmurs/gallops  Abdomen: soft, non-distended, bowel sounds intact  Extremities:  no cyanosis, erythema, edema of lower extremities appreciated  Neurologic: Mental status: intubated, off sedation at time of visit, agitated, not following commands, wincing to painful stimulus but not opening eyes. Cough and gag reflex intact. Pupils equal and reactive to light. Upper and lower extremities grossly mobile.     Lines     site day    Art line   None    TLC R Fem 1; fem line to be removed   PICC  Patient received 10 mg hydralazine overnight for elevated blood pressure. BP currently 137/83.   - Appreciate EP recommendations regarding HTN    - May need to treat blood pressure as permissive hypertension    - Avoid using hydralazine and using ACE-I or ARB for BP treatment     Pulmonary   Acute hypoxic respiratory failure 2/2 encephalopathy   Patient intubated for airway protection as GCS <8. Patient appears to be overventilated this AM. ABG this AM demonstrated the following: pH 7.524, pCO2 28.1, pO2 135.1, HCO3 22.6.   - Will decrease TV to 325 ml  - Continue ventilation, wean as tolerated. Goal to extubate tomorrow if possible.  - Daily SAT and SBT    Neurologic  Acute encephalopathy 2/2 syncopal episode  Patient experienced syncopal episode while sitting and watching TV. Was unarousable, which is unusual compared to her prior episodes of syncope. Patient seen this morning on sedation vacation; she was agitated, not following commands. CT head WO contrast did not show acute process. Now sedated on propofol and fentanyl due to agitation. Ammonia and TSH levels ordered to workup for potential metabolic cause, 20 and 1.33 respectively.   - Follow daily ABG and CXR  - Daily SAT and SBT  - Monitor mentation daily   - Consider repeating imaging should mentation not improve?    Dementia  Patient has hx of severe dementia with vision and hearing abnormalities. Patient agitated this morning; unclear if hearing abnormalities have a part to play in patient not responding to commands. CT head WO contrast showed no acute process but did demonstrate signs of deep white matter small vessel disease and chronic appearing small lacunar infarcts adjacent to the right caudate nucleus.   - Monitor mentation daily     Gastrointestinal  Hx GERD  -Continue PPI    Metabolic  Hypokalemia  Hypophosphatemia  Patient with potassium level of 3.3 (from 3.7) and phos level of 1.9 this AM. Patient received potassium chloride 40 mEq and

## 2024-11-11 NOTE — CONSULTS
[facility]  Consultation Report  PATIENT: Arianna Coleman  MEDICAL RECORD NUMBER: [id number]  DATE OF SERVICE:  11/11/2024  ATTENDING ELECTROPHYSIOLOGIST: Joelle Garcia MD   PRIMARY ELECTROPHYSIOLOGIST: Joelle Garcia MD   REFERRING PHYSICIAN:  Soraya Riddle MD  CHIEF COMPLAINT: Syncope     HPI: This is a 89 y.o. female who has a history of recurrent syncope since 2019, status post ILR 07/05/23, sinus node dysfunction, paroxysmal atrial fibrillation, nonischemic cardiomyopathy with a recovered LVEF, nonobstructive coronary artery disease, hypertension, hyperlipidemia and dementia. The patient was diagnosed with orthostatic hypotension as well as vasovagal syncope due to her ILR interrogations during episodes showed no significant pause or AV block. Her last hospitalization due to syncope was in May 2024 when she was diagnosed with orthostatic hypotension and bradycardia due to her Beta-blocker was some how resumed at home. Her bradycardia resolved after discontinuation of Beta-blocker.  She was seen by EP service and ILR interrogation showed no arrhythmias. She was advised to avoid Beta-blocker. She was last seen in EP clinic on 10/8/24 and was advised to avoid Beta-blocker and Amiodarone as well as continue life style modifications. Per ED note the family reported recent syncopal episodes during bowel movement and hot water bath. Yesterday while she was sitting down on the couch watching football with home aide, she became unresponsive.  EMS was called and in ED she was found to have  altered mental status and had to be intubated to protect airway. She was noted to be marked hypotensive at 40/35 in ED with HR 35. She was given IV Epinephrine and was started on IV Levophed. In ICU her BP improved and she was weaned off Levophed. She remains intubated and sedated with Propofol. She currently in sinus bradycardia at 72 bpm. ILR  increased trabeculation and a minimal decrease in global  systolic function.  Estimated left ventricular ejection fraction 55%.  IV.  Ascending aortogram:  The ascending and descending thoracic aorta  are tortuous with mild ectasia noted of the proximal ascending aorta.   No discrete evidence of dissection.  The aortic valve appears trileaflet  in morphology.     CONCLUSIONS:  1.  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  2.  Below normal left ventricular systolic function.  3.  Ectasia of thoracic aorta.     CONTRAST UTILIZED:  113 mL.     FLUOROSCOPY TIME:  5.2 minutes.     INITIATION OF CONSCIOUS SEDATION:  16:30.     COMPLETION OF CONSCIOUS SEDATION:  16:45.     ESTIMATED BLOOD LOSS:  Less than 5 mL.              DEXTER NESS, DO     Stress Test 8/30/21:   FINDINGS: The overall quality of the study was good.       Left ventricular cavity size was noted to be normal.  mL       Rotational analog analysis demonstrated moderate attenuation secondary   to increased GI uptake as well as left lateral soft tissue artifact.       The gated SPECT stress imaging in the short, vertical long, and   horizontal long axes demonstrate a small-moderate area inferolateral   basal septal perfusion deficit following pharmacologic stress with   improvement at rest which appears physiologically artifactual in its   distribution. Otherwise no areas of significant stress-induced   ischemia noted.       Gated SPECT left ventricular ejection fraction was calculated to be   38%, with moderate global hypokinesis           Impression   1. Small-moderate basal inferoseptal stress perfusion deficit with   improvement at rest but with significant artifact noted. Suspect   abnormality secondary to attenuation rather than physiologic ischemia.   2. Mild-moderate global decrease in myocardial segmental wall motion   and calculated gated SPECT left ventricular ejection fraction of 38%.   3. Suggest clinical

## 2024-11-11 NOTE — PROGRESS NOTES
4 Eyes Skin Assessment     NAME:  Arianna Coleman  YOB: 1931  MEDICAL RECORD NUMBER:  [id number]    The patient is being assessed for  Transfer to New Unit    I agree that at least one RN has performed a thorough Head to Toe Skin Assessment on the patient. ALL assessment sites listed below have been assessed.      Areas assessed by both nurses:    Head, Face, Ears, Shoulders, Back, Chest, Arms, Elbows, Hands, Sacrum. Buttock, Coccyx, Ischium, and Legs. Feet and Heels        Does the Patient have a Wound? No noted wound(s)       Bautista Prevention initiated by RN: No  Wound Care Orders initiated by RN: No    Pressure Injury (Stage 3,4, Unstageable, DTI, NWPT, and Complex wounds) if present, place Wound referral order by RN under : No    New Ostomies, if present place, Ostomy referral order under : No     Nurse 1 eSignature: Electronically signed by Nanci Marquis RN on 11/11/24 at 1:17 AM EST    **SHARE this note so that the co-signing nurse can place an eSignature**    Nurse 2 eSignature: Electronically signed by Caroline Abreu RN on 11/11/24 at 5:26 AM EST

## 2024-11-11 NOTE — ED NOTES
Handoff report called to Rory DAMON in IMCU. Pt is appropriately sedated to tolerate ETT tube. Family at bedside.   Respiratory therapist called to request transport assistance to ICU.

## 2024-11-11 NOTE — PROGRESS NOTES
Bladder scanned patient for repeat bladder scan. Patient had 155ml in bladder did not straight cath. Will repeat in 6 hours.

## 2024-11-11 NOTE — CONSULTS
[facility]  Internal Medicine Residency Program  Consult  MICU    Patient:  Arianna Coleman 89 y.o. female MRN: [id number]     Date of Service: 11/11/2024    Hospital Day: 2      Chief complaint: Syncopal episode   History of Present Illness   The patient is a 89 y.o. female with a past medical history of orthostasis, GERD, paroxysmal atrial fibrillation on  Eliquis, chronic combined CHF CAD, hx of DVT, hypertension, and anxiety attacks who was brought to the ER after a syncopal episode. She lives with home aide and she was watching TV suddenly she passed out and aide called 911. While she in ER found to have altered mental status, GCS was 4, and had to be intubated to protect airway. She has two daughters who do not like with her. According to family such episodes have occurred in the past but she would usually respond when they would talk to her or splash cold water on her but this time she did not.     Multiple prior episodes of syncope over the past few years with most recent admission for vasovagal syncope with bradycardia due to straining during bowel movement on 10/26/2024. Prior to that recent ED visit on 10/3 due to episode of syncope during hot water bath.       ED Course: On arrival she was hypotensive BP was 40/35, HR was 37. She was given 1 push dose of epinephrine. She was intubated for airway protection, GCS was 4. Central line was placed and she was started on Levophed. She was also put on fentanyl and Versed for sedation. Levophed was titrated off. Versed/fentanyl also titrated off and switched to propofol due to the patient having continued agitation and elevated blood pressure. CT head found no acute intracranial abnormality. CXR was normal and showed NG tube in good position. Blood work was pertinent for troponin 24, glucose 112, albumin 3.4.   ED Meds: Patient was given IV Propofol, Levophed starting at 2 mcg/min, Versed 6 mg IV followed by infusion, Fentanyl 50 mcg IV  seen and unable to do orthostatics as she can not stand, with seizure activity reported.   - Syncopal episode 5/22/24 showed no arrhythmias and appeared to be vasovagal syncope after a bowel movement.    Plan:   - Consider EP consult for syncopal episode- known to their service - Follow recs   - previously advised Reverse Trendelenburg position at all times in the hospital and at home to avoid supine hypertension, encourage ambulation and sitting as much as possible to avoid additional deconditioning. And DANIEL hose while awake       2.  Paroxysmal Atrial Fibrillation  - [id number] of 5  - Prior use of Amiodarone, stopped 07/04/2023 due to SND.   - No AV node blocker agents due to history of bradycardia.  - On Eliquis 2.5 mg BID for stroke risk reduction    Plan:     - Continue on eliquis 2.5 mg BID       3.   Hx Bradycardia 2/2 sinus node dysfunction    - HR on admission was in low 30's     - Heart rate histogram on loop recorder interrogation confirms ambient heart rates of between 50 to 60 bpm.   - Currently off Amiodarone and AV node blocker agents    Plan:  - Avoid AV kellen blocking agents   - Monitor heart rate       4.  History of Nonischemic cardiomyopathy  Recovered LV EF  - LV EF 38% on TTE 8/31/21 with stage 1 diastolic dysfunction   - LV EF 60-65% on TTE 7/15/22.  - LV EF 53% on TTE 4/24/23.  - Off Beta-blocker due to bradycardia.  - Compensated and euvolemic.  - NYHA class III, ACC/AHA stage C    Plan:  - Continue Losartan 50 mg BID       5.  Non obstructive Coronary artery disease   - s/p heart CATH on 11/12/19:   Noncritical coronary atherosclerosis with coronary artery ectasia.   Below normal left ventricular systolic function.  Ectasia of thoracic aorta  - stress test 8/30/2021   Small-moderate basal inferoseptal stress perfusion deficit with   improvement at rest but with significant artifact noted. Suspect   abnormality secondary to attenuation rather than physiologic ischemia.   2. Mild-moderate

## 2024-11-11 NOTE — CARE COORDINATION
Transition of Care: LOS 1 Day. Pt admitted to MICU with syncope and collapse. AMW with severe dementia. Intubated, FiO2 35%. Propofol gtt continues. 2 pt restraints. NG. Pacer interrogated. Pt from home with her son and home aide. She has a cane, ww, wc. Family assists with adl's. PCP . [facility]. Pt has hx with [facility] and [facility].DC needs unclear at this time. Will request therapy evals when appropriate. Left message for daughter Migdalia to discuss transition of care. CM will follow(TF)      ALICJA Damian,RN  Case Management  [phone]            Case Management Assessment  Initial Evaluation    Date/Time of Evaluation: 11/11/2024 2:49 PM  Assessment Completed by: CECILE DELUNA RN    If patient is discharged prior to next notation, then this note serves as note for discharge by case management.    Patient Name: Arianna Coleman                   YOB: 1931  Diagnosis: Syncope and collapse [R55]  Bradycardia [R00.1]  Respiratory distress [R06.03]  Idiopathic hypotension [I95.0]  Altered mental status, unspecified altered mental status type [R41.82]  AMS (altered mental status) [R41.82]                   Date / Time: 11/10/2024  6:40 PM    Patient Admission Status: Inpatient   Readmission Risk (Low < 19, Mod (19-27), High > 27): Readmission Risk Score: 12.4    Current PCP: Soraya Riddle MD  PCP verified by CM? No    Chart Reviewed: Yes      History Provided by: Medical Record  Patient Orientation: Unable to Assess    Patient Cognition: Severely Impaired    Hospitalization in the last 30 days (Readmission):  No    If yes, Readmission Assessment in CM Navigator will be completed.    Advance Directives:      Code Status: Full Code   Patient's Primary Decision Maker is: Legal Next of Kin    Primary Decision Maker: [email or web address] - Child - [phone]    Secondary Decision Maker: JudithInés amezcua - Child - [phone]    Supplemental (Other) Decision Maker: Maksim Colmean

## 2024-11-11 NOTE — PROGRESS NOTES
When patient is released from bilateral soft wrist restraints patient pulls at ET tube and other life saving medical devices. Patient remains in bilateral soft wrist restraints. Patient is stable, will continue to monitor.

## 2024-11-12 ENCOUNTER — APPOINTMENT (OUTPATIENT)
Dept: GENERAL RADIOLOGY | Age: 89
End: 2024-11-12
Payer: COMMERCIAL

## 2024-11-12 LAB
AADO2: 65.3 MMHG
ANION GAP SERPL CALCULATED.3IONS-SCNC: 11 MMOL/L (ref 7–16)
B.E.: -3.3 MMOL/L (ref -3–3)
BASOPHILS # BLD: 0.02 K/UL (ref 0–0.2)
BASOPHILS NFR BLD: 0 % (ref 0–2)
BUN SERPL-MCNC: 14 MG/DL (ref 6–23)
CALCIUM SERPL-MCNC: 8.3 MG/DL (ref 8.6–10.2)
CHLORIDE SERPL-SCNC: 109 MMOL/L (ref 98–107)
CO2 SERPL-SCNC: 19 MMOL/L (ref 22–29)
COHB: 0.4 % (ref 0–1.5)
CREAT SERPL-MCNC: 1 MG/DL (ref 0.5–1)
CRITICAL: ABNORMAL
DATE ANALYZED: ABNORMAL
DATE OF COLLECTION: ABNORMAL
EOSINOPHIL # BLD: 0.05 K/UL (ref 0.05–0.5)
EOSINOPHILS RELATIVE PERCENT: 1 % (ref 0–6)
ERYTHROCYTE [DISTWIDTH] IN BLOOD BY AUTOMATED COUNT: 14.6 % (ref 11.5–15)
FIO2: 35 %
GFR, ESTIMATED: 56 ML/MIN/1.73M2
GLUCOSE BLD-MCNC: 103 MG/DL (ref 74–99)
GLUCOSE BLD-MCNC: 93 MG/DL (ref 74–99)
GLUCOSE SERPL-MCNC: 110 MG/DL (ref 74–99)
HCO3: 21.8 MMOL/L (ref 22–26)
HCT VFR BLD AUTO: 36.8 % (ref 34–48)
HGB BLD-MCNC: 11.9 G/DL (ref 11.5–15.5)
HHB: 1.2 % (ref 0–5)
IMM GRANULOCYTES # BLD AUTO: 0.04 K/UL (ref 0–0.58)
IMM GRANULOCYTES NFR BLD: 1 % (ref 0–5)
LAB: ABNORMAL
LACTATE BLDV-SCNC: 0.9 MMOL/L (ref 0.5–2.2)
LYMPHOCYTES NFR BLD: 0.83 K/UL (ref 1.5–4)
LYMPHOCYTES RELATIVE PERCENT: 10 % (ref 20–42)
Lab: 428
MAGNESIUM SERPL-MCNC: 2 MG/DL (ref 1.6–2.6)
MCH RBC QN AUTO: 30.2 PG (ref 26–35)
MCHC RBC AUTO-ENTMCNC: 32.3 G/DL (ref 32–34.5)
MCV RBC AUTO: 93.4 FL (ref 80–99.9)
METHB: 0.5 % (ref 0–1.5)
MODE: AC
MONOCYTES NFR BLD: 1.08 K/UL (ref 0.1–0.95)
MONOCYTES NFR BLD: 13 % (ref 2–12)
NEUTROPHILS NFR BLD: 76 % (ref 43–80)
NEUTS SEG NFR BLD: 6.54 K/UL (ref 1.8–7.3)
O2 SATURATION: 98.8 % (ref 92–98.5)
O2HB: 97.9 % (ref 94–97)
OPERATOR ID: 2593
PATIENT TEMP: 37 C
PCO2: 39.3 MMHG (ref 35–45)
PEEP/CPAP: 5 CMH2O
PFO2: 3.96 MMHG/%
PH BLOOD GAS: 7.36 (ref 7.35–7.45)
PHOSPHATE SERPL-MCNC: 4 MG/DL (ref 2.5–4.5)
PLATELET # BLD AUTO: 148 K/UL (ref 130–450)
PMV BLD AUTO: 9.8 FL (ref 7–12)
PO2: 138.6 MMHG (ref 75–100)
POTASSIUM SERPL-SCNC: 4.3 MMOL/L (ref 3.5–5)
RBC # BLD AUTO: 3.94 M/UL (ref 3.5–5.5)
RI(T): 0.47
RR MECHANICAL: 12 B/MIN
SODIUM SERPL-SCNC: 139 MMOL/L (ref 132–146)
SOURCE, BLOOD GAS: ABNORMAL
THB: 12.9 G/DL (ref 11.5–16.5)
TIME ANALYZED: 428
VT MECHANICAL: 325 ML
WBC OTHER # BLD: 8.6 K/UL (ref 4.5–11.5)

## 2024-11-12 PROCEDURE — 94003 VENT MGMT INPAT SUBQ DAY: CPT

## 2024-11-12 PROCEDURE — 84100 ASSAY OF PHOSPHORUS: CPT

## 2024-11-12 PROCEDURE — 99291 CRITICAL CARE FIRST HOUR: CPT | Performed by: INTERNAL MEDICINE

## 2024-11-12 PROCEDURE — 82805 BLOOD GASES W/O2 SATURATION: CPT

## 2024-11-12 PROCEDURE — 51701 INSERT BLADDER CATHETER: CPT

## 2024-11-12 PROCEDURE — 83735 ASSAY OF MAGNESIUM: CPT

## 2024-11-12 PROCEDURE — 2000000000 HC ICU R&B

## 2024-11-12 PROCEDURE — 85025 COMPLETE CBC W/AUTO DIFF WBC: CPT

## 2024-11-12 PROCEDURE — 80048 BASIC METABOLIC PNL TOTAL CA: CPT

## 2024-11-12 PROCEDURE — 2580000003 HC RX 258

## 2024-11-12 PROCEDURE — 51798 US URINE CAPACITY MEASURE: CPT

## 2024-11-12 PROCEDURE — 6370000000 HC RX 637 (ALT 250 FOR IP)

## 2024-11-12 PROCEDURE — 82962 GLUCOSE BLOOD TEST: CPT

## 2024-11-12 PROCEDURE — 99232 SBSQ HOSP IP/OBS MODERATE 35: CPT | Performed by: STUDENT IN AN ORGANIZED HEALTH CARE EDUCATION/TRAINING PROGRAM

## 2024-11-12 PROCEDURE — 83605 ASSAY OF LACTIC ACID: CPT

## 2024-11-12 PROCEDURE — 71045 X-RAY EXAM CHEST 1 VIEW: CPT

## 2024-11-12 PROCEDURE — 51702 INSERT TEMP BLADDER CATH: CPT

## 2024-11-12 PROCEDURE — 6360000002 HC RX W HCPCS

## 2024-11-12 RX ADMIN — APIXABAN 2.5 MG: 5 TABLET, FILM COATED ORAL at 22:24

## 2024-11-12 RX ADMIN — SODIUM CHLORIDE, PRESERVATIVE FREE 40 MG: 5 INJECTION INTRAVENOUS at 08:10

## 2024-11-12 RX ADMIN — ACETAMINOPHEN 650 MG: 325 TABLET ORAL at 14:38

## 2024-11-12 RX ADMIN — PROPOFOL 45 MCG/KG/MIN: 10 INJECTION, EMULSION INTRAVENOUS at 04:15

## 2024-11-12 RX ADMIN — PROPOFOL 45 MCG/KG/MIN: 10 INJECTION, EMULSION INTRAVENOUS at 14:38

## 2024-11-12 RX ADMIN — SODIUM CHLORIDE, PRESERVATIVE FREE 10 ML: 5 INJECTION INTRAVENOUS at 22:24

## 2024-11-12 RX ADMIN — Medication 15 ML: at 08:10

## 2024-11-12 RX ADMIN — Medication 15 ML: at 22:24

## 2024-11-12 RX ADMIN — APIXABAN 2.5 MG: 5 TABLET, FILM COATED ORAL at 08:10

## 2024-11-12 RX ADMIN — SODIUM CHLORIDE, PRESERVATIVE FREE 10 ML: 5 INJECTION INTRAVENOUS at 08:10

## 2024-11-12 ASSESSMENT — PULMONARY FUNCTION TESTS
PIF_VALUE: 19
PIF_VALUE: 22
PIF_VALUE: 15
PIF_VALUE: 19
PIF_VALUE: 17
PIF_VALUE: 15
PIF_VALUE: 17
PIF_VALUE: 16
PIF_VALUE: 15
PIF_VALUE: 16
PIF_VALUE: 17
PIF_VALUE: 17
PIF_VALUE: 18
PIF_VALUE: 18
PIF_VALUE: 22
PIF_VALUE: 19
PIF_VALUE: 21
PIF_VALUE: 20
PIF_VALUE: 19
PIF_VALUE: 19
PIF_VALUE: 18
PIF_VALUE: 19
PIF_VALUE: 15
PIF_VALUE: 20
PIF_VALUE: 18
PIF_VALUE: 16
PIF_VALUE: 16
PIF_VALUE: 19

## 2024-11-12 ASSESSMENT — PAIN SCALES - GENERAL
PAINLEVEL_OUTOF10: 0

## 2024-11-12 NOTE — PROGRESS NOTES
[facility]   IN-PATIENT SERVICE      Progress Note    11/12/2024    11:04 AM    Name:   Arianna Coleman  MRN:     [id number]     Acct:      [id number]   Room:   29/[facility]  IP Day:  2  Admit Date:  11/10/2024  6:40 PM    PCP:   Soraya Riddle MD  Code Status:  Full Code    Subjective:     C/C: No chief complaint on file.  syncope  Interval History Status: not changed.     Pt remains intubated and sedated     EP consulted for sinus azael and hx of PAF    Brief History:     This is a 89-year old -American female with past medical history of severe dementia with vision and hearing problem brought in here due to altered mental status.  Patient lives with home aide and she was watching TV suddenly she passed out and aide called 911.  While she in ER found to have altered mental status and she been intubated to protect airway.  During my encounter her 2 daughter was at the bedside but they do not much about her as they do not live with her.  Her initial blood pressure was low and started on vasopressor and again blood pressure went up then vasopressor taken off.  Blood chemistry insignificant, troponin 29 liver function test normal except albumin 3.4 and blood count and platelet count normal.  CT scan did not show any intracranial abnormality except deep sulci.  It also shows old lacunar infarct and acute sinusitis.       Review of Systems:     As per Hpi     Medications:     Allergies:    Allergies   Allergen Reactions    Nifedipine        Current Meds:   Scheduled Meds:    sodium chloride flush  5-40 mL IntraVENous 2 times per day    chlorhexidine  15 mL Mouth/Throat BID    apixaban  2.5 mg Oral BID    pantoprazole (PROTONIX) 40 mg in sodium chloride (PF) 0.9 % 10 mL injection  40 mg IntraVENous Daily    [Held by provider] losartan  25 mg Oral Daily     Continuous Infusions:    sodium chloride      fentaNYL Stopped (11/12/24 0801)    propofol Stopped (11/12/24 0801)     PRN Meds:  metaphysical exam, chart, assessment and plan.    Miriam Morley MD  11/12/2024  11:04 AM

## 2024-11-12 NOTE — PROGRESS NOTES
11/12/24 1028   Weaning Parameters   Spontaneous Breathing Trial Complete Yes   Respiratory Rate Observed 13   Ve 3.5      NIF -40   VC 0.69   PaO2/FiO2 Ratio 396     At time of 30 minutes, and parameters,  patient was in apnea ventilation.  Pt  woke and was trying to pull out ETT.  Pt does have an audible leak when cuff deflated.

## 2024-11-12 NOTE — PROGRESS NOTES
11/12/24 0939   Patient Observation   Pulse 77   Respirations (!) 38   SpO2 99 %   Vent Information   Vent Mode CPAP/PS   Ventilator Settings   FiO2  35 %   PEEP/CPAP (cmH2O) 8   Pressure Support (cm H2O) 10 cm H2O   Vent Patient Data (Readings)   Vt (Measured) 612 mL   Peak Inspiratory Pressure (cmH2O) 18 cmH2O   Rate Measured 5 br/min   Minute Volume (L/min) 4.04 Liters   Mean Airway Pressure (cmH2O) 9 cmH20   Plateau Pressure (cm H2O) 7 cm H2O   Driving Pressure -1   I:E Ratio 1:6.70   Backup Apnea On   Backup Rate 10 Breaths Per Minute   Backup Vt 325   Vent Alarm Settings   High Pressure (cmH2O) 40 cmH2O   Low Minute Volume (lpm) 3.5 L/min   Low Exhaled Vt (ml) 300 mL   RR High (bpm) 30 br/min   Apnea (secs) 30 secs

## 2024-11-12 NOTE — PROGRESS NOTES
Bladder scanned patient for third time within 24 hours and had 420 cc in bladder. Straight cathed for third time and got 400cc out. Will repeat bladder scan at 1600.

## 2024-11-12 NOTE — PROGRESS NOTES
[facility]  Department of Internal Medicine     MICU Medical Student Progress Note    Patient:  Arianna Coleman 89 y.o. female  MRN: [id number]     Date of Service: 11/12/2024    Allergy: Nifedipine    Subjective     Patient intubated and recently off of sedation at time of visit. Not following commands. Wincing to pain. Unable to collect ROS since patient is intubated. On SBT later in the morning, patient was in apnea ventilation, trying to pull out ETT.     24h change: No acute events overnight     Objective     VS: BP (!) 98/56   Pulse 62   Temp 97.2 °F (36.2 °C) (Esophageal)   Resp 30   Ht 1.676 m (5' 6\")   Wt 65.3 kg (143 lb 14.4 oz)   SpO2 100%   BMI 23.23 kg/m²           I & O - 24hr:   Intake/Output Summary (Last 24 hours) at 11/12/2024 0644  Last data filed at 11/12/2024 0200  Gross per 24 hour   Intake 5 ml   Output 500 ml   Net -495 ml       Physical Exam:  General Appearance: intubated, wincing to painful stimulus, not as agitated on my exam in the AM as patient was just taken off of sedation.  Lung: mechanical sounds bilaterally  Heart: regular rate and rhythm with occasional PVC, no rubs/murmurs/gallops  Abdomen: soft, bowel sounds present  Extremities:  no cyanosis or edema of lower extremities   Neurologic: Mental status: intubated, wincing to painful stimulus, not following commands. Resisting opening eyelids for evaluation of pupils.     Lines     site day    Art line   None    TLC None Fem line out   PICC None    Hemoaccess None      Mechanical Ventilation:  Mode: A/C   TV:325 ml RR: 12  PEEP 5 cmH2O FiO2 35%  Pplat: 14 Cs: 40    ABG:     Lab Results   Component Value Date/Time    PH 7.362 11/12/2024 04:28 AM    PCO2 39.3 11/12/2024 04:28 AM    PO2 138.6 11/12/2024 04:28 AM    HCO3 21.8 11/12/2024 04:28 AM    BE -3.3 11/12/2024 04:28 AM    THB 12.9 11/12/2024 04:28 AM    O2SAT 98.8 11/12/2024 04:28 AM        Medications     Infusions: (Fluid, Sedation, Vasopressors)  IVF:    None currently  Vasopressors  None currently  Sedation  None at time of visit    Nutrition:   NG tube feeds, standard diet with fiber, goal rate 35 ml/hr    ATB:   Antibiotics  Days   None currently               Skin issues: right ankle wound  Patient currently has   Straight Cath every 4-6 hours  Contact Isolation VRE  Restraints Non-Violent or Non-Self-Destructive  DVT prophylaxis Eliquis  GI prophylaxis PPI  Code status Full Code   Labs   CBC with Differential:    Lab Results   Component Value Date/Time    WBC 8.6 11/12/2024 04:07 AM    RBC 3.94 11/12/2024 04:07 AM    HGB 11.9 11/12/2024 04:07 AM    HCT 36.8 11/12/2024 04:07 AM     11/12/2024 04:07 AM    MCV 93.4 11/12/2024 04:07 AM    MCH 30.2 11/12/2024 04:07 AM    MCHC 32.3 11/12/2024 04:07 AM    RDW 14.6 11/12/2024 04:07 AM    LYMPHOPCT 10 11/12/2024 04:07 AM    MONOPCT 13 11/12/2024 04:07 AM    EOSPCT 1 11/12/2024 04:07 AM    BASOPCT 0 11/12/2024 04:07 AM    MONOSABS 1.08 11/12/2024 04:07 AM    LYMPHSABS 0.83 11/12/2024 04:07 AM    EOSABS 0.05 11/12/2024 04:07 AM    BASOSABS 0.02 11/12/2024 04:07 AM     BMP:    Lab Results   Component Value Date/Time     11/12/2024 04:07 AM    K 4.3 11/12/2024 04:07 AM    K 4.5 07/06/2023 06:31 PM     11/12/2024 04:07 AM    CO2 19 11/12/2024 04:07 AM    BUN 14 11/12/2024 04:07 AM    CREATININE 1.0 11/12/2024 04:07 AM    CALCIUM 8.3 11/12/2024 04:07 AM    GFRAA >60 09/13/2022 10:40 AM    LABGLOM 56 11/12/2024 04:07 AM    LABGLOM >60 09/25/2023 10:26 PM    GLUCOSE 110 11/12/2024 04:07 AM    GLUCOSE 102 11/12/2010 01:50 PM     Calcium:    Lab Results   Component Value Date/Time    CALCIUM 8.3 11/12/2024 04:07 AM     Ionized Calcium:  No components found for: \"IONCA\"  Magnesium:    Lab Results   Component Value Date/Time    MG 2.0 11/12/2024 04:07 AM     Phosphorus:    Lab Results   Component Value Date/Time    PHOS 4.0 11/12/2024 04:07 AM     ABG:    Lab Results   Component Value Date/Time    PH 7.362  HTN, avoid vasodilators such as hydralazine and only if severe HTN consider use of ACE-I or ARB   - Continue off cozaar at this time              - Avoid AV node blocker agent and amiodarone              - Avoid QT prolonging medications   - EP to sign off, ensure ongoing ILR transmissions monthly     Sinus bradycardia 2/2 sinus node dysfunction   Heart rate histogram on loop recorder confirms ambient heart rates between 50-60 bpm. Heart rate has remained stable since admission to ICU. Patient is off of AV node blocker agents and amiodarone as advised by EP on visit 10/8/24. HR this AM 64.  - Continue to monitor heart rate  - Avoid AV blocking agents     Paroxysmal A Fib  Patient had used amiodarone previously, appears stopped 7/7/23 on chart review. Patient not on AV blocking medications given hx of bradycardia. [id number] score of 5.   - Continue eliquis 2.5 BID     Hx of nonischemic cardiomyopathy and chronic HFrecEF  Most recent echo showed LVEF of 53% on 4/24/23. TTE on 8/31/21 showed LVEF of 38% with stage 1 diastolic dysfunction; EF recovered to 60-65% on TTE 7/15/22. Patient euvolemic on examination today, physical exam findings not concerning for fluid overload. Patient on losartan 50 mg BID at home.   - Losartan held currently     Nonobstructive CAD  [facility] on 11/12/19 showed no significant CAD     HTN  Home regimen consists of losartan 50 mg BID and hydralazine 10 mg TID. Patient was hypotensive overnight but pressures up to 135/83 by afternoon.  - Appreciate EP recommendations regarding HTN               - Permissive HTN   - Avoid hydralazine; use ACE-I or ARB if severe HTN     Pulmonary   Acute hypoxic respiratory failure 2/2 encephalopathy   Patient intubated for airway protection as GCS <8. Decrease of TV to 325 ml improved patient's overventilation yesterday. ABG this AM demonstrated the following: pH 7.362, pCO2 39.3, pO2 138.6, HCO3 21.8. Patient apneic on SBT this AM. Suspect this was due to effects

## 2024-11-12 NOTE — PROGRESS NOTES
[facility]- [facility] Elect[facility]  Inpatient Progress  Report  PATIENT: Arianna Coleman  MEDICAL RECORD NUMBER: [id number]  DATE OF SERVICE:  11/12/2024  ATTENDING ELECTROPHYSIOLOGIST: Joelle Garcia MD   PRIMARY ELECTROPHYSIOLOGIST: Joelle Garcia MD   REFERRING PHYSICIAN:  Soraya Riddle MD  CHIEF COMPLAINT: Syncope     HPI: This is a 89 y.o. female who has a history of recurrent syncope since 2019, status post ILR 07/05/23, sinus node dysfunction, paroxysmal atrial fibrillation, nonischemic cardiomyopathy with a recovered LVEF, nonobstructive coronary artery disease, hypertension, hyperlipidemia and dementia. The patient was diagnosed with orthostatic hypotension as well as vasovagal syncope due to her ILR interrogations during episodes showed no significant pause or AV block. Her last hospitalization due to syncope was in May 2024 when she was diagnosed with orthostatic hypotension and bradycardia due to her Beta-blocker was some how resumed at home. Her bradycardia resolved after discontinuation of Beta-blocker.  She was seen by EP service and ILR interrogation showed no arrhythmias. She was advised to avoid Beta-blocker. She was last seen in EP clinic on 10/8/24 and was advised to avoid Beta-blocker and Amiodarone as well as continue life style modifications. Per ED note the family reported recent syncopal episodes during bowel movement and hot water bath. Yesterday while she was sitting down on the couch watching football with home aide, she became unresponsive.  EMS was called and in ED she was found to have  altered mental status and had to be intubated to protect airway. She was noted to be marked hypotensive at 40/35 in ED with HR 35. She was given IV Epinephrine and was started on IV Levophed. In ICU her BP improved and she was weaned off Levophed. She remains intubated and sedated with Propofol. She currently in sinus bradycardia at 72

## 2024-11-12 NOTE — PROGRESS NOTES
[facility]  Internal Medicine Residency Program  MICU Progress Note      Patient:  Arianna Coleman 89 y.o. female MRN: [id number] : 1931   Admitted:  11/10/2024  6:40 PM    Date of Service:  2024    Room: [address] [place] [address]-    Chief complaint: had no chief complaint listed for this encounter.     Arianna Coleman is a 89 y.o. female with PMH of orthostasis, GERD, paroxysmal a-fib on Eliquis, chronic combined CHF, CAD, hx of DVT, hypertension, and anxiety, is currently being managed in the MICU for shock, likely cardiac, previously on Levophed, and acute hypoxic respiratory failure s/p intubation .    Subjective     Interval history:  No acute overnight events    Hospital Day: 3  ICU LOS: 1d 16h    Today's course:  The patient was seen intubated, sedated, arousable with pain, does not follow commands.    REVIEW OF SYSTEMS:  Review of Systems   Reason unable to perform ROS: patient is intubated, sedated.     Objective     Vitals  Range   BP (!) 80/44 BP  Min: 80/44  Max: 158/80   HR 69 Pulse  Av.6  Min: 64  Max: 93   RR 29 Resp  Av.7  Min: 20  Max: 43   Temp. 98.4 °F (36.9 °C) Temp  Min: 97.9 °F (36.6 °C)  Max: 100 °F (37.8 °C)   O2sat 100 % SpO2  Av.9 %  Min: 87 %  Max: 100 %   Weight 65.3 kg (143 lb 14.4 oz)        Intake/Output Summary (Last 24 hours) at 2024  Last data filed at 2024 1000  Gross per 24 hour   Intake 792.79 ml   Output 900 ml   Net -107.21 ml     Net IO Since Admission: -464.59 mL [24]    PHYSICAL EXAM:  Physical Exam  Vitals reviewed.   Constitutional:       Appearance: She is normal weight. She is ill-appearing.      Interventions: She is sedated and intubated.   HENT:      Head: Normocephalic and atraumatic.   Cardiovascular:      Rate and Rhythm: Normal rate and regular rhythm.      Heart sounds: Normal heart sounds. No murmur heard.  Pulmonary:      Effort: She is intubated.   Musculoskeletal:      Right lower leg:

## 2024-11-12 NOTE — PROGRESS NOTES
Bladder scanned patient for the fourth time within 24 hours and had 400cc. Inserted raines per policy. Residents were made aware.

## 2024-11-12 NOTE — PROGRESS NOTES
When patient is released from bilateral soft wrist restraints, patient pulls at ET and life saving medical devices. Patient remains in bilateral soft wrist restraints.

## 2024-11-12 NOTE — PLAN OF CARE
Problem: Chronic Conditions and Co-morbidities  Goal: Patient's chronic conditions and co-morbidity symptoms are monitored and maintained or improved  Outcome: Progressing     Problem: Discharge Planning  Goal: Discharge to home or other facility with appropriate resources  Outcome: Progressing     Problem: Safety - Adult  Goal: Free from fall injury  Outcome: Progressing     Problem: ABCDS Injury Assessment  Goal: Absence of physical injury  Outcome: Progressing  Flowsheets (Taken 11/11/2024 2227 by Naye Lizama RN)  Absence of Physical Injury: Implement safety measures based on patient assessment     Problem: Skin/Tissue Integrity  Goal: Absence of new skin breakdown  Description: 1.  Monitor for areas of redness and/or skin breakdown  2.  Assess vascular access sites hourly  3.  Every 4-6 hours minimum:  Change oxygen saturation probe site  4.  Every 4-6 hours:  If on nasal continuous positive airway pressure, respiratory therapy assess nares and determine need for appliance change or resting period.  Outcome: Progressing     Problem: Pain  Goal: Verbalizes/displays adequate comfort level or baseline comfort level  Outcome: Progressing     Problem: Safety - Medical Restraint  Goal: Remains free of injury from restraints (Restraint for Interference with Medical Device)  Description: INTERVENTIONS:  1. Determine that other, less restrictive measures have been tried or would not be effective before applying the restraint  2. Evaluate the patient's condition at the time of restraint application  3. Inform patient/family regarding the reason for restraint  4. Q2H: Monitor safety, psychosocial status, comfort, nutrition and hydration  Outcome: Progressing  Flowsheets  Taken 11/12/2024 0800 by Angelika Humphrey, RN  Remains free of injury from restraints (restraint for interference with medical device): Every 2 hours: Monitor safety, psychosocial status, comfort, nutrition and hydration  Taken 11/12/2024 0600 by

## 2024-11-12 NOTE — PROGRESS NOTES
Comprehensive Nutrition Assessment    Type and Reason for Visit:  Initial (new TF)    Nutrition Recommendations/Plan:   Continue NPO   Modify current TF to better meet estimated nutrient needs     Peptide based (Vital AF 1.2) @ 35 ml/hr + 1 protein modular daily   Provides 840 ml tv, 1008 kcals, 63 gm pro (1108 kcals, 89 gm pro w/ mods), 681 ml free water    Pt is at risk for refeeding syndrome. Monitor electrolytes/replace prn        Malnutrition Assessment:  Malnutrition Status:  At risk for malnutrition (11/12/24 1234)    Context:  Acute Illness     Findings of the 6 clinical characteristics of malnutrition:  Energy Intake:  Mild decrease in energy intake (since admit)  Weight Loss:  Unable to assess (d/t wt fluctuations w/ CHF)     Body Fat Loss:  Unable to assess     Muscle Mass Loss:  Unable to assess    Fluid Accumulation:  No fluid accumulation     Strength:  Not Performed    Nutrition Assessment:    Pt admit w/ AMS & syncopal episode s/p intubated for airway protection. Noted hypotension on admit requiring pressor (now off pressor). PMHx dementia, CHF, CAD, HTN, ischemic colitis. Will provide updated TF recs and will continue to monitor.    Nutrition Related Findings:    pt intubated/sedated, MAP WNL, OGT clamped, abd WDL, +1 pitting edema, fluids WDL   Wound Type: None       Current Nutrition Intake & Therapies:    Average Meal Intake: NPO     Diet NPO  ADULT TUBE FEEDING; Nasogastric; Standard with Fiber; Continuous; 10; Yes; 10; Q 4 hours; 45; 30; Q 4 hours  Current Tube Feeding (TF) Orders:  Feeding Route: Orogastric  Formula: Standard with Fiber  Schedule: Continuous  Feeding Regimen: 45 ml/hr, OGT clamped  Additives/Modulars: None  Water Flushes: 30 ml q 4 hrs = 180 ml water  Current TF Provides: 1080 ml tv, 1620 kcals, 69 gm pro, 820 ml free water, 1000ml total fluids  Additional Calorie Sources:  off propofol    Anthropometric Measures:  Height: 167.6 cm (5' 6\")  Ideal Body Weight (IBW): 130 lbs  (59 kg)    Admission Body Weight: 59.4 kg (131 lb) (11/10 actual)  Current Body Weight: 59.4 kg (131 lb) (11/10 admit wt as CBW elevated), 100.8 % IBW.    Current BMI (kg/m2): 21.2  Usual Body Weight: 59.4 kg (131 lb) (7/2023 actual per EMR)     % Weight Change (Calculated): 0                    BMI Categories: Underweight (BMI less than 22) age over 65    Estimated Daily Nutrient Needs:  Energy Requirements Based On: Formula  Weight Used for Energy Requirements: Admission  Energy (kcal/day): PS3B 1119; 6031-0734  Weight Used for Protein Requirements: Admission  Protein (g/day): 80-90 (1.3-1.5 gm/kg admit wt)  Method Used for Fluid Requirements: Defer to provider  Fluid (ml/day): per critical care    Nutrition Diagnosis:   Inadequate oral intake related to impaired respiratory function as evidenced by NPO or clear liquid status due to medical condition, intubation    Nutrition Interventions:     Nutrition Education/Counseling: Education/Counseling not appropriate  Coordination of Nutrition Care: Continue to monitor while inpatient       Goals:  Goals: Initiate nutrition support, by next RD assessment  Type of Goal: New goal  Previous Goal Met: New Goal    Nutrition Monitoring and Evaluation:      Food/Nutrient Intake Outcomes: Enteral Nutrition Intake/Tolerance  Physical Signs/Symptoms Outcomes: Biochemical Data, GI Status, Fluid Status or Edema, Hemodynamic Status, Nutrition Focused Physical Findings, Skin, Weight    Discharge Planning:    Too soon to determine     Aleida Riojas, MS, RD, LD  Contact: [phone]

## 2024-11-13 ENCOUNTER — APPOINTMENT (OUTPATIENT)
Dept: GENERAL RADIOLOGY | Age: 89
End: 2024-11-13
Payer: COMMERCIAL

## 2024-11-13 LAB
AADO2: 63.3 MMHG
ANION GAP SERPL CALCULATED.3IONS-SCNC: 13 MMOL/L (ref 7–16)
B.E.: -2.4 MMOL/L (ref -3–3)
BASOPHILS # BLD: 0.03 K/UL (ref 0–0.2)
BASOPHILS NFR BLD: 0 % (ref 0–2)
BUN SERPL-MCNC: 15 MG/DL (ref 6–23)
CALCIUM SERPL-MCNC: 8.8 MG/DL (ref 8.6–10.2)
CHLORIDE SERPL-SCNC: 107 MMOL/L (ref 98–107)
CO2 SERPL-SCNC: 20 MMOL/L (ref 22–29)
COHB: 0.6 % (ref 0–1.5)
CREAT SERPL-MCNC: 1 MG/DL (ref 0.5–1)
CRITICAL: ABNORMAL
DATE ANALYZED: ABNORMAL
DATE OF COLLECTION: ABNORMAL
EOSINOPHIL # BLD: 0.06 K/UL (ref 0.05–0.5)
EOSINOPHILS RELATIVE PERCENT: 1 % (ref 0–6)
ERYTHROCYTE [DISTWIDTH] IN BLOOD BY AUTOMATED COUNT: 14.6 % (ref 11.5–15)
FIO2: 35 %
GFR, ESTIMATED: 53 ML/MIN/1.73M2
GLUCOSE SERPL-MCNC: 103 MG/DL (ref 74–99)
HCO3: 23.4 MMOL/L (ref 22–26)
HCT VFR BLD AUTO: 37.7 % (ref 34–48)
HGB BLD-MCNC: 12.1 G/DL (ref 11.5–15.5)
HHB: 1.2 % (ref 0–5)
IMM GRANULOCYTES # BLD AUTO: 0.05 K/UL (ref 0–0.58)
IMM GRANULOCYTES NFR BLD: 1 % (ref 0–5)
LAB: ABNORMAL
LYMPHOCYTES NFR BLD: 1.33 K/UL (ref 1.5–4)
LYMPHOCYTES RELATIVE PERCENT: 12 % (ref 20–42)
Lab: 508
MAGNESIUM SERPL-MCNC: 2.3 MG/DL (ref 1.6–2.6)
MCH RBC QN AUTO: 30.4 PG (ref 26–35)
MCHC RBC AUTO-ENTMCNC: 32.1 G/DL (ref 32–34.5)
MCV RBC AUTO: 94.7 FL (ref 80–99.9)
METHB: 0.5 % (ref 0–1.5)
MODE: AC
MONOCYTES NFR BLD: 1.16 K/UL (ref 0.1–0.95)
MONOCYTES NFR BLD: 11 % (ref 2–12)
NEUTROPHILS NFR BLD: 76 % (ref 43–80)
NEUTS SEG NFR BLD: 8.21 K/UL (ref 1.8–7.3)
O2 SATURATION: 98.8 % (ref 92–98.5)
O2HB: 97.7 % (ref 94–97)
OPERATOR ID: 2593
PATIENT TEMP: 37 C
PCO2: 44 MMHG (ref 35–45)
PEEP/CPAP: 5 CMH2O
PFO2: 3.86 MMHG/%
PH BLOOD GAS: 7.34 (ref 7.35–7.45)
PHOSPHATE SERPL-MCNC: 3.4 MG/DL (ref 2.5–4.5)
PLATELET # BLD AUTO: 169 K/UL (ref 130–450)
PMV BLD AUTO: 9.9 FL (ref 7–12)
PO2: 135.1 MMHG (ref 75–100)
POTASSIUM SERPL-SCNC: 4.3 MMOL/L (ref 3.5–5)
RBC # BLD AUTO: 3.98 M/UL (ref 3.5–5.5)
RI(T): 0.47
RR MECHANICAL: 12 B/MIN
SODIUM SERPL-SCNC: 140 MMOL/L (ref 132–146)
SOURCE, BLOOD GAS: ABNORMAL
THB: 13.3 G/DL (ref 11.5–16.5)
TIME ANALYZED: 514
TRIGL SERPL-MCNC: 71 MG/DL
VT MECHANICAL: 325 ML
WBC OTHER # BLD: 10.8 K/UL (ref 4.5–11.5)

## 2024-11-13 PROCEDURE — 83735 ASSAY OF MAGNESIUM: CPT

## 2024-11-13 PROCEDURE — 80048 BASIC METABOLIC PNL TOTAL CA: CPT

## 2024-11-13 PROCEDURE — 84478 ASSAY OF TRIGLYCERIDES: CPT

## 2024-11-13 PROCEDURE — 85025 COMPLETE CBC W/AUTO DIFF WBC: CPT

## 2024-11-13 PROCEDURE — 6370000000 HC RX 637 (ALT 250 FOR IP)

## 2024-11-13 PROCEDURE — 6360000002 HC RX W HCPCS

## 2024-11-13 PROCEDURE — 2580000003 HC RX 258

## 2024-11-13 PROCEDURE — 99291 CRITICAL CARE FIRST HOUR: CPT | Performed by: INTERNAL MEDICINE

## 2024-11-13 PROCEDURE — 94003 VENT MGMT INPAT SUBQ DAY: CPT

## 2024-11-13 PROCEDURE — 71045 X-RAY EXAM CHEST 1 VIEW: CPT

## 2024-11-13 PROCEDURE — 2000000000 HC ICU R&B

## 2024-11-13 PROCEDURE — 99232 SBSQ HOSP IP/OBS MODERATE 35: CPT | Performed by: STUDENT IN AN ORGANIZED HEALTH CARE EDUCATION/TRAINING PROGRAM

## 2024-11-13 PROCEDURE — 2500000003 HC RX 250 WO HCPCS

## 2024-11-13 PROCEDURE — 82805 BLOOD GASES W/O2 SATURATION: CPT

## 2024-11-13 PROCEDURE — 84100 ASSAY OF PHOSPHORUS: CPT

## 2024-11-13 RX ORDER — MIDAZOLAM HYDROCHLORIDE 2 MG/2ML
2 INJECTION, SOLUTION INTRAMUSCULAR; INTRAVENOUS ONCE
Status: COMPLETED | OUTPATIENT
Start: 2024-11-13 | End: 2024-11-13

## 2024-11-13 RX ORDER — MIDAZOLAM HYDROCHLORIDE 2 MG/2ML
4 INJECTION, SOLUTION INTRAMUSCULAR; INTRAVENOUS ONCE
Status: COMPLETED | OUTPATIENT
Start: 2024-11-13 | End: 2024-11-13

## 2024-11-13 RX ORDER — FENTANYL CITRATE 50 UG/ML
25 INJECTION, SOLUTION INTRAMUSCULAR; INTRAVENOUS ONCE
Status: COMPLETED | OUTPATIENT
Start: 2024-11-13 | End: 2024-11-13

## 2024-11-13 RX ORDER — FENTANYL CITRATE 50 UG/ML
25 INJECTION, SOLUTION INTRAMUSCULAR; INTRAVENOUS
Status: DISCONTINUED | OUTPATIENT
Start: 2024-11-13 | End: 2024-11-16

## 2024-11-13 RX ADMIN — Medication 15 ML: at 09:28

## 2024-11-13 RX ADMIN — FENTANYL CITRATE 25 MCG: 50 INJECTION INTRAMUSCULAR; INTRAVENOUS at 15:43

## 2024-11-13 RX ADMIN — Medication 15 ML: at 20:38

## 2024-11-13 RX ADMIN — APIXABAN 2.5 MG: 5 TABLET, FILM COATED ORAL at 20:38

## 2024-11-13 RX ADMIN — FENTANYL CITRATE 25 MCG: 50 INJECTION INTRAMUSCULAR; INTRAVENOUS at 22:00

## 2024-11-13 RX ADMIN — SODIUM CHLORIDE, PRESERVATIVE FREE 10 ML: 5 INJECTION INTRAVENOUS at 20:38

## 2024-11-13 RX ADMIN — APIXABAN 2.5 MG: 5 TABLET, FILM COATED ORAL at 09:29

## 2024-11-13 RX ADMIN — MIDAZOLAM 2 MG: 1 INJECTION INTRAMUSCULAR; INTRAVENOUS at 22:32

## 2024-11-13 RX ADMIN — SODIUM CHLORIDE, PRESERVATIVE FREE 40 MG: 5 INJECTION INTRAVENOUS at 09:29

## 2024-11-13 RX ADMIN — MIDAZOLAM 4 MG: 1 INJECTION INTRAMUSCULAR; INTRAVENOUS at 00:35

## 2024-11-13 RX ADMIN — Medication 50 MCG/HR: at 00:13

## 2024-11-13 RX ADMIN — SODIUM CHLORIDE, PRESERVATIVE FREE 10 ML: 5 INJECTION INTRAVENOUS at 09:29

## 2024-11-13 RX ADMIN — FENTANYL CITRATE 25 MCG: 50 INJECTION INTRAMUSCULAR; INTRAVENOUS at 22:20

## 2024-11-13 RX ADMIN — FENTANYL CITRATE 25 MCG: 50 INJECTION INTRAMUSCULAR; INTRAVENOUS at 14:25

## 2024-11-13 RX ADMIN — ACETAMINOPHEN 650 MG: 325 TABLET ORAL at 14:25

## 2024-11-13 ASSESSMENT — PULMONARY FUNCTION TESTS
PIF_VALUE: 20
PIF_VALUE: 19
PIF_VALUE: 16
PIF_VALUE: 16
PIF_VALUE: 17
PIF_VALUE: 19
PIF_VALUE: 16
PIF_VALUE: 32
PIF_VALUE: 18
PIF_VALUE: 19
PIF_VALUE: 17
PIF_VALUE: 17
PIF_VALUE: 21
PIF_VALUE: 13
PIF_VALUE: 18
PIF_VALUE: 18
PIF_VALUE: 19
PIF_VALUE: 15
PIF_VALUE: 17
PIF_VALUE: 27
PIF_VALUE: 16
PIF_VALUE: 18
PIF_VALUE: 15
PIF_VALUE: 14
PIF_VALUE: 17
PIF_VALUE: 17
PIF_VALUE: 20

## 2024-11-13 ASSESSMENT — PAIN SCALES - GENERAL
PAINLEVEL_OUTOF10: 5
PAINLEVEL_OUTOF10: 0
PAINLEVEL_OUTOF10: 0
PAINLEVEL_OUTOF10: 3
PAINLEVEL_OUTOF10: 0

## 2024-11-13 NOTE — PROGRESS NOTES
Attempted SBT, pt minute ventilation was 2.3lpm, and respiratory rate was 7. Switched pt back to AC, notified resident.

## 2024-11-13 NOTE — PROGRESS NOTES
[facility]  Internal Medicine Residency Program  MICU Progress Note      Patient:  Arianna Coleman 89 y.o. female MRN: [id number] : 1931   Admitted:  11/10/2024  6:40 PM    Date of Service:  2024    Room: [address] [place] [address]    Chief complaint: had no chief complaint listed for this encounter.     Arianna Coleman is a 89 y.o. female with PMH of orthostasis, GERD, paroxysmal a-fib on Eliquis, chronic combined CHF, CAD, hx of DVT, hypertension, and anxiety, is currently being managed in the MICU for shock, likely cardiac, previously on Levophed, and acute hypoxic respiratory failure s/p intubation .    Subjective     Interval history:  No acute overnight events    Hospital Day: 4  ICU LOS: 2d 18h    Today's course:  The patient was seen intubated, sedated, arousable with pain, does not follow commands.    REVIEW OF SYSTEMS:  Review of Systems   Reason unable to perform ROS: patient is intubated, sedated.     Objective     Vitals  Range   /70 BP  Min: 108/66  Max: 152/78   HR 77 Pulse  Av.8  Min: 73  Max: 104   RR 24 Resp  Av.8  Min: 12  Max: 24   Temp. 99.7 °F (37.6 °C) Temp  Min: 99.7 °F (37.6 °C)  Max: 100.4 °F (38 °C)   O2sat 99 % SpO2  Av.7 %  Min: 92 %  Max: 100 %   Weight 64.2 kg (141 lb 9.6 oz)        Intake/Output Summary (Last 24 hours) at 2024  Last data filed at 2024 1800  Gross per 24 hour   Intake 1158.4 ml   Output 416 ml   Net 742.4 ml     Net IO Since Admission: 247.81 mL [24]    PHYSICAL EXAM:  Physical Exam  Vitals reviewed.   Constitutional:       Appearance: She is normal weight. She is ill-appearing.      Interventions: She is sedated and intubated.   HENT:      Head: Normocephalic and atraumatic.   Cardiovascular:      Rate and Rhythm: Normal rate and regular rhythm.      Heart sounds: Normal heart sounds. No murmur heard.  Pulmonary:      Effort: She is intubated.   Musculoskeletal:      Right lower leg: No

## 2024-11-13 NOTE — PLAN OF CARE
Not Progressing     Problem: Safety - Adult  Goal: Free from fall injury  11/13/2024 1517 by Tina Prasad RN  Outcome: Progressing  11/13/2024 1118 by Tina Prasad RN  Outcome: Progressing  Flowsheets (Taken 11/13/2024 0800)  Free From Fall Injury:   Instruct family/caregiver on patient safety   Based on caregiver fall risk screen, instruct family/caregiver to ask for assistance with transferring infant if caregiver noted to have fall risk factors     Problem: ABCDS Injury Assessment  Goal: Absence of physical injury  11/13/2024 1517 by Tina Prasad RN  Outcome: Progressing  11/13/2024 1118 by Tina Prasad RN  Outcome: Progressing  Flowsheets (Taken 11/13/2024 0800)  Absence of Physical Injury: Implement safety measures based on patient assessment     Problem: Skin/Tissue Integrity  Goal: Absence of new skin breakdown  Description: 1.  Monitor for areas of redness and/or skin breakdown  2.  Assess vascular access sites hourly  3.  Every 4-6 hours minimum:  Change oxygen saturation probe site  4.  Every 4-6 hours:  If on nasal continuous positive airway pressure, respiratory therapy assess nares and determine need for appliance change or resting period.  11/13/2024 1517 by Tina Prasad RN  Outcome: Progressing  11/13/2024 1118 by Tina Prasad RN  Outcome: Progressing     Problem: Pain  Goal: Verbalizes/displays adequate comfort level or baseline comfort level  11/13/2024 1517 by Tina Prasad RN  Outcome: Progressing  11/13/2024 1118 by Tina Prasad RN  Outcome: Progressing  Flowsheets (Taken 11/13/2024 0800)  Verbalizes/displays adequate comfort level or baseline comfort level:   Encourage patient to monitor pain and request assistance   Assess pain using appropriate pain scale   Administer analgesics based on type and severity of pain and evaluate response   Implement non-pharmacological measures as appropriate and evaluate response   Consider cultural and social influences on pain and pain  drains   Monitor endotracheal (as able) and nasal secretions for changes in amount and color  Goal: Absence of fever/infection during anticipated neutropenic period  11/13/2024 1517 by Tina Prasad RN  Outcome: Progressing  11/13/2024 1118 by Tina Prasad RN  Outcome: Progressing  Flowsheets (Taken 11/13/2024 0800)  Absence of fever/infection during anticipated neutropenic period: Monitor white blood cell count     Problem: Metabolic/Fluid and Electrolytes - Adult  Goal: Electrolytes maintained within normal limits  11/13/2024 1517 by Tina Prasad RN  Outcome: Progressing  11/13/2024 1118 by Tina Prasad RN  Outcome: Progressing  Flowsheets (Taken 11/13/2024 0800)  Electrolytes maintained within normal limits:   Monitor labs and assess patient for signs and symptoms of electrolyte imbalances   Administer electrolyte replacement as ordered   Monitor response to electrolyte replacements, including repeat lab results as appropriate  Goal: Hemodynamic stability and optimal renal function maintained  11/13/2024 1517 by Tina Prasad RN  Outcome: Progressing  11/13/2024 1118 by Tina Prasad RN  Outcome: Progressing  Flowsheets (Taken 11/13/2024 0800)  Hemodynamic stability and optimal renal function maintained:   Monitor labs and assess for signs and symptoms of volume excess or deficit   Monitor intake, output and patient weight  Goal: Glucose maintained within prescribed range  11/13/2024 1517 by Tina Prasad RN  Outcome: Progressing  11/13/2024 1118 by Tina Prasad RN  Outcome: Progressing  Flowsheets (Taken 11/13/2024 0800)  Glucose maintained within prescribed range:   Monitor blood glucose as ordered   Assess for signs and symptoms of hyperglycemia and hypoglycemia     Problem: Hematologic - Adult  Goal: Maintains hematologic stability  11/13/2024 1517 by Tina Prasad RN  Outcome: Progressing  11/13/2024 1118 by Tina Prasad RN  Outcome: Progressing  Flowsheets (Taken 11/13/2024  0800)  Maintains hematologic stability:   Assess for signs and symptoms of bleeding or hemorrhage   Monitor labs for bleeding or clotting disorders

## 2024-11-13 NOTE — PROGRESS NOTES
[facility]  Department of Internal Medicine     MICU Medical Student Progress Note    Patient:  Arianna Coleman 89 y.o. female  MRN: [id number]     Date of Service: 11/13/2024    Allergy: Nifedipine    Subjective     Patient intubated and off sedation at time of visit this AM. She was agitated, trying to remove ET tube. Not following commands. Reflexively squeezing with right hand.     Overnight events: no acute events overnight  Objective     VS: /66   Pulse 76   Temp 99 °F (37.2 °C) (Esophageal)   Resp 13   Ht 1.676 m (5' 6\")   Wt 65.3 kg (143 lb 14.4 oz)   SpO2 99%   BMI 23.23 kg/m²           I & O - 24hr:   Intake/Output Summary (Last 24 hours) at 11/13/2024 0801  Last data filed at 11/13/2024 0700  Gross per 24 hour   Intake 524.74 ml   Output 648 ml   Net -123.26 ml       Physical Exam:  General Appearance: intubated, agitated, responsive to touch.   Lung: mechanical breath sounds  Heart: irregularly irregular rhythm, rate regular, no rubs/murmurs/gallops  Abdomen: soft, bowel sounds present  Extremities:  no cyanosis, edema, erythema of upper or lower extremities  Neurologic: Mental status: intubated, off sedation, agitated. Responsive to touch/painful stimulus. Actively resisting opening of eyelids for examination of pupils.     Lines     site day    Art line   None    TLC None    PICC None    Hemoaccess None      Mechanical Ventilation:  Mode: A/C   TV:325 ml RR: 12  PEEP 5 cmH2O FiO2 35%  Pplat: 13 Cs: 45  Ppeak 17  ABG:     Lab Results   Component Value Date/Time    PH 7.343 11/13/2024 05:08 AM    PCO2 44.0 11/13/2024 05:08 AM    PO2 135.1 11/13/2024 05:08 AM    HCO3 23.4 11/13/2024 05:08 AM    BE -2.4 11/13/2024 05:08 AM    THB 13.3 11/13/2024 05:08 AM    O2SAT 98.8 11/13/2024 05:08 AM        Medications     Infusions: (Fluid, Sedation, Vasopressors)  IVF:   None currently  Vasopressors  None currently  Sedation  None currently    Nutrition:   NG tube feeds, peptide based,  sinus node dysfunction   Heart rate histogram on loop recorder confirms ambient heart rates between 50-60 bpm. Heart rate has remained stable since admission to ICU. Patient is off of AV node blocker agents and amiodarone as advised by EP on visit 10/8/24. HR this AM 76.  - Continue to monitor heart rate  - Avoid AV blocking agents     Paroxysmal A Fib  Patient had used amiodarone previously, appears stopped 7/7/23 on chart review. Patient not on AV blocking medications given hx of bradycardia. [id number] score of 5.   - Continue eliquis 2.5 BID     Hx of nonischemic cardiomyopathy and chronic HFrecEF  Most recent echo showed LVEF of 53% on 4/24/23. TTE on 8/31/21 showed LVEF of 38% with stage 1 diastolic dysfunction; EF recovered to 60-65% on TTE 7/15/22. Patient euvolemic on examination today, physical exam findings not concerning for fluid overload. Patient on losartan 50 mg BID at home.   - Losartan held currently per EP recommendations     Nonobstructive CAD  LHC on 11/12/19 showed no significant CAD     HTN  Home regimen consists of losartan 50 mg BID and hydralazine 10 mg TID. BP stable this AM, 122/66.  - Appreciate EP recommendations regarding HTN               - Permissive HTN              - Avoid hydralazine; use ACE-I or ARB if severe HTN     Pulmonary   Acute hypoxic respiratory failure 2/2 encephalopathy   Patient intubated for airway protection as GCS <8. ABG this AM demonstrated the following: pH 7.343, pCO2 44.0, pO2 135.1, HCO3 23.4. Patient apneic on SBT again this AM. Suspect this was due to effects of sedation. Discussion to potentially adjust vent setting to SIMV from AC/VC.  - Continue ventilation, wean as tolerated.   - Change fentanyl to 25 mcg every hour as needed for agitation   - Daily SAT and SBT     Neurologic  Acute encephalopathy 2/2 syncopal episode  Patient experienced syncopal episode while sitting and watching TV. Was unarousable, which is unusual compared to her prior episodes

## 2024-11-13 NOTE — PROGRESS NOTES
Patient attempting to pull at lines/tubes when restraints are loosened. Bilateral wrist restraints continued.

## 2024-11-13 NOTE — PLAN OF CARE
Problem: Chronic Conditions and Co-morbidities  Goal: Patient's chronic conditions and co-morbidity symptoms are monitored and maintained or improved  Outcome: Not Progressing  Flowsheets (Taken 11/13/2024 0800)  Care Plan - Patient's Chronic Conditions and Co-Morbidity Symptoms are Monitored and Maintained or Improved:   Monitor and assess patient's chronic conditions and comorbid symptoms for stability, deterioration, or improvement   Collaborate with multidisciplinary team to address chronic and comorbid conditions and prevent exacerbation or deterioration   Update acute care plan with appropriate goals if chronic or comorbid symptoms are exacerbated and prevent overall improvement and discharge     Problem: Discharge Planning  Goal: Discharge to home or other facility with appropriate resources  Outcome: Not Progressing  Flowsheets (Taken 11/13/2024 0800)  Discharge to home or other facility with appropriate resources: Identify barriers to discharge with patient and caregiver     Problem: Neurosensory - Adult  Goal: Achieves stable or improved neurological status  Outcome: Not Progressing  Flowsheets (Taken 11/13/2024 0800)  Achieves stable or improved neurological status: Assess for and report changes in neurological status     Problem: Musculoskeletal - Adult  Goal: Return mobility to safest level of function  Outcome: Not Progressing  Goal: Return ADL status to a safe level of function  Outcome: Not Progressing     Problem: Safety - Adult  Goal: Free from fall injury  Outcome: Progressing  Flowsheets (Taken 11/13/2024 0800)  Free From Fall Injury:   Instruct family/caregiver on patient safety   Based on caregiver fall risk screen, instruct family/caregiver to ask for assistance with transferring infant if caregiver noted to have fall risk factors     Problem: ABCDS Injury Assessment  Goal: Absence of physical injury  Outcome: Progressing  Flowsheets (Taken 11/13/2024 0800)  Absence of Physical Injury:  during anticipated neutropenic period: Monitor white blood cell count     Problem: Metabolic/Fluid and Electrolytes - Adult  Goal: Electrolytes maintained within normal limits  Outcome: Progressing  Flowsheets (Taken 11/13/2024 0800)  Electrolytes maintained within normal limits:   Monitor labs and assess patient for signs and symptoms of electrolyte imbalances   Administer electrolyte replacement as ordered   Monitor response to electrolyte replacements, including repeat lab results as appropriate  Goal: Hemodynamic stability and optimal renal function maintained  Outcome: Progressing  Flowsheets (Taken 11/13/2024 0800)  Hemodynamic stability and optimal renal function maintained:   Monitor labs and assess for signs and symptoms of volume excess or deficit   Monitor intake, output and patient weight  Goal: Glucose maintained within prescribed range  Outcome: Progressing  Flowsheets (Taken 11/13/2024 0800)  Glucose maintained within prescribed range:   Monitor blood glucose as ordered   Assess for signs and symptoms of hyperglycemia and hypoglycemia     Problem: Hematologic - Adult  Goal: Maintains hematologic stability  Outcome: Progressing  Flowsheets (Taken 11/13/2024 0800)  Maintains hematologic stability:   Assess for signs and symptoms of bleeding or hemorrhage   Monitor labs for bleeding or clotting disorders

## 2024-11-13 NOTE — PROGRESS NOTES
P Quality Flow/Interdisciplinary Rounds Progress Note        Quality Flow Rounds held on November 13, 2024    Disciplines Attending:  Bedside Nurse and Pharmacist    Arianna Coleman was admitted on 11/10/2024  6:40 PM    Anticipated Discharge Date:   unknown     Disposition: unknown       Bautista Score:  Bautista Scale Score: 12    Readmission Risk              Risk of Unplanned Readmission:  20           Discussed patient goal for the day, patient clinical progression, and barriers to discharge.  The following Goal(s) of the Day/Commitment(s) have been identified:   SAT/SBT      Tina Prasad RN  November 13, 2024

## 2024-11-13 NOTE — PROGRESS NOTES
Sedation interruption order verified by RN.     Contraindications to performing a sedation interruption (Check all that apply):  [] Receiving neuromuscular blocker  [] Active seizures  [] Active treatment for suspected elevated intracranial pressure or ICP monitor in place  [] RASS goal less than or equal to -4  [] Therapeutic hypothermia and core body temperature less than 37 degrees Celsius  [] Evidence of PCI or CABG in preceeding 24 hours  [] Prone position  [] Current RASS greater than +1    Contraindication to performing sedation interruption present? no-safety parameters screen performed    Safety Parameters screen (check all parameters met):  [x] Minute ventilation less than 15L  [x] PEEP less than 10mmHg on ventilator  [x] FiO2 less than 70% on ventilator  [x] SpO2 greater than 92% on monitor  [x] PaO2 / FiO2 ratio greater than 1    Safety screen parameters:   All Safety Parameters met-to proceed with sedation interruption as ordered. Respiratory Therapy notified of planned sedation interruption.    Provider notified:  Dr. Mahoney     Provider instruction if one or more Safety Parameters not met:  N/A    Electronically signed by Tina Prasad RN on 11/13/2024 at 12:32 PM

## 2024-11-13 NOTE — PROGRESS NOTES
[facility]   IN-PATIENT SERVICE      Progress Note    11/13/2024    12:17 PM    Name:   Arianna Coleman  MRN:     [id number]     Acct:      [id number]   [facility]  IP Day:  3  Admit Date:  11/10/2024  6:40 PM    PCP:   Soraya Riddle MD  Code Status:  Full Code    Subjective:     C/C: No chief complaint on file.  syncope  Interval History Status: not changed.     Pt remains intubated and sedated     EP s/o, originally consulted for sinus azael and hx of PAF    Plan for SBT and extubation when tolerating     Brief History:     This is a 89-year old -American female with past medical history of severe dementia with vision and hearing problem brought in here due to altered mental status.  Patient lives with home aide and she was watching TV suddenly she passed out and aide called 911.  While she in ER found to have altered mental status and she been intubated to protect airway.  During my encounter her 2 daughter was at the bedside but they do not much about her as they do not live with her.  Her initial blood pressure was low and started on vasopressor and again blood pressure went up then vasopressor taken off.  Blood chemistry insignificant, troponin 29 liver function test normal except albumin 3.4 and blood count and platelet count normal.  CT scan did not show any intracranial abnormality except deep sulci.  It also shows old lacunar infarct and acute sinusitis.       Review of Systems:     As per Hpi     Medications:     Allergies:    Allergies   Allergen Reactions    Nifedipine        Current Meds:   Scheduled Meds:    sodium chloride flush  5-40 mL IntraVENous 2 times per day    chlorhexidine  15 mL Mouth/Throat BID    apixaban  2.5 mg Oral BID    pantoprazole (PROTONIX) 40 mg in sodium chloride (PF) 0.9 % 10 mL injection  40 mg IntraVENous Daily    [Held by provider] losartan  25 mg Oral Daily     Continuous Infusions:    sodium chloride      fentaNYL 50 mcg/hr  11/13/24  0510     --  140 139 139 140   K 3.7  --  3.3* 4.4 4.3 4.3   *  --  109* 112* 109* 107   CO2 24  --  21* 20* 19* 20*   GLUCOSE 112*  --  140* 126* 110* 103*   BUN 12  --  11 11 14 15   CREATININE 1.0  --  0.7 0.8 1.0 1.0   MG  --   --  1.9  --  2.0 2.3   ANIONGAP 9  --  10 7 11 13   LABGLOM 53*  --  77 72 56* 53*   CALCIUM 8.8  --  8.7 7.7* 8.3* 8.8   PHOS  --   --  1.9*  --  4.0 3.4   TROPHS 29* 24*  --   --   --   --      Recent Labs     11/10/24  1837 11/10/24  1857 11/11/24  0829 11/11/24  0921 11/12/24  0107 11/12/24  0602 11/13/24  0510   TSH  --   --   --  1.33  --   --   --    AST  --  18  --   --   --   --   --    ALT  --  11  --   --   --   --   --    ALKPHOS  --  79  --   --   --   --   --    BILITOT  --  0.6  --   --   --   --   --    AMMONIA  --   --   --  20  --   --   --    TRIG  --   --   --   --   --   --  71   POCGLU 103*  --  101*  --  103* 93  --      ABG:  Lab Results   Component Value Date/Time    PH 7.343 11/13/2024 05:08 AM    PCO2 44.0 11/13/2024 05:08 AM    PO2 135.1 11/13/2024 05:08 AM    HCO3 23.4 11/13/2024 05:08 AM    BE -2.4 11/13/2024 05:08 AM    THB 13.3 11/13/2024 05:08 AM    O2SAT 98.8 11/13/2024 05:08 AM    FIO2 35.0 11/13/2024 05:08 AM     No results found for: \"SPECIAL\"  No results found for: \"CULTURE\"    Radiology:  XR CHEST PORTABLE    Result Date: 11/12/2024  1. Endotracheal tube and orogastric tube in satisfactory position. 2. Mild blunting of the costophrenic angles with increased markings at the lung bases.     XR CHEST PORTABLE    Result Date: 11/11/2024  1. Endotracheal tube tip 2.5 cm above the melodie. 2. Orogastric tube looped in the fundus of the stomach in satisfactory position. 3. Subsegmental atelectasis at the left lung base.     CT HEAD WO CONTRAST    Result Date: 11/10/2024  1.  No acute intracranial abnormality. 2.  Signs of deep white matter small vessel disease 3.  Chronic appearing small lacunar infarct adjacent to the right caudate  small vessel disease and chronic appearing small lacunar infarcts adjacent to the right caudate nucleus.   GERD-continue PPI  Ventilator dependent respiratory failure.    Greater than 35 minutes metaphysical exam, chart, assessment and plan.    Miriam Morley MD  11/13/2024  12:17 PM

## 2024-11-13 NOTE — CARE COORDINATION
Care Coordination: LOS 3 day.  Intubated, Restraints, Fentanyl, ogt/tf.  EP sign off, pt has ILR with normal device function/ no bradycardia noted during episode of syncope. SBT attempted.  Per CM note, severe dementia, lives home with son and home aide. Needs unclear, will follow    Electronically signed by Anna Brumfield RN on 11/13/2024 at 12:06 PM

## 2024-11-14 ENCOUNTER — APPOINTMENT (OUTPATIENT)
Dept: GENERAL RADIOLOGY | Age: 89
End: 2024-11-14
Payer: COMMERCIAL

## 2024-11-14 LAB
AADO2: 377.6 MMHG
AADO2: 63.4 MMHG
ANION GAP SERPL CALCULATED.3IONS-SCNC: 7 MMOL/L (ref 7–16)
B.E.: -0.5 MMOL/L (ref -3–3)
B.E.: -1.4 MMOL/L (ref -3–3)
BASOPHILS # BLD: 0.01 K/UL (ref 0–0.2)
BASOPHILS NFR BLD: 0 % (ref 0–2)
BUN SERPL-MCNC: 19 MG/DL (ref 6–23)
CALCIUM SERPL-MCNC: 8.7 MG/DL (ref 8.6–10.2)
CHLORIDE SERPL-SCNC: 105 MMOL/L (ref 98–107)
CO2 SERPL-SCNC: 24 MMOL/L (ref 22–29)
COHB: 0.5 % (ref 0–1.5)
COHB: 0.7 % (ref 0–1.5)
CREAT SERPL-MCNC: 0.8 MG/DL (ref 0.5–1)
CRITICAL: ABNORMAL
CRITICAL: ABNORMAL
DATE ANALYZED: ABNORMAL
DATE ANALYZED: ABNORMAL
DATE OF COLLECTION: ABNORMAL
DATE OF COLLECTION: ABNORMAL
EOSINOPHIL # BLD: 0.07 K/UL (ref 0.05–0.5)
EOSINOPHILS RELATIVE PERCENT: 1 % (ref 0–6)
ERYTHROCYTE [DISTWIDTH] IN BLOOD BY AUTOMATED COUNT: 14.2 % (ref 11.5–15)
FIO2: 35 %
FIO2: 80 %
GFR, ESTIMATED: 69 ML/MIN/1.73M2
GLUCOSE BLD-MCNC: 139 MG/DL (ref 74–99)
GLUCOSE BLD-MCNC: 140 MG/DL (ref 74–99)
GLUCOSE BLD-MCNC: 174 MG/DL (ref 74–99)
GLUCOSE BLD-MCNC: 99 MG/DL (ref 74–99)
GLUCOSE SERPL-MCNC: 119 MG/DL (ref 74–99)
HCO3: 23.8 MMOL/L (ref 22–26)
HCO3: 23.8 MMOL/L (ref 22–26)
HCT VFR BLD AUTO: 34.1 % (ref 34–48)
HGB BLD-MCNC: 11 G/DL (ref 11.5–15.5)
HHB: 0.9 % (ref 0–5)
HHB: 0.9 % (ref 0–5)
IMM GRANULOCYTES # BLD AUTO: 0.05 K/UL (ref 0–0.58)
IMM GRANULOCYTES NFR BLD: 1 % (ref 0–5)
LAB: ABNORMAL
LAB: ABNORMAL
LYMPHOCYTES NFR BLD: 0.89 K/UL (ref 1.5–4)
LYMPHOCYTES RELATIVE PERCENT: 10 % (ref 20–42)
Lab: 1400
Lab: 458
MAGNESIUM SERPL-MCNC: 2.3 MG/DL (ref 1.6–2.6)
MCH RBC QN AUTO: 30.3 PG (ref 26–35)
MCHC RBC AUTO-ENTMCNC: 32.3 G/DL (ref 32–34.5)
MCV RBC AUTO: 93.9 FL (ref 80–99.9)
METHB: 0.4 % (ref 0–1.5)
METHB: 0.6 % (ref 0–1.5)
MODE: ABNORMAL
MODE: AC
MONOCYTES NFR BLD: 1.31 K/UL (ref 0.1–0.95)
MONOCYTES NFR BLD: 14 % (ref 2–12)
NEUTROPHILS NFR BLD: 75 % (ref 43–80)
NEUTS SEG NFR BLD: 6.91 K/UL (ref 1.8–7.3)
O2 SATURATION: 99.1 % (ref 92–98.5)
O2 SATURATION: 99.1 % (ref 92–98.5)
O2HB: 98 % (ref 94–97)
O2HB: 98 % (ref 94–97)
OPERATOR ID: 1741
OPERATOR ID: 3214
PATIENT TEMP: 37 C
PATIENT TEMP: 37 C
PCO2: 37.8 MMHG (ref 35–45)
PCO2: 41.9 MMHG (ref 35–45)
PEEP/CPAP: 5 CMH2O
PFO2: 1.86 MMHG/%
PFO2: 4.06 MMHG/%
PH BLOOD GAS: 7.37 (ref 7.35–7.45)
PH BLOOD GAS: 7.42 (ref 7.35–7.45)
PHOSPHATE SERPL-MCNC: 2.1 MG/DL (ref 2.5–4.5)
PLATELET # BLD AUTO: 156 K/UL (ref 130–450)
PMV BLD AUTO: 9.7 FL (ref 7–12)
PO2: 142.2 MMHG (ref 75–100)
PO2: 148.8 MMHG (ref 75–100)
POTASSIUM SERPL-SCNC: 4.1 MMOL/L (ref 3.5–5)
RBC # BLD AUTO: 3.63 M/UL (ref 3.5–5.5)
RI(T): 0.45
RI(T): 2.54
RR MECHANICAL: 12 B/MIN
SODIUM SERPL-SCNC: 136 MMOL/L (ref 132–146)
SOURCE, BLOOD GAS: ABNORMAL
SOURCE, BLOOD GAS: ABNORMAL
THB: 12.3 G/DL (ref 11.5–16.5)
THB: 13.4 G/DL (ref 11.5–16.5)
TIME ANALYZED: 1416
TIME ANALYZED: 508
VT MECHANICAL: 325 ML
WBC OTHER # BLD: 9.2 K/UL (ref 4.5–11.5)

## 2024-11-14 PROCEDURE — 94003 VENT MGMT INPAT SUBQ DAY: CPT

## 2024-11-14 PROCEDURE — 99291 CRITICAL CARE FIRST HOUR: CPT | Performed by: INTERNAL MEDICINE

## 2024-11-14 PROCEDURE — 82805 BLOOD GASES W/O2 SATURATION: CPT

## 2024-11-14 PROCEDURE — 84100 ASSAY OF PHOSPHORUS: CPT

## 2024-11-14 PROCEDURE — 2580000003 HC RX 258

## 2024-11-14 PROCEDURE — 5A09357 ASSISTANCE WITH RESPIRATORY VENTILATION, LESS THAN 24 CONSECUTIVE HOURS, CONTINUOUS POSITIVE AIRWAY PRESSURE: ICD-10-PCS

## 2024-11-14 PROCEDURE — 85025 COMPLETE CBC W/AUTO DIFF WBC: CPT

## 2024-11-14 PROCEDURE — 83735 ASSAY OF MAGNESIUM: CPT

## 2024-11-14 PROCEDURE — 2000000000 HC ICU R&B

## 2024-11-14 PROCEDURE — 6360000002 HC RX W HCPCS

## 2024-11-14 PROCEDURE — 82962 GLUCOSE BLOOD TEST: CPT

## 2024-11-14 PROCEDURE — 94660 CPAP INITIATION&MGMT: CPT

## 2024-11-14 PROCEDURE — 2500000003 HC RX 250 WO HCPCS

## 2024-11-14 PROCEDURE — 6370000000 HC RX 637 (ALT 250 FOR IP)

## 2024-11-14 PROCEDURE — 6360000002 HC RX W HCPCS: Performed by: INTERNAL MEDICINE

## 2024-11-14 PROCEDURE — 99232 SBSQ HOSP IP/OBS MODERATE 35: CPT | Performed by: STUDENT IN AN ORGANIZED HEALTH CARE EDUCATION/TRAINING PROGRAM

## 2024-11-14 PROCEDURE — 80048 BASIC METABOLIC PNL TOTAL CA: CPT

## 2024-11-14 PROCEDURE — 6370000000 HC RX 637 (ALT 250 FOR IP): Performed by: INTERNAL MEDICINE

## 2024-11-14 PROCEDURE — 71045 X-RAY EXAM CHEST 1 VIEW: CPT

## 2024-11-14 PROCEDURE — 94640 AIRWAY INHALATION TREATMENT: CPT

## 2024-11-14 RX ORDER — SODIUM CHLORIDE FOR INHALATION 0.9 %
3 VIAL, NEBULIZER (ML) INHALATION ONCE
Status: COMPLETED | OUTPATIENT
Start: 2024-11-14 | End: 2024-11-14

## 2024-11-14 RX ORDER — DEXAMETHASONE SODIUM PHOSPHATE 4 MG/ML
4 INJECTION, SOLUTION INTRA-ARTICULAR; INTRALESIONAL; INTRAMUSCULAR; INTRAVENOUS; SOFT TISSUE EVERY 4 HOURS
Status: DISCONTINUED | OUTPATIENT
Start: 2024-11-14 | End: 2024-11-16

## 2024-11-14 RX ORDER — HYDRALAZINE HYDROCHLORIDE 20 MG/ML
10 INJECTION INTRAMUSCULAR; INTRAVENOUS ONCE
Status: COMPLETED | OUTPATIENT
Start: 2024-11-14 | End: 2024-11-14

## 2024-11-14 RX ORDER — DIGOXIN 0.25 MG/ML
250 INJECTION INTRAMUSCULAR; INTRAVENOUS ONCE
Status: COMPLETED | OUTPATIENT
Start: 2024-11-14 | End: 2024-11-14

## 2024-11-14 RX ORDER — METOPROLOL TARTRATE 1 MG/ML
INJECTION, SOLUTION INTRAVENOUS
Status: COMPLETED
Start: 2024-11-14 | End: 2024-11-14

## 2024-11-14 RX ORDER — SODIUM CHLORIDE FOR INHALATION 0.9 %
3 VIAL, NEBULIZER (ML) INHALATION EVERY 4 HOURS PRN
Status: DISCONTINUED | OUTPATIENT
Start: 2024-11-14 | End: 2024-11-22 | Stop reason: HOSPADM

## 2024-11-14 RX ORDER — DEXAMETHASONE SODIUM PHOSPHATE 4 MG/ML
INJECTION, SOLUTION INTRA-ARTICULAR; INTRALESIONAL; INTRAMUSCULAR; INTRAVENOUS; SOFT TISSUE
Status: COMPLETED
Start: 2024-11-14 | End: 2024-11-14

## 2024-11-14 RX ORDER — METOPROLOL TARTRATE 1 MG/ML
2.5 INJECTION, SOLUTION INTRAVENOUS ONCE
Status: COMPLETED | OUTPATIENT
Start: 2024-11-14 | End: 2024-11-14

## 2024-11-14 RX ORDER — DEXAMETHASONE SODIUM PHOSPHATE 4 MG/ML
4 INJECTION, SOLUTION INTRA-ARTICULAR; INTRALESIONAL; INTRAMUSCULAR; INTRAVENOUS; SOFT TISSUE ONCE
Status: COMPLETED | OUTPATIENT
Start: 2024-11-14 | End: 2024-11-14

## 2024-11-14 RX ADMIN — POLYETHYLENE GLYCOL 3350 17 G: 17 POWDER, FOR SOLUTION ORAL at 08:01

## 2024-11-14 RX ADMIN — DIGOXIN 250 MCG: 0.25 INJECTION INTRAMUSCULAR; INTRAVENOUS at 13:14

## 2024-11-14 RX ADMIN — RACEPINEPHRINE HYDROCHLORIDE 0.5 ML: 11.25 SOLUTION RESPIRATORY (INHALATION) at 14:10

## 2024-11-14 RX ADMIN — APIXABAN 2.5 MG: 5 TABLET, FILM COATED ORAL at 08:01

## 2024-11-14 RX ADMIN — Medication 15 ML: at 08:01

## 2024-11-14 RX ADMIN — METOPROLOL TARTRATE 2.5 MG: 1 INJECTION, SOLUTION INTRAVENOUS at 13:22

## 2024-11-14 RX ADMIN — RACEPINEPHRINE HYDROCHLORIDE 0.5 ML: 11.25 SOLUTION RESPIRATORY (INHALATION) at 16:25

## 2024-11-14 RX ADMIN — SODIUM CHLORIDE, PRESERVATIVE FREE 40 MG: 5 INJECTION INTRAVENOUS at 08:01

## 2024-11-14 RX ADMIN — DEXAMETHASONE SODIUM PHOSPHATE 4 MG: 4 INJECTION INTRA-ARTICULAR; INTRALESIONAL; INTRAMUSCULAR; INTRAVENOUS; SOFT TISSUE at 16:17

## 2024-11-14 RX ADMIN — ISODIUM CHLORIDE 3 ML: 0.03 SOLUTION RESPIRATORY (INHALATION) at 14:10

## 2024-11-14 RX ADMIN — DEXAMETHASONE SODIUM PHOSPHATE 4 MG: 4 INJECTION INTRA-ARTICULAR; INTRALESIONAL; INTRAMUSCULAR; INTRAVENOUS; SOFT TISSUE at 21:09

## 2024-11-14 RX ADMIN — DEXAMETHASONE SODIUM PHOSPHATE 4 MG: 4 INJECTION, SOLUTION INTRA-ARTICULAR; INTRALESIONAL; INTRAMUSCULAR; INTRAVENOUS; SOFT TISSUE at 14:10

## 2024-11-14 RX ADMIN — DEXAMETHASONE SODIUM PHOSPHATE 4 MG: 4 INJECTION INTRA-ARTICULAR; INTRALESIONAL; INTRAMUSCULAR; INTRAVENOUS; SOFT TISSUE at 14:10

## 2024-11-14 RX ADMIN — SODIUM PHOSPHATE, MONOBASIC, MONOHYDRATE AND SODIUM PHOSPHATE, DIBASIC, ANHYDROUS 15 MMOL: 142; 276 INJECTION, SOLUTION INTRAVENOUS at 08:45

## 2024-11-14 RX ADMIN — FENTANYL CITRATE 25 MCG: 50 INJECTION INTRAMUSCULAR; INTRAVENOUS at 04:47

## 2024-11-14 RX ADMIN — SODIUM CHLORIDE, PRESERVATIVE FREE 10 ML: 5 INJECTION INTRAVENOUS at 21:10

## 2024-11-14 RX ADMIN — FENTANYL CITRATE 25 MCG: 50 INJECTION INTRAMUSCULAR; INTRAVENOUS at 02:54

## 2024-11-14 RX ADMIN — FENTANYL CITRATE 25 MCG: 50 INJECTION INTRAMUSCULAR; INTRAVENOUS at 00:59

## 2024-11-14 RX ADMIN — HYDRALAZINE HYDROCHLORIDE 10 MG: 20 INJECTION INTRAMUSCULAR; INTRAVENOUS at 12:06

## 2024-11-14 ASSESSMENT — PULMONARY FUNCTION TESTS
PIF_VALUE: 17
PIF_VALUE: 14
PIF_VALUE: 15
PIF_VALUE: 17
PIF_VALUE: 18
PIF_VALUE: 11
PIF_VALUE: 13
PIF_VALUE: 14
PIF_VALUE: 15
PIF_VALUE: 13
PIF_VALUE: 13
PIF_VALUE: 14
PIF_VALUE: 20
PIF_VALUE: 25
PIF_VALUE: 18
PIF_VALUE: 14
PIF_VALUE: 13
PIF_VALUE: 14
PIF_VALUE: 17
PIF_VALUE: 17
PIF_VALUE: 15

## 2024-11-14 ASSESSMENT — PAIN SCALES - GENERAL
PAINLEVEL_OUTOF10: 0
PAINLEVEL_OUTOF10: 5
PAINLEVEL_OUTOF10: 0

## 2024-11-14 NOTE — PROGRESS NOTES
[facility]   IN-PATIENT SERVICE      Progress Note    11/14/2024    1:12 PM    Name:   Arianna Coleman  MRN:     [id number]     Acct:      [id number]   Room:   [facility]  IP Day:  4  Admit Date:  11/10/2024  6:40 PM    PCP:   Soraya Riddle MD  Code Status:  Full Code    Subjective:     C/C: No chief complaint on file.  syncope  Interval History Status: not changed.     Pt remains intubated and sedated     EP s/o, originally consulted for sinus azael and hx of PAF    Plan for SBT and extubation when tolerating     Brief History:     This is a 89-year old -American female with past medical history of severe dementia with vision and hearing problem brought in here due to altered mental status.  Patient lives with home aide and she was watching TV suddenly she passed out and aide called 911.  While she in ER found to have altered mental status and she been intubated to protect airway.  During my encounter her 2 daughter was at the bedside but they do not much about her as they do not live with her.  Her initial blood pressure was low and started on vasopressor and again blood pressure went up then vasopressor taken off.  Blood chemistry insignificant, troponin 29 liver function test normal except albumin 3.4 and blood count and platelet count normal.  CT scan did not show any intracranial abnormality except deep sulci.  It also shows old lacunar infarct and acute sinusitis.       Review of Systems:     As per Hpi     Medications:     Allergies:    Allergies   Allergen Reactions    Nifedipine        Current Meds:   Scheduled Meds:    digoxin  250 mcg IntraVENous Once    sodium chloride flush  5-40 mL IntraVENous 2 times per day    chlorhexidine  15 mL Mouth/Throat BID    apixaban  2.5 mg Oral BID    pantoprazole (PROTONIX) 40 mg in sodium chloride (PF) 0.9 % 10 mL injection  40 mg IntraVENous Daily    [Held by provider] losartan  25 mg Oral Daily     Continuous Infusions:     normal air movement, no respiratory distress  Cardiovascular: normal rate, normal S1 and S2 and no carotid bruits  Abdomen: soft, non-tender, non-distended, normal bowel sounds, no masses or organomegaly  Extremities: no cyanosis, no clubbing and no edema  Neurologic: Could not be evaluated due to intubation.    Assessment:        Hospital Problems             Last Modified POA    * (Principal) AMS (altered mental status) 11/10/2024 Yes    Status post placement of implantable loop recorder 11/11/2024 Yes       Plan:        Syncope-secondary to orthostatic, vasovagal, versus cardiogenic.  EP following.  Made for ICU.  Patient intubated for airway protection.  Currently on pressors  Sinus bradycardia-hold all AV kellen blockade.  Off amiodarone.  EP follow-up.Avoid using Hydralazine and using ACE-I or ARB for BP treatment.   PAF-continue Eliquis 2.5 twice daily.  Off of beta-blocker due to bradycardia.  History of nonischemic cardiomyopathy-LV EF 53% on TTE 4/24/23   Acute encephalopathy-in the setting of history of dementia.  Intubated and sedated at this time.  Management per ICU.  CT head WO contrast showed no acute process but did demonstrate signs of deep white matter small vessel disease and chronic appearing small lacunar infarcts adjacent to the right caudate nucleus.   GERD-continue PPI  Ventilator dependent respiratory failure.    Greater than 35 minutes metaphysical exam, chart, assessment and plan.    Miriam Morley MD  11/14/2024  1:12 PM

## 2024-11-14 NOTE — PROGRESS NOTES
11/14/24 1226   Patient Observation   Patient Observations oet leak test . pt sxd orally and evac and oet. Oet removed from tube mahmood.  balloon deflated, insp vt 600 given and exh vt 246 measured in return, with audible airflow heard orally.  balloon reinflated oet replaced to tube mahmood and pt placed back on psv . results to Dr Porter.

## 2024-11-14 NOTE — PROGRESS NOTES
11/14/24 1618   NIV Type   NIV Started/Stopped On   Equipment Type v60   Mode Bilevel   Mask Type Full face mask   Mask Size Small   Assessment   Pulse 97   Respirations 14   SpO2 98 %   Level of Consciousness 1   Comfort Level Good   Using Accessory Muscles No   Mask Compliance Good   Skin Assessment Clean, dry, & intact   Skin Protection for O2 Device Yes   Orientation Middle   Location Nose   Intervention(s) Skin Barrier   Settings/Measurements   IPAP 12 cmH20   CPAP/EPAP 6 cmH2O   Vt (Measured) 328 mL   Rate Ordered 12   FiO2  30 %   I Time/ I Time % 0.9 s   Minute Volume (L/min) 4.9 Liters   Mask Leak (lpm) 44 lpm   Patient's Home Machine No   Alarm Settings   Alarms On Y   Low Pressure (cmH2O) 8 cmH2O   High Pressure (cmH2O) 28 cmH2O   RR Low (bpm) 10   RR High (bpm) 45 br/min     Date: 11/14/2024    Time: 4:20 PM    Patient Placed On BIPAP/CPAP/ Non-Invasive Ventilation?  Yes    If no must comment.  Facial area red/color change? No           If YES are Blister/Lesion present?No   If yes must notify nursing staff  BIPAP/CPAP skin barrier?  Yes    Skin barrier type:mepilexlite       Comments:        Karly Mireles RCP

## 2024-11-14 NOTE — PROGRESS NOTES
Attending Physician Attestation: Dr. Modesto Porter    Thank you very much for allowing me to see this patient in consultation and follow up.    I personally saw, examined and provided care for the patient. Radiographs, labs and medication list were reviewed by me independently. I spoke with bedside nursing, respiratory therapists and consultants. Critical care services and times documented are independent of procedures and multidisciplinary rounds with Residents. Additionally comprehensive, multidisciplinary rounds were conducted with the MICU team. The case was discussed in detail and plans for care were established. Review of Residents documentation was conducted and revisions were made as appropriate. I agree with the the above documented information.     Current Facility-Administered Medications   Medication Dose Route Frequency Provider Last Rate Last Admin    fentaNYL (SUBLIMAZE) injection 25 mcg  25 mcg IntraVENous Q1H PRN Bobby Bergeron MD   25 mcg at 11/14/24 0447    sodium chloride flush 0.9 % injection 5-40 mL  5-40 mL IntraVENous 2 times per day Nan Duque MD   10 mL at 11/13/24 2038    sodium chloride flush 0.9 % injection 5-40 mL  5-40 mL IntraVENous PRN Nan Duque MD        0.9 % sodium chloride infusion   IntraVENous PRN Nan Duque MD        magnesium sulfate 2000 mg in 50 mL IVPB premix  2,000 mg IntraVENous PRN Nan Duque MD        ondansetron (ZOFRAN-ODT) disintegrating tablet 4 mg  4 mg Oral Q8H PRN Nan Duque MD        Or    ondansetron (ZOFRAN) injection 4 mg  4 mg IntraVENous Q6H PRN Nan Duque MD        polyethylene glycol (GLYCOLAX) packet 17 g  17 g Oral Daily PRN Nan Duque MD   17 g at 11/14/24 0801    acetaminophen (TYLENOL) tablet 650 mg  650 mg Oral Q6H PRN Nan Duque MD   650 mg at 11/13/24 1425    Or    acetaminophen (TYLENOL) suppository 650 mg  650 mg Rectal Q6H PRN Nan Duque MD        chlorhexidine (PERIDEX) 0.12 % solution 15 mL  15

## 2024-11-14 NOTE — PROGRESS NOTES
RT callled to bedside by Rn and Dr jimenez . Pt having stridorous breathing . Racemic epinefrine given via nebulizer and facemask.   Resp rate 20 use of accessory muscles mildly Herat rate 120s . Spo2 100 via ox. Pt placed on cool mist humidified aerosl face mask at 10 lpm oxygen flow.

## 2024-11-14 NOTE — PROGRESS NOTES
11/14/24 7795   Patient Observation   Patient Observations vorb Extubate from [facility] ventilation Dr jimenez to RT . pt sxd orally evac and oet.  balloon deflated oet and og removed by Rt with Rn at bedside. pt coughed and pt was orally sxd for clear secretions . oxygen via 5 lpm nasal cannula applied to pt. bilateral clear overall diminished breath sounds nasal congestion. pt has moist cough again. pt does not verbalize any words . respirations comfortable at this time .16 resp rate.

## 2024-11-14 NOTE — PROGRESS NOTES
[facility]   Department of Internal Medicine   Internal Medicine Residency  MICU Progress Note    Patient:  Arianna Coleman 89 y.o. female   MRN: [id number]       Date of Service: 11/14/2024    Allergy: Nifedipine    Subjective     Arianna Coleman is a 89 year old female with PMH of orthostasis, atrial fibrillation (pt on Eliquis),CHF, CAD, DVT, HTN, anxiety, and severe dementia that presented on 11/10 for a syncopal episode while watching tv. Patient was reported to be unarousable. Patient home health aide called EMS. Per ED note, on arrival patient was GCS 4, hypotensive and bradycardic. Patient was started on levophed and intubated for airway protection. Patient is ICU Day 4 for vent management. Patient has been weaned off of pressors since 11/11.     Patient was seen and examined this morning at bedside in no acute distress. Overnight, patient was agitated and given 2mg and 4mg of Versed. Patient last received Fentanyl push 25mcg at 0447 this morning. She is arousable, able to open her eyes, but unable to follow commands.         Objective       I & O - 24hr:    Intake/Output Summary (Last 24 hours) at 11/14/2024 1014  Last data filed at 11/14/2024 0700  Gross per 24 hour   Intake 553.66 ml   Output 408 ml   Net 145.66 ml       Physical Exam  Vitals: BP (!) 161/85   Pulse 78   Temp 99.9 °F (37.7 °C) (Bladder)   Resp 17   Ht 1.676 m (5' 6\")   Wt 64.2 kg (141 lb 9.6 oz)   SpO2 100%   BMI 22.85 kg/m²     I & O - 24hr: No intake/output data recorded.   General Appearance: awake, intubated, appears stated age.  HEENT:  Head: Normocephalic, no lesions, without obvious abnormality.  Neck / Thyroid: Supple, no masses, nodes, nodules or enlargement.  Neck: no adenopathy, no carotid bruit, no JVD, supple, symmetrical, trachea midline, and thyroid not enlarged, symmetric, no tenderness/mass/nodules  Lung: clear to auscultation bilaterally  Heart: regular rate and rhythm, S1, S2 normal, no murmur,  35; 30; Q 4 hours; Protein; 1 Dose; Daily   Pain management: as needed  Code status: Full Code   Disposition: Admitted to ICU  Family: updated as available  Check: serial labs   Medication Alterations: N/A  Procedures: N/A  Imaging: reviewed   New Consultations:   VENT: RR   12     / TV      325      / PEEP   5         / FiO2 35%  Ppeak: 15  Pplateau: 11  Compliance: 55  Access: Peripheral   Consults: EP signed off   Drips: none   Nutrition: Tube feedings  ABX: N/A  Completed ABX: N/A  Blood Products: N/A  - PRBC:  - FFP:  - SDP:  - Cryoprecipitate:        Liliya Saucedo, MS3  Attending physician: Dr. Porter     Attending Physician Attestation: Dr. Modesto Porter    Thank you very much for allowing me to see this patient in consultation and follow up.    I personally saw, examined and provided care for the patient. Radiographs, labs and medication list were reviewed by me independently. I spoke with bedside nursing, respiratory therapists and consultants. Critical care services and times documented are independent of procedures and multidisciplinary rounds with Residents. Additionally comprehensive, multidisciplinary rounds were conducted with the MICU team. The case was discussed in detail and plans for care were established. Review of Residents documentation was conducted and revisions were made as appropriate. I agree with the the above documented information.     Current Facility-Administered Medications   Medication Dose Route Frequency Provider Last Rate Last Admin    fentaNYL (SUBLIMAZE) injection 25 mcg  25 mcg IntraVENous Q1H PRN Bobby Bergeron MD   25 mcg at 11/14/24 0447    sodium chloride flush 0.9 % injection 5-40 mL  5-40 mL IntraVENous 2 times per day Nan Duque MD   10 mL at 11/13/24 2038    sodium chloride flush 0.9 % injection 5-40 mL  5-40 mL IntraVENous PRN Nan Duque MD        0.9 % sodium chloride infusion   IntraVENous PRN Nan Duque MD        magnesium sulfate 2000 mg in 50  on 11/14/2024  - Post-Extubation Stridor - 11/14/2024, decadron 4 mg IV q 4 hours, racemic epinephrine x 2 on 11/14/2024    Thank you for allowing me to participate in the care of this patient.    Care reviewed with nursing staff, medical and surgical specialty care, primary care and the patient's family as available. Restraints are ordered when the patient can do harm to him/herself by pulling out devices.    Critical Care Time: 35 minutes excluding procedures    Modesto Porter M.D.    Modesto Porter MD  11/14/2024  1:21 PM

## 2024-11-14 NOTE — PROGRESS NOTES
11/14/24 1200   Weaning Parameters   Spontaneous Breathing Trial Complete Yes   Respiratory Rate Observed 24   Ve 7.4      RSBI 64   NIF -34   VC   (pt not following request for vc maneuver)   Muñoz Agitation Sedation Scale (RASS) +1

## 2024-11-14 NOTE — PLAN OF CARE
Problem: Chronic Conditions and Co-morbidities  Goal: Patient's chronic conditions and co-morbidity symptoms are monitored and maintained or improved  Outcome: Not Progressing     Problem: Discharge Planning  Goal: Discharge to home or other facility with appropriate resources  Outcome: Not Progressing     Problem: Neurosensory - Adult  Goal: Achieves stable or improved neurological status  Outcome: Not Progressing  Flowsheets (Taken 11/13/2024 2000)  Achieves stable or improved neurological status:   Assess for and report changes in neurological status   Initiate measures to prevent increased intracranial pressure   Maintain blood pressure and fluid volume within ordered parameters to optimize cerebral perfusion and minimize risk of hemorrhage   Monitor temperature, glucose, and sodium. Initiate appropriate interventions as ordered     Problem: Respiratory - Adult  Goal: Achieves optimal ventilation and oxygenation  Outcome: Not Progressing  Flowsheets (Taken 11/13/2024 2000)  Achieves optimal ventilation and oxygenation:   Assess for changes in respiratory status   Assess for changes in mentation and behavior   Position to facilitate oxygenation and minimize respiratory effort   Oxygen supplementation based on oxygen saturation or arterial blood gases   Encourage broncho-pulmonary hygiene including cough, deep breathe, incentive spirometry   Initiate smoking cessation protocol as indicated   Assess the need for suctioning and aspirate as needed   Respiratory therapy support as indicated   Assess and instruct to report shortness of breath or any respiratory difficulty     Problem: Musculoskeletal - Adult  Goal: Return mobility to safest level of function  Outcome: Not Progressing  Goal: Return ADL status to a safe level of function  Outcome: Not Progressing      Problem: Safety - Adult  Goal: Free from fall injury  Outcome: Progressing     Problem: ABCDS Injury Assessment  Goal: Absence of physical  injury  Outcome: Progressing     Problem: Skin/Tissue Integrity  Goal: Absence of new skin breakdown  Description: 1.  Monitor for areas of redness and/or skin breakdown  2.  Assess vascular access sites hourly  3.  Every 4-6 hours minimum:  Change oxygen saturation probe site  4.  Every 4-6 hours:  If on nasal continuous positive airway pressure, respiratory therapy assess nares and determine need for appliance change or resting period.  Outcome: Progressing     Problem: Pain  Goal: Verbalizes/displays adequate comfort level or baseline comfort level  Outcome: Progressing  Flowsheets (Taken 11/13/2024 2000)  Verbalizes/displays adequate comfort level or baseline comfort level:   Encourage patient to monitor pain and request assistance   Assess pain using appropriate pain scale   Administer analgesics based on type and severity of pain and evaluate response   Implement non-pharmacological measures as appropriate and evaluate response   Consider cultural and social influences on pain and pain management   Notify Licensed Independent Practitioner if interventions unsuccessful or patient reports new pain     Problem: Safety - Medical Restraint  Goal: Remains free of injury from restraints (Restraint for Interference with Medical Device)  Description: INTERVENTIONS:  1. Determine that other, less restrictive measures have been tried or would not be effective before applying the restraint  2. Evaluate the patient's condition at the time of restraint application  3. Inform patient/family regarding the reason for restraint  4. Q2H: Monitor safety, psychosocial status, comfort, nutrition and hydration  Outcome: Progressing  Flowsheets (Taken 11/13/2024 2000)  Remains free of injury from restraints (restraint for interference with medical device):   Determine that other, less restrictive measures have been tried or would not be effective before applying the restraint   Evaluate the patient's condition at the time of  (Taken 11/13/2024 2000)  Urinary catheter remains patent: Assess patency of urinary catheter     Problem: Infection - Adult  Goal: Absence of infection at discharge  Outcome: Progressing  Goal: Absence of infection during hospitalization  Outcome: Progressing  Goal: Absence of fever/infection during anticipated neutropenic period  Outcome: Progressing     Problem: Metabolic/Fluid and Electrolytes - Adult  Goal: Electrolytes maintained within normal limits  Outcome: Progressing  Goal: Hemodynamic stability and optimal renal function maintained  Outcome: Progressing  Goal: Glucose maintained within prescribed range  Outcome: Progressing     Problem: Hematologic - Adult  Goal: Maintains hematologic stability  Outcome: Progressing

## 2024-11-14 NOTE — PROGRESS NOTES
[facility]  Department of Internal Medicine   Internal Medicine Residency   MICU Progress Note    Patient:  Arianna Coleman 89 y.o. female  MRN: [id number]     Date of Service: 11/14/2024    Allergy: Nifedipine    Subjective     Patient seen and examined at bedside today. She is still intubated but off sedation. She opens eye to voice and follow commands. Plan is to extubate her today.     24h change: none  ROS: Denies Fever/chills/CP/SOB/N/V/D/C/Dysuria/Blood in stool or urine  Objective     VS: BP (!) 154/76   Pulse 78   Temp 99.7 °F (37.6 °C) (Bladder)   Resp 24   Ht 1.676 m (5' 6\")   Wt 64.2 kg (141 lb 9.6 oz)   SpO2 100%   BMI 22.85 kg/m²           I & O - 24hr:   Intake/Output Summary (Last 24 hours) at 11/14/2024 1053  Last data filed at 11/14/2024 0700  Gross per 24 hour   Intake 553.66 ml   Output 408 ml   Net 145.66 ml       Physical Exam:  General Appearance:  intubated, off sedation  Neck: no JVD  Lung: clear to auscultation bilaterally  Heart: regular rate and rhythm, S1, S2 normal, no murmur, click, rub or gallop  Abdomen: soft, non-tender; bowel sounds normal; no masses,  no organomegaly  Extremities:  extremities normal, atraumatic, no cyanosis or edema  Musculoskeletal: No joint swelling, no muscle tenderness. ROM normal in all joints of extremities.   Neurologic: Mental status: awakens to voice but does not follow commands.     Lines     site day    Art line   None    TLC None    PICC None    Hemoaccess None      Mechanical Ventilation:  Mode: A/C PRCV  TV:325 ml RR: 12  PEEP 5 cmH2O FiO2 35  Pplat: 11 Ppeak 15 Cs: 55   ABG:     Lab Results   Component Value Date/Time    PH 7.417 11/14/2024 04:58 AM    PCO2 37.8 11/14/2024 04:58 AM    PO2 142.2 11/14/2024 04:58 AM    HCO3 23.8 11/14/2024 04:58 AM    BE -0.5 11/14/2024 04:58 AM    THB 12.3 11/14/2024 04:58 AM    O2SAT 99.1 11/14/2024 04:58 AM        Medications     Infusions: (Fluid, Sedation, Vasopressors)  IVF:  11/14/2024, decadron 4 mg IV q 4 hours, racemic epinephrine x 2 on 11/14/2024    Thank you for allowing me to participate in the care of this patient.    Care reviewed with nursing staff, medical and surgical specialty care, primary care and the patient's family as available. Restraints are ordered when the patient can do harm to him/herself by pulling out devices.    Critical Care Time: 35 minutes excluding procedures    Modesto Porter M.D.    Modesto Porter MD  11/14/2024  1:21 PM

## 2024-11-15 ENCOUNTER — APPOINTMENT (OUTPATIENT)
Dept: GENERAL RADIOLOGY | Age: 89
End: 2024-11-15
Payer: COMMERCIAL

## 2024-11-15 LAB
AADO2: 15.5 MMHG
ANION GAP SERPL CALCULATED.3IONS-SCNC: 9 MMOL/L (ref 7–16)
B.E.: -0.6 MMOL/L (ref -3–3)
BASOPHILS # BLD: 0 K/UL (ref 0–0.2)
BASOPHILS NFR BLD: 0 % (ref 0–2)
BUN SERPL-MCNC: 25 MG/DL (ref 6–23)
CALCIUM SERPL-MCNC: 9 MG/DL (ref 8.6–10.2)
CHLORIDE SERPL-SCNC: 107 MMOL/L (ref 98–107)
CO2 SERPL-SCNC: 23 MMOL/L (ref 22–29)
COHB: 0.4 % (ref 0–1.5)
CREAT SERPL-MCNC: 0.8 MG/DL (ref 0.5–1)
CRITICAL: ABNORMAL
DATE ANALYZED: ABNORMAL
DATE OF COLLECTION: ABNORMAL
EOSINOPHIL # BLD: 0 K/UL (ref 0.05–0.5)
EOSINOPHILS RELATIVE PERCENT: 0 % (ref 0–6)
ERYTHROCYTE [DISTWIDTH] IN BLOOD BY AUTOMATED COUNT: 14 % (ref 11.5–15)
FIO2: 30 %
GFR, ESTIMATED: 73 ML/MIN/1.73M2
GLUCOSE BLD-MCNC: 112 MG/DL (ref 74–99)
GLUCOSE BLD-MCNC: 123 MG/DL (ref 74–99)
GLUCOSE BLD-MCNC: 141 MG/DL (ref 74–99)
GLUCOSE SERPL-MCNC: 136 MG/DL (ref 74–99)
HCO3: 22.8 MMOL/L (ref 22–26)
HCT VFR BLD AUTO: 36 % (ref 34–48)
HGB BLD-MCNC: 11.7 G/DL (ref 11.5–15.5)
HHB: 0.7 % (ref 0–5)
LAB: ABNORMAL
LYMPHOCYTES NFR BLD: 0.54 K/UL (ref 1.5–4)
LYMPHOCYTES RELATIVE PERCENT: 6 % (ref 20–42)
Lab: 450
MAGNESIUM SERPL-MCNC: 2.4 MG/DL (ref 1.6–2.6)
MCH RBC QN AUTO: 30 PG (ref 26–35)
MCHC RBC AUTO-ENTMCNC: 32.5 G/DL (ref 32–34.5)
MCV RBC AUTO: 92.3 FL (ref 80–99.9)
METHB: 0.4 % (ref 0–1.5)
MODE: ABNORMAL
MONOCYTES NFR BLD: 0.38 K/UL (ref 0.1–0.95)
MONOCYTES NFR BLD: 4 % (ref 2–12)
NEUTROPHILS NFR BLD: 90 % (ref 43–80)
NEUTS SEG NFR BLD: 7.98 K/UL (ref 1.8–7.3)
O2 SATURATION: 99.3 % (ref 92–98.5)
O2HB: 98.5 % (ref 94–97)
OPERATOR ID: ABNORMAL
PATIENT TEMP: 37 C
PCO2: 33.9 MMHG (ref 35–45)
PEEP/CPAP: 8 CMH2O
PFO2: 5.29 MMHG/%
PH BLOOD GAS: 7.45 (ref 7.35–7.45)
PHOSPHATE SERPL-MCNC: 3.2 MG/DL (ref 2.5–4.5)
PLATELET # BLD AUTO: 209 K/UL (ref 130–450)
PMV BLD AUTO: 9.5 FL (ref 7–12)
PO2: 158.6 MMHG (ref 75–100)
POTASSIUM SERPL-SCNC: 5 MMOL/L (ref 3.5–5)
PS: 12 CMH20
RBC # BLD AUTO: 3.9 M/UL (ref 3.5–5.5)
RBC # BLD: ABNORMAL 10*6/UL
RI(T): 0.1
SODIUM SERPL-SCNC: 139 MMOL/L (ref 132–146)
SOURCE, BLOOD GAS: ABNORMAL
THB: 12.8 G/DL (ref 11.5–16.5)
TIME ANALYZED: 504
WBC OTHER # BLD: 8.9 K/UL (ref 4.5–11.5)

## 2024-11-15 PROCEDURE — 6360000002 HC RX W HCPCS

## 2024-11-15 PROCEDURE — 2580000003 HC RX 258

## 2024-11-15 PROCEDURE — 94660 CPAP INITIATION&MGMT: CPT

## 2024-11-15 PROCEDURE — 6360000002 HC RX W HCPCS: Performed by: INTERNAL MEDICINE

## 2024-11-15 PROCEDURE — 74018 RADEX ABDOMEN 1 VIEW: CPT

## 2024-11-15 PROCEDURE — 99232 SBSQ HOSP IP/OBS MODERATE 35: CPT | Performed by: STUDENT IN AN ORGANIZED HEALTH CARE EDUCATION/TRAINING PROGRAM

## 2024-11-15 PROCEDURE — 6370000000 HC RX 637 (ALT 250 FOR IP)

## 2024-11-15 PROCEDURE — 85025 COMPLETE CBC W/AUTO DIFF WBC: CPT

## 2024-11-15 PROCEDURE — 2000000000 HC ICU R&B

## 2024-11-15 PROCEDURE — 83735 ASSAY OF MAGNESIUM: CPT

## 2024-11-15 PROCEDURE — 80048 BASIC METABOLIC PNL TOTAL CA: CPT

## 2024-11-15 PROCEDURE — 99291 CRITICAL CARE FIRST HOUR: CPT | Performed by: INTERNAL MEDICINE

## 2024-11-15 PROCEDURE — 82805 BLOOD GASES W/O2 SATURATION: CPT

## 2024-11-15 PROCEDURE — 5A0935A ASSISTANCE WITH RESPIRATORY VENTILATION, LESS THAN 24 CONSECUTIVE HOURS, HIGH NASAL FLOW/VELOCITY: ICD-10-PCS

## 2024-11-15 PROCEDURE — 82962 GLUCOSE BLOOD TEST: CPT

## 2024-11-15 PROCEDURE — 2700000000 HC OXYGEN THERAPY PER DAY

## 2024-11-15 PROCEDURE — 84100 ASSAY OF PHOSPHORUS: CPT

## 2024-11-15 RX ORDER — CLONIDINE HYDROCHLORIDE 0.1 MG/1
0.1 TABLET ORAL 3 TIMES DAILY
Status: DISCONTINUED | OUTPATIENT
Start: 2024-11-15 | End: 2024-11-16

## 2024-11-15 RX ORDER — SENNOSIDES A AND B 8.6 MG/1
1 TABLET, FILM COATED ORAL NIGHTLY
Status: DISCONTINUED | OUTPATIENT
Start: 2024-11-15 | End: 2024-11-16

## 2024-11-15 RX ORDER — HALOPERIDOL 5 MG/ML
2 INJECTION INTRAMUSCULAR EVERY 12 HOURS PRN
Status: DISCONTINUED | OUTPATIENT
Start: 2024-11-15 | End: 2024-11-22 | Stop reason: HOSPADM

## 2024-11-15 RX ORDER — LOSARTAN POTASSIUM 50 MG/1
50 TABLET ORAL DAILY
Status: DISCONTINUED | OUTPATIENT
Start: 2024-11-16 | End: 2024-11-22 | Stop reason: HOSPADM

## 2024-11-15 RX ORDER — HYDROXYZINE HYDROCHLORIDE 50 MG/ML
25 INJECTION, SOLUTION INTRAMUSCULAR ONCE
Status: COMPLETED | OUTPATIENT
Start: 2024-11-15 | End: 2024-11-15

## 2024-11-15 RX ORDER — POLYETHYLENE GLYCOL 3350 17 G/17G
17 POWDER, FOR SOLUTION ORAL DAILY
Status: DISCONTINUED | OUTPATIENT
Start: 2024-11-15 | End: 2024-11-16

## 2024-11-15 RX ORDER — LOSARTAN POTASSIUM 25 MG/1
25 TABLET ORAL ONCE
Status: COMPLETED | OUTPATIENT
Start: 2024-11-15 | End: 2024-11-15

## 2024-11-15 RX ADMIN — LOSARTAN POTASSIUM 25 MG: 25 TABLET, FILM COATED ORAL at 09:59

## 2024-11-15 RX ADMIN — CLONIDINE HYDROCHLORIDE 0.1 MG: 0.1 TABLET ORAL at 11:25

## 2024-11-15 RX ADMIN — SODIUM CHLORIDE, PRESERVATIVE FREE 10 ML: 5 INJECTION INTRAVENOUS at 09:06

## 2024-11-15 RX ADMIN — ACETAMINOPHEN 650 MG: 325 TABLET ORAL at 13:26

## 2024-11-15 RX ADMIN — DEXAMETHASONE SODIUM PHOSPHATE 4 MG: 4 INJECTION INTRA-ARTICULAR; INTRALESIONAL; INTRAMUSCULAR; INTRAVENOUS; SOFT TISSUE at 20:31

## 2024-11-15 RX ADMIN — Medication 15 ML: at 09:05

## 2024-11-15 RX ADMIN — DEXAMETHASONE SODIUM PHOSPHATE 4 MG: 4 INJECTION INTRA-ARTICULAR; INTRALESIONAL; INTRAMUSCULAR; INTRAVENOUS; SOFT TISSUE at 05:07

## 2024-11-15 RX ADMIN — SODIUM CHLORIDE, PRESERVATIVE FREE 40 MG: 5 INJECTION INTRAVENOUS at 09:05

## 2024-11-15 RX ADMIN — DEXAMETHASONE SODIUM PHOSPHATE 4 MG: 4 INJECTION INTRA-ARTICULAR; INTRALESIONAL; INTRAMUSCULAR; INTRAVENOUS; SOFT TISSUE at 01:28

## 2024-11-15 RX ADMIN — APIXABAN 2.5 MG: 5 TABLET, FILM COATED ORAL at 21:29

## 2024-11-15 RX ADMIN — DEXAMETHASONE SODIUM PHOSPHATE 4 MG: 4 INJECTION INTRA-ARTICULAR; INTRALESIONAL; INTRAMUSCULAR; INTRAVENOUS; SOFT TISSUE at 09:05

## 2024-11-15 RX ADMIN — LOSARTAN POTASSIUM 25 MG: 25 TABLET, FILM COATED ORAL at 13:27

## 2024-11-15 RX ADMIN — POLYETHYLENE GLYCOL 3350 17 G: 17 POWDER, FOR SOLUTION ORAL at 15:54

## 2024-11-15 RX ADMIN — HYDROXYZINE HYDROCHLORIDE 25 MG: 50 INJECTION, SOLUTION INTRAMUSCULAR at 11:26

## 2024-11-15 RX ADMIN — DEXAMETHASONE SODIUM PHOSPHATE 4 MG: 4 INJECTION INTRA-ARTICULAR; INTRALESIONAL; INTRAMUSCULAR; INTRAVENOUS; SOFT TISSUE at 15:54

## 2024-11-15 RX ADMIN — SENNOSIDES 8.6 MG: 8.6 TABLET, FILM COATED ORAL at 21:29

## 2024-11-15 RX ADMIN — CLONIDINE HYDROCHLORIDE 0.1 MG: 0.1 TABLET ORAL at 21:29

## 2024-11-15 RX ADMIN — SODIUM CHLORIDE, PRESERVATIVE FREE 10 ML: 5 INJECTION INTRAVENOUS at 21:29

## 2024-11-15 RX ADMIN — APIXABAN 2.5 MG: 5 TABLET, FILM COATED ORAL at 09:59

## 2024-11-15 RX ADMIN — DEXAMETHASONE SODIUM PHOSPHATE 4 MG: 4 INJECTION INTRA-ARTICULAR; INTRALESIONAL; INTRAMUSCULAR; INTRAVENOUS; SOFT TISSUE at 12:28

## 2024-11-15 ASSESSMENT — PAIN SCALES - GENERAL
PAINLEVEL_OUTOF10: 1
PAINLEVEL_OUTOF10: 0

## 2024-11-15 ASSESSMENT — PAIN - FUNCTIONAL ASSESSMENT: PAIN_FUNCTIONAL_ASSESSMENT: ACTIVITIES ARE NOT PREVENTED

## 2024-11-15 NOTE — PLAN OF CARE
Problem: Safety - Medical Restraint  Goal: Remains free of injury from restraints (Restraint for Interference with Medical Device)  Description: INTERVENTIONS:  1. Determine that other, less restrictive measures have been tried or would not be effective before applying the restraint  2. Evaluate the patient's condition at the time of restraint application  3. Inform patient/family regarding the reason for restraint  4. Q2H: Monitor safety, psychosocial status, comfort, nutrition and hydration  11/15/2024 1101 by Blanquita Armendariz  Outcome: Progressing

## 2024-11-15 NOTE — CARE COORDINATION
Care Coordination: LOS 5 day. Extubated 11/14/24  5 ltr nc  Agitation today- Vistaril this am,   Haldol this afternoon- restraints Decadron q4h- failed swallow- NGT, TF.   Left VM for primary contact- daughter nia.  Pt has hx of severe dementia, per previous cm, lives at home with son, has aide services at home. Will continue to follow for transition of care needs.    Electronically signed by Anna Brumfield RN on 11/15/2024 at 2:58 PM

## 2024-11-15 NOTE — PLAN OF CARE
Problem: Chronic Conditions and Co-morbidities  Goal: Patient's chronic conditions and co-morbidity symptoms are monitored and maintained or improved  11/15/2024 1054 by Blanquita Armendariz  Outcome: Progressing  Flowsheets (Taken 11/15/2024 0800)  Care Plan - Patient's Chronic Conditions and Co-Morbidity Symptoms are Monitored and Maintained or Improved: Monitor and assess patient's chronic conditions and comorbid symptoms for stability, deterioration, or improvement     Problem: Discharge Planning  Goal: Discharge to home or other facility with appropriate resources  11/15/2024 1054 by Blanquita Armendariz  Outcome: Progressing  Flowsheets (Taken 11/15/2024 0800)  Discharge to home or other facility with appropriate resources: Identify barriers to discharge with patient and caregiver     Problem: Safety - Adult  Goal: Free from fall injury  11/15/2024 1054 by Blanquita Armendariz  Outcome: Progressing     Problem: ABCDS Injury Assessment  Goal: Absence of physical injury  Outcome: Progressing     Problem: Skin/Tissue Integrity  Goal: Absence of new skin breakdown  Description: 1.  Monitor for areas of redness and/or skin breakdown  2.  Assess vascular access sites hourly  3.  Every 4-6 hours minimum:  Change oxygen saturation probe site  4.  Every 4-6 hours:  If on nasal continuous positive airway pressure, respiratory therapy assess nares and determine need for appliance change or resting period.  Outcome: Progressing     Problem: Pain  Goal: Verbalizes/displays adequate comfort level or baseline comfort level  Outcome: Progressing  Flowsheets (Taken 11/15/2024 0800)  Verbalizes/displays adequate comfort level or baseline comfort level: Encourage patient to monitor pain and request assistance     Problem: Safety - Medical Restraint  Goal: Remains free of injury from restraints (Restraint for Interference with Medical Device)  Description: INTERVENTIONS:  1. Determine that other, less restrictive measures have been tried or would  symptoms of infection     Problem: Infection - Adult  Goal: Absence of fever/infection during anticipated neutropenic period  Outcome: Progressing  Flowsheets (Taken 11/15/2024 0800)  Absence of fever/infection during anticipated neutropenic period: Monitor white blood cell count     Problem: Metabolic/Fluid and Electrolytes - Adult  Goal: Electrolytes maintained within normal limits  Outcome: Progressing  Flowsheets (Taken 11/15/2024 0800)  Electrolytes maintained within normal limits: Monitor labs and assess patient for signs and symptoms of electrolyte imbalances     Problem: Metabolic/Fluid and Electrolytes - Adult  Goal: Hemodynamic stability and optimal renal function maintained  Outcome: Progressing  Flowsheets (Taken 11/15/2024 0800)  Hemodynamic stability and optimal renal function maintained: Monitor labs and assess for signs and symptoms of volume excess or deficit     Problem: Metabolic/Fluid and Electrolytes - Adult  Goal: Glucose maintained within prescribed range  Outcome: Progressing  Flowsheets (Taken 11/15/2024 0800)  Glucose maintained within prescribed range: Monitor blood glucose as ordered     Problem: Hematologic - Adult  Goal: Maintains hematologic stability  Outcome: Progressing  Flowsheets (Taken 11/15/2024 0800)  Maintains hematologic stability: Assess for signs and symptoms of bleeding or hemorrhage

## 2024-11-15 NOTE — PROGRESS NOTES
[facility]  Department of Internal Medicine   Internal Medicine Residency   MICU Progress Note    Patient:  Arianna Coleman 89 y.o. female  MRN: [id number]     Date of Service: 11/15/2024    Allergy: Nifedipine    Subjective   Patient seen and examined at bedside this morning. She is seen awake and agitated. She follows commands but is disoriented which is her baseline. Blood pressure was elevated to SBP >200s. Patient failed her bedside swallow evaluation. NG tube in place.       24h change: none  ROS: Denies Fever/chills/CP/SOB/N/V/D/C/Dysuria/Blood in stool or urine  Objective     VS: BP (!) 185/109   Pulse 70   Temp 97.9 °F (36.6 °C) (Temporal)   Resp 15   Ht 1.676 m (5' 6\")   Wt 64.5 kg (142 lb 3.2 oz)   SpO2 100%   BMI 22.95 kg/m²           I & O - 24hr:   Intake/Output Summary (Last 24 hours) at 11/15/2024 1324  Last data filed at 11/15/2024 1000  Gross per 24 hour   Intake 964.51 ml   Output 740 ml   Net 224.51 ml       Physical Exam:  General Appearance: awake, agitated, follows commands, disoriented at baseline  Neck: no JVD  Lung: clear to auscultation bilaterally  Heart: regular rate and rhythm, S1, S2 normal, no murmur, click, rub or gallop  Abdomen: soft, non-tender; bowel sounds normal; no masses,  no organomegaly  Extremities:  extremities normal, atraumatic, no cyanosis or edema  Musculoskeletal: No joint swelling, no muscle tenderness. ROM normal in all joints of extremities.   Neurologic: Mental status: follows commands. Pupils sluggishly reactive to light.     Lines     site day    Art line   None    TLC None    PICC None    Hemoaccess None      Oxygen  5L/min on HFNC   ABG:     Lab Results   Component Value Date/Time    PH 7.446 11/15/2024 04:50 AM    PCO2 33.9 11/15/2024 04:50 AM    PO2 158.6 11/15/2024 04:50 AM    HCO3 22.8 11/15/2024 04:50 AM    BE -0.6 11/15/2024 04:50 AM    THB 12.8 11/15/2024 04:50 AM    O2SAT 99.3 11/15/2024 04:50 AM        Medications  recent):  XR ABDOMEN FOR NG/OG/NE TUBE PLACEMENT 11/15/2024    Narrative  EXAMINATION:  ONE SUPINE XRAY VIEW(S) OF THE ABDOMEN    11/15/2024 9:08 am    COMPARISON:  November 10, 2024    HISTORY:  ORDERING SYSTEM PROVIDED HISTORY: Confirmation of course of NG/OG/NE tube and  location of tip of tube  TECHNOLOGIST PROVIDED HISTORY:  Reason for exam:->Confirmation of course of NG/OG/NE tube and location of tip  of tube  Portable?->Yes    FINDINGS:  Single view upper abdomen and a chest demonstrate the lungs to be  satisfactorily expanded with a orogastric tube in place with the tip 10 cm  below the GE junction in satisfactory position.  The tip is in the upper body  of the stomach.    Impression  Orogastric tube in satisfactory position.      CT scan:   CT Result (most recent):  CT HEAD WO CONTRAST 11/10/2024  Impression  1.  No acute intracranial abnormality.    2.  Signs of deep white matter small vessel disease    3.  Chronic appearing small lacunar infarct adjacent to the right caudate  nucleus.    4.  Acute left maxillary sinusitis        Resident's Assessment and Plan     Neurology:   Acute Encephalopathy likely 2/2 hypoxemia, improving  S/p intubation 11/11/24, extubated on 11/15/24  No structural or metabolic abnormalities seen  Monitor mental status  History of Dementia   Has history of severe dementia with vision and hearing abnormalities   CT head 911/10/24):  No acute intracranial abnormality.  Signs of deep white matter small vessel disease  Chronic appearing small lacunar infarct adjacent to the right caudate nucleus.  Given vistaril 25mg once for agitation.May give Haldol 5mg prn if agitated    Cardiology:   Hypertension, uncontrolled  /100s  Cardio  Recurrent syncope likely 2/2 vasovagal-orthostatic hypotension with supine hypertension  S/p loop recorder placement (7/5/23)  EP recommendations:  Allow permissive hypertension  No arrhythmia on interrogation this admission  Monitor   Paroxysmal atrial

## 2024-11-15 NOTE — PROGRESS NOTES
[facility]   IN-PATIENT SERVICE      Progress Note    11/15/2024    1:49 PM    Name:   Arianna Coleman  MRN:     [id number]     Acct:      [id number]   Room:   [address] [place], [place] [address]A  IP Day:  5  Admit Date:  11/10/2024  6:40 PM    PCP:   Soraya Riddle MD  Code Status:  Full Code    Subjective:     C/C: No chief complaint on file.  syncope  Interval History Status: not changed.         EP s/o, originally consulted for sinus azael and hx of PAF    Extubated on supplemental o2 now      Patient extubated 11/14.   Brief History:     This is a 89-year old -American female with past medical history of severe dementia with vision and hearing problem brought in here due to altered mental status.  Patient lives with home aide and she was watching TV suddenly she passed out and aide called 911.  While she in ER found to have altered mental status and she been intubated to protect airway.  During my encounter her 2 daughter was at the bedside but they do not much about her as they do not live with her.  Her initial blood pressure was low and started on vasopressor and again blood pressure went up then vasopressor taken off.  Blood chemistry insignificant, troponin 29 liver function test normal except albumin 3.4 and blood count and platelet count normal.  CT scan did not show any intracranial abnormality except deep sulci.  It also shows old lacunar infarct and acute sinusitis.       Review of Systems:     As per Hpi     Medications:     Allergies:    Allergies   Allergen Reactions    Nifedipine        Current Meds:   Scheduled Meds:    cloNIDine  0.1 mg Oral TID    [START ON 11/16/2024] losartan  50 mg Oral Daily    dexAMETHasone  4 mg IntraVENous Q4H    racepinephrine HCl  0.5 mL Nebulization Once    sodium chloride flush  5-40 mL IntraVENous 2 times per day    chlorhexidine  15 mL Mouth/Throat BID    apixaban  2.5 mg Oral BID    pantoprazole (PROTONIX) 40 mg in sodium chloride (PF) 0.9 % 10 mL

## 2024-11-16 ENCOUNTER — APPOINTMENT (OUTPATIENT)
Dept: GENERAL RADIOLOGY | Age: 89
End: 2024-11-16
Payer: COMMERCIAL

## 2024-11-16 LAB
ANION GAP SERPL CALCULATED.3IONS-SCNC: 7 MMOL/L (ref 7–16)
BASOPHILS # BLD: 0 K/UL (ref 0–0.2)
BASOPHILS NFR BLD: 0 % (ref 0–2)
BUN SERPL-MCNC: 35 MG/DL (ref 6–23)
CALCIUM SERPL-MCNC: 8 MG/DL (ref 8.6–10.2)
CHLORIDE SERPL-SCNC: 110 MMOL/L (ref 98–107)
CO2 SERPL-SCNC: 24 MMOL/L (ref 22–29)
CREAT SERPL-MCNC: 0.8 MG/DL (ref 0.5–1)
EOSINOPHIL # BLD: 0 K/UL (ref 0.05–0.5)
EOSINOPHILS RELATIVE PERCENT: 0 % (ref 0–6)
ERYTHROCYTE [DISTWIDTH] IN BLOOD BY AUTOMATED COUNT: 13.9 % (ref 11.5–15)
GFR, ESTIMATED: 75 ML/MIN/1.73M2
GLUCOSE BLD-MCNC: 160 MG/DL (ref 74–99)
GLUCOSE BLD-MCNC: 165 MG/DL (ref 74–99)
GLUCOSE SERPL-MCNC: 129 MG/DL (ref 74–99)
HCT VFR BLD AUTO: 33.1 % (ref 34–48)
HGB BLD-MCNC: 10.8 G/DL (ref 11.5–15.5)
IMM GRANULOCYTES # BLD AUTO: 0.05 K/UL (ref 0–0.58)
IMM GRANULOCYTES NFR BLD: 1 % (ref 0–5)
LYMPHOCYTES NFR BLD: 0.61 K/UL (ref 1.5–4)
LYMPHOCYTES RELATIVE PERCENT: 9 % (ref 20–42)
MAGNESIUM SERPL-MCNC: 2.3 MG/DL (ref 1.6–2.6)
MCH RBC QN AUTO: 30.6 PG (ref 26–35)
MCHC RBC AUTO-ENTMCNC: 32.6 G/DL (ref 32–34.5)
MCV RBC AUTO: 93.8 FL (ref 80–99.9)
MONOCYTES NFR BLD: 0.43 K/UL (ref 0.1–0.95)
MONOCYTES NFR BLD: 6 % (ref 2–12)
NEUTROPHILS NFR BLD: 85 % (ref 43–80)
NEUTS SEG NFR BLD: 6.12 K/UL (ref 1.8–7.3)
PHOSPHATE SERPL-MCNC: 2.4 MG/DL (ref 2.5–4.5)
PLATELET # BLD AUTO: 187 K/UL (ref 130–450)
PMV BLD AUTO: 9.5 FL (ref 7–12)
POTASSIUM SERPL-SCNC: 4.1 MMOL/L (ref 3.5–5)
RBC # BLD AUTO: 3.53 M/UL (ref 3.5–5.5)
SODIUM SERPL-SCNC: 141 MMOL/L (ref 132–146)
WBC OTHER # BLD: 7.2 K/UL (ref 4.5–11.5)

## 2024-11-16 PROCEDURE — 83735 ASSAY OF MAGNESIUM: CPT

## 2024-11-16 PROCEDURE — 6370000000 HC RX 637 (ALT 250 FOR IP)

## 2024-11-16 PROCEDURE — 80048 BASIC METABOLIC PNL TOTAL CA: CPT

## 2024-11-16 PROCEDURE — 2700000000 HC OXYGEN THERAPY PER DAY

## 2024-11-16 PROCEDURE — 71045 X-RAY EXAM CHEST 1 VIEW: CPT

## 2024-11-16 PROCEDURE — 6360000002 HC RX W HCPCS

## 2024-11-16 PROCEDURE — 2580000003 HC RX 258

## 2024-11-16 PROCEDURE — 84100 ASSAY OF PHOSPHORUS: CPT

## 2024-11-16 PROCEDURE — 94660 CPAP INITIATION&MGMT: CPT

## 2024-11-16 PROCEDURE — 99232 SBSQ HOSP IP/OBS MODERATE 35: CPT | Performed by: STUDENT IN AN ORGANIZED HEALTH CARE EDUCATION/TRAINING PROGRAM

## 2024-11-16 PROCEDURE — 2500000003 HC RX 250 WO HCPCS

## 2024-11-16 PROCEDURE — 6360000002 HC RX W HCPCS: Performed by: INTERNAL MEDICINE

## 2024-11-16 PROCEDURE — 99291 CRITICAL CARE FIRST HOUR: CPT | Performed by: INTERNAL MEDICINE

## 2024-11-16 PROCEDURE — 2000000000 HC ICU R&B

## 2024-11-16 PROCEDURE — 85025 COMPLETE CBC W/AUTO DIFF WBC: CPT

## 2024-11-16 PROCEDURE — 82962 GLUCOSE BLOOD TEST: CPT

## 2024-11-16 RX ORDER — HYDRALAZINE HYDROCHLORIDE 20 MG/ML
5 INJECTION INTRAMUSCULAR; INTRAVENOUS EVERY 4 HOURS PRN
Status: DISCONTINUED | OUTPATIENT
Start: 2024-11-16 | End: 2024-11-17

## 2024-11-16 RX ORDER — CLONIDINE HYDROCHLORIDE 0.1 MG/1
0.1 TABLET ORAL ONCE
Status: COMPLETED | OUTPATIENT
Start: 2024-11-16 | End: 2024-11-16

## 2024-11-16 RX ORDER — SENNOSIDES A AND B 8.6 MG/1
1 TABLET, FILM COATED ORAL NIGHTLY PRN
Status: DISCONTINUED | OUTPATIENT
Start: 2024-11-16 | End: 2024-11-22 | Stop reason: HOSPADM

## 2024-11-16 RX ORDER — POLYETHYLENE GLYCOL 3350 17 G/17G
17 POWDER, FOR SOLUTION ORAL DAILY PRN
Status: DISCONTINUED | OUTPATIENT
Start: 2024-11-16 | End: 2024-11-22 | Stop reason: HOSPADM

## 2024-11-16 RX ADMIN — DEXAMETHASONE SODIUM PHOSPHATE 4 MG: 4 INJECTION INTRA-ARTICULAR; INTRALESIONAL; INTRAMUSCULAR; INTRAVENOUS; SOFT TISSUE at 05:29

## 2024-11-16 RX ADMIN — HYDRALAZINE HYDROCHLORIDE 5 MG: 20 INJECTION INTRAMUSCULAR; INTRAVENOUS at 15:31

## 2024-11-16 RX ADMIN — APIXABAN 2.5 MG: 5 TABLET, FILM COATED ORAL at 09:29

## 2024-11-16 RX ADMIN — LOSARTAN POTASSIUM 50 MG: 25 TABLET, FILM COATED ORAL at 09:29

## 2024-11-16 RX ADMIN — CLONIDINE HYDROCHLORIDE 0.1 MG: 0.1 TABLET ORAL at 09:29

## 2024-11-16 RX ADMIN — SODIUM CHLORIDE, PRESERVATIVE FREE 10 ML: 5 INJECTION INTRAVENOUS at 20:14

## 2024-11-16 RX ADMIN — CLONIDINE HYDROCHLORIDE 0.1 MG: 0.1 TABLET ORAL at 12:15

## 2024-11-16 RX ADMIN — HALOPERIDOL LACTATE 2 MG: 5 INJECTION, SOLUTION INTRAMUSCULAR at 09:28

## 2024-11-16 RX ADMIN — APIXABAN 2.5 MG: 5 TABLET, FILM COATED ORAL at 21:03

## 2024-11-16 RX ADMIN — FENTANYL CITRATE 25 MCG: 50 INJECTION INTRAMUSCULAR; INTRAVENOUS at 09:54

## 2024-11-16 RX ADMIN — DEXAMETHASONE SODIUM PHOSPHATE 4 MG: 4 INJECTION INTRA-ARTICULAR; INTRALESIONAL; INTRAMUSCULAR; INTRAVENOUS; SOFT TISSUE at 09:29

## 2024-11-16 RX ADMIN — Medication 15 ML: at 09:30

## 2024-11-16 RX ADMIN — SODIUM PHOSPHATE, MONOBASIC, MONOHYDRATE AND SODIUM PHOSPHATE, DIBASIC, ANHYDROUS 15 MMOL: 142; 276 INJECTION, SOLUTION INTRAVENOUS at 09:35

## 2024-11-16 RX ADMIN — SODIUM CHLORIDE, PRESERVATIVE FREE 40 MG: 5 INJECTION INTRAVENOUS at 09:30

## 2024-11-16 RX ADMIN — DEXAMETHASONE SODIUM PHOSPHATE 4 MG: 4 INJECTION INTRA-ARTICULAR; INTRALESIONAL; INTRAMUSCULAR; INTRAVENOUS; SOFT TISSUE at 00:54

## 2024-11-16 RX ADMIN — SODIUM CHLORIDE, PRESERVATIVE FREE 10 ML: 5 INJECTION INTRAVENOUS at 09:30

## 2024-11-16 RX ADMIN — Medication 15 ML: at 20:14

## 2024-11-16 ASSESSMENT — PAIN SCALES - PAIN ASSESSMENT IN ADVANCED DEMENTIA (PAINAD)
BREATHING: NORMAL
FACIALEXPRESSION: FACIAL GRIMACING
CONSOLABILITY: NO NEED TO CONSOLE
BREATHING: OCCASIONAL LABORED BREATHING, SHORT PERIOD OF HYPERVENTILATION
TOTALSCORE: 0
NEGVOCALIZATION: REPEATED TROUBLED CALLING OUT, LOUD MOANING/GROANING, CRYING
BODYLANGUAGE: TENSE, DISTRESSED PACING, FIDGETING
FACIALEXPRESSION: SMILING OR INEXPRESSIVE
CONSOLABILITY: UNABLE TO CONSOLE, DISTRACT OR REASSURE
BODYLANGUAGE: RELAXED
TOTALSCORE: 8

## 2024-11-16 ASSESSMENT — PAIN SCALES - GENERAL: PAINLEVEL_OUTOF10: 0

## 2024-11-16 NOTE — PLAN OF CARE
Cardiology consulted for hypertension and history of frequent episodes of syncope.  Patient is known to Dr. Mares, who saw the patient in April and May 2023.  Will defer consult to Dr. Mares.  If [facility] cardiology is needed, please reconsult.    Aaron Lopez, FRANCES - CNP

## 2024-11-16 NOTE — PROGRESS NOTES
[facility]   IN-PATIENT SERVICE      Progress Note    11/16/2024    11:26 AM    Name:   Arianna Coleman  MRN:     [id number]     Acct:      [id number]   [facility]  IP Day:  6  Admit Date:  11/10/2024  6:40 PM    PCP:   Soraya Riddle MD  Code Status:  Full Code    Subjective:     C/C: No chief complaint on file.  syncope  Interval History Status: not changed.         EP s/o, originally consulted for sinus azael and hx of PAF    Extubated on supplemental o2 now      Patient extubated 11/14.   Brief History:     This is a 89-year old -American female with past medical history of severe dementia with vision and hearing problem brought in here due to altered mental status.  Patient lives with home aide and she was watching TV suddenly she passed out and aide called 911.  While she in ER found to have altered mental status and she been intubated to protect airway.  During my encounter her 2 daughter was at the bedside but they do not much about her as they do not live with her.  Her initial blood pressure was low and started on vasopressor and again blood pressure went up then vasopressor taken off.  Blood chemistry insignificant, troponin 29 liver function test normal except albumin 3.4 and blood count and platelet count normal.  CT scan did not show any intracranial abnormality except deep sulci.  It also shows old lacunar infarct and acute sinusitis.       Review of Systems:     As per Hpi     Medications:     Allergies:    Allergies   Allergen Reactions    Nifedipine        Current Meds:   Scheduled Meds:    sodium phosphate IVPB (PERIPHERAL line)  15 mmol IntraVENous Once    cloNIDine  0.1 mg Oral TID    losartan  50 mg Oral Daily    racepinephrine HCl  0.5 mL Nebulization Once    sodium chloride flush  5-40 mL IntraVENous 2 times per day    chlorhexidine  15 mL Mouth/Throat BID    apixaban  2.5 mg Oral BID    pantoprazole (PROTONIX) 40 mg in sodium chloride (PF) 0.9  non tender without mass   Pulmonary/Chest: wheezing b/l  Cardiovascular: normal rate, normal S1 and S2 and no carotid bruits  Abdomen: soft, non-tender, non-distended, normal bowel sounds, no masses or organomegaly  Extremities: no cyanosis, no clubbing and no edema      Assessment:        Hospital Problems             Last Modified POA    * (Principal) AMS (altered mental status) 11/10/2024 Yes    Status post placement of implantable loop recorder 11/11/2024 Yes       Plan:        Syncope-secondary to orthostatic, vasovagal, versus cardiogenic.  EP following.  Made for ICU.  Patient extubated 11/14.  Currently on pressors  Sinus bradycardia-resolved. hold all AV kellen blockade.  Off amiodarone.  EP follow-up.Avoid using Hydralazine and using ACE-I or ARB for BP treatment.   PAF-continue Eliquis 2.5 twice daily.  Off of beta-blocker due to bradycardia.  History of nonischemic cardiomyopathy-LV EF 53% on TTE 4/24/23   Acute encephalopathy-in the setting of history of dementia.  Intubated and sedated at this time.  Management per ICU.  CT head WO contrast showed no acute process but did demonstrate signs of deep white matter small vessel disease and chronic appearing small lacunar infarcts adjacent to the right caudate nucleus.   GERD-continue PPI  Acute resp  failure- pulm following. Cont duonebs and inhalers. Oxygenation and mucus mobilization therapies.     Greater than 35 minutes metaphysical exam, chart, assessment and plan.    Miriam Morley MD  11/16/2024  11:26 AM

## 2024-11-16 NOTE — PROGRESS NOTES
tube  ATB:   Antibiotics  Days   none              Skin issues: wound right lateral ankle   Patient currently has   Urinary cath 2 way F16  Isolation VRE, contact  Bilateral wrist restraints  DVT prophylaxis (eliquis 2.5 mg ) / GI prophylaxis (protonix 40 mg daily)  Palliative care consult   Labs   CBC with Differential:    Lab Results   Component Value Date/Time    WBC 7.2 11/16/2024 04:25 AM    RBC 3.53 11/16/2024 04:25 AM    HGB 10.8 11/16/2024 04:25 AM    HCT 33.1 11/16/2024 04:25 AM     11/16/2024 04:25 AM    MCV 93.8 11/16/2024 04:25 AM    MCH 30.6 11/16/2024 04:25 AM    MCHC 32.6 11/16/2024 04:25 AM    RDW 13.9 11/16/2024 04:25 AM    LYMPHOPCT 9 11/16/2024 04:25 AM    MONOPCT 6 11/16/2024 04:25 AM    EOSPCT 0 11/16/2024 04:25 AM    BASOPCT 0 11/16/2024 04:25 AM    MONOSABS 0.43 11/16/2024 04:25 AM    LYMPHSABS 0.61 11/16/2024 04:25 AM    EOSABS 0.00 11/16/2024 04:25 AM    BASOSABS 0.00 11/16/2024 04:25 AM     CMP:    Lab Results   Component Value Date/Time     11/16/2024 04:25 AM    K 4.1 11/16/2024 04:25 AM    K 4.5 07/06/2023 06:31 PM     11/16/2024 04:25 AM    CO2 24 11/16/2024 04:25 AM    BUN 35 11/16/2024 04:25 AM    CREATININE 0.8 11/16/2024 04:25 AM    GFRAA >60 09/13/2022 10:40 AM    LABGLOM 75 11/16/2024 04:25 AM    LABGLOM >60 09/25/2023 10:26 PM    GLUCOSE 129 11/16/2024 04:25 AM    GLUCOSE 102 11/12/2010 01:50 PM    CALCIUM 8.0 11/16/2024 04:25 AM    BILITOT 0.6 11/10/2024 06:57 PM    ALKPHOS 79 11/10/2024 06:57 PM    AST 18 11/10/2024 06:57 PM    ALT 11 11/10/2024 06:57 PM     ABG:    Lab Results   Component Value Date/Time    PH 7.446 11/15/2024 04:50 AM    PCO2 33.9 11/15/2024 04:50 AM    PO2 158.6 11/15/2024 04:50 AM    HCO3 22.8 11/15/2024 04:50 AM    BE -0.6 11/15/2024 04:50 AM    O2SAT 99.3 11/15/2024 04:50 AM       Imaging Studies:  CXR:   Xray Result (most recent):  XR CHEST PORTABLE 11/16/2024    Narrative  EXAMINATION:  ONE XRAY VIEW OF THE CHEST    11/16/2024 8:04  am    COMPARISON:  11/14/2024    HISTORY:  ORDERING SYSTEM PROVIDED HISTORY: intubated  TECHNOLOGIST PROVIDED HISTORY:  Reason for exam:->intubated    FINDINGS:  Enteric tube projects below the field of view. Mild central bronchial wall  thickening and chronic interstitial coarsening.  Bibasilar subsegmental  atelectasis or scarring.  Tortuous atherosclerotic aorta.  No visible  endotracheal tube today.    Impression  No visible endotracheal tube today.      CT scan:   CT Result (most recent):  CT HEAD WO CONTRAST 11/10/2024  Impression  1.  No acute intracranial abnormality.    2.  Signs of deep white matter small vessel disease    3.  Chronic appearing small lacunar infarct adjacent to the right caudate  nucleus.    4.  Acute left maxillary sinusitis        Resident's Assessment and Plan     Neurology:   Acute Encephalopathy likely 2/2 hypoxemia, improving  S/p intubation 11/11/24, extubated on 11/15/24  No structural or metabolic abnormalities seen  Monitor mental status  History of Dementia   Has history of severe dementia with vision and hearing abnormalities   CT head 911/10/24):  No acute intracranial abnormality.  Signs of deep white matter small vessel disease  Chronic appearing small lacunar infarct adjacent to the right caudate nucleus.  Given vistaril 25mg once for agitation.May give Haldol 5mg prn if agitated  Monitor    Cardiology:   Hypertension, uncontrolled  /100s  Patient started on Clonidine 0.1 mg TID and Losartan 50 mg yesterday but BP still uncontrolled  Patient is allergic to nifedipine  EP consulted again with following recommendations:  Avoid clonidine, Cardizem, beta-blocker or verapamil due to bradycardia  May give hydralazine if needed  May also give IV nirtogylcerin or IV nitroprusside if needed  Cardiology consulted for BP management  Recurrent syncope likely 2/2 vasovagal-orthostatic hypotension with supine hypertension  S/p loop recorder placement (7/5/23)  EP  Care time is documented if appropriate.      Critical Care time: 34 minutes    Maria Isabel Mahoney DO

## 2024-11-16 NOTE — PLAN OF CARE
Problem: Chronic Conditions and Co-morbidities  Goal: Patient's chronic conditions and co-morbidity symptoms are monitored and maintained or improved  11/16/2024 1050 by Ammy Worthington RN  Outcome: Progressing  11/16/2024 0103 by Aaron Kuhn RN  Outcome: Not Progressing     Problem: Safety - Adult  Goal: Free from fall injury  11/16/2024 1050 by Ammy Worthington RN  Outcome: Progressing  11/16/2024 0103 by Aaron Kuhn RN  Outcome: Not Progressing     Problem: Skin/Tissue Integrity  Goal: Absence of new skin breakdown  Description: 1.  Monitor for areas of redness and/or skin breakdown  2.  Assess vascular access sites hourly  3.  Every 4-6 hours minimum:  Change oxygen saturation probe site  4.  Every 4-6 hours:  If on nasal continuous positive airway pressure, respiratory therapy assess nares and determine need for appliance change or resting period.  11/16/2024 1050 by Ammy Worthington RN  Outcome: Not Progressing  11/16/2024 0103 by Aaron Kuhn RN  Outcome: Progressing     Problem: Pain  Goal: Verbalizes/displays adequate comfort level or baseline comfort level  11/16/2024 1050 by Ammy Worthington RN  Outcome: Not Progressing  11/16/2024 0103 by Aaron Kuhn RN  Outcome: Not Progressing  Flowsheets  Taken 11/15/2024 1600 by Blanquita Armendariz  Verbalizes/displays adequate comfort level or baseline comfort level: Encourage patient to monitor pain and request assistance  Taken 11/15/2024 1200 by Blanquita Armendariz  Verbalizes/displays adequate comfort level or baseline comfort level: Encourage patient to monitor pain and request assistance     Problem: Safety - Medical Restraint  Goal: Remains free of injury from restraints (Restraint for Interference with Medical Device)  Description: INTERVENTIONS:  1. Determine that other, less restrictive measures have been tried or would not be effective before applying the restraint  2. Evaluate the patient's condition at the time of restraint  0103 by Aaron Kuhn, RN  Outcome: Progressing     Problem: Skin/Tissue Integrity - Adult  Goal: Skin integrity remains intact  11/16/2024 1050 by Ammy Worthington RN  Outcome: Not Progressing  11/16/2024 0103 by Aaron Kuhn, RN  Outcome: Progressing     Problem: Musculoskeletal - Adult  Goal: Return mobility to safest level of function  11/16/2024 1050 by Ammy Worthington RN  Outcome: Not Progressing  11/16/2024 0103 by Aaron Kuhn RN  Outcome: Progressing  Goal: Return ADL status to a safe level of function  Outcome: Not Progressing     Problem: Gastrointestinal - Adult  Goal: Maintains or returns to baseline bowel function  Outcome: Not Progressing

## 2024-11-16 NOTE — PLAN OF CARE
Problem: Chronic Conditions and Co-morbidities  Goal: Patient's chronic conditions and co-morbidity symptoms are monitored and maintained or improved  Outcome: Not Progressing     Problem: Safety - Adult  Goal: Free from fall injury  Outcome: Not Progressing     Problem: Pain  Goal: Verbalizes/displays adequate comfort level or baseline comfort level  Outcome: Not Progressing     Problem: Safety - Medical Restraint  Goal: Remains free of injury from restraints (Restraint for Interference with Medical Device)  Outcome: Not Progressing     Problem: Chronic Conditions and Co-morbidities  Goal: Patient's chronic conditions and co-morbidity symptoms are monitored and maintained or improved  Outcome: Not Progressing     Problem: Safety - Adult  Goal: Free from fall injury  Outcome: Not Progressing     Problem: Pain  Goal: Verbalizes/displays adequate comfort level or baseline comfort level  Outcome: Not Progressing     Problem: Safety - Medical Restraint  Goal: Remains free of injury from restraints (Restraint for Interference with Medical Device)  Outcome: Not Progressing

## 2024-11-16 NOTE — FLOWSHEET NOTE
Restraints released and patient pulls at lines and tubes. Unable to redirect patient. Bilateral soft wrist restraints continued to maintain patient safety.

## 2024-11-17 LAB
ANION GAP SERPL CALCULATED.3IONS-SCNC: 10 MMOL/L (ref 7–16)
BASOPHILS # BLD: 0.01 K/UL (ref 0–0.2)
BASOPHILS NFR BLD: 0 % (ref 0–2)
BUN SERPL-MCNC: 38 MG/DL (ref 6–23)
CALCIUM SERPL-MCNC: 9.4 MG/DL (ref 8.6–10.2)
CHLORIDE SERPL-SCNC: 108 MMOL/L (ref 98–107)
CO2 SERPL-SCNC: 24 MMOL/L (ref 22–29)
CREAT SERPL-MCNC: 0.9 MG/DL (ref 0.5–1)
EOSINOPHIL # BLD: 0 K/UL (ref 0.05–0.5)
EOSINOPHILS RELATIVE PERCENT: 0 % (ref 0–6)
ERYTHROCYTE [DISTWIDTH] IN BLOOD BY AUTOMATED COUNT: 13.7 % (ref 11.5–15)
GFR, ESTIMATED: 63 ML/MIN/1.73M2
GLUCOSE BLD-MCNC: 106 MG/DL (ref 74–99)
GLUCOSE BLD-MCNC: 91 MG/DL (ref 74–99)
GLUCOSE SERPL-MCNC: 110 MG/DL (ref 74–99)
HCT VFR BLD AUTO: 38.3 % (ref 34–48)
HGB BLD-MCNC: 12.7 G/DL (ref 11.5–15.5)
IMM GRANULOCYTES # BLD AUTO: 0.04 K/UL (ref 0–0.58)
IMM GRANULOCYTES NFR BLD: 0 % (ref 0–5)
LYMPHOCYTES NFR BLD: 1.06 K/UL (ref 1.5–4)
LYMPHOCYTES RELATIVE PERCENT: 11 % (ref 20–42)
MAGNESIUM SERPL-MCNC: 2.5 MG/DL (ref 1.6–2.6)
MCH RBC QN AUTO: 29.8 PG (ref 26–35)
MCHC RBC AUTO-ENTMCNC: 33.2 G/DL (ref 32–34.5)
MCV RBC AUTO: 89.9 FL (ref 80–99.9)
MONOCYTES NFR BLD: 1.33 K/UL (ref 0.1–0.95)
MONOCYTES NFR BLD: 13 % (ref 2–12)
NEUTROPHILS NFR BLD: 76 % (ref 43–80)
NEUTS SEG NFR BLD: 7.7 K/UL (ref 1.8–7.3)
PHOSPHATE SERPL-MCNC: 2.7 MG/DL (ref 2.5–4.5)
PLATELET # BLD AUTO: 272 K/UL (ref 130–450)
PMV BLD AUTO: 9.6 FL (ref 7–12)
POTASSIUM SERPL-SCNC: 4.1 MMOL/L (ref 3.5–5)
RBC # BLD AUTO: 4.26 M/UL (ref 3.5–5.5)
SODIUM SERPL-SCNC: 142 MMOL/L (ref 132–146)
WBC OTHER # BLD: 10.1 K/UL (ref 4.5–11.5)

## 2024-11-17 PROCEDURE — 2580000003 HC RX 258

## 2024-11-17 PROCEDURE — 94660 CPAP INITIATION&MGMT: CPT

## 2024-11-17 PROCEDURE — 2500000003 HC RX 250 WO HCPCS

## 2024-11-17 PROCEDURE — 82962 GLUCOSE BLOOD TEST: CPT

## 2024-11-17 PROCEDURE — 85025 COMPLETE CBC W/AUTO DIFF WBC: CPT

## 2024-11-17 PROCEDURE — 6370000000 HC RX 637 (ALT 250 FOR IP)

## 2024-11-17 PROCEDURE — 99233 SBSQ HOSP IP/OBS HIGH 50: CPT | Performed by: INTERNAL MEDICINE

## 2024-11-17 PROCEDURE — 84100 ASSAY OF PHOSPHORUS: CPT

## 2024-11-17 PROCEDURE — 6360000002 HC RX W HCPCS

## 2024-11-17 PROCEDURE — 2060000000 HC ICU INTERMEDIATE R&B

## 2024-11-17 PROCEDURE — 83735 ASSAY OF MAGNESIUM: CPT

## 2024-11-17 PROCEDURE — 80048 BASIC METABOLIC PNL TOTAL CA: CPT

## 2024-11-17 RX ORDER — HYDRALAZINE HYDROCHLORIDE 10 MG/1
10 TABLET, FILM COATED ORAL EVERY 8 HOURS SCHEDULED
Status: DISCONTINUED | OUTPATIENT
Start: 2024-11-17 | End: 2024-11-18

## 2024-11-17 RX ORDER — HYDRALAZINE HYDROCHLORIDE 20 MG/ML
10 INJECTION INTRAMUSCULAR; INTRAVENOUS EVERY 4 HOURS PRN
Status: DISCONTINUED | OUTPATIENT
Start: 2024-11-17 | End: 2024-11-22 | Stop reason: HOSPADM

## 2024-11-17 RX ORDER — METOPROLOL TARTRATE 1 MG/ML
5 INJECTION, SOLUTION INTRAVENOUS ONCE
Status: COMPLETED | OUTPATIENT
Start: 2024-11-17 | End: 2024-11-17

## 2024-11-17 RX ORDER — METOPROLOL TARTRATE 1 MG/ML
INJECTION, SOLUTION INTRAVENOUS
Status: COMPLETED
Start: 2024-11-17 | End: 2024-11-17

## 2024-11-17 RX ORDER — METOPROLOL TARTRATE 1 MG/ML
2.5 INJECTION, SOLUTION INTRAVENOUS ONCE
Status: COMPLETED | OUTPATIENT
Start: 2024-11-17 | End: 2024-11-17

## 2024-11-17 RX ADMIN — SODIUM PHOSPHATE, MONOBASIC, MONOHYDRATE AND SODIUM PHOSPHATE, DIBASIC, ANHYDROUS 15 MMOL: 142; 276 INJECTION, SOLUTION INTRAVENOUS at 07:52

## 2024-11-17 RX ADMIN — NITROGLYCERIN 1 INCH: 20 OINTMENT TOPICAL at 21:14

## 2024-11-17 RX ADMIN — METOPROLOL TARTRATE 5 MG: 1 INJECTION, SOLUTION INTRAVENOUS at 00:45

## 2024-11-17 RX ADMIN — APIXABAN 2.5 MG: 5 TABLET, FILM COATED ORAL at 21:15

## 2024-11-17 RX ADMIN — SODIUM CHLORIDE, PRESERVATIVE FREE 10 ML: 5 INJECTION INTRAVENOUS at 07:53

## 2024-11-17 RX ADMIN — APIXABAN 2.5 MG: 5 TABLET, FILM COATED ORAL at 07:45

## 2024-11-17 RX ADMIN — LOSARTAN POTASSIUM 50 MG: 25 TABLET, FILM COATED ORAL at 07:45

## 2024-11-17 RX ADMIN — NITROGLYCERIN 1 INCH: 20 OINTMENT TOPICAL at 14:39

## 2024-11-17 RX ADMIN — Medication 15 ML: at 07:53

## 2024-11-17 RX ADMIN — SODIUM CHLORIDE, PRESERVATIVE FREE 10 ML: 5 INJECTION INTRAVENOUS at 20:28

## 2024-11-17 RX ADMIN — METOPROLOL TARTRATE 2.5 MG: 1 INJECTION, SOLUTION INTRAVENOUS at 11:55

## 2024-11-17 RX ADMIN — HYDRALAZINE HYDROCHLORIDE 10 MG: 10 TABLET, FILM COATED ORAL at 21:15

## 2024-11-17 RX ADMIN — Medication 15 ML: at 20:28

## 2024-11-17 RX ADMIN — SODIUM CHLORIDE, PRESERVATIVE FREE 40 MG: 5 INJECTION INTRAVENOUS at 07:45

## 2024-11-17 RX ADMIN — HYDRALAZINE HYDROCHLORIDE 10 MG: 10 TABLET, FILM COATED ORAL at 13:40

## 2024-11-17 RX ADMIN — HALOPERIDOL LACTATE 2 MG: 5 INJECTION, SOLUTION INTRAMUSCULAR at 07:58

## 2024-11-17 RX ADMIN — METOPROLOL TARTRATE 5 MG: 1 INJECTION, SOLUTION INTRAVENOUS at 12:35

## 2024-11-17 RX ADMIN — HYDRALAZINE HYDROCHLORIDE 10 MG: 20 INJECTION INTRAMUSCULAR; INTRAVENOUS at 15:08

## 2024-11-17 ASSESSMENT — PAIN SCALES - GENERAL: PAINLEVEL_OUTOF10: 0

## 2024-11-17 NOTE — PLAN OF CARE
Problem: Neurosensory - Adult  Goal: Achieves stable or improved neurological status  11/17/2024 0848 by Ammy Worthington RN  Outcome: Not Progressing  11/16/2024 2134 by Royce Gomez RN  Outcome: Not Progressing  Flowsheets (Taken 11/16/2024 2000)  Achieves stable or improved neurological status:   Assess for and report changes in neurological status   Initiate measures to prevent increased intracranial pressure   Maintain blood pressure and fluid volume within ordered parameters to optimize cerebral perfusion and minimize risk of hemorrhage   Monitor temperature, glucose, and sodium. Initiate appropriate interventions as ordered  Goal: Achieves maximal functionality and self care  11/17/2024 0848 by Ammy Worthington RN  Outcome: Not Progressing  11/16/2024 2134 by Royce Gomez RN  Outcome: Not Progressing  Flowsheets (Taken 11/16/2024 2000)  Achieves maximal functionality and self care: Monitor swallowing and airway patency with patient fatigue and changes in neurological status     Problem: Cardiovascular - Adult  Goal: Absence of cardiac dysrhythmias or at baseline  11/17/2024 0848 by Ammy Worthington RN  Outcome: Not Progressing  11/16/2024 2134 by Royce Gomez RN  Outcome: Progressing  Flowsheets (Taken 11/16/2024 2000)  Absence of cardiac dysrhythmias or at baseline:   Monitor cardiac rate and rhythm   Assess for signs of decreased cardiac output     Problem: Musculoskeletal - Adult  Goal: Return mobility to safest level of function  11/17/2024 0848 by Ammy Worthington RN  Outcome: Not Progressing  11/16/2024 2134 by Royce Gomez RN  Outcome: Not Progressing  Flowsheets (Taken 11/16/2024 2000)  Return Mobility to Safest Level of Function:   Assess patient stability and activity tolerance for standing, transferring and ambulating with or without assistive devices   Assist with transfers and ambulation using safe patient handling equipment as needed   Apply continuous passive motion per provider or

## 2024-11-17 NOTE — PLAN OF CARE
Problem: Chronic Conditions and Co-morbidities  Goal: Patient's chronic conditions and co-morbidity symptoms are monitored and maintained or improved  11/16/2024 2134 by Royce Gomez RN  Outcome: Progressing  Flowsheets (Taken 11/16/2024 2000)  Care Plan - Patient's Chronic Conditions and Co-Morbidity Symptoms are Monitored and Maintained or Improved: Monitor and assess patient's chronic conditions and comorbid symptoms for stability, deterioration, or improvement  11/16/2024 1050 by Ammy Worthington RN  Outcome: Progressing     Problem: Discharge Planning  Goal: Discharge to home or other facility with appropriate resources  11/16/2024 2134 by Royce Gomez RN  Outcome: Progressing  Flowsheets (Taken 11/16/2024 2000)  Discharge to home or other facility with appropriate resources: Identify barriers to discharge with patient and caregiver  11/16/2024 1050 by Ammy Worthington RN  Outcome: Progressing     Problem: Safety - Adult  Goal: Free from fall injury  11/16/2024 2134 by Royce Gomez RN  Outcome: Progressing  Flowsheets (Taken 11/16/2024 2000)  Free From Fall Injury: Instruct family/caregiver on patient safety  11/16/2024 1050 by Ammy Worthington RN  Outcome: Progressing     Problem: ABCDS Injury Assessment  Goal: Absence of physical injury  11/16/2024 2134 by Royce Gomez RN  Outcome: Progressing  Flowsheets (Taken 11/16/2024 2000)  Absence of Physical Injury: Implement safety measures based on patient assessment  11/16/2024 1050 by Ammy Worthington RN  Outcome: Progressing     Problem: Skin/Tissue Integrity  Goal: Absence of new skin breakdown  Description: 1.  Monitor for areas of redness and/or skin breakdown  2.  Assess vascular access sites hourly  3.  Every 4-6 hours minimum:  Change oxygen saturation probe site  4.  Every 4-6 hours:  If on nasal continuous positive airway pressure, respiratory therapy assess nares and determine need for appliance change or resting period.  11/16/2024 2134 by  Function:   Administer medication as ordered   Assess activities of daily living deficits and provide assistive devices as needed  11/16/2024 1050 by Ammy Worthington, RN  Outcome: Not Progressing     Problem: Gastrointestinal - Adult  Goal: Maintains or returns to baseline bowel function  11/16/2024 2134 by Royce Gomez, RN  Outcome: Progressing  Flowsheets (Taken 11/16/2024 2000)  Maintains or returns to baseline bowel function:   Assess bowel function   Administer ordered medications as needed  11/16/2024 1050 by Ammy Worthington, RN  Outcome: Not Progressing

## 2024-11-17 NOTE — CONSULTS
CARDIOLOGY CONSULTATION    Patient Name:  Arianna Coleman    :  1931    Reason for Consultation:   Recurrent syncope    History of Present Illness:   Arianna Coleman returns to [facility], following a history of recurrent episodes of loss of consciousness.  Prior to this episode while she was sitting with her family watching football she suddenly lost consciousness and upon arrival to the emergency room via EMS she was notably quite bradycardic.  She has been on multiple drug regimens for difficult to control systolic hypertension and unfortunately does not tolerate many due to a significant decrease in heart rate.  In fact her last syncopal episode only a few weeks ago was related to a bowel movement.  She has been seen by electrophysiologist who up to this time is not recommended a prophylactic permanent pacemaker but unfortunately this has limited her medications for elevated systolic hypertension as well.  She has a longstanding history of systolic hypertension as well as recurrent persistent atrial fibrillation and has been on polypharmacy in the past.  In  she underwent cardiac catheterization which demonstrated noncritical coronary artery disease.  Likewise her two-dimensional echocardiogram obtained in 2022 demonstrated normal global left ventricular systolic function with estimated left ventricular ejection fraction 60-65% and hemodynamically insignificant mild mitral and tricuspid regurgitation. .  She is now admitted for further observation and adjustment of her therapeutic regimen and potential for cardiac pacemaker as well. She once again is moderately confused to time place and person.    Past Medical History:   has a past medical history of Arthritis, Atrial fibrillation (HCC), Chronic combined systolic (congestive) and diastolic (congestive) heart failure (HCC), Coronary artery disease involving native coronary artery of native heart without angina pectoris,

## 2024-11-17 NOTE — PROGRESS NOTES
[facility]  Department of Internal Medicine   Internal Medicine Residency   MICU Progress Note    Patient:  Arianna Coleman 89 y.o. female  MRN: [id number]     Date of Service: 11/17/2024    Allergy: Nifedipine    Subjective   Patient seen and examined at bedside this morning. She is seen awake and oriented to self.       24h change: Afib in RVR in the 160s. Resolved with one dose of lopressor  ROS: Denies Fever/chills/CP/SOB/N/V/D/C/Dysuria/Blood in stool or urine  Objective     VS: BP (!) 154/93   Pulse 74   Temp 98.6 °F (37 °C) (Axillary)   Resp 10   Ht 1.676 m (5' 6\")   Wt 63 kg (138 lb 12.8 oz)   SpO2 98%   BMI 22.40 kg/m²           I & O - 24hr:   Intake/Output Summary (Last 24 hours) at 11/17/2024 1154  Last data filed at 11/17/2024 1000  Gross per 24 hour   Intake 490.79 ml   Output 900 ml   Net -409.21 ml       Physical Exam:  General Appearance: awake, oriented to self only; follows commands  Neck: no JVD  Lung: clear to auscultation bilaterally  Heart: irregular rate and rhythm, S1, S2 normal, no murmur, click, rub or gallop  Abdomen: soft, non-tender; bowel sounds normal; no masses,  no organomegaly  Extremities:  extremities normal, atraumatic, no cyanosis or edema  Musculoskeletal: No joint swelling, no muscle tenderness. ROM normal in all joints of extremities.   Neurologic: Mental status: Pupils sluggishly reactive to light. Disoriented. Following commands    Lines     site day    Art line   None    TLC None    PICC None    Hemoaccess None      Oxygen  Room air  ABG:     Lab Results   Component Value Date/Time    PH 7.446 11/15/2024 04:50 AM    PCO2 33.9 11/15/2024 04:50 AM    PO2 158.6 11/15/2024 04:50 AM    HCO3 22.8 11/15/2024 04:50 AM    BE -0.6 11/15/2024 04:50 AM    THB 12.8 11/15/2024 04:50 AM    O2SAT 99.3 11/15/2024 04:50 AM        Medications     Infusions: (Fluid, Sedation, Vasopressors)  IVF:   none  Vasopressors  none  Sedation  none    Nutrition:   NG tube  ATB:    Antibiotics  Days   none              Skin issues: wound right lateral ankle   Patient currently has   Urinary cath 2 way F16  Isolation VRE, contact  Bilateral wrist restraints  DVT prophylaxis (eliquis 2.5 mg ) / GI prophylaxis (protonix 40 mg daily)  Palliative care consult   Labs   CBC with Differential:    Lab Results   Component Value Date/Time    WBC 10.1 11/17/2024 04:04 AM    RBC 4.26 11/17/2024 04:04 AM    HGB 12.7 11/17/2024 04:04 AM    HCT 38.3 11/17/2024 04:04 AM     11/17/2024 04:04 AM    MCV 89.9 11/17/2024 04:04 AM    MCH 29.8 11/17/2024 04:04 AM    MCHC 33.2 11/17/2024 04:04 AM    RDW 13.7 11/17/2024 04:04 AM    LYMPHOPCT 11 11/17/2024 04:04 AM    MONOPCT 13 11/17/2024 04:04 AM    EOSPCT 0 11/17/2024 04:04 AM    BASOPCT 0 11/17/2024 04:04 AM    MONOSABS 1.33 11/17/2024 04:04 AM    LYMPHSABS 1.06 11/17/2024 04:04 AM    EOSABS 0.00 11/17/2024 04:04 AM    BASOSABS 0.01 11/17/2024 04:04 AM     CMP:    Lab Results   Component Value Date/Time     11/17/2024 04:04 AM    K 4.1 11/17/2024 04:04 AM    K 4.5 07/06/2023 06:31 PM     11/17/2024 04:04 AM    CO2 24 11/17/2024 04:04 AM    BUN 38 11/17/2024 04:04 AM    CREATININE 0.9 11/17/2024 04:04 AM    GFRAA >60 09/13/2022 10:40 AM    LABGLOM 63 11/17/2024 04:04 AM    LABGLOM >60 09/25/2023 10:26 PM    GLUCOSE 110 11/17/2024 04:04 AM    GLUCOSE 102 11/12/2010 01:50 PM    CALCIUM 9.4 11/17/2024 04:04 AM    BILITOT 0.6 11/10/2024 06:57 PM    ALKPHOS 79 11/10/2024 06:57 PM    AST 18 11/10/2024 06:57 PM    ALT 11 11/10/2024 06:57 PM     ABG:    Lab Results   Component Value Date/Time    PH 7.446 11/15/2024 04:50 AM    PCO2 33.9 11/15/2024 04:50 AM    PO2 158.6 11/15/2024 04:50 AM    HCO3 22.8 11/15/2024 04:50 AM    BE -0.6 11/15/2024 04:50 AM    O2SAT 99.3 11/15/2024 04:50 AM       Imaging Studies:  CXR:   Xray Result (most recent):  XR CHEST PORTABLE 11/16/2024    Narrative  EXAMINATION:  ONE XRAY VIEW OF THE CHEST    11/16/2024 8:04

## 2024-11-18 LAB
ANION GAP SERPL CALCULATED.3IONS-SCNC: 10 MMOL/L (ref 7–16)
BASOPHILS # BLD: 0.01 K/UL (ref 0–0.2)
BASOPHILS NFR BLD: 0 % (ref 0–2)
BUN SERPL-MCNC: 28 MG/DL (ref 6–23)
CALCIUM SERPL-MCNC: 8.5 MG/DL (ref 8.6–10.2)
CHLORIDE SERPL-SCNC: 109 MMOL/L (ref 98–107)
CO2 SERPL-SCNC: 23 MMOL/L (ref 22–29)
CREAT SERPL-MCNC: 0.8 MG/DL (ref 0.5–1)
EOSINOPHIL # BLD: 0.03 K/UL (ref 0.05–0.5)
EOSINOPHILS RELATIVE PERCENT: 0 % (ref 0–6)
ERYTHROCYTE [DISTWIDTH] IN BLOOD BY AUTOMATED COUNT: 13.8 % (ref 11.5–15)
GFR, ESTIMATED: 67 ML/MIN/1.73M2
GLUCOSE BLD-MCNC: 100 MG/DL (ref 74–99)
GLUCOSE BLD-MCNC: 101 MG/DL (ref 74–99)
GLUCOSE BLD-MCNC: 111 MG/DL (ref 74–99)
GLUCOSE SERPL-MCNC: 114 MG/DL (ref 74–99)
HCT VFR BLD AUTO: 39.5 % (ref 34–48)
HGB BLD-MCNC: 12.9 G/DL (ref 11.5–15.5)
IMM GRANULOCYTES # BLD AUTO: 0.03 K/UL (ref 0–0.58)
IMM GRANULOCYTES NFR BLD: 0 % (ref 0–5)
LYMPHOCYTES NFR BLD: 1.22 K/UL (ref 1.5–4)
LYMPHOCYTES RELATIVE PERCENT: 18 % (ref 20–42)
MAGNESIUM SERPL-MCNC: 2.3 MG/DL (ref 1.6–2.6)
MCH RBC QN AUTO: 29.9 PG (ref 26–35)
MCHC RBC AUTO-ENTMCNC: 32.7 G/DL (ref 32–34.5)
MCV RBC AUTO: 91.4 FL (ref 80–99.9)
MONOCYTES NFR BLD: 1.35 K/UL (ref 0.1–0.95)
MONOCYTES NFR BLD: 20 % (ref 2–12)
NEUTROPHILS NFR BLD: 61 % (ref 43–80)
NEUTS SEG NFR BLD: 4.19 K/UL (ref 1.8–7.3)
PHOSPHATE SERPL-MCNC: 2.5 MG/DL (ref 2.5–4.5)
PLATELET # BLD AUTO: 246 K/UL (ref 130–450)
PMV BLD AUTO: 8.9 FL (ref 7–12)
POTASSIUM SERPL-SCNC: 3.9 MMOL/L (ref 3.5–5)
RBC # BLD AUTO: 4.32 M/UL (ref 3.5–5.5)
SODIUM SERPL-SCNC: 142 MMOL/L (ref 132–146)
WBC OTHER # BLD: 6.8 K/UL (ref 4.5–11.5)

## 2024-11-18 PROCEDURE — 2060000000 HC ICU INTERMEDIATE R&B

## 2024-11-18 PROCEDURE — 6370000000 HC RX 637 (ALT 250 FOR IP): Performed by: INTERNAL MEDICINE

## 2024-11-18 PROCEDURE — 99232 SBSQ HOSP IP/OBS MODERATE 35: CPT | Performed by: INTERNAL MEDICINE

## 2024-11-18 PROCEDURE — 92610 EVALUATE SWALLOWING FUNCTION: CPT

## 2024-11-18 PROCEDURE — 2580000003 HC RX 258

## 2024-11-18 PROCEDURE — 84100 ASSAY OF PHOSPHORUS: CPT

## 2024-11-18 PROCEDURE — 6370000000 HC RX 637 (ALT 250 FOR IP)

## 2024-11-18 PROCEDURE — 80048 BASIC METABOLIC PNL TOTAL CA: CPT

## 2024-11-18 PROCEDURE — 85025 COMPLETE CBC W/AUTO DIFF WBC: CPT

## 2024-11-18 PROCEDURE — 83735 ASSAY OF MAGNESIUM: CPT

## 2024-11-18 PROCEDURE — 82962 GLUCOSE BLOOD TEST: CPT

## 2024-11-18 PROCEDURE — 99222 1ST HOSP IP/OBS MODERATE 55: CPT

## 2024-11-18 PROCEDURE — 6360000002 HC RX W HCPCS

## 2024-11-18 PROCEDURE — 94660 CPAP INITIATION&MGMT: CPT

## 2024-11-18 PROCEDURE — 99232 SBSQ HOSP IP/OBS MODERATE 35: CPT | Performed by: STUDENT IN AN ORGANIZED HEALTH CARE EDUCATION/TRAINING PROGRAM

## 2024-11-18 PROCEDURE — 6360000002 HC RX W HCPCS: Performed by: INTERNAL MEDICINE

## 2024-11-18 PROCEDURE — 92526 ORAL FUNCTION THERAPY: CPT

## 2024-11-18 PROCEDURE — 36415 COLL VENOUS BLD VENIPUNCTURE: CPT

## 2024-11-18 PROCEDURE — 51702 INSERT TEMP BLADDER CATH: CPT

## 2024-11-18 RX ORDER — HYDRALAZINE HYDROCHLORIDE 25 MG/1
25 TABLET, FILM COATED ORAL EVERY 8 HOURS SCHEDULED
Status: DISCONTINUED | OUTPATIENT
Start: 2024-11-18 | End: 2024-11-22 | Stop reason: HOSPADM

## 2024-11-18 RX ORDER — LABETALOL HYDROCHLORIDE 5 MG/ML
5 INJECTION, SOLUTION INTRAVENOUS ONCE
Status: COMPLETED | OUTPATIENT
Start: 2024-11-18 | End: 2024-11-18

## 2024-11-18 RX ADMIN — HYDRALAZINE HYDROCHLORIDE 25 MG: 25 TABLET ORAL at 20:08

## 2024-11-18 RX ADMIN — LABETALOL HYDROCHLORIDE 5 MG: 5 INJECTION INTRAVENOUS at 11:58

## 2024-11-18 RX ADMIN — NITROGLYCERIN 1 INCH: 20 OINTMENT TOPICAL at 20:09

## 2024-11-18 RX ADMIN — LOSARTAN POTASSIUM 50 MG: 25 TABLET, FILM COATED ORAL at 08:27

## 2024-11-18 RX ADMIN — SODIUM CHLORIDE, PRESERVATIVE FREE 40 MG: 5 INJECTION INTRAVENOUS at 08:27

## 2024-11-18 RX ADMIN — SODIUM CHLORIDE, PRESERVATIVE FREE 10 ML: 5 INJECTION INTRAVENOUS at 08:28

## 2024-11-18 RX ADMIN — APIXABAN 2.5 MG: 5 TABLET, FILM COATED ORAL at 08:27

## 2024-11-18 RX ADMIN — SODIUM CHLORIDE, PRESERVATIVE FREE 10 ML: 5 INJECTION INTRAVENOUS at 20:10

## 2024-11-18 RX ADMIN — APIXABAN 2.5 MG: 5 TABLET, FILM COATED ORAL at 20:08

## 2024-11-18 RX ADMIN — Medication 15 ML: at 08:27

## 2024-11-18 RX ADMIN — HYDRALAZINE HYDROCHLORIDE 10 MG: 10 TABLET, FILM COATED ORAL at 05:46

## 2024-11-18 RX ADMIN — Medication 15 ML: at 20:08

## 2024-11-18 RX ADMIN — NITROGLYCERIN 1 INCH: 20 OINTMENT TOPICAL at 05:48

## 2024-11-18 RX ADMIN — HYDRALAZINE HYDROCHLORIDE 25 MG: 25 TABLET ORAL at 15:15

## 2024-11-18 RX ADMIN — NITROGLYCERIN 1 INCH: 20 OINTMENT TOPICAL at 15:15

## 2024-11-18 ASSESSMENT — PAIN SCALES - PAIN ASSESSMENT IN ADVANCED DEMENTIA (PAINAD)
TOTALSCORE: 0
BREATHING: NORMAL
BODYLANGUAGE: RELAXED
BODYLANGUAGE: RELAXED
TOTALSCORE: 0
FACIALEXPRESSION: SMILING OR INEXPRESSIVE
CONSOLABILITY: NO NEED TO CONSOLE
FACIALEXPRESSION: SMILING OR INEXPRESSIVE
CONSOLABILITY: NO NEED TO CONSOLE
TOTALSCORE: 0
BREATHING: NORMAL
CONSOLABILITY: NO NEED TO CONSOLE
BREATHING: NORMAL
BODYLANGUAGE: RELAXED
FACIALEXPRESSION: SMILING OR INEXPRESSIVE

## 2024-11-18 ASSESSMENT — PAIN SCALES - GENERAL
PAINLEVEL_OUTOF10: 0

## 2024-11-18 NOTE — PLAN OF CARE
Problem: Neurosensory - Adult  Goal: Achieves stable or improved neurological status  11/17/2024 2059 by Royce Gomez RN  Outcome: Progressing  Flowsheets (Taken 11/17/2024 2000)  Achieves stable or improved neurological status:   Assess for and report changes in neurological status   Initiate measures to prevent increased intracranial pressure   Maintain blood pressure and fluid volume within ordered parameters to optimize cerebral perfusion and minimize risk of hemorrhage  11/17/2024 0848 by Ammy Worthington RN  Outcome: Not Progressing  Goal: Achieves maximal functionality and self care  11/17/2024 2059 by Royce Gomez RN  Outcome: Progressing  Flowsheets (Taken 11/17/2024 2000)  Achieves maximal functionality and self care: Monitor swallowing and airway patency with patient fatigue and changes in neurological status  11/17/2024 0848 by Ammy Worthington RN  Outcome: Not Progressing     Problem: Cardiovascular - Adult  Goal: Absence of cardiac dysrhythmias or at baseline  11/17/2024 2059 by Royce Gomez RN  Outcome: Progressing  Flowsheets (Taken 11/17/2024 2000)  Absence of cardiac dysrhythmias or at baseline: Monitor cardiac rate and rhythm  11/17/2024 0848 by Ammy Worthington RN  Outcome: Not Progressing     Problem: Musculoskeletal - Adult  Goal: Return mobility to safest level of function  11/17/2024 2059 by Royce Gomez RN  Outcome: Progressing  Flowsheets (Taken 11/17/2024 2000)  Return Mobility to Safest Level of Function: Assess patient stability and activity tolerance for standing, transferring and ambulating with or without assistive devices  11/17/2024 0848 by Ammy Worthington RN  Outcome: Not Progressing  Goal: Return ADL status to a safe level of function  11/17/2024 2059 by Royce Gomez RN  Outcome: Progressing  Flowsheets (Taken 11/17/2024 2000)  Return ADL Status to a Safe Level of Function: Administer medication as ordered  11/17/2024 0848 by Ammy Worthington RN  Outcome: Not  Progressing     Problem: Gastrointestinal - Adult  Goal: Maintains or returns to baseline bowel function  11/17/2024 2059 by Royce Gomez RN  Outcome: Progressing  Flowsheets (Taken 11/17/2024 2000)  Maintains or returns to baseline bowel function: Assess bowel function  11/17/2024 0848 by Ammy Worthington RN  Outcome: Not Progressing     Problem: Genitourinary - Adult  Goal: Absence of urinary retention  11/17/2024 2059 by Royce Gomez RN  Outcome: Progressing  Flowsheets (Taken 11/17/2024 2000)  Absence of urinary retention: Assess patient’s ability to void and empty bladder  11/17/2024 0848 by Ammy Worthington RN  Outcome: Not Progressing     Problem: Metabolic/Fluid and Electrolytes - Adult  Goal: Electrolytes maintained within normal limits  11/17/2024 2059 by Royce Gomez RN  Outcome: Progressing  11/17/2024 0848 by Ammy Worthington RN  Outcome: Not Progressing

## 2024-11-18 NOTE — PROGRESS NOTES
SPEECH/LANGUAGE PATHOLOGY  CLINICAL ASSESSMENT OF SWALLOWING FUNCTION   and PLAN OF CARE      PATIENT NAME:  Arianna Coleman  (female)     MRN:  [id number]    :  1931  (89 y.o.)  STATUS:  Inpatient: Room 8512/8512-A    TODAY'S DATE:  2024  ORDER DATE, DESCRIPTION AND REFERRING PROVIDER: 24    SLP swallowing-dysphagia evaluation and treatment  Start:  24,   End:  24,   ONE TIME,   Standing Count:  1 Occurrences,   R       Miriam Morley MD  REASON FOR REFERRAL: pocketing food   EVALUATING THERAPIST: Adelita Wheeler, WILEY                 RESULTS:    DYSPHAGIA DIAGNOSIS:   Clinical indicators of moderate oral phase dysphagia       DIET RECOMMENDATIONS:  Minced and moist consistency solids (IDDSI level 5) with  thin liquids (IDDSI level 0),     MEDICATION ADMINISTRATION, and Administer medication crushed, as able, with pudding/applesauce     FEEDING RECOMMENDATIONS:     Assistance level:  Full assistance is needed during all oral intake      Compensatory strategies recommended: Thorough oral care to prevent colonization of oral bacteria   Upright in bed/ chair as tolerated  Fully alert for all PO  Encourage oral clearing of bolus before next bite/sip is taken  Check for oral pocketing  Slow rate of intake   SINGLE cup sips  SINGLE straw sips   SMALL bites      Discussed recommendations with:  patient nurse in person    SPEECH THERAPY  PLAN OF CARE   The dysphagia POC is established based on physician order, dysphagia diagnosis and results of clinical assessment     Skilled SLP intervention for dysphagia management on acute care up to 5 x per week until goals met, pt plateaus in function and/or discharged from hospital    Conditions Requiring Skilled Therapeutic Intervention for dysphagia:    Patient is performing below functional baseline d/t  current acute condition, respiratory compromise, multiple medications, and/or increased dependency upon caregivers.    Specific

## 2024-11-18 NOTE — PROGRESS NOTES
4 Eyes Skin Assessment     NAME:  Arianna Coleman  YOB: 1931  MEDICAL RECORD NUMBER:  [id number]    The patient is being assessed for  Transfer to New Unit    I agree that at least one RN has performed a thorough Head to Toe Skin Assessment on the patient. ALL assessment sites listed below have been assessed.      Areas assessed by both nurses:    Head, Face, Ears, Shoulders, Back, Chest, Arms, Elbows, Hands, Sacrum. Buttock, Coccyx, Ischium, Legs. Feet and Heels, and Under Medical Devices         Does the Patient have a Wound? No noted wound(s)       Bautista Prevention initiated by RN: No  Wound Care Orders initiated by RN: No    Pressure Injury (Stage 3,4, Unstageable, DTI, NWPT, and Complex wounds) if present, place Wound referral order by RN under : No    New Ostomies, if present place, Ostomy referral order under : No     Nurse 1 eSignature: Electronically signed by Melissa Gilligan, RN on 11/17/24 at 11:26 PM EST    **SHARE this note so that the co-signing nurse can place an eSignature**    Nurse 2 eSignature: {Esignature:[id number]}

## 2024-11-18 NOTE — PROGRESS NOTES
[facility]  PULMONARY/CRITICAL CARE PROGRESS NOTE    Patient: Arianna Coleman  MRN: [id number]  : 1931    Encounter Date: 2024  Encounter Time: 1:22 PM     Date of Admission: .11/10/2024  6:40 PM    Consulting Physician:  Primary Care Physician:     Soraya Riddle MD     [phone]    Reason for Consultation: Dyspnea     Problem List:  Patient Active Problem List   Diagnosis    Systolic hypertension    Primary hypertension    Syncope    Paroxysmal atrial fibrillation (HCC)    Bradycardia    Chronic anticoagulation--Eliquis    Syncope and collapse    Moderate protein-calorie malnutrition (HCC)    Chest pain    Chronic combined systolic (congestive) and diastolic (congestive) heart failure (HCC)    Abnormal urinalysis    Orthostatic hypotension    Coronary artery disease involving native coronary artery of native heart without angina pectoris    Urinary tract infection without hematuria    Chronic congestive heart failure (HCC)    Gastroesophageal reflux disease    Symptomatic bradycardia    Hypokalemia    Sinus bradycardia    Proctocolitis    Altered mental status, unspecified    Recurrent episodes of unresponsiveness    Acute metabolic encephalopathy    Elevated blood pressure reading    AMS (altered mental status)    Status post placement of implantable loop recorder       SUBJECTIVE: Patient seen and examined.  Overnight the patient had no shortness of breath, cough, increased work of breathing.    HOME MEDICATIONS:  Prior to Admission medications    Medication Sig Start Date End Date Taking? Authorizing Provider   hydrALAZINE (APRESOLINE) 10 MG tablet Take 1 tablet by mouth every 8 hours 24  Yes Jennifer Braga MD   losartan (COZAAR) 50 MG tablet Take 1 tablet by mouth in the morning and at bedtime 24  Yes Jennifer Braga MD   apixaban (ELIQUIS) 2.5 MG TABS tablet Take 1 tablet by mouth 2 times daily   Yes Provider, MD Jose  11/18/2024    BASOPCT 0 11/18/2024    NEUTROABS 4.19 11/18/2024    LYMPHSABS 1.22 (L) 11/18/2024    MONOSABS 1.35 (H) 11/18/2024    EOSABS 0.03 (L) 11/18/2024    BASOSABS 0.01 11/18/2024       Recent Labs     11/18/24 0442 11/17/24 0404 11/16/24 0425   WBC 6.8 10.1 7.2   HGB 12.9 12.7 10.8*   HCT 39.5 38.3 33.1*   MCV 91.4 89.9 93.8    272 187       BMP:   Recent Labs     11/16/24 0425 11/17/24 0404 11/18/24 0442    142 142   K 4.1 4.1 3.9   * 108* 109*   CO2 24 24 23   PHOS 2.4* 2.7 2.5   BUN 35* 38* 28*   CREATININE 0.8 0.9 0.8       MG:   Lab Results   Component Value Date/Time    MG 2.3 11/18/2024 04:42 AM     Ca/Phos:   Lab Results   Component Value Date    CALCIUM 8.5 (L) 11/18/2024    PHOS 2.5 11/18/2024     Amylase:   Lab Results   Component Value Date/Time    AMYLASE 34 08/23/2012 02:59 PM     Lipase:   Lab Results   Component Value Date    LIPASE 9 (L) 05/21/2024     LIVER PROFILE: No results for input(s): \"AST\", \"ALT\", \"LIPASE\", \"AMYLASE\", \"BILIDIR\", \"BILITOT\", \"ALKPHOS\" in the last 72 hours.    Invalid input(s): \"ALB\"    PT/INR: No results for input(s): \"PROTIME\", \"INR\" in the last 72 hours.  APTT: No results for input(s): \"APTT\" in the last 72 hours.    Cardiac Enzymes:  Lab Results   Component Value Date    CKTOTAL 100 04/21/2023    CKMB <1.0 09/08/2021    TROPONINI <0.01 09/26/2020       Hgb A1C:   Lab Results   Component Value Date    LABA1C 5.0 07/14/2022     No results found for: \"EAG\"  BLANCA: No results found for: \"BLANCA\"  ESR:   Lab Results   Component Value Date    SEDRATE 4 07/10/2022     CRP:   Lab Results   Component Value Date    CRP 0.3 07/10/2022     D Dimer:   Lab Results   Component Value Date    DDIMER 419 07/15/2022     Folate and B12:   Lab Results   Component Value Date    YYMEDGMO94 729 07/02/2023   ,   Lab Results   Component Value Date    FOLATE 8.9 07/02/2023       Lactic Acid:   Lab Results   Component Value Date    LACTA 0.9 11/12/2024     Ammonia:    Cortisol:  Thyroid Studies:  Lab Results   Component Value Date    TSH 1.33 11/11/2024     XR CHEST PORTABLE   Final Result   No visible endotracheal tube today.         XR ABDOMEN FOR NG/OG/NE TUBE PLACEMENT   Final Result   Orogastric tube in satisfactory position.         XR CHEST PORTABLE   Final Result   No acute cardiopulmonary process. Support tubes are appropriate.         XR CHEST PORTABLE   Final Result   No acute cardiopulmonary process. Support tubes are appropriate.         XR CHEST PORTABLE   Final Result   1. Endotracheal tube and orogastric tube in satisfactory position.   2. Mild blunting of the costophrenic angles with increased markings at the   lung bases.         XR CHEST PORTABLE   Final Result   1. Endotracheal tube tip 2.5 cm above the melodie.   2. Orogastric tube looped in the fundus of the stomach in satisfactory   position.   3. Subsegmental atelectasis at the left lung base.         CT HEAD WO CONTRAST   Final Result   1.  No acute intracranial abnormality.      2.  Signs of deep white matter small vessel disease      3.  Chronic appearing small lacunar infarct adjacent to the right caudate   nucleus.      4.  Acute left maxillary sinusitis         XR CHEST PORTABLE   Final Result   1.  Endotracheal tube in good position.      2.  NG tube projects below the diaphragma in the area of the stomach, tip is   not seen but most likely towards the distal stomach.      3.  No pneumothorax.      4.  No acute cardiopulmonary process.         XR ABDOMEN FOR NG/OG/NE TUBE PLACEMENT   Final Result   Satisfactory position of nasogastric tube.         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

## 2024-11-18 NOTE — CONSULTS
Palliative Care Department  [phone]  Palliative Care Initial Consult  Provider Emily Zimmerman, FRANCES - CNP      PATIENT: Arianna Coleman  : 1931  MRN: [id number]  ADMISSION DATE: 11/10/2024  6:40 PM  Referring Provider: Miriam Morley MD     Palliative Medicine was consulted on hospital day 8 for assistance with Goals of care  HPI:     Clinical Summary:Arianna Coleman is a 89 y.o. y/o female with a history of atrial fibrillation, CAD, blood clots, ischemic colitis, HTN, CHF, nonischemic cardiomyopathy LVEF 53% dementia who presented to [facility] on 11/10/2024 from home with syncope and collapse.  Found with altered mental status in the emergency room.  Found hypotensive with GCS 4, admitted for respiratory failure secondary to cephalopathy, requiring intubation for airway protection s/p extubation on 2024, on room air now.  Has been evaluated by EP due to bradycardia.  Palliative medicine consulted to see further goals of care.  ASSESSMENT/PLAN:     Pertinent Hospital Diagnoses     Syncopal and collapse  Sinus bradycardia  Acute cephalopathy  Acute respiratory failure      Palliative Care Encounter / Counseling Regarding Goals of Care  Please see detailed goals of care discussion as below  At this time, Arianna Coleman, Does Not have capacity for medical decision-making.  Capacity is time limited and situation/question specific  During encounter Dennise was surrogate medical decision-maker  Outcome of goals of care meeting:  Continue with current treatment  Continue full code, DNR CCA introduced and recommended  Patient does not have advanced directive, per daughter Migdalia, all siblings making decisions together, sister Inés would be the spokesperson  Per Dennise, discharge plan is to return to patient's home with her son Richar Oconnor status Full Code  Advanced Directives: no POA or living will in [facility]  Surrogate/Legal NOK:  Inés Wong (daughter): [phone]  Dennise Durham  artificial nutrition, however she will discuss this further with her siblings. Support provided.  Will continue to follow    Prognosis: Poor    OBJECTIVE:     BP (!) 178/102   Pulse (!) 101   Temp 99.9 °F (37.7 °C)   Resp 20   Ht 1.676 m (5' 6\")   Wt 63.5 kg (139 lb 15.9 oz)   SpO2 97%   BMI 22.60 kg/m²     Physical Examination:  Gen: elderly, thin, somnolent, alert to self  Lungs: respirations easy   Heart: Tachycardic  Abdomen: normoactive bowel sounds, soft, non-tender  Extremities: no clubbing, cyanosis or edema  Skin: warm, dry without rashes, lesions, bruising  Neuro: Somnolent    Objective data reviewed: labs, images, records, medication use, vitals, and chart    Time/Communication  Greater than 50% of time spent, total 55 minutes in counseling and coordination of care at the bedside regarding goals of care.    Thank you for allowing Palliative Medicine to participate in the care of Arianna Coleman.    Note: This report was completed using computerLumific voiced recognition software.  Every effort has been made to ensure accuracy; however, inadvertent computerized transcription errors may be present.

## 2024-11-18 NOTE — PROGRESS NOTES
Called and notified Dr. Mares pt bp is 160/112, 's. Orders given to order Labetalol  5 mg IV once per Dr. Mares.

## 2024-11-18 NOTE — CARE COORDINATION
Transferred from MICU 11./17. Here for AMS. Initially patient was intubated to protect airway, at baseline she have severe dementia. Palliative consult this am. Speech eval this am Minced and moist consistency solids with  thin liquids Met with beba Bowden in room .Patient has been staying with her  one son and but will be moving in the other son.Prior to admsiion she was able to ambulate with a cane. No HHC or aid services. Asking about getting hospital bed message to attending.with criteria needed to be placed in progress note. No preference for hhc or dme. Will make referral to [facility] thru [facility] . Checking to see if can downgrade.Electronically signed by Jacinta Hernadez RN on 11/18/2024 at 2:36 PM    Message from [facility] - soc 11/22- called and spoke with Alzia @ [facility] and cristy her that soc 11/22 is ok. Electronically signed by Jacinta Hernadez RN on 11/18/2024 at 3:14 PM      The Plan for Transition of Care is related to the following treatment goals: safety @ home     The Patient and/or patient representative beba Bowden was provided with a choice of provider and agrees with the discharge plan. [x] Yes [] No    Freedom of choice list was provided with basic dialogue that supports the patient's individualized plan of care/goals, treatment preferences and shares the quality data associated with the providers. [x] Yes [] No

## 2024-11-18 NOTE — PROGRESS NOTES
Message sent to Dr Morley to see if voiding trial can be attempted   Okay for voiding trial per Dr Morley

## 2024-11-18 NOTE — PROGRESS NOTES
PCA and this RN offered to assist patient with dinner, family at bedside states they would like to let the patient sleep and they will feed her later.

## 2024-11-18 NOTE — PROGRESS NOTES
Comprehensive Nutrition Assessment    Type and Reason for Visit:  Reassess    Nutrition Recommendations/Plan:   Continue current diet w/ ONS to promote PO/nutrient intake. Updated TF recs also provided to better meet est needs and to prevent overfeeding. Will continue to monitor and provide nutrition recs as appropriate.    Recommend:   Standard Formula w/ Fiber (Jevity 1.5) @35ml/hr + 1 pro mod once daily.   Will provide: 1260kcal, 54g pro, (1364 kcal, 80g pro total w/ pro mod x1), 638ml free water.    Monitor for clinical signs of refeeding/monitor and replace electrolytes PRN.     Malnutrition Assessment:  Malnutrition Status:  At risk for malnutrition (11/12/24 1234)    Context:  Acute Illness     Findings of the 6 clinical characteristics of malnutrition:  Energy Intake:  Mild decrease in energy intake (since admit)  Weight Loss:  Unable to assess (d/t wt fluctuations w/ CHF)     Body Fat Loss:  Unable to assess     Muscle Mass Loss:  Unable to assess    Fluid Accumulation:  No fluid accumulation     Strength:  Not Performed    Nutrition Assessment:    Pt admit w/ AMS & recurrent syncope likely 2/2 vasovagal-orthostatic hypotension with supine hypertension. Admit w/ Acute hypoxic respiratory failure 2/2 acute encephalopathy-intubated for airway protection. Now s/p extubation 11/14/24. PMHx dementia, CHF, CAD, HTN, ischemic colitis. Pt. on regular diet/ONS and w/ noted EN support ordered. Will provide updated TF recs if needed and monitor.    Nutrition Related Findings:    Oriented x1, -I/O(2.1L), soft/flat abd, active BS, no edema, NG w/ TF,  hyperglycemia, Wound Type: None       Current Nutrition Intake & Therapies:    Average Meal Intake: 1-25%, 0%  Average Supplements Intake: Unable to assess  ADULT ORAL NUTRITION SUPPLEMENT; Breakfast; Standard High Calorie/High Protein Oral Supplement  ADULT TUBE FEEDING; Nasogastric; Standard with Fiber; Continuous; 10; Yes; 10; Q 4 hours; 55; 30; Q 3 hours  ADULT  DIET; Regular  Current Tube Feeding (TF) Orders:  Feeding Route: Nasogastric  Formula: Standard with Fiber  Schedule: Continuous  Feeding Regimen: @55ml/hr ordered  Additives/Modulars: None  Water Flushes: 30ml q3hr = 240ml  Current TF Provides: 1320ml TV, 1980kcal, 84g pro, 1003 ml free water, 1243 total water w/ flush    Anthropometric Measures:  Height: 167.6 cm (5' 5.98\")  Ideal Body Weight (IBW): 130 lbs (59 kg)    Admission Body Weight: 59.4 kg (131 lb) (11/10 actual)  Current Body Weight: 59.4 kg (131 lb) (11/10 admit wt as CBW elevated), 100.8 % IBW.    Current BMI (kg/m2): 21.2  Usual Body Weight: 59.4 kg (131 lb) (7/2023 actual per EMR)     % Weight Change (Calculated): 0                    BMI Categories: Underweight (BMI less than 22) age over 65    Estimated Daily Nutrient Needs:  Energy Requirements Based On: Formula  Weight Used for Energy Requirements: Admission  Energy (kcal/day): 1200-1400kcal (MSJx1.2SF)  Weight Used for Protein Requirements: Admission  Protein (g/day): 80-90 (1.3-1.5 gm/kg admit wt)  Method Used for Fluid Requirements: 1 ml/kcal  Fluid (ml/day):     Nutrition Diagnosis:   Inadequate oral intake related to cognitive or neurological impairment as evidenced by poor intake prior to admission, intake 0-25%, nutrition support - enteral nutrition    Nutrition Interventions:   Food and/or Nutrient Delivery: Continue Current Diet, Continue Oral Nutrition Supplement, Modify Tube Feeding (Recommend: Standard Formula w/ Fiber (Jevity 1.5) @35ml/hr + 1 pro mod once daily. Will provide: 1260kcal, 54g pro, (1364 kcal, 80g pro total w/ pro mod x1), 638ml free water)  Nutrition Education/Counseling: Education/Counseling not appropriate  Coordination of Nutrition Care: Swallow Evaluation, Continue to monitor while inpatient       Goals:  Goals: Meet at least 75% of estimated needs, by next RD assessment  Type of Goal: New goal  Previous Goal Met: New Goal    Nutrition Monitoring and

## 2024-11-18 NOTE — PLAN OF CARE
Problem: Chronic Conditions and Co-morbidities  Goal: Patient's chronic conditions and co-morbidity symptoms are monitored and maintained or improved  11/17/2024 2310 by Gilligan, Melissa, RN  Outcome: Progressing  11/17/2024 2059 by Royce Gomez RN  Outcome: Progressing     Problem: Discharge Planning  Goal: Discharge to home or other facility with appropriate resources  11/17/2024 2310 by Gilligan, Melissa, RN  Outcome: Progressing  Flowsheets (Taken 11/17/2024 2300)  Discharge to home or other facility with appropriate resources: Identify barriers to discharge with patient and caregiver  11/17/2024 2059 by Royce Gomez RN  Outcome: Progressing     Problem: Safety - Adult  Goal: Free from fall injury  11/17/2024 2310 by Gilligan, Melissa, RN  Outcome: Progressing  11/17/2024 2059 by Royce Gomez RN  Outcome: Progressing  Flowsheets (Taken 11/17/2024 2000)  Free From Fall Injury: Instruct family/caregiver on patient safety     Problem: ABCDS Injury Assessment  Goal: Absence of physical injury  11/17/2024 2310 by Gilligan, Melissa, RN  Outcome: Progressing  11/17/2024 2059 by Royce Gomez RN  Outcome: Progressing  Flowsheets (Taken 11/17/2024 2000)  Absence of Physical Injury: Implement safety measures based on patient assessment     Problem: Skin/Tissue Integrity  Goal: Absence of new skin breakdown  Description: 1.  Monitor for areas of redness and/or skin breakdown  2.  Assess vascular access sites hourly  3.  Every 4-6 hours minimum:  Change oxygen saturation probe site  4.  Every 4-6 hours:  If on nasal continuous positive airway pressure, respiratory therapy assess nares and determine need for appliance change or resting period.  11/17/2024 2310 by Gilligan, Melissa, RN  Outcome: Progressing  11/17/2024 2059 by Royce Gomez RN  Outcome: Progressing

## 2024-11-18 NOTE — PROGRESS NOTES
[facility]   IN-PATIENT SERVICE      Progress Note    11/18/2024    12:01 PM    Name:   Arianna Coleman  MRN:     [id number]     Acct:      [id number]   Room:   [address] [place] [address]-A  IP Day:  8  Admit Date:  11/10/2024  6:40 PM    PCP:   Soraya Riddle MD  Code Status:  Full Code    Subjective:     C/C: No chief complaint on file.  syncope  Interval History Status: not changed.         EP s/o, originally consulted for sinus zaael and hx of PAF    Extubated on supplemental o2 now      Patient extubated 11/14.     BP and HR remains volatile   Cardiology recommending possible hydralazine with nitrates versus amlodipine alone.  Brief History:     This is a 89-year old -American female with past medical history of severe dementia with vision and hearing problem brought in here due to altered mental status.  Patient lives with home aide and she was watching TV suddenly she passed out and aide called 911.  While she in ER found to have altered mental status and she been intubated to protect airway.  During my encounter her 2 daughter was at the bedside but they do not much about her as they do not live with her.  Her initial blood pressure was low and started on vasopressor and again blood pressure went up then vasopressor taken off.  Blood chemistry insignificant, troponin 29 liver function test normal except albumin 3.4 and blood count and platelet count normal.  CT scan did not show any intracranial abnormality except deep sulci.  It also shows old lacunar infarct and acute sinusitis.       Review of Systems:     As per Hpi     Medications:     Allergies:    Allergies   Allergen Reactions    Nifedipine        Current Meds:   Scheduled Meds:    hydrALAZINE  10 mg Oral 3 times per day    nitroglycerin  1 inch Topical 3 times per day    losartan  50 mg Oral Daily    racepinephrine HCl  0.5 mL Nebulization Once    sodium chloride flush  5-40 mL IntraVENous 2 times per day    chlorhexidine  15

## 2024-11-19 LAB — GLUCOSE BLD-MCNC: 119 MG/DL (ref 74–99)

## 2024-11-19 PROCEDURE — 99232 SBSQ HOSP IP/OBS MODERATE 35: CPT | Performed by: INTERNAL MEDICINE

## 2024-11-19 PROCEDURE — 2580000003 HC RX 258

## 2024-11-19 PROCEDURE — 94660 CPAP INITIATION&MGMT: CPT

## 2024-11-19 PROCEDURE — 6370000000 HC RX 637 (ALT 250 FOR IP)

## 2024-11-19 PROCEDURE — 6370000000 HC RX 637 (ALT 250 FOR IP): Performed by: INTERNAL MEDICINE

## 2024-11-19 PROCEDURE — 2060000000 HC ICU INTERMEDIATE R&B

## 2024-11-19 PROCEDURE — 6360000002 HC RX W HCPCS

## 2024-11-19 PROCEDURE — 99232 SBSQ HOSP IP/OBS MODERATE 35: CPT | Performed by: EMERGENCY MEDICINE

## 2024-11-19 PROCEDURE — 82947 ASSAY GLUCOSE BLOOD QUANT: CPT

## 2024-11-19 RX ORDER — DILTIAZEM HYDROCHLORIDE 120 MG/1
120 CAPSULE, COATED, EXTENDED RELEASE ORAL ONCE
Status: COMPLETED | OUTPATIENT
Start: 2024-11-19 | End: 2024-11-19

## 2024-11-19 RX ORDER — DILTIAZEM HYDROCHLORIDE 120 MG/1
120 CAPSULE, COATED, EXTENDED RELEASE ORAL DAILY
Status: DISCONTINUED | OUTPATIENT
Start: 2024-11-20 | End: 2024-11-22 | Stop reason: HOSPADM

## 2024-11-19 RX ADMIN — LOSARTAN POTASSIUM 50 MG: 25 TABLET, FILM COATED ORAL at 09:44

## 2024-11-19 RX ADMIN — HYDRALAZINE HYDROCHLORIDE 25 MG: 25 TABLET ORAL at 05:20

## 2024-11-19 RX ADMIN — NITROGLYCERIN 1 INCH: 20 OINTMENT TOPICAL at 16:45

## 2024-11-19 RX ADMIN — SODIUM CHLORIDE, PRESERVATIVE FREE 10 ML: 5 INJECTION INTRAVENOUS at 21:59

## 2024-11-19 RX ADMIN — SODIUM CHLORIDE, PRESERVATIVE FREE 10 ML: 5 INJECTION INTRAVENOUS at 09:54

## 2024-11-19 RX ADMIN — HYDRALAZINE HYDROCHLORIDE 25 MG: 25 TABLET ORAL at 21:59

## 2024-11-19 RX ADMIN — Medication 15 ML: at 09:44

## 2024-11-19 RX ADMIN — APIXABAN 2.5 MG: 5 TABLET, FILM COATED ORAL at 09:45

## 2024-11-19 RX ADMIN — DILTIAZEM HYDROCHLORIDE 120 MG: 120 CAPSULE, COATED, EXTENDED RELEASE ORAL at 12:49

## 2024-11-19 RX ADMIN — NITROGLYCERIN 1 INCH: 20 OINTMENT TOPICAL at 05:21

## 2024-11-19 RX ADMIN — APIXABAN 2.5 MG: 5 TABLET, FILM COATED ORAL at 20:13

## 2024-11-19 RX ADMIN — HYDRALAZINE HYDROCHLORIDE 10 MG: 20 INJECTION INTRAMUSCULAR; INTRAVENOUS at 09:42

## 2024-11-19 RX ADMIN — SODIUM CHLORIDE, PRESERVATIVE FREE 40 MG: 5 INJECTION INTRAVENOUS at 09:44

## 2024-11-19 NOTE — CARE COORDINATION
Here for syncope and collapse. Met with son Richar in room and she will be going home to his house  address [address] [place] [address]  with 1 ADELE. Message from [facility] -have accepted and  soc 11/22. Artemio with [facility] of order for hospital bed. Await pt/ot to determine how  they will transport home. Attending ntfd of criteria needed in progress note for hospital bed. Electronically signed by Jacinta Hernadez RN on 11/19/2024 at 2:53 PM

## 2024-11-19 NOTE — PROGRESS NOTES
Elevated HR, no prn med orders on emar. Dr ortega notified, order received.     1215 Cardizem ordered per dr ortega, allergy contraidication noted. pt has allergy to nifedipine with no reaction specification in her hx. Dr ortega paged again regarding med allergy and new orders. Awaiting call back.     1230 Ok per dr ortega to give cardizem po and override allergy contraindication

## 2024-11-19 NOTE — PROGRESS NOTES
[facility]  PULMONARY/CRITICAL CARE PROGRESS NOTE    Patient: Arianna Coleman  MRN: [id number]  : 1931    Encounter Date: 2024  Encounter Time: 2:00 PM     Date of Admission: .11/10/2024  6:40 PM    Consulting Physician:  Primary Care Physician:     Soraya Riddle MD     [phone]    Reason for Consultation: Dyspnea     Problem List:  Patient Active Problem List   Diagnosis    Systolic hypertension    Primary hypertension    Syncope    Paroxysmal atrial fibrillation (HCC)    Bradycardia    Chronic anticoagulation--Eliquis    Syncope and collapse    Moderate protein-calorie malnutrition (HCC)    Chest pain    Chronic combined systolic (congestive) and diastolic (congestive) heart failure (HCC)    Abnormal urinalysis    Orthostatic hypotension    Coronary artery disease involving native coronary artery of native heart without angina pectoris    Urinary tract infection without hematuria    Chronic congestive heart failure (HCC)    Gastroesophageal reflux disease    Symptomatic bradycardia    Hypokalemia    Sinus bradycardia    Proctocolitis    Altered mental status, unspecified    Recurrent episodes of unresponsiveness    Acute metabolic encephalopathy    Elevated blood pressure reading    AMS (altered mental status)    Status post placement of implantable loop recorder       SUBJECTIVE: Patient seen and examined.  Overnight the patient had no shortness of breath, cough, increased work of breathing.    HOME MEDICATIONS:  Prior to Admission medications    Medication Sig Start Date End Date Taking? Authorizing Provider   hydrALAZINE (APRESOLINE) 10 MG tablet Take 1 tablet by mouth every 8 hours 24  Yes Jennifer Braga MD   losartan (COZAAR) 50 MG tablet Take 1 tablet by mouth in the morning and at bedtime 24  Yes Jennifer Braga MD   apixaban (ELIQUIS) 2.5 MG TABS tablet Take 1 tablet by mouth 2 times daily   Yes Provider, MD Jose  updated, all questions answered   - patient will benefit from rehabilitation     I have spent a total time of 35 minutes of this patient encounter reviewing chart, labs, coordinating care with interdisciplinary teams, face to face encounter with patient, providing counseling/education to patient/family.     Thank you Lisa Corral MD very much for allowing me to see this patient in consultation and follow up.    Care reviewed with nursing staff, medical and surgical specialty care, primary care and the patient's family as available. Restraints are ordered when the patient can do harm to him/herself by pulling out devices.    Modesto Porter MD, M.D.

## 2024-11-19 NOTE — PROGRESS NOTES
BP (!) 167/97   Pulse (!) 109   Temp 98.7 °F (37.1 °C) (Axillary)   Resp 20   Ht 1.676 m (5' 5.98\")   Wt 63.5 kg (139 lb 15.9 oz)   SpO2 95%   BMI 22.61 kg/m²   Patient Vitals for the past 96 hrs (Last 3 readings):   Weight   11/18/24 0517 63.5 kg (139 lb 15.9 oz)   11/17/24 0451 63 kg (138 lb 12.8 oz)   11/16/24 0210 63.9 kg (140 lb 14.4 oz)     OBJECTIVE:    HEENT: PERRL, EOM  Intact; sclera non-icteric, conjunctiva pink. Carotids are brisk in upstroke with normal contour. No carotid bruits. Normal jugular venous pulsation at 45°. No palpable cervical nor supraclavicular nodes.Thyroid not palpable. Trachea midline.  Chest: Even excursion  Lungs: CTA B, no expiratory wheezes or rhonchi, no decreased tactile fremitus without inspiratory rales.  Heart: Regular  rhythm; S1 > S2, no gallop or murmur. No clicks, rub, palpable thrills   or heaves. PMI nondisplaced, 5th intercostal space MCL.   Abdomen: Soft, nontender, nondistended,  mildly protuberant, no masses or organomegaly.  Bowel sounds active.  Extremities: Without clubbing, cyanosis or edema. Pulses present 3+ upper extermities bilaterally; barely palpable DP  bilaterally and barely palpable PT bilaterally.     Data:   Scheduled Meds: Reviewed  Continuous Infusions:    sodium chloride         Intake/Output Summary (Last 24 hours) at 11/18/2024 2146  Last data filed at 11/18/2024 1330  Gross per 24 hour   Intake 240 ml   Output 1215 ml   Net -975 ml     CBC:   Recent Labs     11/16/24 0425 11/17/24 0404 11/18/24 0442   WBC 7.2 10.1 6.8   HGB 10.8* 12.7 12.9   HCT 33.1* 38.3 39.5    272 246     BMP:  Recent Labs     11/16/24 0425 11/17/24 0404 11/18/24 0442    142 142   K 4.1 4.1 3.9   * 108* 109*   CO2 24 24 23   BUN 35* 38* 28*   CREATININE 0.8 0.9 0.8   LABGLOM 75 63 67     ABGs:   Lab Results   Component Value Date/Time    PH 7.446  11/15/2024 04:50 AM    PO2 158.6 11/15/2024 04:50 AM    PCO2 33.9 11/15/2024 04:50 AM     INR: No results for input(s): \"INR\" in the last 72 hours.  PRO-BNP:   Lab Results   Component Value Date    PROBNP 410 10/16/2024    PROBNP 1,108 (H) 10/03/2024      TSH:   Lab Results   Component Value Date    TSH 1.33 11/11/2024      Cardiac Injury Profile: No results for input(s): \"TROPHS\" in the last 72 hours.   Lipid Profile:   Lab Results   Component Value Date/Time    TRIG 71 11/13/2024 05:10 AM    HDL 70 11/07/2019 06:01 AM     11/07/2019 06:01 AM    CHOL 186 11/07/2019 06:01 AM      Hemoglobin A1C: No components found for: \"HGBA1C\"      RAD:   No results found.      EKG: See Report  Echo: See Report      IMPRESSIONS:  Patient Active Problem List   Diagnosis Code    Systolic hypertension I10    Primary hypertension I10    Syncope R55    Paroxysmal atrial fibrillation (HCC) I48.0    Bradycardia R00.1    Chronic anticoagulation--Eliquis Z79.01    Syncope and collapse R55    Moderate protein-calorie malnutrition (HCC) E44.0    Chest pain R07.9    Chronic combined systolic (congestive) and diastolic (congestive) heart failure (HCC) I50.42    Abnormal urinalysis R82.90    Orthostatic hypotension I95.1    Coronary artery disease involving native coronary artery of native heart without angina pectoris I25.10    Urinary tract infection without hematuria N39.0    Chronic congestive heart failure (HCC) I50.9    Gastroesophageal reflux disease K21.9    Symptomatic bradycardia R00.1    Hypokalemia E87.6    Sinus bradycardia R00.1    Proctocolitis K52.9    Altered mental status, unspecified R41.82    Recurrent episodes of unresponsiveness R40.4    Acute metabolic encephalopathy G93.41    Elevated blood pressure reading R03.0    AMS (altered mental status) R41.82    Status post placement of implantable loop recorder Z95.818       RECOMMENDATIONS:  Continue to titrate blood pressure medicine with the hope of avoiding  vasodepressor symptoms he had at the same time not grossly affect her heart rhythm.  I have talked with EP who if absolutely necessary will place a pacemaker but at this time will be on hold.  Spoke with her son who is at her bedside and will continue conservative medical intervention.  Carefully monitor electrolytes renal function blood pressure and heart rhythm.    I have spent more than 25 minutes face to face with Arianna Coleman and reviewing notes and laboratory data, with greater than 50% of this time instructing and counseling the patient's family member face to face regarding my findings and recommendations and I have answered all questions as posed to me by Ms. Coleman's son..    Julian Mares, DO FACP,FACC,[facility]      NOTE:  This report was transcribed using voice recognition software.  Every effort was made to ensure accuracy; however, inadvertent computerized transcription errors may be present

## 2024-11-19 NOTE — PROGRESS NOTES
[facility]   IN-PATIENT SERVICE      Progress Note    11/19/2024    12:30 PM    Name:   Arianna Coleman  MRN:     [id number]     Acct:      [id number]   Room:   [facility]  IP Day:  9  Admit Date:  11/10/2024  6:40 PM    PCP:   Soraya Riddle MD  Code Status:  Full Code    Subjective:     C/C: No chief complaint on file.  syncope  Interval History Status: not changed.         EP s/o, originally consulted for sinus azael and hx of PAF    Extubated on supplemental o2 now      Patient extubated 11/14.     Palliative consult    BP and HR remains volatile   Cardiology recommending possible hydralazine with nitrates versus amlodipine alone.  Brief History:     This is a 89-year old -American female with past medical history of severe dementia with vision and hearing problem brought in here due to altered mental status.  Patient lives with home aide and she was watching TV suddenly she passed out and aide called 911.  While she in ER found to have altered mental status and she been intubated to protect airway.  During my encounter her 2 daughter was at the bedside but they do not much about her as they do not live with her.  Her initial blood pressure was low and started on vasopressor and again blood pressure went up then vasopressor taken off.  Blood chemistry insignificant, troponin 29 liver function test normal except albumin 3.4 and blood count and platelet count normal.  CT scan did not show any intracranial abnormality except deep sulci.  It also shows old lacunar infarct and acute sinusitis.       Review of Systems:     As per Hpi     Medications:     Allergies:    Allergies   Allergen Reactions    Nifedipine        Current Meds:   Scheduled Meds:    dilTIAZem  120 mg Oral Once    hydrALAZINE  25 mg Oral 3 times per day    nitroglycerin  1 inch Topical 3 times per day    losartan  50 mg Oral Daily    racepinephrine HCl  0.5 mL Nebulization Once    sodium chloride flush  5-40

## 2024-11-19 NOTE — PLAN OF CARE
Problem: Chronic Conditions and Co-morbidities  Goal: Patient's chronic conditions and co-morbidity symptoms are monitored and maintained or improved  Outcome: Progressing     Problem: Discharge Planning  Goal: Discharge to home or other facility with appropriate resources  Outcome: Progressing  Flowsheets (Taken 11/18/2024 2000)  Discharge to home or other facility with appropriate resources: Identify barriers to discharge with patient and caregiver     Problem: Safety - Adult  Goal: Free from fall injury  Outcome: Progressing  Flowsheets (Taken 11/18/2024 2115)  Free From Fall Injury: Instruct family/caregiver on patient safety     Problem: ABCDS Injury Assessment  Goal: Absence of physical injury  Outcome: Progressing     Problem: Skin/Tissue Integrity  Goal: Absence of new skin breakdown  Description: 1.  Monitor for areas of redness and/or skin breakdown  2.  Assess vascular access sites hourly  3.  Every 4-6 hours minimum:  Change oxygen saturation probe site  4.  Every 4-6 hours:  If on nasal continuous positive airway pressure, respiratory therapy assess nares and determine need for appliance change or resting period.  Outcome: Progressing

## 2024-11-19 NOTE — PROGRESS NOTES
Palliative Care Department  [phone]  Palliative Care Progress Note  Provider Johnnie Barron MD      PATIENT: Arianna Coleman  : 1931  MRN: [id number]  ADMISSION DATE: 11/10/2024  6:40 PM  Referring Provider: Miriam Morley MD     Palliative Medicine was consulted on hospital day 9 for assistance with Goals of care  HPI:     Clinical Summary:Arianna Coleman is a 89 y.o. y/o female with a history of atrial fibrillation, CAD, blood clots, ischemic colitis, HTN, CHF, nonischemic cardiomyopathy LVEF 53% dementia who presented to [facility] on 11/10/2024 from home with syncope and collapse.  Found with altered mental status in the emergency room.  Found hypotensive with GCS 4, admitted for respiratory failure secondary to cephalopathy, requiring intubation for airway protection s/p extubation on 2024, on room air now.  Has been evaluated by EP due to bradycardia.  Palliative medicine consulted to see further goals of care.  ASSESSMENT/PLAN:     Pertinent Hospital Diagnoses     Syncopal and collapse  Sinus bradycardia  Acute cephalopathy  Acute respiratory failure      Palliative Care Encounter / Counseling Regarding Goals of Care  Please see detailed goals of care discussion as below  At this time, Arianna Coleman, Does Not have capacity for medical decision-making.  Capacity is time limited and situation/question specific  During encounter Dennise was surrogate medical decision-maker  Outcome of goals of care meeting:  Continue with current treatment  Continue full code, DNR CCA introduced and recommended  Spoke with Inés, plan to continue full code at this time  [place] the patient would never want a PEG tube for feeding    Code status Full Code  Advanced Directives: no POA or living will in [facility]  Surrogate/Legal NOK:  Inés Wong (daughter): [phone]  Dennise Durham (daughter): [phone]    Spiritual assessment: no spiritual distress identified  Bereavement and grief: to be  transcription errors may be present.

## 2024-11-20 LAB
ANION GAP SERPL CALCULATED.3IONS-SCNC: 9 MMOL/L (ref 7–16)
BASOPHILS # BLD: 0.02 K/UL (ref 0–0.2)
BASOPHILS NFR BLD: 0 % (ref 0–2)
BUN SERPL-MCNC: 20 MG/DL (ref 6–23)
CALCIUM SERPL-MCNC: 8.2 MG/DL (ref 8.6–10.2)
CHLORIDE SERPL-SCNC: 103 MMOL/L (ref 98–107)
CO2 SERPL-SCNC: 26 MMOL/L (ref 22–29)
CREAT SERPL-MCNC: 0.9 MG/DL (ref 0.5–1)
EOSINOPHIL # BLD: 0.17 K/UL (ref 0.05–0.5)
EOSINOPHILS RELATIVE PERCENT: 2 % (ref 0–6)
ERYTHROCYTE [DISTWIDTH] IN BLOOD BY AUTOMATED COUNT: 14.1 % (ref 11.5–15)
GFR, ESTIMATED: 63 ML/MIN/1.73M2
GLUCOSE BLD-MCNC: 106 MG/DL (ref 74–99)
GLUCOSE BLD-MCNC: 106 MG/DL (ref 74–99)
GLUCOSE SERPL-MCNC: 111 MG/DL (ref 74–99)
HCT VFR BLD AUTO: 42.2 % (ref 34–48)
HGB BLD-MCNC: 14 G/DL (ref 11.5–15.5)
IMM GRANULOCYTES # BLD AUTO: 0.06 K/UL (ref 0–0.58)
IMM GRANULOCYTES NFR BLD: 1 % (ref 0–5)
LYMPHOCYTES NFR BLD: 1.71 K/UL (ref 1.5–4)
LYMPHOCYTES RELATIVE PERCENT: 22 % (ref 20–42)
MAGNESIUM SERPL-MCNC: 2.1 MG/DL (ref 1.6–2.6)
MCH RBC QN AUTO: 30.4 PG (ref 26–35)
MCHC RBC AUTO-ENTMCNC: 33.2 G/DL (ref 32–34.5)
MCV RBC AUTO: 91.5 FL (ref 80–99.9)
MONOCYTES NFR BLD: 0.68 K/UL (ref 0.1–0.95)
MONOCYTES NFR BLD: 9 % (ref 2–12)
NEUTROPHILS NFR BLD: 66 % (ref 43–80)
NEUTS SEG NFR BLD: 5.24 K/UL (ref 1.8–7.3)
PHOSPHATE SERPL-MCNC: 2.3 MG/DL (ref 2.5–4.5)
PLATELET # BLD AUTO: 261 K/UL (ref 130–450)
PMV BLD AUTO: 9.3 FL (ref 7–12)
POTASSIUM SERPL-SCNC: 3.6 MMOL/L (ref 3.5–5)
RBC # BLD AUTO: 4.61 M/UL (ref 3.5–5.5)
SODIUM SERPL-SCNC: 138 MMOL/L (ref 132–146)
WBC OTHER # BLD: 7.9 K/UL (ref 4.5–11.5)

## 2024-11-20 PROCEDURE — 99232 SBSQ HOSP IP/OBS MODERATE 35: CPT | Performed by: INTERNAL MEDICINE

## 2024-11-20 PROCEDURE — 2060000000 HC ICU INTERMEDIATE R&B

## 2024-11-20 PROCEDURE — 97167 OT EVAL HIGH COMPLEX 60 MIN: CPT

## 2024-11-20 PROCEDURE — 6360000002 HC RX W HCPCS

## 2024-11-20 PROCEDURE — 6370000000 HC RX 637 (ALT 250 FOR IP)

## 2024-11-20 PROCEDURE — 6370000000 HC RX 637 (ALT 250 FOR IP): Performed by: INTERNAL MEDICINE

## 2024-11-20 PROCEDURE — 83735 ASSAY OF MAGNESIUM: CPT

## 2024-11-20 PROCEDURE — 97530 THERAPEUTIC ACTIVITIES: CPT

## 2024-11-20 PROCEDURE — 85025 COMPLETE CBC W/AUTO DIFF WBC: CPT

## 2024-11-20 PROCEDURE — 36415 COLL VENOUS BLD VENIPUNCTURE: CPT

## 2024-11-20 PROCEDURE — 97161 PT EVAL LOW COMPLEX 20 MIN: CPT

## 2024-11-20 PROCEDURE — 2580000003 HC RX 258

## 2024-11-20 PROCEDURE — 94660 CPAP INITIATION&MGMT: CPT

## 2024-11-20 PROCEDURE — 82947 ASSAY GLUCOSE BLOOD QUANT: CPT

## 2024-11-20 PROCEDURE — 84100 ASSAY OF PHOSPHORUS: CPT

## 2024-11-20 PROCEDURE — 80048 BASIC METABOLIC PNL TOTAL CA: CPT

## 2024-11-20 RX ADMIN — Medication 15 ML: at 21:06

## 2024-11-20 RX ADMIN — ONDANSETRON 4 MG: 2 INJECTION INTRAMUSCULAR; INTRAVENOUS at 18:48

## 2024-11-20 RX ADMIN — APIXABAN 2.5 MG: 5 TABLET, FILM COATED ORAL at 09:12

## 2024-11-20 RX ADMIN — DILTIAZEM HYDROCHLORIDE 120 MG: 120 CAPSULE, COATED, EXTENDED RELEASE ORAL at 09:12

## 2024-11-20 RX ADMIN — SODIUM CHLORIDE, PRESERVATIVE FREE 40 MG: 5 INJECTION INTRAVENOUS at 09:12

## 2024-11-20 RX ADMIN — LOSARTAN POTASSIUM 50 MG: 25 TABLET, FILM COATED ORAL at 09:12

## 2024-11-20 RX ADMIN — HYDRALAZINE HYDROCHLORIDE 25 MG: 25 TABLET ORAL at 05:19

## 2024-11-20 RX ADMIN — HYDRALAZINE HYDROCHLORIDE 25 MG: 25 TABLET ORAL at 21:06

## 2024-11-20 RX ADMIN — NITROGLYCERIN 1 INCH: 20 OINTMENT TOPICAL at 21:06

## 2024-11-20 RX ADMIN — APIXABAN 2.5 MG: 5 TABLET, FILM COATED ORAL at 21:06

## 2024-11-20 RX ADMIN — NITROGLYCERIN 1 INCH: 20 OINTMENT TOPICAL at 06:44

## 2024-11-20 RX ADMIN — NITROGLYCERIN 1 INCH: 20 OINTMENT TOPICAL at 00:00

## 2024-11-20 RX ADMIN — SODIUM CHLORIDE, PRESERVATIVE FREE 10 ML: 5 INJECTION INTRAVENOUS at 21:08

## 2024-11-20 RX ADMIN — NITROGLYCERIN 1 INCH: 20 OINTMENT TOPICAL at 14:39

## 2024-11-20 RX ADMIN — HYDRALAZINE HYDROCHLORIDE 25 MG: 25 TABLET ORAL at 14:39

## 2024-11-20 ASSESSMENT — PAIN SCALES - GENERAL
PAINLEVEL_OUTOF10: 0
PAINLEVEL_OUTOF10: 0

## 2024-11-20 NOTE — PROGRESS NOTES
Physical Therapy  Physical Therapy Initial Assessment    Name: Arianna Coleman  : 1931  MRN: [id number]      Date of Service: 2024    Evaluating PT:  Aneesh Bunch, PT, DPT [id number]    Room #:  8712/5712-A  Diagnosis:  Syncope and collapse [R55]  Bradycardia [R00.1]  Respiratory distress [R06.03]  Idiopathic hypotension [I95.0]  Altered mental status, unspecified altered mental status type [R41.82]  AMS (altered mental status) [R41.82]  PMHx/PSHx:   has a past medical history of Arthritis, Atrial fibrillation (HCC), Chronic combined systolic (congestive) and diastolic (congestive) heart failure (Formerly [facility]), Coronary artery disease involving native coronary artery of native heart without angina pectoris, Hx of blood clots, Hypertension, Ischemic colitis (HCC), and PAF (paroxysmal atrial fibrillation) (Formerly [facility]).   has a past surgical history that includes Ankle fracture surgery; Colonoscopy; Endoscopy, colon, diagnostic; fracture surgery; Cardiac catheterization (2019); and Insertable Cardiac Monitor (2023).  Procedure/Surgery:  None  Precautions:  Falls, contact isolation, cognition, [place], vision, alarms  Equipment Owned:  WC, FWW, SPC  Equipment Needs:  TBD    SUBJECTIVE:    Pt is a questionable historian. Home setup information provided by her children who were present during evaluation. Pt lives with one of her children in a 1 story home with 1+1 stair(s) to enter and 1+0 rail(s). Bed is on first floor and bath is on first floor. Pt ambulated with SPC vs FWW prior to admission.    OBJECTIVE:   Initial Evaluation  Date: 2024 Treatment Short Term/ Long Term   Goals   AM-PAC 6 Clicks      Was pt agreeable to Eval/treatment? Yes     Does pt have pain? LLE pain with movement     Bed Mobility  Rolling: MaxA  Supine to sit: MaxA x2  Sit to supine: MaxA x2  Scooting: MaxA  Rolling: SBA  Supine to sit: Angel  Sit to supine: Angel  Scooting: SBA   Transfers Sit to stand: MaxA x2  Stand to sit: MaxA

## 2024-11-20 NOTE — PROGRESS NOTES
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    Abnormal urinalysis R82.90    Orthostatic hypotension I95.1    Coronary artery disease involving native coronary artery of native heart without angina pectoris I25.10    Urinary tract infection without hematuria N39.0    Chronic congestive heart failure (HCC) I50.9    Gastroesophageal reflux disease K21.9    Symptomatic bradycardia R00.1    Hypokalemia E87.6    Sinus bradycardia R00.1    Proctocolitis K52.9    Altered mental status, unspecified R41.82    Recurrent episodes of unresponsiveness R40.4    Acute metabolic encephalopathy G93.41    Elevated blood pressure reading R03.0    AMS (altered mental status) R41.82    Status post placement of implantable loop recorder Z95.818       SUBJECTIVE:  Arianna Coleman He is more awake this morning, yet hard of hearing but attempts to answer questions.  She denies any shortness of breath no chest discomfort presently.    REVIEW OF SYSTEMS:  General ROS: negative for - fatigue, malaise,  weight gain or weight loss  Psychological ROS: negative for - anxiety , depression  Ophthalmic ROS: negative for - decreased vision or visual distortion.  ENT ROS: negative  Allergy and Immunology ROS: negative  Hematological and Lymphatic ROS: negative  Endocrine: no heat or cold intolerance and no polyphagia, polydipsia, or polyuria  Respiratory ROS: negative for - pleuritic pain and wheezing  Cardiovascular ROS: positive for - irregular heartbeat and loss of consciousness.  Gastrointestinal ROS: no abdominal pain, change in

## 2024-11-20 NOTE — PROGRESS NOTES
[facility]  PULMONARY/CRITICAL CARE PROGRESS NOTE    Patient: Arianna Coleman  MRN: [id number]  : 1931    Encounter Date: 2024  Encounter Time: 1:16 PM     Date of Admission: .11/10/2024  6:40 PM    Consulting Physician:  Primary Care Physician:     Soraya Riddle MD     [phone]    Reason for Consultation: Dyspnea     Problem List:  Patient Active Problem List   Diagnosis    Systolic hypertension    Primary hypertension    Syncope    Paroxysmal atrial fibrillation (HCC)    Bradycardia    Chronic anticoagulation--Eliquis    Syncope and collapse    Moderate protein-calorie malnutrition (HCC)    Chest pain    Chronic combined systolic (congestive) and diastolic (congestive) heart failure (HCC)    Abnormal urinalysis    Orthostatic hypotension    Coronary artery disease involving native coronary artery of native heart without angina pectoris    Urinary tract infection without hematuria    Chronic congestive heart failure (HCC)    Gastroesophageal reflux disease    Symptomatic bradycardia    Hypokalemia    Sinus bradycardia    Proctocolitis    Altered mental status, unspecified    Recurrent episodes of unresponsiveness    Acute metabolic encephalopathy    Elevated blood pressure reading    AMS (altered mental status)    Status post placement of implantable loop recorder       SUBJECTIVE: Patient seen and examined.  Overnight the patient had no shortness of breath, cough, increased work of breathing.    HOME MEDICATIONS:  Prior to Admission medications    Medication Sig Start Date End Date Taking? Authorizing Provider   hydrALAZINE (APRESOLINE) 10 MG tablet Take 1 tablet by mouth every 8 hours 24  Yes Jennifer Braga MD   losartan (COZAAR) 50 MG tablet Take 1 tablet by mouth in the morning and at bedtime 24  Yes Jennifer Braga MD   apixaban (ELIQUIS) 2.5 MG TABS tablet Take 1 tablet by mouth 2 times daily   Yes Provider, MD Jose  11/20/2024    MONOSABS 0.68 11/20/2024    EOSABS 0.17 11/20/2024    BASOSABS 0.02 11/20/2024       Recent Labs     11/20/24  0500 11/18/24  0442 11/17/24  0404   WBC 7.9 6.8 10.1   HGB 14.0 12.9 12.7   HCT 42.2 39.5 38.3   MCV 91.5 91.4 89.9    246 272       BMP:   Recent Labs     11/18/24  0442 11/20/24  0500    138   K 3.9 3.6   * 103   CO2 23 26   PHOS 2.5 2.3*   BUN 28* 20   CREATININE 0.8 0.9       MG:   Lab Results   Component Value Date/Time    MG 2.1 11/20/2024 05:00 AM     Ca/Phos:   Lab Results   Component Value Date    CALCIUM 8.2 (L) 11/20/2024    PHOS 2.3 (L) 11/20/2024     Amylase:   Lab Results   Component Value Date/Time    AMYLASE 34 08/23/2012 02:59 PM     Lipase:   Lab Results   Component Value Date    LIPASE 9 (L) 05/21/2024     LIVER PROFILE: No results for input(s): \"AST\", \"ALT\", \"LIPASE\", \"AMYLASE\", \"BILIDIR\", \"BILITOT\", \"ALKPHOS\" in the last 72 hours.    Invalid input(s): \"ALB\"    PT/INR: No results for input(s): \"PROTIME\", \"INR\" in the last 72 hours.  APTT: No results for input(s): \"APTT\" in the last 72 hours.    Cardiac Enzymes:  Lab Results   Component Value Date    CKTOTAL 100 04/21/2023    CKMB <1.0 09/08/2021    TROPONINI <0.01 09/26/2020       Hgb A1C:   Lab Results   Component Value Date    LABA1C 5.0 07/14/2022     No results found for: \"EAG\"  BLANCA: No results found for: \"BLANCA\"  ESR:   Lab Results   Component Value Date    SEDRATE 4 07/10/2022     CRP:   Lab Results   Component Value Date    CRP 0.3 07/10/2022     D Dimer:   Lab Results   Component Value Date    DDIMER 419 07/15/2022     Folate and B12:   Lab Results   Component Value Date    NBBNCNKL05 729 07/02/2023   ,   Lab Results   Component Value Date    FOLATE 8.9 07/02/2023       Lactic Acid:   Lab Results   Component Value Date    LACTA 0.9 11/12/2024     Ammonia:   Cortisol:  Thyroid Studies:  Lab Results   Component Value Date    TSH 1.33 11/11/2024     XR CHEST PORTABLE   Final Result   No visible  planning for patient     - family updated, all questions answered   - patient will benefit from rehabilitation   - D/C plan to son's home ([place])  - follow up outpatient after 2-4 weeks time   - replace deficient electrolytes     - OK to D/C, follow up in pulmonary clinic in 2-4 weeks time     I have spent a total time of 35 minutes of this patient encounter reviewing chart, labs, coordinating care with interdisciplinary teams, face to face encounter with patient, providing counseling/education to patient/family.     Thank you Lisa Corral MD very much for allowing me to see this patient in consultation and follow up.    Care reviewed with nursing staff, medical and surgical specialty care, primary care and the patient's family as available. Restraints are ordered when the patient can do harm to him/herself by pulling out devices.    Modesto Porter MD, M.D.

## 2024-11-20 NOTE — PLAN OF CARE
Problem: Chronic Conditions and Co-morbidities  Goal: Patient's chronic conditions and co-morbidity symptoms are monitored and maintained or improved  Outcome: Progressing     Problem: Discharge Planning  Goal: Discharge to home or other facility with appropriate resources  Outcome: Progressing     Problem: Safety - Adult  Goal: Free from fall injury  Outcome: Progressing     Problem: ABCDS Injury Assessment  Goal: Absence of physical injury  Outcome: Progressing     Problem: Skin/Tissue Integrity  Goal: Absence of new skin breakdown  Description: 1.  Monitor for areas of redness and/or skin breakdown  2.  Assess vascular access sites hourly  3.  Every 4-6 hours minimum:  Change oxygen saturation probe site  4.  Every 4-6 hours:  If on nasal continuous positive airway pressure, respiratory therapy assess nares and determine need for appliance change or resting period.  Outcome: Progressing     Problem: Pain  Goal: Verbalizes/displays adequate comfort level or baseline comfort level  Outcome: Progressing     Problem: Nutrition Deficit:  Goal: Optimize nutritional status  Outcome: Progressing     Problem: Respiratory - Adult  Goal: Achieves optimal ventilation and oxygenation  Outcome: Progressing     Problem: Cardiovascular - Adult  Goal: Maintains optimal cardiac output and hemodynamic stability  Outcome: Progressing  Goal: Absence of cardiac dysrhythmias or at baseline  Outcome: Progressing     Problem: Skin/Tissue Integrity - Adult  Goal: Skin integrity remains intact  Outcome: Progressing     Problem: Musculoskeletal - Adult  Goal: Return mobility to safest level of function  Outcome: Progressing  Goal: Maintain proper alignment of affected body part  Outcome: Progressing  Goal: Return ADL status to a safe level of function  Outcome: Progressing

## 2024-11-20 NOTE — CARE COORDINATION
Here for syncope and collapse.Spo2  95% RA Palliative care following and had discussion with family - continue current tx and code status.She will be going home to her son Clemente roth  address [address] [place] [address]  with [address]. [facility] -have accepted and  soc 11/22. Lee Ann Artemio with [facility] dme of order for hospital bed. Await pt/ot to determine how  they will transport home. Again ntfd attending of criteria needed in progress note for hospital bed. Electronically signed by Jacinta Hernadez RN on 11/20/2024 at 11:56 AM

## 2024-11-20 NOTE — PROGRESS NOTES
[facility]   IN-PATIENT SERVICE      Progress Note    11/20/2024    10:58 AM    Name:   Arianna Coleman  MRN:     [id number]     Acct:      [id number]   Room:   [facility]  IP Day:  10  Admit Date:  11/10/2024  6:40 PM    PCP:   Soraya Riddle MD  Code Status:  Full Code    Subjective:     C/C: No chief complaint on file.  syncope  Interval History Status: not changed.         EP s/o, originally consulted for sinus azael and hx of PAF    Extubated on supplemental o2 now      Patient extubated 11/14.     Palliative consult    BP and HR remains volatile   Cardiology recommending possible hydralazine with nitrates versus amlodipine alone.  Brief History:     This is a 89-year old -American female with past medical history of severe dementia with vision and hearing problem brought in here due to altered mental status.  Patient lives with home aide and she was watching TV suddenly she passed out and aide called 911.  While she in ER found to have altered mental status and she been intubated to protect airway.  During my encounter her 2 daughter was at the bedside but they do not much about her as they do not live with her.  Her initial blood pressure was low and started on vasopressor and again blood pressure went up then vasopressor taken off.  Blood chemistry insignificant, troponin 29 liver function test normal except albumin 3.4 and blood count and platelet count normal.  CT scan did not show any intracranial abnormality except deep sulci.  It also shows old lacunar infarct and acute sinusitis.       Review of Systems:     As per Hpi     Medications:     Allergies:    Allergies   Allergen Reactions    Nifedipine        Current Meds:   Scheduled Meds:    dilTIAZem  120 mg Oral Daily    hydrALAZINE  25 mg Oral 3 times per day    nitroglycerin  1 inch Topical 3 times per day    losartan  50 mg Oral Daily    racepinephrine HCl  0.5 mL Nebulization Once    sodium chloride flush   reactive to light, conjunctivae normal  Neck: neck supple and non tender without mass   Pulmonary/Chest: wheezing b/l  Cardiovascular: normal rate, normal S1 and S2 and no carotid bruits  Abdomen: soft, non-tender, non-distended, normal bowel sounds, no masses or organomegaly  Extremities: no cyanosis, no clubbing and no edema      Assessment:        Hospital Problems             Last Modified POA    * (Principal) AMS (altered mental status) 11/10/2024 Yes    Status post placement of implantable loop recorder 11/11/2024 Yes       Plan:        Syncope-secondary to orthostatic, vasovagal, versus cardiogenic.  EP following.   Patient extubated 11/14.  Cardiology considering hydralazine and nitrates versus Norvasc alone.  Sinus bradycardia-resolved. hold all AV kellen blockade.  Off amiodarone.  EP follow-up.Avoid using Hydralazine and using ACE-I or ARB for BP treatment.   PAF-continue Eliquis 2.5 twice daily.  Off of beta-blocker due to bradycardia.  History of nonischemic cardiomyopathy-LV EF 53% on TTE 4/24/23   Acute encephalopathy-in the setting of history of dementia.  Intubated and sedated at this time.  Management per ICU.  CT head WO contrast showed no acute process but did demonstrate signs of deep white matter small vessel disease and chronic appearing small lacunar infarcts adjacent to the right caudate nucleus.   GERD-continue PPI  Acute resp  failure- pulm following. Cont duonebs and inhalers. Oxygenation and mucus mobilization therapies.     Greater than 35 minutes metaphysical exam, chart, assessment and plan.    Lisa Corral MD  11/20/2024  10:58 AM

## 2024-11-20 NOTE — PROGRESS NOTES
[] Motor Control     OT PLAN OF CARE   OT POC based on physician orders, patient diagnosis and results of clinical assessment    Frequency/Duration 1-3 days/wk for 2 weeks PRN   Specific OT Treatment to include:   * Instruction/training on adapted ADL techniques and AE recommendations to increase functional independence within precautions       * Training on energy conservation strategies, correct breathing pattern and techniques to improve independence/tolerance for self-care routine  * Functional transfer/mobility training/DME recommendations for increased independence, safety, and fall prevention  * Patient/Family education to increase follow through with safety techniques and functional independence  * Recommendation of environmental modifications for increased safety with functional transfers/mobility and ADLs  * Therapeutic exercise to improve motor endurance, ROM, and functional strength for ADLs/functional transfers  * Therapeutic activities to facilitate/challenge dynamic balance, stand tolerance for increased safety and independence with ADLs  * Positioning to improve skin integrity, interaction with environment and functional independence    Recommended Adaptive Equipment: TBD      Pt is a poor historian, unable to answer PLOF 2/2 hxof dementia. Dtr and son present and able to assist with questions this date. Info below obtained from chart:   Home Living/PLOF:    Home Living:  Pt lives with family - son; Pt has 9 children who are supportive    in a 1 story house with 1 step to enter  and 1 hand rail    Bedroom setup: main level    Bathroom setup: main level  tub/shower combination   Equipment owned: front wheeled walker , cane , and wc  shower chair  and BSC      Prior Level of Function:  Pt A with ADLs - minimal assist per dtr. Total A with IADLs, and completed functional mobility with cane   Falls: no   Driving: no   Occupation/leisure: did not state     Pleasant and cooperative throughout  cleared patient for OT.  Upon arrival, patient was semi-supine in bed  and agreeable to OT session. family present  throughout session . At end of session, patient semi-supine in bed  and alarm activated ; with call light and phone within reach; all lines and tubes intact.   Patient presents with decreased safety awareness, activity tolerance , balance , coordination, strength , ROM , and cognition. Pt demonstrated decreased independence during ADLs, bed mobility , functional transfers, and functional mobility. Pt would benefit from continued skilled OT to increase safety and independence with completion of ADL tasks and functional mobility for improved quality of life        Treatment: OT treatment provided this date includes:  OT edu pt/family on role of OT in the acute care setting. Family  verbalized understanding   Therapist facilitated and instructed pt on adapted techniques & compensatory strategies to improve safety and independence with ADLs, bed mobility , functional transfers, and functional mobility as noted above to allow pt to achieve highest level of independence and safely. Pt provided  mod/max cuing , verbal instructions , extended time , and encouragement  in order to promote maximum level of independence.   Edu Pt family on keeping Pt engaged during the day with HOB elevated and blinds open as tolerated. Edu family on UE AAROM. Pt demo'd well.     Rehab Potential: Fair  for established goals     Patient / Family Goal: to get better     Patient and/or family were instructed on functional diagnosis, prognosis/goals and OT plan of care. Pt/family demonstrated understanding.      Eval Complexity:      Description  Performance deficits  Clinical decision making  Co-morbidities affecting occupational performance  Modification or assistance to complete eval    Low Complexity   1 to 3 []  Low []  None []  None []   Moderate Complexity   3 to 5 []  Mod []  Maybe []  Min to Mod []   High Complexity   5 or

## 2024-11-21 LAB
ANION GAP SERPL CALCULATED.3IONS-SCNC: 7 MMOL/L (ref 7–16)
BASOPHILS # BLD: 0.02 K/UL (ref 0–0.2)
BASOPHILS NFR BLD: 0 % (ref 0–2)
BUN SERPL-MCNC: 18 MG/DL (ref 6–23)
CALCIUM SERPL-MCNC: 8.5 MG/DL (ref 8.6–10.2)
CHLORIDE SERPL-SCNC: 106 MMOL/L (ref 98–107)
CO2 SERPL-SCNC: 25 MMOL/L (ref 22–29)
CREAT SERPL-MCNC: 1 MG/DL (ref 0.5–1)
EOSINOPHIL # BLD: 0.13 K/UL (ref 0.05–0.5)
EOSINOPHILS RELATIVE PERCENT: 2 % (ref 0–6)
ERYTHROCYTE [DISTWIDTH] IN BLOOD BY AUTOMATED COUNT: 14 % (ref 11.5–15)
GFR, ESTIMATED: 53 ML/MIN/1.73M2
GLUCOSE BLD-MCNC: 102 MG/DL (ref 74–99)
GLUCOSE SERPL-MCNC: 105 MG/DL (ref 74–99)
HCT VFR BLD AUTO: 37.4 % (ref 34–48)
HGB BLD-MCNC: 12.4 G/DL (ref 11.5–15.5)
IMM GRANULOCYTES # BLD AUTO: 0.09 K/UL (ref 0–0.58)
IMM GRANULOCYTES NFR BLD: 1 % (ref 0–5)
LYMPHOCYTES NFR BLD: 1.9 K/UL (ref 1.5–4)
LYMPHOCYTES RELATIVE PERCENT: 24 % (ref 20–42)
MAGNESIUM SERPL-MCNC: 2.1 MG/DL (ref 1.6–2.6)
MCH RBC QN AUTO: 30.2 PG (ref 26–35)
MCHC RBC AUTO-ENTMCNC: 33.2 G/DL (ref 32–34.5)
MCV RBC AUTO: 91 FL (ref 80–99.9)
MONOCYTES NFR BLD: 0.75 K/UL (ref 0.1–0.95)
MONOCYTES NFR BLD: 9 % (ref 2–12)
NEUTROPHILS NFR BLD: 64 % (ref 43–80)
NEUTS SEG NFR BLD: 5.11 K/UL (ref 1.8–7.3)
PHOSPHATE SERPL-MCNC: 2.2 MG/DL (ref 2.5–4.5)
PLATELET # BLD AUTO: 261 K/UL (ref 130–450)
PMV BLD AUTO: 8.8 FL (ref 7–12)
POTASSIUM SERPL-SCNC: 3.9 MMOL/L (ref 3.5–5)
RBC # BLD AUTO: 4.11 M/UL (ref 3.5–5.5)
SODIUM SERPL-SCNC: 138 MMOL/L (ref 132–146)
WBC OTHER # BLD: 8 K/UL (ref 4.5–11.5)

## 2024-11-21 PROCEDURE — 36415 COLL VENOUS BLD VENIPUNCTURE: CPT

## 2024-11-21 PROCEDURE — 80048 BASIC METABOLIC PNL TOTAL CA: CPT

## 2024-11-21 PROCEDURE — 85025 COMPLETE CBC W/AUTO DIFF WBC: CPT

## 2024-11-21 PROCEDURE — 2580000003 HC RX 258

## 2024-11-21 PROCEDURE — 94660 CPAP INITIATION&MGMT: CPT

## 2024-11-21 PROCEDURE — 6370000000 HC RX 637 (ALT 250 FOR IP)

## 2024-11-21 PROCEDURE — 82947 ASSAY GLUCOSE BLOOD QUANT: CPT

## 2024-11-21 PROCEDURE — 83735 ASSAY OF MAGNESIUM: CPT

## 2024-11-21 PROCEDURE — 99232 SBSQ HOSP IP/OBS MODERATE 35: CPT | Performed by: INTERNAL MEDICINE

## 2024-11-21 PROCEDURE — 6360000002 HC RX W HCPCS

## 2024-11-21 PROCEDURE — 6370000000 HC RX 637 (ALT 250 FOR IP): Performed by: INTERNAL MEDICINE

## 2024-11-21 PROCEDURE — 84100 ASSAY OF PHOSPHORUS: CPT

## 2024-11-21 PROCEDURE — 92526 ORAL FUNCTION THERAPY: CPT

## 2024-11-21 PROCEDURE — 2060000000 HC ICU INTERMEDIATE R&B

## 2024-11-21 RX ADMIN — ONDANSETRON 4 MG: 4 TABLET, ORALLY DISINTEGRATING ORAL at 13:15

## 2024-11-21 RX ADMIN — DILTIAZEM HYDROCHLORIDE 120 MG: 120 CAPSULE, COATED, EXTENDED RELEASE ORAL at 09:13

## 2024-11-21 RX ADMIN — HYDRALAZINE HYDROCHLORIDE 25 MG: 25 TABLET ORAL at 15:18

## 2024-11-21 RX ADMIN — LOSARTAN POTASSIUM 50 MG: 25 TABLET, FILM COATED ORAL at 09:14

## 2024-11-21 RX ADMIN — POLYETHYLENE GLYCOL 3350 17 G: 17 POWDER, FOR SOLUTION ORAL at 23:18

## 2024-11-21 RX ADMIN — SODIUM CHLORIDE, PRESERVATIVE FREE 10 ML: 5 INJECTION INTRAVENOUS at 22:08

## 2024-11-21 RX ADMIN — SODIUM CHLORIDE, PRESERVATIVE FREE 10 ML: 5 INJECTION INTRAVENOUS at 09:15

## 2024-11-21 RX ADMIN — NITROGLYCERIN 1 INCH: 20 OINTMENT TOPICAL at 05:15

## 2024-11-21 RX ADMIN — APIXABAN 2.5 MG: 5 TABLET, FILM COATED ORAL at 22:08

## 2024-11-21 RX ADMIN — HYDRALAZINE HYDROCHLORIDE 25 MG: 25 TABLET ORAL at 22:08

## 2024-11-21 RX ADMIN — APIXABAN 2.5 MG: 5 TABLET, FILM COATED ORAL at 09:14

## 2024-11-21 RX ADMIN — NITROGLYCERIN 1 INCH: 20 OINTMENT TOPICAL at 22:08

## 2024-11-21 RX ADMIN — HYDRALAZINE HYDROCHLORIDE 25 MG: 25 TABLET ORAL at 05:15

## 2024-11-21 RX ADMIN — ACETAMINOPHEN 650 MG: 325 TABLET ORAL at 13:14

## 2024-11-21 RX ADMIN — SODIUM CHLORIDE, PRESERVATIVE FREE 40 MG: 5 INJECTION INTRAVENOUS at 09:11

## 2024-11-21 ASSESSMENT — PAIN SCALES - GENERAL
PAINLEVEL_OUTOF10: 3
PAINLEVEL_OUTOF10: 0
PAINLEVEL_OUTOF10: 0

## 2024-11-21 ASSESSMENT — PAIN DESCRIPTION - LOCATION: LOCATION: ABDOMEN

## 2024-11-21 ASSESSMENT — PAIN DESCRIPTION - DESCRIPTORS: DESCRIPTORS: ACHING;DISCOMFORT;SORE

## 2024-11-21 ASSESSMENT — PAIN DESCRIPTION - ORIENTATION: ORIENTATION: MID

## 2024-11-21 NOTE — PROGRESS NOTES
[facility]   IN-PATIENT SERVICE      Progress Note    11/21/2024    10:27 AM    Name:   Arianna Coleman  MRN:     [id number]     Acct:      [id number]   Room:   [facility]  IP Day:  11  Admit Date:  11/10/2024  6:40 PM    PCP:   Soraya Riddle MD  Code Status:  Full Code    Subjective:     C/C: No chief complaint on file.  syncope  Interval History Status: not changed.         EP s/o, originally consulted for sinus azael and hx of PAF    Extubated on supplemental o2 now      Patient extubated 11/14.     Palliative consult    BP and HR intermittently volatile  Oral intake improving   Brief History:     This is a 89-year old -American female with past medical history of severe dementia with vision and hearing problem brought in here due to altered mental status.  Patient lives with home aide and she was watching TV suddenly she passed out and aide called 911.  While she in ER found to have altered mental status and she been intubated to protect airway.  During my encounter her 2 daughter was at the bedside but they do not much about her as they do not live with her.  Her initial blood pressure was low and started on vasopressor and again blood pressure went up then vasopressor taken off.  Blood chemistry insignificant, troponin 29 liver function test normal except albumin 3.4 and blood count and platelet count normal.  CT scan did not show any intracranial abnormality except deep sulci.  It also shows old lacunar infarct and acute sinusitis.       Review of Systems:     As per Hpi     Medications:     Allergies:    Allergies   Allergen Reactions    Nifedipine        Current Meds:   Scheduled Meds:    dilTIAZem  120 mg Oral Daily    hydrALAZINE  25 mg Oral 3 times per day    nitroglycerin  1 inch Topical 3 times per day    losartan  50 mg Oral Daily    racepinephrine HCl  0.5 mL Nebulization Once    sodium chloride flush  5-40 mL IntraVENous 2 times per day    apixaban  2.5 mg  b/l  Cardiovascular: normal rate, normal S1 and S2 and no carotid bruits  Abdomen: soft, non-tender, non-distended, normal bowel sounds, no masses or organomegaly  Extremities: no cyanosis, no clubbing and no edema      Assessment:        Hospital Problems             Last Modified POA    * (Principal) AMS (altered mental status) 11/10/2024 Yes    Status post placement of implantable loop recorder 11/11/2024 Yes       Plan:        Syncope-secondary to orthostatic, vasovagal, versus cardiogenic.  EP following.   Patient extubated 11/14.  Cardiology considering hydralazine and nitrates versus Norvasc alone.  Sinus bradycardia-resolved. hold all AV kellen blockade.  Off amiodarone.  EP follow-up.Avoid using Hydralazine and using ACE-I or ARB for BP treatment.   PAF-continue Eliquis 2.5 twice daily.  Off of beta-blocker due to bradycardia.  History of nonischemic cardiomyopathy-LV EF 53% on TTE 4/24/23   Acute encephalopathy-in the setting of history of dementia.  Intubated and sedated at this time.  Management per ICU.  CT head WO contrast showed no acute process but did demonstrate signs of deep white matter small vessel disease and chronic appearing small lacunar infarcts adjacent to the right caudate nucleus.   GERD-continue PPI  Acute resp  failure- pulm following. Cont duonebs and inhalers. Oxygenation and mucus mobilization therapies.     Greater than 35 minutes metaphysical exam, chart, assessment and plan. Anticipate dc home tomorrow if oral intake is adequate     The patient requires the head of the bed to be elevated more than 30 degrees most of the time due to CH. Pillow and wedges have been ruled out and considered.     Lisa Corral MD  11/21/2024  10:27 AM

## 2024-11-21 NOTE — CARE COORDINATION
SOCIAL WORK/[facility] TRANSITION OF CARE PLANNING( MAILE DUTTON, -[phone]): I called nia the daughter and told her that pt could be discharged as early as tomorrow. I called  [facility] and left a vm to call son, kal, or daughter, nia, for delivery tomorrow. Pt will need a ambulacne home and the prior cm has that in place. [facility] has accepted pt and the orders are in epic. Pulmonary has signed off. Pcp to round yet. .Maile Dutton, ESTEFANIA  11/21/2024

## 2024-11-21 NOTE — PROGRESS NOTES
SPEECH LANGUAGE PATHOLOGY  DAILY PROGRESS NOTE        PATIENT NAME:  Arianna Coleman      :  1931          TODAY'S DATE:  2024 ROOM:  [facility]/12-A    Patient seen for f/u for dysphagia mgmt. Charge nurse cleared patient for tx. Patient alert and oriented x 1. Patient agreeable to pudding and thin liquids via cup/straw w/ no overt clinical indicators of pen/aspiration noted. Staff reports good toleration of minced and moist diet with thin liquids. Patient/caregivers to continue use of small bites/sips, slow rate, alt solids/liquids, upright during intake, and encourage clearing of oral cavity before next bite/sip is taken, check for pocketing post meal.  Will cont with POC, meal monitor x 1. Patient to continue with minced and moist solids and thin liquids via cup/straw. RN updated in person.       CPT code(s) 18918  dysphagia tx  Total minutes :  10 minutes     Adelita Wheeler M.S., CCC-SLP  Speech-Language Pathologist  SP. [id number]

## 2024-11-21 NOTE — PLAN OF CARE
Problem: Safety - Adult  Goal: Free from fall injury  Outcome: Progressing     Problem: Neurosensory - Adult  Goal: Achieves stable or improved neurological status  Outcome: Progressing  Goal: Achieves maximal functionality and self care  Outcome: Progressing     Problem: Respiratory - Adult  Goal: Achieves optimal ventilation and oxygenation  Outcome: Progressing     Problem: Discharge Planning  Goal: Discharge to home or other facility with appropriate resources  Outcome: Progressing

## 2024-11-21 NOTE — PROGRESS NOTES
[facility]  PULMONARY/CRITICAL CARE PROGRESS NOTE    Patient: Arianna Coleman  MRN: [id number]  : 1931    Encounter Date: 2024  Encounter Time: 6:03 PM     Date of Admission: .11/10/2024  6:40 PM    Consulting Physician:  Primary Care Physician:     Soraya Riddle MD     [phone]    Reason for Consultation: Dyspnea     Problem List:  Patient Active Problem List   Diagnosis    Systolic hypertension    Primary hypertension    Syncope    Paroxysmal atrial fibrillation (HCC)    Bradycardia    Chronic anticoagulation--Eliquis    Syncope and collapse    Moderate protein-calorie malnutrition (HCC)    Chest pain    Chronic combined systolic (congestive) and diastolic (congestive) heart failure (HCC)    Abnormal urinalysis    Orthostatic hypotension    Coronary artery disease involving native coronary artery of native heart without angina pectoris    Urinary tract infection without hematuria    Chronic congestive heart failure (HCC)    Gastroesophageal reflux disease    Symptomatic bradycardia    Hypokalemia    Sinus bradycardia    Proctocolitis    Altered mental status, unspecified    Recurrent episodes of unresponsiveness    Acute metabolic encephalopathy    Elevated blood pressure reading    AMS (altered mental status)    Status post placement of implantable loop recorder       SUBJECTIVE: Patient seen and examined.  Overnight the patient had no shortness of breath, cough, increased work of breathing.    HOME MEDICATIONS:  Prior to Admission medications    Medication Sig Start Date End Date Taking? Authorizing Provider   hydrALAZINE (APRESOLINE) 10 MG tablet Take 1 tablet by mouth every 8 hours 24  Yes Jennifer Braga MD   losartan (COZAAR) 50 MG tablet Take 1 tablet by mouth in the morning and at bedtime 24  Yes Jennifer Braga MD   apixaban (ELIQUIS) 2.5 MG TABS tablet Take 1 tablet by mouth 2 times daily   Yes Provider, MD Jose

## 2024-11-21 NOTE — PROGRESS NOTES
Chart reviewed patient.  No new acute events.  Patient not requiring PEG tube at this time.  Per family and acute pain.  Planning to discharge back home.  No other acute issues.  Palliative medicine to sign off at this time.  Please reconsult with any new concerns.

## 2024-11-22 VITALS
HEART RATE: 93 BPM | HEIGHT: 66 IN | OXYGEN SATURATION: 98 % | RESPIRATION RATE: 14 BRPM | DIASTOLIC BLOOD PRESSURE: 92 MMHG | WEIGHT: 137.35 LBS | BODY MASS INDEX: 22.07 KG/M2 | TEMPERATURE: 97.3 F | SYSTOLIC BLOOD PRESSURE: 115 MMHG

## 2024-11-22 LAB
ANION GAP SERPL CALCULATED.3IONS-SCNC: 8 MMOL/L (ref 7–16)
BASOPHILS # BLD: 0.02 K/UL (ref 0–0.2)
BASOPHILS NFR BLD: 0 % (ref 0–2)
BUN SERPL-MCNC: 18 MG/DL (ref 6–23)
CALCIUM SERPL-MCNC: 8.4 MG/DL (ref 8.6–10.2)
CHLORIDE SERPL-SCNC: 103 MMOL/L (ref 98–107)
CO2 SERPL-SCNC: 26 MMOL/L (ref 22–29)
CREAT SERPL-MCNC: 1.1 MG/DL (ref 0.5–1)
EOSINOPHIL # BLD: 0.09 K/UL (ref 0.05–0.5)
EOSINOPHILS RELATIVE PERCENT: 1 % (ref 0–6)
ERYTHROCYTE [DISTWIDTH] IN BLOOD BY AUTOMATED COUNT: 14.4 % (ref 11.5–15)
GFR, ESTIMATED: 50 ML/MIN/1.73M2
GLUCOSE BLD-MCNC: 114 MG/DL (ref 74–99)
GLUCOSE SERPL-MCNC: 109 MG/DL (ref 74–99)
HCT VFR BLD AUTO: 38 % (ref 34–48)
HGB BLD-MCNC: 12.4 G/DL (ref 11.5–15.5)
IMM GRANULOCYTES # BLD AUTO: 0.07 K/UL (ref 0–0.58)
IMM GRANULOCYTES NFR BLD: 1 % (ref 0–5)
LYMPHOCYTES NFR BLD: 1.5 K/UL (ref 1.5–4)
LYMPHOCYTES RELATIVE PERCENT: 20 % (ref 20–42)
MAGNESIUM SERPL-MCNC: 2.1 MG/DL (ref 1.6–2.6)
MCH RBC QN AUTO: 30.1 PG (ref 26–35)
MCHC RBC AUTO-ENTMCNC: 32.6 G/DL (ref 32–34.5)
MCV RBC AUTO: 92.2 FL (ref 80–99.9)
MONOCYTES NFR BLD: 0.7 K/UL (ref 0.1–0.95)
MONOCYTES NFR BLD: 9 % (ref 2–12)
NEUTROPHILS NFR BLD: 69 % (ref 43–80)
NEUTS SEG NFR BLD: 5.27 K/UL (ref 1.8–7.3)
PHOSPHATE SERPL-MCNC: 2.4 MG/DL (ref 2.5–4.5)
PLATELET # BLD AUTO: 263 K/UL (ref 130–450)
PMV BLD AUTO: 8.8 FL (ref 7–12)
POTASSIUM SERPL-SCNC: 4.4 MMOL/L (ref 3.5–5)
RBC # BLD AUTO: 4.12 M/UL (ref 3.5–5.5)
SODIUM SERPL-SCNC: 137 MMOL/L (ref 132–146)
WBC OTHER # BLD: 7.7 K/UL (ref 4.5–11.5)

## 2024-11-22 PROCEDURE — 6370000000 HC RX 637 (ALT 250 FOR IP)

## 2024-11-22 PROCEDURE — 80048 BASIC METABOLIC PNL TOTAL CA: CPT

## 2024-11-22 PROCEDURE — 2580000003 HC RX 258

## 2024-11-22 PROCEDURE — 6370000000 HC RX 637 (ALT 250 FOR IP): Performed by: INTERNAL MEDICINE

## 2024-11-22 PROCEDURE — 82947 ASSAY GLUCOSE BLOOD QUANT: CPT

## 2024-11-22 PROCEDURE — 6360000002 HC RX W HCPCS

## 2024-11-22 PROCEDURE — 94660 CPAP INITIATION&MGMT: CPT

## 2024-11-22 PROCEDURE — 36415 COLL VENOUS BLD VENIPUNCTURE: CPT

## 2024-11-22 PROCEDURE — 83735 ASSAY OF MAGNESIUM: CPT

## 2024-11-22 PROCEDURE — 85025 COMPLETE CBC W/AUTO DIFF WBC: CPT

## 2024-11-22 PROCEDURE — 99239 HOSP IP/OBS DSCHRG MGMT >30: CPT | Performed by: INTERNAL MEDICINE

## 2024-11-22 PROCEDURE — 84100 ASSAY OF PHOSPHORUS: CPT

## 2024-11-22 RX ORDER — PANTOPRAZOLE SODIUM 40 MG/1
40 TABLET, DELAYED RELEASE ORAL
Qty: 90 TABLET | Refills: 1 | Status: SHIPPED | OUTPATIENT
Start: 2024-11-22

## 2024-11-22 RX ORDER — SENNOSIDES A AND B 8.6 MG/1
1 TABLET, FILM COATED ORAL NIGHTLY PRN
Qty: 30 TABLET | Refills: 0 | Status: SHIPPED | OUTPATIENT
Start: 2024-11-22 | End: 2024-12-22

## 2024-11-22 RX ORDER — LOSARTAN POTASSIUM 50 MG/1
50 TABLET ORAL DAILY
Qty: 30 TABLET | Refills: 3 | Status: SHIPPED | OUTPATIENT
Start: 2024-11-23

## 2024-11-22 RX ORDER — HYDRALAZINE HYDROCHLORIDE 25 MG/1
25 TABLET, FILM COATED ORAL EVERY 8 HOURS SCHEDULED
Qty: 90 TABLET | Refills: 3 | Status: SHIPPED | OUTPATIENT
Start: 2024-11-22

## 2024-11-22 RX ORDER — DILTIAZEM HYDROCHLORIDE 120 MG/1
120 CAPSULE, COATED, EXTENDED RELEASE ORAL DAILY
Qty: 30 CAPSULE | Refills: 3 | Status: SHIPPED | OUTPATIENT
Start: 2024-11-23

## 2024-11-22 RX ADMIN — SODIUM CHLORIDE, PRESERVATIVE FREE 40 MG: 5 INJECTION INTRAVENOUS at 09:12

## 2024-11-22 RX ADMIN — HYDRALAZINE HYDROCHLORIDE 25 MG: 25 TABLET ORAL at 05:20

## 2024-11-22 RX ADMIN — APIXABAN 2.5 MG: 5 TABLET, FILM COATED ORAL at 09:12

## 2024-11-22 RX ADMIN — SODIUM CHLORIDE, PRESERVATIVE FREE 10 ML: 5 INJECTION INTRAVENOUS at 09:11

## 2024-11-22 RX ADMIN — DILTIAZEM HYDROCHLORIDE 120 MG: 120 CAPSULE, COATED, EXTENDED RELEASE ORAL at 09:12

## 2024-11-22 RX ADMIN — LOSARTAN POTASSIUM 50 MG: 25 TABLET, FILM COATED ORAL at 09:12

## 2024-11-22 NOTE — PLAN OF CARE
Problem: Chronic Conditions and Co-morbidities  Goal: Patient's chronic conditions and co-morbidity symptoms are monitored and maintained or improved  Outcome: Progressing     Problem: Discharge Planning  Goal: Discharge to home or other facility with appropriate resources  Outcome: Progressing     Problem: Safety - Adult  Goal: Free from fall injury  Outcome: Progressing     Problem: ABCDS Injury Assessment  Goal: Absence of physical injury  Outcome: Progressing     Problem: Skin/Tissue Integrity  Goal: Absence of new skin breakdown  Description: 1.  Monitor for areas of redness and/or skin breakdown  2.  Assess vascular access sites hourly  3.  Every 4-6 hours minimum:  Change oxygen saturation probe site  4.  Every 4-6 hours:  If on nasal continuous positive airway pressure, respiratory therapy assess nares and determine need for appliance change or resting period.  Outcome: Progressing     Problem: Pain  Goal: Verbalizes/displays adequate comfort level or baseline comfort level  Outcome: Progressing     Problem: Safety - Medical Restraint  Goal: Remains free of injury from restraints (Restraint for Interference with Medical Device)  Description: INTERVENTIONS:  1. Determine that other, less restrictive measures have been tried or would not be effective before applying the restraint  2. Evaluate the patient's condition at the time of restraint application  3. Inform patient/family regarding the reason for restraint  4. Q2H: Monitor safety, psychosocial status, comfort, nutrition and hydration  Outcome: Progressing     Problem: Nutrition Deficit:  Goal: Optimize nutritional status  Outcome: Progressing     Problem: Neurosensory - Adult  Goal: Achieves stable or improved neurological status  Outcome: Progressing  Goal: Absence of seizures  Outcome: Progressing  Goal: Remains free of injury related to seizures activity  Outcome: Progressing  Goal: Achieves maximal functionality and self care  Outcome:

## 2024-11-22 NOTE — CARE COORDINATION
Chart reviewed and case reviewed in IDR.  Discharge order noted.  Patient to transition home today.  Hospital bed and DME to be delivered to the patient's home between 2 & 4 pm this afternoon from [facility] per Susan.  Call placed to [facility] and spoke with Mark.  Ambulance transportation arranged for a 4 pm .  Call placed to the patient's daughter Dennise and discuss transition of care planning.  Explained above.  Dennise asked for a BSC as well for transition of care planning.  Orders received and called to Susan, liaison with [facility].  She will deliver to the patient's daughter in the room.  Call also placed to Cynthia, liaison with [facility] and notified of discharge.  Dennise's number provided to arrange HC visits with.  Envelope and ambulance form completed and placed in the soft chart.  Will continue to follow for further transition of care planning needs.         Bibi Patel RN.  P:  [phone]

## 2024-11-22 NOTE — PLAN OF CARE
Problem: Chronic Conditions and Co-morbidities  Goal: Patient's chronic conditions and co-morbidity symptoms are monitored and maintained or improved  11/22/2024 1802 by Naye Soto RN  Outcome: Completed     Problem: Discharge Planning  Goal: Discharge to home or other facility with appropriate resources  11/22/2024 1802 by Naye Soto RN  Outcome: Completed     Problem: Safety - Adult  Goal: Free from fall injury  11/22/2024 1802 by Naye Soto RN  Outcome: Completed     Problem: ABCDS Injury Assessment  Goal: Absence of physical injury  11/22/2024 1802 by Naye Soto RN  Outcome: Completed     Problem: Skin/Tissue Integrity  Goal: Absence of new skin breakdown  Description: 1.  Monitor for areas of redness and/or skin breakdown  2.  Assess vascular access sites hourly  3.  Every 4-6 hours minimum:  Change oxygen saturation probe site  4.  Every 4-6 hours:  If on nasal continuous positive airway pressure, respiratory therapy assess nares and determine need for appliance change or resting period.  11/22/2024 1802 by Naye Soto RN  Outcome: Completed     Problem: Pain  Goal: Verbalizes/displays adequate comfort level or baseline comfort level  11/22/2024 1802 by Naye Soto RN  Outcome: Completed     Problem: Safety - Medical Restraint  Goal: Remains free of injury from restraints (Restraint for Interference with Medical Device)  Description: INTERVENTIONS:  1. Determine that other, less restrictive measures have been tried or would not be effective before applying the restraint  2. Evaluate the patient's condition at the time of restraint application  3. Inform patient/family regarding the reason for restraint  4. Q2H: Monitor safety, psychosocial status, comfort, nutrition and hydration  11/22/2024 1802 by Naye Soto RN  Outcome: Completed     Problem: Nutrition Deficit:  Goal: Optimize nutritional status  11/22/2024 1802 by Naye Soto RN  Outcome: Completed    Gastrointestinal - Adult  Goal: Minimal or absence of nausea and vomiting  11/22/2024 1802 by Naye Soto RN  Outcome: Completed     Problem: Gastrointestinal - Adult  Goal: Maintains or returns to baseline bowel function  11/22/2024 1802 by Naye Soto RN  Outcome: Completed     Problem: Gastrointestinal - Adult  Goal: Maintains adequate nutritional intake  11/22/2024 1802 by Naye Soto RN  Outcome: Completed     Problem: Gastrointestinal - Adult  Goal: Establish and maintain optimal ostomy function  11/22/2024 1802 by Naye Soto RN  Outcome: Completed     Problem: Genitourinary - Adult  Goal: Absence of urinary retention  11/22/2024 1802 by Naye Soto RN  Outcome: Completed     Problem: Genitourinary - Adult  Goal: Urinary catheter remains patent  11/22/2024 1802 by Naye Soto RN  Outcome: Completed     Problem: Infection - Adult  Goal: Absence of infection at discharge  11/22/2024 1802 by Naye Soto RN  Outcome: Completed     Problem: Infection - Adult  Goal: Absence of infection during hospitalization  11/22/2024 1802 by Naye Soto RN  Outcome: Completed     Problem: Metabolic/Fluid and Electrolytes - Adult  Goal: Electrolytes maintained within normal limits  11/22/2024 1802 by Naye Soto RN  Outcome: Completed     Problem: Metabolic/Fluid and Electrolytes - Adult  Goal: Hemodynamic stability and optimal renal function maintained  11/22/2024 1802 by Naye Soto RN  Outcome: Completed     Problem: Metabolic/Fluid and Electrolytes - Adult  Goal: Glucose maintained within prescribed range  Outcome: Completed     Problem: Hematologic - Adult  Goal: Maintains hematologic stability  Outcome: Completed

## 2024-11-23 NOTE — PROGRESS NOTES
.CLINICAL PHARMACY NOTE: MEDS TO BEDS    Total # of Prescriptions Filled: 5   The following medications were delivered to the patient:  Pantoprazole 40  Losartan 50  Hydralazine 25  Diltiazem er coated beads 120  Senna 8.6    Additional Documentation:    Delivered to pt

## 2024-11-25 ENCOUNTER — APPOINTMENT (OUTPATIENT)
Dept: GENERAL RADIOLOGY | Age: 88
DRG: 871 | End: 2024-11-25
Payer: COMMERCIAL

## 2024-11-25 ENCOUNTER — APPOINTMENT (OUTPATIENT)
Dept: CT IMAGING | Age: 88
DRG: 871 | End: 2024-11-25
Payer: COMMERCIAL

## 2024-11-25 ENCOUNTER — HOSPITAL ENCOUNTER (INPATIENT)
Age: 88
LOS: 3 days | Discharge: HOME HEALTH | DRG: 871 | End: 2024-11-28
Attending: EMERGENCY MEDICINE | Admitting: INTERNAL MEDICINE
Payer: COMMERCIAL

## 2024-11-25 DIAGNOSIS — R55 SYNCOPE, UNSPECIFIED SYNCOPE TYPE: ICD-10-CM

## 2024-11-25 DIAGNOSIS — N30.01 ACUTE CYSTITIS WITH HEMATURIA: Primary | ICD-10-CM

## 2024-11-25 DIAGNOSIS — R53.1 GENERAL WEAKNESS: ICD-10-CM

## 2024-11-25 DIAGNOSIS — R79.89 ELEVATED TROPONIN: ICD-10-CM

## 2024-11-25 DIAGNOSIS — R10.84 GENERALIZED ABDOMINAL PAIN: ICD-10-CM

## 2024-11-25 PROBLEM — N39.0 UTI (URINARY TRACT INFECTION): Status: ACTIVE | Noted: 2024-11-25

## 2024-11-25 LAB
ALBUMIN SERPL-MCNC: 2.5 G/DL (ref 3.5–5.2)
ALP SERPL-CCNC: 63 U/L (ref 35–104)
ALT SERPL-CCNC: 12 U/L (ref 0–32)
ANION GAP SERPL CALCULATED.3IONS-SCNC: 10 MMOL/L (ref 7–16)
AST SERPL-CCNC: 16 U/L (ref 0–31)
B PARAP IS1001 DNA NPH QL NAA+NON-PROBE: NOT DETECTED
B PERT DNA SPEC QL NAA+PROBE: NOT DETECTED
BACTERIA URNS QL MICRO: ABNORMAL
BASOPHILS # BLD: 0.03 K/UL (ref 0–0.2)
BASOPHILS NFR BLD: 0 % (ref 0–2)
BILIRUB SERPL-MCNC: 0.6 MG/DL (ref 0–1.2)
BILIRUB UR QL STRIP: NEGATIVE
BUN SERPL-MCNC: 12 MG/DL (ref 6–23)
C PNEUM DNA NPH QL NAA+NON-PROBE: NOT DETECTED
CALCIUM SERPL-MCNC: 7.8 MG/DL (ref 8.6–10.2)
CHLORIDE SERPL-SCNC: 108 MMOL/L (ref 98–107)
CHP ED QC CHECK: YES
CLARITY UR: CLEAR
CO2 SERPL-SCNC: 23 MMOL/L (ref 22–29)
COLOR UR: YELLOW
CREAT SERPL-MCNC: 0.9 MG/DL (ref 0.5–1)
EKG ATRIAL RATE: 138 BPM
EKG ATRIAL RATE: 78 BPM
EKG P-R INTERVAL: 168 MS
EKG Q-T INTERVAL: 396 MS
EKG Q-T INTERVAL: 404 MS
EKG QRS DURATION: 110 MS
EKG QRS DURATION: 92 MS
EKG QTC CALCULATION (BAZETT): 451 MS
EKG QTC CALCULATION (BAZETT): 486 MS
EKG R AXIS: -45 DEGREES
EKG R AXIS: -49 DEGREES
EKG T AXIS: 29 DEGREES
EKG T AXIS: 54 DEGREES
EKG VENTRICULAR RATE: 78 BPM
EKG VENTRICULAR RATE: 87 BPM
EOSINOPHIL # BLD: 0.03 K/UL (ref 0.05–0.5)
EOSINOPHILS RELATIVE PERCENT: 0 % (ref 0–6)
ERYTHROCYTE [DISTWIDTH] IN BLOOD BY AUTOMATED COUNT: 14.4 % (ref 11.5–15)
FLUAV RNA NPH QL NAA+NON-PROBE: NOT DETECTED
FLUBV RNA NPH QL NAA+NON-PROBE: NOT DETECTED
GFR, ESTIMATED: 59 ML/MIN/1.73M2
GLUCOSE BLD-MCNC: 123 MG/DL
GLUCOSE BLD-MCNC: 123 MG/DL (ref 74–99)
GLUCOSE SERPL-MCNC: 136 MG/DL (ref 74–99)
GLUCOSE UR STRIP-MCNC: NEGATIVE MG/DL
HADV DNA NPH QL NAA+NON-PROBE: NOT DETECTED
HCOV 229E RNA NPH QL NAA+NON-PROBE: NOT DETECTED
HCOV HKU1 RNA NPH QL NAA+NON-PROBE: NOT DETECTED
HCOV NL63 RNA NPH QL NAA+NON-PROBE: NOT DETECTED
HCOV OC43 RNA NPH QL NAA+NON-PROBE: NOT DETECTED
HCT VFR BLD AUTO: 32.4 % (ref 34–48)
HGB BLD-MCNC: 10.5 G/DL (ref 11.5–15.5)
HGB UR QL STRIP.AUTO: ABNORMAL
HMPV RNA NPH QL NAA+NON-PROBE: NOT DETECTED
HPIV1 RNA NPH QL NAA+NON-PROBE: NOT DETECTED
HPIV2 RNA NPH QL NAA+NON-PROBE: NOT DETECTED
HPIV3 RNA NPH QL NAA+NON-PROBE: NOT DETECTED
HPIV4 RNA NPH QL NAA+NON-PROBE: NOT DETECTED
IMM GRANULOCYTES # BLD AUTO: 0.04 K/UL (ref 0–0.58)
IMM GRANULOCYTES NFR BLD: 0 % (ref 0–5)
KETONES UR STRIP-MCNC: NEGATIVE MG/DL
LACTATE BLDV-SCNC: 2.6 MMOL/L (ref 0.5–2.2)
LEUKOCYTE ESTERASE UR QL STRIP: ABNORMAL
LIPASE SERPL-CCNC: 7 U/L (ref 13–60)
LYMPHOCYTES NFR BLD: 0.65 K/UL (ref 1.5–4)
LYMPHOCYTES RELATIVE PERCENT: 7 % (ref 20–42)
M PNEUMO DNA NPH QL NAA+NON-PROBE: NOT DETECTED
MAGNESIUM SERPL-MCNC: 1.9 MG/DL (ref 1.6–2.6)
MCH RBC QN AUTO: 30.4 PG (ref 26–35)
MCHC RBC AUTO-ENTMCNC: 32.4 G/DL (ref 32–34.5)
MCV RBC AUTO: 93.9 FL (ref 80–99.9)
MONOCYTES NFR BLD: 1.23 K/UL (ref 0.1–0.95)
MONOCYTES NFR BLD: 13 % (ref 2–12)
NEUTROPHILS NFR BLD: 80 % (ref 43–80)
NEUTS SEG NFR BLD: 7.7 K/UL (ref 1.8–7.3)
NITRITE UR QL STRIP: POSITIVE
PH UR STRIP: 7.5 [PH] (ref 5–9)
PLATELET # BLD AUTO: 246 K/UL (ref 130–450)
PMV BLD AUTO: 8.8 FL (ref 7–12)
POTASSIUM SERPL-SCNC: 3.6 MMOL/L (ref 3.5–5)
PROT SERPL-MCNC: 5.3 G/DL (ref 6.4–8.3)
PROT UR STRIP-MCNC: 100 MG/DL
RBC # BLD AUTO: 3.45 M/UL (ref 3.5–5.5)
RBC #/AREA URNS HPF: ABNORMAL /HPF
RSV RNA NPH QL NAA+NON-PROBE: NOT DETECTED
RV+EV RNA NPH QL NAA+NON-PROBE: NOT DETECTED
SARS-COV-2 RNA NPH QL NAA+NON-PROBE: NOT DETECTED
SODIUM SERPL-SCNC: 141 MMOL/L (ref 132–146)
SP GR UR STRIP: 1.02 (ref 1–1.03)
SPECIMEN DESCRIPTION: NORMAL
TROPONIN I SERPL HS-MCNC: 118 NG/L (ref 0–9)
TROPONIN I SERPL HS-MCNC: 125 NG/L (ref 0–9)
UROBILINOGEN UR STRIP-ACNC: 1 EU/DL (ref 0–1)
WBC #/AREA URNS HPF: ABNORMAL /HPF
WBC OTHER # BLD: 9.7 K/UL (ref 4.5–11.5)

## 2024-11-25 PROCEDURE — 83690 ASSAY OF LIPASE: CPT

## 2024-11-25 PROCEDURE — 93005 ELECTROCARDIOGRAM TRACING: CPT

## 2024-11-25 PROCEDURE — 84484 ASSAY OF TROPONIN QUANT: CPT

## 2024-11-25 PROCEDURE — 93010 ELECTROCARDIOGRAM REPORT: CPT | Performed by: INTERNAL MEDICINE

## 2024-11-25 PROCEDURE — 71045 X-RAY EXAM CHEST 1 VIEW: CPT

## 2024-11-25 PROCEDURE — 2060000000 HC ICU INTERMEDIATE R&B

## 2024-11-25 PROCEDURE — 6360000002 HC RX W HCPCS

## 2024-11-25 PROCEDURE — 80053 COMPREHEN METABOLIC PANEL: CPT

## 2024-11-25 PROCEDURE — 87088 URINE BACTERIA CULTURE: CPT

## 2024-11-25 PROCEDURE — 99223 1ST HOSP IP/OBS HIGH 75: CPT | Performed by: INTERNAL MEDICINE

## 2024-11-25 PROCEDURE — 6370000000 HC RX 637 (ALT 250 FOR IP): Performed by: INTERNAL MEDICINE

## 2024-11-25 PROCEDURE — 2580000003 HC RX 258

## 2024-11-25 PROCEDURE — 6360000002 HC RX W HCPCS: Performed by: INTERNAL MEDICINE

## 2024-11-25 PROCEDURE — 99285 EMERGENCY DEPT VISIT HI MDM: CPT

## 2024-11-25 PROCEDURE — 2580000003 HC RX 258: Performed by: INTERNAL MEDICINE

## 2024-11-25 PROCEDURE — 96374 THER/PROPH/DIAG INJ IV PUSH: CPT

## 2024-11-25 PROCEDURE — 85025 COMPLETE CBC W/AUTO DIFF WBC: CPT

## 2024-11-25 PROCEDURE — 0202U NFCT DS 22 TRGT SARS-COV-2: CPT

## 2024-11-25 PROCEDURE — 83735 ASSAY OF MAGNESIUM: CPT

## 2024-11-25 PROCEDURE — 87086 URINE CULTURE/COLONY COUNT: CPT

## 2024-11-25 PROCEDURE — 6360000004 HC RX CONTRAST MEDICATION: Performed by: RADIOLOGY

## 2024-11-25 PROCEDURE — 74177 CT ABD & PELVIS W/CONTRAST: CPT

## 2024-11-25 PROCEDURE — 82947 ASSAY GLUCOSE BLOOD QUANT: CPT

## 2024-11-25 PROCEDURE — 2580000003 HC RX 258: Performed by: RADIOLOGY

## 2024-11-25 PROCEDURE — 81001 URINALYSIS AUTO W/SCOPE: CPT

## 2024-11-25 PROCEDURE — 83605 ASSAY OF LACTIC ACID: CPT

## 2024-11-25 RX ORDER — LINEZOLID 2 MG/ML
600 INJECTION, SOLUTION INTRAVENOUS EVERY 12 HOURS
Status: DISCONTINUED | OUTPATIENT
Start: 2024-11-26 | End: 2024-11-27

## 2024-11-25 RX ORDER — ACETAMINOPHEN 650 MG/1
650 SUPPOSITORY RECTAL EVERY 6 HOURS PRN
Status: DISCONTINUED | OUTPATIENT
Start: 2024-11-25 | End: 2024-11-28 | Stop reason: HOSPADM

## 2024-11-25 RX ORDER — ONDANSETRON 2 MG/ML
4 INJECTION INTRAMUSCULAR; INTRAVENOUS EVERY 6 HOURS PRN
Status: DISCONTINUED | OUTPATIENT
Start: 2024-11-25 | End: 2024-11-28 | Stop reason: HOSPADM

## 2024-11-25 RX ORDER — LINEZOLID 2 MG/ML
600 INJECTION, SOLUTION INTRAVENOUS ONCE
Status: COMPLETED | OUTPATIENT
Start: 2024-11-25 | End: 2024-11-25

## 2024-11-25 RX ORDER — CALCIUM CARBONATE 500 MG/1
500 TABLET, CHEWABLE ORAL 3 TIMES DAILY PRN
Status: DISCONTINUED | OUTPATIENT
Start: 2024-11-25 | End: 2024-11-28 | Stop reason: HOSPADM

## 2024-11-25 RX ORDER — SODIUM CHLORIDE 0.9 % (FLUSH) 0.9 %
10 SYRINGE (ML) INJECTION
Status: COMPLETED | OUTPATIENT
Start: 2024-11-25 | End: 2024-11-25

## 2024-11-25 RX ORDER — SENNOSIDES A AND B 8.6 MG/1
1 TABLET, FILM COATED ORAL NIGHTLY PRN
Status: DISCONTINUED | OUTPATIENT
Start: 2024-11-25 | End: 2024-11-28 | Stop reason: HOSPADM

## 2024-11-25 RX ORDER — LOSARTAN POTASSIUM 50 MG/1
50 TABLET ORAL DAILY
Status: DISCONTINUED | OUTPATIENT
Start: 2024-11-26 | End: 2024-11-28 | Stop reason: HOSPADM

## 2024-11-25 RX ORDER — ACETAMINOPHEN 325 MG/1
650 TABLET ORAL EVERY 6 HOURS PRN
Status: DISCONTINUED | OUTPATIENT
Start: 2024-11-25 | End: 2024-11-28 | Stop reason: HOSPADM

## 2024-11-25 RX ORDER — POTASSIUM CHLORIDE 1500 MG/1
40 TABLET, EXTENDED RELEASE ORAL PRN
Status: DISCONTINUED | OUTPATIENT
Start: 2024-11-25 | End: 2024-11-28 | Stop reason: HOSPADM

## 2024-11-25 RX ORDER — ONDANSETRON 4 MG/1
4 TABLET, ORALLY DISINTEGRATING ORAL EVERY 8 HOURS PRN
Status: DISCONTINUED | OUTPATIENT
Start: 2024-11-25 | End: 2024-11-28 | Stop reason: HOSPADM

## 2024-11-25 RX ORDER — SODIUM CHLORIDE 0.9 % (FLUSH) 0.9 %
5-40 SYRINGE (ML) INJECTION EVERY 12 HOURS SCHEDULED
Status: DISCONTINUED | OUTPATIENT
Start: 2024-11-25 | End: 2024-11-28 | Stop reason: HOSPADM

## 2024-11-25 RX ORDER — ENOXAPARIN SODIUM 100 MG/ML
40 INJECTION SUBCUTANEOUS DAILY
Status: DISCONTINUED | OUTPATIENT
Start: 2024-11-26 | End: 2024-11-25

## 2024-11-25 RX ORDER — SODIUM CHLORIDE 0.9 % (FLUSH) 0.9 %
5-40 SYRINGE (ML) INJECTION PRN
Status: DISCONTINUED | OUTPATIENT
Start: 2024-11-25 | End: 2024-11-28 | Stop reason: HOSPADM

## 2024-11-25 RX ORDER — FENTANYL CITRATE 50 UG/ML
25 INJECTION, SOLUTION INTRAMUSCULAR; INTRAVENOUS
Status: DISCONTINUED | OUTPATIENT
Start: 2024-11-25 | End: 2024-11-28 | Stop reason: HOSPADM

## 2024-11-25 RX ORDER — SODIUM CHLORIDE 9 MG/ML
INJECTION, SOLUTION INTRAVENOUS PRN
Status: DISCONTINUED | OUTPATIENT
Start: 2024-11-25 | End: 2024-11-28 | Stop reason: HOSPADM

## 2024-11-25 RX ORDER — HYDRALAZINE HYDROCHLORIDE 20 MG/ML
10 INJECTION INTRAMUSCULAR; INTRAVENOUS EVERY 6 HOURS PRN
Status: DISCONTINUED | OUTPATIENT
Start: 2024-11-25 | End: 2024-11-28 | Stop reason: HOSPADM

## 2024-11-25 RX ORDER — HYDRALAZINE HYDROCHLORIDE 25 MG/1
25 TABLET, FILM COATED ORAL EVERY 8 HOURS SCHEDULED
Status: DISCONTINUED | OUTPATIENT
Start: 2024-11-25 | End: 2024-11-28 | Stop reason: HOSPADM

## 2024-11-25 RX ORDER — BENZONATATE 100 MG/1
100 CAPSULE ORAL 3 TIMES DAILY PRN
Status: DISCONTINUED | OUTPATIENT
Start: 2024-11-25 | End: 2024-11-28 | Stop reason: HOSPADM

## 2024-11-25 RX ORDER — POTASSIUM CHLORIDE 7.45 MG/ML
10 INJECTION INTRAVENOUS PRN
Status: DISCONTINUED | OUTPATIENT
Start: 2024-11-25 | End: 2024-11-28 | Stop reason: HOSPADM

## 2024-11-25 RX ORDER — MAGNESIUM SULFATE IN WATER 40 MG/ML
2000 INJECTION, SOLUTION INTRAVENOUS PRN
Status: DISCONTINUED | OUTPATIENT
Start: 2024-11-25 | End: 2024-11-28 | Stop reason: HOSPADM

## 2024-11-25 RX ORDER — IOPAMIDOL 755 MG/ML
75 INJECTION, SOLUTION INTRAVASCULAR
Status: COMPLETED | OUTPATIENT
Start: 2024-11-25 | End: 2024-11-25

## 2024-11-25 RX ORDER — PANTOPRAZOLE SODIUM 40 MG/1
40 TABLET, DELAYED RELEASE ORAL
Status: DISCONTINUED | OUTPATIENT
Start: 2024-11-26 | End: 2024-11-28 | Stop reason: HOSPADM

## 2024-11-25 RX ORDER — POLYETHYLENE GLYCOL 3350 17 G/17G
17 POWDER, FOR SOLUTION ORAL DAILY PRN
Status: DISCONTINUED | OUTPATIENT
Start: 2024-11-25 | End: 2024-11-28 | Stop reason: HOSPADM

## 2024-11-25 RX ORDER — DILTIAZEM HYDROCHLORIDE 120 MG/1
120 CAPSULE, COATED, EXTENDED RELEASE ORAL DAILY
Status: DISCONTINUED | OUTPATIENT
Start: 2024-11-26 | End: 2024-11-28 | Stop reason: HOSPADM

## 2024-11-25 RX ADMIN — HYDRALAZINE HYDROCHLORIDE 25 MG: 25 TABLET ORAL at 17:39

## 2024-11-25 RX ADMIN — ACETAMINOPHEN 650 MG: 325 TABLET ORAL at 22:09

## 2024-11-25 RX ADMIN — APIXABAN 2.5 MG: 5 TABLET, FILM COATED ORAL at 22:09

## 2024-11-25 RX ADMIN — CEFTRIAXONE SODIUM 1000 MG: 1 INJECTION, POWDER, FOR SOLUTION INTRAMUSCULAR; INTRAVENOUS at 15:47

## 2024-11-25 RX ADMIN — LINEZOLID 600 MG: 600 INJECTION, SOLUTION INTRAVENOUS at 16:41

## 2024-11-25 RX ADMIN — FENTANYL CITRATE 25 MCG: 50 INJECTION INTRAMUSCULAR; INTRAVENOUS at 17:33

## 2024-11-25 RX ADMIN — HYDRALAZINE HYDROCHLORIDE 25 MG: 25 TABLET ORAL at 22:09

## 2024-11-25 RX ADMIN — SODIUM CHLORIDE, PRESERVATIVE FREE 10 ML: 5 INJECTION INTRAVENOUS at 15:49

## 2024-11-25 RX ADMIN — SODIUM CHLORIDE, PRESERVATIVE FREE 10 ML: 5 INJECTION INTRAVENOUS at 21:02

## 2024-11-25 RX ADMIN — IOPAMIDOL 75 ML: 755 INJECTION, SOLUTION INTRAVENOUS at 14:44

## 2024-11-25 ASSESSMENT — PAIN DESCRIPTION - LOCATION
LOCATION: ABDOMEN
LOCATION: ABDOMEN

## 2024-11-25 ASSESSMENT — PAIN SCALES - GENERAL
PAINLEVEL_OUTOF10: 8
PAINLEVEL_OUTOF10: 5

## 2024-11-25 ASSESSMENT — PAIN DESCRIPTION - ORIENTATION: ORIENTATION: MID

## 2024-11-25 ASSESSMENT — PAIN DESCRIPTION - DESCRIPTORS: DESCRIPTORS: DISCOMFORT

## 2024-11-25 NOTE — H&P
Inpatient H&P      PCP:  Soraya Riddle MD  Admitting Physician:  Fidelina Moore DO  Consultants:  ID  Chief Complaint:    Chief Complaint   Patient presents with    Altered Mental Status     Per EMS , patient went unresp in front of son , patient was admitted to hospital for same 2 weeks ago . Patient responsive to pain .       History of Present Illness  Arianna Coleman is a 89 y.o. female who presents to [facility] ER complaining of altered mental status.    Arianna Coleman has a past medical history that includes paroxysmal atrial fibrillation, history of nonischemic cardiomyopathy, COPD    Arianna presents to the ER with unresponsiveness.  She reportedly had syncopal episode in front of son earlier today.  Patient had a recent admission for syncope also.  She has now returned to normal and admits to lower abdominal pain.  She is found to have UTI. She was also found to have elevated troponin from baseline. She denies any chest pain or shortness of breath. She will be admitted for UTI and syncope. Consultation to cardiology  Discussed patient's case with ED physician.    ER Course  Upon presentation to the ER, routine labwork was performed which revealed lactic acid 2.6, troponin 125, 118, hemoglobin 10.5.  Imaging results are as outlined below in the Imaging section of this note.    Upon arrival to the ER, patient was 131/70.  The patient received Rocephin, Zyvox in the emergency room and was admitted to [facility].    Last Hospital Admission -I personally reviewed previous admission from 11/10/2024  Syncope  Sinus bradycardia  PAF  History of nonischemic cardiomyopathy  Acute encephalopathy  GERD-  Acute resp  failure    Last Echocardiogram -I personally reviewed previous echocardiogram results from 4/24/2023   Left ventricle grossly normal in size.   Increased trabeculation in LV apex. ? pseudo noncompaction ,   Estimated left ventricular ejection fraction is 53 ±5%.   Mild-moderate left

## 2024-11-25 NOTE — ED PROVIDER NOTES
[facility]  EMERGENCY DEPARTMENT ENCOUNTER      Pt Name: Arianna Coleman  MRN: [id number]  Birthdate 12/25/1931  Date of evaluation: 11/25/2024  Provider: Pito Rosa MD  PCP: Soraya Riddle MD  Note Started: 9:35 AM EST 11/25/24    CHIEF COMPLAINT       Chief Complaint   Patient presents with    Altered Mental Status     Per EMS , patient went unresp in front of son , patient was admitted to hospital for same 2 weeks ago . Patient responsive to pain .       HISTORY OF PRESENT ILLNESS: 1 or more Elements   History From: Patient  Limitations to history : Altered    Arianna Coleman is a 89 y.o. female who presents BIBEMS from home with complaints of weakness, transient alteration of awareness, possible syncopal event that occurred prior to arrival.  EMS reports that per patient's son apparently patient was on the toilet and lost consciousness, needed to be sternal rubbed, and immediately regained consciousness.  She was recently admitted to this hospital.  Patient with baseline altered mentation and unable to meaningfully participate in this interview.  Denies chest pain, does endorse abdominal pain.    Nursing Notes were all reviewed and agreed with or any disagreements were addressed in the HPI.    REVIEW OF SYSTEMS :    Positives and Pertinent negatives as per HPI.     PAST MEDICAL HISTORY/Chronic Conditions Affecting Care    has a past medical history of Arthritis, Atrial fibrillation (HCC), Chronic combined systolic (congestive) and diastolic (congestive) heart failure (HCC) (11/7/2021), Coronary artery disease involving native coronary artery of native heart without angina pectoris, blood clots, Hypertension, Ischemic colitis (HCC), and PAF (paroxysmal atrial fibrillation) ([facility]).     SURGICAL HISTORY     Past Surgical History:   Procedure Laterality Date    ANKLE FRACTURE SURGERY      CARDIAC CATHETERIZATION  11/11/2019    Dr. Mares    COLONOSCOPY      ENDOSCOPY, COLON, DIAGNOSTIC      FRACTURE  elevated troponins and acute cystitis.  Consulted internal medicine, Dr. Moore will admit patient.    I discussed the results of the workup with the patient's family as well as the recommendation for admission.  Patient's family verbalizes their understanding and is agreeable and amenable to the plan at this time.  All questions answered, through shared decision making we will plan for admission.    Disposition Considerations (Tests not ordered but considered, Shared Decision Making, Pt Expectation of Test or Tx.): Admission    FINAL IMPRESSION      1. Acute cystitis with hematuria    2. Elevated troponin    3. General weakness    4. Syncope, unspecified syncope type          DISPOSITION/PLAN     DISPOSITION Admitted 11/25/2024 03:58:00 PM    PATIENT REFERRED TO:  No follow-up provider specified.    DISCHARGE MEDICATIONS:  Current Discharge Medication List          DISCONTINUED MEDICATIONS:  Current Discharge Medication List               (Please note that portions of this note were completed with a voice recognition program.  Efforts were made to edit the dictations but occasionally words are mis-transcribed.)    Pito Rosa MD (electronically signed)

## 2024-11-26 LAB
ALBUMIN SERPL-MCNC: 2.9 G/DL (ref 3.5–5.2)
ALP SERPL-CCNC: 75 U/L (ref 35–104)
ALT SERPL-CCNC: 12 U/L (ref 0–32)
ANION GAP SERPL CALCULATED.3IONS-SCNC: 11 MMOL/L (ref 7–16)
AST SERPL-CCNC: 15 U/L (ref 0–31)
BILIRUB SERPL-MCNC: 0.6 MG/DL (ref 0–1.2)
BUN SERPL-MCNC: 12 MG/DL (ref 6–23)
CALCIUM SERPL-MCNC: 8.8 MG/DL (ref 8.6–10.2)
CHLORIDE SERPL-SCNC: 104 MMOL/L (ref 98–107)
CHOLEST SERPL-MCNC: 196 MG/DL
CO2 SERPL-SCNC: 24 MMOL/L (ref 22–29)
CREAT SERPL-MCNC: 0.9 MG/DL (ref 0.5–1)
ERYTHROCYTE [DISTWIDTH] IN BLOOD BY AUTOMATED COUNT: 14.6 % (ref 11.5–15)
GFR, ESTIMATED: 58 ML/MIN/1.73M2
GLUCOSE SERPL-MCNC: 95 MG/DL (ref 74–99)
HBA1C MFR BLD: 5.4 % (ref 4–5.6)
HCT VFR BLD AUTO: 39.9 % (ref 34–48)
HDLC SERPL-MCNC: 44 MG/DL
HGB BLD-MCNC: 12.8 G/DL (ref 11.5–15.5)
LDLC SERPL CALC-MCNC: 138 MG/DL
MAGNESIUM SERPL-MCNC: 2.1 MG/DL (ref 1.6–2.6)
MCH RBC QN AUTO: 30 PG (ref 26–35)
MCHC RBC AUTO-ENTMCNC: 32.1 G/DL (ref 32–34.5)
MCV RBC AUTO: 93.7 FL (ref 80–99.9)
PHOSPHATE SERPL-MCNC: 2.7 MG/DL (ref 2.5–4.5)
PLATELET # BLD AUTO: 308 K/UL (ref 130–450)
PMV BLD AUTO: 9.2 FL (ref 7–12)
POTASSIUM SERPL-SCNC: 3.9 MMOL/L (ref 3.5–5)
PROT SERPL-MCNC: 6.1 G/DL (ref 6.4–8.3)
RBC # BLD AUTO: 4.26 M/UL (ref 3.5–5.5)
SODIUM SERPL-SCNC: 139 MMOL/L (ref 132–146)
TRIGL SERPL-MCNC: 70 MG/DL
TSH SERPL DL<=0.05 MIU/L-ACNC: 1.79 UIU/ML (ref 0.27–4.2)
VLDLC SERPL CALC-MCNC: 14 MG/DL
WBC OTHER # BLD: 12 K/UL (ref 4.5–11.5)

## 2024-11-26 PROCEDURE — 6370000000 HC RX 637 (ALT 250 FOR IP): Performed by: INTERNAL MEDICINE

## 2024-11-26 PROCEDURE — 2060000000 HC ICU INTERMEDIATE R&B

## 2024-11-26 PROCEDURE — 99232 SBSQ HOSP IP/OBS MODERATE 35: CPT | Performed by: STUDENT IN AN ORGANIZED HEALTH CARE EDUCATION/TRAINING PROGRAM

## 2024-11-26 PROCEDURE — 6370000000 HC RX 637 (ALT 250 FOR IP): Performed by: STUDENT IN AN ORGANIZED HEALTH CARE EDUCATION/TRAINING PROGRAM

## 2024-11-26 PROCEDURE — 80061 LIPID PANEL: CPT

## 2024-11-26 PROCEDURE — 83735 ASSAY OF MAGNESIUM: CPT

## 2024-11-26 PROCEDURE — 83036 HEMOGLOBIN GLYCOSYLATED A1C: CPT

## 2024-11-26 PROCEDURE — 80053 COMPREHEN METABOLIC PANEL: CPT

## 2024-11-26 PROCEDURE — 84443 ASSAY THYROID STIM HORMONE: CPT

## 2024-11-26 PROCEDURE — 99223 1ST HOSP IP/OBS HIGH 75: CPT | Performed by: EMERGENCY MEDICINE

## 2024-11-26 PROCEDURE — 84100 ASSAY OF PHOSPHORUS: CPT

## 2024-11-26 PROCEDURE — 85027 COMPLETE CBC AUTOMATED: CPT

## 2024-11-26 PROCEDURE — 6360000002 HC RX W HCPCS: Performed by: INTERNAL MEDICINE

## 2024-11-26 PROCEDURE — 36415 COLL VENOUS BLD VENIPUNCTURE: CPT

## 2024-11-26 PROCEDURE — 2580000003 HC RX 258: Performed by: INTERNAL MEDICINE

## 2024-11-26 RX ORDER — TRAMADOL HYDROCHLORIDE 50 MG/1
50 TABLET ORAL EVERY 8 HOURS PRN
Status: DISCONTINUED | OUTPATIENT
Start: 2024-11-26 | End: 2024-11-28 | Stop reason: HOSPADM

## 2024-11-26 RX ADMIN — APIXABAN 2.5 MG: 5 TABLET, FILM COATED ORAL at 09:26

## 2024-11-26 RX ADMIN — STANDARDIZED SENNA CONCENTRATE 8.6 MG: 8.6 TABLET ORAL at 17:47

## 2024-11-26 RX ADMIN — CALCIUM CARBONATE 500 MG: 500 TABLET, CHEWABLE ORAL at 16:29

## 2024-11-26 RX ADMIN — LINEZOLID 600 MG: 600 INJECTION, SOLUTION INTRAVENOUS at 11:38

## 2024-11-26 RX ADMIN — LINEZOLID 600 MG: 600 INJECTION, SOLUTION INTRAVENOUS at 16:54

## 2024-11-26 RX ADMIN — LOSARTAN POTASSIUM 50 MG: 50 TABLET, FILM COATED ORAL at 09:26

## 2024-11-26 RX ADMIN — ONDANSETRON 4 MG: 2 INJECTION INTRAMUSCULAR; INTRAVENOUS at 16:28

## 2024-11-26 RX ADMIN — CEFTRIAXONE SODIUM 1000 MG: 1 INJECTION, POWDER, FOR SOLUTION INTRAMUSCULAR; INTRAVENOUS at 16:54

## 2024-11-26 RX ADMIN — HYDRALAZINE HYDROCHLORIDE 25 MG: 25 TABLET ORAL at 06:08

## 2024-11-26 RX ADMIN — SODIUM CHLORIDE, PRESERVATIVE FREE 10 ML: 5 INJECTION INTRAVENOUS at 09:26

## 2024-11-26 RX ADMIN — TRAMADOL HYDROCHLORIDE 50 MG: 50 TABLET, COATED ORAL at 18:17

## 2024-11-26 RX ADMIN — HYDRALAZINE HYDROCHLORIDE 25 MG: 25 TABLET ORAL at 20:03

## 2024-11-26 RX ADMIN — APIXABAN 2.5 MG: 5 TABLET, FILM COATED ORAL at 20:03

## 2024-11-26 RX ADMIN — PANTOPRAZOLE SODIUM 40 MG: 40 TABLET, DELAYED RELEASE ORAL at 06:08

## 2024-11-26 RX ADMIN — HYDRALAZINE HYDROCHLORIDE 25 MG: 25 TABLET ORAL at 14:27

## 2024-11-26 RX ADMIN — ACETAMINOPHEN 650 MG: 325 TABLET ORAL at 17:00

## 2024-11-26 RX ADMIN — SODIUM CHLORIDE, PRESERVATIVE FREE 10 ML: 5 INJECTION INTRAVENOUS at 20:04

## 2024-11-26 RX ADMIN — DILTIAZEM HYDROCHLORIDE 120 MG: 120 CAPSULE, COATED, EXTENDED RELEASE ORAL at 09:26

## 2024-11-26 ASSESSMENT — PAIN DESCRIPTION - LOCATION
LOCATION: ABDOMEN
LOCATION: ABDOMEN

## 2024-11-26 ASSESSMENT — PAIN DESCRIPTION - DESCRIPTORS
DESCRIPTORS: ACHING
DESCRIPTORS: ACHING

## 2024-11-26 ASSESSMENT — PAIN DESCRIPTION - ORIENTATION: ORIENTATION: MID

## 2024-11-26 ASSESSMENT — PAIN SCALES - GENERAL
PAINLEVEL_OUTOF10: 6
PAINLEVEL_OUTOF10: 3
PAINLEVEL_OUTOF10: 6

## 2024-11-26 NOTE — CONSULTS
Palliative Care Department  [phone]  Palliative Care Initial Consult  Provider Johnnie Barron MD      PATIENT: Arianna Coleman  : 1931  MRN: [id number]  ADMISSION DATE: 2024  9:29 AM  Referring Provider: Dr. Rosa    Palliative Medicine was consulted on hospital day 1 for assistance with Goals of care  HPI:     Clinical Summary:Arianna Coleman is a 89 y.o. y/o female with a history of atrial fibrillation, CAD, blood clots, ischemic colitis, HTN, CHF, nonischemic cardiomyopathy LVEF 53% dementia who presented to [place] on 2024 from home with syncope and collapse.  Found with altered mental status in the emergency room.  Found hypotensive with GCS 4, admitted for respiratory failure secondary to cephalopathy, requiring intubation for airway protection s/p extubation on 2024, on room air now.  Has been evaluated by EP due to bradycardia.  Palliative medicine consulted to see further goals of care.  Was discharged from the hospital on 2024.  Was back at home with family.  On  patient presented to the hospital for concerns of altered mental status.  She was reportedly found to have syncopal episode with unresponsiveness brought to the hospital for further evaluation.  Was found to have a UTI.  Patient was admitted.  Palliative care consulted for goals of care discussion.  ASSESSMENT/PLAN:     Pertinent Hospital Diagnoses     Syncopal and collapse  Sinus bradycardia  Acute cephalopathy  Acute respiratory failure      Palliative Care Encounter / Counseling Regarding Goals of Care  Please see detailed goals of care discussion as below  At this time, Arianna Coleman, Does Not have capacity for medical decision-making.  Capacity is time limited and situation/question specific  During encounter Dennise was surrogate medical decision-maker  Outcome of goals of care meeting:  Continue with current treatment  CODE STATUS changed to DNR CCA  Daughter Migdalia     Code status Full  peripherally.    Prognosis: Poor    OBJECTIVE:     BP (!) 122/90   Pulse 62   Temp 96.9 °F (36.1 °C) (Temporal)   Resp 16   Ht 1.651 m (5' 5\")   Wt 61.2 kg (135 lb)   SpO2 100%   BMI 22.47 kg/m²     Physical Examination:  Gen: elderly, thin, somnolent, alert to self  Lungs: respirations easy   Heart: Tachycardic  Abdomen: normoactive bowel sounds, soft, non-tender  Extremities: no clubbing, cyanosis or edema  Skin: warm, dry without rashes, lesions, bruising  Neuro: Somnolent    Objective data reviewed: labs, images, records, medication use, vitals, and chart    Time/Communication  Greater than 50% of time spent, total 75 minutes in counseling and coordination of care at the bedside regarding goals of care.    Thank you for allowing Palliative Medicine to participate in the care of Arianna Coleman.    Note: This report was completed using computer[employer] voiced recognition software.  Every effort has been made to ensure accuracy; however, inadvertent computerized transcription errors may be present.

## 2024-11-26 NOTE — PROGRESS NOTES
4 Eyes Skin Assessment     NAME:  Arianna Coleman  YOB: 1931  MEDICAL RECORD NUMBER:  [id number]    The patient is being assessed for  Admission    I agree that at least one RN has performed a thorough Head to Toe Skin Assessment on the patient. ALL assessment sites listed below have been assessed.      Areas assessed by both nurses:    Head, Face, Ears, Shoulders, Back, Chest, Arms, Elbows, Hands, Sacrum. Buttock, Coccyx, Ischium, and Legs. Feet and Heels        Does the Patient have a Wound? No noted wound(s)       Bautista Prevention initiated by RN: Yes  Wound Care Orders initiated by RN: No    Pressure Injury (Stage 3,4, Unstageable, DTI, NWPT, and Complex wounds) if present, place Wound referral order by RN under : No    New Ostomies, if present place, Ostomy referral order under : No     Nurse 1 eSignature: Electronically signed by Kendra Francois RN on 11/25/24 at 11:30 PM EST    **SHARE this note so that the co-signing nurse can place an eSignature**    Nurse 2 eSignature: Electronically signed by Rodríguez Llanes RN on 11/25/24 at 11:32 PM EST

## 2024-11-26 NOTE — CONSULTS
ondansetron, polyethylene glycol, acetaminophen **OR** acetaminophen, senna, fentanNYL    Allergies:  Nifedipine    Social History:   Social History     Socioeconomic History    Marital status:    Tobacco Use    Smoking status: Never    Smokeless tobacco: Never   Vaping Use    Vaping status: Never Used   Substance and Sexual Activity    Alcohol use: No    Drug use: No     Social Determinants of Health     Food Insecurity: No Food Insecurity (11/26/2024)    Hunger Vital Sign     Worried About Running Out of Food in the Last Year: Never true     Ran Out of Food in the Last Year: Never true   Transportation Needs: No Transportation Needs (11/26/2024)    PRAPARE - Transportation     Lack of Transportation (Medical): No     Lack of Transportation (Non-Medical): No   Housing Stability: Low Risk  (11/26/2024)    Housing Stability Vital Sign     Unable to Pay for Housing in the Last Year: No     Number of Times Moved in the Last Year: 0     Homeless in the Last Year: No     Tobacco: No  Alcohol: No  Pets: No  Travel: No    Family History:   No family history on file.. Otherwise non-pertinent to the chief complaint.    REVIEW OF SYSTEMS:    CONSTITUTIONAL:  No chills, fevers or night sweats. No loss of weight.  EYES:  No double vision or drainage from eyes, ears or throat.  HEENT:  No neck stiffness. No dysphagia. No drainage from eyes, ears or throat  RESPIRATORY:  No cough, productive sputum or hemoptysis.   CARDIOVASCULAR:  No chest pain, palpitations, orthopnea or dyspnea on exertion.  GASTROINTESTINAL:  No nausea, vomiting, diarrhea or constipation or hematochezia   GENITOURINARY:  No frequency burning dysuria or hematuria.  INTEGUMENT/BREAST:  No rash or breast masses.  HEMATOLOGIC/LYMPHATIC:  No lymphadenopathy or blood dyscrasics.   ALLERGIC/IMMUNOLOGIC:  No anaphylaxis.   ENDOCRINE:  No polyuria or polydipsia or temperature intolerance.    MUSCULOSKELETAL:  No myalgia or arthralgia.  Full ROM.  NEUROLOGICAL:   102 11/12/2010 01:50 PM    CALCIUM 8.8 11/26/2024 06:51 AM    BILITOT 0.6 11/26/2024 06:51 AM    ALKPHOS 75 11/26/2024 06:51 AM    AST 15 11/26/2024 06:51 AM    ALT 12 11/26/2024 06:51 AM       Lab Results   Component Value Date/Time    PROTIME 15.0 11/10/2024 06:57 PM    INR 1.4 11/10/2024 06:57 PM       Lab Results   Component Value Date/Time    TSH 1.79 11/26/2024 06:51 AM       Lab Results   Component Value Date/Time    COLORU Yellow 11/25/2024 01:53 PM    PHUR 7.5 11/25/2024 01:53 PM    PHUR 8.0 07/27/2023 12:45 PM    WBCUA TOO NUMEROUS TO COUNT 11/25/2024 01:53 PM    WBCUA 0-1 11/12/2010 01:15 PM    RBCUA 51  11/25/2024 01:53 PM    RBCUA 0-1 08/24/2012 05:00 AM    YEAST Present 07/06/2023 07:43 PM    BACTERIA 1+ 11/25/2024 01:53 PM    CLARITYU Clear 07/06/2023 07:43 PM    LEUKOCYTESUR LARGE 11/25/2024 01:53 PM    UROBILINOGEN 1.0 11/25/2024 01:53 PM    BILIRUBINUR NEGATIVE 11/25/2024 01:53 PM    BLOODU Negative 07/06/2023 07:43 PM    GLUCOSEU NEGATIVE 11/25/2024 01:53 PM    GLUCOSEU NEGATIVE 11/12/2010 01:15 PM    AMORPHOUS FEW 05/10/2023 02:55 AM       No results found for: \"MXN2OQZ\", \"BEART\", \"E4RBRJLV\", \"PHART\", \"THGBART\", \"KWN5WQR\", \"PO2ART\", \"SLB2UNU\"  Radiology:  CT ABDOMEN PELVIS W IV CONTRAST Additional Contrast? None   Final Result   Marked urinary bladder wall thickening with new free fluid in the presacral   space with posterior rectal wall thickening.  Findings may represent cystitis   or proctitis.      There is no evidence of bowel obstruction.  Debris-filled colon suggests   fecal stasis.         XR CHEST PORTABLE   Final Result   No acute process.             Microbiology:  Pending  No results for input(s): \"BC\" in the last 72 hours.  No results for input(s): \"ORG\" in the last 72 hours.  No results for input(s): \"BLOODCULT2\" in the last 72 hours.  No results for input(s): \"STREPNEUMAGU\" in the last 72 hours.  No results for input(s): \"LP1UAG\" in the last 72 hours.  No results for

## 2024-11-26 NOTE — PLAN OF CARE
Problem: Chronic Conditions and Co-morbidities  Goal: Patient's chronic conditions and co-morbidity symptoms are monitored and maintained or improved  Outcome: Not Progressing     Problem: Discharge Planning  Goal: Discharge to home or other facility with appropriate resources  Outcome: Not Progressing

## 2024-11-26 NOTE — PROGRESS NOTES
Hospitalist Progress Note      SYNOPSIS: Patient admitted on 2024 for UTI (urinary tract infection)  Arianna presents to the ER with unresponsiveness.   ER Course  Upon presentation to the ER, routine labwork was performed which revealed lactic acid 2.6, troponin 125, 118, hemoglobin 10.5.  Imaging results are as outlined below in the Imaging section of this note.    Upon arrival to the ER, patient was 131/70.  The patient received Rocephin, Zyvox in the emergency room and was admitted to [facility].    SUBJECTIVE:  Stable overnight. No other overnight issues reported.   Patient seen and examined at bedside today am, patient hard of hearing, confused,   Patient complains of abdominal pain,  Discussed with daughter present at bedside  Records reviewed.       Temp (24hrs), Av.3 °F (36.3 °C), Min:96.9 °F (36.1 °C), Max:97.8 °F (36.6 °C)    DIET: ADULT DIET; Regular  CODE: DNR-CCA    Intake/Output Summary (Last 24 hours) at 2024 1123  Last data filed at 2024 0419  Gross per 24 hour   Intake --   Output 300 ml   Net -300 ml       Review of Systems  All bolded are positive; please see HPI  General:  Fever, chills, diaphoresis, fatigue, malaise, night sweats, weight loss  Psychological:  Anxiety, disorientation, hallucinations.  ENT:  Epistaxis, headaches, vertigo, visual changes.  Cardiovascular:  Chest pain, irregular heartbeats, palpitations, paroxysmal nocturnal dyspnea.  Respiratory:  Shortness of breath, coughing, sputum production, hemoptysis, wheezing, orthopnea.  Gastrointestinal:  Nausea, vomiting, diarrhea, heartburn, constipation, abdominal pain, hematemesis, hematochezia, melena, acholic stools  Genito-Urinary:  Dysuria, urgency, frequency, hematuria  Musculoskeletal:  Joint pain, joint stiffness, joint swelling, muscle pain  Neurology:  Headache, focal neurological deficits, weakness, numbness, paresthesia  Derm:  Rashes, ulcers, excoriations, bruising  Extremities:  Decreased  ROM, peripheral edema, mottling      OBJECTIVE:    BP (!) 142/77   Pulse 92   Temp 97.8 °F (36.6 °C) (Temporal)   Resp 18   Ht 1.651 m (5' 5\")   Wt 61.2 kg (135 lb)   SpO2 99%   BMI 22.47 kg/m²     General appearance:  awake, alert, and oriented to person, place, time, and purpose; appears stated age and cooperative; no apparent distress no labored breathing  HEENT:  Conjunctivae/corneas clear.   Neck: Supple. No jugular venous distention.   Respiratory: symmetrical; clear to auscultation bilaterally; no wheezes; no rhonchi; no rales  Cardiovascular: rhythm regular; rate controlled; no murmurs  Abdomen: Soft, tenderness present at periumbilical region, nondistended  Extremities:  peripheral pulses present; no peripheral edema; no ulcers  Musculoskeletal: No clubbing, cyanosis, no bilateral lower extremity edema. Brisk capillary refill.   Skin:  No rashes  on visible skin  Neurologic: awake, alert and following commands     ASSESSMENT and PLAN:  Syncope  Complicated UTI with history of VRE  Abdominal pain  Non-STEMI  Paroxysmal atrial fibrillation  Nonischemic cardiomyopathy  Dementia      Plan  Patient came in with history of syncope, does have a history of situational syncope during straining.   Medication list reviewed  Will order orthostatic vitals  Cardiology on board  Echocardiogram from 04/24 reviewed with ejection fraction of 53%  Continue Rocephin and linezolid patient does have history of VRE, awaiting urine culture, infectious disease consulted  Patient does have history of abdominal pain, on and off denies any nausea vomiting diarrhea constipation. reviewed CT abdomen and pelvis without contrast shows Urinary bladder thickening.  Pain management with Tylenol, avoiding narcotic        DVT Prophylaxis [] Lovenox, []  Heparin, [] SCDs, [] Ambulation   GI Prophylaxis [] PPI,  [] H2 Blocker,  [] Carafate,  [] Diet/Tube Feeds   Disposition Patient requires continued admission due to on IV antibiotic for

## 2024-11-27 PROBLEM — E43 SEVERE PROTEIN-CALORIE MALNUTRITION (HCC): Status: ACTIVE | Noted: 2020-02-13

## 2024-11-27 LAB
BNP SERPL-MCNC: 282 PG/ML (ref 0–450)
MICROORGANISM SPEC CULT: ABNORMAL
SERVICE CMNT-IMP: ABNORMAL
SPECIMEN DESCRIPTION: ABNORMAL

## 2024-11-27 PROCEDURE — 2580000003 HC RX 258: Performed by: INTERNAL MEDICINE

## 2024-11-27 PROCEDURE — 6370000000 HC RX 637 (ALT 250 FOR IP): Performed by: INTERNAL MEDICINE

## 2024-11-27 PROCEDURE — 36415 COLL VENOUS BLD VENIPUNCTURE: CPT

## 2024-11-27 PROCEDURE — 2060000000 HC ICU INTERMEDIATE R&B

## 2024-11-27 PROCEDURE — 6360000002 HC RX W HCPCS: Performed by: INTERNAL MEDICINE

## 2024-11-27 PROCEDURE — 6370000000 HC RX 637 (ALT 250 FOR IP): Performed by: STUDENT IN AN ORGANIZED HEALTH CARE EDUCATION/TRAINING PROGRAM

## 2024-11-27 PROCEDURE — 83880 ASSAY OF NATRIURETIC PEPTIDE: CPT

## 2024-11-27 PROCEDURE — 99232 SBSQ HOSP IP/OBS MODERATE 35: CPT | Performed by: STUDENT IN AN ORGANIZED HEALTH CARE EDUCATION/TRAINING PROGRAM

## 2024-11-27 RX ADMIN — LOSARTAN POTASSIUM 50 MG: 50 TABLET, FILM COATED ORAL at 10:18

## 2024-11-27 RX ADMIN — Medication 3 MG: at 20:16

## 2024-11-27 RX ADMIN — APIXABAN 2.5 MG: 5 TABLET, FILM COATED ORAL at 10:18

## 2024-11-27 RX ADMIN — PANTOPRAZOLE SODIUM 40 MG: 40 TABLET, DELAYED RELEASE ORAL at 05:29

## 2024-11-27 RX ADMIN — TRAMADOL HYDROCHLORIDE 50 MG: 50 TABLET, COATED ORAL at 15:40

## 2024-11-27 RX ADMIN — DILTIAZEM HYDROCHLORIDE 120 MG: 120 CAPSULE, COATED, EXTENDED RELEASE ORAL at 10:18

## 2024-11-27 RX ADMIN — HYDRALAZINE HYDROCHLORIDE 25 MG: 25 TABLET ORAL at 20:16

## 2024-11-27 RX ADMIN — APIXABAN 2.5 MG: 5 TABLET, FILM COATED ORAL at 20:16

## 2024-11-27 RX ADMIN — CEFTRIAXONE SODIUM 1000 MG: 1 INJECTION, POWDER, FOR SOLUTION INTRAMUSCULAR; INTRAVENOUS at 15:41

## 2024-11-27 RX ADMIN — HYDRALAZINE HYDROCHLORIDE 25 MG: 25 TABLET ORAL at 12:31

## 2024-11-27 RX ADMIN — HYDRALAZINE HYDROCHLORIDE 25 MG: 25 TABLET ORAL at 05:29

## 2024-11-27 RX ADMIN — SODIUM CHLORIDE, PRESERVATIVE FREE 10 ML: 5 INJECTION INTRAVENOUS at 20:16

## 2024-11-27 RX ADMIN — LINEZOLID 600 MG: 600 INJECTION, SOLUTION INTRAVENOUS at 05:34

## 2024-11-27 RX ADMIN — SODIUM CHLORIDE, PRESERVATIVE FREE 10 ML: 5 INJECTION INTRAVENOUS at 10:18

## 2024-11-27 RX ADMIN — POLYETHYLENE GLYCOL 3350 17 G: 17 POWDER, FOR SOLUTION ORAL at 12:31

## 2024-11-27 ASSESSMENT — PAIN SCALES - WONG BAKER: WONGBAKER_NUMERICALRESPONSE: NO HURT

## 2024-11-27 ASSESSMENT — PAIN DESCRIPTION - ORIENTATION: ORIENTATION: MID

## 2024-11-27 ASSESSMENT — PAIN DESCRIPTION - LOCATION: LOCATION: ABDOMEN

## 2024-11-27 ASSESSMENT — PAIN SCALES - GENERAL
PAINLEVEL_OUTOF10: 5
PAINLEVEL_OUTOF10: 0

## 2024-11-27 ASSESSMENT — PAIN DESCRIPTION - DESCRIPTORS: DESCRIPTORS: ACHING

## 2024-11-27 NOTE — CARE COORDINATION
11/27/24 CM  Note.  Pt admitted 11/27/24 for UTI.  IV Rocephin. Pt from home with family. Dr Riddle is PCP, [facility] is preferred pharmacy.  Per sonRichar,  pt has all required DME including hospital bed, cane, ww, wc and plan is to return home once medically stable. PT iis active with [facility] for SN. ANDRES order obtained, referral sent. Pt will need ambulance transportation.  Ambulance form/envelope on chart.    Hyacinth HELM RN-BC  [phone]    Case Management Assessment  Initial Evaluation    Date/Time of Evaluation: 11/27/2024 2:37 PM  Assessment Completed by: Hyacinth Hodge RN    If patient is discharged prior to next notation, then this note serves as note for discharge by case management.    Patient Name: Arianna Coleman                   YOB: 1931  Diagnosis: UTI (urinary tract infection) [N39.0]  General weakness [R53.1]  Elevated troponin [R79.89]  Acute cystitis with hematuria [N30.01]  Syncope, unspecified syncope type [R55]                   Date / Time: 11/25/2024  9:29 AM    Patient Admission Status: Inpatient   Readmission Risk (Low < 19, Mod (19-27), High > 27): Readmission Risk Score: 18.6    Current PCP: Soraya Riddle MD  PCP verified by CM? Yes    Chart Reviewed: Yes      History Provided by: Medical Record, Child/Family  Patient Orientation: Person    Patient Cognition: Alert    Hospitalization in the last 30 days (Readmission):  Yes    If yes, Readmission Assessment in  Navigator will be completed.    Advance Directives:      Code Status: DNR-CCA   Patient's Primary Decision Maker is: Legal Next of Kin    Primary Decision Maker: nancy menezes - Child - [phone]    Secondary Decision Maker: JudithInés - Child - [phone]    Supplemental (Other) Decision Maker: JaredMaksim - Child - [phone]    Discharge Planning:    Patient lives with: Children Type of Home: House  Primary Care Giver: Family  Patient Support Systems include:  Children   Current Financial resources:    Current community resources:    Current services prior to admission: Home Care            Current DME:              Type of Home Care services:  Nursing Services    ADLS  Prior functional level: Assistance with the following:, Bathing, Dressing, Toileting, Feeding, Cooking, Housework, Shopping, Mobility  Current functional level: Assistance with the following:, Bathing, Dressing, Feeding, Toileting, Cooking, Housework, Shopping, Mobility    PT AM-PAC:   /24  OT AM-PAC:   /24    Family can provide assistance at DC: Yes  Would you like Case Management to discuss the discharge plan with any other family members/significant others, and if so, who? No (family at bedside)  Plans to Return to Present Housing: Yes  Other Identified Issues/Barriers to RETURNING to current housing: none  Plan for transportation at discharge: ambulance     Financial    [facility] / Plan: [facility] FROM [facility] PLAN / Product Type: *No Product type* /     Does insurance require precert for SNF: Yes    Potential assistance Purchasing Medications:    Meds-to-Beds request: Yes      [facility] - [address] [phone] -  [phone]  [address] [place] [address]  Phone: [phone] Fax: [phone]        The Plan for Transition of Care is related to the following treatment goals of UTI (urinary tract infection) [N39.0]  General weakness [R53.1]  Elevated troponin [R79.89]  Acute cystitis with hematuria [N30.01]  Syncope, unspecified syncope type [R55]      The Patient and/or Patient Representative Agree with the Discharge Plan? yes     Hyacinth Hodge RN  Case Management Department

## 2024-11-27 NOTE — PROGRESS NOTES
Comprehensive Nutrition Assessment    Type and Reason for Visit:  Initial, Positive nutrition screen    Nutrition Recommendations/Plan:     1.) Continue Current Diet  2.) Start Oral Nutrition Supplements       Malnutrition Assessment:  Malnutrition Status:  Severe malnutrition (11/27/24 1118)    Context:  Chronic Illness     Findings of the 6 clinical characteristics of malnutrition:  Energy Intake:  75% or less estimated energy requirements for 1 month or longer  Weight Loss:  Unable to assess (limited wt hx on file)     Body Fat Loss:  Severe body fat loss Triceps, Orbital   Muscle Mass Loss:  Severe muscle mass loss Temples (temporalis), Clavicles (pectoralis & deltoids), Thigh (quadriceps), Calf (gastrocnemius), Hand (interosseous)  Fluid Accumulation:  No fluid accumulation    Strength:  Not Performed    Nutrition Assessment:    Pt admit 2/2 recurrent syncope/ weakness +NSTEMI. Noted complicated UTI. PMHx Dementia, CAD/CHF, & Colitis. Pt meets criteria for Severe Malnutrition. Will add Ensure BID & Magic Cup daily for protein variety.    Nutrition Related Findings:    Pt disoriented, -I/O's, +1 gen edema, active BS, abd distention Wound Type: None       Current Nutrition Intake & Therapies:    Average Meal Intake:  (No data on file however previous RD encounters <75%)     ADULT DIET; Regular    Anthropometric Measures:  Height: 165.1 cm (5' 5\")  Ideal Body Weight (IBW): 125 lbs (57 kg)       Current Body Weight: 61.2 kg (135 lb) (11/26, no method. Recent measured wt 137lb x 1 week ago), 108 % IBW.    Current BMI (kg/m2): 22.5  Usual Body Weight: 59.8 kg (131 lb 13 oz) (10/8/24 EMR measured)  % Weight Change (Calculated): 2.4  BMI Categories: Normal Weight (BMI 22.0 to 24.9) age over 65    Estimated Daily Nutrient Needs:  Energy Requirements Based On: Formula  Weight Used for Energy Requirements: Current  Energy (kcal/day): MSJ 1023 x 1.3 SF= 5205-9502  Weight Used for Protein Requirements: Current  Protein

## 2024-11-27 NOTE — PROGRESS NOTES
Hospitalist Progress Note      SYNOPSIS: Patient admitted on 2024 for UTI (urinary tract infection)  Arianna presents to the ER with unresponsiveness.   ER Course  Upon presentation to the ER, routine labwork was performed which revealed lactic acid 2.6, troponin 125, 118, hemoglobin 10.5.  Imaging results are as outlined below in the Imaging section of this note.    Upon arrival to the ER, patient was 131/70.  The patient received Rocephin, Zyvox in the emergency room and was admitted to [facility].    SUBJECTIVE:  Stable overnight. No other overnight issues reported.   Patient seen and examined at bedside today a.m., patient still confused not answering to my questions appropriately  Abdominal pain is significantly better compared to yesterday  Records reviewed.         Temp (24hrs), Av.8 °F (36.6 °C), Min:97.2 °F (36.2 °C), Max:98.2 °F (36.8 °C)    DIET: ADULT DIET; Regular  CODE: DNR-CCA    Intake/Output Summary (Last 24 hours) at 2024 1025  Last data filed at 2024 0246  Gross per 24 hour   Intake --   Output 1200 ml   Net -1200 ml       Review of Systems  All bolded are positive; please see HPI  General:  Fever, chills, diaphoresis, fatigue, malaise, night sweats, weight loss  Psychological:  Anxiety, disorientation, hallucinations.  ENT:  Epistaxis, headaches, vertigo, visual changes.  Cardiovascular:  Chest pain, irregular heartbeats, palpitations, paroxysmal nocturnal dyspnea.  Respiratory:  Shortness of breath, coughing, sputum production, hemoptysis, wheezing, orthopnea.  Gastrointestinal:  Nausea, vomiting, diarrhea, heartburn, constipation, abdominal pain, hematemesis, hematochezia, melena, acholic stools  Genito-Urinary:  Dysuria, urgency, frequency, hematuria  Musculoskeletal:  Joint pain, joint stiffness, joint swelling, muscle pain  Neurology:  Headache, focal neurological deficits, weakness, numbness, paresthesia  Derm:  Rashes, ulcers, excoriations,

## 2024-11-27 NOTE — PROGRESS NOTES
[facility]  [facility]  Progress Note      Chief Complaint   Patient presents with    Altered Mental Status     Per EMS , patient went unresp in front of son , patient was admitted to hospital for same 2 weeks ago . Patient responsive to pain .       SUBJECTIVE:    Patient is tolerating medications. No reported adverse drug reactions.  No nausea, vomiting, diarrhea.    Review of systems:  As stated above in the chief complaint, otherwise negative.    Medications:  Scheduled Meds:   sodium chloride flush  5-40 mL IntraVENous 2 times per day    apixaban  2.5 mg Oral BID    dilTIAZem  120 mg Oral Daily    hydrALAZINE  25 mg Oral 3 times per day    losartan  50 mg Oral Daily    pantoprazole  40 mg Oral QAM AC    cefTRIAXone (ROCEPHIN) IV  1,000 mg IntraVENous Q24H     Continuous Infusions:   sodium chloride       PRN Meds:traMADol, benzocaine-menthol, benzonatate, calcium carbonate, hydrALAZINE, melatonin, Polyvinyl Alcohol-Povidone PF, sodium chloride, sodium chloride flush, sodium chloride, potassium chloride **OR** potassium alternative oral replacement **OR** potassium chloride, magnesium sulfate, ondansetron **OR** ondansetron, polyethylene glycol, acetaminophen **OR** acetaminophen, senna, fentanNYL    OBJECTIVE:  /60   Pulse 61   Temp 97.2 °F (36.2 °C) (Temporal)   Resp 16   Ht 1.651 m (5' 5\")   Wt 61.2 kg (135 lb)   SpO2 96%   BMI 22.47 kg/m²   Temp  Av.6 °F (36.4 °C)  Min: 97.2 °F (36.2 °C)  Max: 98.2 °F (36.8 °C)  Constitutional: The patient is awake, alert, and oriented.   Skin: Warm and dry. No rashes were noted. No jaundice.  HEENT: Eyes show round, and reactive pupils. Moist mucous membranes, no ulcerations, no thrush.   Neck: Supple to movements. No lymphadenopathy.   Chest: No use of accessory muscles to breathe. Symmetrical expansion. Auscultation reveals no wheezing, crackles, or rhonchi.   Cardiovascular: S1 and S2 are rhythmic and regular. No murmurs  Value Date/Time    BLOODCULT2 5 Days no growth 05/10/2023 06:43 AM    ORG Enterococcus faecium 07/07/2023 12:01 AM    ORG Escherichia coli 05/10/2023 02:55 AM     No results found for: \"WNDABS\"  No results found for: \"RESPSMEAR\"      Component Value Date/Time    MPNEUMO Not Detected 11/25/2024 1027     No results found for: \"CULTRESP\"  No results found for: \"CXCATHTIP\"  No results found for: \"BFCS\"  No results found for: \"CXSURG\"  Urine Culture, Routine   Date Value Ref Range Status   07/07/2023 <10,000 CFU/mL  Gram positive organism   (A)  Final   07/07/2023   Final    >100,000 CFU/ml  Vancomycin resistant Enterococci isolated     05/10/2023 >100,000 CFU/ml  Final     No results found for: \"MRSAC\"    Assessment:  Pansensitive E. 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 cefdinir in a.m.  DC Zyvox  Blood cultures no growth so far  Follow susceptibility of E. coli in urine  ID will continue to follow    Trevor Kathleen MD  3:50 PM  11/27/2024

## 2024-11-27 NOTE — PROGRESS NOTES
Spiritual Health History and Assessment/Progress Note  [facility]    Initial Encounter, Spiritual/Emotional Needs, Palliative Care,  ,  ,      Name: Arianna Coleman MRN: [id number]    Age: 89 y.o.     Sex: female   Language: English   Advent: Pent[employer]al   UTI (urinary tract infection)     Date: 11/27/2024                           Spiritual Assessment began in [facility]        Referral/Consult From: Rounding, Palliative Care   Encounter Overview/Reason: Initial Encounter, Spiritual/Emotional Needs, Palliative Care  Service Provided For: Family, Patient    Sruthi, Belief, Meaning:   Patient Other: Patient was very passive and conversation was primarily with her daughter and granddaughter.   Family/Friends identify as spiritual, are connected with a sruthi tradition or spiritual practice, and have beliefs or practices that help with coping during difficult times      Importance and Influence:  Patient Other: Patient was very passive and conversation was primarily with her daughter and granddaughter.   Family/Friends have spiritual/personal beliefs that influence decisions regarding the patient's health    Community:  Patient Other: Patient was very passive and conversation was primarily with her daughter and granddaughter.   Family/Friends are connected with a spiritual community:, feel well-supported. Support system includes: Children and Extended family, and Other: Mrs. Coleman has a large family made up of nine total children and many grand and great grandkids.      Assessment and Plan of Care:     Patient Interventions include: Other: Patient was very passive and conversation was primarily with her daughter and granddaughter.   Family/Friends Interventions include: Explored spiritual coping/struggle/distress, Engaged in theological reflection, and Affirmed coping skills/support systems    Patient Plan of Care: Other: Patient was very passive and conversation was primarily with her daughter and

## 2024-11-27 NOTE — CONSULTS
CARDIOLOGY CONSULTATION    Patient Name:  Arianna Coleman    :  1931    Reason for Consultation:   Recurrent transient loss of consciousness    History of Present Illness:   Arianna Coleman returns to [facility], for a second time within 30 days, following a history of recurrent episodes of sudden loss of responsiveness/consciousness.  On this occasion she was sitting at the commode and son noticed that she lost consciousness and pressed on her chest and immediately regained consciousness.  She has been on multiple drug regimens for difficult to control systolic hypertension and unfortunately does not tolerate many due to a significant decrease in heart rate.  In fact her last syncopal episode only a few weeks ago was related to a bowel movement.  She has been seen by electrophysiologist who up to this time is not recommended a prophylactic permanent pacemaker but unfortunately this has limited her medications for elevated systolic hypertension as well.  She has a longstanding history of systolic hypertension as well as recurrent persistent atrial fibrillation and has been on polypharmacy in the past.  In  she underwent cardiac catheterization which demonstrated noncritical coronary artery disease.  Likewise her two-dimensional echocardiogram obtained in 2022 demonstrated normal global left ventricular systolic function with estimated left ventricular ejection fraction 60-65% and hemodynamically insignificant mild mitral and tricuspid regurgitation. .  She is now admitted for further observation and adjustment of her therapeutic regimen and potential for cardiac pacemaker as well. She once again is moderately confused to time place and person.    Past Medical History:   has a past medical history of Arthritis, Atrial fibrillation (HCC), Chronic combined systolic (congestive) and diastolic (congestive) heart failure (HCC), Coronary artery disease involving native coronary  artery of native heart without angina pectoris, Hx of blood clots, Hypertension, Ischemic colitis (HCC), and PAF (paroxysmal atrial fibrillation) (HCC).    Surgical History:   has a past surgical history that includes Ankle fracture surgery; Colonoscopy; Endoscopy, colon, diagnostic; fracture surgery; Cardiac catheterization (2019); and Insertable Cardiac Monitor (2023).     Social History:   reports that she has never smoked. She has never used smokeless tobacco. She reports that she does not drink alcohol and does not use drugs.     Family History:  Remarkable for - Mother  secondary CVA. Father  secondary to myocardial infarction.    Medications:  See medication reconciliation list    Allergies:  Nifedipine     Review of Systems:   Unable to accurately obtain secondary patient's depressed cognitive status.    Physical Examination:    Vital Signs: /85   Pulse 64   Temp 97.6 °F (36.4 °C) (Temporal)   Resp 14   Ht 5' 5\" (1.651 m)   Wt 138 lb (62.6 kg) Comment: bedscale per RD  SpO2 97%   BMI 22.96 kg/m²   General appearance: Well preserved, ectomorphic body habitus, alert, no distress.  Skin: Skin color, texture, turgor normal. No rashes or lesions. No induration or tightening palpated.  Head: Normocephalic. No masses, lesions, tenderness or abnormalities  Eyes: Conjunctivae/corneas clear. PERRL, EOMs intact. Sclera non icteric.  Ears: External ears normal. Canals clear. TM's clear bilaterally. Hearing normal to finger rub.  Nose/Sinuses: Nares normal. Septum midline. Mucosa normal. No drainage or sinus tenderness.  Oropharynx: Lips, mucosa, and tongue normal. Oropharynx clear with no exudate seen.  Neck: Neck supple and symmetric. No adenopathy. Thyroid symmetric, normal size, without nodules. Trachea is midline. Carotids brisk in upstroke without bruits, no abnormal JVP noted at 45°.  Chest: Even excursion  Lungs: Lungs clear to auscultation bilaterally. No retractions or

## 2024-11-28 VITALS
OXYGEN SATURATION: 97 % | BODY MASS INDEX: 22.49 KG/M2 | DIASTOLIC BLOOD PRESSURE: 57 MMHG | TEMPERATURE: 97.9 F | WEIGHT: 135 LBS | HEIGHT: 65 IN | HEART RATE: 73 BPM | RESPIRATION RATE: 16 BRPM | SYSTOLIC BLOOD PRESSURE: 116 MMHG

## 2024-11-28 PROCEDURE — 2580000003 HC RX 258: Performed by: INTERNAL MEDICINE

## 2024-11-28 PROCEDURE — 99239 HOSP IP/OBS DSCHRG MGMT >30: CPT | Performed by: STUDENT IN AN ORGANIZED HEALTH CARE EDUCATION/TRAINING PROGRAM

## 2024-11-28 PROCEDURE — 6370000000 HC RX 637 (ALT 250 FOR IP): Performed by: INTERNAL MEDICINE

## 2024-11-28 PROCEDURE — 6370000000 HC RX 637 (ALT 250 FOR IP): Performed by: STUDENT IN AN ORGANIZED HEALTH CARE EDUCATION/TRAINING PROGRAM

## 2024-11-28 RX ORDER — CEFDINIR 300 MG/1
300 CAPSULE ORAL 2 TIMES DAILY
Qty: 10 CAPSULE | Refills: 0 | Status: SHIPPED | OUTPATIENT
Start: 2024-11-28 | End: 2024-11-28

## 2024-11-28 RX ORDER — CEFDINIR 300 MG/1
300 CAPSULE ORAL 2 TIMES DAILY
Qty: 10 CAPSULE | Refills: 0 | Status: SHIPPED | OUTPATIENT
Start: 2024-11-28 | End: 2024-12-03

## 2024-11-28 RX ORDER — TRAMADOL HYDROCHLORIDE 50 MG/1
50 TABLET ORAL EVERY 8 HOURS PRN
Qty: 15 TABLET | Refills: 0 | Status: SHIPPED | OUTPATIENT
Start: 2024-11-28 | End: 2024-11-28

## 2024-11-28 RX ORDER — CEFDINIR 300 MG/1
300 CAPSULE ORAL EVERY 12 HOURS SCHEDULED
Status: DISCONTINUED | OUTPATIENT
Start: 2024-11-28 | End: 2024-11-28 | Stop reason: HOSPADM

## 2024-11-28 RX ORDER — TRAMADOL HYDROCHLORIDE 50 MG/1
50 TABLET ORAL EVERY 8 HOURS PRN
Qty: 15 TABLET | Refills: 0 | Status: SHIPPED | OUTPATIENT
Start: 2024-11-28 | End: 2024-12-03

## 2024-11-28 RX ADMIN — DILTIAZEM HYDROCHLORIDE 120 MG: 120 CAPSULE, COATED, EXTENDED RELEASE ORAL at 08:41

## 2024-11-28 RX ADMIN — HYDRALAZINE HYDROCHLORIDE 25 MG: 25 TABLET ORAL at 05:54

## 2024-11-28 RX ADMIN — LOSARTAN POTASSIUM 50 MG: 50 TABLET, FILM COATED ORAL at 08:41

## 2024-11-28 RX ADMIN — PANTOPRAZOLE SODIUM 40 MG: 40 TABLET, DELAYED RELEASE ORAL at 05:54

## 2024-11-28 RX ADMIN — SODIUM CHLORIDE, PRESERVATIVE FREE 10 ML: 5 INJECTION INTRAVENOUS at 08:41

## 2024-11-28 RX ADMIN — APIXABAN 2.5 MG: 5 TABLET, FILM COATED ORAL at 08:41

## 2024-11-28 RX ADMIN — CEFDINIR 300 MG: 300 CAPSULE ORAL at 10:19

## 2024-11-28 RX ADMIN — POLYETHYLENE GLYCOL 3350 17 G: 17 POWDER, FOR SOLUTION ORAL at 08:41

## 2024-11-28 NOTE — DISCHARGE SUMMARY
Hospital Medicine Discharge Summary    Patient ID: Arianna Coleman      Patient's PCP: Soraya Riddle MD    Admit Date: 11/25/2024     Discharge Date:   11/28/2024    Admitting Physician: Fidelina Moore DO     Discharge Physician: Jairo Zelaya MD     Discharge Diagnoses:  Syncope  UTI with history of VRE     Active Hospital Problems    Diagnosis Date Noted    UTI (urinary tract infection) [N39.0] 11/25/2024    Severe protein-calorie malnutrition (HCC) [E43] 02/13/2020       The patient was seen and examined on day of discharge and this discharge summary is in conjunction with any daily progress note from day of discharge.    Hospital Course:   Arianna presents to the ER with unresponsiveness.   ER Course  Upon presentation to the ER, routine labwork was performed which revealed lactic acid 2.6, troponin 125, 118, hemoglobin 10.5.    CT abdomen and pelvis with IV contrast was done which showed marked urinary bladder wall thickening with new free fluid in the presacral space with posterior rectal wall thickening suggestive of cystitis or proctitis.      Upon arrival to the ER, patient was 131/70.  The patient received Rocephin, Zyvox in the emergency room and was admitted to [facility], with infectious disease consult      Patient's symptom improved however she was still complaining of abdominal pain she was started on tramadol as needed.    Patient's daughter states she does have a history of situational syncope during straining.  Medication list was reviewed, orthostatic vitals was ordered which was negative echocardiogram reviewed from 04/24 reviewed ejection fraction of 53%    Urine culture grew E. coli, Zyvox was discontinued, Rocephin was continued which was switched to cefdinir at the time of discharge      Patient is being discharged to follow-up with her primary care physician in a week    Exam:     /68   Pulse 88   Temp 98.4 °F (36.9 °C) (Temporal)   Resp 16   Ht 1.651 m

## 2024-11-28 NOTE — PLAN OF CARE
Problem: Safety - Adult  Goal: Free from fall injury  Outcome: Progressing     Problem: Chronic Conditions and Co-morbidities  Goal: Patient's chronic conditions and co-morbidity symptoms are monitored and maintained or improved  Outcome: Progressing     Problem: Discharge Planning  Goal: Discharge to home or other facility with appropriate resources  Outcome: Progressing     Problem: Pain  Goal: Verbalizes/displays adequate comfort level or baseline comfort level  Outcome: Progressing     Problem: Neurosensory - Adult  Goal: Achieves stable or improved neurological status  Outcome: Progressing  Goal: Remains free of injury related to seizures activity  Outcome: Progressing     Problem: Respiratory - Adult  Goal: Achieves optimal ventilation and oxygenation  Outcome: Progressing     Problem: Infection - Adult  Goal: Absence of infection at discharge  Outcome: Progressing  Goal: Absence of infection during hospitalization  Outcome: Progressing  Goal: Absence of fever/infection during anticipated neutropenic period  Outcome: Progressing     Problem: Skin/Tissue Integrity  Goal: Absence of new skin breakdown  Description: 1.  Monitor for areas of redness and/or skin breakdown  2.  Assess vascular access sites hourly  3.  Every 4-6 hours minimum:  Change oxygen saturation probe site  4.  Every 4-6 hours:  If on nasal continuous positive airway pressure, respiratory therapy assess nares and determine need for appliance change or resting period.  Outcome: Progressing     Problem: ABCDS Injury Assessment  Goal: Absence of physical injury  Outcome: Progressing     Problem: Nutrition Deficit:  Goal: Optimize nutritional status  Outcome: Progressing

## 2024-12-02 PROBLEM — Z51.5 PALLIATIVE CARE ENCOUNTER: Status: ACTIVE | Noted: 2024-12-02

## 2024-12-02 NOTE — PROGRESS NOTES
Physician Progress Note      PATIENT:               SHARDA GALLARDO  CSN #:                  [id number]  :                       1931  ADMIT DATE:       2024 9:29 AM  DISCH DATE:        2024 11:49 AM  RESPONDING  PROVIDER #:        Jairo Zelaya MD          QUERY TEXT:    Patient admitted with UTI. Documentation in H&P reflects sepsis due to UTI.    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 RR 20 23   18 23. WBC 9.7 12 Lactic acid 2.6.  Treatment: Rocephin 1g IV q 24 hr. Zyvox 600 mg IV q 12 hr. Dc'd on Omnicef.  Options provided:  -- Sepsis confirmed after study  -- Sepsis treated and resolved  -- Sepsis ruled out after study  -- Other - I will add my own diagnosis  -- Disagree - Not applicable / Not valid  -- Disagree - Clinically unable to determine / Unknown  -- Refer to Clinical Documentation Reviewer    PROVIDER RESPONSE TEXT:    Sepsis ruled out after study.    Query created by: Hyacinth Conklin on 2024 4:01 PM      Electronically signed by:  Jairo Zelaya MD 2024 5:47 PM

## 2024-12-03 NOTE — PROGRESS NOTES
Physician Progress Note      PATIENT:               SHARDA GALLARDO  [facility] #:                  [id number]  :                       1931  ADMIT DATE:       11/10/2024 6:40 PM  DISCH DATE:        2024 6:12 PM  RESPONDING  PROVIDER #:        Lisa Corral MD          QUERY TEXT:    Patient admitted with AMS, syncope. noted to have Orthostatic hypotension with   supine hypertension per critical care consult. If possible, please document   in progress notes and discharge summary after study the etiology of the   syncope:    The medical record reflects the following:  Risk Factors: syncope, PAF, bradycardia 2/2 sinus node dysfunction,   Nonischemic cardiomyopathy  Clinical Indicators: per HP-she was watching TV suddenly she passed out and   aide called 911.  While she in ER found to have altered mental status and she   been intubated to protect airway. Her initial blood pressure was low and   started on vasopressor and again blood pressure went up then vasopressor taken   off.  Blood chemistry insignificant, troponin 29 liver function test normal   except albumin 3.4 and blood count and platelet count normal.  CT scan did not   show any intracranial abnormality except deep sulci.  It also shows old   lacunar infarct and acute sinusitis.  Criti  Currently off Amiodarone and AV node blocker agents.  Pulmonology-   Neurocardiogenic Syncope  Treatment: mechanical ventilation, intubation, vasopressors, consults,   monitoring.  IM PN- Syncope-secondary to orthostatic, vasovagal, versus   cardiogenic.  Options provided:  -- Syncope due to sinus node dysfunction  -- Syncope due to orthostatic hypotension  -- AMS related to neurocardiogenic syncope  -- Vasovagal syncope  -- Other - I will add my own diagnosis  -- Disagree - Not applicable / Not valid  -- Disagree - Clinically unable to determine / Unknown  -- Refer to Clinical Documentation Reviewer    PROVIDER RESPONSE TEXT:    AMS related to

## 2024-12-03 NOTE — PROGRESS NOTES
Physician Progress Note      PATIENT:               SHARDA GALLARDO  CSN #:                  [id number]  :                       1931  ADMIT DATE:       2024 9:29 AM  DISCH DATE:        2024 11:49 AM  RESPONDING  PROVIDER #:        Jairo Zelaya MD        QUERY TEXT:    Type of Encephalopathy: Please provide further specificity, if known.    Clinical indicators include: altered mental status, acute, encephalopathy,   alcohol use, fever, influenza, ams, infection, sepsis  Options provided:  -- Anoxic/hypoxic encephalopathy  -- Metabolic encephalopathy  -- Toxic encephalopathy  -- Hepatic encephalopathy  -- Hypertensive encephalopathy  -- Other - I will add my own diagnosis  -- Disagree - Not applicable / Not valid  -- Disagree - Clinically Unable to determine / Unknown        PROVIDER RESPONSE TEXT:    The patient has metabolic encephalopathy.      Electronically signed by:  Jairo Zelaya MD 2024 8:23 PM

## 2024-12-18 ENCOUNTER — HOSPITAL ENCOUNTER (OUTPATIENT)
Age: 88
Setting detail: OBSERVATION
Discharge: HOME | End: 2024-12-20
Attending: STUDENT IN AN ORGANIZED HEALTH CARE EDUCATION/TRAINING PROGRAM | Admitting: FAMILY MEDICINE
Payer: COMMERCIAL

## 2024-12-18 ENCOUNTER — APPOINTMENT (OUTPATIENT)
Dept: CT IMAGING | Age: 88
End: 2024-12-18
Attending: STUDENT IN AN ORGANIZED HEALTH CARE EDUCATION/TRAINING PROGRAM
Payer: COMMERCIAL

## 2024-12-18 DIAGNOSIS — Z79.01 CHRONIC ANTICOAGULATION: ICD-10-CM

## 2024-12-18 DIAGNOSIS — K92.1 GASTROINTESTINAL HEMORRHAGE WITH MELENA: ICD-10-CM

## 2024-12-18 DIAGNOSIS — K92.2 GASTROINTESTINAL HEMORRHAGE, UNSPECIFIED GASTROINTESTINAL HEMORRHAGE TYPE: Primary | ICD-10-CM

## 2024-12-18 DIAGNOSIS — D64.9 ANEMIA, UNSPECIFIED TYPE: ICD-10-CM

## 2024-12-18 LAB
ALBUMIN SERPL-MCNC: 2.9 G/DL (ref 3.5–5.2)
ALP SERPL-CCNC: 64 U/L (ref 35–104)
ALT SERPL-CCNC: 10 U/L (ref 0–32)
ANION GAP SERPL CALCULATED.3IONS-SCNC: 10 MMOL/L (ref 7–16)
AST SERPL-CCNC: 25 U/L (ref 0–31)
BASOPHILS # BLD: 0.04 K/UL (ref 0–0.2)
BASOPHILS NFR BLD: 1 % (ref 0–2)
BILIRUB SERPL-MCNC: 0.4 MG/DL (ref 0–1.2)
BILIRUBIN, URINE: NEGATIVE
BUN SERPL-MCNC: 7 MG/DL (ref 6–23)
CALCIUM SERPL-MCNC: 8.1 MG/DL (ref 8.6–10.2)
CHLORIDE SERPL-SCNC: 112 MMOL/L (ref 98–107)
CO2 SERPL-SCNC: 20 MMOL/L (ref 22–29)
COLOR, UA: YELLOW
COMMENT: NORMAL
CREAT SERPL-MCNC: 0.9 MG/DL (ref 0.5–1)
EOSINOPHIL # BLD: 0.14 K/UL (ref 0.05–0.5)
EOSINOPHILS RELATIVE PERCENT: 3 % (ref 0–6)
ERYTHROCYTE [DISTWIDTH] IN BLOOD BY AUTOMATED COUNT: 15.4 % (ref 11.5–15)
GFR, ESTIMATED: 62 ML/MIN/1.73M2
GLUCOSE SERPL-MCNC: 89 MG/DL (ref 74–99)
GLUCOSE URINE: NEGATIVE MG/DL
HCT VFR BLD AUTO: 34 % (ref 34–48)
HGB BLD-MCNC: 11 G/DL (ref 11.5–15.5)
IMM GRANULOCYTES # BLD AUTO: <0.03 K/UL (ref 0–0.58)
IMM GRANULOCYTES NFR BLD: 0 % (ref 0–5)
INR PPP: 1.3
KETONES, URINE: NEGATIVE MG/DL
LACTATE BLDV-SCNC: 1.1 MMOL/L (ref 0.5–2.2)
LEUKOCYTE ESTERASE, URINE: NEGATIVE
LIPASE SERPL-CCNC: 9 U/L (ref 13–60)
LYMPHOCYTES NFR BLD: 1.72 K/UL (ref 1.5–4)
LYMPHOCYTES RELATIVE PERCENT: 35 % (ref 20–42)
MCH RBC QN AUTO: 29.9 PG (ref 26–35)
MCHC RBC AUTO-ENTMCNC: 32.4 G/DL (ref 32–34.5)
MCV RBC AUTO: 92.4 FL (ref 80–99.9)
MONOCYTES NFR BLD: 0.53 K/UL (ref 0.1–0.95)
MONOCYTES NFR BLD: 11 % (ref 2–12)
NEUTROPHILS NFR BLD: 50 % (ref 43–80)
NEUTS SEG NFR BLD: 2.44 K/UL (ref 1.8–7.3)
NITRITE, URINE: NEGATIVE
PH, URINE: 5.5 (ref 5–9)
PLATELET # BLD AUTO: 188 K/UL (ref 130–450)
PMV BLD AUTO: 8.9 FL (ref 7–12)
POTASSIUM SERPL-SCNC: 3.9 MMOL/L (ref 3.5–5)
PROT SERPL-MCNC: 5.5 G/DL (ref 6.4–8.3)
PROTEIN UA: NEGATIVE MG/DL
PROTHROMBIN TIME: 13.8 SEC (ref 9.3–12.4)
RBC # BLD AUTO: 3.68 M/UL (ref 3.5–5.5)
SODIUM SERPL-SCNC: 142 MMOL/L (ref 132–146)
SPECIFIC GRAVITY UA: 1.01 (ref 1–1.03)
TROPONIN I SERPL HS-MCNC: 32 NG/L (ref 0–9)
TURBIDITY: CLEAR
URINE HGB: NEGATIVE
UROBILINOGEN, URINE: 0.2 EU/DL (ref 0–1)
WBC OTHER # BLD: 4.9 K/UL (ref 4.5–11.5)

## 2024-12-18 PROCEDURE — 74177 CT ABD & PELVIS W/CONTRAST: CPT

## 2024-12-18 PROCEDURE — 99222 1ST HOSP IP/OBS MODERATE 55: CPT | Performed by: SURGERY

## 2024-12-18 PROCEDURE — 93005 ELECTROCARDIOGRAM TRACING: CPT

## 2024-12-18 PROCEDURE — 85025 COMPLETE CBC W/AUTO DIFF WBC: CPT

## 2024-12-18 PROCEDURE — 6360000002 HC RX W HCPCS: Performed by: STUDENT IN AN ORGANIZED HEALTH CARE EDUCATION/TRAINING PROGRAM

## 2024-12-18 PROCEDURE — 99222 1ST HOSP IP/OBS MODERATE 55: CPT | Performed by: FAMILY MEDICINE

## 2024-12-18 PROCEDURE — G0378 HOSPITAL OBSERVATION PER HR: HCPCS

## 2024-12-18 PROCEDURE — 6370000000 HC RX 637 (ALT 250 FOR IP): Performed by: FAMILY MEDICINE

## 2024-12-18 PROCEDURE — 6360000002 HC RX W HCPCS: Performed by: FAMILY MEDICINE

## 2024-12-18 PROCEDURE — 84484 ASSAY OF TROPONIN QUANT: CPT

## 2024-12-18 PROCEDURE — 2580000003 HC RX 258: Performed by: FAMILY MEDICINE

## 2024-12-18 PROCEDURE — 96374 THER/PROPH/DIAG INJ IV PUSH: CPT

## 2024-12-18 PROCEDURE — 96376 TX/PRO/DX INJ SAME DRUG ADON: CPT

## 2024-12-18 PROCEDURE — 83690 ASSAY OF LIPASE: CPT

## 2024-12-18 PROCEDURE — 80053 COMPREHEN METABOLIC PANEL: CPT

## 2024-12-18 PROCEDURE — 6360000004 HC RX CONTRAST MEDICATION: Performed by: RADIOLOGY

## 2024-12-18 PROCEDURE — 83605 ASSAY OF LACTIC ACID: CPT

## 2024-12-18 PROCEDURE — 2500000003 HC RX 250 WO HCPCS: Performed by: FAMILY MEDICINE

## 2024-12-18 PROCEDURE — 99285 EMERGENCY DEPT VISIT HI MDM: CPT

## 2024-12-18 PROCEDURE — 85610 PROTHROMBIN TIME: CPT

## 2024-12-18 RX ORDER — SODIUM CHLORIDE 9 MG/ML
INJECTION, SOLUTION INTRAVENOUS PRN
Status: DISCONTINUED | OUTPATIENT
Start: 2024-12-18 | End: 2024-12-21 | Stop reason: HOSPADM

## 2024-12-18 RX ORDER — SENNOSIDES A AND B 8.6 MG/1
1 TABLET, FILM COATED ORAL NIGHTLY PRN
Status: DISCONTINUED | OUTPATIENT
Start: 2024-12-18 | End: 2024-12-21 | Stop reason: HOSPADM

## 2024-12-18 RX ORDER — SODIUM CHLORIDE 0.9 % (FLUSH) 0.9 %
5-40 SYRINGE (ML) INJECTION EVERY 12 HOURS SCHEDULED
Status: DISCONTINUED | OUTPATIENT
Start: 2024-12-18 | End: 2024-12-21 | Stop reason: HOSPADM

## 2024-12-18 RX ORDER — POTASSIUM CHLORIDE 7.45 MG/ML
10 INJECTION INTRAVENOUS PRN
Status: DISCONTINUED | OUTPATIENT
Start: 2024-12-18 | End: 2024-12-21 | Stop reason: HOSPADM

## 2024-12-18 RX ORDER — HYDRALAZINE HYDROCHLORIDE 25 MG/1
25 TABLET, FILM COATED ORAL EVERY 8 HOURS SCHEDULED
Status: DISCONTINUED | OUTPATIENT
Start: 2024-12-18 | End: 2024-12-21 | Stop reason: HOSPADM

## 2024-12-18 RX ORDER — IOPAMIDOL 755 MG/ML
75 INJECTION, SOLUTION INTRAVASCULAR
Status: COMPLETED | OUTPATIENT
Start: 2024-12-18 | End: 2024-12-18

## 2024-12-18 RX ORDER — SODIUM CHLORIDE 0.9 % (FLUSH) 0.9 %
5-40 SYRINGE (ML) INJECTION PRN
Status: DISCONTINUED | OUTPATIENT
Start: 2024-12-18 | End: 2024-12-21 | Stop reason: HOSPADM

## 2024-12-18 RX ORDER — ONDANSETRON 2 MG/ML
4 INJECTION INTRAMUSCULAR; INTRAVENOUS EVERY 6 HOURS PRN
Status: DISCONTINUED | OUTPATIENT
Start: 2024-12-18 | End: 2024-12-21 | Stop reason: HOSPADM

## 2024-12-18 RX ORDER — PANTOPRAZOLE SODIUM 40 MG/10ML
40 INJECTION, POWDER, LYOPHILIZED, FOR SOLUTION INTRAVENOUS ONCE
Status: COMPLETED | OUTPATIENT
Start: 2024-12-18 | End: 2024-12-18

## 2024-12-18 RX ORDER — ONDANSETRON 4 MG/1
4 TABLET, ORALLY DISINTEGRATING ORAL EVERY 8 HOURS PRN
Status: DISCONTINUED | OUTPATIENT
Start: 2024-12-18 | End: 2024-12-21 | Stop reason: HOSPADM

## 2024-12-18 RX ORDER — DILTIAZEM HYDROCHLORIDE 120 MG/1
120 CAPSULE, COATED, EXTENDED RELEASE ORAL DAILY
Status: DISCONTINUED | OUTPATIENT
Start: 2024-12-18 | End: 2024-12-21 | Stop reason: HOSPADM

## 2024-12-18 RX ORDER — POLYETHYLENE GLYCOL 3350 17 G/17G
17 POWDER, FOR SOLUTION ORAL DAILY PRN
Status: DISCONTINUED | OUTPATIENT
Start: 2024-12-18 | End: 2024-12-21 | Stop reason: HOSPADM

## 2024-12-18 RX ORDER — LOSARTAN POTASSIUM 50 MG/1
50 TABLET ORAL DAILY
Status: DISCONTINUED | OUTPATIENT
Start: 2024-12-18 | End: 2024-12-21 | Stop reason: HOSPADM

## 2024-12-18 RX ORDER — ACETAMINOPHEN 650 MG/1
650 SUPPOSITORY RECTAL EVERY 6 HOURS PRN
Status: DISCONTINUED | OUTPATIENT
Start: 2024-12-18 | End: 2024-12-21 | Stop reason: HOSPADM

## 2024-12-18 RX ORDER — POTASSIUM CHLORIDE 1500 MG/1
40 TABLET, EXTENDED RELEASE ORAL PRN
Status: DISCONTINUED | OUTPATIENT
Start: 2024-12-18 | End: 2024-12-21 | Stop reason: HOSPADM

## 2024-12-18 RX ORDER — ACETAMINOPHEN 325 MG/1
650 TABLET ORAL EVERY 6 HOURS PRN
Status: DISCONTINUED | OUTPATIENT
Start: 2024-12-18 | End: 2024-12-21 | Stop reason: HOSPADM

## 2024-12-18 RX ORDER — MAGNESIUM SULFATE IN WATER 40 MG/ML
2000 INJECTION, SOLUTION INTRAVENOUS PRN
Status: DISCONTINUED | OUTPATIENT
Start: 2024-12-18 | End: 2024-12-21 | Stop reason: HOSPADM

## 2024-12-18 RX ADMIN — PANTOPRAZOLE SODIUM 40 MG: 40 INJECTION, POWDER, FOR SOLUTION INTRAVENOUS at 14:32

## 2024-12-18 RX ADMIN — SODIUM CHLORIDE, PRESERVATIVE FREE 10 ML: 5 INJECTION INTRAVENOUS at 22:00

## 2024-12-18 RX ADMIN — IOPAMIDOL 75 ML: 755 INJECTION, SOLUTION INTRAVENOUS at 17:24

## 2024-12-18 RX ADMIN — HYDRALAZINE HYDROCHLORIDE 25 MG: 25 TABLET ORAL at 21:55

## 2024-12-18 RX ADMIN — SENNOSIDES 8.6 MG: 8.6 TABLET, FILM COATED ORAL at 21:55

## 2024-12-18 RX ADMIN — LOSARTAN POTASSIUM 50 MG: 50 TABLET, FILM COATED ORAL at 21:55

## 2024-12-18 RX ADMIN — DILTIAZEM HYDROCHLORIDE 120 MG: 120 CAPSULE, COATED, EXTENDED RELEASE ORAL at 21:55

## 2024-12-18 RX ADMIN — SODIUM CHLORIDE, PRESERVATIVE FREE 40 MG: 5 INJECTION INTRAVENOUS at 23:59

## 2024-12-18 ASSESSMENT — VISUAL ACUITY: OU: 1

## 2024-12-18 NOTE — ED PROVIDER NOTES
ATTENDING PROVIDER ATTESTATION:     Arianna Coleman presented to the emergency department for evaluation of Abdominal Pain and Rectal Bleeding (Patient c/o mid abdominal pain with dark stools x 3 days . On eliquis )    I have reviewed and discussed the case, including pertinent history (medical, surgical, family and social) and exam findings with the Resident and the Nurse assigned to Arianna Coleman.  I have personally performed and/or participated in the history, exam, medical decision making, and procedures and agree with all pertinent clinical information.  I agree with any EKG interpretation by resident.      I have reviewed my findings and recommendations with Arianna Coleman and members of family present at the time of disposition.          I, Dr. Cook, am the primary provider of record        My findings/plan: The primary encounter diagnosis was Gastrointestinal hemorrhage, unspecified gastrointestinal hemorrhage type. Diagnoses of Anemia, unspecified type and Chronic anticoagulation were also pertinent to this visit.  New Prescriptions    No medications on file     Renetta Cook MD            [facility] EMERGENCY DEPARTMENT  EMERGENCY DEPARTMENT ENCOUNTER        Pt Name: Arianna Coleman  MRN: [id number]  Birthdate 12/25/1931  Date of evaluation: 12/18/2024  Provider: Julio C Ayala MD  PCP: Soraya Riddle MD  Note Started: 1:44 PM EST 12/18/24    CHIEF COMPLAINT       Chief Complaint   Patient presents with    Abdominal Pain    Rectal Bleeding     Patient c/o mid abdominal pain with dark stools x 3 days . On eliquis      HISTORY OF PRESENT ILLNESS: 1 or more Elements   History From: PATIENT & son (bedside)     Limitations to history : Patient has dementia    Arianna Coleman is a 89 y.o. female with a PMHx of paroxysmal atrial fibrillation, sinus bradycardia, NICM, GERD who presents to the ED for black stools.     Patient's son notes that she has been

## 2024-12-18 NOTE — ED TRIAGE NOTES
Department of Emergency Medicine  FIRST PROVIDER TRIAGE NOTE             Independent MLP           12/18/24  12:18 PM EST    Date of Encounter: 12/18/24   MRN: [id number]      HPI: Arianna Coleman is a 89 y.o. female who presents to the ED for Abdominal Pain and Rectal Bleeding (Patient c/o mid abdominal pain with dark stools x 3 days . On eliquis )     ROS: Negative for cp, Suicidal ideation, or Homicidal Ideation.    PE: Gen Appearance/Constitutional: alert  CV: regular rate     Initial Plan of Care: All treatment areas with department are currently occupied. Proceed toTreatment Area When Bed Available for ED Attending/MLP to Continue Care    Electronically signed by FRANCES Delgado - CNP   DD: 12/18/24

## 2024-12-19 PROBLEM — I48.20 CHRONIC A-FIB (HCC): Status: ACTIVE | Noted: 2024-12-19

## 2024-12-19 LAB
ANION GAP SERPL CALCULATED.3IONS-SCNC: 11 MMOL/L (ref 7–16)
BASOPHILS # BLD: 0.05 K/UL (ref 0–0.2)
BASOPHILS NFR BLD: 1 % (ref 0–2)
BUN SERPL-MCNC: 5 MG/DL (ref 6–23)
CALCIUM SERPL-MCNC: 8.7 MG/DL (ref 8.6–10.2)
CHLORIDE SERPL-SCNC: 109 MMOL/L (ref 98–107)
CO2 SERPL-SCNC: 23 MMOL/L (ref 22–29)
CREAT SERPL-MCNC: 0.7 MG/DL (ref 0.5–1)
EOSINOPHIL # BLD: 0.1 K/UL (ref 0.05–0.5)
EOSINOPHILS RELATIVE PERCENT: 2 % (ref 0–6)
ERYTHROCYTE [DISTWIDTH] IN BLOOD BY AUTOMATED COUNT: 15.1 % (ref 11.5–15)
GFR, ESTIMATED: 77 ML/MIN/1.73M2
GLUCOSE SERPL-MCNC: 80 MG/DL (ref 74–99)
HCT VFR BLD AUTO: 40.4 % (ref 34–48)
HGB BLD-MCNC: 13 G/DL (ref 11.5–15.5)
IMM GRANULOCYTES # BLD AUTO: <0.03 K/UL (ref 0–0.58)
IMM GRANULOCYTES NFR BLD: 0 % (ref 0–5)
LYMPHOCYTES NFR BLD: 1.66 K/UL (ref 1.5–4)
LYMPHOCYTES RELATIVE PERCENT: 34 % (ref 20–42)
MCH RBC QN AUTO: 29.9 PG (ref 26–35)
MCHC RBC AUTO-ENTMCNC: 32.2 G/DL (ref 32–34.5)
MCV RBC AUTO: 92.9 FL (ref 80–99.9)
MONOCYTES NFR BLD: 0.62 K/UL (ref 0.1–0.95)
MONOCYTES NFR BLD: 13 % (ref 2–12)
NEUTROPHILS NFR BLD: 50 % (ref 43–80)
NEUTS SEG NFR BLD: 2.42 K/UL (ref 1.8–7.3)
PLATELET # BLD AUTO: 187 K/UL (ref 130–450)
PMV BLD AUTO: 8.9 FL (ref 7–12)
POTASSIUM SERPL-SCNC: 4.2 MMOL/L (ref 3.5–5)
RBC # BLD AUTO: 4.35 M/UL (ref 3.5–5.5)
SODIUM SERPL-SCNC: 143 MMOL/L (ref 132–146)
WBC OTHER # BLD: 4.9 K/UL (ref 4.5–11.5)

## 2024-12-19 PROCEDURE — 80048 BASIC METABOLIC PNL TOTAL CA: CPT

## 2024-12-19 PROCEDURE — 2580000003 HC RX 258: Performed by: FAMILY MEDICINE

## 2024-12-19 PROCEDURE — 36415 COLL VENOUS BLD VENIPUNCTURE: CPT

## 2024-12-19 PROCEDURE — 6360000002 HC RX W HCPCS: Performed by: FAMILY MEDICINE

## 2024-12-19 PROCEDURE — 99232 SBSQ HOSP IP/OBS MODERATE 35: CPT | Performed by: SURGERY

## 2024-12-19 PROCEDURE — 99233 SBSQ HOSP IP/OBS HIGH 50: CPT | Performed by: INTERNAL MEDICINE

## 2024-12-19 PROCEDURE — 96376 TX/PRO/DX INJ SAME DRUG ADON: CPT

## 2024-12-19 PROCEDURE — 6370000000 HC RX 637 (ALT 250 FOR IP): Performed by: FAMILY MEDICINE

## 2024-12-19 PROCEDURE — G0378 HOSPITAL OBSERVATION PER HR: HCPCS

## 2024-12-19 PROCEDURE — 2500000003 HC RX 250 WO HCPCS: Performed by: FAMILY MEDICINE

## 2024-12-19 PROCEDURE — 85025 COMPLETE CBC W/AUTO DIFF WBC: CPT

## 2024-12-19 PROCEDURE — 96375 TX/PRO/DX INJ NEW DRUG ADDON: CPT

## 2024-12-19 RX ORDER — MORPHINE SULFATE 2 MG/ML
1 INJECTION, SOLUTION INTRAMUSCULAR; INTRAVENOUS
Status: COMPLETED | OUTPATIENT
Start: 2024-12-19 | End: 2024-12-19

## 2024-12-19 RX ORDER — BISACODYL 10 MG
10 SUPPOSITORY, RECTAL RECTAL
Status: COMPLETED | OUTPATIENT
Start: 2024-12-19 | End: 2024-12-19

## 2024-12-19 RX ORDER — SENNA AND DOCUSATE SODIUM 50; 8.6 MG/1; MG/1
1 TABLET, FILM COATED ORAL 2 TIMES DAILY
Status: DISCONTINUED | OUTPATIENT
Start: 2024-12-19 | End: 2024-12-21 | Stop reason: HOSPADM

## 2024-12-19 RX ADMIN — HYDRALAZINE HYDROCHLORIDE 25 MG: 25 TABLET ORAL at 04:41

## 2024-12-19 RX ADMIN — SENNOSIDES AND DOCUSATE SODIUM 1 TABLET: 50; 8.6 TABLET ORAL at 20:47

## 2024-12-19 RX ADMIN — BISACODYL 10 MG: 10 SUPPOSITORY RECTAL at 02:36

## 2024-12-19 RX ADMIN — SENNOSIDES AND DOCUSATE SODIUM 1 TABLET: 50; 8.6 TABLET ORAL at 08:54

## 2024-12-19 RX ADMIN — ACETAMINOPHEN 650 MG: 325 TABLET ORAL at 08:54

## 2024-12-19 RX ADMIN — SODIUM CHLORIDE, PRESERVATIVE FREE 40 MG: 5 INJECTION INTRAVENOUS at 08:50

## 2024-12-19 RX ADMIN — HYDRALAZINE HYDROCHLORIDE 25 MG: 25 TABLET ORAL at 18:04

## 2024-12-19 RX ADMIN — LOSARTAN POTASSIUM 50 MG: 50 TABLET, FILM COATED ORAL at 08:54

## 2024-12-19 RX ADMIN — MORPHINE SULFATE 1 MG: 2 INJECTION, SOLUTION INTRAMUSCULAR; INTRAVENOUS at 02:35

## 2024-12-19 RX ADMIN — SODIUM CHLORIDE, PRESERVATIVE FREE 40 MG: 5 INJECTION INTRAVENOUS at 20:46

## 2024-12-19 RX ADMIN — SENNOSIDES 8.6 MG: 8.6 TABLET, FILM COATED ORAL at 02:35

## 2024-12-19 RX ADMIN — SODIUM CHLORIDE, PRESERVATIVE FREE 10 ML: 5 INJECTION INTRAVENOUS at 20:47

## 2024-12-19 RX ADMIN — HYDRALAZINE HYDROCHLORIDE 25 MG: 25 TABLET ORAL at 20:47

## 2024-12-19 RX ADMIN — DILTIAZEM HYDROCHLORIDE 120 MG: 120 CAPSULE, COATED, EXTENDED RELEASE ORAL at 08:53

## 2024-12-19 RX ADMIN — SODIUM CHLORIDE, PRESERVATIVE FREE 10 ML: 5 INJECTION INTRAVENOUS at 08:53

## 2024-12-19 ASSESSMENT — PAIN DESCRIPTION - LOCATION
LOCATION: ABDOMEN

## 2024-12-19 ASSESSMENT — PAIN SCALES - GENERAL
PAINLEVEL_OUTOF10: 10
PAINLEVEL_OUTOF10: 10
PAINLEVEL_OUTOF10: 0
PAINLEVEL_OUTOF10: 0
PAINLEVEL_OUTOF10: 8
PAINLEVEL_OUTOF10: 0
PAINLEVEL_OUTOF10: 3

## 2024-12-19 ASSESSMENT — ENCOUNTER SYMPTOMS
BLOOD IN STOOL: 1
VOMITING: 0
RESPIRATORY NEGATIVE: 1
ABDOMINAL PAIN: 1
CONSTIPATION: 1
DIARRHEA: 1

## 2024-12-19 ASSESSMENT — PAIN - FUNCTIONAL ASSESSMENT
PAIN_FUNCTIONAL_ASSESSMENT: ACTIVITIES ARE NOT PREVENTED
PAIN_FUNCTIONAL_ASSESSMENT: PREVENTS OR INTERFERES SOME ACTIVE ACTIVITIES AND ADLS
PAIN_FUNCTIONAL_ASSESSMENT: PREVENTS OR INTERFERES SOME ACTIVE ACTIVITIES AND ADLS
PAIN_FUNCTIONAL_ASSESSMENT: 0-10

## 2024-12-19 ASSESSMENT — PAIN DESCRIPTION - DESCRIPTORS
DESCRIPTORS: ACHING
DESCRIPTORS: ACHING
DESCRIPTORS: BURNING

## 2024-12-19 ASSESSMENT — PAIN DESCRIPTION - ORIENTATION
ORIENTATION: RIGHT
ORIENTATION: MID
ORIENTATION: MID

## 2024-12-19 ASSESSMENT — PAIN SCALES - WONG BAKER
WONGBAKER_NUMERICALRESPONSE: NO HURT

## 2024-12-19 NOTE — PROGRESS NOTES
GENERAL SURGERY  DAILY PROGRESS NOTE  12/19/2024  Cc: had concerns including Abdominal Pain and Rectal Bleeding (Patient c/o mid abdominal pain with dark stools x 3 days . On eliquis ).    SUBJECTIVE:  No new complaints or overnight events. No signs of bleeding overnight. Hgb 13 from 11    OBJECTIVE:  BP (!) 152/98   Pulse 66   Temp 98.3 °F (36.8 °C) (Temporal)   Resp 16   Wt 57 kg (125 lb 10.6 oz)   SpO2 96%   BMI 20.91 kg/m²   No intake/output data recorded.    GENERAL: No acute distress.  HEAD: NCAT.   EYES: Anicteric. Round symmetric pupils.  CV: RR.  LUNGS/CHEST: No increased work of breathing on RA.  ABDOMEN: Soft, no tenderness, non distended.    LABS:  CBC  Recent Labs     12/18/24  1359 12/19/24  0615   WBC 4.9 4.9   RBC 3.68 4.35   HGB 11.0* 13.0   HCT 34.0 40.4   MCV 92.4 92.9   MCH 29.9 29.9   MCHC 32.4 32.2   RDW 15.4* 15.1*    187   MPV 8.9 8.9     BMP  Recent Labs     12/18/24  1359 12/19/24  0615    143   K 3.9 4.2   * 109*   CO2 20* 23   BUN 7 5*   CREATININE 0.9 0.7   GLUCOSE 89 80   CALCIUM 8.1* 8.7     Liver Function  Recent Labs     12/18/24  1359   ALKPHOS 64   ALT 10   AST 25   BILITOT 0.4     Recent Labs     12/18/24  1359   LIPASE 9*     No results for input(s): \"LACTATE\" in the last 72 hours.  Recent Labs     12/18/24  1359   INR 1.3       RADIOLOGY:  I have personally reviewed all relevant imaging:      ASSESSMENT/PLAN:  89 y.o. female with melena    Plan  -Monitor H&H  -PPI twice daily  -may benefit from EGD, no current plans for scope  - clear liquid diet  -Transfuse for hemoglobin less than 7 or if patient becomes unstable  - hold eliquis    Electronically signed by Alf Hayes DO on 12/19/2024 at 8:54 AM  General Surgery, PGY-I      [facility]   ATTENDING PHYSICIAN PROGRESS NOTE     I have personally examined the patient, personally reviewed the record, and personally discussed the case with the resident. I have personally reviewed

## 2024-12-19 NOTE — PROGRESS NOTES
4 Eyes Skin Assessment     NAME:  Arianna Coleman  YOB: 1931  MEDICAL RECORD NUMBER:  [id number]    The patient is being assessed for  Admission    I agree that at least one RN has performed a thorough Head to Toe Skin Assessment on the patient. ALL assessment sites listed below have been assessed.      Areas assessed by both nurses:    Head, Face, Ears, Shoulders, Back, Chest, Arms, Elbows, Hands, Sacrum. Buttock, Coccyx, Ischium, Legs. Feet and Heels, and Under Medical Devices         Does the Patient have a Wound? No noted wound(s)       Bautista Prevention initiated by RN: No  Wound Care Orders initiated by RN: No    Pressure Injury (Stage 3,4, Unstageable, DTI, NWPT, and Complex wounds) if present, place Wound referral order by RN under : No    New Ostomies, if present place, Ostomy referral order under : No     Nurse 1 eSignature: Electronically signed by Keyanna Costa RN on 12/19/24 at 10:03 AM EST    **SHARE this note so that the co-signing nurse can place an eSignature**    Nurse 2 eSignature: Electronically signed by Sheeba Campo RN on 12/19/24 at 10:06 AM EST

## 2024-12-19 NOTE — H&P
Hospital Medicine History & Physical      PCP: Soraya Riddle MD    Date of Admission: 12/18/2024    Date of Service: Pt seen/examined on 12/18/2024 and Placed in Observation.    Chief Complaint: Black stools      History Of Present Illness:    89-year-old female past medical history of A-fib on Eliquis, hypertension, history of blood clots, combined systolic and diastolic CHF, coronary artery  disease presented due to black tarry stools going on for 3 days.  Hemoglobin in the ER of 11.0 down from 12.8 on 11/26/2024.  He was given Protonix.  CT abdomen pelvis was obtained with no acute finding except for colonic fecal retention.  Patient has been feeling lightheaded as if she is going to pass out.  Recently discharged from the hospital on 11/20/2024 for syncope and UTI with history of VRE.  She does report some diffuse abdominal pain mostly epigastric region.    Past Medical History:          Diagnosis Date    Arthritis     Atrial fibrillation (HCC)     Chronic combined systolic (congestive) and diastolic (congestive) heart failure (HCC) 11/7/2021    Coronary artery disease involving native coronary artery of native heart without angina pectoris     Hx of blood clots     Hypertension     Ischemic colitis (HCC)     PAF (paroxysmal atrial fibrillation) (HCC)        Past Surgical History:          Procedure Laterality Date    ANKLE FRACTURE SURGERY      CARDIAC CATHETERIZATION  11/11/2019    Dr. Mares    COLONOSCOPY      ENDOSCOPY, COLON, DIAGNOSTIC      FRACTURE SURGERY      INSERTABLE CARDIAC MONITOR  07/05/2023    Dr. Garcia       Medications Prior to Admission:      Prior to Admission medications    Medication Sig Start Date End Date Taking? Authorizing Provider   hydrALAZINE (APRESOLINE) 25 MG tablet Take 1 tablet by mouth every 8 hours 11/22/24  Yes Lisa Corral MD   losartan (COZAAR) 50 MG tablet Take 1 tablet by mouth daily 11/23/24  Yes Lisa Corral MD   dilTIAZem (CARDIZEM CD) 120 MG extended

## 2024-12-19 NOTE — PROGRESS NOTES
[facility] Hospitalist Progress Note    Admitting Date and Time: 12/18/2024 12:25 PM  Admit Dx: GI bleed [K92.2]  Chronic anticoagulation [Z79.01]  Gastrointestinal hemorrhage, unspecified gastrointestinal hemorrhage type [K92.2]  Anemia, unspecified type [D64.9]    Subjective:  Patient is being followed for GI bleed [K92.2]  Chronic anticoagulation [Z79.01]  Gastrointestinal hemorrhage, unspecified gastrointestinal hemorrhage type [K92.2]  Anemia, unspecified type [D64.9]   Patient seen and examined.   Pleasantly demented.   Complains of lower abdominal pain, no pain with palpation.   Poor historian.     ROS: denies fever, chills, cp, sob, n/v, HA unless stated above.      sennosides-docusate sodium  1 tablet Oral BID    pantoprazole (PROTONIX) 40 mg in sodium chloride (PF) 0.9 % 10 mL injection  40 mg IntraVENous BID    sodium chloride flush  5-40 mL IntraVENous 2 times per day    dilTIAZem  120 mg Oral Daily    hydrALAZINE  25 mg Oral 3 times per day    losartan  50 mg Oral Daily     sodium chloride flush, 5-40 mL, PRN  sodium chloride, , PRN  potassium chloride, 40 mEq, PRN   Or  potassium alternative oral replacement, 40 mEq, PRN   Or  potassium chloride, 10 mEq, PRN  magnesium sulfate, 2,000 mg, PRN  ondansetron, 4 mg, Q8H PRN   Or  ondansetron, 4 mg, Q6H PRN  polyethylene glycol, 17 g, Daily PRN  acetaminophen, 650 mg, Q6H PRN   Or  acetaminophen, 650 mg, Q6H PRN  senna, 1 tablet, Nightly PRN         Objective:    BP (!) 189/91   Pulse 83   Temp 97 °F (36.1 °C) (Oral)   Resp 20   Ht 1.651 m (5' 5\")   Wt 57 kg (125 lb 10.6 oz)   SpO2 95%   BMI 20.91 kg/m²     General Appearance: pleasantly demented.   Skin: warm and dry  Head: normocephalic and atraumatic  Eyes: pupils equal, round, and reactive to light, extraocular eye movements intact, conjunctivae normal  Neck: neck supple and non tender without mass   Pulmonary/Chest: clear to auscultation bilaterally- no wheezes, rales or rhonchi, normal  air movement, no respiratory distress  Cardiovascular: normal rate, normal S1 and S2 and no carotid bruits  Abdomen: soft, non-tender, non-distended, normal bowel sounds, no masses or organomegaly  Extremities: no cyanosis, no clubbing and no edema  Neurologic: no cranial nerve deficit and speech normal        Recent Labs     12/18/24  1359 12/19/24  0615    143   K 3.9 4.2   * 109*   CO2 20* 23   BUN 7 5*   CREATININE 0.9 0.7   GLUCOSE 89 80   CALCIUM 8.1* 8.7       Recent Labs     12/18/24  1359 12/19/24  0615   WBC 4.9 4.9   RBC 3.68 4.35   HGB 11.0* 13.0   HCT 34.0 40.4   MCV 92.4 92.9   MCH 29.9 29.9   MCHC 32.4 32.2   RDW 15.4* 15.1*    187   MPV 8.9 8.9     Labs and imaging reviewed.     Assessment:    Principal Problem:    GI bleed  Active Problems:    Anemia    Chronic a-fib (HCC)  Resolved Problems:    * No resolved hospital problems. *      Plan:  Concern for UGIB  GS following  Hg stable, monitor and transfuse if less than 7.   Monitor abdominal exam  Continue PPI  Holding home Eliquis, resume when okay with GS.  No plan for scope unless ongoing signs of bleeding, per GS.   No bowel movement yet.     Discharge planning, PT/OT, came from home    Code status: DNR-CCA  DVT ppx: PCD    Total time 50 minutes spent reviewing chart notes, reviewing treatment plans and prognosis with the patient/caregiver, and documenting in the chart.        NOTE: This report was transcribed using voice recognition software. Every effort was made to ensure accuracy; however, inadvertent computerized transcription errors may be present.  Electronically signed by Jennifer Braga MD on 12/19/2024 at 12:52 PM

## 2024-12-19 NOTE — CONSULTS
GENERAL SURGERY  CONSULT NOTE  12/18/2024    Physician Consulted: Dr. Vidales  Reason for Consult: Melena  Referring Physician: Dr. Frieda STOCKTON  Arianna Coleman is a 89 y.o. female with a past medical hx of dementia, A-fib, CHF, hypertension, ischemic colitis who presents for evaluation of dark tarry stools.  Started approximately 4 to 5 days ago.  She does have some mild epigastric pain denies any nausea, vomiting, hematochezia or hematemesis.  Last EGD in 2015 with Dr. Hernandez with findings of gastritis.  Most recently admitted at the end of October for syncopal episodes.  CT scan was done which showed constipation.  Hemoglobin was 11 upon arrival. She is afebrile and vitals stable.       Past Medical History:   Diagnosis Date    Arthritis     Atrial fibrillation (HCC)     Chronic combined systolic (congestive) and diastolic (congestive) heart failure (HCC) 11/7/2021    Coronary artery disease involving native coronary artery of native heart without angina pectoris     Hx of blood clots     Hypertension     Ischemic colitis (HCC)     PAF (paroxysmal atrial fibrillation) (HCC)        Past Surgical History:   Procedure Laterality Date    ANKLE FRACTURE SURGERY      CARDIAC CATHETERIZATION  11/11/2019    Dr. Mares    COLONOSCOPY      ENDOSCOPY, COLON, DIAGNOSTIC      FRACTURE SURGERY      INSERTABLE CARDIAC MONITOR  07/05/2023    Dr. Garcia       Medications Prior to Admission    Prior to Admission medications    Medication Sig Start Date End Date Taking? Authorizing Provider   hydrALAZINE (APRESOLINE) 25 MG tablet Take 1 tablet by mouth every 8 hours 11/22/24  Yes Lisa Corral MD   losartan (COZAAR) 50 MG tablet Take 1 tablet by mouth daily 11/23/24  Yes Lisa Corral MD   dilTIAZem (CARDIZEM CD) 120 MG extended release capsule Take 1 capsule by mouth daily 11/23/24  Yes Lsia Corral MD   pantoprazole (PROTONIX) 40 MG tablet Take 1 tablet by mouth every morning (before breakfast) 11/22/24  Yes Lisa Corral

## 2024-12-19 NOTE — ACP (ADVANCE CARE PLANNING)
Advance Care Planning   Healthcare Decision Maker:    Primary Decision Maker: ririnancy medina - Child - [phone]    Secondary Decision Maker: JudithInés - Child - [phone]    Supplemental (Other) Decision Maker: Maksim Coleman - Child - [phone]    Click here to complete Healthcare Decision Makers including selection of the Healthcare Decision Maker Relationship (ie \"Primary\").       Electronically signed by Cipriano Abdalla RN on 12/19/2024 at 5:14 PM

## 2024-12-19 NOTE — CARE COORDINATION
12/19/24 Transition of care:  Pt admitted OBS for GI bleed Waiting on hemoccult results Spoke with pt son Pt lives with her son PT has hospital bed cane WW WC Pt is active with [facility] for SN [facility] on chart PCP is Venkatesh larios Brine Plan is home with no additional needs expressed for discharge Pt will need ambulance for transport home Ambulance form in soft chart Electronically signed by Cipriano Abdalla RN on 12/19/2024 at 5:20 PM     Case Management Assessment  Initial Evaluation    Date/Time of Evaluation: 12/19/2024 5:21 PM  Assessment Completed by: Cipriano Abdalla, RN    If patient is discharged prior to next notation, then this note serves as note for discharge by case management.    Patient Name: Arianna Coleman                   YOB: 1931  Diagnosis: GI bleed [K92.2]  Chronic anticoagulation [Z79.01]  Gastrointestinal hemorrhage, unspecified gastrointestinal hemorrhage type [K92.2]  Anemia, unspecified type [D64.9]                   Date / Time: 12/18/2024 12:25 PM    Patient Admission Status: Observation   Readmission Risk (Low < 19, Mod (19-27), High > 27): Readmission Risk Score: 18.3    Current PCP: Soraya Riddle MD  PCP verified by CM? Yes    Chart Reviewed: Yes      History Provided by: Child/Family  Patient Orientation: Person    Patient Cognition: Alert    Hospitalization in the last 30 days (Readmission):  No    If yes, Readmission Assessment in  Navigator will be completed.    Advance Directives:      Code Status: DNR-CCA   Patient's Primary Decision Maker is: Legal Next of Kin    Primary Decision Maker: nancy menezes - Child - [phone]    Secondary Decision Maker: JudithInés - Child - [phone]    Supplemental (Other) Decision Maker: JaredMaksim - Child - [phone]    Discharge Planning:    Patient lives with: Children Type of Home: House  Primary Care Giver: Family  Patient Support Systems include: Children, Family Members   Current Financial  Plan?  yes    Cipriano Abdalla RN  Case Management Department  Ph: [phone] Fax: na

## 2024-12-19 NOTE — PROGRESS NOTES
Report called to Keyanna DAMON on 82. Placed for transport. DaughterDennise called and left voicemail for new room assignment.

## 2024-12-19 NOTE — PROGRESS NOTES
Spoke with pt amanuel Schaeffer, update provided and all questions answered. Also, was able to get some admission information needed to finish admission questions.

## 2024-12-20 VITALS
BODY MASS INDEX: 20.94 KG/M2 | HEIGHT: 65 IN | OXYGEN SATURATION: 95 % | WEIGHT: 125.66 LBS | DIASTOLIC BLOOD PRESSURE: 74 MMHG | SYSTOLIC BLOOD PRESSURE: 133 MMHG | TEMPERATURE: 98.2 F | RESPIRATION RATE: 18 BRPM | HEART RATE: 67 BPM

## 2024-12-20 LAB
EKG ATRIAL RATE: 77 BPM
EKG P AXIS: 41 DEGREES
EKG P-R INTERVAL: 176 MS
EKG Q-T INTERVAL: 434 MS
EKG QRS DURATION: 108 MS
EKG QTC CALCULATION (BAZETT): 491 MS
EKG R AXIS: -53 DEGREES
EKG T AXIS: 21 DEGREES
EKG VENTRICULAR RATE: 77 BPM
HCT VFR BLD AUTO: 38.5 % (ref 34–48)
HGB BLD-MCNC: 12.7 G/DL (ref 11.5–15.5)

## 2024-12-20 PROCEDURE — G0378 HOSPITAL OBSERVATION PER HR: HCPCS

## 2024-12-20 PROCEDURE — 93010 ELECTROCARDIOGRAM REPORT: CPT | Performed by: INTERNAL MEDICINE

## 2024-12-20 PROCEDURE — 6370000000 HC RX 637 (ALT 250 FOR IP): Performed by: FAMILY MEDICINE

## 2024-12-20 PROCEDURE — 36415 COLL VENOUS BLD VENIPUNCTURE: CPT

## 2024-12-20 PROCEDURE — 6370000000 HC RX 637 (ALT 250 FOR IP)

## 2024-12-20 PROCEDURE — 85018 HEMOGLOBIN: CPT

## 2024-12-20 PROCEDURE — 96376 TX/PRO/DX INJ SAME DRUG ADON: CPT

## 2024-12-20 PROCEDURE — 85014 HEMATOCRIT: CPT

## 2024-12-20 PROCEDURE — 2580000003 HC RX 258: Performed by: FAMILY MEDICINE

## 2024-12-20 PROCEDURE — 2500000003 HC RX 250 WO HCPCS: Performed by: FAMILY MEDICINE

## 2024-12-20 PROCEDURE — 99239 HOSP IP/OBS DSCHRG MGMT >30: CPT | Performed by: INTERNAL MEDICINE

## 2024-12-20 PROCEDURE — 6360000002 HC RX W HCPCS: Performed by: FAMILY MEDICINE

## 2024-12-20 PROCEDURE — 99231 SBSQ HOSP IP/OBS SF/LOW 25: CPT | Performed by: SURGERY

## 2024-12-20 RX ORDER — SUCRALFATE 1 G/1
1 TABLET ORAL
Status: DISCONTINUED | OUTPATIENT
Start: 2024-12-20 | End: 2024-12-21 | Stop reason: HOSPADM

## 2024-12-20 RX ORDER — SUCRALFATE 1 G/1
1 TABLET ORAL
Qty: 120 TABLET | Refills: 3 | Status: SHIPPED | OUTPATIENT
Start: 2024-12-20

## 2024-12-20 RX ADMIN — SENNOSIDES AND DOCUSATE SODIUM 1 TABLET: 50; 8.6 TABLET ORAL at 09:14

## 2024-12-20 RX ADMIN — SODIUM CHLORIDE, PRESERVATIVE FREE 10 ML: 5 INJECTION INTRAVENOUS at 09:11

## 2024-12-20 RX ADMIN — LOSARTAN POTASSIUM 50 MG: 50 TABLET, FILM COATED ORAL at 09:14

## 2024-12-20 RX ADMIN — HYDRALAZINE HYDROCHLORIDE 25 MG: 25 TABLET ORAL at 05:11

## 2024-12-20 RX ADMIN — SODIUM CHLORIDE, PRESERVATIVE FREE 40 MG: 5 INJECTION INTRAVENOUS at 09:09

## 2024-12-20 RX ADMIN — HYDRALAZINE HYDROCHLORIDE 25 MG: 25 TABLET ORAL at 21:44

## 2024-12-20 RX ADMIN — SUCRALFATE 1 G: 1 TABLET ORAL at 20:06

## 2024-12-20 RX ADMIN — SODIUM CHLORIDE, PRESERVATIVE FREE 40 MG: 5 INJECTION INTRAVENOUS at 20:06

## 2024-12-20 RX ADMIN — SODIUM CHLORIDE, PRESERVATIVE FREE 10 ML: 5 INJECTION INTRAVENOUS at 20:07

## 2024-12-20 RX ADMIN — DILTIAZEM HYDROCHLORIDE 120 MG: 120 CAPSULE, COATED, EXTENDED RELEASE ORAL at 09:17

## 2024-12-20 RX ADMIN — SUCRALFATE 1 G: 1 TABLET ORAL at 09:14

## 2024-12-20 RX ADMIN — SENNOSIDES AND DOCUSATE SODIUM 1 TABLET: 50; 8.6 TABLET ORAL at 20:06

## 2024-12-20 RX ADMIN — HYDRALAZINE HYDROCHLORIDE 25 MG: 25 TABLET ORAL at 14:29

## 2024-12-20 NOTE — DISCHARGE INSTRUCTIONS
Hold Eliyazis   Follow up with GS  Repeat CBC in one week.   Return to the ED if symptoms worsen.   Follow up with PCP.

## 2024-12-20 NOTE — PROGRESS NOTES
GENERAL SURGERY  DAILY PROGRESS NOTE  12/20/2024  Cc: had concerns including Abdominal Pain and Rectal Bleeding (Patient c/o mid abdominal pain with dark stools x 3 days . On eliquis ).    SUBJECTIVE:  Patient had mild abdominal pain yesterday night that resolved.  Denies any current abdominal pain, nausea or emesis.  He had 1 bowel movement yesterday but denies it being melenic.  No hematochezia.    OBJECTIVE:  /80   Pulse 64   Temp 97.7 °F (36.5 °C) (Oral)   Resp 17   Ht 1.651 m (5' 5\")   Wt 57 kg (125 lb 10.6 oz)   SpO2 94%   BMI 20.91 kg/m²   I/O last 3 completed shifts:  In: 600 [P.O.:600]  Out: 0     GENERAL: No acute distress.  HEAD: NCAT.   EYES: Anicteric. Round symmetric pupils.  CV: RR.  LUNGS/CHEST: No increased work of breathing on RA.  ABDOMEN: Soft, no tenderness, non distended.    LABS:  CBC  Recent Labs     12/18/24  1359 12/19/24  0615   WBC 4.9 4.9   RBC 3.68 4.35   HGB 11.0* 13.0   HCT 34.0 40.4   MCV 92.4 92.9   MCH 29.9 29.9   MCHC 32.4 32.2   RDW 15.4* 15.1*    187   MPV 8.9 8.9     BMP  Recent Labs     12/18/24  1359 12/19/24  0615    143   K 3.9 4.2   * 109*   CO2 20* 23   BUN 7 5*   CREATININE 0.9 0.7   GLUCOSE 89 80   CALCIUM 8.1* 8.7     Liver Function  Recent Labs     12/18/24  1359   ALKPHOS 64   ALT 10   AST 25   BILITOT 0.4     Recent Labs     12/18/24  1359   LIPASE 9*     No results for input(s): \"LACTATE\" in the last 72 hours.  Recent Labs     12/18/24  1359   INR 1.3       RADIOLOGY:  I have personally reviewed all relevant imaging:      ASSESSMENT/PLAN:  89 y.o. female with melena    Plan  -Monitor H&H: Hgb 13 yesterday. Awaiting Hgb this morning.   -PPI twice daily  -Carafate 1 g 4 times daily  -may benefit from EGD, no current plans for scope  -Advance diet as tolerated  -Transfuse for hemoglobin less than 7 or if patient becomes unstable  - hold eliquis, awaiting hemoglobin this a.m. if stable okay to continue Eliquis from surgery POV.   -If  hemoglobin stable patient is okay to DC from surgery POV with outpatient follow-up.    Electronically signed by Angie Mariscal MD on 12/20/2024 at 6:08 AM  General Surgery, PGY-I      [facility]   ATTENDING PHYSICIAN PROGRESS NOTE      I have personally examined the patient, personally reviewed the record, and personally discussed the case with the resident. I have personally reviewed all relevant labs and imaging data. I am actively managing this patient's medications.  Please refer to the resident's note. I agree with the assessment and plan with the following corrections/additions. The following summarizes my clinical findings and independent assessment.      CC: melena     Pt without complaints.     Awake and alert  Confused  Hrt: regular  Lungs:  clear  Abd:  soft; BS active; NT/ND  Skin:  warm/dry             Patient Active Problem List     Diagnosis Date Noted    Hypokalemia 09/13/2022    Chronic congestive heart failure (HCC) 09/11/2022    Gastroesophageal reflux disease 09/11/2022    Symptomatic bradycardia 09/11/2022    Systolic hypertension 08/23/2012    Chronic a-fib (HCC) 12/19/2024    GI bleed 12/18/2024    Palliative care encounter 12/02/2024    UTI (urinary tract infection) 11/25/2024    Status post placement of implantable loop recorder 11/11/2024    AMS (altered mental status) 11/10/2024    Elevated blood pressure reading 05/22/2024    Acute metabolic encephalopathy 07/07/2023    Recurrent episodes of unresponsiveness 07/04/2023    Altered mental status, unspecified 07/01/2023    Proctocolitis 05/10/2023    Sinus bradycardia 04/23/2023    Urinary tract infection without hematuria      Coronary artery disease involving native coronary artery of native heart without angina pectoris      Chronic combined systolic (congestive) and diastolic (congestive) heart failure (HCC) 11/07/2021    Abnormal urinalysis 11/07/2021    Orthostatic hypotension 11/07/2021    Chest pain 08/29/2021

## 2024-12-20 NOTE — DISCHARGE SUMMARY
outpatient.  Stable for discharge home      Discharge Exam:    General Appearance: Pleasantly demented  Skin: warm and dry  Head: normocephalic and atraumatic  Eyes: pupils equal, round, and reactive to light, extraocular eye movements intact, conjunctivae normal  Neck: neck supple and non tender without mass   Pulmonary/Chest: clear to auscultation bilaterally- no wheezes, rales or rhonchi, normal air movement, no respiratory distress  Cardiovascular: normal rate, normal S1 and S2 and no carotid bruits  Abdomen: soft, non-tender, non-distended, normal bowel sounds, no masses or organomegaly  Extremities: no cyanosis, no clubbing and no edema  Neurologic: no cranial nerve deficit and speech normal    I/O last 3 completed shifts:  In: 600 [P.O.:600]  Out: 0   No intake/output data recorded.      LABS:  Recent Labs     12/18/24  1359 12/19/24  0615    143   K 3.9 4.2   * 109*   CO2 20* 23   BUN 7 5*   CREATININE 0.9 0.7   GLUCOSE 89 80   CALCIUM 8.1* 8.7       Recent Labs     12/18/24  1359 12/19/24  0615   WBC 4.9 4.9   RBC 3.68 4.35   HGB 11.0* 13.0   HCT 34.0 40.4   MCV 92.4 92.9   MCH 29.9 29.9   MCHC 32.4 32.2   RDW 15.4* 15.1*    187   MPV 8.9 8.9       No results for input(s): \"POCGLU\" in the last 72 hours.    Imaging:  CT ABDOMEN PELVIS W IV CONTRAST Additional Contrast? None    Result Date: 12/18/2024  EXAMINATION: CT OF THE ABDOMEN AND PELVIS WITH CONTRAST 12/18/2024 5:23 pm TECHNIQUE: CT of the abdomen and pelvis was performed with the administration of intravenous contrast. Multiplanar reformatted images are provided for review. Automated exposure control, iterative reconstruction, and/or weight based adjustment of the mA/kV was utilized to reduce the radiation dose to as low as reasonably achievable. COMPARISON: 11/25/2024 HISTORY: ORDERING SYSTEM PROVIDED HISTORY: epigastric abdominal pain TECHNOLOGIST PROVIDED HISTORY: Additional Contrast?->None Reason for exam:->epigastric abdominal          35 minutes was spent in preparing discharge papers, discussing discharge with patient, medication review, etc.    Signed:  Electronically signed by Jennifer Braga MD on 12/20/2024 at 2:54 PM

## 2024-12-20 NOTE — CARE COORDINATION
12/20/24, SW met with patient in room and daughter Inés.  Discharge plan remains home with son whom patient lives with.  Patient will need ambulance transport home.  [facility] and RN can contact them at [phone].  Ambulance form will need completed on day of discharge. Ambulance form, face sheet and envelope is on soft chart.  Patient has hospital bed , cane, WW and WC for DME.  [facility] is active with patient.  Will need ANDRES placed on chart.  Nursing updated on the above.  SW to follow.      Luh Forrester, EMIL  [facility] Case Management  [phone]

## 2024-12-20 NOTE — PLAN OF CARE
Problem: Chronic Conditions and Co-morbidities  Goal: Patient's chronic conditions and co-morbidity symptoms are monitored and maintained or improved  Outcome: Progressing     Problem: Pain  Goal: Verbalizes/displays adequate comfort level or baseline comfort level  Outcome: Progressing     Problem: Skin/Tissue Integrity  Goal: Absence of new skin breakdown  Description: 1.  Monitor for areas of redness and/or skin breakdown  2.  Assess vascular access sites hourly  3.  Every 4-6 hours minimum:  Change oxygen saturation probe site  4.  Every 4-6 hours:  If on nasal continuous positive airway pressure, respiratory therapy assess nares and determine need for appliance change or resting period.  Outcome: Progressing     Problem: ABCDS Injury Assessment  Goal: Absence of physical injury  Outcome: Progressing     Problem: Safety - Adult  Goal: Free from fall injury  Outcome: Progressing

## 2024-12-21 NOTE — PLAN OF CARE
Problem: Chronic Conditions and Co-morbidities  Goal: Patient's chronic conditions and co-morbidity symptoms are monitored and maintained or improved  Outcome: Adequate for Discharge     Problem: Pain  Goal: Verbalizes/displays adequate comfort level or baseline comfort level  Outcome: Adequate for Discharge     Problem: Skin/Tissue Integrity  Goal: Absence of new skin breakdown  Description: 1.  Monitor for areas of redness and/or skin breakdown  2.  Assess vascular access sites hourly  3.  Every 4-6 hours minimum:  Change oxygen saturation probe site  4.  Every 4-6 hours:  If on nasal continuous positive airway pressure, respiratory therapy assess nares and determine need for appliance change or resting period.  Outcome: Adequate for Discharge     Problem: ABCDS Injury Assessment  Goal: Absence of physical injury  Outcome: Adequate for Discharge     Problem: Safety - Adult  Goal: Free from fall injury  Outcome: Adequate for Discharge

## 2024-12-21 NOTE — PROGRESS NOTES
Daughter at bedside updated on patient's discharge.  [facility] and able to  patient at approximately 7:30pm tonight. Daughter at bedside updated on estimated time of pickup.

## 2024-12-25 PROBLEM — N39.0 UTI (URINARY TRACT INFECTION): Status: RESOLVED | Noted: 2024-11-25 | Resolved: 2024-12-25

## 2025-01-01 ENCOUNTER — APPOINTMENT (OUTPATIENT)
Dept: CT IMAGING | Age: 89
End: 2025-01-01
Payer: COMMERCIAL

## 2025-01-01 ENCOUNTER — HOSPITAL ENCOUNTER (INPATIENT)
Age: 89
LOS: 1 days | End: 2025-02-08
Attending: EMERGENCY MEDICINE | Admitting: STUDENT IN AN ORGANIZED HEALTH CARE EDUCATION/TRAINING PROGRAM
Payer: COMMERCIAL

## 2025-01-01 ENCOUNTER — APPOINTMENT (OUTPATIENT)
Dept: GENERAL RADIOLOGY | Age: 89
End: 2025-01-01
Payer: COMMERCIAL

## 2025-01-01 VITALS
DIASTOLIC BLOOD PRESSURE: 66 MMHG | BODY MASS INDEX: 22.08 KG/M2 | SYSTOLIC BLOOD PRESSURE: 96 MMHG | TEMPERATURE: 99 F | WEIGHT: 132.5 LBS | RESPIRATION RATE: 10 BRPM | HEIGHT: 65 IN

## 2025-01-01 DIAGNOSIS — I62.00 SUBDURAL HEMORRHAGE (HCC): Primary | ICD-10-CM

## 2025-01-01 LAB
AADO2: 105.7 MMHG
AADO2: 221.4 MMHG
AADO2: 75.2 MMHG
ALBUMIN SERPL-MCNC: 3.7 G/DL (ref 3.5–5.2)
ALP SERPL-CCNC: 66 U/L (ref 35–104)
ALT SERPL-CCNC: 9 U/L (ref 0–32)
ANION GAP SERPL CALCULATED.3IONS-SCNC: 12 MMOL/L (ref 7–16)
ANION GAP SERPL CALCULATED.3IONS-SCNC: 15 MMOL/L (ref 7–16)
AST SERPL-CCNC: 29 U/L (ref 0–31)
B.E.: -2.1 MMOL/L (ref -3–3)
B.E.: -2.2 MMOL/L (ref -3–3)
B.E.: -4.1 MMOL/L (ref -3–3)
BASOPHILS # BLD: 0.05 K/UL (ref 0–0.2)
BASOPHILS NFR BLD: 1 % (ref 0–2)
BILIRUB SERPL-MCNC: 0.5 MG/DL (ref 0–1.2)
BUN SERPL-MCNC: 8 MG/DL (ref 6–23)
BUN SERPL-MCNC: 8 MG/DL (ref 6–23)
CALCIUM SERPL-MCNC: 8.4 MG/DL (ref 8.6–10.2)
CALCIUM SERPL-MCNC: 8.6 MG/DL (ref 8.6–10.2)
CHLORIDE SERPL-SCNC: 107 MMOL/L (ref 98–107)
CHLORIDE SERPL-SCNC: 116 MMOL/L (ref 98–107)
CHOLEST SERPL-MCNC: 171 MG/DL
CO2 SERPL-SCNC: 19 MMOL/L (ref 22–29)
CO2 SERPL-SCNC: 21 MMOL/L (ref 22–29)
COHB: 0.2 % (ref 0–1.5)
COHB: 0.2 % (ref 0–1.5)
COHB: 1.1 % (ref 0–1.5)
CREAT SERPL-MCNC: 0.8 MG/DL (ref 0.5–1)
CREAT SERPL-MCNC: 0.8 MG/DL (ref 0.5–1)
CRITICAL: ABNORMAL
DATE ANALYZED: ABNORMAL
DATE OF COLLECTION: ABNORMAL
EKG ATRIAL RATE: 104 BPM
EKG P AXIS: 77 DEGREES
EKG Q-T INTERVAL: 456 MS
EKG QRS DURATION: 118 MS
EKG QTC CALCULATION (BAZETT): 516 MS
EKG R AXIS: -41 DEGREES
EKG T AXIS: -32 DEGREES
EKG VENTRICULAR RATE: 77 BPM
EOSINOPHIL # BLD: 0.33 K/UL (ref 0.05–0.5)
EOSINOPHILS RELATIVE PERCENT: 5 % (ref 0–6)
ERYTHROCYTE [DISTWIDTH] IN BLOOD BY AUTOMATED COUNT: 15.5 % (ref 11.5–15)
ERYTHROCYTE [DISTWIDTH] IN BLOOD BY AUTOMATED COUNT: 15.7 % (ref 11.5–15)
FIO2: 100 %
FIO2: 40 %
FIO2: 40 %
GFR, ESTIMATED: 69 ML/MIN/1.73M2
GFR, ESTIMATED: 69 ML/MIN/1.73M2
GLUCOSE SERPL-MCNC: 118 MG/DL (ref 74–99)
GLUCOSE SERPL-MCNC: 161 MG/DL (ref 74–99)
HBA1C MFR BLD: 4.6 % (ref 4–5.6)
HCO3: 19.4 MMOL/L (ref 22–26)
HCO3: 19.6 MMOL/L (ref 22–26)
HCO3: 21.4 MMOL/L (ref 22–26)
HCT VFR BLD AUTO: 33.4 % (ref 34–48)
HCT VFR BLD AUTO: 37.6 % (ref 34–48)
HDLC SERPL-MCNC: 53 MG/DL
HGB BLD-MCNC: 11.1 G/DL (ref 11.5–15.5)
HGB BLD-MCNC: 12.3 G/DL (ref 11.5–15.5)
HHB: 0.2 % (ref 0–5)
HHB: 0.9 % (ref 0–5)
HHB: 1 % (ref 0–5)
IMM GRANULOCYTES # BLD AUTO: <0.03 K/UL (ref 0–0.58)
IMM GRANULOCYTES NFR BLD: 0 % (ref 0–5)
LAB: ABNORMAL
LDLC SERPL CALC-MCNC: 100 MG/DL
LYMPHOCYTES NFR BLD: 4.19 K/UL (ref 1.5–4)
LYMPHOCYTES RELATIVE PERCENT: 66 % (ref 20–42)
Lab: 1509
Lab: 1823
Lab: 430
MAGNESIUM SERPL-MCNC: 2.2 MG/DL (ref 1.6–2.6)
MCH RBC QN AUTO: 29.5 PG (ref 26–35)
MCH RBC QN AUTO: 30.2 PG (ref 26–35)
MCHC RBC AUTO-ENTMCNC: 32.7 G/DL (ref 32–34.5)
MCHC RBC AUTO-ENTMCNC: 33.2 G/DL (ref 32–34.5)
MCV RBC AUTO: 90.2 FL (ref 80–99.9)
MCV RBC AUTO: 91 FL (ref 80–99.9)
METHB: 0.5 % (ref 0–1.5)
MODE: AC
MONOCYTES NFR BLD: 0.62 K/UL (ref 0.1–0.95)
MONOCYTES NFR BLD: 10 % (ref 2–12)
NEUTROPHILS NFR BLD: 19 % (ref 43–80)
NEUTS SEG NFR BLD: 1.2 K/UL (ref 1.8–7.3)
O2 SATURATION: 99 % (ref 92–98.5)
O2 SATURATION: 99.1 % (ref 92–98.5)
O2 SATURATION: 99.8 % (ref 92–98.5)
O2HB: 97.4 % (ref 94–97)
O2HB: 98.4 % (ref 94–97)
O2HB: 99.1 % (ref 94–97)
OPERATOR ID: 1768
OPERATOR ID: 1893
OPERATOR ID: 5134
PATIENT TEMP: 37 C
PCO2: 24.1 MMHG (ref 35–45)
PCO2: 31.3 MMHG (ref 35–45)
PCO2: 33.1 MMHG (ref 35–45)
PEEP/CPAP: 5 CMH2O
PFO2: 3.53 MMHG/%
PFO2: 4.35 MMHG/%
PFO2: 4.68 MMHG/%
PH BLOOD GAS: 7.42 (ref 7.35–7.45)
PH BLOOD GAS: 7.43 (ref 7.35–7.45)
PH BLOOD GAS: 7.52 (ref 7.35–7.45)
PLATELET # BLD AUTO: 194 K/UL (ref 130–450)
PLATELET, FLUORESCENCE: 144 K/UL (ref 130–450)
PMV BLD AUTO: 9.5 FL (ref 7–12)
PMV BLD AUTO: 9.7 FL (ref 7–12)
PO2: 141.4 MMHG (ref 75–100)
PO2: 174 MMHG (ref 75–100)
PO2: 467.5 MMHG (ref 75–100)
POTASSIUM SERPL-SCNC: 3.5 MMOL/L (ref 3.5–5)
POTASSIUM SERPL-SCNC: 3.7 MMOL/L (ref 3.5–5)
PROT SERPL-MCNC: 6.5 G/DL (ref 6.4–8.3)
RBC # BLD AUTO: 3.67 M/UL (ref 3.5–5.5)
RBC # BLD AUTO: 4.17 M/UL (ref 3.5–5.5)
RI(T): 0.43
RI(T): 0.47
RI(T): 0.75
RR MECHANICAL: 10 B/MIN
RR MECHANICAL: 10 B/MIN
RR MECHANICAL: 20 B/MIN
SODIUM SERPL-SCNC: 141 MMOL/L (ref 132–146)
SODIUM SERPL-SCNC: 149 MMOL/L (ref 132–146)
SOURCE, BLOOD GAS: ABNORMAL
THB: 11.4 G/DL (ref 11.5–16.5)
THB: 11.5 G/DL (ref 11.5–16.5)
THB: 11.6 G/DL (ref 11.5–16.5)
TIME ANALYZED: 1525
TIME ANALYZED: 1827
TIME ANALYZED: 437
TRIGL SERPL-MCNC: 91 MG/DL
TROPONIN I SERPL HS-MCNC: 23 NG/L (ref 0–9)
VLDLC SERPL CALC-MCNC: 18 MG/DL
VT MECHANICAL: 350 ML
WBC OTHER # BLD: 6.4 K/UL (ref 4.5–11.5)
WBC OTHER # BLD: 8 K/UL (ref 4.5–11.5)

## 2025-01-01 PROCEDURE — 80053 COMPREHEN METABOLIC PANEL: CPT

## 2025-01-01 PROCEDURE — 2580000003 HC RX 258: Performed by: NURSE PRACTITIONER

## 2025-01-01 PROCEDURE — 82805 BLOOD GASES W/O2 SATURATION: CPT

## 2025-01-01 PROCEDURE — 87081 CULTURE SCREEN ONLY: CPT

## 2025-01-01 PROCEDURE — 2000000000 HC ICU R&B

## 2025-01-01 PROCEDURE — 31500 INSERT EMERGENCY AIRWAY: CPT

## 2025-01-01 PROCEDURE — 2500000003 HC RX 250 WO HCPCS: Performed by: STUDENT IN AN ORGANIZED HEALTH CARE EDUCATION/TRAINING PROGRAM

## 2025-01-01 PROCEDURE — 99232 SBSQ HOSP IP/OBS MODERATE 35: CPT | Performed by: NURSE PRACTITIONER

## 2025-01-01 PROCEDURE — 93005 ELECTROCARDIOGRAM TRACING: CPT

## 2025-01-01 PROCEDURE — 70498 CT ANGIOGRAPHY NECK: CPT

## 2025-01-01 PROCEDURE — 94003 VENT MGMT INPAT SUBQ DAY: CPT

## 2025-01-01 PROCEDURE — 85027 COMPLETE CBC AUTOMATED: CPT

## 2025-01-01 PROCEDURE — 83036 HEMOGLOBIN GLYCOSYLATED A1C: CPT

## 2025-01-01 PROCEDURE — 96374 THER/PROPH/DIAG INJ IV PUSH: CPT

## 2025-01-01 PROCEDURE — 74018 RADEX ABDOMEN 1 VIEW: CPT

## 2025-01-01 PROCEDURE — 6360000002 HC RX W HCPCS: Performed by: NURSE PRACTITIONER

## 2025-01-01 PROCEDURE — 6370000000 HC RX 637 (ALT 250 FOR IP): Performed by: NURSE PRACTITIONER

## 2025-01-01 PROCEDURE — 93010 ELECTROCARDIOGRAM REPORT: CPT | Performed by: INTERNAL MEDICINE

## 2025-01-01 PROCEDURE — 99285 EMERGENCY DEPT VISIT HI MDM: CPT

## 2025-01-01 PROCEDURE — 70496 CT ANGIOGRAPHY HEAD: CPT

## 2025-01-01 PROCEDURE — 2500000003 HC RX 250 WO HCPCS

## 2025-01-01 PROCEDURE — 70450 CT HEAD/BRAIN W/O DYE: CPT

## 2025-01-01 PROCEDURE — 99222 1ST HOSP IP/OBS MODERATE 55: CPT

## 2025-01-01 PROCEDURE — 99291 CRITICAL CARE FIRST HOUR: CPT | Performed by: NURSE PRACTITIONER

## 2025-01-01 PROCEDURE — 94002 VENT MGMT INPAT INIT DAY: CPT

## 2025-01-01 PROCEDURE — 84484 ASSAY OF TROPONIN QUANT: CPT

## 2025-01-01 PROCEDURE — 0BH17EZ INSERTION OF ENDOTRACHEAL AIRWAY INTO TRACHEA, VIA NATURAL OR ARTIFICIAL OPENING: ICD-10-PCS | Performed by: STUDENT IN AN ORGANIZED HEALTH CARE EDUCATION/TRAINING PROGRAM

## 2025-01-01 PROCEDURE — 6360000004 HC RX CONTRAST MEDICATION: Performed by: RADIOLOGY

## 2025-01-01 PROCEDURE — 80061 LIPID PANEL: CPT

## 2025-01-01 PROCEDURE — 5A1935Z RESPIRATORY VENTILATION, LESS THAN 24 CONSECUTIVE HOURS: ICD-10-PCS | Performed by: STUDENT IN AN ORGANIZED HEALTH CARE EDUCATION/TRAINING PROGRAM

## 2025-01-01 PROCEDURE — 51702 INSERT TEMP BLADDER CATH: CPT

## 2025-01-01 PROCEDURE — 99221 1ST HOSP IP/OBS SF/LOW 40: CPT | Performed by: STUDENT IN AN ORGANIZED HEALTH CARE EDUCATION/TRAINING PROGRAM

## 2025-01-01 PROCEDURE — 2500000003 HC RX 250 WO HCPCS: Performed by: NURSE PRACTITIONER

## 2025-01-01 PROCEDURE — 85025 COMPLETE CBC W/AUTO DIFF WBC: CPT

## 2025-01-01 PROCEDURE — 6370000000 HC RX 637 (ALT 250 FOR IP): Performed by: STUDENT IN AN ORGANIZED HEALTH CARE EDUCATION/TRAINING PROGRAM

## 2025-01-01 PROCEDURE — 71045 X-RAY EXAM CHEST 1 VIEW: CPT

## 2025-01-01 PROCEDURE — 80048 BASIC METABOLIC PNL TOTAL CA: CPT

## 2025-01-01 PROCEDURE — 83735 ASSAY OF MAGNESIUM: CPT

## 2025-01-01 RX ORDER — FENTANYL CITRATE-0.9 % NACL/PF 10 MCG/ML
50 PLASTIC BAG, INJECTION (ML) INTRAVENOUS CONTINUOUS
Status: DISCONTINUED | OUTPATIENT
Start: 2025-01-01 | End: 2025-01-01

## 2025-01-01 RX ORDER — ETOMIDATE 2 MG/ML
20 INJECTION INTRAVENOUS ONCE
Status: COMPLETED | OUTPATIENT
Start: 2025-01-01 | End: 2025-01-01

## 2025-01-01 RX ORDER — IOPAMIDOL 755 MG/ML
75 INJECTION, SOLUTION INTRAVASCULAR
Status: COMPLETED | OUTPATIENT
Start: 2025-01-01 | End: 2025-01-01

## 2025-01-01 RX ORDER — ONDANSETRON 2 MG/ML
4 INJECTION INTRAMUSCULAR; INTRAVENOUS EVERY 6 HOURS PRN
Status: DISCONTINUED | OUTPATIENT
Start: 2025-01-01 | End: 2025-01-01 | Stop reason: HOSPADM

## 2025-01-01 RX ORDER — LORAZEPAM 2 MG/ML
0.5 INJECTION INTRAMUSCULAR
Status: DISCONTINUED | OUTPATIENT
Start: 2025-01-01 | End: 2025-01-01 | Stop reason: HOSPADM

## 2025-01-01 RX ORDER — SODIUM CHLORIDE 9 MG/ML
INJECTION, SOLUTION INTRAVENOUS CONTINUOUS
Status: DISCONTINUED | OUTPATIENT
Start: 2025-01-01 | End: 2025-01-01 | Stop reason: HOSPADM

## 2025-01-01 RX ORDER — MINERAL OIL AND WHITE PETROLATUM 150; 830 MG/G; MG/G
OINTMENT OPHTHALMIC EVERY 4 HOURS
Status: DISCONTINUED | OUTPATIENT
Start: 2025-01-01 | End: 2025-01-01 | Stop reason: HOSPADM

## 2025-01-01 RX ORDER — SODIUM CHLORIDE 9 MG/ML
INJECTION, SOLUTION INTRAVENOUS PRN
Status: DISCONTINUED | OUTPATIENT
Start: 2025-01-01 | End: 2025-01-01 | Stop reason: HOSPADM

## 2025-01-01 RX ORDER — ACETAMINOPHEN 650 MG/1
650 SUPPOSITORY RECTAL EVERY 6 HOURS PRN
Status: DISCONTINUED | OUTPATIENT
Start: 2025-01-01 | End: 2025-01-01 | Stop reason: HOSPADM

## 2025-01-01 RX ORDER — CHLORHEXIDINE GLUCONATE ORAL RINSE 1.2 MG/ML
15 SOLUTION DENTAL 2 TIMES DAILY
Status: DISCONTINUED | OUTPATIENT
Start: 2025-01-01 | End: 2025-01-01

## 2025-01-01 RX ORDER — SODIUM CHLORIDE 0.9 % (FLUSH) 0.9 %
5-40 SYRINGE (ML) INJECTION PRN
Status: DISCONTINUED | OUTPATIENT
Start: 2025-01-01 | End: 2025-01-01 | Stop reason: HOSPADM

## 2025-01-01 RX ORDER — FENTANYL CITRATE 50 UG/ML
50 INJECTION, SOLUTION INTRAMUSCULAR; INTRAVENOUS EVERY 30 MIN PRN
Status: DISCONTINUED | OUTPATIENT
Start: 2025-01-01 | End: 2025-01-01

## 2025-01-01 RX ORDER — BISACODYL 10 MG
10 SUPPOSITORY, RECTAL RECTAL DAILY PRN
Status: DISCONTINUED | OUTPATIENT
Start: 2025-01-01 | End: 2025-01-01 | Stop reason: HOSPADM

## 2025-01-01 RX ORDER — LEVETIRACETAM 500 MG/5ML
500 INJECTION, SOLUTION, CONCENTRATE INTRAVENOUS EVERY 12 HOURS
Status: DISCONTINUED | OUTPATIENT
Start: 2025-01-01 | End: 2025-01-01

## 2025-01-01 RX ORDER — HYDRALAZINE HYDROCHLORIDE 20 MG/ML
5 INJECTION INTRAMUSCULAR; INTRAVENOUS EVERY 10 MIN PRN
Status: DISCONTINUED | OUTPATIENT
Start: 2025-01-01 | End: 2025-01-01 | Stop reason: HOSPADM

## 2025-01-01 RX ORDER — LABETALOL HYDROCHLORIDE 5 MG/ML
5 INJECTION, SOLUTION INTRAVENOUS EVERY 10 MIN PRN
Status: DISCONTINUED | OUTPATIENT
Start: 2025-01-01 | End: 2025-01-01 | Stop reason: HOSPADM

## 2025-01-01 RX ORDER — GLYCOPYRROLATE 0.2 MG/ML
0.2 INJECTION INTRAMUSCULAR; INTRAVENOUS EVERY 4 HOURS PRN
Status: DISCONTINUED | OUTPATIENT
Start: 2025-01-01 | End: 2025-01-01 | Stop reason: HOSPADM

## 2025-01-01 RX ORDER — MORPHINE SULFATE 2 MG/ML
2 INJECTION, SOLUTION INTRAMUSCULAR; INTRAVENOUS
Status: DISCONTINUED | OUTPATIENT
Start: 2025-01-01 | End: 2025-01-01 | Stop reason: HOSPADM

## 2025-01-01 RX ORDER — ROCURONIUM BROMIDE 10 MG/ML
80 INJECTION, SOLUTION INTRAVENOUS ONCE
Status: COMPLETED | OUTPATIENT
Start: 2025-01-01 | End: 2025-01-01

## 2025-01-01 RX ORDER — SODIUM CHLORIDE 0.9 % (FLUSH) 0.9 %
5-40 SYRINGE (ML) INJECTION EVERY 12 HOURS SCHEDULED
Status: DISCONTINUED | OUTPATIENT
Start: 2025-01-01 | End: 2025-01-01 | Stop reason: HOSPADM

## 2025-01-01 RX ORDER — ACETAMINOPHEN 325 MG/1
650 TABLET ORAL EVERY 6 HOURS PRN
Status: DISCONTINUED | OUTPATIENT
Start: 2025-01-01 | End: 2025-01-01 | Stop reason: HOSPADM

## 2025-01-01 RX ORDER — ONDANSETRON 4 MG/1
4 TABLET, ORALLY DISINTEGRATING ORAL EVERY 8 HOURS PRN
Status: DISCONTINUED | OUTPATIENT
Start: 2025-01-01 | End: 2025-01-01 | Stop reason: HOSPADM

## 2025-01-01 RX ADMIN — FAMOTIDINE 20 MG: 10 INJECTION, SOLUTION INTRAVENOUS at 16:07

## 2025-01-01 RX ADMIN — GLYCOPYRROLATE 0.2 MG: 0.2 INJECTION INTRAMUSCULAR; INTRAVENOUS at 13:33

## 2025-01-01 RX ADMIN — ROCURONIUM BROMIDE 80 MG: 10 INJECTION, SOLUTION INTRAVENOUS at 12:07

## 2025-01-01 RX ADMIN — ACETAMINOPHEN 650 MG: 325 TABLET ORAL at 00:38

## 2025-01-01 RX ADMIN — SODIUM CHLORIDE, PRESERVATIVE FREE 10 ML: 5 INJECTION INTRAVENOUS at 09:23

## 2025-01-01 RX ADMIN — IOPAMIDOL 60 ML: 755 INJECTION, SOLUTION INTRAVENOUS at 12:33

## 2025-01-01 RX ADMIN — SODIUM CHLORIDE, PRESERVATIVE FREE 10 ML: 5 INJECTION INTRAVENOUS at 20:20

## 2025-01-01 RX ADMIN — HYDRALAZINE HYDROCHLORIDE 5 MG: 20 INJECTION INTRAMUSCULAR; INTRAVENOUS at 16:36

## 2025-01-01 RX ADMIN — CHLORHEXIDINE GLUCONATE, 0.12% ORAL RINSE 15 ML: 1.2 SOLUTION DENTAL at 20:21

## 2025-01-01 RX ADMIN — LORAZEPAM 0.5 MG: 2 INJECTION INTRAMUSCULAR; INTRAVENOUS at 13:44

## 2025-01-01 RX ADMIN — POLYVINYL ALCOHOL, POVIDONE 1 DROP: 14; 6 SOLUTION/ DROPS OPHTHALMIC at 16:08

## 2025-01-01 RX ADMIN — WHITE PETROLATUM: .15; .85 OINTMENT OPHTHALMIC at 20:19

## 2025-01-01 RX ADMIN — FAMOTIDINE 20 MG: 10 INJECTION, SOLUTION INTRAVENOUS at 09:22

## 2025-01-01 RX ADMIN — LORAZEPAM 0.5 MG: 2 INJECTION INTRAMUSCULAR; INTRAVENOUS at 14:13

## 2025-01-01 RX ADMIN — WHITE PETROLATUM: .15; .85 OINTMENT OPHTHALMIC at 00:39

## 2025-01-01 RX ADMIN — LEVETIRACETAM 500 MG: 100 INJECTION INTRAVENOUS at 03:07

## 2025-01-01 RX ADMIN — SODIUM CHLORIDE: 9 INJECTION, SOLUTION INTRAVENOUS at 06:08

## 2025-01-01 RX ADMIN — LORAZEPAM 0.5 MG: 2 INJECTION INTRAMUSCULAR; INTRAVENOUS at 14:49

## 2025-01-01 RX ADMIN — Medication 50 MCG/HR: at 12:13

## 2025-01-01 RX ADMIN — LEVETIRACETAM 500 MG: 100 INJECTION INTRAVENOUS at 16:07

## 2025-01-01 RX ADMIN — HYDRALAZINE HYDROCHLORIDE 5 MG: 20 INJECTION INTRAMUSCULAR; INTRAVENOUS at 17:03

## 2025-01-01 RX ADMIN — POLYVINYL ALCOHOL, POVIDONE 1 DROP: 14; 6 SOLUTION/ DROPS OPHTHALMIC at 04:58

## 2025-01-01 RX ADMIN — WHITE PETROLATUM: .15; .85 OINTMENT OPHTHALMIC at 09:23

## 2025-01-01 RX ADMIN — CHLORHEXIDINE GLUCONATE, 0.12% ORAL RINSE 15 ML: 1.2 SOLUTION DENTAL at 09:22

## 2025-01-01 RX ADMIN — LABETALOL HYDROCHLORIDE 5 MG: 5 INJECTION INTRAVENOUS at 17:18

## 2025-01-01 RX ADMIN — ETOMIDATE 20 MG: 2 INJECTION, SOLUTION INTRAVENOUS at 12:07

## 2025-01-01 RX ADMIN — SODIUM CHLORIDE: 9 INJECTION, SOLUTION INTRAVENOUS at 16:06

## 2025-01-01 ASSESSMENT — PULMONARY FUNCTION TESTS
PIF_VALUE: 18
PIF_VALUE: 18
PIF_VALUE: 20
PIF_VALUE: 17
PIF_VALUE: 20
PIF_VALUE: 17
PIF_VALUE: 16
PIF_VALUE: 17
PIF_VALUE: 21
PIF_VALUE: 28
PIF_VALUE: 17
PIF_VALUE: 17
PIF_VALUE: 18
PIF_VALUE: 0
PIF_VALUE: 19
PIF_VALUE: 20
PIF_VALUE: 18
PIF_VALUE: 17
PIF_VALUE: 22
PIF_VALUE: 19
PIF_VALUE: 17
PIF_VALUE: 21
PIF_VALUE: 18
PIF_VALUE: 19
PIF_VALUE: 22
PIF_VALUE: 18
PIF_VALUE: 16
PIF_VALUE: 17
PIF_VALUE: 17
PIF_VALUE: 19
PIF_VALUE: 17
PIF_VALUE: 25
PIF_VALUE: 20
PIF_VALUE: 19
PIF_VALUE: 10
PIF_VALUE: 17
PIF_VALUE: 19
PIF_VALUE: 20
PIF_VALUE: 23
PIF_VALUE: 18
PIF_VALUE: 19
PIF_VALUE: 18
PIF_VALUE: 23
PIF_VALUE: 17
PIF_VALUE: 18
PIF_VALUE: 17
PIF_VALUE: 21
PIF_VALUE: 17
PIF_VALUE: 18
PIF_VALUE: 17
PIF_VALUE: 16
PIF_VALUE: 21
PIF_VALUE: 21

## 2025-01-06 ENCOUNTER — TELEPHONE (OUTPATIENT)
Dept: PULMONOLOGY | Age: 89
End: 2025-01-06

## 2025-01-07 ENCOUNTER — OFFICE VISIT (OUTPATIENT)
Dept: PULMONOLOGY | Age: 89
End: 2025-01-07
Payer: COMMERCIAL

## 2025-01-07 VITALS
DIASTOLIC BLOOD PRESSURE: 61 MMHG | TEMPERATURE: 97.8 F | HEART RATE: 81 BPM | OXYGEN SATURATION: 96 % | RESPIRATION RATE: 12 BRPM | SYSTOLIC BLOOD PRESSURE: 115 MMHG

## 2025-01-07 DIAGNOSIS — R55 NEUROCARDIOGENIC SYNCOPE: Primary | ICD-10-CM

## 2025-01-07 DIAGNOSIS — I48.0 PAROXYSMAL ATRIAL FIBRILLATION (HCC): ICD-10-CM

## 2025-01-07 DIAGNOSIS — R09.02 HYPOXIA: ICD-10-CM

## 2025-01-07 DIAGNOSIS — Z87.898 HISTORY OF SYNCOPE: ICD-10-CM

## 2025-01-07 DIAGNOSIS — J95.89 POSTEXTUBATION STRIDOR: ICD-10-CM

## 2025-01-07 DIAGNOSIS — Z86.59 HISTORY OF DEMENTIA: ICD-10-CM

## 2025-01-07 PROCEDURE — 99214 OFFICE O/P EST MOD 30 MIN: CPT | Performed by: INTERNAL MEDICINE

## 2025-01-07 PROCEDURE — 1123F ACP DISCUSS/DSCN MKR DOCD: CPT | Performed by: INTERNAL MEDICINE

## 2025-01-07 PROCEDURE — 1159F MED LIST DOCD IN RCRD: CPT | Performed by: INTERNAL MEDICINE

## 2025-01-07 NOTE — PROGRESS NOTES
Authorizing Provider   sucralfate (CARAFATE) 1 GM tablet Take 1 tablet by mouth 4 times daily (before meals and nightly) 12/20/24  Yes Jennifer Braga MD   hydrALAZINE (APRESOLINE) 25 MG tablet Take 1 tablet by mouth every 8 hours 11/22/24  Yes Lisa Corral MD   losartan (COZAAR) 50 MG tablet Take 1 tablet by mouth daily 11/23/24  Yes Lisa Corral MD   dilTIAZem (CARDIZEM CD) 120 MG extended release capsule Take 1 capsule by mouth daily 11/23/24  Yes Lisa Corral MD   pantoprazole (PROTONIX) 40 MG tablet Take 1 tablet by mouth every morning (before breakfast) 11/22/24  Yes Lisa Corral MD       ALLERGIES:  Allergies   Allergen Reactions    Nifedipine      REVIEW OF SYSTEMS:  General ROS: Negative For: chills, fatigue, fever, malaise, night sweats or sleep disturbance   ENT ROS: Negative For: epistaxis, headaches, sinus pain, sneezing or sore throat   Respiratory ROS: Negative For: hemoptysis, pleuritic type chest pains  Cardiovascular ROS: Negative For: chest pain, dyspnea on exertion, irregular heartbeat, loss of consciousness, murmur, orthopnea or palpitations   Gastrointestinal ROS: Negative For: abdominal pain, appetite loss, blood in stools, change in bowel habits, change in stools, constipation, diarrhea, hematemesis, melena, nausea/vomiting or stool incontinence     OBJECTIVE:  PHYSICAL EXAMINATION:     VITAL SIGNS:  Vitals:    01/07/25 1438   BP: 115/61   Pulse: 81   Resp: 12   Temp: 97.8 °F (36.6 °C)   SpO2: 96%        General Appearance: alert and oriented to person, place and time, well-developed and   well-nourished, in no acute distress   Eyes: pupils equal, round, and reactive to light, extraocular eye movements intact, conjunctivae normal and sclera anicteric   Neck: neck supple and non tender without mass, no thyromegaly, no thyroid nodules and no cervical adenopathy   Pulmonary/Chest: decreased breath sounds at bases, no accessory muscles of inspiration noted  Cardiovascular: normal rate,

## 2025-02-07 PROBLEM — J96.20 ACUTE AND CHR RESP FAILURE, UNSP W HYPOXIA OR HYPERCAPNIA: Status: ACTIVE | Noted: 2025-02-07

## 2025-02-07 PROBLEM — Z71.89 GOALS OF CARE, COUNSELING/DISCUSSION: Status: ACTIVE | Noted: 2025-01-01

## 2025-02-07 PROBLEM — S06.5XAA SUBDURAL HEMATOMA: Status: ACTIVE | Noted: 2025-01-01

## 2025-02-07 NOTE — PROGRESS NOTES
The  had prayer with the patient's daughter in the waiting room. The patient's daughter was is very concerned with her mother physical health and is concerned with the next steps of her mother's health. The patient's daughter was leading back to the room to pray for the patient. However, the patient was not available at the time of visit and the family asked for the  to come back to have prayer with their mother. The  will follow as needed. If additional support is needed please reach out to Spiritual Health (ext [phone]).    Rev SARAN Garcia MDIV,

## 2025-02-07 NOTE — PROGRESS NOTES
Barrow catheter inserted using sterile technique per physician's order. Barrow Catheter Indication: End of Life Comfort Upon insertion, 150 mL of urine returned. Securing device applied. Barrow bag is hanging below the level of the bladder, safety clip attached to the bed sheet, tamper seal is intact, drainage bag is not on the floor, lack of dependent loop in tubing, and the drainage bag is less than half full.

## 2025-02-07 NOTE — ED PROVIDER NOTES
[facility]  EMERGENCY DEPARTMENT ENCOUNTER        Pt Name: Arianna Coleman  MRN: [id number]  Birthdate 12/25/1931  Date of evaluation: 2/7/2025  Provider: Alexis Bains MD  PCP: Soraya Riddle MD  Note Started: 12:29 PM EST 2/7/25    CHIEF COMPLAINT & HISTORY     Chief Complaint   Patient presents with    Cerebrovascular Accident     LKW 1115. Slumped over in chair while talking to son and slid out of seat. Pt was unresponsive for EMS and required assisted ventilations. Unresponsive on arrival to ED.         History From: pt  Arianna Coleman is a 89 y.o. female w/pmhx of paroxysmal afib presents with stroke. LKW 1115, per family leaned over in chair and became unresponsive. We were told she was FULL CODE on presentation by EMS. She was unresponsive and being bagged by EMS on arrival. She was extensor posturing. She never gave history due to condition. She has a history of HTN, CH, paroxysmal afib for which she was not on blood thinners s/p GI bleed on 12/18. Until then she was on eliquis.      Medical Decision Making/Differential Diagnosis/ED Course:    CC/HPI Summary, Social Determinants of health, Records Reviewed, DDx, testing done/not done, ED Course, Reassessment, disposition considerations/shared decision making with patient, consults, disposition:      Is this patient to be included in the SEP-1 core measure? No Exclusion criteria - the patient is NOT to be included for SEP-1 Core Measure due to: Infection is not suspected      ED Course as of 02/07/25 1522   Fri Feb 07, 2025   1302 Spoke to [facility]--devastating large hemispheric R subdural with 23mm midline shift, basilar cistern herniation, and obstructive hydrocephalus. [AO]      ED Course User Index  [AO] Alexis Bains MD      Medications   sodium chloride flush 0.9 % injection 5-40 mL (has no administration in time range)   sodium chloride flush 0.9 % injection 5-40 mL (has no administration in time range)   0.9 % sodium  Regular rhythm. thready extremity pulses.   GI:  Abdomen Soft, Non distended. No rebound, guarding, or rigidity.   Musculoskeletal: Flexor posture. Does not move. Flaccid. Warm and well perfused, no clubbing, no cyanosis, no edema. Capillary refill <3 seconds  Integument: skin warm and dry. No rashes.   Neurologic/Psych: Extensor posturing, not alert or oriented.           DIAGNOSTIC RESULTS   LABS:    Labs Reviewed   COMPREHENSIVE METABOLIC PANEL W/ REFLEX TO MG FOR LOW K - Abnormal; Notable for the following components:       Result Value    CO2 19 (*)     Glucose 161 (*)     All other components within normal limits   CBC WITH AUTO DIFFERENTIAL - Abnormal; Notable for the following components:    RDW 15.5 (*)     Neutrophils % 19 (*)     Lymphocytes % 66 (*)     Neutrophils Absolute 1.20 (*)     Lymphocytes Absolute 4.19 (*)     All other components within normal limits   TROPONIN - Abnormal; Notable for the following components:    Troponin, High Sensitivity 23 (*)     All other components within normal limits   MAGNESIUM   POCT GLUCOSE       As interpreted by me, the above displayed labs are abnormal. All other labs obtained during this visit were within normal range or not returned as of this dictation.    EKG in clinical impression    Radiology reads  Interpretation per the Radiologist below, if available at the time of this note:    XR ABDOMEN FOR NG/OG/NE TUBE PLACEMENT   Final Result   Satisfactory position of nasogastric tube.         CTA HEAD W CONTRAST   Final Result   1. Large heterogeneous right sided subdural hematoma with leftward midline   shift.  Please refer to report from CT head performed on the same day.   2. Diminutive blood flow is present within the intracranial segments of the   vertebral arteries as well as the basilar artery which may be related to   bolus timing or bilateral vertebral artery and basilar artery stenosis.   3. Anterior, middle, and posterior cerebral arteries appear to be

## 2025-02-07 NOTE — CONSULTS
Palliative Care Department  [phone]  Palliative Care Initial Consult  Provider Mary Terrazas, FRANCES - JERICHO     Arianna Coleman  [id number]  Hospital Day: 1  Date of Initial Consult: 2/7/25  Referring Provider: Florencia Ramirez APRN - CNS   Palliative Medicine was consulted for assistance with: Goals of care    HPI:   Arianna Coleman is a 89 y.o. with a medical history of atrial fibrillation, CAD, blood clots, ischemic colitis, HTN, CHF, nonischemic cardiomyopathy LVEF 53% dementia who was admitted on 2/7/2025 from home with a CHIEF COMPLAINT of unresponsive after fall.  Patient fell off a chair while talking to her son, upon EMS arrival was nonresponsive and required assisted ventilation.  She was then intubated in ER.  In ER, patient was hypothermic and hypertensive.  CT head shows right large subdural hematoma with considerable mass effect and midline shift, transtentorial cerebral herniation with early obstructive hydrocephalus.  Neurosurgery consulted, no plans for surgical intervention, recommending comfort care.  Palliative medicine consulted for further assistance.    ASSESSMENT/PLAN:     Pertinent Hospital Diagnoses     Large right subdural hematoma, midline shift, herniation, early obstructive hydrocephalus  History of dementia      Palliative Care Encounter / Counseling Regarding Goals of Care  Please see detailed goals of care discussion as below  At this time, Arianna Coleman, Does Not have capacity for medical decision-making.  Capacity is time limited and situation/question specific  During encounter Dennise Durham was surrogate medical decision-maker  Outcome of goals of care meeting:   No escalation of care  Plan for withdraw of care tomorrow after family arrives  Continue limited CODE STATUS (DNR/DNI)  Code status Limited DNR/DNI  Advanced Directives: no POA or living will [facility]  Surrogate/Legal NOK:  Truman Durham (child) [phone]  Inés Wong (child) [phone]      Spiritual  IDT members: Palliative Medicine IDT Team, Floor Nurse, and Family    Time/Communication  Greater than 50% of time spent, total 55 minutes in counseling and coordination of care at the bedside regarding goals of care, diagnosis and prognosis, and see above.    Thank you for allowing Palliative Medicine to participate in the care of Arianna Coleman.

## 2025-02-07 NOTE — H&P
Hospitalist History & Physical      PCP: Soraya Riddle MD    Date of Admission: 2/7/2025    Date of Service: Pt seen/examined on 2/7/2025 and is admitted to Inpatient with expected LOS greater than two midnights due to medical therapy.      Chief Complaint:  unresponsive after falling     History Of Present Illness:    Ms. Arianna Coleman, a 89 y.o. year old female  who  has a past medical history of Arthritis, Atrial fibrillation (HCC), Chronic combined systolic (congestive) and diastolic (congestive) heart failure (HCC), Coronary artery disease involving native coronary artery of native heart without angina pectoris, Hx of blood clots, Hypertension, Ischemic colitis (HCC), and PAF (paroxysmal atrial fibrillation) ([facility]).     Patient presented to the ER via EMS from home after she fell off a chair while talking to her son and slid out of the seat.  Upon EMS arrival patient was nonresponsive and required assisted ventilation.  She was subsequently intubated in the ER.  Patient was previously on Eliquis for atrial fibrillation however was discontinued on prior last admission on December 2024 after she came in with GI bleed.    On ER presentation she was hypothermic, hypertensive.   CT head Right holohemispheric large subdural hematoma with considerable mass effect and midline shift associated with transtentorial cerebral herniation together with early obstructive hydrocephalus.   Neurosurgery was consulted from the ER.  Patient was be admitted to [facility].  [facility] was called for admission            Past Medical History:   Diagnosis Date    Arthritis     Atrial fibrillation (HCC)     Chronic combined systolic (congestive) and diastolic (congestive) heart failure (HCC) 11/7/2021    Coronary artery disease involving native coronary artery of native heart without angina pectoris     Hx of blood clots     Hypertension     Ischemic colitis (HCC)     PAF (paroxysmal atrial fibrillation) (HCC)         Past Surgical History:   Procedure Laterality Date    ANKLE FRACTURE SURGERY      CARDIAC CATHETERIZATION  11/11/2019    Dr. Mares    COLONOSCOPY      ENDOSCOPY, COLON, DIAGNOSTIC      FRACTURE SURGERY      INSERTABLE CARDIAC MONITOR  07/05/2023    Dr. Garcia       Prior to Admission medications    Medication Sig Start Date End Date Taking? Authorizing Provider   sucralfate (CARAFATE) 1 GM tablet Take 1 tablet by mouth 4 times daily (before meals and nightly) 12/20/24   Jennifer Braga MD   hydrALAZINE (APRESOLINE) 25 MG tablet Take 1 tablet by mouth every 8 hours 11/22/24   Lisa Corral MD   losartan (COZAAR) 50 MG tablet Take 1 tablet by mouth daily 11/23/24   Lisa Corral MD   dilTIAZem (CARDIZEM CD) 120 MG extended release capsule Take 1 capsule by mouth daily 11/23/24   Lisa Corral MD   pantoprazole (PROTONIX) 40 MG tablet Take 1 tablet by mouth every morning (before breakfast) 11/22/24   Lisa Corral MD         Allergies:  Nifedipine    Social History:    TOBACCO:   reports that she has never smoked. She has never used smokeless tobacco.  ETOH:   reports no history of alcohol use.    Family History:  Reviewed in detail and negative for DM, CAD, Cancer, CVA. Positive as follows\"  No family history on file.    REVIEW OF SYSTEMS:  Pertinent positives as noted in the HPI. All other systems reviewed and negative.        PHYSICAL EXAM:  BP (!) 160/94   Pulse (!) 164   Resp 18   Wt 57 kg (125 lb 10.6 oz)   SpO2 95%   BMI 20.91 kg/m²   General appearance: Intubated, sedated, not responding  Neck:  No jugular venous distention. Trachea midline.  Respiratory: Orally intubated.  CTAB   Cardiovascular: RRR, no M/G/R  Abdomen: ND, soft, NT, no visceromegaly    Musculoskeletal: No clubbing, cyanosis, edema of bilateral lower extremities. Brisk capillary refill.   Skin: Normal skin color.  No rashes or lesions.  Neurologic: Asymmetrical pupil size, right pupil is dilated and fixed.  Left pupil is  mass effect and midline shift with transtentorial cerebral herniation and early obstructive hydrocephalus: Patient is intubated and admitted into neuro ICU.  Neurosurgery on board.  No plans for surgical intervention due to severity of condition.  Comfort care recommended.  Palliative care was consulted        Diet: No diet orders on file  Code Status: Prior  Surrogate decision maker confirmed with patient:   Extended Emergency Contact Information  Primary Emergency Contact: nancy menezes  Home Phone: [phone]  Mobile Phone: [phone]  Relation: Child   needed? No  Secondary Emergency Contact: Inés Wong  Home Phone: [phone]  Mobile Phone: [phone]  Relation: Child   needed? No    DVT Prophylaxis: []Lovenox []Heparin [x]PCD [] Warfarin/NOAC []Encouraged ambulation  Disposition: []Med/Surg [] Intermediate [x] ICU/CCU  Admit status: [] Observation [x] Inpatient     +++++++++++++++++++++++++++++++++++++++++++++++++  Maria Marino Milanes, MD  [facility]  [place], [place]  +++++++++++++++++++++++++++++++++++++++++++++++++  NOTE: This report was transcribed using voice recognition software. Every effort was made to ensure accuracy; however, inadvertent computerized transcription errors may be present.

## 2025-02-07 NOTE — PROGRESS NOTES
Patient admitted to NSICU with the following belongings:  Dentures (Upper) and Socks and lower dentures. The following belongings admitted with the patient, None, were sent home with the patient's family.     4 Eyes Skin Assessment     NAME:  Arianna Coleman  YOB: 1931  MEDICAL RECORD NUMBER:  [id number]    The patient is being assessed for  Admission    I agree that at least one RN has performed a thorough Head to Toe Skin Assessment on the patient. ALL assessment sites listed below have been assessed.      Areas assessed by both nurses:    Head, Face, Ears, Shoulders, Back, Chest, Arms, Elbows, Hands, Sacrum. Buttock, Coccyx, Ischium, Legs. Feet and Heels, and Under Medical Devices         Does the Patient have a Wound? No noted wound(s)       Bautista Prevention initiated by RN: Yes  Wound Care Orders initiated by RN: No    Pressure Injury (Stage 3,4, Unstageable, DTI, NWPT, and Complex wounds) if present, place Wound referral order by RN under : No    New Ostomies, if present place, Ostomy referral order under : No     Nurse 1 eSignature: Electronically signed by BUCKY RAMESH RN on 2/7/25 at 3:26 PM EST    **SHARE this note so that the co-signing nurse can place an eSignature**    Nurse 2 eSignature: Electronically signed by Ean Carlin RN on 2/7/25 at 5:18 PM EST

## 2025-02-07 NOTE — CONSULTS
NEUROSURGERY        Chief Complaint   Patient presents with    Cerebrovascular Accident     LKW 1115. Slumped over in chair while talking to son and slid out of seat. Pt was unresponsive for EMS and required assisted ventilations. Unresponsive on arrival to ED.         History of Present Illness:   Arianna Coleman is a 89 y.o. female w/pmhx of paroxysmal afib presents with stroke. LKW 1115, per family leaned over in chair and became unresponsive. We were told she was FULL CODE on presentation by EMS. She was unresponsive and being bagged by EMS on arrival. She was extensor posturing. She never gave history due to condition. She has a history of HTN, CH, paroxysmal afib for which she was not on blood thinners s/p GI bleed on 12/18. Until then she was on eliquis.     Past Medical History:   Diagnosis Date    Arthritis     Atrial fibrillation (HCC)     Chronic combined systolic (congestive) and diastolic (congestive) heart failure (HCC) 11/7/2021    Coronary artery disease involving native coronary artery of native heart without angina pectoris     Hx of blood clots     Hypertension     Ischemic colitis (HCC)     PAF (paroxysmal atrial fibrillation) (HCC)      Past Surgical History:   Procedure Laterality Date    ANKLE FRACTURE SURGERY      CARDIAC CATHETERIZATION  11/11/2019    Dr. Mares    COLONOSCOPY      ENDOSCOPY, COLON, DIAGNOSTIC      FRACTURE SURGERY      INSERTABLE CARDIAC MONITOR  07/05/2023    Dr. Garcia     No outpatient medications have been marked as taking for the 2/7/25 encounter (Hospital Encounter).     Social History     Tobacco Use    Smoking status: Never    Smokeless tobacco: Never   Substance Use Topics    Alcohol use: No     No family history on file.    Review of Systems    Examination:    BP Readings from Last 3 Encounters:   02/07/25 (!) 163/92   01/07/25 115/61   12/20/24 133/74     Wt Readings from Last 3 Encounters:   02/07/25 57 kg (125 lb 10.6 oz)   12/19/24 57 kg (125 lb 10.6 oz)

## 2025-02-07 NOTE — ED NOTES
Name: Arianna Coleman  : 1931  MRN: [id number]    Date: 2025    Benefits of immediately proceeding with Radiology exam outweigh the risks and therefore the following is being waived:      [x] Pregnancy test    [x] Protocol for Iodine allergy    [x] MRI questionnaire    [x] BUN/Creatinine        Alexis Bains MD

## 2025-02-07 NOTE — PROGRESS NOTES
02/07/25 1233   Patient Observation   Pulse 76   Respirations 26   SpO2 100 %   Vent Information   Ventilator Day(s) 1   Ventilator ID [id number]   Ventilator Safety Check Performed Pre-Use Yes   Ventilator Initiate Yes   Vent Mode AC/VC   $Ventilation $Initial Day   Ventilator Settings   FiO2  100 %   Insp Time (sec) 0.8 sec   Vt (Set, mL) 350 mL   Resp Rate (Set) 20 bpm   PEEP/CPAP (cmH2O) 5   Vent Patient Data (Readings)   Peak Inspiratory Pressure (cmH2O) 10 cmH2O   Rate Measured 20 br/min   Minute Volume (L/min) 7 Liters   Peak Inspiratory Flow (lpm) 35 L/sec   Mean Airway Pressure (cmH2O) 5 cmH20   Inspiratory Time 0.8 sec   I:E Ratio 1:2.8   Vent Alarm Settings   Low Pressure (cmH2O) 9 cmH2O   High Pressure (cmH2O) 45 cmH2O   Low Minute Volume (lpm) 5 L/min   High Minute Volume (lpm) 18 L/min   RR High (bpm) 35 br/min   Apnea (secs) 20 secs   Additional Respiratoray Assessments   Humidification Source HME   Circuit Condensation Not drained   Ambu Bag With Mask At Bedside Yes   Non-Surgical Airway 02/07/25 Endotracheal tube   Placement Date/Time: 02/07/25 1208   Present on Admission/Arrival: No  Placed By: In ED  Placement Verified By: Auscultation;Capnometry  Mask Ventilation: Ventilated by mask (1)  Insertion attempts: 1  Location: Oral  Airway Device: Endotracheal tube ...   $ Intubation Emergent  $ Yes   Secured At 23 cm   Measured From Lips   Secured Location Center   Secured By Commercial tube mahmood

## 2025-02-07 NOTE — MANAGEMENT PLAN
Delay to CT due to patient needing intubated prior to scans.     Sue Storey, RN, BSN, Stroke Navigator

## 2025-02-07 NOTE — PROGRESS NOTES
[facility]  Critical Care  Critical Care Consult Note  2/7/2025    Date of Admission: 02/07/2025    CC: Follow up for SDH    HOSPITAL COURSE/OVERNIGHT EVENTS:  89 yo woman presented to ED with stroke like symptoms.  PMH Includes: Afib, dementia, CHF, CAD, HTN, Afib & arthirits.  Previously on Eliquis but stopped recently due to melena.  Slumped over in chair & became unresponsive.  At time of arrival, son reported she was a full code.  Intubated prior to CT scans.  HCT showed large right SDH with midline shift.  Neurosurgery consulted & recommended no surgical intervention.  Admitted to Neuro ICU.  Met with her 2 daughters.  Discussed code status.  Code status changed to Limited code: no reintubation, no CPR, No Defib & no resuscitative meds.  Reviewed CT finding with both daughters.        PMH:   Past Medical History:   Diagnosis Date    Arthritis     Atrial fibrillation (HCC)     Chronic combined systolic (congestive) and diastolic (congestive) heart failure (HCC) 11/7/2021    Coronary artery disease involving native coronary artery of native heart without angina pectoris     Hx of blood clots     Hypertension     Ischemic colitis (HCC)     PAF (paroxysmal atrial fibrillation) (HCC)      PSH:   Past Surgical History:   Procedure Laterality Date    ANKLE FRACTURE SURGERY      CARDIAC CATHETERIZATION  11/11/2019    Dr. Mares    COLONOSCOPY      ENDOSCOPY, COLON, DIAGNOSTIC      FRACTURE SURGERY      INSERTABLE CARDIAC MONITOR  07/05/2023    Dr. Garcia     Home Medications:   Prior to Admission medications    Medication Sig Start Date End Date Taking? Authorizing Provider   sucralfate (CARAFATE) 1 GM tablet Take 1 tablet by mouth 4 times daily (before meals and nightly) 12/20/24   Jennifer Braga MD   hydrALAZINE (APRESOLINE) 25 MG tablet Take 1 tablet by mouth every 8 hours 11/22/24   Lisa Corral MD   losartan (COZAAR) 50 MG tablet Take 1 tablet by mouth daily 11/23/24   Ellis   SpO2 100%   BMI 20.91 kg/m²     General appearance:  Comfortable.     NEUROLOGIC:    RASS:  -4  GCS:  3  1 - Does not open eyes   1 - No motor response to pain  1 - Makes no noise       Pupil size:  Left 3 mm    Right 7 mm  Pupil reaction: No     Wiggles fingers: Left No Right No  Hand grasp:   Left none    Right none  Wiggles toes: Left No    Right No  Plantar flexion: Left none   Right none  No corneal reflex.   No cough reflex.   No gag reflex.    No response to deep pain.        CONSTITUTIONAL: No acute distress, lying in hospital bed.    CARDIOVASCULAR: Monitor: Irregular rhythm. .  S1 S2.  Irregular rate, irregular rhythm.  No murmur/gallop/rub.  PULMONARY: Bilateral clear.   No rhonchi/rales/wheezes, no use of accessory muscles.  Intubated.  Ventilator:  Vt 350 ml.  FiO2 100%.  AC mode.  Rate 20 bpm.  Peep 5 cm.    RENAL:   Indwelling urinary catheter   ABDOMEN: Soft, nontender, nondistended, nontympanic, normal bowel sounds.   Diet:  NPO.  OGT  Clamped.  Last BM:  PTA.    MUSCULOSKELETAL:  No response to deep pain.  SKIN/EXTREMITIES: No rashes/ecchymosis, no edema/clubbing, warm/dry, good capillary refill.   LINES:   Peripheral      LABS:    Recent Labs     02/07/25  1214   WBC 6.4   HGB 12.3   HCT 37.6   MCV 90.2          Recent Labs     02/07/25  1214      K 3.5   CO2 19*   BUN 8   CREATININE 0.8     Diagnostic imaging:  Reviewed.       ASSESSMENT & PLAN       Principal Problem:    Subdural hematoma  Active Problems:    Acute and chr resp failure, unsp w hypoxia or hypercapnia  Resolved Problems:    * No resolved hospital problems. *     Neuro:  Large Right SDH with midline shift.  Hx of dementia.    Monitor neuro status.    Neurosurgery following.  Keppra  HT in am.  CV:  Hypertensive at times.  Hx of HTN, CAD, and CHF   BP goal systolic less than 150 mm Hg.   PRN Hydralazine & labetalol.      Monitor hemodynamics.    PRN hydralzine & labetalol.    Pulm: Acute respiratory

## 2025-02-07 NOTE — ED PROVIDER NOTES
ATTENDING PROVIDER ATTESTATION:     Arianna Coleman presented to the emergency department for evaluation of Cerebrovascular Accident (LKW 1115. Slumped over in chair while talking to son and slid out of seat. Pt was unresponsive for EMS and required assisted ventilations. Unresponsive on arrival to ED.)   and was initially evaluated by the Medical Resident. See Original ED Note for H&P and ED course above.     I have reviewed and discussed the case, including pertinent history (medical, surgical, family and social) and exam findings with the Medical Resident assigned to Arianna PAVON Jared.  I have personally performed and/or participated in the history, exam, medical decision making, and procedures and agree with all pertinent clinical information and any additional changes or corrections are noted below in my assessment and plan. I have discussed this patient in detail with the resident, and provided the instruction and education,       I have reviewed my findings and recommendations with the assigned Medical Resident, Arianna Coleman and members of family present at the time of disposition.      I have performed a history and physical examination of this patient and directly supervised the resident caring for this patient              [address] ICU-N  EMERGENCY DEPARTMENT ENCOUNTER        Pt Name: Arianna Coleman  MRN: [id number]  Birthdate 12/25/1931  Date of evaluation: 2/7/2025  Provider: Haile Green MD  PCP: Soraya Riddle MD  Note Started: 12:44 PM EST 2/7/25    CHIEF COMPLAINT       Chief Complaint   Patient presents with    Cerebrovascular Accident     LKW 1115. Slumped over in chair while talking to son and slid out of seat. Pt was unresponsive for EMS and required assisted ventilations. Unresponsive on arrival to ED.       HISTORY OF PRESENT ILLNESS: 1 or more Elements       Arianna Coleman is a 89 y.o. female who presents for stroke like symptoms. EMS reports she was sitting in the chair and slumped  components within normal limits   CBC WITH AUTO DIFFERENTIAL - Abnormal; Notable for the following components:    RDW 15.5 (*)     Neutrophils % 19 (*)     Lymphocytes % 66 (*)     Neutrophils Absolute 1.20 (*)     Lymphocytes Absolute 4.19 (*)     All other components within normal limits   TROPONIN - Abnormal; Notable for the following components:    Troponin, High Sensitivity 23 (*)     All other components within normal limits   BLOOD GAS, ARTERIAL - Abnormal; Notable for the following components:    pH, Blood Gas 7.523 (*)     PCO2 24.1 (*)     PO2 467.5 (*)     HCO3 19.4 (*)     O2 Sat 99.8 (*)     O2Hb 99.1 (*)     All other components within normal limits   BLOOD GAS, ARTERIAL - Abnormal; Notable for the following components:    PCO2 31.3 (*)     PO2 174.0 (*)     HCO3 19.6 (*)     B.E. -4.1 (*)     O2 Sat 99.1 (*)     O2Hb 98.4 (*)     tHb (est) 11.4 (*)     All other components within normal limits   MAGNESIUM   BASIC METABOLIC PANEL   CBC   HEMOGLOBIN A1C   LIPID PANEL   BLOOD GAS, ARTERIAL   POCT GLUCOSE       The above labs are interpreted by me. The above displayed labs are abnormal. All other labs obtained during this visit were within normal range or not returned as of this dictation.    RADIOLOGY:   Unless a wet read is documented in MDM or ED course,  non-plain film images such as CT, Ultrasound and MRI are read by the radiologist. Plain radiographic images are visualized and preliminarily interpreted by the ED Provider with the findings documented in the ED course:    Interpretation per the Radiologist below, if available at the time of this note:    XR CHEST PORTABLE   Final Result   1. Satisfactory position of NG tube.   2. Endotracheal tube is approximately 3 cm above the melodie.         XR ABDOMEN FOR NG/OG/NE TUBE PLACEMENT   Final Result   Satisfactory position of nasogastric tube.         CTA HEAD W CONTRAST   Final Result   1. Large heterogeneous right sided subdural hematoma with leftward  to Old EKG Changes from prior EKG        Risk  Prescription drug management.  Drug therapy requiring intensive monitoring for toxicity.  Decision regarding hospitalization.                  CONSULTS:   IP CONSULT TO NEUROSURGERY  IP CONSULT TO NEUROSURGERY  IP CONSULT TO PALLIATIVE CARE            PROCEDURES   Haile MATOS MD, personally supervised the procedure throughout the course, including all critical and key portions of the procedure and was immediately available to manage complications should they arise, non did.   The procedure was intubation.        CRITICAL CARE TIME (.cct)   CRITICAL CARE:  Please note that the withdrawal or failure to initiate urgent interventions for this patient would likely result in a life threatening deterioration or permanent disability.      Accordingly this patient received 30 minutes of critical care time, including coordination of care, and direct bedside care and excluding separately billable procedures.        Haile MATOS MD, am the primary provider of record      FINAL IMPRESSION      1. Subdural hemorrhage (HCC)          DISPOSITION/PLAN     DISPOSITION Admitted 02/07/2025 12:37:10 PM      PATIENT REFERRED TO:  No follow-up provider specified.    DISCHARGE MEDICATIONS:  Current Discharge Medication List          DISCONTINUED MEDICATIONS:  Current Discharge Medication List                 (Please note that portions of this note were completed with a voice recognition program.  Efforts were made to edit the dictations but occasionally words are mis-transcribed.)    Haile Green MD (electronically signed)            Haile Green MD  02/07/25 4900

## 2025-02-08 NOTE — FLOWSHEET NOTE
02/07/25 1946   Treatment Team Notification   Reason for Communication Review case  (need for am CT d/t family plan to withdrawl care tomorrow)   Name of Team Member Notified Ramos   Treatment Team Role Consulting Provider   Method of Communication Secure Message   Response See orders  (cancel morning CT scan)   Notification Time 1940

## 2025-02-08 NOTE — PROGRESS NOTES
[facility]  Critical Care  Daily Progress Note 2/8/2025    Date of Admission: 02/07/2025     CC: Follow up for SDH     HOSPITAL COURSE/OVERNIGHT EVENTS:  89 yo woman presented to ED with stroke like symptoms.  PMH Includes: Afib, dementia, CHF, CAD, HTN, Afib & arthirits.  Previously on Eliquis but stopped recently due to melena.  Slumped over in chair & became unresponsive.  At time of arrival, son reported she was a full code.  Intubated prior to CT scans.  HCT showed large right SDH with midline shift.  Neurosurgery consulted & recommended no surgical intervention.  Admitted to Neuro ICU.  Met with her 2 daughters.  Discussed code status.  Code status changed to Limited code: no reintubation, no CPR, No Defib & no resuscitative meds.  Reviewed CT finding with both daughters.   2/8  No issues overnight.  Per nursing staff, family is planning on compassionate withdraw later today [place]      PHYSICAL EXAM:   /72   Pulse 75   Temp 98.8 °F (37.1 °C) (Bladder)   Resp 10   Wt 57 kg (125 lb 10.6 oz)   SpO2 100%   BMI 20.91 kg/m²     Intake/Output Summary (Last 24 hours) at 2/8/2025 0745  Last data filed at 2/8/2025 0700  Gross per 24 hour   Intake 1043.8 ml   Output 2920 ml   Net -1876.2 ml      General appearance:  Comfortable.     NEUROLOGIC:   RASS Score:  -4  GCS:  6  1 - Does not open eyes   4 - Moves part of body but does not remove noxious stimulus  1 - Makes no noise       Pupil size:  Left 8 mm  Right 8 mm  Pupil reaction: No    Wiggles fingers: Left   No  Right No  Hand grasp:   Left: none.   Right  none  Wiggles toes: Left   No Right   No   Plantar flexion:  Left  none Right   none  No corneal reflex.    No gag reflex.   Very weak cough reflex.      CONSTITUTIONAL: No acute distress, lying in hospital bed.    CARDIOVASCULAR: Monitor: irregular rhythm.  S1 S2.  Irregular rate, irregular rhythm.  No murmur/gallop/rub.  PULMONARY: Bilateral clear

## 2025-02-08 NOTE — PLAN OF CARE
Problem: Chronic Conditions and Co-morbidities  Goal: Patient's chronic conditions and co-morbidity symptoms are monitored and maintained or improved  2/7/2025 2011 by Sam Hilliard RN  Outcome: Progressing  Flowsheets (Taken 2/7/2025 2011)  Care Plan - Patient's Chronic Conditions and Co-Morbidity Symptoms are Monitored and Maintained or Improved:   Monitor and assess patient's chronic conditions and comorbid symptoms for stability, deterioration, or improvement   Collaborate with multidisciplinary team to address chronic and comorbid conditions and prevent exacerbation or deterioration   Update acute care plan with appropriate goals if chronic or comorbid symptoms are exacerbated and prevent overall improvement and discharge  2/7/2025 1355 by Ean Carlin RN  Outcome: Progressing     Problem: Discharge Planning  Goal: Discharge to home or other facility with appropriate resources  2/7/2025 2011 by Sam Hilliard RN  Outcome: Progressing  Flowsheets (Taken 2/7/2025 2011)  Discharge to home or other facility with appropriate resources: Identify barriers to discharge with patient and caregiver  2/7/2025 1355 by Ean Carlin RN  Outcome: Progressing     Problem: Neurosensory - Adult  Goal: Achieves stable or improved neurological status  2/7/2025 2011 by Sam Hilliard RN  Outcome: Progressing  Flowsheets (Taken 2/7/2025 2011)  Achieves stable or improved neurological status:   Assess for and report changes in neurological status   Monitor temperature, glucose, and sodium. Initiate appropriate interventions as ordered  2/7/2025 1355 by Ean Carlin RN  Outcome: Progressing  Goal: Absence of seizures  2/7/2025 2011 by Sam Hilliard RN  Outcome: Progressing  Flowsheets (Taken 2/7/2025 2011)  Absence of seizures: Monitor for seizure activity.  If seizure occurs, document type and location of movements and any associated apnea  2/7/2025 1355 by Ean Carlin RN  Outcome: Progressing  Goal: Achieves maximal

## 2025-02-08 NOTE — PROGRESS NOTES
Central supply called for isolation cart. Central supply notified RN that there are no isolation carts available.  Isolation sign placed outside door along with gloves and gowns.

## 2025-02-08 NOTE — PLAN OF CARE
Problem: Chronic Conditions and Co-morbidities  Goal: Patient's chronic conditions and co-morbidity symptoms are monitored and maintained or improved  Outcome: Adequate for Discharge     Problem: Discharge Planning  Goal: Discharge to home or other facility with appropriate resources  Outcome: Adequate for Discharge     Problem: Neurosensory - Adult  Goal: Achieves stable or improved neurological status  2/8/2025 1555 by Ammy Hardy RN  Outcome: Adequate for Discharge  2/8/2025 0808 by Ammy Hardy RN  Outcome: Not Progressing  Flowsheets (Taken 2/7/2025 2011 by Sam Hilliard RN)  Achieves stable or improved neurological status:   Assess for and report changes in neurological status   Monitor temperature, glucose, and sodium. Initiate appropriate interventions as ordered  Goal: Absence of seizures  2/8/2025 1555 by Ammy Hardy RN  Outcome: Adequate for Discharge  2/8/2025 0808 by Ammy Hardy RN  Outcome: Progressing  Goal: Achieves maximal functionality and self care  2/8/2025 1555 by Ammy Hardy RN  Outcome: Adequate for Discharge  2/8/2025 0808 by Ammy Hardy RN  Outcome: Not Progressing  Flowsheets (Taken 2/7/2025 2011 by Sam Hilliard RN)  Achieves maximal functionality and self care: Monitor swallowing and airway patency with patient fatigue and changes in neurological status     Problem: Respiratory - Adult  Goal: Achieves optimal ventilation and oxygenation  2/8/2025 1555 by Ammy Hardy RN  Outcome: Adequate for Discharge  2/8/2025 0808 by Ammy Hardy RN  Outcome: Not Progressing  Flowsheets (Taken 2/7/2025 2011 by Sam Hilliard, RN)  Achieves optimal ventilation and oxygenation:   Assess for changes in respiratory status   Assess for changes in mentation and behavior     Problem: Skin/Tissue Integrity  Goal: Skin integrity remains intact  Description: 1.  Monitor for areas of redness and/or skin breakdown  2.  Assess vascular access sites hourly  3.  Every 4-6 hours minimum:

## 2025-02-08 NOTE — PLAN OF CARE
Problem: Neurosensory - Adult  Goal: Absence of seizures  2/8/2025 0808 by Ammy Hardy RN  Outcome: Progressing  2/7/2025 2011 by Sam Hilliard RN  Outcome: Progressing  Flowsheets (Taken 2/7/2025 2011)  Absence of seizures: Monitor for seizure activity.  If seizure occurs, document type and location of movements and any associated apnea     Problem: Safety - Adult  Goal: Free from fall injury  2/8/2025 0808 by Ammy Hardy RN  Outcome: Progressing  2/7/2025 2011 by Sam Hilliard RN  Outcome: Progressing  Flowsheets (Taken 2/7/2025 2011)  Free From Fall Injury: Instruct family/caregiver on patient safety     Problem: Neurosensory - Adult  Goal: Achieves stable or improved neurological status  2/8/2025 0808 by Ammy Hardy RN  Outcome: Not Progressing  Flowsheets (Taken 2/7/2025 2011 by Sam Hilliard RN)  Achieves stable or improved neurological status:   Assess for and report changes in neurological status   Monitor temperature, glucose, and sodium. Initiate appropriate interventions as ordered  2/7/2025 2011 by Sam Hilliard RN  Outcome: Progressing  Flowsheets (Taken 2/7/2025 2011)  Achieves stable or improved neurological status:   Assess for and report changes in neurological status   Monitor temperature, glucose, and sodium. Initiate appropriate interventions as ordered  Goal: Achieves maximal functionality and self care  2/8/2025 0808 by Ammy Hardy RN  Outcome: Not Progressing  Flowsheets (Taken 2/7/2025 2011 by Sam Hilliard RN)  Achieves maximal functionality and self care: Monitor swallowing and airway patency with patient fatigue and changes in neurological status  2/7/2025 2011 by Sam Hilliard RN  Outcome: Progressing  Flowsheets (Taken 2/7/2025 2011)  Achieves maximal functionality and self care: Monitor swallowing and airway patency with patient fatigue and changes in neurological status     Problem: Respiratory - Adult  Goal: Achieves optimal ventilation and oxygenation  2/8/2025

## 2025-02-08 NOTE — PROGRESS NOTES
HOSPITALIST PROGRESS NOTE    Patient's name: Arianna Coleman  : 1931  Admission date: 2025  Date of service: 2025   Primary care physician: Soraya Riddle MD    Assessment   Patient admitted on 2025     Ms. Arianna Coleman, a 89 y.o. year old female  who  has a past medical history of Arthritis, Atrial fibrillation ([facility]), Chronic combined systolic (congestive) and diastolic (congestive) heart failure ([facility]), Coronary artery disease involving native coronary artery of native heart without angina pectoris, Hx of blood clots, Hypertension, Ischemic colitis (HCC), and PAF (paroxysmal atrial fibrillation) ([facility]).      Patient presented to the ER via EMS from home after she fell off a chair while talking to her son and slid out of the seat.  Upon EMS arrival patient was nonresponsive and required assisted ventilation.  She was subsequently intubated in the ER.  Patient was previously on Eliquis for atrial fibrillation however was discontinued on prior last admission on 2024 after she came in with GI bleed.     On ER presentation she was hypothermic, hypertensive.   CT head Right holohemispheric large subdural hematoma with considerable mass effect and midline shift associated with transtentorial cerebral herniation together with early obstructive hydrocephalus.   Neurosurgery was consulted from the ER.  Patient was be admitted to NICU.  [facility] was called for admission.  While in the ICU neurosurgeon met with the patient's daughter and recommended no surgical intervention, goals of care were addressed, patient CODE STATUS was changed to limited code with no intubation, CPR or defibrillation/resuscitative medications.  Per documentation plan is for compassionate extubation later today [place].    Traumatic large right subdural hematoma with midline shift  History of dementia  Acute respiratory failure s/p intubation   and CT findings were telephoned by Dr. Adler to   the ER referring service, Dr. Rory Bains at 1258 hours, Eastern standard time.         XR CHEST PORTABLE    (Results Pending)   XR CHEST PORTABLE    (Results Pending)       Hospital Medications  Current Facility-Administered Medications   Medication Dose Route Frequency Provider Last Rate Last Admin    sodium chloride flush 0.9 % injection 5-40 mL  5-40 mL IntraVENous 2 times per day Milanes Marino, Maria, MD   10 mL at 02/08/25 0923    sodium chloride flush 0.9 % injection 5-40 mL  5-40 mL IntraVENous PRN Milanes Marino, Maria, MD        0.9 % sodium chloride infusion   IntraVENous PRN Milanes Marino, Maria, MD        acetaminophen (TYLENOL) tablet 650 mg  650 mg Oral Q6H PRN Milanes Marino, Maria, MD   650 mg at 02/08/25 0038    Or    acetaminophen (TYLENOL) suppository 650 mg  650 mg Rectal Q6H PRN Milanes Marino, Maria, MD        ondansetron (ZOFRAN-ODT) disintegrating tablet 4 mg  4 mg Oral Q8H PRN Milanes Marino, Maria, MD        Or    ondansetron (ZOFRAN) injection 4 mg  4 mg IntraVENous Q6H PRN Milanes Marino, Maria, MD        chlorhexidine (PERIDEX) 0.12 % solution 15 mL  15 mL Mouth/Throat BID Florencia Ramirez APRN - CNS   15 mL at 02/08/25 0922    famotidine (PEPCID) 20 mg in sodium chloride (PF) 0.9 % 10 mL injection  20 mg IntraVENous Daily Florencia Ramirez APRN - CNS   20 mg at 02/08/25 0922    fentaNYL (SUBLIMAZE) injection 50 mcg  50 mcg IntraVENous Q30 Min PRN Florencia Ramirez APRN - CNS        Polyvinyl Alcohol-Povidone PF (REFRESH) 1.4-0.6 % ophthalmic solution 1 drop  1 drop Both Eyes Q4H Florencia Ramirez APRN - CNS   1 drop at 02/08/25 0458    Or    lubrifresh P.M. (artificial tears) ophthalmic ointment   Both Eyes Q4H Florencia Ramirez APRN - CNS   Given at 02/08/25 0923    labetalol (NORMODYNE;TRANDATE) injection 5 mg  5 mg IntraVENous Q10 Min PRN Florencia Ramirez APRN - CNS   5 mg at 02/07/25 1718    hydrALAZINE (APRESOLINE) injection 5 mg  5 mg

## 2025-02-08 NOTE — PROGRESS NOTES
NEUROSURGERY SOAP    Subjective: I was informed that plans are made for withdrawal of treatment in the afternoon once the family was able to see the patient    Objective: Patient extends GCS 4 with fixed dilated pupils and mild cough but no corneals no doll's    Assessment: Large acute subdural hematoma with severe shift    PLAN: No neurosurgical intervention per my discussion with the family and mainly supportive treatment.  Neurosurgery will sign off.

## 2025-02-09 LAB
MICROORGANISM SPEC CULT: NORMAL
SPECIMEN DESCRIPTION: NORMAL

## 2025-02-09 NOTE — DISCHARGE SUMMARY
[facility] Hospitalist Discharge Summary         Arianna Coleman  MRN: [id number]  Account Number: [id number]  Admitted: 2/7/2025  Discharge Date: 02/08/25    PCP Handoff    Recommended Outpatient Testing      Results Pending At Discharge          Clinical Summary  Patient admitted on 2/7/2025      Ms. Arianna Coleman, a 89 y.o. year old female  who  has a past medical history of Arthritis, Atrial fibrillation ([facility]), Chronic combined systolic (congestive) and diastolic (congestive) heart failure ([facility]), Coronary artery disease involving native coronary artery of native heart without angina pectoris, Hx of blood clots, Hypertension, Ischemic colitis ([facility]), and PAF (paroxysmal atrial fibrillation) ([facility]).      Patient presented to the ER via EMS from home after she fell off a chair while talking to her son and slid out of the seat.  Upon EMS arrival patient was nonresponsive and required assisted ventilation.  She was subsequently intubated in the ER.  Patient was previously on Eliquis for atrial fibrillation however was discontinued on prior last admission on December 2024 after she came in with GI bleed.     On ER presentation she was hypothermic, hypertensive.   CT head Right holohemispheric large subdural hematoma with considerable mass effect and midline shift associated with transtentorial cerebral herniation together with early obstructive hydrocephalus.   Neurosurgery was consulted from the ER.  Patient was be admitted to NICU.  [facility] was called for admission.  While in the ICU neurosurgeon met with the patient's daughter and recommended no surgical intervention, goals of care were addressed, patient CODE STATUS was changed to limited code with no intubation, CPR or defibrillation/resuscitative medications.  Per documentation plan is for compassionate extubation later today after family arrives from out of state.     Traumatic large right subdural hematoma with midline shift  History  of dementia  Acute respiratory failure s/p intubation 2/ above  Dysphagia  Fall  Patient was compassionately extubated and  on 2025 at 1457         Discharge Medications     Medication List        ASK your doctor about these medications      dilTIAZem 120 MG extended release capsule  Commonly known as: CARDIZEM CD  Take 1 capsule by mouth daily     hydrALAZINE 25 MG tablet  Commonly known as: APRESOLINE  Take 1 tablet by mouth every 8 hours     losartan 50 MG tablet  Commonly known as: COZAAR  Take 1 tablet by mouth daily     pantoprazole 40 MG tablet  Commonly known as: PROTONIX  Take 1 tablet by mouth every morning (before breakfast)     sucralfate 1 GM tablet  Commonly known as: CARAFATE  Take 1 tablet by mouth 4 times daily (before meals and nightly)                  Physician(s) Follow Up:  No follow-up provider specified.    Condition at Discharge:     Disposition:        The patient's condition is stable.  At this time the patient is without objective evidence of an acute process requiring continuing hospitalization or inpatient management.  They are stable for discharge with outpatient follow-up.     I have spoken with the patient and discussed the results of the current hospitalization, in addition to providing specific details for the plan of care and counseling regarding the diagnosis and prognosis.  The plan has been discussed in detail and they are aware of the specific conditions for emergent return, as well as the importance of follow-up.  Their questions are answered at this time and they are agreeable with the plan for discharge    I reviewed discharge recommendations with the patient in person/family.      On day of discharge I saw Arianna Coleman and Total time spent on day of encounter: 20 minutes on discharge.    Completed by: Adán Myers MD on 25, 9:36 AM

## (undated) PROCEDURE — 0BH17EZ INSERTION OF ENDOTRACHEAL AIRWAY INTO TRACHEA, VIA NATURAL OR ARTIFICIAL OPENING: ICD-10-PCS

## (undated) PROCEDURE — 3E033XZ INTRODUCTION OF VASOPRESSOR INTO PERIPHERAL VEIN, PERCUTANEOUS APPROACH: ICD-10-PCS